# Patient Record
Sex: MALE | Race: BLACK OR AFRICAN AMERICAN | Employment: FULL TIME | URBAN - METROPOLITAN AREA
[De-identification: names, ages, dates, MRNs, and addresses within clinical notes are randomized per-mention and may not be internally consistent; named-entity substitution may affect disease eponyms.]

---

## 2020-06-01 ENCOUNTER — APPOINTMENT (OUTPATIENT)
Dept: GENERAL RADIOLOGY | Age: 30
End: 2020-06-01
Payer: COMMERCIAL

## 2020-06-01 ENCOUNTER — HOSPITAL ENCOUNTER (EMERGENCY)
Age: 30
Discharge: HOME | End: 2020-06-01
Payer: COMMERCIAL

## 2020-06-01 VITALS
TEMPERATURE: 100.2 F | HEART RATE: 76 BPM | BODY MASS INDEX: 26.18 KG/M2 | DIASTOLIC BLOOD PRESSURE: 90 MMHG | HEIGHT: 76 IN | SYSTOLIC BLOOD PRESSURE: 128 MMHG | RESPIRATION RATE: 16 BRPM | OXYGEN SATURATION: 97 % | WEIGHT: 215 LBS

## 2020-06-01 LAB — SARS-COV-2, NAAT: NOT DETECTED

## 2020-06-01 PROCEDURE — U0002 COVID-19 LAB TEST NON-CDC: HCPCS

## 2020-06-01 PROCEDURE — 71045 X-RAY EXAM CHEST 1 VIEW: CPT

## 2020-06-01 PROCEDURE — 99283 EMERGENCY DEPT VISIT LOW MDM: CPT

## 2020-06-01 PROCEDURE — 6370000000 HC RX 637 (ALT 250 FOR IP): Performed by: NURSE PRACTITIONER

## 2020-06-01 RX ORDER — IBUPROFEN 800 MG/1
800 TABLET ORAL ONCE
Status: COMPLETED | OUTPATIENT
Start: 2020-06-01 | End: 2020-06-01

## 2020-06-01 RX ORDER — ACETAMINOPHEN 500 MG
1000 TABLET ORAL ONCE
Status: COMPLETED | OUTPATIENT
Start: 2020-06-01 | End: 2020-06-01

## 2020-06-01 RX ADMIN — ACETAMINOPHEN 1000 MG: 500 TABLET ORAL at 14:55

## 2020-06-01 RX ADMIN — IBUPROFEN 800 MG: 800 TABLET, FILM COATED ORAL at 16:28

## 2020-06-01 ASSESSMENT — PAIN SCALES - GENERAL
PAINLEVEL_OUTOF10: 8
PAINLEVEL_OUTOF10: 6

## 2020-06-01 NOTE — ED PROVIDER NOTES
[facility]     Department of Emergency Medicine   ED  Provider Note  Admit Date/RoomTime: 6/1/2020  2:35 PM  ED Room: 33/   Chief Complaint:   Fever (states temp was 103 at home starting friday increased weakness and bodyaches ); Chills; and Fatigue    History of Present Illness   Source of history provided by:  patient. History/Exam Limitations: none. Maureen Cardona is a 34 y.o. old male who has a past medical Hx of:   Past Medical History:   Diagnosis Date    Asthma     presents to the emergency department by private vehicle, for fever, which began 4 day(s) prior to arrival.  The fever is described as: low grade fevers and measured orally. Since onset the symptoms have been stable. His symptoms are associated with slight cough. There has been NO history of none of significance. He has been treated with OTC antipyretics prior to arrival.  Patient stated that he has recently been  at a family get together starting on Friday so developing low-grade fevers. He states they have been going on since then. Yesterday he woke up and was unable to taste foods or beverages. Is denying any type of fatigue. He has been having associated symptoms of a slight cough as well as a headache. N/A  ROS    Pertinent positives and negatives are stated within HPI, all other systems reviewed and are negative. No past surgical history on file. Social History:  reports that he has been smoking. He does not have any smokeless tobacco history on file. He reports that he does not drink alcohol or use drugs. Family History: family history is not on file. Allergies: Patient has no known allergies.     Physical Exam           ED Triage Vitals   BP Temp Temp Source Pulse Resp SpO2 Height Weight   06/01/20 1423 06/01/20 1420 06/01/20 1420 06/01/20 1420 06/01/20 1423 06/01/20 1420 06/01/20 1423 06/01/20 1423   129/70 99.5 °F (37.5 °C) Temporal 113 18 97 % 6' 4\" (1.93 m) 215 lb (97.5 kg)     Oxygen Saturation Interpretation: Bilobed Flap Text: The defect edges were debeveled with a #15 scalpel blade.  Given the location of the defect and the proximity to free margins a bilobe flap was deemed most appropriate.  Using a sterile surgical marker, an appropriate bilobe flap drawn around the defect.    The area thus outlined was incised deep to adipose tissue with a #15 scalpel blade.  The skin margins were undermined to an appropriate distance in all directions utilizing iris scissors.

## 2020-06-02 ENCOUNTER — CARE COORDINATION (OUTPATIENT)
Dept: CARE COORDINATION | Age: 30
End: 2020-06-02

## 2020-06-02 NOTE — CARE COORDINATION
Date/Time:  6/2/2020 10:56 AM  Attempted to reach patient by telephone. Unable to leave a message. Mailbox full. Will attempt to reach patient again.

## 2020-06-03 ENCOUNTER — CARE COORDINATION (OUTPATIENT)
Dept: CARE COORDINATION | Age: 30
End: 2020-06-03

## 2020-06-03 NOTE — CARE COORDINATION
Patient's preferred phone number: [phone]  Based on Loop alert triggers, patient will be contacted by nurse care manager for worsening symptoms. Pt will be further monitored by COVID Loop Team based on severity of symptoms and risk factors.

## 2021-10-05 ENCOUNTER — APPOINTMENT (OUTPATIENT)
Dept: CT IMAGING | Age: 31
DRG: 890 | End: 2021-10-05
Payer: COMMERCIAL

## 2021-10-05 ENCOUNTER — OFFICE VISIT (OUTPATIENT)
Dept: FAMILY MEDICINE CLINIC | Age: 31
DRG: 890 | End: 2021-10-05
Payer: COMMERCIAL

## 2021-10-05 ENCOUNTER — HOSPITAL ENCOUNTER (INPATIENT)
Age: 31
LOS: 33 days | Discharge: HOME | DRG: 890 | End: 2021-11-07
Attending: EMERGENCY MEDICINE | Admitting: FAMILY MEDICINE
Payer: COMMERCIAL

## 2021-10-05 VITALS
RESPIRATION RATE: 24 BRPM | BODY MASS INDEX: 20.19 KG/M2 | HEIGHT: 77 IN | WEIGHT: 171 LBS | SYSTOLIC BLOOD PRESSURE: 130 MMHG | TEMPERATURE: 100.2 F | HEART RATE: 140 BPM | OXYGEN SATURATION: 96 % | DIASTOLIC BLOOD PRESSURE: 76 MMHG

## 2021-10-05 DIAGNOSIS — N17.9 ACUTE KIDNEY INJURY DUE TO COVID-19 (HCC): ICD-10-CM

## 2021-10-05 DIAGNOSIS — R00.0 TACHYCARDIA: ICD-10-CM

## 2021-10-05 DIAGNOSIS — U07.1 ACUTE KIDNEY INJURY DUE TO COVID-19 (HCC): ICD-10-CM

## 2021-10-05 DIAGNOSIS — N17.9 AKI (ACUTE KIDNEY INJURY) (HCC): ICD-10-CM

## 2021-10-05 DIAGNOSIS — U07.1 COVID-19: Primary | ICD-10-CM

## 2021-10-05 DIAGNOSIS — R06.02 SHORTNESS OF BREATH: Primary | ICD-10-CM

## 2021-10-05 PROBLEM — J96.01 ACUTE RESPIRATORY FAILURE WITH HYPOXIA (HCC): Status: ACTIVE | Noted: 2021-10-05

## 2021-10-05 LAB
ALBUMIN SERPL-MCNC: 1.7 G/DL (ref 3.5–5.2)
ALP BLD-CCNC: 74 U/L (ref 40–129)
ALT SERPL-CCNC: 28 U/L (ref 0–40)
ANION GAP SERPL CALCULATED.3IONS-SCNC: 8 MMOL/L (ref 7–16)
AST SERPL-CCNC: 106 U/L (ref 0–39)
BASOPHILS ABSOLUTE: 0 E9/L (ref 0–0.2)
BASOPHILS RELATIVE PERCENT: 0 % (ref 0–2)
BILIRUB SERPL-MCNC: 0.3 MG/DL (ref 0–1.2)
BUN BLDV-MCNC: 41 MG/DL (ref 6–20)
CALCIUM SERPL-MCNC: 7.7 MG/DL (ref 8.6–10.2)
CHLORIDE BLD-SCNC: 102 MMOL/L (ref 98–107)
CO2: 20 MMOL/L (ref 22–29)
CREAT SERPL-MCNC: 3.6 MG/DL (ref 0.7–1.2)
EOSINOPHILS ABSOLUTE: 0 E9/L (ref 0.05–0.5)
EOSINOPHILS RELATIVE PERCENT: 0 % (ref 0–6)
GFR AFRICAN AMERICAN: 24
GFR NON-AFRICAN AMERICAN: 24 ML/MIN/1.73
GLUCOSE BLD-MCNC: 89 MG/DL (ref 74–99)
HCT VFR BLD CALC: 30 % (ref 37–54)
HEMOGLOBIN: 9.9 G/DL (ref 12.5–16.5)
LACTIC ACID, SEPSIS: 0.9 MMOL/L (ref 0.5–1.9)
LYMPHOCYTES ABSOLUTE: 0.76 E9/L (ref 1.5–4)
LYMPHOCYTES RELATIVE PERCENT: 17.9 % (ref 20–42)
MAGNESIUM: 2.7 MG/DL (ref 1.6–2.6)
MCH RBC QN AUTO: 29.2 PG (ref 26–35)
MCHC RBC AUTO-ENTMCNC: 33 % (ref 32–34.5)
MCV RBC AUTO: 88.5 FL (ref 80–99.9)
MONOCYTES ABSOLUTE: 0.13 E9/L (ref 0.1–0.95)
MONOCYTES RELATIVE PERCENT: 2.7 % (ref 2–12)
NEUTROPHILS ABSOLUTE: 3.36 E9/L (ref 1.8–7.3)
NEUTROPHILS RELATIVE PERCENT: 79.5 % (ref 43–80)
OVALOCYTES: ABNORMAL
PDW BLD-RTO: 14.8 FL (ref 11.5–15)
PLATELET # BLD: 178 E9/L (ref 130–450)
PMV BLD AUTO: 11.9 FL (ref 7–12)
POLYCHROMASIA: ABNORMAL
POTASSIUM SERPL-SCNC: 5.2 MMOL/L (ref 3.5–5)
PRO-BNP: 1599 PG/ML (ref 0–125)
RBC # BLD: 3.39 E12/L (ref 3.8–5.8)
SARS-COV-2, NAAT: DETECTED
SCHISTOCYTES: ABNORMAL
SODIUM BLD-SCNC: 130 MMOL/L (ref 132–146)
TOTAL PROTEIN: 6 G/DL (ref 6.4–8.3)
TROPONIN, HIGH SENSITIVITY: 78 NG/L (ref 0–11)
TROPONIN, HIGH SENSITIVITY: 78 NG/L (ref 0–11)
WBC # BLD: 4.2 E9/L (ref 4.5–11.5)

## 2021-10-05 PROCEDURE — 71250 CT THORAX DX C-: CPT

## 2021-10-05 PROCEDURE — 83735 ASSAY OF MAGNESIUM: CPT

## 2021-10-05 PROCEDURE — 85025 COMPLETE CBC W/AUTO DIFF WBC: CPT

## 2021-10-05 PROCEDURE — 6370000000 HC RX 637 (ALT 250 FOR IP): Performed by: EMERGENCY MEDICINE

## 2021-10-05 PROCEDURE — 83605 ASSAY OF LACTIC ACID: CPT

## 2021-10-05 PROCEDURE — G8484 FLU IMMUNIZE NO ADMIN: HCPCS | Performed by: STUDENT IN AN ORGANIZED HEALTH CARE EDUCATION/TRAINING PROGRAM

## 2021-10-05 PROCEDURE — 87186 SC STD MICRODIL/AGAR DIL: CPT

## 2021-10-05 PROCEDURE — 74176 CT ABD & PELVIS W/O CONTRAST: CPT

## 2021-10-05 PROCEDURE — 2580000003 HC RX 258: Performed by: EMERGENCY MEDICINE

## 2021-10-05 PROCEDURE — 80053 COMPREHEN METABOLIC PANEL: CPT

## 2021-10-05 PROCEDURE — 99202 OFFICE O/P NEW SF 15 MIN: CPT | Performed by: STUDENT IN AN ORGANIZED HEALTH CARE EDUCATION/TRAINING PROGRAM

## 2021-10-05 PROCEDURE — G8427 DOCREV CUR MEDS BY ELIG CLIN: HCPCS | Performed by: STUDENT IN AN ORGANIZED HEALTH CARE EDUCATION/TRAINING PROGRAM

## 2021-10-05 PROCEDURE — 87635 SARS-COV-2 COVID-19 AMP PRB: CPT

## 2021-10-05 PROCEDURE — 93005 ELECTROCARDIOGRAM TRACING: CPT | Performed by: EMERGENCY MEDICINE

## 2021-10-05 PROCEDURE — 99285 EMERGENCY DEPT VISIT HI MDM: CPT

## 2021-10-05 PROCEDURE — 87077 CULTURE AEROBIC IDENTIFY: CPT

## 2021-10-05 PROCEDURE — 84484 ASSAY OF TROPONIN QUANT: CPT

## 2021-10-05 PROCEDURE — 87040 BLOOD CULTURE FOR BACTERIA: CPT

## 2021-10-05 PROCEDURE — 6360000002 HC RX W HCPCS: Performed by: FAMILY MEDICINE

## 2021-10-05 PROCEDURE — 99204 OFFICE O/P NEW MOD 45 MIN: CPT | Performed by: STUDENT IN AN ORGANIZED HEALTH CARE EDUCATION/TRAINING PROGRAM

## 2021-10-05 PROCEDURE — 1036F TOBACCO NON-USER: CPT | Performed by: STUDENT IN AN ORGANIZED HEALTH CARE EDUCATION/TRAINING PROGRAM

## 2021-10-05 PROCEDURE — 3E0333Z INTRODUCTION OF ANTI-INFLAMMATORY INTO PERIPHERAL VEIN, PERCUTANEOUS APPROACH: ICD-10-PCS | Performed by: FAMILY MEDICINE

## 2021-10-05 PROCEDURE — G8420 CALC BMI NORM PARAMETERS: HCPCS | Performed by: STUDENT IN AN ORGANIZED HEALTH CARE EDUCATION/TRAINING PROGRAM

## 2021-10-05 PROCEDURE — 83880 ASSAY OF NATRIURETIC PEPTIDE: CPT

## 2021-10-05 PROCEDURE — 2140000000 HC CCU INTERMEDIATE R&B

## 2021-10-05 RX ORDER — SODIUM CHLORIDE 9 MG/ML
25 INJECTION, SOLUTION INTRAVENOUS PRN
Status: DISCONTINUED | OUTPATIENT
Start: 2021-10-05 | End: 2021-11-07 | Stop reason: HOSPADM

## 2021-10-05 RX ORDER — CHLORHEXIDINE GLUCONATE 0.12 MG/ML
RINSE ORAL
Status: ON HOLD | COMMUNITY
Start: 2021-09-09 | End: 2021-11-07 | Stop reason: HOSPADM

## 2021-10-05 RX ORDER — 0.9 % SODIUM CHLORIDE 0.9 %
1000 INTRAVENOUS SOLUTION INTRAVENOUS ONCE
Status: COMPLETED | OUTPATIENT
Start: 2021-10-05 | End: 2021-10-05

## 2021-10-05 RX ORDER — 0.9 % SODIUM CHLORIDE 0.9 %
1000 INTRAVENOUS SOLUTION INTRAVENOUS ONCE
Status: COMPLETED | OUTPATIENT
Start: 2021-10-06 | End: 2021-10-06

## 2021-10-05 RX ORDER — ACETAMINOPHEN 500 MG
1000 TABLET ORAL ONCE
Status: COMPLETED | OUTPATIENT
Start: 2021-10-05 | End: 2021-10-05

## 2021-10-05 RX ORDER — IBUPROFEN 800 MG/1
800 TABLET ORAL ONCE
Status: COMPLETED | OUTPATIENT
Start: 2021-10-05 | End: 2021-10-05

## 2021-10-05 RX ORDER — DEXAMETHASONE SODIUM PHOSPHATE 10 MG/ML
10 INJECTION INTRAMUSCULAR; INTRAVENOUS EVERY 12 HOURS
Status: DISCONTINUED | OUTPATIENT
Start: 2021-10-05 | End: 2021-10-08 | Stop reason: SDUPTHER

## 2021-10-05 RX ADMIN — DEXAMETHASONE SODIUM PHOSPHATE 10 MG: 10 INJECTION INTRAMUSCULAR; INTRAVENOUS at 23:43

## 2021-10-05 RX ADMIN — ACETAMINOPHEN 1000 MG: 500 TABLET ORAL at 16:08

## 2021-10-05 RX ADMIN — SODIUM CHLORIDE 1000 ML: 9 INJECTION, SOLUTION INTRAVENOUS at 16:09

## 2021-10-05 RX ADMIN — IBUPROFEN 800 MG: 800 TABLET, FILM COATED ORAL at 22:06

## 2021-10-05 RX ADMIN — SODIUM CHLORIDE 1000 ML: 9 INJECTION, SOLUTION INTRAVENOUS at 23:55

## 2021-10-05 SDOH — ECONOMIC STABILITY: FOOD INSECURITY: WITHIN THE PAST 12 MONTHS, THE FOOD YOU BOUGHT JUST DIDN'T LAST AND YOU DIDN'T HAVE MONEY TO GET MORE.: SOMETIMES TRUE

## 2021-10-05 SDOH — ECONOMIC STABILITY: FOOD INSECURITY: WITHIN THE PAST 12 MONTHS, YOU WORRIED THAT YOUR FOOD WOULD RUN OUT BEFORE YOU GOT MONEY TO BUY MORE.: SOMETIMES TRUE

## 2021-10-05 ASSESSMENT — ENCOUNTER SYMPTOMS
CONSTIPATION: 0
WHEEZING: 1
ABDOMINAL PAIN: 0
SINUS PAIN: 0
VOMITING: 1
CHEST TIGHTNESS: 1
COUGH: 1
SORE THROAT: 0
COUGH: 1
BLOOD IN STOOL: 0
VOMITING: 0
DIARRHEA: 1
ABDOMINAL PAIN: 0
SHORTNESS OF BREATH: 1
NAUSEA: 0
RHINORRHEA: 0
SHORTNESS OF BREATH: 1
NAUSEA: 1

## 2021-10-05 ASSESSMENT — LIFESTYLE VARIABLES: HOW OFTEN DO YOU HAVE A DRINK CONTAINING ALCOHOL: NEVER

## 2021-10-05 ASSESSMENT — SOCIAL DETERMINANTS OF HEALTH (SDOH): HOW HARD IS IT FOR YOU TO PAY FOR THE VERY BASICS LIKE FOOD, HOUSING, MEDICAL CARE, AND HEATING?: NOT VERY HARD

## 2021-10-05 ASSESSMENT — PAIN SCALES - GENERAL
PAINLEVEL_OUTOF10: 6
PAINLEVEL_OUTOF10: 9
PAINLEVEL_OUTOF10: 0

## 2021-10-05 ASSESSMENT — PAIN DESCRIPTION - LOCATION: LOCATION: CHEST

## 2021-10-05 ASSESSMENT — PAIN DESCRIPTION - PAIN TYPE: TYPE: ACUTE PAIN

## 2021-10-05 NOTE — PROGRESS NOTES
[facility]  FAMILY MEDICINE RESIDENCY PROGRAM  DATE OF VISIT : 10/5/2021    Patient : Alee Salas   Age : 36 y.o.  : 1990   MRN : <[id number]>   ______________________________________________________________________    Chief Complaint :   Chief Complaint   Patient presents with    [facility] Care     post COVID       HPI : Alee Salas is 36 y.o. male who presented to the clinic today for Shortness of breath    SOB  - COVID 19- positive in Aug  - experienced cough and SOB back then. Improved 10 days later  - 1 month ago began having return SOB, increase sputum production and cough  - reports unintentional weight loss of 50 lbs since Aug due to decrease appetite. - weakness in LE and numbness in feet to the point he can not shower on his own  - denies any recent sick contacts  - Temperature yesterday at home 102F    Past Medical History :  Past Medical History:   Diagnosis Date    Asthma      History reviewed. No pertinent surgical history. Allergies :   No Known Allergies    Medication List :    No current outpatient medications on file. No current facility-administered medications for this visit. Family History :    Family History   Problem Relation Age of Onset    Diabetes type 2  Mother     Diabetes type 2  Father        Surgical History :   History reviewed. No pertinent surgical history. Social History :   Social History     Tobacco Use    Smoking status: Former Smoker     Quit date: 2021     Years since quittin.1    Smokeless tobacco: Never Used   Substance Use Topics    Alcohol use: No    Drug use: No       Review of Systems :  Review of Systems   Constitutional: Positive for appetite change, fatigue, fever and unexpected weight change. HENT: Negative for congestion, sinus pain and sore throat. Respiratory: Positive for cough and shortness of breath. Cardiovascular: Positive for palpitations. Negative for chest pain.    Gastrointestinal: Positive for nausea and vomiting. Negative for abdominal pain. Endocrine: Positive for cold intolerance.     ______________________________________________________________________    Physical Exam :    Vitals: /76 (Site: Right Upper Arm, Position: Sitting, Cuff Size: Medium Adult)   Pulse 140   Temp 100.2 °F (37.9 °C) (Temporal)   Resp 24   Ht 6' 4.5\" (1.943 m)   Wt 171 lb (77.6 kg)   SpO2 96%   BMI 20.54 kg/m²   General Appearance: awake, alert, oriented, and ill appearing  HEENT: NCAT, MMM, no pallor or icterus. Neck: Supple, symmetrical, trachea midline Cervical  Lymphadenopathy   Chest wall/Lung: Course breath sounds. Wheezing bilaterally. Heart[de-identified] tachycardic, normal S1 and S2, no murmurs, rubs or gallops. Abdomen: SNTND, +BSx4. Extremities: Extremities normal, atraumatic, no cyanosis, clubbing or edema. Warm extremeties  Skin: Skin color, texture, turgor normal, no rashes or lesions  Musculokeletal: ROM grossly normal in all joints of extremities, no obvious joint swelling. Lymph nodes: cervical  lymph node enlargement appreciated  ___________________________________________________________________    Assessment & Plan :    1. Shortness of breath and tachycardia - differential diagnosis (COVID vs PNA vs asthma exacerbation)  - Pulse ox 94% while sitting, desaturated to 80% while walking 10 feet. - upon sitting down patient pulse ox increase to 90%, .   - given history of being covid positive in Aug, Pulse ox desaturation with minimal exertion and tachycardia patient will be sent to ED for further evaluation.            Ching Austin MD

## 2021-10-05 NOTE — ED NOTES
Bed: 19  Expected date:   Expected time:   Means of arrival:   Comments:  KANDI Leyva RN  10/05/21 4133

## 2021-10-05 NOTE — PROGRESS NOTES
S: 36 y.o. male here as a new patient to establish care. Hx of asthma. Dyspnea at rest and while sleeping, fatigue and numbness in pedal extremities. Symptoms started with COVID. Resolved and then returned about a month ago. Fever yesterday to 102. Lost 50 pounds unintentionally since he was diagnosed with COVID. Not using albuterol. O: VS: /76 (Site: Right Upper Arm, Position: Sitting, Cuff Size: Medium Adult)   Pulse 140   Temp 100.2 °F (37.9 °C) (Temporal)   Resp 24   Ht 6' 4.5\" (1.943 m)   Wt 171 lb (77.6 kg)   SpO2 96%   BMI 20.54 kg/m²    General: NAD   CV:  RRR, no gallops, rubs, or murmurs   Resp: diffuse wheezing anterior and bibasilar with coarse bs throughout   Abd:  Soft, nontender, no masses    Ext:  no C/C/E   Rt anterior cervical lad  Impression/Plan:   1. Dyspnea-ambulatory pulse for oxygen and pulse recheck with desaturation to 80s. Glendia Reeve -to ER. Possible asthma,pneumonia, or covid/viral etiologies. 2.Weight loss-HIV testing    To the ER      Attending Physician Statement  I have discussed the case, including pertinent history and exam findings with the resident. I saw and examined the patient. I agree with the documented assessment and plan.         Marilia Wilson MD

## 2021-10-06 PROBLEM — U07.1 PNEUMONIA DUE TO COVID-19 VIRUS: Status: ACTIVE | Noted: 2021-10-06

## 2021-10-06 PROBLEM — J45.909 ASTHMA: Status: ACTIVE | Noted: 2021-10-06

## 2021-10-06 PROBLEM — J12.82 PNEUMONIA DUE TO COVID-19 VIRUS: Status: ACTIVE | Noted: 2021-10-06

## 2021-10-06 LAB
ALBUMIN SERPL-MCNC: 1.3 G/DL (ref 3.5–5.2)
ALP BLD-CCNC: 67 U/L (ref 40–129)
ALT SERPL-CCNC: 27 U/L (ref 0–40)
ANION GAP SERPL CALCULATED.3IONS-SCNC: 7 MMOL/L (ref 7–16)
ANION GAP SERPL CALCULATED.3IONS-SCNC: 9 MMOL/L (ref 7–16)
AST SERPL-CCNC: 96 U/L (ref 0–39)
BACTERIA: ABNORMAL /HPF
BASOPHILS ABSOLUTE: 0.01 E9/L (ref 0–0.2)
BASOPHILS RELATIVE PERCENT: 0.4 % (ref 0–2)
BILIRUB SERPL-MCNC: 0.3 MG/DL (ref 0–1.2)
BILIRUBIN URINE: NEGATIVE
BLOOD, URINE: ABNORMAL
BUN BLDV-MCNC: 46 MG/DL (ref 6–20)
BUN BLDV-MCNC: 51 MG/DL (ref 6–20)
C-REACTIVE PROTEIN: 10.1 MG/DL (ref 0–0.4)
CALCIUM IONIZED: 1.15 MMOL/L (ref 1.15–1.33)
CALCIUM SERPL-MCNC: 7.2 MG/DL (ref 8.6–10.2)
CALCIUM SERPL-MCNC: 7.9 MG/DL (ref 8.6–10.2)
CHLORIDE BLD-SCNC: 106 MMOL/L (ref 98–107)
CHLORIDE BLD-SCNC: 108 MMOL/L (ref 98–107)
CHLORIDE URINE RANDOM: <20 MMOL/L
CLARITY: CLEAR
CO2: 14 MMOL/L (ref 22–29)
CO2: 17 MMOL/L (ref 22–29)
COARSE CASTS, UA: ABNORMAL /LPF (ref 0–2)
COLOR: YELLOW
CREAT SERPL-MCNC: 3.4 MG/DL (ref 0.7–1.2)
CREAT SERPL-MCNC: 3.5 MG/DL (ref 0.7–1.2)
CREATININE URINE: 129 MG/DL (ref 40–278)
D DIMER: 712 NG/ML DDU
EKG ATRIAL RATE: 114 BPM
EKG P AXIS: 66 DEGREES
EKG P-R INTERVAL: 118 MS
EKG Q-T INTERVAL: 312 MS
EKG QRS DURATION: 72 MS
EKG QTC CALCULATION (BAZETT): 430 MS
EKG R AXIS: 66 DEGREES
EKG T AXIS: 43 DEGREES
EKG VENTRICULAR RATE: 114 BPM
EOSINOPHILS ABSOLUTE: 0 E9/L (ref 0.05–0.5)
EOSINOPHILS RELATIVE PERCENT: 0 % (ref 0–6)
EPITHELIAL CELLS, UA: ABNORMAL /HPF
FERRITIN: 3921 NG/ML
FIBRINOGEN: >700 MG/DL (ref 225–540)
FOLATE: 13.8 NG/ML (ref 4.8–24.2)
GFR AFRICAN AMERICAN: 25
GFR AFRICAN AMERICAN: 26
GFR NON-AFRICAN AMERICAN: 25 ML/MIN/1.73
GFR NON-AFRICAN AMERICAN: 26 ML/MIN/1.73
GLUCOSE BLD-MCNC: 100 MG/DL (ref 74–99)
GLUCOSE BLD-MCNC: 113 MG/DL (ref 74–99)
GLUCOSE URINE: NEGATIVE MG/DL
HCT VFR BLD CALC: 29 % (ref 37–54)
HEMOGLOBIN: 9.6 G/DL (ref 12.5–16.5)
IMMATURE GRANULOCYTES #: 0.02 E9/L
IMMATURE GRANULOCYTES %: 0.9 % (ref 0–5)
IRON SATURATION: 19 % (ref 20–55)
IRON: 23 MCG/DL (ref 59–158)
KETONES, URINE: NEGATIVE MG/DL
L. PNEUMOPHILA SEROGP 1 UR AG: NORMAL
LACTATE DEHYDROGENASE: 1389 U/L (ref 135–225)
LEUKOCYTE ESTERASE, URINE: NEGATIVE
LYMPHOCYTES ABSOLUTE: 0.27 E9/L (ref 1.5–4)
LYMPHOCYTES RELATIVE PERCENT: 11.6 % (ref 20–42)
MCH RBC QN AUTO: 29.9 PG (ref 26–35)
MCHC RBC AUTO-ENTMCNC: 33.1 % (ref 32–34.5)
MCV RBC AUTO: 90.3 FL (ref 80–99.9)
MONOCYTES ABSOLUTE: 0.04 E9/L (ref 0.1–0.95)
MONOCYTES RELATIVE PERCENT: 1.7 % (ref 2–12)
NEUTROPHILS ABSOLUTE: 1.98 E9/L (ref 1.8–7.3)
NEUTROPHILS RELATIVE PERCENT: 85.4 % (ref 43–80)
NITRITE, URINE: NEGATIVE
OSMOLALITY URINE: 225 MOSM/KG (ref 300–900)
OVALOCYTES: ABNORMAL
PDW BLD-RTO: 15.1 FL (ref 11.5–15)
PH UA: 5 (ref 5–9)
PLATELET # BLD: 144 E9/L (ref 130–450)
PMV BLD AUTO: 10.7 FL (ref 7–12)
POIKILOCYTES: ABNORMAL
POLYCHROMASIA: ABNORMAL
POTASSIUM REFLEX MAGNESIUM: 5.2 MMOL/L (ref 3.5–5)
POTASSIUM SERPL-SCNC: 5.5 MMOL/L (ref 3.5–5)
POTASSIUM, UR: 19.4 MMOL/L
PROTEIN UA: >=300 MG/DL
RBC # BLD: 3.21 E12/L (ref 3.8–5.8)
RBC UA: ABNORMAL /HPF (ref 0–2)
SEDIMENTATION RATE, ERYTHROCYTE: 130 MM/HR (ref 0–15)
SMUDGE CELLS: ABNORMAL
SODIUM BLD-SCNC: 130 MMOL/L (ref 132–146)
SODIUM BLD-SCNC: 131 MMOL/L (ref 132–146)
SODIUM URINE: <20 MMOL/L
SPECIFIC GRAVITY UA: 1.01 (ref 1–1.03)
TOTAL CK: 605 U/L (ref 20–200)
TOTAL IRON BINDING CAPACITY: 118 MCG/DL (ref 250–450)
TOTAL PROTEIN: 6.3 G/DL (ref 6.4–8.3)
TSH SERPL DL<=0.05 MIU/L-ACNC: 3.22 UIU/ML (ref 0.27–4.2)
UREA NITROGEN, UR: 362 MG/DL (ref 800–1666)
UROBILINOGEN, URINE: 0.2 E.U./DL
VITAMIN B-12: 1005 PG/ML (ref 211–946)
WAXY CASTS: ABNORMAL /LPF
WBC # BLD: 2.3 E9/L (ref 4.5–11.5)
WBC UA: ABNORMAL /HPF (ref 0–5)

## 2021-10-06 PROCEDURE — 86140 C-REACTIVE PROTEIN: CPT

## 2021-10-06 PROCEDURE — 86701 HIV-1ANTIBODY: CPT

## 2021-10-06 PROCEDURE — 6370000000 HC RX 637 (ALT 250 FOR IP): Performed by: NURSE PRACTITIONER

## 2021-10-06 PROCEDURE — 84540 ASSAY OF URINE/UREA-N: CPT

## 2021-10-06 PROCEDURE — 80053 COMPREHEN METABOLIC PANEL: CPT

## 2021-10-06 PROCEDURE — 87186 SC STD MICRODIL/AGAR DIL: CPT

## 2021-10-06 PROCEDURE — 82746 ASSAY OF FOLIC ACID SERUM: CPT

## 2021-10-06 PROCEDURE — 6360000002 HC RX W HCPCS: Performed by: NURSE PRACTITIONER

## 2021-10-06 PROCEDURE — 84165 PROTEIN E-PHORESIS SERUM: CPT

## 2021-10-06 PROCEDURE — 83615 LACTATE (LD) (LDH) ENZYME: CPT

## 2021-10-06 PROCEDURE — 86702 HIV-2 ANTIBODY: CPT

## 2021-10-06 PROCEDURE — 87077 CULTURE AEROBIC IDENTIFY: CPT

## 2021-10-06 PROCEDURE — 83935 ASSAY OF URINE OSMOLALITY: CPT

## 2021-10-06 PROCEDURE — 93010 ELECTROCARDIOGRAM REPORT: CPT | Performed by: INTERNAL MEDICINE

## 2021-10-06 PROCEDURE — 84443 ASSAY THYROID STIM HORMONE: CPT

## 2021-10-06 PROCEDURE — 6360000002 HC RX W HCPCS: Performed by: FAMILY MEDICINE

## 2021-10-06 PROCEDURE — 2580000003 HC RX 258: Performed by: INTERNAL MEDICINE

## 2021-10-06 PROCEDURE — 82436 ASSAY OF URINE CHLORIDE: CPT

## 2021-10-06 PROCEDURE — 99223 1ST HOSP IP/OBS HIGH 75: CPT | Performed by: FAMILY MEDICINE

## 2021-10-06 PROCEDURE — 82570 ASSAY OF URINE CREATININE: CPT

## 2021-10-06 PROCEDURE — 83550 IRON BINDING TEST: CPT

## 2021-10-06 PROCEDURE — 86703 HIV-1/HIV-2 1 RESULT ANTBDY: CPT

## 2021-10-06 PROCEDURE — 82728 ASSAY OF FERRITIN: CPT

## 2021-10-06 PROCEDURE — 36415 COLL VENOUS BLD VENIPUNCTURE: CPT

## 2021-10-06 PROCEDURE — 84166 PROTEIN E-PHORESIS/URINE/CSF: CPT

## 2021-10-06 PROCEDURE — 85384 FIBRINOGEN ACTIVITY: CPT

## 2021-10-06 PROCEDURE — 82607 VITAMIN B-12: CPT

## 2021-10-06 PROCEDURE — 87449 NOS EACH ORGANISM AG IA: CPT

## 2021-10-06 PROCEDURE — 85651 RBC SED RATE NONAUTOMATED: CPT

## 2021-10-06 PROCEDURE — 85378 FIBRIN DEGRADE SEMIQUANT: CPT

## 2021-10-06 PROCEDURE — 82330 ASSAY OF CALCIUM: CPT

## 2021-10-06 PROCEDURE — 81001 URINALYSIS AUTO W/SCOPE: CPT

## 2021-10-06 PROCEDURE — 82550 ASSAY OF CK (CPK): CPT

## 2021-10-06 PROCEDURE — P9047 ALBUMIN (HUMAN), 25%, 50ML: HCPCS | Performed by: NURSE PRACTITIONER

## 2021-10-06 PROCEDURE — 80074 ACUTE HEPATITIS PANEL: CPT

## 2021-10-06 PROCEDURE — 80048 BASIC METABOLIC PNL TOTAL CA: CPT

## 2021-10-06 PROCEDURE — 85025 COMPLETE CBC W/AUTO DIFF WBC: CPT

## 2021-10-06 PROCEDURE — 2140000000 HC CCU INTERMEDIATE R&B

## 2021-10-06 PROCEDURE — 2580000003 HC RX 258: Performed by: FAMILY MEDICINE

## 2021-10-06 PROCEDURE — 2500000003 HC RX 250 WO HCPCS: Performed by: INTERNAL MEDICINE

## 2021-10-06 PROCEDURE — 84300 ASSAY OF URINE SODIUM: CPT

## 2021-10-06 PROCEDURE — 84133 ASSAY OF URINE POTASSIUM: CPT

## 2021-10-06 PROCEDURE — 86038 ANTINUCLEAR ANTIBODIES: CPT

## 2021-10-06 PROCEDURE — 87088 URINE BACTERIA CULTURE: CPT

## 2021-10-06 PROCEDURE — 6360000002 HC RX W HCPCS: Performed by: INTERNAL MEDICINE

## 2021-10-06 PROCEDURE — 83540 ASSAY OF IRON: CPT

## 2021-10-06 PROCEDURE — 87040 BLOOD CULTURE FOR BACTERIA: CPT

## 2021-10-06 PROCEDURE — 82784 ASSAY IGA/IGD/IGG/IGM EACH: CPT

## 2021-10-06 RX ORDER — SODIUM CHLORIDE 0.9 % (FLUSH) 0.9 %
5-40 SYRINGE (ML) INJECTION EVERY 12 HOURS SCHEDULED
Status: DISCONTINUED | OUTPATIENT
Start: 2021-10-06 | End: 2021-11-07 | Stop reason: HOSPADM

## 2021-10-06 RX ORDER — SODIUM CHLORIDE 0.9 % (FLUSH) 0.9 %
5-40 SYRINGE (ML) INJECTION PRN
Status: DISCONTINUED | OUTPATIENT
Start: 2021-10-06 | End: 2021-11-07 | Stop reason: HOSPADM

## 2021-10-06 RX ORDER — ALBUMIN (HUMAN) 12.5 G/50ML
25 SOLUTION INTRAVENOUS EVERY 6 HOURS
Status: DISPENSED | OUTPATIENT
Start: 2021-10-06 | End: 2021-10-07

## 2021-10-06 RX ORDER — HEPARIN SODIUM 10000 [USP'U]/ML
5000 INJECTION, SOLUTION INTRAVENOUS; SUBCUTANEOUS EVERY 8 HOURS
Status: DISCONTINUED | OUTPATIENT
Start: 2021-10-07 | End: 2021-10-15

## 2021-10-06 RX ORDER — ONDANSETRON 4 MG/1
4 TABLET, ORALLY DISINTEGRATING ORAL EVERY 8 HOURS PRN
Status: DISCONTINUED | OUTPATIENT
Start: 2021-10-06 | End: 2021-11-07 | Stop reason: HOSPADM

## 2021-10-06 RX ORDER — HEPARIN SODIUM 10000 [USP'U]/ML
5000 INJECTION, SOLUTION INTRAVENOUS; SUBCUTANEOUS EVERY 8 HOURS
Status: DISCONTINUED | OUTPATIENT
Start: 2021-10-06 | End: 2021-10-06

## 2021-10-06 RX ORDER — ACETAMINOPHEN 650 MG/1
650 SUPPOSITORY RECTAL EVERY 6 HOURS PRN
Status: DISCONTINUED | OUTPATIENT
Start: 2021-10-06 | End: 2021-11-07 | Stop reason: HOSPADM

## 2021-10-06 RX ORDER — ACETAMINOPHEN 325 MG/1
650 TABLET ORAL EVERY 6 HOURS PRN
Status: DISCONTINUED | OUTPATIENT
Start: 2021-10-06 | End: 2021-11-07 | Stop reason: HOSPADM

## 2021-10-06 RX ORDER — ONDANSETRON 2 MG/ML
4 INJECTION INTRAMUSCULAR; INTRAVENOUS EVERY 6 HOURS PRN
Status: DISCONTINUED | OUTPATIENT
Start: 2021-10-06 | End: 2021-11-07 | Stop reason: HOSPADM

## 2021-10-06 RX ORDER — POLYETHYLENE GLYCOL 3350 17 G/17G
17 POWDER, FOR SOLUTION ORAL DAILY PRN
Status: DISCONTINUED | OUTPATIENT
Start: 2021-10-06 | End: 2021-11-07 | Stop reason: HOSPADM

## 2021-10-06 RX ADMIN — ALBUMIN (HUMAN) 25 G: 0.25 INJECTION, SOLUTION INTRAVENOUS at 16:54

## 2021-10-06 RX ADMIN — SODIUM BICARBONATE: 84 INJECTION, SOLUTION INTRAVENOUS at 16:54

## 2021-10-06 RX ADMIN — VANCOMYCIN HYDROCHLORIDE 1500 MG: 10 INJECTION, POWDER, LYOPHILIZED, FOR SOLUTION INTRAVENOUS at 16:54

## 2021-10-06 RX ADMIN — ENOXAPARIN SODIUM 40 MG: 40 INJECTION SUBCUTANEOUS at 08:48

## 2021-10-06 RX ADMIN — Medication 10 ML: at 21:30

## 2021-10-06 RX ADMIN — SODIUM ZIRCONIUM CYCLOSILICATE 10 G: 10 POWDER, FOR SUSPENSION ORAL at 21:30

## 2021-10-06 RX ADMIN — DEXAMETHASONE SODIUM PHOSPHATE 10 MG: 10 INJECTION INTRAMUSCULAR; INTRAVENOUS at 23:56

## 2021-10-06 RX ADMIN — DEXAMETHASONE SODIUM PHOSPHATE 10 MG: 10 INJECTION INTRAMUSCULAR; INTRAVENOUS at 10:44

## 2021-10-06 RX ADMIN — Medication 10 ML: at 08:48

## 2021-10-06 RX ADMIN — ALBUMIN (HUMAN) 25 G: 0.25 INJECTION, SOLUTION INTRAVENOUS at 23:56

## 2021-10-06 RX ADMIN — SODIUM BICARBONATE: 84 INJECTION, SOLUTION INTRAVENOUS at 23:46

## 2021-10-06 ASSESSMENT — PAIN SCALES - GENERAL
PAINLEVEL_OUTOF10: 0

## 2021-10-06 NOTE — CONSULTS
(DEFINITY) injection 1.65 mg, 1.5 mL, IntraVENous, ONCE PRN  0.9 % sodium chloride infusion, 25 mL, IntraVENous, PRN  Allergies:  Patient has no known allergies. Social History:    TOBACCO:   reports that he quit smoking about 7 weeks ago. He has never used smokeless tobacco.  ETOH:   reports no history of alcohol use. Family History:       Problem Relation Age of Onset    Diabetes type 2  Mother     Diabetes type 2  Father      REVIEW OF SYSTEMS:    CONSTITUTIONAL:  negative for  fevers, chills and sweats.  Positive for fatigue, malaise and anorexia  EYES:  negative for  visual disturbance and color blindness  HEENT:  negative for  epistaxis and sore throat  RESPIRATORY:  positive for  dyspnea  CARDIOVASCULAR:  negative for  chest pain, palpitations, orthopnea  GASTROINTESTINAL:  positive for nausea  GENITOURINARY:  negative  INTEGUMENT/BREAST:  negative  HEMATOLOGIC/LYMPHATIC:  negative for easy bruising and bleeding  ALLERGIC/IMMUNOLOGIC:  negative for angioedema and anaphylaxis  ENDOCRINE:  negative for heat intolerance and cold intolerance  MUSCULOSKELETAL:  negative for  joint swelling and stiff joints positive for muscle weakness  NEUROLOGICAL:  negative for headaches, dizziness and seizures  BEHAVIOR/PSYCH:  negative  PHYSICAL EXAM:      Vitals:    VITALS:  /80   Pulse 76   Temp 97.5 °F (36.4 °C) (Oral)   Resp 18   Ht 6' 4.5\" (1.943 m)   Wt 173 lb (78.5 kg)   SpO2 97%   BMI 20.78 kg/m²   24HR INTAKE/OUTPUT:    Intake/Output Summary (Last 24 hours) at 10/6/2021 1204  Last data filed at 10/6/2021 0659  Gross per 24 hour   Intake 1000 ml   Output    Net 1000 ml       Constitutional:  Awake, alert, NAD  HEENT:  PERRL, normocephalic, atraumatic  Respiratory: diminished lung sounds  Cardiovascular/Edema:  RRR, S1/S2, -edema  Gastrointestinal:  abd flat, soft, non-tender  Neurologic:  Awake, alert, no focal deficits  Skin:  Warm, dry, no rash or lesions  Other:      DATA:    CBC:   Lab Results Component Value Date    WBC 2.3 10/06/2021    RBC 3.21 10/06/2021    HGB 9.6 10/06/2021    HCT 29.0 10/06/2021    MCV 90.3 10/06/2021    MCH 29.9 10/06/2021    MCHC 33.1 10/06/2021    RDW 15.1 10/06/2021     10/06/2021    MPV 10.7 10/06/2021     CMP:    Lab Results   Component Value Date     10/06/2021    K 5.2 10/06/2021     10/06/2021    CO2 14 10/06/2021    BUN 46 10/06/2021    CREATININE 3.5 10/06/2021    GFRAA 25 10/06/2021    LABGLOM 25 10/06/2021    GLUCOSE 100 10/06/2021    PROT 6.3 10/06/2021    LABALBU 1.3 10/06/2021    CALCIUM 7.2 10/06/2021    BILITOT 0.3 10/06/2021    ALKPHOS 67 10/06/2021    AST 96 10/06/2021    ALT 27 10/06/2021     Magnesium:    Lab Results   Component Value Date    MG 2.7 10/05/2021     Phosphorus:  No results found for: PHOS  Radiology Review:      CT Chest October 5, 2021   Limited study by the patient's respiratory motion and lack of intravenous   contrast.       Extensive multifocal COVID-19 pneumonia bilaterally.       Bilateral trace layering pleural effusions.       Bilateral gynecomastia.       Abdominal organs inadequately evaluated due to the limitations of the study.       Normal appendix.       No evidence of bowel obstruction.             IMPRESSION/RECOMMENDATIONS:      Briefly, Mr. Fatimah Meyer is a 36year old male with no significant PMH who was admitted on October 5, 2021 after he presented from an outside clinic after he was found to be hypoxic during 6 minute walking test. Patient tested positive for Covid 19 on August 18th, he is unvaccinated and had Covid 19 last year as well. Patient states he has quarantined by himself and when his mother recently visited him she was shocked at his appearance as he had lost about 50 pounds in 2 and a half months. On admission labs were significant for sodium of 130, potassium 5.2, bicarbonate 20, BUN 41, creatinine 3.6, magnesium 2.7, calcium 7.7 and proBNP 1,599. We are consulted for DEISI.     DEISI stage II versus DEISI on CKD?, most likely volume responsive pre-renal DEISI d/t volume depletion from poor oral intake, urine sodium <20, Patient states he has lost about 50 pounds in the last 2 and a half months. Large proteinuria (U/A with > 300 mg), r/o CKD, FSGS?, we will obtain UACR and UPCR   Hyperkalemia, 2/2 DEISI. Low potassium diet  Hypotonic hyponatremia 2/2 intravascular volume from poor oral intake. Bicarb drip started. Low bicarbonate levels with hyperchloremia, most likely NAGMA versus respiratory alkalosis; we need a PH to clarify diagnosis    Severe Hypoalbuminemia, multifactorial  Covid 19 infection in non vaccinated pt    Plan:    Start D5 with 150 mEq sodium bicarbonate at 150 cc/hr  Give albumin 25 g Q6 hrs X 4 doses  BMP Q 6 hrs X3  Strict I&O's  Monitor potassium levels  Low potassium diet  Lokelma 10 g po every 8 hours x 3    Monitor albumin levels  Obtain ionized calcium in am  Obtain chest x-ray  Obtain urine ACR and PCR   Encourage oral intake      Thank you very much for allowing us to participate in the care of Mr. Neida Agosto.     Electronically signed by RICKY Murhpy CNP on 10/6/2021 at 12:08 PM

## 2021-10-06 NOTE — ED NOTES
This RN to bedside, pt resting comfortably in bed, vitals are stable, no distress noted at this time. Pt complains of pain in both lower extremities 9/10.    Nelda Whitman RN  10/05/21 2004       Nelda Whitman RN  10/05/21 2005

## 2021-10-06 NOTE — CARE COORDINATION
10/6: Transition of care: Pt came into the ER with SOB and weakness/Covid+ 10/5. Pt resides with his cousin Cindy with apprx 9 steps to enter. There are apprx another 12 steps to get to his bed/bathroom. Pt's PCP is  and [facility]. Pt has no DME/HHC or preferences. Pt's d/c plan is to return home. Pt's family can transport him. Pt is currently on room air, IV decadron 10mg Q 12hrs and pending a 2Decho. SW/CM will continue to follow. Electronically signed by Job Borges RN on 10/6/2021 at 9:03 AM  The Plan for Transition of Care is related to the following treatment goals: DME/HHC    The Patient and/or patient representative  was provided with a choice of provider and agrees   with the discharge plan. [x] Yes [] No    Freedom of choice list was provided with basic dialogue that supports the patient's individualized plan of care/goals, treatment preferences and shares the quality data associated with the providers.  [x] Yes [] No

## 2021-10-06 NOTE — PROGRESS NOTES
[facility] Inpatient   Resident Progress Note    S:  Hospital day: 1   Brief Synopsis: Mera Collado is a 36 y.o. male with no PMH who presented with hypoxia and fever. He was initially seen in PCP office and was hypoxic on exertion with desaturate into the mid low 80s as well as noted to be febrile and tachycardic. Per patient, his symptoms began 8/15/2021 and he tested positive for Covid 8/28. endorse worsening shortness of breath over the past month with sputum production, intermittent chest discomfort, persistent diarrhea, chills, fever, decrease in appetite and 50 pound weight loss since August.  Patient also endorses significantly worsening \"neuropathy\" of bilateral feet, states it feels like he is \"being stabbed by needles\" which is causing him inability to walk due to pain. In the ED, he was found to be anemic, hyperkalemic, hyponatremic, febrile and COVID positive. He also had elevated Creatinine, AST, proBNP and troponin x2. CTA showed extensive multifocal COVID-19 pneumonia bilaterally, bilateral trace layering pleural effusion and bilateral gynecomastia. CT abdomen was limited due to movement and non-contrast, but showed normal appendix and no evidence of bowel obstruction. Overnight/interim:  Patient was seen and examined at bedside. He states he has been profusely diaphoretic, as needs to change his gown twice. He denies chest pain, shortness of breath, but continues to have numbness in his toes. Also endorses cough with sputum production, decreased appetite and diarrhea. Denies hematochezia, hematuria, and hemoptysis. He is not vaccinated against Covid. He states he had Covid 8/2020 and again this year. He has had weakness since August 15.           Cont meds:    sodium chloride       Scheduled meds:    sodium chloride flush  5-40 mL IntraVENous 2 times per day    enoxaparin  40 mg SubCUTAneous Daily    dexamethasone  10 mg IntraVENous Q12H     PRN meds: sodium chloride flush, ondansetron **OR** ondansetron, polyethylene glycol, acetaminophen **OR** acetaminophen, perflutren lipid microspheres, sodium chloride     I reviewed the patient's past medical and surgical history, Medications and Allergies. O:  /71   Pulse 109   Temp 98.9 °F (37.2 °C) (Oral)   Resp 18   Ht 6' 4.5\" (1.943 m)   Wt 173 lb (78.5 kg)   SpO2 94%   BMI 20.78 kg/m²   24 hour I&O: No intake/output data recorded. No intake/output data recorded. Physical Exam  Constitutional:       General: He is not in acute distress. Appearance: Normal appearance. He is ill-appearing. He is not diaphoretic. HENT:      Nose: No rhinorrhea. Mouth/Throat:      Mouth: Mucous membranes are moist.      Pharynx: Oropharynx is clear. Eyes:      General: No scleral icterus. Right eye: No discharge. Left eye: No discharge. Extraocular Movements: Extraocular movements intact. Cardiovascular:      Rate and Rhythm: Normal rate and regular rhythm. Heart sounds: Normal heart sounds. No murmur heard. No friction rub. No gallop. Pulmonary:      Effort: Pulmonary effort is normal. No respiratory distress. Breath sounds: No wheezing, rhonchi or rales. Comments: Decreased breath sounds   Chest:      Chest wall: No tenderness. Abdominal:      General: Abdomen is flat. Bowel sounds are normal. There is no distension. Palpations: Abdomen is soft. There is no mass. Tenderness: There is abdominal tenderness (diffuse, most in LUQ ). There is no guarding or rebound. Hernia: No hernia is present. Musculoskeletal:         General: No swelling or tenderness. Cervical back: Normal range of motion and neck supple. No rigidity or tenderness. Right lower leg: No edema. Left lower leg: No edema. Lymphadenopathy:      Cervical: No cervical adenopathy. Skin:     General: Skin is warm and dry. Coloration: Skin is not jaundiced. Findings: No bruising or rash. Neurological:      General: No focal deficit present. Mental Status: He is alert and oriented to person, place, and time. Cranial Nerves: No cranial nerve deficit. Motor: Weakness (decreased strenght in LE and UE, 4/5 ) present. Comments: Hypersensitivity to light touch   Psychiatric:         Mood and Affect: Mood normal.         Behavior: Behavior normal.         Thought Content: Thought content normal.                 Labs:  Na/K/Cl/CO2:  130/5.2/102/20 (10/05 1550)  BUN/Cr/glu/ALT/AST/amyl/lip:  41/3.6/--/28/106/--/-- (10/05 1550)  WBC/Hgb/Hct/Plts:  4.2/9.9/30.0/178 (10/05 1550)  estimated creatinine clearance is 34 mL/min (A) (based on SCr of 3.6 mg/dL (H)). Other pertinent labs as noted below    Radiology:  CT ABDOMEN PELVIS WO CONTRAST Additional Contrast? None   Final Result   Limited study by the patient's respiratory motion and lack of intravenous   contrast.      Extensive multifocal COVID-19 pneumonia bilaterally. Bilateral trace layering pleural effusions. Bilateral gynecomastia. Abdominal organs inadequately evaluated due to the limitations of the study. Normal appendix. No evidence of bowel obstruction. CT CHEST WO CONTRAST   Final Result   Limited study by the patient's respiratory motion and lack of intravenous   contrast.      Extensive multifocal COVID-19 pneumonia bilaterally. Bilateral trace layering pleural effusions. Bilateral gynecomastia. Abdominal organs inadequately evaluated due to the limitations of the study. Normal appendix. No evidence of bowel obstruction. Resident Assessment and Plan       1.  Acute hypoxia 2/2 COVID pneumonia   - Worsening SOB since 8/28/2021 when he initially tested positive   - Patient desaturates to low-mid 80s with any activity  - CTA: COVID pneumonia, Bilateral trace layering pleural effusion, negative for PE   - D dimer 712, Ferritin 3900, CRP 10.1  - WBC 4.2 on admission, now 2.3  - Does not require O2 when resting, saturating 94-97%  - Continue monitoring SpO2  - Pulmonology and ID consulted  - Legionella negative  - T spot TB test ordered   - Tmax overnight 103, currently afebrile, received tylenol / ibuprofen   - Trend CRP, D Dimer, Ferritin daily  - Continue Decadron 10mg BID, day 2      2. Weight loss   - Per patient due to decreased appetite from COVID; 50lb weight loss since August   - R/o other potential origins   - MARILEE, HIV, hepatitis panel pending   - TSH normal   - Sed rate and CRP elevated     3. DEISI   - Likely secondary to dehydration from persistent diarrhea and deacresed PO intake   - Cr on admission 3.6, now 3.5  - Urine osm and urine nitrogen low   - UA: protein, 1-2 waxy casts, moderate bacteria, moderate blood   - Nephro consulted, started on bicarb drip        4. Elevated troponin and proBNP  - 2/2 myocardial injury vs stress cardiomyopathy due to COVID vs new onset CHF   - initial troponin 78, 78  - initial BNP 1599  - Echo pending   - Consider Cardio consult   - Gentle hydration      4. Hyperkalemia/Hyponatremia   - K 5.2, Na 130  - IVF  - Monitor BMP      5.  Anemia   - Likely 2/2 from neuropathy   - Rule out granulomatosis with polyangitis in the setting of neuropathy, DEISI and weight loss  - Consider OP EMG        PT/OT evaluation: not indicated   DVT prophylaxis:lovenox   GI prophylaxis: not indicated   Disposition:home +/- home health/ rehab  Diet: adult         Electronically signed by Darrell Browne MD on 10/6/2021 at 6:32 AM  Attending physician: Dr. Bertin Li

## 2021-10-06 NOTE — ACP (ADVANCE CARE PLANNING)
Advance Care Planning   Healthcare Decision Maker:    Primary Decision Maker: Fay Ocampo - Brother/Sister - [phone]    Secondary Decision Maker: Cindi Doyle - Parent - [phone]    Click here to complete Healthcare Decision Makers including selection of the Healthcare Decision Maker Relationship (ie \"Primary\").

## 2021-10-06 NOTE — CONSULTS
[facility] CONSULT NOTE    Reason for Consult: COVID-19   Requested by: Tim Philippe MD    Chief complaint: Shortness of breath    History Obtained From: Patient and EMR    HISTORY OFPRESENT ILLNESS              The patient is a 36 y.o. male, not vaccinated against COVID-19, with history of asthma, presented on 10/05 with shortness of breath, found to be febrile at 103 °F, positive SARS-CoV-2 PCR, bilateral groundglass opacities on chest CT, tolerating room air with oxygen saturation of 97%. He reports having tested positive for SARS-CoV-2 for the 3rd time now with previous on in late August at which time he reports having quarantined. Urine Streptococcus pneumonia and Legionella antigens were negative. Blood cultures showed Gram-positive cocci in clusters. ID service was subsequently consulted for further recommendations.     Past Medical History  Past Medical History:   Diagnosis Date    Asthma        Current Facility-Administered Medications   Medication Dose Route Frequency Provider Last Rate Last Admin    sodium chloride flush 0.9 % injection 5-40 mL  5-40 mL IntraVENous 2 times per day Tim Philippe MD   10 mL at 10/06/21 0848    sodium chloride flush 0.9 % injection 5-40 mL  5-40 mL IntraVENous PRN Tim Philippe MD        ondansetron (ZOFRAN-ODT) disintegrating tablet 4 mg  4 mg Oral Q8H PRN Tim Philippe MD        Or    ondansetron [facility]) injection 4 mg  4 mg IntraVENous Q6H PRN Tim Philippe MD        polyethylene glycol [facility]) packet 17 g  17 g Oral Daily PRN Tim Philippe MD        acetaminophen (TYLENOL) tablet 650 mg  650 mg Oral Q6H PRN Tim Philippe MD        Or    acetaminophen (TYLENOL) suppository 650 mg  650 mg Rectal Q6H PRN Tim Philippe MD        [START ON 10/7/2021] heparin (porcine) injection 5,000 Units  5,000 Units SubCUTAneous Hira Fontana MD        sodium bicarbonate 150 mEq in dextrose 5 % 1,000 mL infusion   IntraVENous Continuous [facility]        dexamethasone (DECADRON) injection 10 mg  10 mg IntraVENous Q12H Corinne Beat, MD   10 mg at 10/06/21 1044    perflutren lipid microspheres (DEFINITY) injection 1.65 mg  1.5 mL IntraVENous ONCE PRN Corinne Beat, MD        0.9 % sodium chloride infusion  25 mL IntraVENous PRN Corinne Beat, MD           No Known Allergies    Surgical History  Past Surgical History:   Procedure Laterality Date    FRACTURE SURGERY          Social History  Social History     Socioeconomic History    Marital status: Single   Tobacco Use    Smoking status: Former Smoker     Quit date: 2021     Years since quittin.1    Smokeless tobacco: Never Used   Vaping Use    Vaping Use: Never used   Substance and Sexual Activity    Alcohol use: No    Drug use: No         Family Medical History  Family History   Problem Relation Age of Onset    Diabetes type 2  Mother     Diabetes type 2  Father        Review of Systems:  Constitutional: Has fever, no chills  Eyes: No vision changes, no retroorbital pain  ENT: No hearing changes, no ear pain  Respiratory: No cough, has dyspnea  Cardiovascular: No chest pain, no palpitations  Gastrointestinal: No abdominal pain, no diarrhea  Genitourinary: No dysuria, no hematuria  Integumentary: No rash, no itching  Musculoskeletal: No muscle pain, no joint pain  Neurologic: No headache, no numbness in extremities    Physical Examination:  Vitals:    10/05/21 2157 10/05/21 2342 10/06/21 0015 10/06/21 0844   BP: (!) 130/90 122/78 111/71 129/80   Pulse: 110 106 109 76   Resp: 18 16 18 18   Temp: 103 °F (39.4 °C) 101 °F (38.3 °C) 98.9 °F (37.2 °C) 97.5 °F (36.4 °C)   TempSrc: Oral Oral Oral Oral   SpO2: 97% 95% 94% 97%   Weight:   173 lb (78.5 kg)    Height:   6' 4.5\" (1.943 m)      Constitutional: Alert, not in distress  Eyes: Sclerae anicteric, no conjunctival erythema  ENT: No buccal lesion, no pharyngeal exudates  Neck: No nuchal rigidity, no cervical adenopathy  Lungs: Clear breath sounds, no crackles, no wheezes  Heart: Regular rate and rhythm, no murmurs  Abdomen: Bowel sounds present, soft, nontender  Skin: Warm and dry, no active dermatoses  Musculoskeletal: No joint erythema, no joint swelling    Labs, imaging, and medical records/notes were personally reviewed. Assessment:  Sepsis  COVID-19, mild  Pneumonia  Gram-positive cocci in clusters on blood culture  DEISI    Plan:  Repeat blood cultures. Start vancomycin after repeat blood cultures drawn. Follow up blood cultures. Follow up HIV screen. Monitor respiratory status. Wean off oxygen as tolerated. Continue supportive care. Advised to consider getting vaccinated against COVID-19. Thank you for involving me in the care of Kristin James. I will continue to follow. Please do not hesitate to call for any questions or concerns.         Electronically signed by Mi Chavez MD on 10/6/2021 at 12:24 PM

## 2021-10-06 NOTE — PROGRESS NOTES
[facility]  Family Medicine Attending    S: 36 y.o. male with history of asthma and smoking history presents with increasing dyspnea and cough over past month. Has tested positive for COVID twice, most recently end of August.  CT shows extensive bilateral pneumonia and COVID testing again positive. Today, some dyspnea, fevers, weakness. Painful paresthesias in bilateral toes      O: VS- Blood pressure 111/71, pulse 109, temperature 98.9 °F (37.2 °C), temperature source Oral, resp. rate 18, height 6' 4.5\" (1.943 m), weight 173 lb (78.5 kg), SpO2 94 %. Exam is as noted by resident with the following changes, additions or corrections:  Awake, alert  Heart - tachycardia  Lungs- bilateral wheezing and coarse BS  Ext - no edema, but very sensitive bilateral toes and distal feet. No color change     Impressions: Active Problems:    Acute respiratory failure with hypoxia (HCC)    Pneumonia due to COVID-19 virus    Asthma  Resolved Problems:    * No resolved hospital problems. *      Plan:   Kidney failure with proteinuria noted - labs obtained   Apparent iron deficiency, but normocytic anemia (? Renal anemia)   Consult pulmonary, ID, and nephrology   No old records - no significant PMH   Weight loss, hypoalbuminemia noted as well     Attending Physician Statement  I have reviewed the chart and seen the patient with the resident(s). I personally reviewed images, EKG's and similar tests, if present. I personally reviewed and performed key elements of the history and exam.  I have reviewed and confirmed student and/or resident history and exam with changes as indicated above. I agree with the assessment, plan and orders as documented by the resident. Please refer to the resident and/or student note for additional information.       Leslie Sandhu MD

## 2021-10-06 NOTE — PROGRESS NOTES
Pharmacy Consultation Note  (Antibiotic Dosing and Monitoring)    Initial consult date: 10/6/21  Consulting physician/provider: Karen Ashford  Drug: Vancomycin  Indication: bloodstream infection    Age/  Gender Height Weight IBW  Allergy Information   31 y.o./male 6' 4\" (193 cm) 171 lb (77.6 kg)     Ideal body weight: 87.9 kg (193 lb 14.3 oz)   Patient has no known allergies. Renal Function:  Recent Labs     10/05/21  1550 10/06/21  0657   BUN 41* 46*   CREATININE 3.6* 3.5*       Intake/Output Summary (Last 24 hours) at 10/6/2021 1509  Last data filed at 10/6/2021 0659  Gross per 24 hour   Intake 1000 ml   Output    Net 1000 ml       Vancomycin Monitoring:  Trough:  No results for input(s): VANCOTROUGH in the last 72 hours. Random:  No results for input(s): VANCORANDOM in the last 72 hours. Vancomycin Administration Times:  Recent vancomycin administrations      No vancomycin IV orders with administrations found. Orders not given:          vancomycin 1500 mg in dextrose 5% 300 mL IVPB    vancomycin (VANCOCIN) intermittent dosing (placeholder)                Assessment:  · Patient is a 36 y.o. male who has been initiated on vancomycin  · Estimated Creatinine Clearance: 35 mL/min (A) (based on SCr of 3.5 mg/dL (H)).   · To dose vancomycin, pharmacy will be utilizing dosing based off of levels because of patient's renal impairment/insufficiency    Plan:  · Vancomycin 1500 mg IVPB x 1  · Will continue to monitor renal function   · Clinical pharmacy to follow    Angel Marquez PharmD, BCGP 10/6/2021 3:11 PM

## 2021-10-06 NOTE — CONSULTS
Consult sent via [facility] service to Dr. William Polanco.   Hanh Boyer RN

## 2021-10-06 NOTE — PLAN OF CARE
Problem: Airway Clearance - Ineffective  Goal: Achieve or maintain patent airway  Outcome: Met This Shift     Problem: Gas Exchange - Impaired  Goal: Absence of hypoxia  Outcome: Met This Shift  Goal: Promote optimal lung function  Outcome: Met This Shift     Problem: Breathing Pattern - Ineffective  Goal: Ability to achieve and maintain a regular respiratory rate  Outcome: Met This Shift     Problem:  Body Temperature -  Risk of, Imbalanced  Goal: Ability to maintain a body temperature within defined limits  Outcome: Met This Shift  Goal: Will regain or maintain usual level of consciousness  Outcome: Met This Shift  Goal: Complications related to the disease process, condition or treatment will be avoided or minimized  Outcome: Met This Shift     Problem: Isolation Precautions - Risk of Spread of Infection  Goal: Prevent transmission of infection  Outcome: Met This Shift     Problem: Nutrition Deficits  Goal: Optimize nutritional status  Outcome: Met This Shift     Problem: Risk for Fluid Volume Deficit  Goal: Maintain normal heart rhythm  Outcome: Met This Shift  Goal: Maintain absence of muscle cramping  Outcome: Met This Shift     Problem: Loneliness or Risk for Loneliness  Goal: Demonstrate positive use of time alone when socialization is not possible  Outcome: Met This Shift     Problem: Patient Education: Go to Patient Education Activity  Goal: Patient/Family Education  Outcome: Met This Shift     Problem: Falls - Risk of:  Goal: Will remain free from falls  Description: Will remain free from falls  Outcome: Met This Shift  Goal: Absence of physical injury  Description: Absence of physical injury  Outcome: Met This Shift     Problem: Risk for Fluid Volume Deficit  Goal: Maintain normal serum potassium, sodium, calcium, phosphorus, and pH  Outcome: Not Met This Shift     Problem: Fatigue  Goal: Verbalize increase energy and improved vitality  Outcome: Not Met This Shift

## 2021-10-06 NOTE — H&P
[facility]  Family Medicine Residency Program  History and Physical    Patient:  Joanna Oviedo 36 y.o. male MRN: [id number]     Date of Service: 10/5/2021    Hospital Day: 1      Chief complaint: had concerns including Fever (sent in from the clinic for a low walking pulse ox and fever. Reports 53# weight loss in approx 2 months. Cough with left sided chest pain) and Extremity Weakness (states that the top of his bilateral feet feel numb and weak). History of Present Illness   The patient is a 36 y.o. male with no previous medical history who presented to the ED for worsening shortness of breath and weakness. Per patient he tested positive for Covid earlier this year and again in August 28. Patient was seen in PCPs office today and was noted to be hypoxic on exertion with desaturate into the mid low 80s as well as noted to be febrile and tachycardic. Patient was sent to the ED for further assessment and management. Patient does endorse worsening shortness of breath over the past month with sputum production, intermittent chest discomfort, persistent diarrhea, chills, fever, decrease in appetite and 50 pound weight loss since August.  Patient also endorses significantly worsening \"neuropathy\" of bilateral feet, states it feels like he is \"being stabbed by needles\" which is causing him inability to walk due to pain. ED course:  Patient remained hemodynamically stable. Labs: Hgb 9.9, , K5.2, BUN 41, CR 3.6, ALK Phos 74, ALT 28, , initial troponin 78 (repeat 78), BNP 1,599, COVID-19 positive. CTA chest: Extensive multifocal COVID-19 pneumonia bilaterally, bilateral trace layering pleural effusion. Bilateral gynecomastia. CT abdomen: Abdominal organs and adequately Evaluated due to the limitations of the study (noncontrast and movement), normal appendix and no evidence of bowel obstruction.       Medications   0.9 % sodium chloride bolus (0 mLs IntraVENous Stopped 10/5/21 2408)   acetaminophen (TYLENOL) tablet 1,000 mg (1,000 mg Oral Given 10/5/21 1603)       Past Medical History:      Diagnosis Date    Asthma        Past Surgical History:    History reviewed. No pertinent surgical history. Medications Prior to Admission:    Prior to Admission medications    Not on File       Allergies:  Patient has no known allergies. Social History:   TOBACCO:   reports that he quit smoking about 7 weeks ago. He has never used smokeless tobacco.  ETOH:   reports no history of alcohol use. Family History:       Problem Relation Age of Onset    Diabetes type 2  Mother     Diabetes type 2  Father        REVIEW OF SYSTEMS:    Review of Systems   Constitutional: Positive for activity change, appetite change, chills, fatigue, fever and unexpected weight change. HENT: Negative for congestion and rhinorrhea. Respiratory: Positive for cough, chest tightness, shortness of breath and wheezing. Cardiovascular: Positive for chest pain. Negative for palpitations and leg swelling. Gastrointestinal: Positive for diarrhea. Negative for abdominal pain, blood in stool, constipation, nausea and vomiting. Endocrine: Negative for polydipsia and polyuria. Genitourinary: Negative for dysuria, frequency, hematuria and urgency. Musculoskeletal: Positive for gait problem and myalgias. Negative for arthralgias. Neurological: Positive for dizziness, light-headedness and numbness. Negative for headaches. Hematological: Negative for adenopathy. Does not bruise/bleed easily. Physical Exam   Vitals: /82   Pulse 113   Temp 101.4 °F (38.6 °C) (Oral)   Resp 16   Ht 6' 4\" (1.93 m)   Wt 171 lb (77.6 kg)   SpO2 97%   BMI 20.81 kg/m²     Physical Exam  Vitals reviewed. Constitutional:       General: He is not in acute distress. Appearance: Normal appearance. He is ill-appearing. Cardiovascular:      Rate and Rhythm: Regular rhythm. Tachycardia present.       Pulses: Normal pulses. Heart sounds: Normal heart sounds. No murmur heard. Pulmonary:      Effort: No respiratory distress. Breath sounds: Wheezing present. Comments: Inspiratory and expiratory wheezing bilaterally, bilateral coarse breath sounds. Abdominal:      General: Bowel sounds are normal. There is no distension. Palpations: Abdomen is soft. Tenderness: There is no abdominal tenderness. Musculoskeletal:      Right lower leg: No edema. Left lower leg: No edema. Skin:     General: Skin is warm. Capillary Refill: Capillary refill takes less than 2 seconds. Neurological:      General: No focal deficit present. Mental Status: He is alert. Motor: No weakness. Comments: Significant hypersensitivity noted to the plantar aspect of bilateral feet otherwise strength and sensation equal and intact bilaterally above the ankle. Labs and Imaging Studies   Basic Labs  CBC:   Recent Labs     10/05/21  1550   WBC 4.2*   RBC 3.39*   HGB 9.9*   HCT 30.0*   MCV 88.5   MCH 29.2   MCHC 33.0   RDW 14.8      MPV 11.9       BMP:    Recent Labs     10/05/21  1550   *   K 5.2*      CO2 20*   BUN 41*   CREATININE 3.6*   GLUCOSE 89   CALCIUM 7.7*   PROT 6.0*   LABALBU 1.7*   BILITOT 0.3   ALKPHOS 74   *   ALT 28       LIVER PROFILE:   Recent Labs     10/05/21  1550   *   ALT 28   BILITOT 0.3   ALKPHOS 74       PT/INR:   No results for input(s): PROTIME, INR in the last 72 hours. APTT:   No results for input(s): APTT in the last 72 hours.     Fasting Lipid Panel:    No results found for: CHOL, TRIG, HDL    Cardiac Enzymes:    No results found for: CKTOTAL, CKMB, CKMBINDEX, TROPONINI    Imaging Studies:     CT ABDOMEN PELVIS WO CONTRAST Additional Contrast? None    Result Date: 10/5/2021  EXAMINATION: CT OF THE CHEST WITHOUT CONTRAST; CT OF THE ABDOMEN AND PELVIS WITHOUT CONTRAST 10/5/2021 7:15 pm TECHNIQUE: CT of the chest was performed without the administration of intravenous contrast. Multiplanar reformatted images are provided for review. Dose modulation, iterative reconstruction, and/or weight based adjustment of the mA/kV was utilized to reduce the radiation dose to as low as reasonably achievable.; CT of the abdomen and pelvis was performed without the administration of intravenous contrast. Multiplanar reformatted images are provided for review. Dose modulation, iterative reconstruction, and/or weight based adjustment of the mA/kV was utilized to reduce the radiation dose to as low as reasonably achievable. COMPARISON: None. HISTORY: ORDERING SYSTEM PROVIDED HISTORY: ro mass, weight loss? TECHNOLOGIST PROVIDED HISTORY: Reason for exam:->ro mass, weight loss? Decision Support Exception - unselect if not a suspected or confirmed emergency medical condition->Emergency Medical Condition (MA) What reading provider will be dictating this exam?->CRC FINDINGS: THE STUDY IS LIMITED DUE TO LACK OF INTRAVENOUS CONTRAST. Chest: Mediastinum: No thoracic aortic aneurysm. No definite adenopathy on this unenhanced study. Trace pericardial fluid anteriorly. Lungs/pleura: Trace layering pleural effusions bilaterally, slightly larger on the right. No pneumothorax. Numerous ground-glass densities in the bilateral upper and to a lesser degree lower lungs, in keeping with known COVID-19 pneumonia. Soft Tissues/Bones: Bilateral gynecomastia. No acute osseous abnormality in the thoracic cage. Abdomen and pelvis: The study is degraded by the patient's respiratory motion. Organs: Abdominal organs inadequately evaluated on this motion degraded and unenhanced study. No hydronephrosis on either side. GI/Bowel: Normal appendix. No evidence of bowel obstruction. Pelvis: Normal sized prostate. Urinary bladder grossly intact. Peritoneum/Retroperitoneum: No free air or free fluid. No bulky adenopathy. No abdominal aortic aneurysm.  Bones/Soft Tissues: No lytic or sclerotic lesion in the regional skeleton. Limited study by the patient's respiratory motion and lack of intravenous contrast. Extensive multifocal COVID-19 pneumonia bilaterally. Bilateral trace layering pleural effusions. Bilateral gynecomastia. Abdominal organs inadequately evaluated due to the limitations of the study. Normal appendix. No evidence of bowel obstruction. CT CHEST WO CONTRAST    Result Date: 10/5/2021  EXAMINATION: CT OF THE CHEST WITHOUT CONTRAST; CT OF THE ABDOMEN AND PELVIS WITHOUT CONTRAST 10/5/2021 7:15 pm TECHNIQUE: CT of the chest was performed without the administration of intravenous contrast. Multiplanar reformatted images are provided for review. Dose modulation, iterative reconstruction, and/or weight based adjustment of the mA/kV was utilized to reduce the radiation dose to as low as reasonably achievable.; CT of the abdomen and pelvis was performed without the administration of intravenous contrast. Multiplanar reformatted images are provided for review. Dose modulation, iterative reconstruction, and/or weight based adjustment of the mA/kV was utilized to reduce the radiation dose to as low as reasonably achievable. COMPARISON: None. HISTORY: ORDERING SYSTEM PROVIDED HISTORY: ro mass, weight loss? TECHNOLOGIST PROVIDED HISTORY: Reason for exam:->ro mass, weight loss? Decision Support Exception - unselect if not a suspected or confirmed emergency medical condition->Emergency Medical Condition (MA) What reading provider will be dictating this exam?->CRC FINDINGS: THE STUDY IS LIMITED DUE TO LACK OF INTRAVENOUS CONTRAST. Chest: Mediastinum: No thoracic aortic aneurysm. No definite adenopathy on this unenhanced study. Trace pericardial fluid anteriorly. Lungs/pleura: Trace layering pleural effusions bilaterally, slightly larger on the right. No pneumothorax.   Numerous ground-glass densities in the bilateral upper and to a lesser degree lower lungs, in keeping with known COVID-19 pneumonia. Soft Tissues/Bones: Bilateral gynecomastia. No acute osseous abnormality in the thoracic cage. Abdomen and pelvis: The study is degraded by the patient's respiratory motion. Organs: Abdominal organs inadequately evaluated on this motion degraded and unenhanced study. No hydronephrosis on either side. GI/Bowel: Normal appendix. No evidence of bowel obstruction. Pelvis: Normal sized prostate. Urinary bladder grossly intact. Peritoneum/Retroperitoneum: No free air or free fluid. No bulky adenopathy. No abdominal aortic aneurysm. Bones/Soft Tissues: No lytic or sclerotic lesion in the regional skeleton. Limited study by the patient's respiratory motion and lack of intravenous contrast. Extensive multifocal COVID-19 pneumonia bilaterally. Bilateral trace layering pleural effusions. Bilateral gynecomastia. Abdominal organs inadequately evaluated due to the limitations of the study. Normal appendix. No evidence of bowel obstruction. Resident's Assessment and Plan     Alee Salas is a 36 y.o. male    Active Problems:    * No active hospital problems. *  Resolved Problems:    * No resolved hospital problems.  *    <Neurologic>    <Cardiovascular>  Elevated troponin  - 2/2 myocardial injury VS stress cardiomyopathy from [facility]  - Initial troponin 78-->78  - ECHO ordered  -Consider cardiology consult    Elevated Pro-BNP  - 2/2 new onset CHF vs cardiomyopathy (Stress cardiomyopathy from COVID19)  - BNP on admission: 159  - CTA chest: Bilateral trace layering pleural effusions  - Gentle hydration   - ECHO ordered  -Consider cardiology consult    <Pulmonary>  Hypoxia  - 2/2 to COVID 19   - Worsening since September  - Patient desaturates to low-mid 80s with any activity  - Does not require O2 when resting  - Continue monitoring SpO2  -Pulmonology consulted    <Gastrointestinal>  Weight Loss  - Per patien due to decreased appetite from COVID  - Per patient about 50b weight loss since August  - Rule

## 2021-10-07 ENCOUNTER — APPOINTMENT (OUTPATIENT)
Dept: GENERAL RADIOLOGY | Age: 31
DRG: 890 | End: 2021-10-07
Payer: COMMERCIAL

## 2021-10-07 ENCOUNTER — APPOINTMENT (OUTPATIENT)
Dept: ULTRASOUND IMAGING | Age: 31
DRG: 890 | End: 2021-10-07
Payer: COMMERCIAL

## 2021-10-07 PROBLEM — N17.9 ACUTE KIDNEY INJURY DUE TO COVID-19 (HCC): Status: ACTIVE | Noted: 2021-10-06

## 2021-10-07 PROBLEM — R80.9 NEPHROTIC RANGE PROTEINURIA: Status: ACTIVE | Noted: 2021-10-07

## 2021-10-07 LAB
ALBUMIN SERPL-MCNC: 2.2 G/DL (ref 3.5–5.2)
ALP BLD-CCNC: 59 U/L (ref 40–129)
ALT SERPL-CCNC: 21 U/L (ref 0–40)
ANION GAP SERPL CALCULATED.3IONS-SCNC: 12 MMOL/L (ref 7–16)
ANION GAP SERPL CALCULATED.3IONS-SCNC: 9 MMOL/L (ref 7–16)
ANISOCYTOSIS: ABNORMAL
ANTI-NUCLEAR ANTIBODY (ANA): NEGATIVE
AST SERPL-CCNC: 63 U/L (ref 0–39)
BASOPHILS ABSOLUTE: 0 E9/L (ref 0–0.2)
BASOPHILS RELATIVE PERCENT: 0 % (ref 0–2)
BILIRUB SERPL-MCNC: 0.4 MG/DL (ref 0–1.2)
BUN BLDV-MCNC: 51 MG/DL (ref 6–20)
BUN BLDV-MCNC: 53 MG/DL (ref 6–20)
C3 COMPLEMENT: 88 MG/DL (ref 90–180)
C4 COMPLEMENT: 18 MG/DL (ref 10–40)
CALCIUM SERPL-MCNC: 7.1 MG/DL (ref 8.6–10.2)
CALCIUM SERPL-MCNC: 7.2 MG/DL (ref 8.6–10.2)
CHLORIDE BLD-SCNC: 103 MMOL/L (ref 98–107)
CHLORIDE BLD-SCNC: 104 MMOL/L (ref 98–107)
CO2: 18 MMOL/L (ref 22–29)
CO2: 19 MMOL/L (ref 22–29)
CREAT SERPL-MCNC: 2.8 MG/DL (ref 0.7–1.2)
CREAT SERPL-MCNC: 3 MG/DL (ref 0.7–1.2)
CREATININE URINE: 94 MG/DL (ref 40–278)
CREATININE URINE: <1 MG/DL (ref 40–278)
EOSINOPHILS ABSOLUTE: 0 E9/L (ref 0.05–0.5)
EOSINOPHILS RELATIVE PERCENT: 0 % (ref 0–6)
GFR AFRICAN AMERICAN: 30
GFR AFRICAN AMERICAN: 33
GFR NON-AFRICAN AMERICAN: 30 ML/MIN/1.73
GFR NON-AFRICAN AMERICAN: 33 ML/MIN/1.73
GLUCOSE BLD-MCNC: 124 MG/DL (ref 74–99)
GLUCOSE BLD-MCNC: 149 MG/DL (ref 74–99)
HCT VFR BLD CALC: 26.2 % (ref 37–54)
HEMOGLOBIN: 8.8 G/DL (ref 12.5–16.5)
LYMPHOCYTES ABSOLUTE: 0.11 E9/L (ref 1.5–4)
LYMPHOCYTES RELATIVE PERCENT: 2.6 % (ref 20–42)
MCH RBC QN AUTO: 29.2 PG (ref 26–35)
MCHC RBC AUTO-ENTMCNC: 33.6 % (ref 32–34.5)
MCV RBC AUTO: 87 FL (ref 80–99.9)
MICROALBUMIN UR-MCNC: <12 MG/L
MICROALBUMIN/CREAT UR-RTO: ABNORMAL (ref 0–30)
MONOCYTES ABSOLUTE: 0.15 E9/L (ref 0.1–0.95)
MONOCYTES RELATIVE PERCENT: 4.3 % (ref 2–12)
NEUTROPHILS ABSOLUTE: 3.44 E9/L (ref 1.8–7.3)
NEUTROPHILS RELATIVE PERCENT: 93 % (ref 43–80)
NUCLEATED RED BLOOD CELLS: 0.9 /100 WBC
PDW BLD-RTO: 14.9 FL (ref 11.5–15)
PLATELET # BLD: 168 E9/L (ref 130–450)
PMV BLD AUTO: 11.2 FL (ref 7–12)
POIKILOCYTES: ABNORMAL
POLYCHROMASIA: ABNORMAL
POTASSIUM REFLEX MAGNESIUM: 4.3 MMOL/L (ref 3.5–5)
POTASSIUM SERPL-SCNC: 4.8 MMOL/L (ref 3.5–5)
PROTEIN PROTEIN: 491 MG/DL (ref 0–12)
PROTEIN/CREAT RATIO: 5.2
PROTEIN/CREAT RATIO: 5.2 (ref 0–0.2)
RBC # BLD: 3.01 E12/L (ref 3.8–5.8)
SODIUM BLD-SCNC: 131 MMOL/L (ref 132–146)
SODIUM BLD-SCNC: 134 MMOL/L (ref 132–146)
TOTAL PROTEIN: 6.2 G/DL (ref 6.4–8.3)
TROPONIN, HIGH SENSITIVITY: 45 NG/L (ref 0–11)
VANCOMYCIN RANDOM: 21.1 MCG/ML (ref 5–40)
WBC # BLD: 3.7 E9/L (ref 4.5–11.5)

## 2021-10-07 PROCEDURE — 82570 ASSAY OF URINE CREATININE: CPT

## 2021-10-07 PROCEDURE — 80053 COMPREHEN METABOLIC PANEL: CPT

## 2021-10-07 PROCEDURE — 76770 US EXAM ABDO BACK WALL COMP: CPT

## 2021-10-07 PROCEDURE — 85025 COMPLETE CBC W/AUTO DIFF WBC: CPT

## 2021-10-07 PROCEDURE — 99232 SBSQ HOSP IP/OBS MODERATE 35: CPT | Performed by: FAMILY MEDICINE

## 2021-10-07 PROCEDURE — 71045 X-RAY EXAM CHEST 1 VIEW: CPT

## 2021-10-07 PROCEDURE — 6370000000 HC RX 637 (ALT 250 FOR IP): Performed by: NURSE PRACTITIONER

## 2021-10-07 PROCEDURE — 6360000002 HC RX W HCPCS: Performed by: FAMILY MEDICINE

## 2021-10-07 PROCEDURE — 84156 ASSAY OF PROTEIN URINE: CPT

## 2021-10-07 PROCEDURE — 82044 UR ALBUMIN SEMIQUANTITATIVE: CPT

## 2021-10-07 PROCEDURE — 2580000003 HC RX 258: Performed by: INTERNAL MEDICINE

## 2021-10-07 PROCEDURE — 36415 COLL VENOUS BLD VENIPUNCTURE: CPT

## 2021-10-07 PROCEDURE — 86481 TB AG RESPONSE T-CELL SUSP: CPT

## 2021-10-07 PROCEDURE — 2580000003 HC RX 258: Performed by: FAMILY MEDICINE

## 2021-10-07 PROCEDURE — P9047 ALBUMIN (HUMAN), 25%, 50ML: HCPCS | Performed by: NURSE PRACTITIONER

## 2021-10-07 PROCEDURE — 6360000002 HC RX W HCPCS: Performed by: INTERNAL MEDICINE

## 2021-10-07 PROCEDURE — 99255 IP/OBS CONSLTJ NEW/EST HI 80: CPT | Performed by: INTERNAL MEDICINE

## 2021-10-07 PROCEDURE — 2500000003 HC RX 250 WO HCPCS: Performed by: INTERNAL MEDICINE

## 2021-10-07 PROCEDURE — 80202 ASSAY OF VANCOMYCIN: CPT

## 2021-10-07 PROCEDURE — 84484 ASSAY OF TROPONIN QUANT: CPT

## 2021-10-07 PROCEDURE — 80048 BASIC METABOLIC PNL TOTAL CA: CPT

## 2021-10-07 PROCEDURE — 2140000000 HC CCU INTERMEDIATE R&B

## 2021-10-07 PROCEDURE — 6360000002 HC RX W HCPCS: Performed by: NURSE PRACTITIONER

## 2021-10-07 PROCEDURE — 82595 ASSAY OF CRYOGLOBULIN: CPT

## 2021-10-07 PROCEDURE — 86160 COMPLEMENT ANTIGEN: CPT

## 2021-10-07 RX ADMIN — DEXAMETHASONE SODIUM PHOSPHATE 10 MG: 10 INJECTION INTRAMUSCULAR; INTRAVENOUS at 12:16

## 2021-10-07 RX ADMIN — SODIUM ZIRCONIUM CYCLOSILICATE 10 G: 10 POWDER, FOR SUSPENSION ORAL at 21:35

## 2021-10-07 RX ADMIN — HEPARIN SODIUM 5000 UNITS: 10000 INJECTION INTRAVENOUS; SUBCUTANEOUS at 05:15

## 2021-10-07 RX ADMIN — SODIUM BICARBONATE: 84 INJECTION, SOLUTION INTRAVENOUS at 06:59

## 2021-10-07 RX ADMIN — HEPARIN SODIUM 5000 UNITS: 10000 INJECTION INTRAVENOUS; SUBCUTANEOUS at 21:42

## 2021-10-07 RX ADMIN — Medication 10 ML: at 21:34

## 2021-10-07 RX ADMIN — SODIUM BICARBONATE: 84 INJECTION, SOLUTION INTRAVENOUS at 16:09

## 2021-10-07 RX ADMIN — ALBUMIN (HUMAN) 25 G: 0.25 INJECTION, SOLUTION INTRAVENOUS at 05:15

## 2021-10-07 RX ADMIN — VANCOMYCIN HYDROCHLORIDE 750 MG: 10 INJECTION, POWDER, LYOPHILIZED, FOR SOLUTION INTRAVENOUS at 17:30

## 2021-10-07 RX ADMIN — SODIUM ZIRCONIUM CYCLOSILICATE 10 G: 10 POWDER, FOR SUSPENSION ORAL at 12:17

## 2021-10-07 ASSESSMENT — PAIN SCALES - GENERAL
PAINLEVEL_OUTOF10: 0
PAINLEVEL_OUTOF10: 0

## 2021-10-07 ASSESSMENT — ENCOUNTER SYMPTOMS
VOMITING: 0
ABDOMINAL PAIN: 0
CHEST TIGHTNESS: 0
DIARRHEA: 0
NAUSEA: 0
SHORTNESS OF BREATH: 1

## 2021-10-07 NOTE — PROGRESS NOTES
[facility]  Family Medicine Attending    S: 36 y.o. male with history of asthma and smoking history presents with increasing dyspnea and cough over past month. Has tested positive for COVID twice, most recently end of August.  CT shows extensive bilateral pneumonia and COVID testing again positive. Today, feeling much better. No fevers for past 24 hours      O: VS- Blood pressure 113/71, pulse 83, temperature 98.2 °F (36.8 °C), temperature source Oral, resp. rate 16, height 6' 4.5\" (1.943 m), weight 173 lb (78.5 kg), SpO2 97 %. Exam is as noted by resident with the following changes, additions or corrections:  Awake, alert  Heart - RRR  Lungs- bilateral wheezing and coarse BS  Ext - no edema, but very sensitive bilateral toes and distal feet. No color change     Impressions:   Principal Problem:    Acute respiratory failure with hypoxia (HCC)  Active Problems:    Acute kidney injury due to COVID-19 ([address]    Asthma    Nephrotic range proteinuria  Resolved Problems:    * No resolved hospital problems. *      Plan:   Kidney failure with proteinuria noted - labs obtained   Apparent iron deficiency, but normocytic anemia (? Renal anemia)   Consult pulmonary, ID, and nephrology   No old records - no significant PMH   Weight loss, hypoalbuminemia noted as well - consult nutrition     Attending Physician Statement  I have reviewed the chart and seen the patient with the resident(s). I personally reviewed images, EKG's and similar tests, if present. I personally reviewed and performed key elements of the history and exam.  I have reviewed and confirmed student and/or resident history and exam with changes as indicated above. I agree with the assessment, plan and orders as documented by the resident. Please refer to the resident and/or student note for additional information.       Nel Charles MD

## 2021-10-07 NOTE — PROGRESS NOTES
Pharmacy Consultation Note  (Antibiotic Dosing and Monitoring)    Initial consult date: 10/6/21  Consulting physician/provider: Dr. Karen Ashford  Drug: Vancomycin  Indication: Bloodstream Infection    Age/  Gender Height Weight IBW  Allergy Information   31 y.o./male 6' 4\" (193 cm) 171 lb (77.6 kg)     Ideal body weight: 87.9 kg (193 lb 14.3 oz)   Patient has no known allergies. Renal Function:  Recent Labs     10/06/21  1548 10/07/21  0021 10/07/21  0535   BUN 51* 53* 51*   CREATININE 3.4* 3.0* 2.8*       Intake/Output Summary (Last 24 hours) at 10/7/2021 0844  Last data filed at 10/7/2021 0519  Gross per 24 hour   Intake 2362.5 ml   Output 1350 ml   Net 1012.5 ml       Vancomycin Monitoring:  Trough:  No results for input(s): VANCOTROUGH in the last 72 hours. Random:    Recent Labs     10/07/21  0535   VANCORANDOM 21.1       Vancomycin Administration Times:  Recent vancomycin administrations                   vancomycin 1500 mg in dextrose 5% 300 mL IVPB (mg) 1,500 mg New Bag 10/06/21 1654              Assessment:  · Patient is a 36 y.o. male who has been initiated on vancomycin  Estimated Creatinine Clearance: 40 mL/min (A) (based on SCr of 3 mg/dL (H)).   · To dose vancomycin, pharmacy will be utilizing dosing based off of levels because of patient's renal impairment/insufficiency    Plan:  · Give vancomycin 750 mg IV x 1 tonight  · Will draw random level tomorrow with AM labs  · Will continue to monitor renal function   · Clinical pharmacy to       [address], [facility] 10/7/2021 8:44 AM

## 2021-10-07 NOTE — CONSULTS
[facility]  Department of Internal Medicine  Division of Pulmonary, Critical Care and Sleep Medicine  Consult Note    Sonja Hemphill DO, MPH, RESHMA Melendrez MD, [facility]  [facility]      Patient: Cam Rangel  MRN: [id number]  : 1990    Encounter Time: 3:54 PM     Date of Admission: 10/5/2021  3:07 PM    Primary Care Physician: Yessenia Andrews MD    Reason for Consultation: COVID     HISTORY OF PRESENT ILLNESS : Eliseo Harris 36 y.o. male was seen in consultation regarding the above chief compliant. The patient is a 36 y.o. male, not vaccinated against COVID-19, with history of asthma, presented on 10/05 with shortness of breath, found to be febrile at 103 °F, positive SARS-CoV-2 PCR, bilateral groundglass opacities on chest CT, tolerating room air with oxygen saturation of 97%. He reports having tested positive for SARS-CoV-2 for the 3rd time now with previous on in late August at which time he reports having quarantined. Urine Streptococcus pneumonia and Legionella antigens were negative. Blood cultures from 10/05 and 10/06 showed Gram-positive cocci in clusters in 1 out of 2 sets. PAST MEDICAL HISTORY:  has a past medical history of Asthma. SURGICAL HISTORY:  has a past surgical history that includes fracture surgery. SOCIAL HISTORY:  reports that he quit smoking about 7 weeks ago. He has never used smokeless tobacco. He reports that he does not drink alcohol and does not use drugs. FAMILY  HISTORY: family history includes Diabetes type 2  in his father and mother. MEDICATIONS:    Prior to Admission medications    Medication Sig Start Date End Date Taking? Authorizing Provider   chlorhexidine (PERIDEX) 0.12 % solution SWISH 1/2 OUNCE BY MOUTH TWICE DAILY 21   Historical Provider, MD       ALLERGIES: Patient has no known allergies.        REVIEW OF SYSTEMS:  Otherwise negative if not reported or listed below  Constitutional:  No unanticipated weight loss. No change in sleep pattern or activity. No fevers, chills or rigors. Eyes:    No visual changes or diplopia. No scleral icterus. ENT:    No Headaches, hearing loss or vertigo. No mouth sores or sore throat. Cardiovascular:  + chest discomfort, palpitations. Respiratory:  + cough, No wheezing      No sputum production. No hemoptysis, pleuritic pain. Gastrointestinal:  No abdominal pain, appetite loss, blood in stools. No hematemesis  Genitourinary:  No dysuria, trouble voiding or hematuria. No nocturia. Musculoskeletal:   No weakness or joint complaints. Integumentary: No rashes or pruritis. Neurological:  No headache, numbness or tingling. Psychiatric:   No effect on mood, memory, mentation, or behavior. No anxiety or depression. Endocrine:   No excessive thirst, fluid intake, or urination. No tremor. Hematologic: No abnormal bruising or bleeding. Lymphatic:  No swollen lymph nodes. Immunologic:  No hives or hx of anaphaxsis.       OBJECTIVE:     PHYSICAL EXAM:   VITALS:   Vitals:    10/06/21 0015 10/06/21 0844 10/06/21 2130 10/07/21 0945   BP: 111/71 129/80 (!) 128/94 113/71   Pulse: 109 76 84 83   Resp: 18 18 16 16   Temp: 98.9 °F (37.2 °C) 97.5 °F (36.4 °C) 98 °F (36.7 °C) 98.2 °F (36.8 °C)   TempSrc: Oral Oral Oral Oral   SpO2: 94% 97% 99% 97%   Weight: 173 lb (78.5 kg)      Height: 6' 4.5\" (1.943 m)           Intake/Output Summary (Last 24 hours) at 10/7/2021 1554  Last data filed at 10/7/2021 0959  Gross per 24 hour   Intake 2362.5 ml   Output 1350 ml   Net 1012.5 ml        CONSTITUTIONAL:   A&O x 3, NAD  SKIN:     No rash, No suspicious lesions or skin discoloration  HEENT:     EOMI, MMM, No thrush  NECK:    No bruits, No JVP apprechiated  CV:      Sinus,  No murmur, No rubs, No gallops  PULMONARY:   Couse BS,  No Wheezing, No Rales, No Rhonchi      No noted egophony  ABDOMEN:     Soft, non-tender. BS normal. No R/R/G  EXT:    No deformities . No clubbing.       + lower extremity edema, No venous stasis  PULSE:   Appears equal and palpable.   PSYCHIATRIC:  Seems appropriate, No acute psycosis  MS:    No fractures, No gross weakness  NEUROLOGIC:   The clinical assessment is non-focal     DATA: IMAGING & TESTING:     LABORATORY TESTS:    CBC:   Lab Results   Component Value Date    WBC 3.7 10/07/2021    RBC 3.01 10/07/2021    HGB 8.8 10/07/2021    HCT 26.2 10/07/2021    MCV 87.0 10/07/2021    MCH 29.2 10/07/2021    MCHC 33.6 10/07/2021    RDW 14.9 10/07/2021     10/07/2021    MPV 11.2 10/07/2021     CMP:    Lab Results   Component Value Date     10/07/2021    K 4.3 10/07/2021     10/07/2021    CO2 19 10/07/2021    BUN 51 10/07/2021    CREATININE 2.8 10/07/2021    GFRAA 33 10/07/2021    LABGLOM 33 10/07/2021    GLUCOSE 124 10/07/2021    PROT 6.2 10/07/2021    LABALBU 2.2 10/07/2021    CALCIUM 7.2 10/07/2021    BILITOT 0.4 10/07/2021    ALKPHOS 59 10/07/2021    AST 63 10/07/2021    ALT 21 10/07/2021     Magnesium:    Lab Results   Component Value Date    MG 2.7 10/05/2021     Phosphorus:  No results found for: PHOS  PT/INR:  No results found for: PROTIME, INR  FERRITIN:    Lab Results   Component Value Date    FERRITIN 3,921 10/06/2021     Fibrinogen Level:  No components found for: FIB     PRO-BNP:   Lab Results   Component Value Date    PROBNP 1,599 (H) 10/05/2021      ABGs: No results found for: PH, PO2, PCO2  Hemoglobin A1C: No components found for: HGBA1C    IMAGING:  Imaging tests were completed and reviewed and discussed radiology and care team involved and reveals   CT ABDOMEN PELVIS WO CONTRAST Additional Contrast? None    Result Date: 10/5/2021  EXAMINATION: CT OF THE CHEST WITHOUT CONTRAST; CT OF THE ABDOMEN AND PELVIS WITHOUT CONTRAST 10/5/2021 7:15 pm TECHNIQUE: CT of the chest was performed without the administration of intravenous contrast. Multiplanar reformatted images are provided for review. Dose modulation, iterative reconstruction, and/or weight based adjustment of the mA/kV was utilized to reduce the radiation dose to as low as reasonably achievable.; CT of the abdomen and pelvis was performed without the administration of intravenous contrast. Multiplanar reformatted images are provided for review. Dose modulation, iterative reconstruction, and/or weight based adjustment of the mA/kV was utilized to reduce the radiation dose to as low as reasonably achievable. COMPARISON: None. HISTORY: ORDERING SYSTEM PROVIDED HISTORY: ro mass, weight loss? TECHNOLOGIST PROVIDED HISTORY: Reason for exam:->ro mass, weight loss? Decision Support Exception - unselect if not a suspected or confirmed emergency medical condition->Emergency Medical Condition (MA) What reading provider will be dictating this exam?->CRC FINDINGS: THE STUDY IS LIMITED DUE TO LACK OF INTRAVENOUS CONTRAST. Chest: Mediastinum: No thoracic aortic aneurysm. No definite adenopathy on this unenhanced study. Trace pericardial fluid anteriorly. Lungs/pleura: Trace layering pleural effusions bilaterally, slightly larger on the right. No pneumothorax. Numerous ground-glass densities in the bilateral upper and to a lesser degree lower lungs, in keeping with known COVID-19 pneumonia. Soft Tissues/Bones: Bilateral gynecomastia. No acute osseous abnormality in the thoracic cage. Abdomen and pelvis: The study is degraded by the patient's respiratory motion. Organs: Abdominal organs inadequately evaluated on this motion degraded and unenhanced study. No hydronephrosis on either side. GI/Bowel: Normal appendix. No evidence of bowel obstruction. Pelvis: Normal sized prostate. Urinary bladder grossly intact. Peritoneum/Retroperitoneum: No free air or free fluid. No bulky adenopathy. No abdominal aortic aneurysm. Bones/Soft Tissues: No lytic or sclerotic lesion in the regional skeleton.      Limited study by the patient's respiratory motion and lack of intravenous contrast. Extensive multifocal COVID-19 pneumonia bilaterally. Bilateral trace layering pleural effusions. Bilateral gynecomastia. Abdominal organs inadequately evaluated due to the limitations of the study. Normal appendix. No evidence of bowel obstruction. CT CHEST WO CONTRAST    Result Date: 10/5/2021  EXAMINATION: CT OF THE CHEST WITHOUT CONTRAST; CT OF THE ABDOMEN AND PELVIS WITHOUT CONTRAST 10/5/2021 7:15 pm TECHNIQUE: CT of the chest was performed without the administration of intravenous contrast. Multiplanar reformatted images are provided for review. Dose modulation, iterative reconstruction, and/or weight based adjustment of the mA/kV was utilized to reduce the radiation dose to as low as reasonably achievable.; CT of the abdomen and pelvis was performed without the administration of intravenous contrast. Multiplanar reformatted images are provided for review. Dose modulation, iterative reconstruction, and/or weight based adjustment of the mA/kV was utilized to reduce the radiation dose to as low as reasonably achievable. COMPARISON: None. HISTORY: ORDERING SYSTEM PROVIDED HISTORY: ro mass, weight loss? TECHNOLOGIST PROVIDED HISTORY: Reason for exam:->ro mass, weight loss? Decision Support Exception - unselect if not a suspected or confirmed emergency medical condition->Emergency Medical Condition (MA) What reading provider will be dictating this exam?->CRC FINDINGS: THE STUDY IS LIMITED DUE TO LACK OF INTRAVENOUS CONTRAST. Chest: Mediastinum: No thoracic aortic aneurysm. No definite adenopathy on this unenhanced study. Trace pericardial fluid anteriorly. Lungs/pleura: Trace layering pleural effusions bilaterally, slightly larger on the right. No pneumothorax. Numerous ground-glass densities in the bilateral upper and to a lesser degree lower lungs, in keeping with known COVID-19 pneumonia. Soft Tissues/Bones: Bilateral gynecomastia.   No acute osseous abnormality in the thoracic cage. Abdomen and pelvis: The study is degraded by the patient's respiratory motion. Organs: Abdominal organs inadequately evaluated on this motion degraded and unenhanced study. No hydronephrosis on either side. GI/Bowel: Normal appendix. No evidence of bowel obstruction. Pelvis: Normal sized prostate. Urinary bladder grossly intact. Peritoneum/Retroperitoneum: No free air or free fluid. No bulky adenopathy. No abdominal aortic aneurysm. Bones/Soft Tissues: No lytic or sclerotic lesion in the regional skeleton. Limited study by the patient's respiratory motion and lack of intravenous contrast. Extensive multifocal COVID-19 pneumonia bilaterally. Bilateral trace layering pleural effusions. Bilateral gynecomastia. Abdominal organs inadequately evaluated due to the limitations of the study. Normal appendix. No evidence of bowel obstruction. Assessment:  Mr. Juan Orellana is a 36year old male with no significant PMH who was admitted on October 5, 2021 after he presented from an outside clinic after he was found to be hypoxic during 6 minute walking test. Patient tested positive for Covid 19 on August 18th, he is unvaccinated and had Covid 19 last year as well.  Patient states he has quarantined by himself and when his mother recently visited him she was shocked at his appearance as he had lost about 50 pounds in 2 and a half months  Sepsis  COVID-19, mild  Pneumonia  Gram-positive cocci in clusters bacteremia, etiology unclear  DEISI        Plan:   · Strict I&O's  · Continue to monitor potassium levels  · Continue low potassium diet  · Continue to monitor albumin levels  · Repeat urine ACR   · Encourage oral intake  · Obtain renal ultrasound  · Obtain complement C 3&4 levels, and cryoglobulin titer        Mary Starkey DO DO, MPH, [facility], [facility]  Professor of Internal Medicine  Pulmonary, Critical Care and Sleep Medicine

## 2021-10-07 NOTE — PROGRESS NOTES
CREATININE 2.8 10/07/2021    GFRAA 33 10/07/2021    LABGLOM 33 10/07/2021    GLUCOSE 124 10/07/2021    PROT 6.2 10/07/2021    LABALBU 2.2 10/07/2021    CALCIUM 7.2 10/07/2021    BILITOT 0.4 10/07/2021    ALKPHOS 59 10/07/2021    AST 63 10/07/2021    ALT 21 10/07/2021     Magnesium:    Lab Results   Component Value Date    MG 2.7 10/05/2021     Phosphorus:  No results found for: PHOS  Radiology Review:      CT Chest October 5, 2021   Limited study by the patient's respiratory motion and lack of intravenous   contrast.       Extensive multifocal COVID-19 pneumonia bilaterally.       Bilateral trace layering pleural effusions.       Bilateral gynecomastia.       Abdominal organs inadequately evaluated due to the limitations of the study.       Normal appendix.       No evidence of bowel obstruction.           BRIEF SUMMARY OF INITIAL CONSULT:     Briefly, Mr. Gilda Sandhoff is a 36year old male with no significant PMH who was admitted on October 5, 2021 after he presented from an outside clinic after he was found to be hypoxic during 6 minute walking test. Patient tested positive for Covid 19 on August 18th, he is unvaccinated and had Covid 19 last year as well. Patient states he has quarantined by himself and when his mother recently visited him she was shocked at his appearance as he had lost about 50 pounds in 2 and a half months. On admission labs were significant for sodium of 130, potassium 5.2, bicarbonate 20, BUN 41, creatinine 3.6, magnesium 2.7, calcium 7.7 and proBNP 1,599. We are consulted for DEISI. Problems resolved  Hyperkalemia, 2/2 DEISI. Low potassium diet    IMPRESSION/RECOMMENDATIONS:     DEISI stage II versus DEISI on CKD?, most likely volume responsive pre-renal DEISI d/t volume depletion from poor oral intake, urine sodium <20, Patient states he has lost about 50 pounds in the last 2 and a half months. Creatinine improving over the last 24 hrs with IVF administration.     Large proteinuria (U/A with > 300 mg), r/o CKD, FSGS?, Nephrotic range protein 5.2 g. UACR showing less than 12, I believe this to be in error and will repeat. Will also obtain Complement C3&4 levels, and cryoglobulin titer. Hepatitis B and C serology pending, immunoglobulins pending. MARILEE negative. Hypotonic hyponatremia 2/2 intravascular volume from poor oral intake. Bicarb drip started. Sodium levels improving. Low bicarbonate levels with hyperchloremia, most likely NAGMA versus respiratory alkalosis; we need a PH to clarify diagnosis. Awaiting ABG results  Severe Hypoalbuminemia, multifactorial  Bacteremia, gram +cocci in clusters in 1 of 2 bottles  HIV screen pending  Covid 19 infection in non vaccinated pt  Leukopenia, WBC   T spot TB test pending  Complaints of tingling pain in bilateral feet, neuropathy?     Plan:    Continue D5 with 150 mEq sodium bicarbonate at 150 cc/hr  Give albumin 25 g Q6 hrs to complete 4 doses  Strict I&O's  Continue to monitor potassium levels  Continue low potassium diet  Continue to monitor albumin levels  Repeat urine ACR   Encourage oral intake  Obtain renal ultrasound  Obtain complement C 3&4 levels, and cryoglobulin titer  Continue dry tray      Case and plan discussed with Dr. Melyssa Hernandez    Electronically signed by RICKY Washington CNP on 10/7/2021 at 11:47 AM

## 2021-10-07 NOTE — PROGRESS NOTES
[facility] PROGRESS NOTE      Chief complaint: Follow-up of Gram-positive cocci in clusters on blood culture    The patient is a 36 y.o. male, not vaccinated against COVID-19, with history of asthma, presented on 10/05 with shortness of breath, found to be febrile at 103 °F, positive SARS-CoV-2 PCR, bilateral groundglass opacities on chest CT, tolerating room air with oxygen saturation of 97%. He reports having tested positive for SARS-CoV-2 for the 3rd time now with previous on in late August at which time he reports having quarantined. Urine Streptococcus pneumonia and Legionella antigens were negative. Blood cultures from 10/05 and 10/06 showed Gram-positive cocci in clusters in 1 out of 2 sets. Subjective: Patient was seen and examined. No chills, no abdominal pain, no diarrhea, no rash, no itching. He reports feeling better. Objective:    Vitals:    10/07/21 0945   BP: 113/71   Pulse: 83   Resp: 16   Temp: 98.2 °F (36.8 °C)   SpO2: 97%     Constitutional: Alert, not in distress  Respiratory: Clear breath sounds, no crackles, no wheezes  Cardiovascular: Regular rate and rhythm, no murmurs  Gastrointestinal: Bowel sounds present, soft, nontender  Skin: Warm and dry, no active dermatoses  Musculoskeletal: No joint swelling, no joint erythema    Labs, imaging, and medical records/notes were personally reviewed. Assessment:  Sepsis  COVID-19, mild  Pneumonia  Gram-positive cocci in clusters bacteremia, etiology unclear  DEISI    Recommendations:  Continue vancomycin dosed according to level per Pharmacy. Follow up blood cultures. Follow up HIV screen. Check JENNY. Monitor respiratory status. Wean off oxygen as tolerated. Continue supportive care.     Thank you for involving me in the care of Kristin James. I will continue to follow. Please do not hesitate to call for any questions or concerns.     Electronically signed by Mi Chavez MD on 10/7/2021 at 10:44 AM

## 2021-10-07 NOTE — ED PROVIDER NOTES
and time. Cranial Nerves: No cranial nerve deficit. Procedures     [facility]              --------------------------------------------- PAST HISTORY ---------------------------------------------  Past Medical History:  has a past medical history of Asthma. Past Surgical History:  has a past surgical history that includes fracture surgery. Social History:  reports that he quit smoking about 7 weeks ago. He has never used smokeless tobacco. He reports that he does not drink alcohol and does not use drugs. Family History: family history includes Diabetes type 2  in his father and mother. The patients home medications have been reviewed. Allergies: Patient has no known allergies. -------------------------------------------------- RESULTS -------------------------------------------------    LABS:  Results for orders placed or performed during the hospital encounter of 10/05/21   Culture, Blood 1    Specimen: Blood   Result Value Ref Range    Blood Culture, Routine (A)      Gram stain performed from blood culture bottle media  Gram positive cocci in clusters     Culture, Blood 2    Specimen: Blood   Result Value Ref Range    Culture, Blood 2 24 Hours no growth    COVID-19, Rapid    Specimen: Nasopharyngeal Swab   Result Value Ref Range    SARS-CoV-2, NAAT DETECTED (A) Not Detected   LEGIONELLA ANTIGEN, URINE    Specimen: Urine, clean catch   Result Value Ref Range    L. pneumophila Serogp 1 Ur Ag       Presumptive Negative -suggesting no recent or current infections  with Legionella pneumophila serogroup 1. Infection to Legionella cannot be ruled out since other serogroups  and species may cause infection, antigen may not be present in  early infection, or level of antigen may be below the  detection limit.   Normal Range: Presumptive Negative     CBC auto differential   Result Value Ref Range    WBC 4.2 (L) 4.5 - 11.5 E9/L    RBC 3.39 (L) 3.80 - 5.80 E12/L    Hemoglobin 9.9 (L) 12.5 - 16.5 g/dL    Hematocrit 30.0 (L) 37.0 - 54.0 %    MCV 88.5 80.0 - 99.9 fL    MCH 29.2 26.0 - 35.0 pg    MCHC 33.0 32.0 - 34.5 %    RDW 14.8 11.5 - 15.0 fL    Platelets 358 405 - 206 E9/L    MPV 11.9 7.0 - 12.0 fL    Neutrophils % 79.5 43.0 - 80.0 %    Lymphocytes % 17.9 (L) 20.0 - 42.0 %    Monocytes % 2.7 2.0 - 12.0 %    Eosinophils % 0.0 0.0 - 6.0 %    Basophils % 0.0 0.0 - 2.0 %    Neutrophils Absolute 3.36 1.80 - 7.30 E9/L    Lymphocytes Absolute 0.76 (L) 1.50 - 4.00 E9/L    Monocytes Absolute 0.13 0.10 - 0.95 E9/L    Eosinophils Absolute 0.00 (L) 0.05 - 0.50 E9/L    Basophils Absolute 0.00 0.00 - 0.20 E9/L    Polychromasia 1+     Schistocytes 1+     Ovalocytes 1+    Comprehensive metabolic panel   Result Value Ref Range    Sodium 130 (L) 132 - 146 mmol/L    Potassium 5.2 (H) 3.5 - 5.0 mmol/L    Chloride 102 98 - 107 mmol/L    CO2 20 (L) 22 - 29 mmol/L    Anion Gap 8 7 - 16 mmol/L    Glucose 89 74 - 99 mg/dL    BUN 41 (H) 6 - 20 mg/dL    CREATININE 3.6 (H) 0.7 - 1.2 mg/dL    GFR Non-African American 24 >=60 mL/min/1.73    GFR African American 24     Calcium 7.7 (L) 8.6 - 10.2 mg/dL    Total Protein 6.0 (L) 6.4 - 8.3 g/dL    Albumin 1.7 (L) 3.5 - 5.2 g/dL    Total Bilirubin 0.3 0.0 - 1.2 mg/dL    Alkaline Phosphatase 74 40 - 129 U/L    ALT 28 0 - 40 U/L     (H) 0 - 39 U/L   Troponin   Result Value Ref Range    Troponin, High Sensitivity 78 (H) 0 - 11 ng/L   Brain Natriuretic Peptide   Result Value Ref Range    Pro-BNP 1,599 (H) 0 - 125 pg/mL   Magnesium   Result Value Ref Range    Magnesium 2.7 (H) 1.6 - 2.6 mg/dL   Lactate, Sepsis   Result Value Ref Range    Lactic Acid, Sepsis 0.9 0.5 - 1.9 mmol/L   Troponin   Result Value Ref Range    Troponin, High Sensitivity 78 (H) 0 - 11 ng/L   Creatinine, Random Urine   Result Value Ref Range    Creatinine, Ur 129 40 - 278 mg/dL   Urine electrolytes   Result Value Ref Range    Sodium, Ur <20 Not Established mmol/L    Potassium, Ur 19.4 Not Established mmol/L    Chloride <20 Not Established mmol/L   Urea nitrogen, urine   Result Value Ref Range    Urea Nitrogen, Ur 362 (L) 800 - 1666 mg/dL   Osmolality, Urine   Result Value Ref Range    Osmolality, Ur 225 (L) 300 - 900 mOsm/kg   Urinalysis with Microscopic   Result Value Ref Range    Color, UA Yellow Straw/Yellow    Clarity, UA Clear Clear    Glucose, Ur Negative Negative mg/dL    Bilirubin Urine Negative Negative    Ketones, Urine Negative Negative mg/dL    Specific Gravity, UA 1.015 1.005 - 1.030    Blood, Urine MODERATE (A) Negative    pH, UA 5.0 5.0 - 9.0    Protein, UA >=300 (A) Negative mg/dL    Urobilinogen, Urine 0.2 <2.0 E.U./dL    Nitrite, Urine Negative Negative    Leukocyte Esterase, Urine Negative Negative    Coarse Casts, UA 0-2 0 - 2 /LPF    Waxy Casts, UA 1-2 (A) None Seen /LPF    WBC, UA 1-3 0 - 5 /HPF    RBC, UA 2-5 0 - 2 /HPF    Epithelial Cells, UA RARE /HPF    Bacteria, UA MODERATE (A) None Seen /HPF   Comprehensive Metabolic Panel w/ Reflex to MG   Result Value Ref Range    Sodium 131 (L) 132 - 146 mmol/L    Potassium reflex Magnesium 5.2 (H) 3.5 - 5.0 mmol/L    Chloride 108 (H) 98 - 107 mmol/L    CO2 14 (L) 22 - 29 mmol/L    Anion Gap 9 7 - 16 mmol/L    Glucose 100 (H) 74 - 99 mg/dL    BUN 46 (H) 6 - 20 mg/dL    CREATININE 3.5 (H) 0.7 - 1.2 mg/dL    GFR Non-African American 25 >=60 mL/min/1.73    GFR African American 25     Calcium 7.2 (L) 8.6 - 10.2 mg/dL    Total Protein 6.3 (L) 6.4 - 8.3 g/dL    Albumin 1.3 (L) 3.5 - 5.2 g/dL    Total Bilirubin 0.3 0.0 - 1.2 mg/dL    Alkaline Phosphatase 67 40 - 129 U/L    ALT 27 0 - 40 U/L    AST 96 (H) 0 - 39 U/L   CBC auto differential   Result Value Ref Range    WBC 2.3 (L) 4.5 - 11.5 E9/L    RBC 3.21 (L) 3.80 - 5.80 E12/L    Hemoglobin 9.6 (L) 12.5 - 16.5 g/dL    Hematocrit 29.0 (L) 37.0 - 54.0 %    MCV 90.3 80.0 - 99.9 fL    MCH 29.9 26.0 - 35.0 pg    MCHC 33.1 32.0 - 34.5 %    RDW 15.1 (H) 11.5 - 15.0 fL    Platelets 436 850 - 960 E9/L    MPV 10.7 7.0 - 12.0 fL Neutrophils % 85.4 (H) 43.0 - 80.0 %    Immature Granulocytes % 0.9 0.0 - 5.0 %    Lymphocytes % 11.6 (L) 20.0 - 42.0 %    Monocytes % 1.7 (L) 2.0 - 12.0 %    Eosinophils % 0.0 0.0 - 6.0 %    Basophils % 0.4 0.0 - 2.0 %    Neutrophils Absolute 1.98 1.80 - 7.30 E9/L    Immature Granulocytes # 0.02 E9/L    Lymphocytes Absolute 0.27 (L) 1.50 - 4.00 E9/L    Monocytes Absolute 0.04 (L) 0.10 - 0.95 E9/L    Eosinophils Absolute 0.00 (L) 0.05 - 0.50 E9/L    Basophils Absolute 0.01 0.00 - 0.20 E9/L    Smudge Cells 1+     Polychromasia 1+     Poikilocytes 1+     Ovalocytes 1+    CK   Result Value Ref Range    Total  (H) 20 - 200 U/L   Fibrinogen   Result Value Ref Range    Fibrinogen >700 (H) 225 - 540 mg/dL   C-REACTIVE PROTEIN   Result Value Ref Range    CRP 10.1 (H) 0.0 - 0.4 mg/dL   Sedimentation Rate   Result Value Ref Range    Sed Rate 130 (H) 0 - 15 mm/Hr   TSH WITHOUT REFLEX   Result Value Ref Range    TSH 3.220 0.270 - 4.200 uIU/mL   LACTATE DEHYDROGENASE   Result Value Ref Range    LD 1,389 (H) 135 - 225 U/L   D-dimer, quantitative   Result Value Ref Range    D-Dimer, Quant 712 ng/mL DDU   Vitamin B12 & folate   Result Value Ref Range    Vitamin B-12 1005 (H) 211 - 946 pg/mL    Folate 13.8 4.8 - 24.2 ng/mL   FERRITIN   Result Value Ref Range    Ferritin 3,921 ng/mL   Iron and TIBC   Result Value Ref Range    Iron 23 (L) 59 - 158 mcg/dL    TIBC 118 (L) 250 - 450 mcg/dL    Iron Saturation 19 (L) 20 - 55 %   Calcium, ionized   Result Value Ref Range    Calcium, Ion 1.15 1.15 - 1.33 mmol/L   Basic Metabolic Panel   Result Value Ref Range    Sodium 130 (L) 132 - 146 mmol/L    Potassium 5.5 (H) 3.5 - 5.0 mmol/L    Chloride 106 98 - 107 mmol/L    CO2 17 (L) 22 - 29 mmol/L    Anion Gap 7 7 - 16 mmol/L    Glucose 113 (H) 74 - 99 mg/dL    BUN 51 (H) 6 - 20 mg/dL    CREATININE 3.4 (H) 0.7 - 1.2 mg/dL    GFR Non-African American 26 >=60 mL/min/1.73    GFR African American 26     Calcium 7.9 (L) 8.6 - 10.2 mg/dL   Basic 129/80 97.5 °F (36.4 °C) Oral 76 18 97 %       Oxygen Saturation Interpretation: Abnormal and Improved after treatment    ------------------------------------------ PROGRESS NOTES ------------------------------------------  Re-evaluation(s):    Patients symptoms are improving  Repeat physical examination is improved    Counseling:  I have spoken with the patient and discussed todays results, in addition to providing specific details for the plan of care and counseling regarding the diagnosis and prognosis. Their questions are answered at this time and they are agreeable with the plan of admission.    --------------------------------- ADDITIONAL PROVIDER NOTES ---------------------------------  Consultations:  Spoke with Dr. Sol Perez. Discussed case. They will admit the patient. This patient's ED course included: a personal history and physicial examination and re-evaluation prior to disposition    This patient has remained hemodynamically stable during their ED course. Diagnosis:  1. COVID-19    2. DEISI (acute kidney injury) ([facility] [address]        Disposition:  Patient's disposition: Admit to telemetry  Patient's condition is stable.            Janeth Hernandez DO  10/07/21 0128

## 2021-10-07 NOTE — PROGRESS NOTES
Spoke with Lizzie Blackburn pt's mother. Pt's mother was upset because there was a miss understanding about pt's fluid intake. The pt not allowed any fluids other than IV fluids. This RN explained to Lizzie Blackburn that a sign will be hung in pt's room and staff will be educated on this matter.

## 2021-10-07 NOTE — PROGRESS NOTES
[facility] Inpatient   Resident Progress Note    S:  Hospital day: 2   Brief Synopsis: Morena Tam is a 36 y.o. male with no PMH who presented with hypoxia and fever. He was initially seen in PCP office and was hypoxic on exertion with desaturate into the mid low 80s as well as noted to be febrile and tachycardic. Per patient, his symptoms began 8/15/2021 and he tested positive for Covid 8/28. endorse worsening shortness of breath over the past month with sputum production, intermittent chest discomfort, persistent diarrhea, chills, fever, decrease in appetite and 50 pound weight loss since August.  Patient also endorses significantly worsening \"neuropathy\" of bilateral feet, states it feels like he is \"being stabbed by needles\" which is causing him inability to walk due to pain. In the ED, he was found to be anemic, hyperkalemic, hyponatremic, febrile and COVID positive. He also had elevated Creatinine, AST, proBNP and troponin x2. CTA showed extensive multifocal COVID-19 pneumonia bilaterally, bilateral trace layering pleural effusion and bilateral gynecomastia. CT abdomen was limited due to movement and non-contrast, but showed normal appendix and no evidence of bowel obstruction. Admitted for acute hypoxia due to Covid pneumonia. Overnight/interim:  Patient was seen and examined at bedside. He states he is feeling overall better. He endorses some left-sided chest pain. Continues to feel diaphoretic and short of breath. Also also has decreased appetite.         Cont meds:    IV infusion builder 150 mL/hr at 10/07/21 0659    sodium chloride       Scheduled meds:    sodium chloride flush  5-40 mL IntraVENous 2 times per day    heparin (porcine)  5,000 Units SubCUTAneous Q8H    albumin human  25 g IntraVENous Q6H    vancomycin (VANCOCIN) intermittent dosing (placeholder)   Other RX Placeholder    sodium zirconium cyclosilicate  10 g Oral TID    dexamethasone  10 mg IntraVENous Cervical: No cervical adenopathy. Skin:     General: Skin is warm and dry. Coloration: Skin is not jaundiced. Findings: No bruising or rash. Neurological:      General: No focal deficit present. Mental Status: He is alert and oriented to person, place, and time. Cranial Nerves: No cranial nerve deficit. Motor: Weakness (decreased strenght in LE and UE, 4/5 ) present. Comments: Hypersensitivity to light touch   Psychiatric:         Mood and Affect: Mood normal.         Behavior: Behavior normal.         Thought Content: Thought content normal.                 Labs:  Na/K/Cl/CO2:  131/4.8/104/18 (10/07 0021)  BUN/Cr/glu/ALT/AST/amyl/lip:  53/3.0/--/--/--/--/-- (10/07 0021)  WBC/Hgb/Hct/Plts:  2.3/9.6/29.0/144 (10/06 9273)  estimated creatinine clearance is 40 mL/min (A) (based on SCr of 3 mg/dL (H)). Other pertinent labs as noted below    Radiology:  CT ABDOMEN PELVIS WO CONTRAST Additional Contrast? None   Final Result   Limited study by the patient's respiratory motion and lack of intravenous   contrast.      Extensive multifocal COVID-19 pneumonia bilaterally. Bilateral trace layering pleural effusions. Bilateral gynecomastia. Abdominal organs inadequately evaluated due to the limitations of the study. Normal appendix. No evidence of bowel obstruction. CT CHEST WO CONTRAST   Final Result   Limited study by the patient's respiratory motion and lack of intravenous   contrast.      Extensive multifocal COVID-19 pneumonia bilaterally. Bilateral trace layering pleural effusions. Bilateral gynecomastia. Abdominal organs inadequately evaluated due to the limitations of the study. Normal appendix. No evidence of bowel obstruction. Resident Assessment and Plan       1.  Acute hypoxia 2/2 COVID pneumonia   - Worsening SOB since 8/28/2021 when he initially tested positive   - Patient desaturates to low-mid 80s with any activity  - CTA: COVID pneumonia, Bilateral trace layering pleural effusion, negative for PE   - D dimer 712, Ferritin 3900, CRP 10.1  - WBC 4.2 on admission, now 2.3  - Does not require O2 when resting, saturating 94-99% on room air  - Continue monitoring SpO2  - Pulmonology and ID consulted  - Legionella negative  - T spot TB test pending  - Currently afebrile  - Trend CRP, D Dimer, Ferritin daily  - Continue Decadron 10mg BID, day 4  - CXR shows multifocal groundglass opacities are stable  - ID on board: received 2 doses of vancomycin 10/6  - Blood cx: gram positive cocci in clusters, repeat blood cx shows no growth  Urine culture: NGTD  - Counseled on getting COVID vaccine after patient recovers      2. Weight loss   - Per patient due to decreased appetite from COVID; 50lb weight loss since August   - R/o other potential origins   - MARILEE negative  HIV, hepatitis panel pending   - Immunoglobulins pending   - TSH normal   - Sed rate and CRP elevated     3. DEISI   - Likely secondary to dehydration from persistent diarrhea and decreased PO intake   - Cr on admission 3.6, trending down now 2.8  - Urine osm and urine nitrogen low   - UA: protein, 1-2 waxy casts, moderate bacteria, moderate blood   - Nephro consulted, started on bicarb drip    - Microalbumin-creatinine ratio elevated      4. Elevated troponin and proBNP  - 2/2 myocardial injury vs stress cardiomyopathy due to COVID vs new onset CHF   - initial troponin 78, 78  - initial BNP 1599  - Echo pending   - Consider Cardio consult   -Fluid restriction  - Intermittent chest pain, EKG pending  Repeat troponin 45, decreased from admission     4. Hyperkalemia/Hyponatremia - resolving    - on admission K 5.2, Na 130  - K 4.3, Na 134  - Fluid restricted, strict I&O's  - Nephro on board: Lokelma 10g PO q8h x 3 doses  Low potassium diet  - Monitor BMP     5.  Hypoalbuminemia   - Alb 1.7 on admission, now 2.8  -On albumin 25 g q6h x 4 doses  Nephro recommends

## 2021-10-08 LAB
ADDENDUM ELECTROPHORESIS URINE RANDOM: NORMAL
ALBUMIN SERPL-MCNC: 2.3 G/DL (ref 3.5–5.2)
ALP BLD-CCNC: 63 U/L (ref 40–129)
ALT SERPL-CCNC: 21 U/L (ref 0–40)
ANION GAP SERPL CALCULATED.3IONS-SCNC: 9 MMOL/L (ref 7–16)
ANISOCYTOSIS: ABNORMAL
AST SERPL-CCNC: 53 U/L (ref 0–39)
BASOPHILS ABSOLUTE: 0 E9/L (ref 0–0.2)
BASOPHILS RELATIVE PERCENT: 0 % (ref 0–2)
BILIRUB SERPL-MCNC: 0.4 MG/DL (ref 0–1.2)
BUN BLDV-MCNC: 53 MG/DL (ref 6–20)
CALCIUM SERPL-MCNC: 7.1 MG/DL (ref 8.6–10.2)
CHLORIDE BLD-SCNC: 102 MMOL/L (ref 98–107)
CO2: 31 MMOL/L (ref 22–29)
CREAT SERPL-MCNC: 2.7 MG/DL (ref 0.7–1.2)
CREATININE URINE: 109 MG/DL (ref 40–278)
CREATININE URINE: 111 MG/DL (ref 40–278)
EKG ATRIAL RATE: 74 BPM
EKG P AXIS: 62 DEGREES
EKG P-R INTERVAL: 132 MS
EKG Q-T INTERVAL: 482 MS
EKG QRS DURATION: 82 MS
EKG QTC CALCULATION (BAZETT): 535 MS
EKG R AXIS: 69 DEGREES
EKG T AXIS: 40 DEGREES
EKG VENTRICULAR RATE: 74 BPM
EOSINOPHILS ABSOLUTE: 0 E9/L (ref 0.05–0.5)
EOSINOPHILS RELATIVE PERCENT: 0 % (ref 0–6)
GFR AFRICAN AMERICAN: 34
GFR NON-AFRICAN AMERICAN: 34 ML/MIN/1.73
GLUCOSE BLD-MCNC: 101 MG/DL (ref 74–99)
HCT VFR BLD CALC: 28.4 % (ref 37–54)
HEMOGLOBIN: 9.3 G/DL (ref 12.5–16.5)
HYPOCHROMIA: ABNORMAL
IGA: 577 MG/DL (ref 70–400)
IGG: 1966 MG/DL (ref 700–1600)
IGM: 191 MG/DL (ref 40–230)
LYMPHOCYTES ABSOLUTE: 0.41 E9/L (ref 1.5–4)
LYMPHOCYTES RELATIVE PERCENT: 7 % (ref 20–42)
MCH RBC QN AUTO: 28.8 PG (ref 26–35)
MCHC RBC AUTO-ENTMCNC: 32.7 % (ref 32–34.5)
MCV RBC AUTO: 87.9 FL (ref 80–99.9)
MICROALBUMIN UR-MCNC: 2768.3 MG/L
MICROALBUMIN/CREAT UR-RTO: 2539.7 (ref 0–30)
MONOCYTES ABSOLUTE: 0.23 E9/L (ref 0.1–0.95)
MONOCYTES RELATIVE PERCENT: 4.3 % (ref 2–12)
NEUTROPHILS ABSOLUTE: 5.16 E9/L (ref 1.8–7.3)
NEUTROPHILS RELATIVE PERCENT: 88.7 % (ref 43–80)
OVALOCYTES: ABNORMAL
PDW BLD-RTO: 14.6 FL (ref 11.5–15)
PLATELET # BLD: 192 E9/L (ref 130–450)
PMV BLD AUTO: 10.9 FL (ref 7–12)
POIKILOCYTES: ABNORMAL
POLYCHROMASIA: ABNORMAL
POTASSIUM REFLEX MAGNESIUM: 3.7 MMOL/L (ref 3.5–5)
PROTEIN PROTEIN: 421 MG/DL (ref 0–12)
PROTEIN/CREAT RATIO: 3.8
PROTEIN/CREAT RATIO: 3.8 (ref 0–0.2)
RBC # BLD: 3.23 E12/L (ref 3.8–5.8)
SODIUM BLD-SCNC: 142 MMOL/L (ref 132–146)
TEAR DROP CELLS: ABNORMAL
TOTAL PROTEIN: 6.4 G/DL (ref 6.4–8.3)
URINE CULTURE, ROUTINE: NORMAL
VANCOMYCIN RANDOM: 19.1 MCG/ML (ref 5–40)
WBC # BLD: 5.8 E9/L (ref 4.5–11.5)

## 2021-10-08 PROCEDURE — 6360000002 HC RX W HCPCS: Performed by: FAMILY MEDICINE

## 2021-10-08 PROCEDURE — 87040 BLOOD CULTURE FOR BACTERIA: CPT

## 2021-10-08 PROCEDURE — 85025 COMPLETE CBC W/AUTO DIFF WBC: CPT

## 2021-10-08 PROCEDURE — 6360000002 HC RX W HCPCS: Performed by: INTERNAL MEDICINE

## 2021-10-08 PROCEDURE — 2140000000 HC CCU INTERMEDIATE R&B

## 2021-10-08 PROCEDURE — 2500000003 HC RX 250 WO HCPCS: Performed by: INTERNAL MEDICINE

## 2021-10-08 PROCEDURE — 84156 ASSAY OF PROTEIN URINE: CPT

## 2021-10-08 PROCEDURE — 2580000003 HC RX 258: Performed by: INTERNAL MEDICINE

## 2021-10-08 PROCEDURE — 99232 SBSQ HOSP IP/OBS MODERATE 35: CPT | Performed by: FAMILY MEDICINE

## 2021-10-08 PROCEDURE — 80053 COMPREHEN METABOLIC PANEL: CPT

## 2021-10-08 PROCEDURE — 82570 ASSAY OF URINE CREATININE: CPT

## 2021-10-08 PROCEDURE — 82044 UR ALBUMIN SEMIQUANTITATIVE: CPT

## 2021-10-08 PROCEDURE — 2580000003 HC RX 258: Performed by: FAMILY MEDICINE

## 2021-10-08 PROCEDURE — 36415 COLL VENOUS BLD VENIPUNCTURE: CPT

## 2021-10-08 PROCEDURE — 80202 ASSAY OF VANCOMYCIN: CPT

## 2021-10-08 RX ORDER — DEXAMETHASONE SODIUM PHOSPHATE 4 MG/ML
10 INJECTION, SOLUTION INTRA-ARTICULAR; INTRALESIONAL; INTRAMUSCULAR; INTRAVENOUS; SOFT TISSUE EVERY 12 HOURS
Status: DISCONTINUED | OUTPATIENT
Start: 2021-10-08 | End: 2021-10-09

## 2021-10-08 RX ORDER — DEXTROSE AND SODIUM CHLORIDE 5; .9 G/100ML; G/100ML
INJECTION, SOLUTION INTRAVENOUS CONTINUOUS
Status: DISCONTINUED | OUTPATIENT
Start: 2021-10-08 | End: 2021-10-10

## 2021-10-08 RX ADMIN — HEPARIN SODIUM 5000 UNITS: 10000 INJECTION INTRAVENOUS; SUBCUTANEOUS at 14:13

## 2021-10-08 RX ADMIN — HEPARIN SODIUM 5000 UNITS: 10000 INJECTION INTRAVENOUS; SUBCUTANEOUS at 05:29

## 2021-10-08 RX ADMIN — Medication 10 ML: at 10:38

## 2021-10-08 RX ADMIN — SODIUM BICARBONATE: 84 INJECTION, SOLUTION INTRAVENOUS at 00:38

## 2021-10-08 RX ADMIN — Medication 2000 MG: at 21:57

## 2021-10-08 RX ADMIN — DEXTROSE AND SODIUM CHLORIDE: 5; 900 INJECTION, SOLUTION INTRAVENOUS at 12:55

## 2021-10-08 RX ADMIN — Medication 10 ML: at 21:57

## 2021-10-08 RX ADMIN — DEXAMETHASONE SODIUM PHOSPHATE 10 MG: 10 INJECTION INTRAMUSCULAR; INTRAVENOUS at 00:39

## 2021-10-08 RX ADMIN — DEXAMETHASONE SODIUM PHOSPHATE 10 MG: 10 INJECTION INTRAMUSCULAR; INTRAVENOUS at 11:52

## 2021-10-08 RX ADMIN — Medication 2000 MG: at 12:55

## 2021-10-08 RX ADMIN — HEPARIN SODIUM 5000 UNITS: 10000 INJECTION INTRAVENOUS; SUBCUTANEOUS at 23:37

## 2021-10-08 RX ADMIN — DEXTROSE AND SODIUM CHLORIDE: 5; 900 INJECTION, SOLUTION INTRAVENOUS at 21:57

## 2021-10-08 ASSESSMENT — PAIN SCALES - GENERAL
PAINLEVEL_OUTOF10: 0

## 2021-10-08 NOTE — PROGRESS NOTES
[facility] PROGRESS NOTE      Chief complaint: Follow-up of Gram-positive cocci in clusters on blood culture    The patient is a 36 y.o. male, not vaccinated against COVID-19, with history of asthma, presented on 10/05 with shortness of breath, found to be febrile at 103 °F, positive SARS-CoV-2 PCR, bilateral groundglass opacities on chest CT, tolerating room air with oxygen saturation of 97%. He reports having tested positive for SARS-CoV-2 for the 3rd time now with previous on in late August at which time he reports having quarantined. Urine Streptococcus pneumonia and Legionella antigens were negative. Blood cultures from 10/05 and 10/06 showed methicillin-susceptible Staphylococcus epidermidis in 1 out of 2 sets. Subjective: Patient was seen and examined. No chills, no abdominal pain, no diarrhea, no rash, no itching. He reports dyspnea on ambulation. Objective:    Vitals:    10/08/21 1030   BP: 132/78   Pulse: 88   Resp: 18   Temp: 97.8 °F (36.6 °C)   SpO2: 99%     Constitutional: Alert, not in distress  Respiratory: Clear breath sounds, no crackles, no wheezes  Cardiovascular: Regular rate and rhythm, no murmurs  Gastrointestinal: Bowel sounds present, soft, nontender  Skin: Warm and dry, no active dermatoses  Musculoskeletal: No joint swelling, no joint erythema    Labs, imaging, and medical records/notes were personally reviewed. Assessment:  Sepsis  COVID-19, mild  Pneumonia  Gram-positive cocci in clusters bacteremia, etiology unclear  DEISI    Recommendations:  Change vancomycin to cefazolin 2g q8h. Follow up TTE. Follow up blood cultures. Follow up HIV screen. Repeat blood cultures today. Monitor respiratory status. Continue supportive care.     Thank you for involving me in the care of Kristin James. I will continue to follow. Please do not hesitate to call for any questions or concerns.     Electronically signed by Claudine Pittman MD on 10/8/2021 at 11:22 AM

## 2021-10-08 NOTE — PLAN OF CARE
Problem: Airway Clearance - Ineffective  Goal: Achieve or maintain patent airway  Outcome: Met This Shift     Problem: Gas Exchange - Impaired  Goal: Absence of hypoxia  Outcome: Met This Shift  Goal: Promote optimal lung function  Outcome: Met This Shift     Problem: Breathing Pattern - Ineffective  Goal: Ability to achieve and maintain a regular respiratory rate  Outcome: Met This Shift     Problem: Body Temperature -  Risk of, Imbalanced  Goal: Ability to maintain a body temperature within defined limits  Outcome: Met This Shift  Goal: Will regain or maintain usual level of consciousness  Outcome: Met This Shift  Goal: Complications related to the disease process, condition or treatment will be avoided or minimized  Outcome: Met This Shift     Problem: Isolation Precautions - Risk of Spread of Infection  Goal: Prevent transmission of infection  Outcome: Met This Shift     Problem: Nutrition Deficits  Goal: Optimize nutritional status  Outcome: Met This Shift     Problem: Inadequate oral food/beverage intake (NI-2.1)  Goal: Food and/or Nutrient Delivery  Description: Individualized approach for food/nutrient provision.   10/8/2021 1133 by Mahesh Calvillo RD, LD  Outcome: Met This Shift

## 2021-10-08 NOTE — PROGRESS NOTES
Department of Internal Medicine  Nephrology Progress Note      Events reviewed. SUBJECTIVE:  We are following Mr. Aron Chaparro for DEISI. Reports no complaints.     PHYSICAL EXAM:      Vitals:    VITALS:  /72   Pulse 76   Temp 97.6 °F (36.4 °C) (Tympanic)   Resp 18   Ht 6' 4\" (1.93 m)   Wt 173 lb (78.5 kg)   SpO2 98%   BMI 21.06 kg/m²   24HR INTAKE/OUTPUT:      Intake/Output Summary (Last 24 hours) at 10/8/2021 1723  Last data filed at 10/8/2021 0951  Gross per 24 hour   Intake 0 ml   Output 1100 ml   Net -1100 ml       Constitutional:  Awake, alert, NAD  HEENT:  PERRL, normocephalic, atraumatic  Respiratory: diminished lung sounds  Cardiovascular/Edema:  RRR, S1/S2, -edema  Gastrointestinal:  abd flat, soft, non-tender  Neurologic:  Awake, alert, no focal deficits  Skin:  Warm, dry, no rash or lesions  Other: No edema      Scheduled Meds:   ceFAZolin  2,000 mg IntraVENous Q8H    sodium chloride flush  5-40 mL IntraVENous 2 times per day    heparin (porcine)  5,000 Units SubCUTAneous Q8H    dexamethasone  10 mg IntraVENous Q12H     Continuous Infusions:   dextrose 5 % and 0.9 % NaCl 125 mL/hr at 10/08/21 1255    sodium chloride       PRN Meds:.sodium chloride flush, ondansetron **OR** ondansetron, polyethylene glycol, acetaminophen **OR** acetaminophen, perflutren lipid microspheres, sodium chloride    DATA:    CBC:   Lab Results   Component Value Date    WBC 5.8 10/08/2021    RBC 3.23 10/08/2021    HGB 9.3 10/08/2021    HCT 28.4 10/08/2021    MCV 87.9 10/08/2021    MCH 28.8 10/08/2021    MCHC 32.7 10/08/2021    RDW 14.6 10/08/2021     10/08/2021    MPV 10.9 10/08/2021     CMP:    Lab Results   Component Value Date     10/08/2021    K 3.7 10/08/2021     10/08/2021    CO2 31 10/08/2021    BUN 53 10/08/2021    CREATININE 2.7 10/08/2021    GFRAA 34 10/08/2021    LABGLOM 34 10/08/2021    GLUCOSE 101 10/08/2021    PROT 6.4 10/08/2021    LABALBU 2.3 10/08/2021    CALCIUM 7.1 10/08/2021    BILITOT 0.4 10/08/2021    ALKPHOS 63 10/08/2021    AST 53 10/08/2021    ALT 21 10/08/2021     Magnesium:    Lab Results   Component Value Date    MG 2.7 10/05/2021     Phosphorus:  No results found for: PHOS     Radiology Review:      CT Chest October 5, 2021   Limited study by the patient's respiratory motion and lack of intravenous   contrast.       Extensive multifocal COVID-19 pneumonia bilaterally.       Bilateral trace layering pleural effusions.       Bilateral gynecomastia.       Abdominal organs inadequately evaluated due to the limitations of the study.       Normal appendix.       No evidence of bowel obstruction.         Kidney ultrasound October 7, 2021   1. Hyperechoic kidneys due to underlying parenchymal disease. 2. Trace bilateral perinephric fluid of indeterminate etiology, potentially   due to underlying inflammation. 3. An approximately 1.1 cm x 1.5 cm x 2.3 cm lesion in the right kidney could   be a cyst but could also be seen with infarction, abscess, or neoplasia. Consider further evaluation with renal protocol abdomen MRI (preferred) or   CT.  Following resolution of acute kidney injury.           BRIEF SUMMARY OF INITIAL CONSULT:     Briefly, Mr. Bobby Santiago is a 36year old male with no significant PMH who was admitted on October 5, 2021 after he presented from an outside clinic after he was found to be hypoxic during 6 minute walking test. Patient tested positive for Covid 19 on August 18th, he is unvaccinated and had Covid 19 last year as well. Patient states he has quarantined by himself and when his mother recently visited him she was shocked at his appearance as he had lost about 50 pounds in 2 and a half months. On admission labs were significant for sodium of 130, potassium 5.2, bicarbonate 20, BUN 41, creatinine 3.6, magnesium 2.7, calcium 7.7 and proBNP 1,599. We are consulted for DEISI. Problems resolved:    Hyperkalemia, 2/2 DEISI.  Low potassium diet  Hypotonic hyponatremia 2/2 intravascular volume from poor oral intake. Bicarb drip started. Sodium levels improving. Low bicarbonate levels with hyperchloremia, most likely NAGMA versus respiratory alkalosis; we need a PH to clarify diagnosis. Awaiting ABG results    IMPRESSION/RECOMMENDATIONS:     DEISI on CKD?, volume responsive pre-renal DEISI d/t volume (poor oral intake), urine sodium <20. Renal function continues to slowly improve with IV fluids administration. CKD, stage unknown, with large proteinuria (U/A with > 300 mg), r/o CKD, FSGS?, Nephrotic range protein 5.2 g. UACR showing less than 12, to repeat, possibly lab error. Work-up so far MARILEE negative, C4 normal, C3 88 (low), UPEP without monoclonal gammopathy, SPEP pending. Kidney ultrasound with hyperechoic kidneys. Right kidney lesion, likely a cyst but cannot be ruled out other pathology including infarction, abscess, neoplasm. We will proceed with MRI when renal function improved    High bicarbonate levels, likely metabolic alkalosis 2/2 administration  Severe Hypoalbuminemia, multifactorial  ---------------------------------------------------------------------  Bacteremia?, gram +cocci in clusters in 1 of 2 bottles, likely contaminant  HIV screen pending  Covid 19 infection in non vaccinated pt  Leukopenia, WBC   T spot TB test pending  Complaints of tingling pain in bilateral feet, neuropathy?     Plan:    Change IV fluids to D5 NS at 125 cc/hour  Complete albumin 25 g Q6 hrs x4  Repeat urine ACR   Discontinue fluid restriction  We will discuss kidney biopsy if severe proteinuria is confirmed     Electronically signed by Jane Read MD on 10/8/2021 at 5:23 PM

## 2021-10-08 NOTE — PROGRESS NOTES
 Vancomycin has been discontinued   [address] to [facility] Physician to re-consult pharmacy if future dosing is needed    Thank you for the consult,  Babs Klinefelter. Isaac Banks, PharmD 10/8/2021 12:42 PM        Pharmacy Consultation Note  (Antibiotic Dosing and Monitoring)    Initial consult date: 10/6/21  Consulting physician/provider: Dr. Tiny Farmer  Drug: Vancomycin  Indication: Bloodstream Infection    Age/  Gender Height Weight IBW  Allergy Information   31 y.o./male 6' 4\" (193 cm) 171 lb (77.6 kg)     Ideal body weight: 87.9 kg (193 lb 14.3 oz)   Patient has no known allergies. Renal Function:  Recent Labs     10/07/21  0021 10/07/21  0535 10/08/21  0757   BUN 53* 51* 53*   CREATININE 3.0* 2.8* 2.7*       Intake/Output Summary (Last 24 hours) at 10/8/2021 1045  Last data filed at 10/8/2021 0951  Gross per 24 hour   Intake 0 ml   Output 1400 ml   Net -1400 ml       Vancomycin Monitoring:  Trough:  No results for input(s): VANCOTROUGH in the last 72 hours. Random:    Recent Labs     10/07/21  0535 10/08/21  0757   VANCORANDOM 21.1 19.1       Vancomycin Administration Times:  Recent vancomycin administrations                   vancomycin (VANCOCIN) 750 mg in dextrose 5 % 250 mL IVPB (mg) 750 mg New Bag 10/07/21 1730    vancomycin 1500 mg in dextrose 5% 300 mL IVPB (mg) 1,500 mg New Bag 10/06/21 1654                Assessment:  · Patient is a 36 y.o. male who has been initiated on vancomycin  · Estimated Creatinine Clearance: 45 mL/min (A) (based on SCr of 2.7 mg/dL (H)).   · To dose vancomycin, pharmacy will be utilizing [employer] calculation software for goal AUC/MARSHALL 400-600 mg/L-hr    Plan:  · Initiate vancomycin 750 mg IV q24h   · Will check vancomycin levels when appropriate  · Will continue to monitor renal function   · Clinical pharmacy to follow      [facility] 10/8/2021 10:45 AM

## 2021-10-08 NOTE — PROGRESS NOTES
[address] [facility]  Family Medicine Attending    S: 36 y.o. male with history of asthma and smoking history presents with increasing dyspnea and cough over past month. On presentation had fever of 103. Has tested positive for COVID twice, most recently end of August.  CT shows extensive bilateral pneumonia and COVID testing again positive. Now afebrile for second day, and feeling better. O: VS- Blood pressure 132/78, pulse 88, temperature 97.8 °F (36.6 °C), temperature source Tympanic, resp. rate 18, height 6' 4\" (1.93 m), weight 173 lb (78.5 kg), SpO2 99 %. Exam is as noted by resident   Awake, alert  Heart - RRR  Lungs- bilateral wheezing and coarse BS  Ext - no edema, distal tenderness    Creatinine 3.6 to 2.8    Impressions:   Principal Problem:    Acute respiratory failure with hypoxia (HCC)  Active Problems:  Repeatedly positive Covid  Stable groundglass opacities  Saturations okay on room air    Acute kidney injury due to COVID-19 ([address]    Asthma    Nephrotic range proteinuria  50 pound weight loss  2 blood cultures positive  Improved on vancomycin    Plan:   Kidney failure with proteinuria noted - labs obtained  rx albumin   Apparent iron deficiency, but normocytic anemia (? Renal anemia)   Consult pulmonary, ID, and nephrology   No old records - no significant PMH   Weight loss, hypoalbuminemia noted as well - consult nutrition              Nephrology, ID and Pulm following    Awaiting further labs, HIV  Awaiting echo       Attending Physician Statement  I have reviewed the chart and seen the patient with the resident(s). I personally reviewed images, EKG's and similar tests, if present. I personally reviewed and performed key elements of the history and exam.  I have reviewed and confirmed student and/or resident history and exam with changes as indicated above. I agree with the assessment, plan and orders as documented by the resident.   Please refer to the resident and/or student note for additional information.       Jessy Phillips MD

## 2021-10-08 NOTE — PROGRESS NOTES
[facility] Inpatient   Resident Progress Note    S:  Hospital day: 3   Brief Synopsis: Kinza Draper is a 36 y.o. male with no PMH who presented with hypoxia and fever. He was initially seen in PCP office and was hypoxic on exertion with desaturate into the mid low 80s as well as noted to be febrile and tachycardic. Per patient, his symptoms began 8/15/2021 and he tested positive for Covid 8/28. endorse worsening shortness of breath over the past month with sputum production, intermittent chest discomfort, persistent diarrhea, chills, fever, decrease in appetite and 50 pound weight loss since August.  Patient also endorses significantly worsening \"neuropathy\" of bilateral feet, states it feels like he is \"being stabbed by needles\" which is causing him inability to walk due to pain. In the ED, he was found to be anemic, hyperkalemic, hyponatremic, febrile and COVID positive. He also had elevated Creatinine, AST, proBNP and troponin x2. CTA showed extensive multifocal COVID-19 pneumonia bilaterally, bilateral trace layering pleural effusion and bilateral gynecomastia. CT abdomen was limited due to movement and non-contrast, but showed normal appendix and no evidence of bowel obstruction. Admitted for acute hypoxia due to Covid pneumonia. Nephro, pulm and ID consulted. Started on bicarb drip. US showed hyperechoic kidneys, trace b/l perinephric fluid and right kidney lesion (Cyst vs infarction vs abscess vs neoplasia). Overnight/interim:  Patient was seen and examined at bedside. He states he is feeling overall better. Continues to have night sweats and shortness of breath. Appetite improved. Denies CP, abdominal pain or diarrhea.          Cont meds:    IV infusion builder 150 mL/hr at 10/08/21 0038    sodium chloride       Scheduled meds:    sodium chloride flush  5-40 mL IntraVENous 2 times per day    heparin (porcine)  5,000 Units SubCUTAneous Q8H    vancomycin (VANCOCIN) intermittent dosing (placeholder)   Other RX Placeholder    dexamethasone  10 mg IntraVENous Q12H     PRN meds: sodium chloride flush, ondansetron **OR** ondansetron, polyethylene glycol, acetaminophen **OR** acetaminophen, perflutren lipid microspheres, sodium chloride     I reviewed the patient's past medical and surgical history, Medications and Allergies. O:  /76   Pulse 86   Temp 97.3 °F (36.3 °C) (Tympanic)   Resp 18   Ht 6' 4.5\" (1.943 m)   Wt 173 lb (78.5 kg)   SpO2 100%   BMI 20.78 kg/m²   24 hour I&O: I/O last 3 completed shifts: In: 0   Out: 300 [Urine:300]  No intake/output data recorded. Physical Exam  Constitutional:       General: He is not in acute distress. Appearance: Normal appearance. He is not ill-appearing, toxic-appearing or diaphoretic. HENT:      Nose: No rhinorrhea. Mouth/Throat:      Mouth: Mucous membranes are moist.      Pharynx: Oropharynx is clear. Eyes:      General: No scleral icterus. Right eye: No discharge. Left eye: No discharge. Extraocular Movements: Extraocular movements intact. Cardiovascular:      Rate and Rhythm: Normal rate and regular rhythm. Pulses: Normal pulses. Heart sounds: Normal heart sounds. No murmur heard. No friction rub. No gallop. Pulmonary:      Effort: Pulmonary effort is normal. No respiratory distress. Breath sounds: No wheezing, rhonchi or rales. Comments: Decreased breath sounds   Chest:      Chest wall: No tenderness. Abdominal:      General: Abdomen is flat. Bowel sounds are normal. There is no distension. Palpations: Abdomen is soft. There is no mass. Tenderness: There is no abdominal tenderness (LUQ ). There is no guarding or rebound. Hernia: No hernia is present. Musculoskeletal:         General: No swelling or tenderness. Cervical back: Normal range of motion and neck supple. No rigidity or tenderness. Right lower leg: No edema.       Left lower leg: No edema. Lymphadenopathy:      Cervical: No cervical adenopathy. Skin:     General: Skin is warm and dry. Coloration: Skin is not jaundiced. Findings: No bruising or rash. Neurological:      General: No focal deficit present. Mental Status: He is alert and oriented to person, place, and time. Cranial Nerves: No cranial nerve deficit. Motor: No weakness. Comments: Hypersensitivity to light touch   Psychiatric:         Mood and Affect: Mood normal.         Behavior: Behavior normal.         Thought Content: Thought content normal.                 Labs:  Na/K/Cl/CO2:  134/4.3/103/19 (10/07 0535)  BUN/Cr/glu/ALT/AST/amyl/lip:  51/2.8/--/21/63/--/-- (10/07 0535)  WBC/Hgb/Hct/Plts:  3.7/8.8/26.2/168 (10/07 0535)  estimated creatinine clearance is 43 mL/min (A) (based on SCr of 2.8 mg/dL (H)). Other pertinent labs as noted below    Radiology:  [address] [facility]   Final Result   1. Hyperechoic kidneys due to underlying parenchymal disease. 2. Trace bilateral perinephric fluid of indeterminate etiology, potentially   due to underlying inflammation. 3. An approximately 1.1 cm x 1.5 cm x 2.3 cm lesion in the right kidney could   be a cyst but could also be seen with infarction, abscess, or neoplasia. Consider further evaluation with renal protocol abdomen MRI (preferred) or   CT. Following resolution of acute kidney injury. XR CHEST PORTABLE   Final Result   Multifocal bilateral pulmonary ground-glass airspace opacities are stable         CT ABDOMEN PELVIS WO CONTRAST Additional Contrast? None   Final Result   Limited study by the patient's respiratory motion and lack of intravenous   contrast.      Extensive multifocal COVID-19 pneumonia bilaterally. Bilateral trace layering pleural effusions. Bilateral gynecomastia. Abdominal organs inadequately evaluated due to the limitations of the study. Normal appendix.       No evidence of bowel obstruction. CT CHEST WO CONTRAST   Final Result   Limited study by the patient's respiratory motion and lack of intravenous   contrast.      Extensive multifocal COVID-19 pneumonia bilaterally. Bilateral trace layering pleural effusions. Bilateral gynecomastia. Abdominal organs inadequately evaluated due to the limitations of the study. Normal appendix. No evidence of bowel obstruction. Resident Assessment and Plan       1. Acute hypoxia 2/2 COVID pneumonia   - Worsening SOB since 8/28/2021 when he initially tested positive   - Patient desaturates to low-mid 80s with any activity  - CTA: COVID pneumonia, Bilateral trace layering pleural effusion, negative for PE   - D dimer 712, Ferritin 3900, CRP 10.1  - WBC 4.2 on admission, now 3.7  - Does not require O2 when resting, saturating 94-99% on room air  - Continue monitoring SpO2  - Pulmonology and ID consulted  - Legionella negative  - T spot TB test pending  - Currently afebrile  - Continue Decadron 10mg BID, day 5  - CXR 10/8 shows multifocal groundglass opacities are stable  - ID on board: received 2 days of vancomycin 10/6  - Blood cx: gram positive cocci in clusters, repeat blood cx shows no growth  Urine culture: NGTD  - Counseled on getting COVID vaccine after patient recovers      2. Weight loss   - Per patient due to decreased appetite from COVID; 50lb weight loss since August   - R/o other potential origins   - MARILEE negative  HIV, hepatitis panel pending   - Elevated IgG and IgA, normal IgM: IgA nephropathy vs MPGN?  - TSH normal   - Sed rate and CRP elevated     3.  DEISI   - Likely secondary to dehydration from persistent diarrhea and decreased PO intake   - Cr on admission 3.6, trending down now 2.8  - Urine osm and urine nitrogen low   - UA: protein, 1-2 waxy casts, moderate bacteria, moderate blood   - On bicarb drip    - Microalbumin-creatinine ratio elevated   - Nephro recommends repeat albumin to creatinine ratio  - Fluid restriction, encourage oral intake       4. Elevated troponin and proBNP  - 2/2 myocardial injury vs stress cardiomyopathy due to COVID vs new onset CHF   - initial troponin 78, 78  - initial BNP 1599  - Echo pending   - Consider Cardio consult   - Fluid restriction  - Intermittent chest pain yesterday, EKG showed prolonged QT, T wave abnormality   Repeat troponin 45, decreased from admission     4. Hyperkalemia/Hyponatremia - resolving    - on admission K 5.2, Na 130  - K 4.3, Na 134  - Fluid restricted, strict I&O's  - Nephro on board: Lokelma 10g PO q8h x 3 doses  Low potassium diet  - Monitor BMP     5. Hypoalbuminemia   - Alb 1.7 on admission, now 2.8  -On albumin 25 g q6h x 4 doses  Nephro recommends complement C3 and 4 levels and cryoglobulin titer  - Continue to monitor      6.  Anemia   - Likely 2/2 from neuropathy   - Rule out granulomatosis with polyangitis in the setting of neuropathy, DEISI and weight loss  - Consider OP EMG        PT/OT evaluation: not indicated   DVT prophylaxis: heparin due to DEISI    GI prophylaxis: not indicated   Disposition:home +/- home health/ rehab  Diet: adult         Electronically signed by Diego Gamble MD on 10/8/2021 at 8:32 AM  Attending physician: Dr. Trevon Dougherty

## 2021-10-08 NOTE — PROGRESS NOTES
Patient has a potassium level of 3.7. Call to Nephrology and fluid restriction discontinued. Patient may now have PO fluids with meals.

## 2021-10-09 ENCOUNTER — APPOINTMENT (OUTPATIENT)
Dept: GENERAL RADIOLOGY | Age: 31
DRG: 890 | End: 2021-10-09
Payer: COMMERCIAL

## 2021-10-09 LAB
ALBUMIN SERPL-MCNC: 2.1 G/DL (ref 3.5–5.2)
ALP BLD-CCNC: 61 U/L (ref 40–129)
ALT SERPL-CCNC: 20 U/L (ref 0–40)
ANION GAP SERPL CALCULATED.3IONS-SCNC: 5 MMOL/L (ref 7–16)
AST SERPL-CCNC: 48 U/L (ref 0–39)
BASOPHILS ABSOLUTE: 0 E9/L (ref 0–0.2)
BASOPHILS RELATIVE PERCENT: 0 % (ref 0–2)
BILIRUB SERPL-MCNC: 0.4 MG/DL (ref 0–1.2)
BUN BLDV-MCNC: 48 MG/DL (ref 6–20)
CALCIUM SERPL-MCNC: 7.2 MG/DL (ref 8.6–10.2)
CHLORIDE BLD-SCNC: 101 MMOL/L (ref 98–107)
CO2: 32 MMOL/L (ref 22–29)
CREAT SERPL-MCNC: 2.4 MG/DL (ref 0.7–1.2)
EOSINOPHILS ABSOLUTE: 0 E9/L (ref 0.05–0.5)
EOSINOPHILS RELATIVE PERCENT: 0 % (ref 0–6)
GFR AFRICAN AMERICAN: 39
GFR NON-AFRICAN AMERICAN: 39 ML/MIN/1.73
GLUCOSE BLD-MCNC: 106 MG/DL (ref 74–99)
HCT VFR BLD CALC: 26.8 % (ref 37–54)
HEMOGLOBIN: 8.7 G/DL (ref 12.5–16.5)
HYPOCHROMIA: ABNORMAL
IMMATURE GRANULOCYTES #: 0.02 E9/L
IMMATURE GRANULOCYTES %: 0.4 % (ref 0–5)
LYMPHOCYTES ABSOLUTE: 0.23 E9/L (ref 1.5–4)
LYMPHOCYTES RELATIVE PERCENT: 4.7 % (ref 20–42)
MCH RBC QN AUTO: 29.9 PG (ref 26–35)
MCHC RBC AUTO-ENTMCNC: 32.5 % (ref 32–34.5)
MCV RBC AUTO: 92.1 FL (ref 80–99.9)
MONOCYTES ABSOLUTE: 0.23 E9/L (ref 0.1–0.95)
MONOCYTES RELATIVE PERCENT: 4.7 % (ref 2–12)
NEUTROPHILS ABSOLUTE: 4.38 E9/L (ref 1.8–7.3)
NEUTROPHILS RELATIVE PERCENT: 90.2 % (ref 43–80)
OVALOCYTES: ABNORMAL
PDW BLD-RTO: 14.6 FL (ref 11.5–15)
PLATELET # BLD: 198 E9/L (ref 130–450)
PMV BLD AUTO: 11.3 FL (ref 7–12)
POIKILOCYTES: ABNORMAL
POTASSIUM REFLEX MAGNESIUM: 3.9 MMOL/L (ref 3.5–5)
PRO-BNP: 3431 PG/ML (ref 0–125)
RBC # BLD: 2.91 E12/L (ref 3.8–5.8)
SODIUM BLD-SCNC: 138 MMOL/L (ref 132–146)
TOTAL PROTEIN: 5.7 G/DL (ref 6.4–8.3)
WBC # BLD: 4.9 E9/L (ref 4.5–11.5)

## 2021-10-09 PROCEDURE — 85025 COMPLETE CBC W/AUTO DIFF WBC: CPT

## 2021-10-09 PROCEDURE — 2580000003 HC RX 258: Performed by: FAMILY MEDICINE

## 2021-10-09 PROCEDURE — 83880 ASSAY OF NATRIURETIC PEPTIDE: CPT

## 2021-10-09 PROCEDURE — 99233 SBSQ HOSP IP/OBS HIGH 50: CPT | Performed by: INTERNAL MEDICINE

## 2021-10-09 PROCEDURE — 6360000002 HC RX W HCPCS: Performed by: FAMILY MEDICINE

## 2021-10-09 PROCEDURE — 36415 COLL VENOUS BLD VENIPUNCTURE: CPT

## 2021-10-09 PROCEDURE — 71045 X-RAY EXAM CHEST 1 VIEW: CPT

## 2021-10-09 PROCEDURE — 2580000003 HC RX 258: Performed by: INTERNAL MEDICINE

## 2021-10-09 PROCEDURE — 80074 ACUTE HEPATITIS PANEL: CPT

## 2021-10-09 PROCEDURE — 80053 COMPREHEN METABOLIC PANEL: CPT

## 2021-10-09 PROCEDURE — 2500000003 HC RX 250 WO HCPCS: Performed by: INTERNAL MEDICINE

## 2021-10-09 PROCEDURE — 99232 SBSQ HOSP IP/OBS MODERATE 35: CPT | Performed by: FAMILY MEDICINE

## 2021-10-09 PROCEDURE — 2140000000 HC CCU INTERMEDIATE R&B

## 2021-10-09 PROCEDURE — 6360000002 HC RX W HCPCS: Performed by: INTERNAL MEDICINE

## 2021-10-09 PROCEDURE — 6360000002 HC RX W HCPCS: Performed by: CLINICAL NURSE SPECIALIST

## 2021-10-09 RX ORDER — FUROSEMIDE 10 MG/ML
20 INJECTION INTRAMUSCULAR; INTRAVENOUS ONCE
Status: COMPLETED | OUTPATIENT
Start: 2021-10-09 | End: 2021-10-09

## 2021-10-09 RX ORDER — DEXAMETHASONE SODIUM PHOSPHATE 10 MG/ML
6 INJECTION, SOLUTION INTRAMUSCULAR; INTRAVENOUS EVERY 24 HOURS
Status: DISCONTINUED | OUTPATIENT
Start: 2021-10-10 | End: 2021-10-09

## 2021-10-09 RX ADMIN — FUROSEMIDE 20 MG: 10 INJECTION, SOLUTION INTRAMUSCULAR; INTRAVENOUS at 14:26

## 2021-10-09 RX ADMIN — Medication 10 ML: at 10:02

## 2021-10-09 RX ADMIN — DEXAMETHASONE SODIUM PHOSPHATE 10 MG: 4 INJECTION, SOLUTION INTRAMUSCULAR; INTRAVENOUS at 10:03

## 2021-10-09 RX ADMIN — Medication 2000 MG: at 06:14

## 2021-10-09 RX ADMIN — DEXTROSE AND SODIUM CHLORIDE: 5; 900 INJECTION, SOLUTION INTRAVENOUS at 14:10

## 2021-10-09 RX ADMIN — Medication 2000 MG: at 21:38

## 2021-10-09 RX ADMIN — HEPARIN SODIUM 5000 UNITS: 10000 INJECTION INTRAVENOUS; SUBCUTANEOUS at 14:27

## 2021-10-09 RX ADMIN — Medication 10 ML: at 21:39

## 2021-10-09 RX ADMIN — Medication 2000 MG: at 12:00

## 2021-10-09 RX ADMIN — HEPARIN SODIUM 5000 UNITS: 10000 INJECTION INTRAVENOUS; SUBCUTANEOUS at 06:14

## 2021-10-09 RX ADMIN — DEXAMETHASONE SODIUM PHOSPHATE 10 MG: 4 INJECTION, SOLUTION INTRAMUSCULAR; INTRAVENOUS at 00:01

## 2021-10-09 RX ADMIN — HEPARIN SODIUM 5000 UNITS: 10000 INJECTION INTRAVENOUS; SUBCUTANEOUS at 21:39

## 2021-10-09 ASSESSMENT — PAIN SCALES - GENERAL
PAINLEVEL_OUTOF10: 0

## 2021-10-09 NOTE — PROGRESS NOTES
[facility] PROGRESS NOTE      Chief complaint: Follow-up of Gram-positive cocci in clusters on blood culture    The patient is a 36 y.o. male, not vaccinated against COVID-19, with history of asthma, presented on 10/05 with shortness of breath, found to be febrile at 103 °F, positive SARS-CoV-2 PCR, bilateral groundglass opacities on chest CT, tolerating room air with oxygen saturation of 97%. He reports having tested positive for SARS-CoV-2 for the 3rd time now with previous on in late August at which time he reports having quarantined. Urine Streptococcus pneumonia and Legionella antigens were negative. Blood cultures from 10/05 and 10/06 showed methicillin-susceptible Staphylococcus epidermidis in 1 out of 2 sets. Subjective: Patient was seen and examined. No chills, no abdominal pain, no diarrhea, no rash, no itching. He reports swelling on his feet. Objective:    Vitals:    10/09/21 0945   BP: 128/88   Pulse: 74   Resp: 18   Temp: 97 °F (36.1 °C)   SpO2: 98%     Constitutional: Alert, not in distress  Respiratory: Clear breath sounds, no crackles, no wheezes  Cardiovascular: Regular rate and rhythm, no murmurs  Gastrointestinal: Bowel sounds present, soft, nontender  Skin: Warm and dry, no active dermatoses  Musculoskeletal: No joint swelling, no joint erythema    Labs, imaging, and medical records/notes were personally reviewed. Assessment:  Sepsis, resolved  COVID-19, mild  Pneumonia  Methicillin susceptible Staphylococcus epidermidis bacteremia, etiology unclear  DEISI    Recommendations:  Continue cefazolin 2g q8h. Follow up TTE. Follow up blood cultures. Follow up HIV screen. Monitor respiratory status. Continue supportive care.     Thank you for involving me in the care of Kristin James. I will continue to follow. Please do not hesitate to call for any questions or concerns.     Electronically signed by Racheal Terry MD on 10/9/2021 at 11:17 AM

## 2021-10-09 NOTE — PROGRESS NOTES
Department of Internal Medicine  Nephrology Progress Note      Events reviewed. SUBJECTIVE:  We are following Mr. Carlyn Snellen for DEISI. Reports no complaints.     PHYSICAL EXAM:      Vitals:    VITALS:  /88   Pulse 74   Temp 97 °F (36.1 °C) (Oral)   Resp 18   Ht 6' 4\" (1.93 m)   Wt 173 lb (78.5 kg)   SpO2 98%   BMI 21.06 kg/m²   24HR INTAKE/OUTPUT:      Intake/Output Summary (Last 24 hours) at 10/9/2021 1344  Last data filed at 10/9/2021 0018  Gross per 24 hour   Intake    Output 250 ml   Net -250 ml       Constitutional:  Awake, alert, NAD  HEENT:  PERRL, normocephalic, atraumatic  Respiratory: diminished lung sounds  Cardiovascular/Edema:  RRR, S1/S2, -edema  Gastrointestinal:  abd flat, soft, non-tender  Neurologic:  Awake, alert, no focal deficits  Skin:  Warm, dry, no rash or lesions  Other: No edema      Scheduled Meds:   ceFAZolin  2,000 mg IntraVENous Q8H    sodium chloride flush  5-40 mL IntraVENous 2 times per day    heparin (porcine)  5,000 Units SubCUTAneous Q8H     Continuous Infusions:   dextrose 5 % and 0.9 % NaCl 75 mL/hr at 10/08/21 2157    sodium chloride       PRN Meds:.sodium chloride flush, ondansetron **OR** ondansetron, polyethylene glycol, acetaminophen **OR** acetaminophen, perflutren lipid microspheres, sodium chloride    DATA:    CBC:   Lab Results   Component Value Date    WBC 4.9 10/09/2021    RBC 2.91 10/09/2021    HGB 8.7 10/09/2021    HCT 26.8 10/09/2021    MCV 92.1 10/09/2021    MCH 29.9 10/09/2021    MCHC 32.5 10/09/2021    RDW 14.6 10/09/2021     10/09/2021    MPV 11.3 10/09/2021     CMP:    Lab Results   Component Value Date     10/08/2021    K 3.7 10/08/2021     10/08/2021    CO2 31 10/08/2021    BUN 53 10/08/2021    CREATININE 2.7 10/08/2021    GFRAA 34 10/08/2021    LABGLOM 34 10/08/2021    GLUCOSE 101 10/08/2021    PROT 6.4 10/08/2021    LABALBU 2.3 10/08/2021    CALCIUM 7.1 10/08/2021    BILITOT 0.4 10/08/2021    ALKPHOS 63 10/08/2021 AST 53 10/08/2021    ALT 21 10/08/2021     Magnesium:    Lab Results   Component Value Date    MG 2.7 10/05/2021     Phosphorus:  No results found for: PHOS     Radiology Review:      CT Chest October 5, 2021   Limited study by the patient's respiratory motion and lack of intravenous   contrast.       Extensive multifocal COVID-19 pneumonia bilaterally.       Bilateral trace layering pleural effusions.       Bilateral gynecomastia.       Abdominal organs inadequately evaluated due to the limitations of the study.       Normal appendix.       No evidence of bowel obstruction.         Kidney ultrasound October 7, 2021   1. Hyperechoic kidneys due to underlying parenchymal disease. 2. Trace bilateral perinephric fluid of indeterminate etiology, potentially   due to underlying inflammation. 3. An approximately 1.1 cm x 1.5 cm x 2.3 cm lesion in the right kidney could   be a cyst but could also be seen with infarction, abscess, or neoplasia. Consider further evaluation with renal protocol abdomen MRI (preferred) or   CT.  Following resolution of acute kidney injury.           BRIEF SUMMARY OF INITIAL CONSULT:     Briefly, Mr. Tessa Espinoza is a 36year old male with no significant PMH who was admitted on October 5, 2021 after he presented from an outside clinic after he was found to be hypoxic during 6 minute walking test. Patient tested positive for Covid 19 on August 18th, he is unvaccinated and had Covid 19 last year as well. Patient states he has quarantined by himself and when his mother recently visited him she was shocked at his appearance as he had lost about 50 pounds in 2 and a half months. On admission labs were significant for sodium of 130, potassium 5.2, bicarbonate 20, BUN 41, creatinine 3.6, magnesium 2.7, calcium 7.7 and proBNP 1,599. We are consulted for DEISI. Problems resolved:    Hyperkalemia, 2/2 DEISI.  Low potassium diet  Hypotonic hyponatremia 2/2 intravascular volume from poor oral intake. Bicarb drip started. Sodium levels improving. Low bicarbonate levels with hyperchloremia, most likely NAGMA versus respiratory alkalosis; we need a PH to clarify diagnosis. Awaiting ABG results    IMPRESSION/RECOMMENDATIONS:     DEISI on CKD?, volume responsive pre-renal DEISI d/t volume (poor oral intake), urine sodium <20. Renal function continues to slowly improve with IV fluids administration. CKD, stage unknown, with large proteinuria (U/A with > 300 mg), r/o CKD, FSGS?, Nephrotic range protein 5.2 g. UACR showing less than 12, to repeat, possibly lab error. Work-up so far MARILEE negative, C4 normal, C3 88 (low), UPEP without monoclonal gammopathy, SPEP pending. Kidney ultrasound with hyperechoic kidneys. Right kidney lesion, likely a cyst but cannot be ruled out other pathology including infarction, abscess, neoplasm. We will proceed with MRI when renal function improved    High bicarbonate levels, likely metabolic alkalosis 2/2 administration  Severe Hypoalbuminemia, multifactorial  ---------------------------------------------------------------------  Bacteremia?, gram +cocci in clusters in 1 of 2 bottles, likely contaminant  HIV screen pending  Covid 19 infection in non vaccinated pt  Leukopenia, WBC   T spot TB test pending  Complaints of tingling pain in bilateral feet, neuropathy?     Plan:    Continue IV fluids to D5 NS at 125 mL/hour  Lasix IV 20 mg x1 today  Portable chest xray  We will discuss kidney biopsy if severe proteinuria is confirmed     Electronically signed by RICKY Spencer on 10/9/2021 at 1:44 PM  Agree as annotated  Kaykay Urena MD

## 2021-10-09 NOTE — PLAN OF CARE
Problem: Airway Clearance - Ineffective  Goal: Achieve or maintain patent airway  10/9/2021 0019 by Zoya Goldberg RN  Outcome: Ongoing  10/8/2021 1524 by Teagan Holguin RN  Outcome: Met This Shift     Problem: Gas Exchange - Impaired  Goal: Absence of hypoxia  10/9/2021 0019 by Zoya Goldberg RN  Outcome: Ongoing  10/8/2021 1524 by Teagan Holguin RN  Outcome: Met This Shift  Goal: Promote optimal lung function  10/9/2021 0019 by Zoya Goldberg RN  Outcome: Ongoing  10/8/2021 1524 by Teagan Holguin RN  Outcome: Met This Shift     Problem: Body Temperature -  Risk of, Imbalanced  Goal: Ability to maintain a body temperature within defined limits  10/9/2021 0019 by Zoya Goldberg RN  Outcome: Ongoing  10/8/2021 1524 by Teagan Holguin RN  Outcome: Met This Shift  Goal: Will regain or maintain usual level of consciousness  10/9/2021 0019 by Zoya Goldberg RN  Outcome: Ongoing  10/8/2021 1524 by Teagan Holguin RN  Outcome: Met This Shift  Goal: Complications related to the disease process, condition or treatment will be avoided or minimized  10/9/2021 0019 by Zoya Goldberg RN  Outcome: Ongoing  10/8/2021 1524 by Teagan Holguin RN  Outcome: Met This Shift     Problem: Nutrition Deficits  Goal: Optimize nutritional status  10/9/2021 0019 by Zoya Goldberg RN  Outcome: Ongoing  10/8/2021 1524 by Teagan Holguin RN  Outcome: Met This Shift     Problem: Isolation Precautions - Risk of Spread of Infection  Goal: Prevent transmission of infection  10/9/2021 0019 by Zoya Goldberg RN  Outcome: Ongoing  10/8/2021 1524 by Teagan Holguin RN  Outcome: Met This Shift     Problem: Loneliness or Risk for Loneliness  Goal: Demonstrate positive use of time alone when socialization is not possible  Outcome: Ongoing     Problem: Inadequate oral food/beverage intake (NI-2.1)  Goal: Food and/or Nutrient Delivery  Description: Individualized approach for food/nutrient provision.   10/8/2021 1133 by Omaira Davis RD, LD  Outcome: Met This Shift

## 2021-10-09 NOTE — PROGRESS NOTES
[address] [facility]  Family Medicine Attending    S: 36 y.o. male with history of asthma and smoking history presents with increasing dyspnea and cough over past month. On presentation had fever of 103. Has tested positive for COVID twice, most recently end of August.  CT shows extensive bilateral pneumonia and COVID testing again positive. Now afebrile on Ancef, and feeling better. O: VS- Blood pressure 128/88, pulse 74, temperature 97 °F (36.1 °C), temperature source Oral, resp. rate 18, height 6' 4\" (1.93 m), weight 173 lb (78.5 kg), SpO2 98 %. Exam is as noted by resident   Awake, alert  Heart - RRR  Lungs- bilateral wheezing and coarse BS  Ext - no edema, distal tenderness    Creatinine 3.6 to 2.8    Impressions:   Principal Problem:    Acute respiratory failure with hypoxia (HCC)  Active Problems:  Repeatedly positive Covid  Stable groundglass opacities  Saturations okay on room air    Acute kidney injury due to COVID-19 ([address]    Asthma    Nephrotic range proteinuria  50 pound weight loss  2 blood cultures positive  Improved on abx    Plan:   Kidney failure with proteinuria noted - labs obtained  rx albumin   Apparent iron deficiency, but normocytic anemia (? Renal                                                                                                 anemia)   Pulmonary, ID, and nephrology following   No old records - no significant PMH   Weight loss, hypoalbuminemia noted as well - nutrition                  Awaiting further labs, HIV  Awaiting echo  Nephrology considering kidney bx and multiple labs     Attending Physician Statement  I have reviewed the chart and seen the patient with the resident(s). I personally reviewed images, EKG's and similar tests, if present. I personally reviewed and performed key elements of the history and exam.  I have reviewed and confirmed student and/or resident history and exam with changes as indicated above.   I agree with the assessment, plan and orders as documented by the resident. Please refer to the resident and/or student note for additional information.       Jahaira Thurston MD

## 2021-10-09 NOTE — PROGRESS NOTES
IntraVENous Q8H    dexamethasone  10 mg IntraVENous Q12H    sodium chloride flush  5-40 mL IntraVENous 2 times per day    heparin (porcine)  5,000 Units SubCUTAneous Q8H     PRN meds: sodium chloride flush, ondansetron **OR** ondansetron, polyethylene glycol, acetaminophen **OR** acetaminophen, perflutren lipid microspheres, sodium chloride     I reviewed the patient's past medical and surgical history, Medications and Allergies. O:  /70   Pulse 61   Temp 97.3 °F (36.3 °C) (Temporal)   Resp 18   Ht 6' 4\" (1.93 m)   Wt 173 lb (78.5 kg)   SpO2 92%   BMI 21.06 kg/m²   24 hour I&O: I/O last 3 completed shifts: In: 0   Out: 1350 [Urine:1350]  No intake/output data recorded. Physical Exam  Constitutional:       General: He is not in acute distress. Appearance: Normal appearance. He is not ill-appearing, toxic-appearing or diaphoretic. HENT:      Head: Normocephalic and atraumatic. Nose: No rhinorrhea. Mouth/Throat:      Mouth: Mucous membranes are moist.      Pharynx: Oropharynx is clear. Eyes:      General: No scleral icterus. Right eye: No discharge. Left eye: No discharge. Extraocular Movements: Extraocular movements intact. Cardiovascular:      Rate and Rhythm: Normal rate and regular rhythm. Pulses: Normal pulses. Heart sounds: Normal heart sounds. No murmur heard. No friction rub. No gallop. Pulmonary:      Effort: Pulmonary effort is normal. No respiratory distress. Breath sounds: Wheezing (diffuse) present. No rhonchi or rales. Comments: Decreased breath sounds   Abdominal:      General: Abdomen is flat. Musculoskeletal:         General: No swelling or tenderness. Cervical back: Normal range of motion. Right lower leg: No edema. Left lower leg: No edema. Skin:     General: Skin is warm and dry. Coloration: Skin is not jaundiced. Findings: No bruising or rash.    Neurological:      General: No focal deficit present. Mental Status: He is alert and oriented to person, place, and time. Cranial Nerves: No cranial nerve deficit. Motor: No weakness. Comments: Hypersensitivity to light touch   Psychiatric:         Behavior: Behavior normal.         Thought Content: Thought content normal.      Comments: Lower mood today, judgement and thought process normal           Labs:  Na/K/Cl/CO2:  142/3.7/102/31 (10/08 0757)  BUN/Cr/glu/ALT/AST/amyl/lip:  53/2.7/--/21/53/--/-- (10/08 0757)  WBC/Hgb/Hct/Plts:  5.8/9.3/28.4/192 (10/08 0757)  estimated creatinine clearance is 45 mL/min (A) (based on SCr of 2.7 mg/dL (H)). Other pertinent labs as noted below    Radiology:  [facility] 157   Final Result   1. Hyperechoic kidneys due to underlying parenchymal disease. 2. Trace bilateral perinephric fluid of indeterminate etiology, potentially   due to underlying inflammation. 3. An approximately 1.1 cm x 1.5 cm x 2.3 cm lesion in the right kidney could   be a cyst but could also be seen with infarction, abscess, or neoplasia. Consider further evaluation with renal protocol abdomen MRI (preferred) or   CT. Following resolution of acute kidney injury. XR CHEST PORTABLE   Final Result   Multifocal bilateral pulmonary ground-glass airspace opacities are stable         CT ABDOMEN PELVIS WO CONTRAST Additional Contrast? None   Final Result   Limited study by the patient's respiratory motion and lack of intravenous   contrast.      Extensive multifocal COVID-19 pneumonia bilaterally. Bilateral trace layering pleural effusions. Bilateral gynecomastia. Abdominal organs inadequately evaluated due to the limitations of the study. Normal appendix. No evidence of bowel obstruction. CT CHEST WO CONTRAST   Final Result   Limited study by the patient's respiratory motion and lack of intravenous   contrast.      Extensive multifocal COVID-19 pneumonia bilaterally. Bilateral trace layering pleural effusions. Bilateral gynecomastia. Abdominal organs inadequately evaluated due to the limitations of the study. Normal appendix. No evidence of bowel obstruction. IR INTERVENTIONAL RADIOLOGY PROCEDURE REQUEST    (Results Pending)         Resident Assessment and Plan       1. Acute hypoxia 2/2 COVID pneumonia   - Worsening SOB since 8/28/2021 when he initially tested positive   - Patient desaturates to low-mid 80s with any activity  - CTA: COVID pneumonia, Bilateral trace layering pleural effusion, negative for PE   - D dimer 712, Ferritin 3900, CRP 10.1  - WBC 4.2 on admission, now 3.7  - Does not require O2 when resting, saturating 94-99% on room air  - Continue monitoring SpO2  - Pulmonology and ID consulted  - Legionella negative  - T spot TB test pending  - Currently afebrile  - Continue Decadron 10mg BID, day 5  - CXR 10/8 shows multifocal groundglass opacities are stable  - ID on board: received 2 days of vancomycin 10/6  - Blood cx: gram positive cocci in clusters, repeat blood cx shows no growth  -Urine culture: NGTD  -Counseled on getting COVID vaccine after patient recovers   -98% on RA       2. Weight loss   - Per patient due to decreased appetite from COVID; 50lb weight loss since August   - R/o other potential origins   - MARILEE negative  HIV, hepatitis panel pending   - Elevated IgG and IgA, normal IgM: IgA nephropathy vs MPGN?  - TSH normal   - Sed rate and CRP elevated    - On cefazolin 2000 mg q8h per ID    3.  DEISI   - Likely secondary to dehydration from persistent diarrhea and decreased PO intake   - Cr on admission 3.6, trending down now 2.7  - Urine osm and urine nitrogen low   - UA: protein, 1-2 waxy casts, moderate bacteria, moderate blood   - On bicarb drip    - Microalbumin-creatinine ratio elevated   - Nephro recommends repeat albumin to creatinine ratio  - Fluid restriction discontinued  - On D5 NS at 75 ml/hr as per nephro, consider discontinuing now that fluid restriction is D/C'd     4. Elevated troponin and proBNP  - 2/2 myocardial injury vs stress cardiomyopathy due to COVID vs new onset CHF   - initial troponin 78, 78  - initial BNP 1599, repeat today  - Echo pending, consider outpatient     4. Hyperkalemia/Hyponatremia - resolving    - on admission K 5.2, Na 130  - K 3.7, Na 142 today  - Fluid restricted, strict I&O's  - Nephro on board: received Lokelma 10g PO q8h x 3 doses  Low potassium diet  - Monitor BMP     5. Proteinuria  -kidney biopsy being considered per nephro  -protein to creatinine ratio 3.8  -if no biopsy, consider possible discharge with further work up outpatient    6. Hypoalbuminemia   - Alb 1.7 on admission, now 2.3  -Received albumin 25 g q6h x 4 doses  Nephro recommends complement C3 and 4 levels and cryoglobulin titer  - Continue to monitor      7.  Anemia   - Likely 2/2 from neuropathy   - Rule out granulomatosis with polyangitis in the setting of neuropathy, DEISI and weight loss  - Consider OP EMG        PT/OT evaluation: not indicated   DVT prophylaxis: heparin due to DEISI    GI prophylaxis: not indicated   Disposition:home +/- home health/ rehab   Diet: adult         Electronically signed by Na Moeller DO on 10/9/2021 at 8:40 AM  Attending physician: Dr. Raquel Quiles

## 2021-10-09 NOTE — PROGRESS NOTES
[place]  Department of Internal Medicine  Division of Pulmonary, Critical Care and Sleep Medicine  Progress Note    Shane Olivier DO, MPH, [facility], Richard Jasmine MD, [facility]  [facility]      Patient: Joanna Oviedo  MRN: [id number]  : 1990    Encounter Time: 11:53 AM     Date of Admission: 10/5/2021  3:07 PM    Primary Care Physician: Lynda Duenas MD    Reason for Consultation: COVID     SUBJECTIVE:  36 y.o. male with history of asthma and smoking history presents with increasing dyspnea and cough over past month. On presentation had fever of 103. Has tested positive for COVID twice, most recently end of August.  CT shows extensive bilateral pneumonia and COVID testing again positive. Now afebrile on Ancef, and feeling better. OBJECTIVE:     PHYSICAL EXAM:   VITALS:   Vitals:    10/08/21 1515 10/08/21 2130 10/09/21 0630 10/09/21 0945   BP: 128/72 115/72 122/70 128/88   Pulse: 76 73 61 74   Resp: 18 16 18 18   Temp: 97.6 °F (36.4 °C) 97.5 °F (36.4 °C) 97.3 °F (36.3 °C) 97 °F (36.1 °C)   TempSrc: Tympanic Temporal Temporal Oral   SpO2: 98% 94% 92% 98%   Weight:       Height:            Intake/Output Summary (Last 24 hours) at 10/9/2021 1153  Last data filed at 10/9/2021 0018  Gross per 24 hour   Intake    Output 250 ml   Net -250 ml        CONSTITUTIONAL:   A&O x 3, NAD  SKIN:     No rash, No suspicious lesions or skin discoloration  HEENT:     EOMI, MMM, No thrush  NECK:    No bruits, No JVP apprechiated  CV:      Sinus,  No murmur, No rubs, No gallops  PULMONARY:   Couse BS,  No Wheezing, No Rales, No Rhonchi      No noted egophony  ABDOMEN:     Soft, non-tender. BS normal. No R/R/G  EXT:    No deformities . No clubbing.       + lower extremity edema, No venous stasis  PULSE:   Appears equal and palpable.   PSYCHIATRIC:  Seems appropriate, No acute psycosis  MS:    No fractures, No gross weakness  NEUROLOGIC:   The clinical assessment is non-focal     DATA: IMAGING & TESTING:     LABORATORY TESTS:    CBC:   Lab Results   Component Value Date    WBC 5.8 10/08/2021    RBC 3.23 10/08/2021    HGB 9.3 10/08/2021    HCT 28.4 10/08/2021    MCV 87.9 10/08/2021    MCH 28.8 10/08/2021    MCHC 32.7 10/08/2021    RDW 14.6 10/08/2021     10/08/2021    MPV 10.9 10/08/2021     CMP:    Lab Results   Component Value Date     10/08/2021    K 3.7 10/08/2021     10/08/2021    CO2 31 10/08/2021    BUN 53 10/08/2021    CREATININE 2.7 10/08/2021    GFRAA 34 10/08/2021    LABGLOM 34 10/08/2021    GLUCOSE 101 10/08/2021    PROT 6.4 10/08/2021    LABALBU 2.3 10/08/2021    CALCIUM 7.1 10/08/2021    BILITOT 0.4 10/08/2021    ALKPHOS 63 10/08/2021    AST 53 10/08/2021    ALT 21 10/08/2021     Magnesium:    Lab Results   Component Value Date    MG 2.7 10/05/2021     Phosphorus:  No results found for: PHOS  PT/INR:  No results found for: PROTIME, INR  FERRITIN:    Lab Results   Component Value Date    FERRITIN 3,921 10/06/2021     Fibrinogen Level:  No components found for: FIB     PRO-BNP:   Lab Results   Component Value Date    PROBNP 1,599 (H) 10/05/2021      ABGs: No results found for: PH, PO2, PCO2  Hemoglobin A1C: No components found for: HGBA1C    IMAGING:  Imaging tests were completed and reviewed and discussed radiology and care team involved and reveals   CT ABDOMEN PELVIS WO CONTRAST Additional Contrast? None    Result Date: 10/5/2021  EXAMINATION: CT OF THE CHEST WITHOUT CONTRAST; CT OF THE ABDOMEN AND PELVIS WITHOUT CONTRAST 10/5/2021 7:15 pm TECHNIQUE: CT of the chest was performed without the administration of intravenous contrast. Multiplanar reformatted images are provided for review.  Dose modulation, iterative reconstruction, and/or weight based adjustment of the mA/kV was utilized to reduce the radiation dose to as low as reasonably achievable.; CT of the abdomen and pelvis was performed without the administration of intravenous contrast. Multiplanar reformatted images are provided for review. Dose modulation, iterative reconstruction, and/or weight based adjustment of the mA/kV was utilized to reduce the radiation dose to as low as reasonably achievable. COMPARISON: None. HISTORY: ORDERING SYSTEM PROVIDED HISTORY: ro mass, weight loss? TECHNOLOGIST PROVIDED HISTORY: Reason for exam:->ro mass, weight loss? Decision Support Exception - unselect if not a suspected or confirmed emergency medical condition->Emergency Medical Condition ([place]) What reading provider will be dictating this exam?->CRC FINDINGS: THE STUDY IS LIMITED DUE TO LACK OF INTRAVENOUS CONTRAST. Chest: Mediastinum: No thoracic aortic aneurysm. No definite adenopathy on this unenhanced study. Trace pericardial fluid anteriorly. Lungs/pleura: Trace layering pleural effusions bilaterally, slightly larger on the right. No pneumothorax. Numerous ground-glass densities in the bilateral upper and to a lesser degree lower lungs, in keeping with known COVID-19 pneumonia. Soft Tissues/Bones: Bilateral gynecomastia. No acute osseous abnormality in the thoracic cage. Abdomen and pelvis: The study is degraded by the patient's respiratory motion. Organs: Abdominal organs inadequately evaluated on this motion degraded and unenhanced study. No hydronephrosis on either side. GI/Bowel: Normal appendix. No evidence of bowel obstruction. Pelvis: Normal sized prostate. Urinary bladder grossly intact. Peritoneum/Retroperitoneum: No free air or free fluid. No bulky adenopathy. No abdominal aortic aneurysm. Bones/Soft Tissues: No lytic or sclerotic lesion in the regional skeleton. Limited study by the patient's respiratory motion and lack of intravenous contrast. Extensive multifocal COVID-19 pneumonia bilaterally. Bilateral trace layering pleural effusions. Bilateral gynecomastia. Abdominal organs inadequately evaluated due to the limitations of the study. Normal appendix.  No evidence of bowel obstruction. CT CHEST WO CONTRAST    Result Date: 10/5/2021  EXAMINATION: CT OF THE CHEST WITHOUT CONTRAST; CT OF THE ABDOMEN AND PELVIS WITHOUT CONTRAST 10/5/2021 7:15 pm TECHNIQUE: CT of the chest was performed without the administration of intravenous contrast. Multiplanar reformatted images are provided for review. Dose modulation, iterative reconstruction, and/or weight based adjustment of the mA/kV was utilized to reduce the radiation dose to as low as reasonably achievable.; CT of the abdomen and pelvis was performed without the administration of intravenous contrast. Multiplanar reformatted images are provided for review. Dose modulation, iterative reconstruction, and/or weight based adjustment of the mA/kV was utilized to reduce the radiation dose to as low as reasonably achievable. COMPARISON: None. HISTORY: ORDERING SYSTEM PROVIDED HISTORY: ro mass, weight loss? TECHNOLOGIST PROVIDED HISTORY: Reason for exam:->ro mass, weight loss? Decision Support Exception - unselect if not a suspected or confirmed emergency medical condition->Emergency Medical Condition (MA) What reading provider will be dictating this exam?->CRC FINDINGS: THE STUDY IS LIMITED DUE TO LACK OF INTRAVENOUS CONTRAST. Chest: Mediastinum: No thoracic aortic aneurysm. No definite adenopathy on this unenhanced study. Trace pericardial fluid anteriorly. Lungs/pleura: Trace layering pleural effusions bilaterally, slightly larger on the right. No pneumothorax. Numerous ground-glass densities in the bilateral upper and to a lesser degree lower lungs, in keeping with known COVID-19 pneumonia. Soft Tissues/Bones: Bilateral gynecomastia. No acute osseous abnormality in the thoracic cage. Abdomen and pelvis: The study is degraded by the patient's respiratory motion. Organs: Abdominal organs inadequately evaluated on this motion degraded and unenhanced study. No hydronephrosis on either side. GI/Bowel: Normal appendix.   No evidence of bowel obstruction. Pelvis: Normal sized prostate. Urinary bladder grossly intact. Peritoneum/Retroperitoneum: No free air or free fluid. No bulky adenopathy. No abdominal aortic aneurysm. Bones/Soft Tissues: No lytic or sclerotic lesion in the regional skeleton. Limited study by the patient's respiratory motion and lack of intravenous contrast. Extensive multifocal COVID-19 pneumonia bilaterally. Bilateral trace layering pleural effusions. Bilateral gynecomastia. Abdominal organs inadequately evaluated due to the limitations of the study. Normal appendix. No evidence of bowel obstruction. Assessment:  Mr. Dominik Amaya is a 36year old male with no significant PMH who was admitted on October 5, 2021 after he presented from an outside clinic after he was found to be hypoxic during 6 minute walking test. Patient tested positive for Covid 19 on August 18th, he is unvaccinated and had Covid 19 last year as well. Patient states he has quarantined by himself and when his mother recently visited him she was shocked at his appearance as he had lost about 50 pounds in 2 and a half months  Sepsis  COVID-19, mild  Pneumonia  Gram-positive cocci in clusters bacteremia, etiology unclear  DEISI        Plan:   1. Lower and stop steroids soon as off oxygen  2. Strict I&O's  3. ontinue to monitor potassium levels  4. Continue low potassium diet  5. Continue to monitor albumin levels  6. Repeat urine ACR   7. Encourage oral intake  8. Obtain renal ultrasound  9. Obtain complement C 3&4 levels, and cryoglobulin titer  10. Off oxygen   11.  Keep off Jorge Luis Vasquez DO DO, MPH, Nelia Little  Professor of Internal Medicine  Pulmonary, Critical Care and Sleep Medicine

## 2021-10-10 LAB
ALBUMIN SERPL-MCNC: 2 G/DL (ref 3.5–5.2)
ALP BLD-CCNC: 62 U/L (ref 40–129)
ALT SERPL-CCNC: 27 U/L (ref 0–40)
ANION GAP SERPL CALCULATED.3IONS-SCNC: 6 MMOL/L (ref 7–16)
AST SERPL-CCNC: 79 U/L (ref 0–39)
BASOPHILS ABSOLUTE: 0.01 E9/L (ref 0–0.2)
BASOPHILS RELATIVE PERCENT: 0.2 % (ref 0–2)
BILIRUB SERPL-MCNC: 0.3 MG/DL (ref 0–1.2)
BUN BLDV-MCNC: 39 MG/DL (ref 6–20)
CALCIUM SERPL-MCNC: 7.4 MG/DL (ref 8.6–10.2)
CHLORIDE BLD-SCNC: 108 MMOL/L (ref 98–107)
CO2: 29 MMOL/L (ref 22–29)
CREAT SERPL-MCNC: 2.1 MG/DL (ref 0.7–1.2)
CULTURE, BLOOD 2: NORMAL
EOSINOPHILS ABSOLUTE: 0 E9/L (ref 0.05–0.5)
EOSINOPHILS RELATIVE PERCENT: 0 % (ref 0–6)
GFR AFRICAN AMERICAN: 45
GFR NON-AFRICAN AMERICAN: 45 ML/MIN/1.73
GLUCOSE BLD-MCNC: 77 MG/DL (ref 74–99)
HCT VFR BLD CALC: 27 % (ref 37–54)
HEMOGLOBIN: 8.4 G/DL (ref 12.5–16.5)
HYPOCHROMIA: ABNORMAL
IMMATURE GRANULOCYTES #: 0.03 E9/L
IMMATURE GRANULOCYTES %: 0.7 % (ref 0–5)
LYMPHOCYTES ABSOLUTE: 0.3 E9/L (ref 1.5–4)
LYMPHOCYTES RELATIVE PERCENT: 6.5 % (ref 20–42)
MAGNESIUM: 2.9 MG/DL (ref 1.6–2.6)
MCH RBC QN AUTO: 29.4 PG (ref 26–35)
MCHC RBC AUTO-ENTMCNC: 31.1 % (ref 32–34.5)
MCV RBC AUTO: 94.4 FL (ref 80–99.9)
MONOCYTES ABSOLUTE: 0.3 E9/L (ref 0.1–0.95)
MONOCYTES RELATIVE PERCENT: 6.5 % (ref 2–12)
NEUTROPHILS ABSOLUTE: 3.95 E9/L (ref 1.8–7.3)
NEUTROPHILS RELATIVE PERCENT: 86.1 % (ref 43–80)
OVALOCYTES: ABNORMAL
PDW BLD-RTO: 14.6 FL (ref 11.5–15)
PLATELET # BLD: 193 E9/L (ref 130–450)
PMV BLD AUTO: 10.9 FL (ref 7–12)
POIKILOCYTES: ABNORMAL
POTASSIUM REFLEX MAGNESIUM: 3.5 MMOL/L (ref 3.5–5)
RBC # BLD: 2.86 E12/L (ref 3.8–5.8)
SCHISTOCYTES: ABNORMAL
SODIUM BLD-SCNC: 143 MMOL/L (ref 132–146)
TOTAL PROTEIN: 5.6 G/DL (ref 6.4–8.3)
WBC # BLD: 4.6 E9/L (ref 4.5–11.5)

## 2021-10-10 PROCEDURE — 6360000002 HC RX W HCPCS: Performed by: FAMILY MEDICINE

## 2021-10-10 PROCEDURE — 99232 SBSQ HOSP IP/OBS MODERATE 35: CPT | Performed by: FAMILY MEDICINE

## 2021-10-10 PROCEDURE — P9047 ALBUMIN (HUMAN), 25%, 50ML: HCPCS | Performed by: CLINICAL NURSE SPECIALIST

## 2021-10-10 PROCEDURE — 2500000003 HC RX 250 WO HCPCS: Performed by: INTERNAL MEDICINE

## 2021-10-10 PROCEDURE — 83735 ASSAY OF MAGNESIUM: CPT

## 2021-10-10 PROCEDURE — 2580000003 HC RX 258: Performed by: INTERNAL MEDICINE

## 2021-10-10 PROCEDURE — 6360000002 HC RX W HCPCS: Performed by: INTERNAL MEDICINE

## 2021-10-10 PROCEDURE — 36415 COLL VENOUS BLD VENIPUNCTURE: CPT

## 2021-10-10 PROCEDURE — 2580000003 HC RX 258: Performed by: FAMILY MEDICINE

## 2021-10-10 PROCEDURE — 6360000002 HC RX W HCPCS: Performed by: CLINICAL NURSE SPECIALIST

## 2021-10-10 PROCEDURE — 2140000000 HC CCU INTERMEDIATE R&B

## 2021-10-10 PROCEDURE — 80053 COMPREHEN METABOLIC PANEL: CPT

## 2021-10-10 PROCEDURE — 85025 COMPLETE CBC W/AUTO DIFF WBC: CPT

## 2021-10-10 RX ORDER — ALBUMIN (HUMAN) 12.5 G/50ML
25 SOLUTION INTRAVENOUS EVERY 6 HOURS
Status: DISPENSED | OUTPATIENT
Start: 2021-10-10 | End: 2021-10-12

## 2021-10-10 RX ORDER — FUROSEMIDE 10 MG/ML
20 INJECTION INTRAMUSCULAR; INTRAVENOUS ONCE
Status: COMPLETED | OUTPATIENT
Start: 2021-10-10 | End: 2021-10-10

## 2021-10-10 RX ADMIN — HEPARIN SODIUM 5000 UNITS: 10000 INJECTION INTRAVENOUS; SUBCUTANEOUS at 14:26

## 2021-10-10 RX ADMIN — DEXTROSE AND SODIUM CHLORIDE: 5; 900 INJECTION, SOLUTION INTRAVENOUS at 06:08

## 2021-10-10 RX ADMIN — HEPARIN SODIUM 5000 UNITS: 10000 INJECTION INTRAVENOUS; SUBCUTANEOUS at 06:17

## 2021-10-10 RX ADMIN — SODIUM CHLORIDE, PRESERVATIVE FREE 10 ML: 5 INJECTION INTRAVENOUS at 11:31

## 2021-10-10 RX ADMIN — ALBUMIN (HUMAN) 25 G: 0.25 INJECTION, SOLUTION INTRAVENOUS at 21:21

## 2021-10-10 RX ADMIN — Medication 2000 MG: at 11:32

## 2021-10-10 RX ADMIN — Medication 10 ML: at 19:31

## 2021-10-10 RX ADMIN — HEPARIN SODIUM 5000 UNITS: 10000 INJECTION INTRAVENOUS; SUBCUTANEOUS at 21:21

## 2021-10-10 RX ADMIN — ALBUMIN (HUMAN) 25 G: 0.25 INJECTION, SOLUTION INTRAVENOUS at 15:00

## 2021-10-10 RX ADMIN — Medication 2000 MG: at 06:08

## 2021-10-10 RX ADMIN — Medication 2000 MG: at 19:31

## 2021-10-10 RX ADMIN — FUROSEMIDE 20 MG: 20 INJECTION, SOLUTION INTRAMUSCULAR; INTRAVENOUS at 15:00

## 2021-10-10 RX ADMIN — SODIUM CHLORIDE, PRESERVATIVE FREE 10 ML: 5 INJECTION INTRAVENOUS at 15:00

## 2021-10-10 ASSESSMENT — PAIN SCALES - GENERAL
PAINLEVEL_OUTOF10: 0
PAINLEVEL_OUTOF10: 0

## 2021-10-10 NOTE — PROGRESS NOTES
[facility] - [facility] Inpatient   Resident Progress Note    S:  Hospital day: 5   Brief Synopsis: Krishna Castano is a 36 y.o. male with no PMH who presented with hypoxia and fever. He was initially seen in PCP office and was hypoxic on exertion with desaturate into the mid low 80s as well as noted to be febrile and tachycardic. Per patient, his symptoms began 8/15/2021 and he tested positive for Covid 8/28. endorse worsening shortness of breath over the past month with sputum production, intermittent chest discomfort, persistent diarrhea, chills, fever, decrease in appetite and 50 pound weight loss since August.  Patient also endorses significantly worsening \"neuropathy\" of bilateral feet, states it feels like he is \"being stabbed by needles\" which is causing him inability to walk due to pain. In the ED, he was found to be anemic, hyperkalemic, hyponatremic, febrile and COVID positive. He also had elevated Creatinine, AST, proBNP and troponin x2. CTA showed extensive multifocal COVID-19 pneumonia bilaterally, bilateral trace layering pleural effusion and bilateral gynecomastia. CT abdomen was limited due to movement and non-contrast, but showed normal appendix and no evidence of bowel obstruction. Admitted for acute hypoxia due to Covid pneumonia. Nephro, pulm and ID consulted. Started on bicarb drip. US showed hyperechoic kidneys, trace b/l perinephric fluid and right kidney lesion (Cyst vs infarction vs abscess vs neoplasia). Overnight/interim:  Patient was seen and examined at bedside. Patient is contemplating leaving AMA. He reports he is not getting enough sleep, and he feels he does not understand why everything is being done. I was able to answer some questions today. He feels he can recover better at home. Discussed risks of leaving AMA, including greatest risk of death, and other risk of worsening illness. Patient understands risks.  Still some SOB when getting up to walk, but not at rest.    Cont meds:    dextrose 5 % and 0.9 % NaCl 75 mL/hr at 10/10/21 0608    sodium chloride       Scheduled meds:    ceFAZolin  2,000 mg IntraVENous Q8H    sodium chloride flush  5-40 mL IntraVENous 2 times per day    heparin (porcine)  5,000 Units SubCUTAneous Q8H     PRN meds: sodium chloride flush, ondansetron **OR** ondansetron, polyethylene glycol, acetaminophen **OR** acetaminophen, perflutren lipid microspheres, sodium chloride     I reviewed the patient's past medical and surgical history, Medications and Allergies. O:  /82   Pulse 64   Temp 97.4 °F (36.3 °C) (Temporal)   Resp 14   Ht 6' 4\" (1.93 m)   Wt 173 lb (78.5 kg)   SpO2 100%   BMI 21.06 kg/m²   24 hour I&O: I/O last 3 completed shifts:  In: -   Out: 600 [Urine:600]  No intake/output data recorded. Physical Exam  Constitutional:       Appearance: Normal appearance. He is not ill-appearing, toxic-appearing or diaphoretic. HENT:      Head: Normocephalic and atraumatic. Nose: No rhinorrhea. Mouth/Throat:      Mouth: Mucous membranes are moist.      Pharynx: Oropharynx is clear. Eyes:      General: No scleral icterus. Right eye: No discharge. Left eye: No discharge. Extraocular Movements: Extraocular movements intact. Cardiovascular:      Rate and Rhythm: Normal rate and regular rhythm. Pulses: Normal pulses. Heart sounds: Normal heart sounds. No murmur heard. No friction rub. No gallop. Pulmonary:      Effort: Pulmonary effort is normal. No respiratory distress. Breath sounds: No wheezing, rhonchi or rales. Comments: Decreased breath sounds   Abdominal:      General: Abdomen is flat. Musculoskeletal:         General: No swelling or tenderness. Cervical back: Normal range of motion. Right lower leg: No edema. Left lower leg: No edema. Skin:     General: Skin is warm and dry. Coloration: Skin is not jaundiced.       Findings: No bruising or rash.   Neurological:      General: No focal deficit present. Mental Status: He is alert and oriented to person, place, and time. Cranial Nerves: No cranial nerve deficit. Motor: No weakness. Comments: Hypersensitivity to light touch   Psychiatric:         Behavior: Behavior normal.         Thought Content: Thought content normal.      Comments: Lower mood today, judgement and thought process normal. Somewhat flat affect. Labs:  Na/K/Cl/CO2:  138/3.9/101/32 (10/09 1156)  BUN/Cr/glu/ALT/AST/amyl/lip:  48/2.4/--/20/48/--/-- (10/09 1156)  WBC/Hgb/Hct/Plts:  4.9/8.7/26.8/198 (10/09 1156)  estimated creatinine clearance is 50 mL/min (A) (based on SCr of 2.4 mg/dL (H)). Other pertinent labs as noted below    Radiology:  XR CHEST PORTABLE   Final Result   No acute process. US RETROPERITONEAL COMPLETE   Final Result   1. Hyperechoic kidneys due to underlying parenchymal disease. 2. Trace bilateral perinephric fluid of indeterminate etiology, potentially   due to underlying inflammation. 3. An approximately 1.1 cm x 1.5 cm x 2.3 cm lesion in the right kidney could   be a cyst but could also be seen with infarction, abscess, or neoplasia. Consider further evaluation with renal protocol abdomen MRI (preferred) or   CT. Following resolution of acute kidney injury. XR CHEST PORTABLE   Final Result   Multifocal bilateral pulmonary ground-glass airspace opacities are stable         CT ABDOMEN PELVIS WO CONTRAST Additional Contrast? None   Final Result   Limited study by the patient's respiratory motion and lack of intravenous   contrast.      Extensive multifocal COVID-19 pneumonia bilaterally. Bilateral trace layering pleural effusions. Bilateral gynecomastia. Abdominal organs inadequately evaluated due to the limitations of the study. Normal appendix. No evidence of bowel obstruction.          CT CHEST WO CONTRAST   Final Result   Limited study by the patient's respiratory motion and lack of intravenous   contrast.      Extensive multifocal COVID-19 pneumonia bilaterally. Bilateral trace layering pleural effusions. Bilateral gynecomastia. Abdominal organs inadequately evaluated due to the limitations of the study. Normal appendix. No evidence of bowel obstruction.          IR INTERVENTIONAL RADIOLOGY PROCEDURE REQUEST    (Results Pending)         Resident Assessment and Plan       Acute hypoxia 2/2 COVID pneumonia   - Worsening SOB since 8/28/2021 when he initially tested positive   - Patient desaturates to low-mid 80s with any activity  - CTA: COVID pneumonia, Bilateral trace layering pleural effusion, negative for PE   - D dimer 712, Ferritin 3900, CRP 10.1  - WBC 4.2 on admission, now 3.7  - Does not require O2 when resting, saturating 94-99% on room air  - Continue monitoring SpO2  - Pulmonology and ID consulted  - Legionella negative  - T spot TB test pending  - Currently afebrile  - Continue Decadron 10mg BID, day 5  - CXR 10/8 shows multifocal groundglass opacities are stable  - ID on board: received 2 days of vancomycin 10/6  - Blood cx: gram positive cocci in clusters, repeat blood cx shows no growth  -Urine culture: NGTD  -Counseled on getting COVID vaccine after patient recovers   -100% on RA       Weight loss   - Per patient due to decreased appetite from COVID; 50lb weight loss since August   - R/o other potential origins   - MARILEE negative  HIV, hepatitis panel pending   - Elevated IgG and IgA, normal IgM: IgA nephropathy vs MPGN?  - TSH normal   - Sed rate and CRP elevated    - On cefazolin 2000 mg q8h per ID  - blood cultures #1 and #2 no growth after 24 hours    DEISI   - Likely secondary to dehydration from persistent diarrhea and decreased PO intake   - Cr on admission 3.6, trending down now 2.4  - Urine osm and urine nitrogen low   - UA: protein, 1-2 waxy casts, moderate bacteria, moderate blood   - On bicarb drip    - Microalbumin-creatinine ratio elevated   - Nephro recommends repeat albumin to creatinine ratio  - Fluid restriction discontinued  - D5 NS discontinued with agreement from nephrology.  - If Creatinine continues to trend down, will plan ot hold on biopsy     Elevated troponin and proBNP  - 2/2 myocardial injury vs stress cardiomyopathy due to COVID vs new onset CHF   - initial troponin 78, 78  - initial BNP 1599, repeat 3431 on 10/9  - Echo pending, consider outpatient     Hyperkalemia/Hyponatremia - resolving    - on admission K 5.2, Na 130  - K 3.9, Na 142 today  - Fluid restricted, strict I&O's  - Nephro on board: received Lokelma 10g PO q8h x 3 doses  Low potassium diet  - Monitor BMP     Proteinuria  -kidney biopsy being considered per nephro  -protein to creatinine ratio 3.8  -if no biopsy, consider possible discharge with further work up outpatient    Hypoalbuminemia   - Alb 1.7 on admission, now 2.3  -Received albumin 25 g q6h x 4 doses  Nephro recommends complement C3 and 4 levels and cryoglobulin titer  - Continue to monitor      Anemia   - Likely 2/2 from neuropathy   - Rule out granulomatosis with polyangitis in the setting of neuropathy, DEISI and weight loss  - Consider OP EMG        PT/OT evaluation: not indicated   DVT prophylaxis: heparin due to DEISI    GI prophylaxis: not indicated   Disposition:home +/- home health/ rehab   Diet: adult         Electronically signed by Siddhartha Peng DO on 10/10/2021 at 9:00 AM  Attending physician: Dr. [facility]

## 2021-10-10 NOTE — PROGRESS NOTES
Department of Internal Medicine  Nephrology Progress Note      Events reviewed. SUBJECTIVE:  We are following Mr. Bernell Meckel for DEISI. Very frustrated today. Stated he almost signed himself out.      PHYSICAL EXAM:      Vitals:    VITALS:  /76   Pulse 75   Temp 97.7 °F (36.5 °C) (Temporal)   Resp 14   Ht 6' 4\" (1.93 m)   Wt 173 lb (78.5 kg)   SpO2 100%   BMI 21.06 kg/m²   24HR INTAKE/OUTPUT:      Intake/Output Summary (Last 24 hours) at 10/10/2021 1137  Last data filed at 10/10/2021 0700  Gross per 24 hour   Intake    Output 600 ml   Net -600 ml       Constitutional:  Awake, alert, NAD  HEENT:  PERRL, normocephalic, atraumatic  Respiratory: diminished lung sounds  Cardiovascular/Edema:  RRR, S1/S2, -edema  Gastrointestinal:  abd flat, soft, non-tender  Neurologic:  Awake, alert, no focal deficits  Skin:  Warm, dry, no rash or lesions  Other: BLE 1+ edema       Scheduled Meds:   ceFAZolin  2,000 mg IntraVENous Q8H    sodium chloride flush  5-40 mL IntraVENous 2 times per day    heparin (porcine)  5,000 Units SubCUTAneous Q8H     Continuous Infusions:   sodium chloride       PRN Meds:.sodium chloride flush, ondansetron **OR** ondansetron, polyethylene glycol, acetaminophen **OR** acetaminophen, perflutren lipid microspheres, sodium chloride    DATA:    CBC:   Lab Results   Component Value Date    WBC 4.9 10/09/2021    RBC 2.91 10/09/2021    HGB 8.7 10/09/2021    HCT 26.8 10/09/2021    MCV 92.1 10/09/2021    MCH 29.9 10/09/2021    MCHC 32.5 10/09/2021    RDW 14.6 10/09/2021     10/09/2021    MPV 11.3 10/09/2021     CMP:    Lab Results   Component Value Date     10/09/2021    K 3.9 10/09/2021     10/09/2021    CO2 32 10/09/2021    BUN 48 10/09/2021    CREATININE 2.4 10/09/2021    GFRAA 39 10/09/2021    LABGLOM 39 10/09/2021    GLUCOSE 106 10/09/2021    PROT 5.7 10/09/2021    LABALBU 2.1 10/09/2021    CALCIUM 7.2 10/09/2021    BILITOT 0.4 10/09/2021    ALKPHOS 61 10/09/2021 AST 48 10/09/2021    ALT 20 10/09/2021     Magnesium:    Lab Results   Component Value Date    MG 2.7 10/05/2021     Phosphorus:  No results found for: PHOS     Radiology Review:      Chest Xray 10/09/2021    No acute process.                     CT Chest October 5, 2021   Limited study by the patient's respiratory motion and lack of intravenous   contrast.       Extensive multifocal COVID-19 pneumonia bilaterally.       Bilateral trace layering pleural effusions.       Bilateral gynecomastia.       Abdominal organs inadequately evaluated due to the limitations of the study.       Normal appendix.       No evidence of bowel obstruction.         Kidney ultrasound October 7, 2021   1. Hyperechoic kidneys due to underlying parenchymal disease. 2. Trace bilateral perinephric fluid of indeterminate etiology, potentially   due to underlying inflammation. 3. An approximately 1.1 cm x 1.5 cm x 2.3 cm lesion in the right kidney could   be a cyst but could also be seen with infarction, abscess, or neoplasia. Consider further evaluation with renal protocol abdomen MRI (preferred) or   CT.  Following resolution of acute kidney injury.           BRIEF SUMMARY OF INITIAL CONSULT:     Briefly, Mr. Kindra Serrano is a 36year old male with no significant PMH who was admitted on October 5, 2021 after he presented from an outside clinic after he was found to be hypoxic during 6 minute walking test. Patient tested positive for Covid 19 on August 18th, he is unvaccinated and had Covid 19 last year as well. Patient states he has quarantined by himself and when his mother recently visited him she was shocked at his appearance as he had lost about 50 pounds in 2 and a half months. On admission labs were significant for sodium of 130, potassium 5.2, bicarbonate 20, BUN 41, creatinine 3.6, magnesium 2.7, calcium 7.7 and proBNP 1,599. We are consulted for DEISI. Problems resolved:    Hyperkalemia, 2/2 DEISI.  Low potassium diet  Hypotonic hyponatremia 2/2 intravascular volume from poor oral intake. Bicarb drip started. Sodium levels improving. Low bicarbonate levels with hyperchloremia, most likely NAGMA versus respiratory alkalosis; we need a PH to clarify diagnosis. Awaiting ABG results    IMPRESSION/RECOMMENDATIONS:     DEISI on CKD?, volume responsive pre-renal DEISI d/t volume (poor oral intake), urine sodium <20. Renal function continues to slowly improve with IV fluids administration. CKD, stage unknown, with large proteinuria (U/A with > 300 mg), r/o CKD, FSGS?, Nephrotic range protein 5.2 g. UACR showing less than 12, to repeat, possibly lab error. Work-up so far MARILEE negative, C4 normal, C3 88 (low), UPEP without monoclonal gammopathy, SPEP pending. Kidney ultrasound with hyperechoic kidneys. Right kidney lesion, likely a cyst but cannot be ruled out other pathology including infarction, abscess, neoplasm. We will proceed with MRI when renal function improved    High bicarbonate levels, likely metabolic alkalosis 2/2 administration  Severe Hypoalbuminemia, multifactorial  ---------------------------------------------------------------------  Bacteremia?, gram +cocci in clusters in 1 of 2 bottles, likely contaminant  HIV screen pending  Covid 19 infection in non vaccinated pt  Leukopenia, WBC   T spot TB test pending  Complaints of tingling pain in bilateral feet, neuropathy?     Plan:    Albumin 25 gm IV q 6 hour x 8 doses  Lasix IV 20 mg x 1  Strict I & O's  We will discuss kidney biopsy if severe proteinuria is confirmed       Case discussed with Dr. Melyssa Hernandez  Electronically signed by RICKY Tiwari on 10/10/2021 at 11:37 AM

## 2021-10-10 NOTE — PROGRESS NOTES
[facility] PROGRESS NOTE      Chief complaint: Follow-up of Gram-positive cocci in clusters on blood culture    The patient is a 36 y.o. male, not vaccinated against COVID-19, with history of asthma, presented on 10/05 with shortness of breath, found to be febrile at 103 °F, positive SARS-CoV-2 PCR, bilateral groundglass opacities on chest CT, tolerating room air with oxygen saturation of 97%. He reports having tested positive for SARS-CoV-2 for the 3rd time now with previous on in late August at which time he reports having quarantined. Urine Streptococcus pneumonia and Legionella antigens were negative. Blood cultures from 10/05 and 10/06 showed methicillin-susceptible Staphylococcus epidermidis in 1 out of 2 sets. Subjective: Patient was seen and examined. No chills, no abdominal pain, no diarrhea, no rash, no itching. He reports swelling on his feet, otherwise feeling well. Objective:    Vitals:    10/10/21 1000   BP: 132/76   Pulse: 75   Resp: 14   Temp: 97.7 °F (36.5 °C)   SpO2: 100%     Constitutional: Alert, not in distress  Respiratory: Clear breath sounds, no crackles, no wheezes  Cardiovascular: Regular rate and rhythm, no murmurs  Gastrointestinal: Bowel sounds present, soft, nontender  Skin: Warm and dry, no active dermatoses  Musculoskeletal: No joint swelling, no joint erythema    Labs, imaging, and medical records/notes were personally reviewed. Assessment:  Sepsis, resolved  COVID-19, mild  Pneumonia  Methicillin susceptible Staphylococcus epidermidis bacteremia, etiology unclear  DEISI    Recommendations:  Continue cefazolin 2g q8h. Follow up TTE. Follow up blood cultures. Follow up HIV screen. Monitor respiratory status. Continue supportive care.     Thank you for involving me in the care of Kristin James. I will continue to follow. Please do not hesitate to call for any questions or concerns.     Electronically signed by Sami Gutierres MD on 10/10/2021 at 11:45 AM

## 2021-10-11 LAB
ABO/RH: NORMAL
ALBUMIN SERPL-MCNC: 1.3 G/DL (ref 3.5–4.7)
ALBUMIN SERPL-MCNC: 2.5 G/DL (ref 3.5–5.2)
ALP BLD-CCNC: 53 U/L (ref 40–129)
ALPHA-1-GLOBULIN: 0.4 G/DL (ref 0.2–0.4)
ALPHA-2-GLOBULIN: 0.9 G/FL (ref 0.5–1)
ALT SERPL-CCNC: 17 U/L (ref 0–40)
ANION GAP SERPL CALCULATED.3IONS-SCNC: 4 MMOL/L (ref 7–16)
ANTIBODY SCREEN: NORMAL
AST SERPL-CCNC: 57 U/L (ref 0–39)
BASOPHILS ABSOLUTE: 0 E9/L (ref 0–0.2)
BASOPHILS RELATIVE PERCENT: 0 % (ref 0–2)
BETA GLOBULIN: 0.8 G/DL (ref 0.8–1.3)
BILIRUB SERPL-MCNC: 0.5 MG/DL (ref 0–1.2)
BLOOD BANK DISPENSE STATUS: NORMAL
BLOOD BANK PRODUCT CODE: NORMAL
BPU ID: NORMAL
BUN BLDV-MCNC: 27 MG/DL (ref 6–20)
CALCIUM SERPL-MCNC: 7.9 MG/DL (ref 8.6–10.2)
CHLORIDE BLD-SCNC: 108 MMOL/L (ref 98–107)
CO2: 29 MMOL/L (ref 22–29)
CREAT SERPL-MCNC: 1.9 MG/DL (ref 0.7–1.2)
CRYOGLOBULIN: NEGATIVE
CULTURE, BLOOD 2: NORMAL
DESCRIPTION BLOOD BANK: NORMAL
ELECTROPHORESIS: ABNORMAL
EOSINOPHILS ABSOLUTE: 0.01 E9/L (ref 0.05–0.5)
EOSINOPHILS RELATIVE PERCENT: 0.5 % (ref 0–6)
GAMMA GLOBULIN: 2 G/DL (ref 0.7–1.6)
GFR AFRICAN AMERICAN: 51
GFR NON-AFRICAN AMERICAN: 51 ML/MIN/1.73
GLUCOSE BLD-MCNC: 146 MG/DL (ref 74–99)
HAV IGM SER IA-ACNC: ABNORMAL
HCT VFR BLD CALC: 20.9 % (ref 37–54)
HCT VFR BLD CALC: 22 % (ref 37–54)
HCT VFR BLD CALC: 24.3 % (ref 37–54)
HEMOGLOBIN: 6.7 G/DL (ref 12.5–16.5)
HEMOGLOBIN: 6.9 G/DL (ref 12.5–16.5)
HEMOGLOBIN: 8 G/DL (ref 12.5–16.5)
HEPATITIS B CORE IGM ANTIBODY: ABNORMAL
HEPATITIS B SURFACE ANTIGEN INTERPRETATION: ABNORMAL
HEPATITIS C ANTIBODY INTERPRETATION: REACTIVE
HIV-1 AND HIV-2 ANTIBODIES: REACTIVE
IMMATURE GRANULOCYTES #: 0.02 E9/L
IMMATURE GRANULOCYTES %: 1 % (ref 0–5)
LYMPHOCYTES ABSOLUTE: 0.22 E9/L (ref 1.5–4)
LYMPHOCYTES RELATIVE PERCENT: 10.8 % (ref 20–42)
MAGNESIUM: 2.4 MG/DL (ref 1.6–2.6)
MCH RBC QN AUTO: 29.1 PG (ref 26–35)
MCH RBC QN AUTO: 29.7 PG (ref 26–35)
MCHC RBC AUTO-ENTMCNC: 31.4 % (ref 32–34.5)
MCHC RBC AUTO-ENTMCNC: 32.9 % (ref 32–34.5)
MCV RBC AUTO: 90.3 FL (ref 80–99.9)
MCV RBC AUTO: 92.8 FL (ref 80–99.9)
MONOCYTES ABSOLUTE: 0.12 E9/L (ref 0.1–0.95)
MONOCYTES RELATIVE PERCENT: 5.9 % (ref 2–12)
NEUTROPHILS ABSOLUTE: 1.67 E9/L (ref 1.8–7.3)
NEUTROPHILS RELATIVE PERCENT: 81.8 % (ref 43–80)
ORGANISM: ABNORMAL
OVALOCYTES: ABNORMAL
PDW BLD-RTO: 14.3 FL (ref 11.5–15)
PDW BLD-RTO: 14.4 FL (ref 11.5–15)
PLATELET # BLD: 141 E9/L (ref 130–450)
PLATELET # BLD: 155 E9/L (ref 130–450)
PMV BLD AUTO: 10.6 FL (ref 7–12)
PMV BLD AUTO: 11.2 FL (ref 7–12)
POIKILOCYTES: ABNORMAL
POTASSIUM REFLEX MAGNESIUM: 3.4 MMOL/L (ref 3.5–5)
RBC # BLD: 2.37 E12/L (ref 3.8–5.8)
RBC # BLD: 2.69 E12/L (ref 3.8–5.8)
SODIUM BLD-SCNC: 141 MMOL/L (ref 132–146)
TOTAL PROTEIN: 5.4 G/DL (ref 6.4–8.3)
TOTAL PROTEIN: 5.6 G/DL (ref 6.4–8.3)
WBC # BLD: 2 E9/L (ref 4.5–11.5)
WBC # BLD: 2.9 E9/L (ref 4.5–11.5)

## 2021-10-11 PROCEDURE — 86850 RBC ANTIBODY SCREEN: CPT

## 2021-10-11 PROCEDURE — P9047 ALBUMIN (HUMAN), 25%, 50ML: HCPCS | Performed by: CLINICAL NURSE SPECIALIST

## 2021-10-11 PROCEDURE — 2500000003 HC RX 250 WO HCPCS: Performed by: INTERNAL MEDICINE

## 2021-10-11 PROCEDURE — 87522 HEPATITIS C REVRS TRNSCRPJ: CPT

## 2021-10-11 PROCEDURE — 99232 SBSQ HOSP IP/OBS MODERATE 35: CPT | Performed by: FAMILY MEDICINE

## 2021-10-11 PROCEDURE — 86900 BLOOD TYPING SEROLOGIC ABO: CPT

## 2021-10-11 PROCEDURE — 6370000000 HC RX 637 (ALT 250 FOR IP): Performed by: STUDENT IN AN ORGANIZED HEALTH CARE EDUCATION/TRAINING PROGRAM

## 2021-10-11 PROCEDURE — 36415 COLL VENOUS BLD VENIPUNCTURE: CPT

## 2021-10-11 PROCEDURE — 6360000002 HC RX W HCPCS: Performed by: CLINICAL NURSE SPECIALIST

## 2021-10-11 PROCEDURE — 87536 HIV-1 QUANT&REVRSE TRNSCRPJ: CPT

## 2021-10-11 PROCEDURE — 83735 ASSAY OF MAGNESIUM: CPT

## 2021-10-11 PROCEDURE — 6360000002 HC RX W HCPCS: Performed by: INTERNAL MEDICINE

## 2021-10-11 PROCEDURE — 6360000002 HC RX W HCPCS: Performed by: FAMILY MEDICINE

## 2021-10-11 PROCEDURE — 86923 COMPATIBILITY TEST ELECTRIC: CPT

## 2021-10-11 PROCEDURE — 2140000000 HC CCU INTERMEDIATE R&B

## 2021-10-11 PROCEDURE — 85025 COMPLETE CBC W/AUTO DIFF WBC: CPT

## 2021-10-11 PROCEDURE — P9016 RBC LEUKOCYTES REDUCED: HCPCS

## 2021-10-11 PROCEDURE — 86360 T CELL ABSOLUTE COUNT/RATIO: CPT

## 2021-10-11 PROCEDURE — 2580000003 HC RX 258: Performed by: FAMILY MEDICINE

## 2021-10-11 PROCEDURE — 85018 HEMOGLOBIN: CPT

## 2021-10-11 PROCEDURE — 86901 BLOOD TYPING SEROLOGIC RH(D): CPT

## 2021-10-11 PROCEDURE — 86359 T CELLS TOTAL COUNT: CPT

## 2021-10-11 PROCEDURE — 85014 HEMATOCRIT: CPT

## 2021-10-11 PROCEDURE — 99232 SBSQ HOSP IP/OBS MODERATE 35: CPT | Performed by: INTERNAL MEDICINE

## 2021-10-11 PROCEDURE — 85027 COMPLETE CBC AUTOMATED: CPT

## 2021-10-11 PROCEDURE — 80053 COMPREHEN METABOLIC PANEL: CPT

## 2021-10-11 RX ORDER — SODIUM CHLORIDE 9 MG/ML
INJECTION, SOLUTION INTRAVENOUS PRN
Status: DISCONTINUED | OUTPATIENT
Start: 2021-10-11 | End: 2021-10-25

## 2021-10-11 RX ORDER — POTASSIUM CHLORIDE 20 MEQ/1
40 TABLET, EXTENDED RELEASE ORAL DAILY
Status: DISCONTINUED | OUTPATIENT
Start: 2021-10-11 | End: 2021-10-12

## 2021-10-11 RX ADMIN — HEPARIN SODIUM 5000 UNITS: 10000 INJECTION INTRAVENOUS; SUBCUTANEOUS at 21:55

## 2021-10-11 RX ADMIN — Medication 10 ML: at 20:27

## 2021-10-11 RX ADMIN — HEPARIN SODIUM 5000 UNITS: 10000 INJECTION INTRAVENOUS; SUBCUTANEOUS at 06:18

## 2021-10-11 RX ADMIN — Medication 2000 MG: at 03:22

## 2021-10-11 RX ADMIN — Medication 2000 MG: at 11:57

## 2021-10-11 RX ADMIN — Medication 2000 MG: at 20:26

## 2021-10-11 RX ADMIN — ALBUMIN (HUMAN) 25 G: 0.25 INJECTION, SOLUTION INTRAVENOUS at 09:22

## 2021-10-11 RX ADMIN — ALBUMIN (HUMAN) 25 G: 0.25 INJECTION, SOLUTION INTRAVENOUS at 20:34

## 2021-10-11 RX ADMIN — ALBUMIN (HUMAN) 25 G: 0.25 INJECTION, SOLUTION INTRAVENOUS at 03:22

## 2021-10-11 RX ADMIN — HEPARIN SODIUM 5000 UNITS: 10000 INJECTION INTRAVENOUS; SUBCUTANEOUS at 14:11

## 2021-10-11 RX ADMIN — POTASSIUM CHLORIDE 40 MEQ: 1500 TABLET, EXTENDED RELEASE ORAL at 12:03

## 2021-10-11 RX ADMIN — Medication 10 ML: at 09:22

## 2021-10-11 ASSESSMENT — PAIN SCALES - GENERAL
PAINLEVEL_OUTOF10: 0

## 2021-10-11 NOTE — PROGRESS NOTES
[facility] Inpatient   Resident Progress Note    S:  Hospital day: 6   Brief Synopsis: Ricardo Jones is a 36 y.o. male with no PMH who presented with hypoxia and fever. He was initially seen in PCP office and was hypoxic on exertion with desaturate into the mid low 80s as well as noted to be febrile and tachycardic. Per patient, his symptoms began 8/15/2021 and he tested positive for Covid 8/28. endorse worsening shortness of breath over the past month with sputum production, intermittent chest discomfort, persistent diarrhea, chills, fever, decrease in appetite and 50 pound weight loss since August.  Patient also endorses significantly worsening \"neuropathy\" of bilateral feet, states it feels like he is \"being stabbed by needles\" which is causing him inability to walk due to pain. In the ED, he was found to be anemic, hyperkalemic, hyponatremic, febrile and COVID positive. He also had elevated Creatinine, AST, proBNP and troponin x2. CTA showed extensive multifocal COVID-19 pneumonia bilaterally, bilateral trace layering pleural effusion and bilateral gynecomastia. CT abdomen was limited due to movement and non-contrast, but showed normal appendix and no evidence of bowel obstruction. Admitted for acute hypoxia due to Covid pneumonia. Nephro, pulm and ID consulted. Started on bicarb drip. US showed hyperechoic kidneys, trace b/l perinephric fluid and right kidney lesion (Cyst vs infarction vs abscess vs neoplasia). Overnight/interim:  · Patient was seen and examined at bedside. Patient continues to feel frustrated and would like to leave. Discussed risks of leaving AMA, including greatest risk of death, and other risk of worsening illness. attempted to answer all of his questions. Patient understands risks. Denies CP, SOB, abdominal pain or diarrhea. · Patient was informed about positive screen for HIV and Hepatitis C.   Patient initially in disbelief and did not understand how this could be. He denied any new sexual encounters or IV drug use. Has gotten tattoos in the past. He was extensively counseled and discussed that this is not yet a confirmed diagnosis as there still needs to be additional testing. He eventually verbalized understanding. Expressed much anxiety and confusion about all of this information. Cont meds:    sodium chloride       Scheduled meds:    albumin human  25 g IntraVENous Q6H    ceFAZolin  2,000 mg IntraVENous Q8H    sodium chloride flush  5-40 mL IntraVENous 2 times per day    heparin (porcine)  5,000 Units SubCUTAneous Q8H     PRN meds: sodium chloride flush, ondansetron **OR** ondansetron, polyethylene glycol, acetaminophen **OR** acetaminophen, perflutren lipid microspheres, sodium chloride     I reviewed the patient's past medical and surgical history, Medications and Allergies. O:  BP (!) 133/97   Pulse 94   Temp 98.5 °F (36.9 °C) (Oral)   Resp 14   Ht 6' 4\" (1.93 m)   Wt 173 lb (78.5 kg)   SpO2 100%   BMI 21.06 kg/m²   24 hour I&O: I/O last 3 completed shifts:  In: -   Out: 600 [Urine:600]  No intake/output data recorded. Physical Exam  Constitutional:       Appearance: Normal appearance. He is not ill-appearing, toxic-appearing or diaphoretic. HENT:      Head: Normocephalic and atraumatic. Nose: No rhinorrhea. Mouth/Throat:      Mouth: Mucous membranes are moist.      Pharynx: Oropharynx is clear. Eyes:      General: No scleral icterus. Right eye: No discharge. Left eye: No discharge. Extraocular Movements: Extraocular movements intact. Cardiovascular:      Rate and Rhythm: Normal rate and regular rhythm. Pulses: Normal pulses. Heart sounds: Normal heart sounds. No murmur heard. No friction rub. No gallop. Pulmonary:      Effort: Pulmonary effort is normal. No respiratory distress. Breath sounds: No wheezing, rhonchi or rales.       Comments: Decreased breath sounds   Abdominal: General: Abdomen is flat. Musculoskeletal:         General: No swelling or tenderness. Cervical back: Normal range of motion. Right lower leg: No edema. Left lower leg: No edema. Skin:     General: Skin is warm and dry. Coloration: Skin is not jaundiced. Findings: No bruising or rash. Neurological:      General: No focal deficit present. Mental Status: He is alert and oriented to person, place, and time. Cranial Nerves: No cranial nerve deficit. Motor: No weakness. Comments: Hypersensitivity to light touch   Psychiatric:         Behavior: Behavior normal.         Thought Content: Thought content normal.      Comments: Agitated, judgement and thought process normal.           Labs:  Na/K/Cl/CO2:  143/3.5/108/29 (10/10 1147)  BUN/Cr/glu/ALT/AST/amyl/lip:  39/2.1/--/27/79/--/-- (10/10 1147)  WBC/Hgb/Hct/Plts:  4.6/8.4/27.0/193 (10/10 1147)  estimated creatinine clearance is 58 mL/min (A) (based on SCr of 2.1 mg/dL (H)). Other pertinent labs as noted below    Radiology:  XR CHEST PORTABLE   Final Result   No acute process. US RETROPERITONEAL COMPLETE   Final Result   1. Hyperechoic kidneys due to underlying parenchymal disease. 2. Trace bilateral perinephric fluid of indeterminate etiology, potentially   due to underlying inflammation. 3. An approximately 1.1 cm x 1.5 cm x 2.3 cm lesion in the right kidney could   be a cyst but could also be seen with infarction, abscess, or neoplasia. Consider further evaluation with renal protocol abdomen MRI (preferred) or   CT. Following resolution of acute kidney injury. XR CHEST PORTABLE   Final Result   Multifocal bilateral pulmonary ground-glass airspace opacities are stable         CT ABDOMEN PELVIS WO CONTRAST Additional Contrast? None   Final Result   Limited study by the patient's respiratory motion and lack of intravenous   contrast.      Extensive multifocal COVID-19 pneumonia bilaterally. Bilateral trace layering pleural effusions. Bilateral gynecomastia. Abdominal organs inadequately evaluated due to the limitations of the study. Normal appendix. No evidence of bowel obstruction. CT CHEST WO CONTRAST   Final Result   Limited study by the patient's respiratory motion and lack of intravenous   contrast.      Extensive multifocal COVID-19 pneumonia bilaterally. Bilateral trace layering pleural effusions. Bilateral gynecomastia. Abdominal organs inadequately evaluated due to the limitations of the study. Normal appendix. No evidence of bowel obstruction.          IR INTERVENTIONAL RADIOLOGY PROCEDURE REQUEST    (Results Pending)         Resident Assessment and Plan       Acute hypoxia 2/2 COVID pneumonia   - Worsening SOB since 8/28/2021 when he initially tested positive   - Patient desaturates to low-mid 80s with any activity  - CTA: COVID pneumonia, Bilateral trace layering pleural effusion, negative for PE   - D dimer 712, Ferritin 3900, CRP 10.1  - WBC 4.2 on admission, now 3.7  - Does not require O2 when resting, saturating 94-99% on room air  - Continue monitoring SpO2  - Pulmonology and ID consulted  - Legionella negative  - T spot TB test pending  - Currently afebrile  - Decadron discontinued on 10/9  - CXR 10/8 shows multifocal groundglass opacities are stable  - ID on board: received 2 days of vancomycin 10/6, on ancef 10/8, may discontinue due to negative blood culture growth   - Blood cx: gram positive cocci in clusters, repeat blood cx shows no growth x5 days   -Urine culture: NGTD   -Counseled on getting COVID vaccine after patient recovers      Weight loss   - Per patient due to decreased appetite from COVID; 50lb weight loss since August   - R/o other potential origins   - MARILEE negative  HIV, hepatitis C antibodies positive   - Elevated IgG and IgA, normal IgM: IgA nephropathy vs MPGN?  - Cryoglobulin normal   - TSH normal   - Sed rate and CRP elevated      HIV  HIV 1/2 antibodies positive  T-cell lymphocyte labs pending  HIV RNA quantitative PCR pending    Hepatitis C  Hep C antibody reactive  Viral load pending    Anemia   - Likely 2/2 from neuropathy vs GI bleed  - Hb 6.7 today, 9.9 on admission   - FOBT positive   Transfuse 1 unit   Type and screen  - General surgery consulted  - Rule out granulomatosis with polyangitis in the setting of neuropathy, DEISI and weight loss  - Consider OP EMG     DEISI   - Likely secondary to dehydration from persistent diarrhea and decreased PO intake   - Cr on admission 3.6, trending down now 2.4  - Urine osm and urine nitrogen low   - UA: protein, 1-2 waxy casts, moderate bacteria, moderate blood   - bicarb drip discontinued 10/8  - Microalbumin-creatinine ratio elevated   - Nephro recommends repeat albumin to creatinine ratio  - D5 NS discontinued with agreement from nephrology.   - Nephro recommends kidney bx     Elevated troponin and proBNP  - 2/2 myocardial injury vs stress cardiomyopathy due to COVID vs new onset CHF   - initial troponin 78, 78  - initial BNP 1599, repeat 3431 on 10/9  - Echo pending, consider outpatient      Hyperkalemia/Hyponatremia - resolving    - on admission K 5.2, Na 130  - K 3.4, Na 141 today, will replace with PO KCl   - Fluid restricted, strict I&O's  - Nephro on board: received Lokelma 10g PO q8h x 3 doses  Low potassium diet  - Monitor BMP      Proteinuria  -protein to creatinine ratio 3.8  - microalbumin to creatine ratio elevated   -kidney biopsy per nephro, patient agreeable    Hypoalbuminemia   - Alb 1.7 on admission, now 1.3  -Previously received albumin 25 g q6h x 4 doses 10/6  -Currently receiving albumin 25 g q6h x 8 doses  Low C3, normal C4 and cryoglobulin  - Continue to monitor             PT/OT evaluation: not indicated   DVT prophylaxis: heparin due to DEISI    GI prophylaxis: not indicated   Disposition:home +/- home health/ rehab   Diet: adult Electronically signed by Sidra Mullen MD on 10/11/2021 at 6:48 AM  Attending physician: Dr. Yuli Lyle

## 2021-10-11 NOTE — PLAN OF CARE
Dr Bruce Bautista came to department and is ok with doing renal biopsy as outpt.  Please schedule in 2 weeks per his request. Floor RN notified

## 2021-10-11 NOTE — CONSULTS
GENERAL SURGERY  CONSULT NOTE  10/11/2021    Physician Consulted: Dr. Declan Chang  Reason for Consult: anemia  Referring Physician: Dr. Catina Gomesry is a 36 y.o. male who presents to the general surgery service for evaluation of anemia. The patient is currently admitted for COVID pneumonia. He reports recent chills, fever, numbness, tremor, and 50lb weight loss. He states that he is feeling better since admission, and is currently tolerating a regular diet without nausea, vomiting, or abdominal pain. He is having normal, solid bowel movements. The patient denies past medical, surgical, or endoscopic history. He reports occasional tobacco use and socially drinking alcohol approximately 1x per week. He denies recent sexual activity or recreational drug use. He states that he never uses needles at home and has received tattoos only from licensed facilities. Over the past 2 days, his hemoglobin has downtrended from 9 to 6.7. A stool sample was reported to be FOBT+. Bloodwork shows positive serology for HIV and HepC. Blood cultures also positive for Staph epidermitis. Past Medical History:   Diagnosis Date    Asthma        Past Surgical History:   Procedure Laterality Date    FRACTURE SURGERY         Medications Prior to Admission:    Prior to Admission medications    Medication Sig Start Date End Date Taking?  Authorizing Provider   chlorhexidine (PERIDEX) 0.12 % solution SWISH 1/2 OUNCE BY MOUTH TWICE DAILY 21   Historical Provider, MD       No Known Allergies    Family History   Problem Relation Age of Onset    Diabetes type 2  Mother     Diabetes type 2  Father        Social History     Tobacco Use    Smoking status: Former Smoker     Quit date: 2021     Years since quittin.1    Smokeless tobacco: Never Used   Vaping Use    Vaping Use: Never used   Substance Use Topics    Alcohol use: No    Drug use: No         Review of Systems   Negative except as listed in the HPI PHYSICAL EXAM:    Vitals:    10/11/21 1647   BP: (!) 156/88   Pulse: 96   Resp: 14   Temp: 97.8 °F (36.6 °C)   SpO2: 94%       General Appearance:  awake, alert, oriented, in no acute distress  Skin:  Skin color, texture, turgor normal. No rashes or lesions. Head/face:  NCAT  Eyes:  No gross abnormalities. , PERRL, EOMI and Sclera nonicteric  Lungs/Chest:  On room air. Normal expansion. No chest wall tenderness  Heart:  Warm throughout. No chest pain  Abdomen:  Soft, minimally-tender, non-distended. No guarding. No palpable masses. Extremities: Extremities warm to touch, with no edema. Rectal:  No palpable masses. No hemorrhoids noted. No stool present in rectal vault. FOBT negative      LABS:    CBC  Recent Labs     10/11/21  0907 10/11/21  0907 10/11/21  1151   WBC 2.0*  --   --    HGB 6.9*   < > 6.7*   HCT 22.0*   < > 20.9*     --   --     < > = values in this interval not displayed. BMP  Recent Labs     10/11/21  0907      K 3.4*   *   CO2 29   BUN 27*   CREATININE 1.9*   CALCIUM 7.9*     Liver Function  Recent Labs     10/11/21  0907   BILITOT 0.5   AST 57*   ALT 17   ALKPHOS 53   PROT 5.6*   LABALBU 2.5*     No results for input(s): LACTATE in the last 72 hours. No results for input(s): INR, PTT in the last 72 hours. Invalid input(s): PT    RADIOLOGY    CT ABDOMEN PELVIS WO CONTRAST Additional Contrast? None    Result Date: 10/5/2021  EXAMINATION: CT OF THE CHEST WITHOUT CONTRAST; CT OF THE ABDOMEN AND PELVIS WITHOUT CONTRAST 10/5/2021 7:15 pm TECHNIQUE: CT of the chest was performed without the administration of intravenous contrast. Multiplanar reformatted images are provided for review.  Dose modulation, iterative reconstruction, and/or weight based adjustment of the mA/kV was utilized to reduce the radiation dose to as low as reasonably achievable.; CT of the abdomen and pelvis was performed without the administration of intravenous contrast. Multiplanar reformatted images are provided for review. Dose modulation, iterative reconstruction, and/or weight based adjustment of the mA/kV was utilized to reduce the radiation dose to as low as reasonably achievable. COMPARISON: None. HISTORY: ORDERING SYSTEM PROVIDED HISTORY: ro mass, weight loss? TECHNOLOGIST PROVIDED HISTORY: Reason for exam:->ro mass, weight loss? Decision Support Exception - unselect if not a suspected or confirmed emergency medical condition->Emergency Medical Condition (MA) What reading provider will be dictating this exam?->CRC FINDINGS: THE STUDY IS LIMITED DUE TO LACK OF INTRAVENOUS CONTRAST. Chest: Mediastinum: No thoracic aortic aneurysm. No definite adenopathy on this unenhanced study. Trace pericardial fluid anteriorly. Lungs/pleura: Trace layering pleural effusions bilaterally, slightly larger on the right. No pneumothorax. Numerous ground-glass densities in the bilateral upper and to a lesser degree lower lungs, in keeping with known COVID-19 pneumonia. Soft Tissues/Bones: Bilateral gynecomastia. No acute osseous abnormality in the thoracic cage. Abdomen and pelvis: The study is degraded by the patient's respiratory motion. Organs: Abdominal organs inadequately evaluated on this motion degraded and unenhanced study. No hydronephrosis on either side. GI/Bowel: Normal appendix. No evidence of bowel obstruction. Pelvis: Normal sized prostate. Urinary bladder grossly intact. Peritoneum/Retroperitoneum: No free air or free fluid. No bulky adenopathy. No abdominal aortic aneurysm. Bones/Soft Tissues: No lytic or sclerotic lesion in the regional skeleton. Limited study by the patient's respiratory motion and lack of intravenous contrast. Extensive multifocal COVID-19 pneumonia bilaterally. Bilateral trace layering pleural effusions. Bilateral gynecomastia. Abdominal organs inadequately evaluated due to the limitations of the study. Normal appendix. No evidence of bowel obstruction.      CT CHEST WO sized prostate. Urinary bladder grossly intact. Peritoneum/Retroperitoneum: No free air or free fluid. No bulky adenopathy. No abdominal aortic aneurysm. Bones/Soft Tissues: No lytic or sclerotic lesion in the regional skeleton. Limited study by the patient's respiratory motion and lack of intravenous contrast. Extensive multifocal COVID-19 pneumonia bilaterally. Bilateral trace layering pleural effusions. Bilateral gynecomastia. Abdominal organs inadequately evaluated due to the limitations of the study. Normal appendix. No evidence of bowel obstruction. ASSESSMENT:  36 y.o. male with anemia, and reported FOBT+ stool. No stool present in rectal vault and FOBT- on re-examination. No signs of bleeding. Admitted for COVID-19 pneumonia. Newly diagnosed HIV and HepC with positive blood cultures    PLAN:  - recheck H/H  - no acute surgical or endoscopic intervention planned at this time  - monitor for signs of bleeding    Plan to be discussed with Dr. Lola Perry    Electronically signed by Duran Costa DO on 10/11/21 at 6:30 PM EDT     ATTENDING PHYSICIAN PROGRESS NOTE      I have examined the patient, reviewed the record, and discussed the case with the Resident. I have reviewed all relevant labs and imaging data. Please refer to the resident's note. I agree with the assessment and plan with the following corrections/ additions. The following summarizes my clinical findings and independent assessment.      Moe Tejeda MD

## 2021-10-11 NOTE — PROGRESS NOTES
Department of Internal Medicine  Nephrology Progress Note      Events reviewed. SUBJECTIVE:  We are following Mr. Reg Hagan for DEISI. Reports no complaints.     PHYSICAL EXAM:      Vitals:    VITALS:  BP (!) 142/88   Pulse 74   Temp 98.4 °F (36.9 °C) (Oral)   Resp 14   Ht 6' 4\" (1.93 m)   Wt 173 lb (78.5 kg)   SpO2 96%   BMI 21.06 kg/m²   24HR INTAKE/OUTPUT:    No intake or output data in the 24 hours ending 10/11/21 1012    Constitutional:  Awake, alert, NAD  HEENT:  PERRL, normocephalic, atraumatic  Respiratory: diminished lung sounds  Cardiovascular/Edema:  RRR, S1/S2, -edema  Gastrointestinal:  abd flat, soft, non-tender  Neurologic:  Awake, alert, no focal deficits  Skin:  Warm, dry, no rash or lesions  Other: BLE 1+ edema       Scheduled Meds:   albumin human  25 g IntraVENous Q6H    ceFAZolin  2,000 mg IntraVENous Q8H    sodium chloride flush  5-40 mL IntraVENous 2 times per day    heparin (porcine)  5,000 Units SubCUTAneous Q8H     Continuous Infusions:   sodium chloride       PRN Meds:.sodium chloride flush, ondansetron **OR** ondansetron, polyethylene glycol, acetaminophen **OR** acetaminophen, perflutren lipid microspheres, sodium chloride    DATA:    CBC:   Lab Results   Component Value Date    WBC 4.6 10/10/2021    RBC 2.86 10/10/2021    HGB 8.4 10/10/2021    HCT 27.0 10/10/2021    MCV 94.4 10/10/2021    MCH 29.4 10/10/2021    MCHC 31.1 10/10/2021    RDW 14.6 10/10/2021     10/10/2021    MPV 10.9 10/10/2021     CMP:    Lab Results   Component Value Date     10/10/2021    K 3.5 10/10/2021     10/10/2021    CO2 29 10/10/2021    BUN 39 10/10/2021    CREATININE 2.1 10/10/2021    GFRAA 45 10/10/2021    LABGLOM 45 10/10/2021    GLUCOSE 77 10/10/2021    PROT 5.6 10/10/2021    LABALBU 2.0 10/10/2021    CALCIUM 7.4 10/10/2021    BILITOT 0.3 10/10/2021    ALKPHOS 62 10/10/2021    AST 79 10/10/2021    ALT 27 10/10/2021     Magnesium:    Lab Results   Component Value Date    MG 2.9 10/10/2021     Phosphorus:  No results found for: PHOS     Radiology Review:      Chest Xray 10/09/2021    No acute process.                     CT Chest October 5, 2021   Limited study by the patient's respiratory motion and lack of intravenous   contrast.       Extensive multifocal COVID-19 pneumonia bilaterally.       Bilateral trace layering pleural effusions.       Bilateral gynecomastia.       Abdominal organs inadequately evaluated due to the limitations of the study.       Normal appendix.       No evidence of bowel obstruction.         Kidney ultrasound October 7, 2021   1. Hyperechoic kidneys due to underlying parenchymal disease. 2. Trace bilateral perinephric fluid of indeterminate etiology, potentially   due to underlying inflammation. 3. An approximately 1.1 cm x 1.5 cm x 2.3 cm lesion in the right kidney could   be a cyst but could also be seen with infarction, abscess, or neoplasia. Consider further evaluation with renal protocol abdomen MRI (preferred) or   CT.  Following resolution of acute kidney injury.           BRIEF SUMMARY OF INITIAL CONSULT:     Briefly, Mr. Milli Urena is a 36year old male with no significant PMH who was admitted on October 5, 2021 after he presented from an outside clinic after he was found to be hypoxic during 6 minute walking test. Patient tested positive for Covid 19 on August 18th, he is unvaccinated and had Covid 19 last year as well. Patient states he has quarantined by himself and when his mother recently visited him she was shocked at his appearance as he had lost about 50 pounds in 2 and a half months. On admission labs were significant for sodium of 130, potassium 5.2, bicarbonate 20, BUN 41, creatinine 3.6, magnesium 2.7, calcium 7.7 and proBNP 1,599. We are consulted for DEISI. Problems resolved:    Hyperkalemia, 2/2 DEISI. Low potassium diet  Hypotonic hyponatremia 2/2 intravascular volume from poor oral intake. Bicarb drip started.  Sodium levels improving. Low bicarbonate levels with hyperchloremia, most likely NAGMA versus respiratory alkalosis; we need a PH to clarify diagnosis. Awaiting ABG results  High bicarbonate levels, likely metabolic alkalosis 2/2 administration    IMPRESSION/RECOMMENDATIONS:     DEISI on CKD, volume responsive pre-renal DEISI d/t volume (poor oral intake), urine sodium <20. Renal function continues to improve. 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 [facility]y Angles Work-up so far MARILEE negative, C4 normal, C3 88 (low), UPEP without monoclonal gammopathy, negative cryoglobulins. Kidney ultrasound with hyperechoic kidneys. Needs kidney biopsy, discussed with interventional radiology they would rather wait for a couple weeks to perform biopsy in view of patient having Covid-19 infection. Right kidney lesion, likely a cyst but cannot be ruled out other pathology including infarction, abscess, neoplasm.   We will proceed with MRI when renal function improved    Hypokalemia, multifactorial, poor intake, probably renal potassium wasting  Severe Hypoalbuminemia, multifactorial, status post albumin administration  ---------------------------------------------------------------------  MSSE bacteremia, on cefazolin 2 g every 8 hours needs TTE  Covid 19 infection in non vaccinated pt  Normocytic anemia, multifactorial    Plan:    Complete Albumin 25 gm IV q 6 hour x 8 doses  Replace potassium, KCl 40 mg p.o. daily  Kidney biopsy as an outpatient in 2 weeks after discharge  Transfuse 1 unit of packed red cells       Electronically signed by Naye Mckenzie MD on 10/11/2021 at 10:12 AM

## 2021-10-11 NOTE — PROGRESS NOTES
NORMA PROGRESS NOTE      Chief complaint: Follow-up of Gram-positive cocci in clusters on blood culture    The patient is a 36 y.o. male, not vaccinated against COVID-19, with history of asthma, presented on 10/05 with shortness of breath, found to be febrile at 103 °F, positive SARS-CoV-2 PCR, bilateral groundglass opacities on chest CT, tolerating room air with oxygen saturation of 97%. He reports having tested positive for SARS-CoV-2 for the 3rd time now with previous on in late August at which time he reports having quarantined. Urine Streptococcus pneumonia and Legionella antigens were negative. Blood cultures from 10/05 and 10/06 showed methicillin-susceptible Staphylococcus epidermidis in 1 out of 2 sets. HIV screen was positive. Subjective: Patient was seen and examined. No chills, no abdominal pain, no diarrhea, no rash, no itching. He reports swelling on his feet. He was noted to have low hemoglobin with positive stool occult blood. Objective:    Vitals:    10/11/21 0745   BP: (!) 142/88   Pulse: 74   Resp: 14   Temp: 98.4 °F (36.9 °C)   SpO2: 96%     Constitutional: Alert, not in distress  Respiratory: Clear breath sounds, no crackles, no wheezes  Cardiovascular: Regular rate and rhythm, no murmurs  Gastrointestinal: Bowel sounds present, soft, nontender  Skin: Warm and dry, no active dermatoses  Musculoskeletal: No joint swelling, no joint erythema    Labs, imaging, and medical records/notes were personally reviewed. Assessment:  Sepsis, resolved  COVID-19, mild  Pneumonia  Methicillin susceptible Staphylococcus epidermidis bacteremia, etiology unclear  DEISI  HIV screen positive  Hepatitis C Ab positive. Recommendations:  Continue cefazolin 2g q8h. Check HIV viral load, CD4 cell count, hepatitis C viral load. Follow up TTE. Follow up blood cultures. Monitor respiratory status. Continue supportive care.     Thank you for involving me in the care of Kristin James.  I will continue to follow. Please do not hesitate to call for any questions or concerns.     Electronically signed by Michaelle Wang MD on 10/11/2021 at 11:16 AM

## 2021-10-11 NOTE — PROGRESS NOTES
[facility] Attending    S: 36 y.o. male with history of asthma and smoking history presented with increasing dyspnea and cough over past month. On presentation had fever of 103. Has tested positive for COVID twice, most recently end of August.  CT shows extensive bilateral pneumonia and COVID testing again positive. Currently  afebrile, and feeling better. Pt reports that his breathing is better today. Asking many questions about his medical care. Denies rectal bleeding or hematemesis. Pt attempted to leave AMA this weekend. O: VS- Blood pressure (!) 142/88, pulse 74, temperature 98.4 °F (36.9 °C), temperature source Oral, resp. rate 14, height 6' 4\" (1.93 m), weight 173 lb (78.5 kg), SpO2 96 %. Exam is as noted by resident   Awake, alert  Heart - RRR  Lungs- decreased bs  Ext - no edema, distal tenderness      Impressions:   Principal Problem:    Acute respiratory failure with hypoxia (HCC)  Active Problems:  Repeatedly positive Covid  Stable groundglass opacities  Saturations okay on room air    Acute kidney injury due to COVID-19 ([address]    Asthma    Nephrotic range proteinuria  50 pound weight loss  2 blood cultures positive  Improved on antibiotic    Old records not available    Plan  Covid pneumonia   Appreciate Nephrology, ID, and pulmonology following   Blood culture with staph epi, on Ancef; sepsis appears resolved  Check ambulatory pulse ox, normal resting oxygen level  Awaiting further labs, HIV, HCV, TB, confirmatory testing pending  Decadron stopped  Awaiting echo  Nephrology planning biopsy outpatient, W/U for possible Wegener'sReceiving albumin  DEISI is improved  FOBT heme positive-with decrease in h/h; repeat lab and transfuse, consult gen surgery if low  Disposition planning  Follow up outpatient for right kidney lesion-ct/mri when cr improved     Attending Physician Statement  I have reviewed the chart and seen the patient with the resident(s).   I personally reviewed images, EKG's and similar tests, if present. I personally reviewed and performed key elements of the history and exam.  I have reviewed and confirmed student and/or resident history and exam with changes as indicated above. I agree with the assessment, plan and orders as documented by the resident. Please refer to the resident and/or student note for additional information.       Jason Schumacher MD

## 2021-10-11 NOTE — CARE COORDINATION
Patient remains on room air, afebrile on IV Ancef Q8, hemoglobin 6.7 today. Nephrology following for DEISI.     Rey Patel, MSW, LSW [phone]

## 2021-10-11 NOTE — PROGRESS NOTES
[place]  Department of Internal Medicine  Division of Pulmonary, Critical Care and Sleep Medicine  Progress Note    Bird Yates DO, MPH, FarnazBeverly Andino MD, [facility]  [place] [facility]      Patient: Krishna Castano  MRN: [id number]  : 1990    Encounter Time: 3:16 PM     Date of Admission: 10/5/2021  3:07 PM    Primary Care Physician: Veronica Genao MD    Reason for Consultation: COVID     SUBJECTIVE:  36 y.o. male with history of asthma and smoking history presents with increasing dyspnea and cough over past month. On presentation had fever of 103. Has tested positive for COVID twice, most recently end of August.  CT shows extensive bilateral pneumonia and COVID testing again positive. Now afebrile on Ancef, and feeling better. Hep C and HIV + screen    OBJECTIVE:     PHYSICAL EXAM:   VITALS:   Vitals:    10/10/21 1000 10/10/21 1500 10/11/21 0330 10/11/21 0745   BP: 132/76 130/82 (!) 133/97 (!) 142/88   Pulse: 75 78 94 74   Resp: 14 14 14 14   Temp: 97.7 °F (36.5 °C) 98 °F (36.7 °C) 98.5 °F (36.9 °C) 98.4 °F (36.9 °C)   TempSrc: Temporal Oral Oral Oral   SpO2: 100% 100%  96%   Weight:       Height:            Intake/Output Summary (Last 24 hours) at 10/11/2021 1516  Last data filed at 10/11/2021 1438  Gross per 24 hour   Intake    Output 900 ml   Net -900 ml        CONSTITUTIONAL:   A&O x 3, NAD  SKIN:     No rash, No suspicious lesions or skin discoloration  HEENT:     EOMI, MMM, No thrush  NECK:    No bruits, No JVP apprechiated  CV:      Sinus,  No murmur, No rubs, No gallops  PULMONARY:   Couse BS,  No Wheezing, No Rales, No Rhonchi      No noted egophony  ABDOMEN:     Soft, non-tender. BS normal. No R/R/G  EXT:    No deformities . No clubbing.       + lower extremity edema, No venous stasis  PULSE:   Appears equal and palpable.   PSYCHIATRIC:  Seems appropriate, No acute psycosis  MS:    No fractures, No gross weakness  NEUROLOGIC:   The clinical assessment is non-focal     DATA: IMAGING & TESTING:     LABORATORY TESTS:    CBC:   Lab Results   Component Value Date    WBC 2.0 10/11/2021    RBC 2.37 10/11/2021    HGB 6.7 10/11/2021    HCT 20.9 10/11/2021    MCV 92.8 10/11/2021    MCH 29.1 10/11/2021    MCHC 31.4 10/11/2021    RDW 14.4 10/11/2021     10/11/2021    MPV 11.2 10/11/2021     CMP:    Lab Results   Component Value Date     10/11/2021    K 3.4 10/11/2021     10/11/2021    CO2 29 10/11/2021    BUN 27 10/11/2021    CREATININE 1.9 10/11/2021    GFRAA 51 10/11/2021    LABGLOM 51 10/11/2021    GLUCOSE 146 10/11/2021    PROT 5.6 10/11/2021    LABALBU 2.5 10/11/2021    CALCIUM 7.9 10/11/2021    BILITOT 0.5 10/11/2021    ALKPHOS 53 10/11/2021    AST 57 10/11/2021    ALT 17 10/11/2021     Magnesium:    Lab Results   Component Value Date    MG 2.4 10/11/2021     Phosphorus:  No results found for: PHOS  PT/INR:  No results found for: PROTIME, INR  FERRITIN:    Lab Results   Component Value Date    FERRITIN 3,921 10/06/2021     Fibrinogen Level:  No components found for: FIB     PRO-BNP:   Lab Results   Component Value Date    PROBNP 3,431 (H) 10/09/2021    PROBNP 1,599 (H) 10/05/2021      ABGs: No results found for: PH, PO2, PCO2  Hemoglobin A1C: No components found for: HGBA1C    IMAGING:  Imaging tests were completed and reviewed and discussed radiology and care team involved and reveals   CT ABDOMEN PELVIS WO CONTRAST Additional Contrast? None    Result Date: 10/5/2021  EXAMINATION: CT OF THE CHEST WITHOUT CONTRAST; CT OF THE ABDOMEN AND PELVIS WITHOUT CONTRAST 10/5/2021 7:15 pm TECHNIQUE: CT of the chest was performed without the administration of intravenous contrast. Multiplanar reformatted images are provided for review.  Dose modulation, iterative reconstruction, and/or weight based adjustment of the mA/kV was utilized to reduce the radiation dose to as low as reasonably achievable.; CT of the abdomen and pelvis was performed without the administration of intravenous contrast. Multiplanar reformatted images are provided for review. Dose modulation, iterative reconstruction, and/or weight based adjustment of the mA/kV was utilized to reduce the radiation dose to as low as reasonably achievable. COMPARISON: None. HISTORY: ORDERING SYSTEM PROVIDED HISTORY: ro mass, weight loss? TECHNOLOGIST PROVIDED HISTORY: Reason for exam:->ro mass, weight loss? Decision Support Exception - unselect if not a suspected or confirmed emergency medical condition->Emergency Medical Condition (MA) What reading provider will be dictating this exam?->CRC FINDINGS: THE STUDY IS LIMITED DUE TO LACK OF INTRAVENOUS CONTRAST. Chest: Mediastinum: No thoracic aortic aneurysm. No definite adenopathy on this unenhanced study. Trace pericardial fluid anteriorly. Lungs/pleura: Trace layering pleural effusions bilaterally, slightly larger on the right. No pneumothorax. Numerous ground-glass densities in the bilateral upper and to a lesser degree lower lungs, in keeping with known COVID-19 pneumonia. Soft Tissues/Bones: Bilateral gynecomastia. No acute osseous abnormality in the thoracic cage. Abdomen and pelvis: The study is degraded by the patient's respiratory motion. Organs: Abdominal organs inadequately evaluated on this motion degraded and unenhanced study. No hydronephrosis on either side. GI/Bowel: Normal appendix. No evidence of bowel obstruction. Pelvis: Normal sized prostate. Urinary bladder grossly intact. Peritoneum/Retroperitoneum: No free air or free fluid. No bulky adenopathy. No abdominal aortic aneurysm. Bones/Soft Tissues: No lytic or sclerotic lesion in the regional skeleton. Limited study by the patient's respiratory motion and lack of intravenous contrast. Extensive multifocal COVID-19 pneumonia bilaterally. Bilateral trace layering pleural effusions. Bilateral gynecomastia.  Abdominal organs inadequately evaluated due to the limitations of the study. Normal appendix. No evidence of bowel obstruction. CT CHEST WO CONTRAST    Result Date: 10/5/2021  EXAMINATION: CT OF THE CHEST WITHOUT CONTRAST; CT OF THE ABDOMEN AND PELVIS WITHOUT CONTRAST 10/5/2021 7:15 pm TECHNIQUE: CT of the chest was performed without the administration of intravenous contrast. Multiplanar reformatted images are provided for review. Dose modulation, iterative reconstruction, and/or weight based adjustment of the mA/kV was utilized to reduce the radiation dose to as low as reasonably achievable.; CT of the abdomen and pelvis was performed without the administration of intravenous contrast. Multiplanar reformatted images are provided for review. Dose modulation, iterative reconstruction, and/or weight based adjustment of the mA/kV was utilized to reduce the radiation dose to as low as reasonably achievable. COMPARISON: None. HISTORY: ORDERING SYSTEM PROVIDED HISTORY: ro mass, weight loss? TECHNOLOGIST PROVIDED HISTORY: Reason for exam:->ro mass, weight loss? Decision Support Exception - unselect if not a suspected or confirmed emergency medical condition->Emergency Medical Condition (MA) What reading provider will be dictating this exam?->CRC FINDINGS: THE STUDY IS LIMITED DUE TO LACK OF INTRAVENOUS CONTRAST. Chest: Mediastinum: No thoracic aortic aneurysm. No definite adenopathy on this unenhanced study. Trace pericardial fluid anteriorly. Lungs/pleura: Trace layering pleural effusions bilaterally, slightly larger on the right. No pneumothorax. Numerous ground-glass densities in the bilateral upper and to a lesser degree lower lungs, in keeping with known COVID-19 pneumonia. Soft Tissues/Bones: Bilateral gynecomastia. No acute osseous abnormality in the thoracic cage. Abdomen and pelvis: The study is degraded by the patient's respiratory motion. Organs: Abdominal organs inadequately evaluated on this motion degraded and unenhanced study. No hydronephrosis on either side. GI/Bowel: Normal appendix. No evidence of bowel obstruction. Pelvis: Normal sized prostate. Urinary bladder grossly intact. Peritoneum/Retroperitoneum: No free air or free fluid. No bulky adenopathy. No abdominal aortic aneurysm. Bones/Soft Tissues: No lytic or sclerotic lesion in the regional skeleton. Limited study by the patient's respiratory motion and lack of intravenous contrast. Extensive multifocal COVID-19 pneumonia bilaterally. Bilateral trace layering pleural effusions. Bilateral gynecomastia. Abdominal organs inadequately evaluated due to the limitations of the study. Normal appendix. No evidence of bowel obstruction. Assessment:  Mr. Juan Orellana is a 36year old male with no significant PMH who was admitted on October 5, 2021 after he presented from an outside clinic after he was found to be hypoxic during 6 minute walking test. Patient tested positive for Covid 19 on August 18th, he is unvaccinated and had Covid 19 last year as well. Patient states he has quarantined by himself and when his mother recently visited him she was shocked at his appearance as he had lost about 50 pounds in 2 and a half months  Sepsis  COVID-19, mild  HIV     Pneumonia  Gram-positive cocci in clusters bacteremia, etiology unclear  DEISI        Plan:   1. Lower and stop steroids soon as off oxygen  2. Strict I&O's  3. ontinue to monitor potassium levels  4. Continue low potassium diet  5. Continue to monitor albumin levels  6. Repeat urine ACR   7. Encourage oral intake  8. Obtain renal ultrasound  9. Obtain complement C 3&4 levels, and cryoglobulin titer  10. Off oxygen   11. Keep off Bronchodialtors  12.  ID to see for +HIV , CD4 and VL        Mary Starkey DO DO, MPH, [facility]  Professor of Internal Medicine  Pulmonary, Critical Care and Sleep Medicine

## 2021-10-12 ENCOUNTER — APPOINTMENT (OUTPATIENT)
Dept: GENERAL RADIOLOGY | Age: 31
DRG: 890 | End: 2021-10-12
Payer: COMMERCIAL

## 2021-10-12 DIAGNOSIS — R80.9 PROTEINURIA, UNSPECIFIED TYPE: Primary | ICD-10-CM

## 2021-10-12 LAB
ALBUMIN SERPL-MCNC: 2.9 G/DL (ref 3.5–5.2)
ALP BLD-CCNC: 53 U/L (ref 40–129)
ALT SERPL-CCNC: 13 U/L (ref 0–40)
ANION GAP SERPL CALCULATED.3IONS-SCNC: 10 MMOL/L (ref 7–16)
ANISOCYTOSIS: ABNORMAL
APTT: 31.6 SEC (ref 24.5–35.1)
AST SERPL-CCNC: 53 U/L (ref 0–39)
BASOPHILS ABSOLUTE: 0.03 E9/L (ref 0–0.2)
BASOPHILS RELATIVE PERCENT: 0.9 % (ref 0–2)
BILIRUB SERPL-MCNC: 0.5 MG/DL (ref 0–1.2)
BLOOD CULTURE, ROUTINE: ABNORMAL
BUN BLDV-MCNC: 17 MG/DL (ref 6–20)
CALCIUM SERPL-MCNC: 8.2 MG/DL (ref 8.6–10.2)
CHLORIDE BLD-SCNC: 107 MMOL/L (ref 98–107)
CO2: 22 MMOL/L (ref 22–29)
CREAT SERPL-MCNC: 1.5 MG/DL (ref 0.7–1.2)
D DIMER: 752 NG/ML DDU
EOSINOPHILS ABSOLUTE: 0.03 E9/L (ref 0.05–0.5)
EOSINOPHILS RELATIVE PERCENT: 0.9 % (ref 0–6)
GFR AFRICAN AMERICAN: >60
GFR NON-AFRICAN AMERICAN: >60 ML/MIN/1.73
GLUCOSE BLD-MCNC: 79 MG/DL (ref 74–99)
HCT VFR BLD CALC: 24.5 % (ref 37–54)
HEMOGLOBIN: 7.9 G/DL (ref 12.5–16.5)
HYPOCHROMIA: ABNORMAL
INR BLD: 1.2
LYMPHOCYTES ABSOLUTE: 0.23 E9/L (ref 1.5–4)
LYMPHOCYTES RELATIVE PERCENT: 7.5 % (ref 20–42)
MCH RBC QN AUTO: 29.3 PG (ref 26–35)
MCHC RBC AUTO-ENTMCNC: 32.2 % (ref 32–34.5)
MCV RBC AUTO: 90.7 FL (ref 80–99.9)
MONOCYTES ABSOLUTE: 0.23 E9/L (ref 0.1–0.95)
MONOCYTES RELATIVE PERCENT: 8.4 % (ref 2–12)
NEUTROPHILS ABSOLUTE: 2.38 E9/L (ref 1.8–7.3)
NEUTROPHILS RELATIVE PERCENT: 82.2 % (ref 43–80)
NUCLEATED RED BLOOD CELLS: 0.9 /100 WBC
ORGANISM: ABNORMAL
OVALOCYTES: ABNORMAL
PDW BLD-RTO: 14.3 FL (ref 11.5–15)
PLATELET # BLD: 141 E9/L (ref 130–450)
PMV BLD AUTO: 11.2 FL (ref 7–12)
POIKILOCYTES: ABNORMAL
POLYCHROMASIA: ABNORMAL
POTASSIUM REFLEX MAGNESIUM: 3.8 MMOL/L (ref 3.5–5)
PROTHROMBIN TIME: 13.8 SEC (ref 9.3–12.4)
RBC # BLD: 2.7 E12/L (ref 3.8–5.8)
SMUDGE CELLS: ABNORMAL
SODIUM BLD-SCNC: 139 MMOL/L (ref 132–146)
TOTAL PROTEIN: 5.8 G/DL (ref 6.4–8.3)
WBC # BLD: 2.9 E9/L (ref 4.5–11.5)

## 2021-10-12 PROCEDURE — 2500000003 HC RX 250 WO HCPCS: Performed by: INTERNAL MEDICINE

## 2021-10-12 PROCEDURE — 6360000002 HC RX W HCPCS: Performed by: FAMILY MEDICINE

## 2021-10-12 PROCEDURE — 85378 FIBRIN DEGRADE SEMIQUANT: CPT

## 2021-10-12 PROCEDURE — 2580000003 HC RX 258: Performed by: FAMILY MEDICINE

## 2021-10-12 PROCEDURE — 6360000002 HC RX W HCPCS: Performed by: CLINICAL NURSE SPECIALIST

## 2021-10-12 PROCEDURE — 6360000002 HC RX W HCPCS: Performed by: INTERNAL MEDICINE

## 2021-10-12 PROCEDURE — 99232 SBSQ HOSP IP/OBS MODERATE 35: CPT | Performed by: INTERNAL MEDICINE

## 2021-10-12 PROCEDURE — 6370000000 HC RX 637 (ALT 250 FOR IP): Performed by: FAMILY MEDICINE

## 2021-10-12 PROCEDURE — 80053 COMPREHEN METABOLIC PANEL: CPT

## 2021-10-12 PROCEDURE — 85610 PROTHROMBIN TIME: CPT

## 2021-10-12 PROCEDURE — 36415 COLL VENOUS BLD VENIPUNCTURE: CPT

## 2021-10-12 PROCEDURE — 71045 X-RAY EXAM CHEST 1 VIEW: CPT

## 2021-10-12 PROCEDURE — 2140000000 HC CCU INTERMEDIATE R&B

## 2021-10-12 PROCEDURE — P9047 ALBUMIN (HUMAN), 25%, 50ML: HCPCS | Performed by: CLINICAL NURSE SPECIALIST

## 2021-10-12 PROCEDURE — 85730 THROMBOPLASTIN TIME PARTIAL: CPT

## 2021-10-12 PROCEDURE — 85025 COMPLETE CBC W/AUTO DIFF WBC: CPT

## 2021-10-12 PROCEDURE — 6370000000 HC RX 637 (ALT 250 FOR IP): Performed by: STUDENT IN AN ORGANIZED HEALTH CARE EDUCATION/TRAINING PROGRAM

## 2021-10-12 PROCEDURE — 99232 SBSQ HOSP IP/OBS MODERATE 35: CPT | Performed by: FAMILY MEDICINE

## 2021-10-12 RX ORDER — PANTOPRAZOLE SODIUM 40 MG/1
40 TABLET, DELAYED RELEASE ORAL
Status: DISCONTINUED | OUTPATIENT
Start: 2021-10-12 | End: 2021-11-07 | Stop reason: HOSPADM

## 2021-10-12 RX ADMIN — Medication 2000 MG: at 13:48

## 2021-10-12 RX ADMIN — Medication 10 ML: at 09:07

## 2021-10-12 RX ADMIN — POTASSIUM CHLORIDE 40 MEQ: 1500 TABLET, EXTENDED RELEASE ORAL at 09:11

## 2021-10-12 RX ADMIN — PANTOPRAZOLE SODIUM 40 MG: 40 TABLET, DELAYED RELEASE ORAL at 09:06

## 2021-10-12 RX ADMIN — Medication 2000 MG: at 21:07

## 2021-10-12 RX ADMIN — Medication 10 ML: at 21:17

## 2021-10-12 RX ADMIN — ALBUMIN (HUMAN) 25 G: 0.25 INJECTION, SOLUTION INTRAVENOUS at 11:32

## 2021-10-12 RX ADMIN — Medication 2000 MG: at 05:51

## 2021-10-12 RX ADMIN — HEPARIN SODIUM 5000 UNITS: 10000 INJECTION INTRAVENOUS; SUBCUTANEOUS at 05:51

## 2021-10-12 RX ADMIN — HEPARIN SODIUM 5000 UNITS: 10000 INJECTION INTRAVENOUS; SUBCUTANEOUS at 13:52

## 2021-10-12 RX ADMIN — ACETAMINOPHEN 650 MG: 325 TABLET ORAL at 00:15

## 2021-10-12 RX ADMIN — ACETAMINOPHEN 650 MG: 325 TABLET ORAL at 21:12

## 2021-10-12 RX ADMIN — HEPARIN SODIUM 5000 UNITS: 10000 INJECTION INTRAVENOUS; SUBCUTANEOUS at 21:07

## 2021-10-12 RX ADMIN — ALBUMIN (HUMAN) 25 G: 0.25 INJECTION, SOLUTION INTRAVENOUS at 05:51

## 2021-10-12 ASSESSMENT — PAIN SCALES - GENERAL
PAINLEVEL_OUTOF10: 0

## 2021-10-12 NOTE — PROGRESS NOTES
[facility] Inpatient   Resident Progress Note    S:  Hospital day: 7   Brief Synopsis: Dominik Blackwood is a 36 y.o. male with no PMH who presented with hypoxia and fever. He was initially seen in PCP office and was hypoxic on exertion with desaturate into the mid low 80s as well as noted to be febrile and tachycardic. Per patient, his symptoms began 8/15/2021 and he tested positive for Covid 8/28. endorse worsening shortness of breath over the past month with sputum production, intermittent chest discomfort, persistent diarrhea, chills, fever, decrease in appetite and 50 pound weight loss since August.  Patient also endorses significantly worsening \"neuropathy\" of bilateral feet, states it feels like he is \"being stabbed by needles\" which is causing him inability to walk due to pain. In the ED, he was found to be anemic, hyperkalemic, hyponatremic, febrile and COVID positive. He also had elevated Creatinine, AST, proBNP and troponin x2. CTA showed extensive multifocal COVID-19 pneumonia bilaterally, bilateral trace layering pleural effusion and bilateral gynecomastia. CT abdomen was limited due to movement and non-contrast, but showed normal appendix and no evidence of bowel obstruction. Admitted for acute hypoxia due to Covid pneumonia. Nephro, pulm and ID consulted. Started on bicarb drip. US showed hyperechoic kidneys, trace b/l perinephric fluid and right kidney lesion (Cyst vs infarction vs abscess vs neoplasia). Overnight/interim:  · Patient was seen and examined at bedside. He was a little less frustrated and more understanding about the HIV/Hep C results taking time. He also had a fever overnight, 101 and he was tachycardic and hypertensive. Came down to 99 with tylenol. Also had an episode of diarrhea, no blood in the stool. He stated yesterday he was able to walk around the room with minimal SOB. · He had another concern about there being lapses in his care since he's been here. When asked to explain, he stated that there was miscommunication about when he was supposed to be fluid restricted last week. Another instance was his confusion about being on a low potassium diet and receiving potassium supplementation. Dr. Javier Ahn and I were able to answer his questions and clarify any confusion and ensured him that we ensuring there is no compromise in his care. We also informed him that he can have a patient advocate if he has any more issues with his care. Cont meds:    sodium chloride      sodium chloride       Scheduled meds:    potassium chloride  40 mEq Oral Daily    albumin human  25 g IntraVENous Q6H    ceFAZolin  2,000 mg IntraVENous Q8H    sodium chloride flush  5-40 mL IntraVENous 2 times per day    heparin (porcine)  5,000 Units SubCUTAneous Q8H     PRN meds: sodium chloride, sodium chloride flush, ondansetron **OR** ondansetron, polyethylene glycol, acetaminophen **OR** acetaminophen, perflutren lipid microspheres, sodium chloride     I reviewed the patient's past medical and surgical history, Medications and Allergies. O:  BP (!) 146/86   Pulse 104   Temp 99.4 °F (37.4 °C) (Oral)   Resp 18   Ht 6' 4\" (1.93 m)   Wt 173 lb (78.5 kg)   SpO2 90%   BMI 21.06 kg/m²   24 hour I&O: I/O last 3 completed shifts:  In: -   Out: 1425 [Urine:1425]  No intake/output data recorded. Physical Exam  Constitutional:       Appearance: Normal appearance. He is not ill-appearing, toxic-appearing or diaphoretic. HENT:      Head: Normocephalic and atraumatic. Nose: No rhinorrhea. Mouth/Throat:      Mouth: Mucous membranes are moist.      Pharynx: Oropharynx is clear. Eyes:      General: No scleral icterus. Right eye: No discharge. Left eye: No discharge. Extraocular Movements: Extraocular movements intact. Cardiovascular:      Rate and Rhythm: Normal rate and regular rhythm. Pulses: Normal pulses. Heart sounds: Normal heart sounds. No murmur heard. No friction rub. No gallop. Pulmonary:      Effort: Pulmonary effort is normal. No respiratory distress. Breath sounds: No wheezing, rhonchi or rales. Comments: Decreased breath sounds   Abdominal:      General: Abdomen is flat. Musculoskeletal:         General: No swelling or tenderness. Cervical back: Normal range of motion. Right lower leg: No edema. Left lower leg: No edema. Skin:     General: Skin is warm and dry. Coloration: Skin is not jaundiced. Findings: No bruising or rash. Neurological:      General: No focal deficit present. Mental Status: He is alert and oriented to person, place, and time. Cranial Nerves: No cranial nerve deficit. Motor: No weakness. Comments: Hypersensitivity to light touch   Psychiatric:         Behavior: Behavior normal.         Thought Content: Thought content normal.      Comments: Agitated, judgement and thought process normal.           Labs:  Na/K/Cl/CO2:  141/3.4/108/29 (10/11 0907)  BUN/Cr/glu/ALT/AST/amyl/lip:  27/1.9/--/17/57/--/-- (10/11 0907)  WBC/Hgb/Hct/Plts:  2.9/8.0/24.3/141 (10/11 2055)  estimated creatinine clearance is 64 mL/min (A) (based on SCr of 1.9 mg/dL (H)). Other pertinent labs as noted below    Radiology:  XR CHEST PORTABLE   Final Result   No acute process. US RETROPERITONEAL COMPLETE   Final Result   1. Hyperechoic kidneys due to underlying parenchymal disease. 2. Trace bilateral perinephric fluid of indeterminate etiology, potentially   due to underlying inflammation. 3. An approximately 1.1 cm x 1.5 cm x 2.3 cm lesion in the right kidney could   be a cyst but could also be seen with infarction, abscess, or neoplasia. Consider further evaluation with renal protocol abdomen MRI (preferred) or   CT. Following resolution of acute kidney injury.          XR CHEST PORTABLE   Final Result   Multifocal bilateral pulmonary ground-glass airspace opacities are stable CT ABDOMEN PELVIS WO CONTRAST Additional Contrast? None   Final Result   Limited study by the patient's respiratory motion and lack of intravenous   contrast.      Extensive multifocal COVID-19 pneumonia bilaterally. Bilateral trace layering pleural effusions. Bilateral gynecomastia. Abdominal organs inadequately evaluated due to the limitations of the study. Normal appendix. No evidence of bowel obstruction. CT CHEST WO CONTRAST   Final Result   Limited study by the patient's respiratory motion and lack of intravenous   contrast.      Extensive multifocal COVID-19 pneumonia bilaterally. Bilateral trace layering pleural effusions. Bilateral gynecomastia. Abdominal organs inadequately evaluated due to the limitations of the study. Normal appendix. No evidence of bowel obstruction.                Resident Assessment and Plan       Acute hypoxia 2/2 COVID pneumonia   - Worsening SOB since 8/28/2021 when he initially tested positive   - Patient desaturates to low-mid 80s with any activity  - CTA: COVID pneumonia, Bilateral trace layering pleural effusion, negative for PE   - D dimer 712, repeat today stable 752  - Ferritin 3900, CRP 10.1 on admission   - WBC 4.2 on admission, now 3.7  - Does not require O2 when resting, saturating 90-99% on room air  - Continue monitoring SpO2  - Pulmonology and ID consulted  - Legionella negative  - T spot TB test pending  - Tmax 101, down to 99.4 with tylenol   - Decadron discontinued on 10/9  - CXR 10/8 shows multifocal groundglass opacities are stable  - Repeat CXR today: b/l pulm infiltrates   - ID on board: received 2 days of vancomycin 10/6, on ancef 10/8 day 5  - Blood cx: gram positive cocci in clusters, repeat blood cx shows no growth x5 days   -Urine culture: NGTD   -Counseled on getting COVID vaccine after patient recovers     Weight loss   - Per patient due to decreased appetite from COVID; 50lb weight loss since August   - R/o other potential origins   - MARILEE negative  HIV, hepatitis C antibodies positive   - Elevated IgG and IgA, normal IgM: IgA nephropathy vs MPGN?  - Cryoglobulin normal   - TSH normal   - Sed rate and CRP elevated      HIV  HIV 1/2 antibodies positive  T-cell lymphocyte labs pending  HIV RNA quantitative PCR pending    Hepatitis C  Hep C antibody reactive  Viral load pending    Anemia   - Likely 2/2 from neuropathy vs GI bleed  - Hb 7.9 today, 9.9 on admission   - FOBT positive  Transfused 1 unit 10/11, post transfusion Hb 8   - General surgery consulted, FOBT negative the second time  - Rule out granulomatosis with polyangitis in the setting of neuropathy, DEISI and weight loss  - Consider OP EMG     DEISI   - Likely secondary to dehydration from persistent diarrhea and decreased PO intake   - Cr on admission 3.6, trending down now 2.4  - Urine osm and urine nitrogen low   - UA: protein, 1-2 waxy casts, moderate bacteria, moderate blood   - bicarb drip discontinued 10/8  - Microalbumin-creatinine ratio elevated   - Kidney bx OP 2 weeks after discharge      Elevated troponin and proBNP  - 2/2 myocardial injury vs stress cardiomyopathy due to COVID vs new onset CHF   - initial troponin 78, 78  - initial BNP 1599, repeat 3431 on 10/9  - Echo pending, consider outpatient      Hyperkalemia/Hyponatremia - resolving    - on admission K 5.2, Na 130  - K 3.4, Na 141 today, will replace with PO KCl   - Fluid restricted, strict I&O's  - Nephro on board: received Lokelma 10g PO q8h x 3 doses  Low potassium diet  - Monitor BMP      Proteinuria  -protein to creatinine ratio 3.8  - microalbumin to creatine ratio elevated   -kidney biopsy per nephro, patient agreeable    Hypoalbuminemia   - Alb 1.7 on admission, now 1.3  -Previously received albumin 25 g q6h x 4 doses 10/6  -Currently receiving albumin 25 g q6h x 8 doses  Low C3, normal C4 and cryoglobulin  - Continue to monitor             PT/OT evaluation:

## 2021-10-12 NOTE — PROGRESS NOTES
Department of Internal Medicine  Nephrology Progress Note      Events reviewed. SUBJECTIVE:  We are following Mr. Jerald Cintron for DEISI. Reports no complaints.     PHYSICAL EXAM:      Vitals:    VITALS:  BP (!) 150/98   Pulse 86   Temp 98.5 °F (36.9 °C) (Oral)   Resp 18   Ht 6' 4\" (1.93 m)   Wt 173 lb (78.5 kg)   SpO2 90%   BMI 21.06 kg/m²   24HR INTAKE/OUTPUT:      Intake/Output Summary (Last 24 hours) at 10/12/2021 1126  Last data filed at 10/11/2021 2035  Gross per 24 hour   Intake    Output 1425 ml   Net -1425 ml       Constitutional:  Awake, alert, NAD  HEENT:  PERRL, normocephalic, atraumatic  Respiratory: diminished lung sounds  Cardiovascular/Edema:  RRR, S1/S2, -edema  Gastrointestinal:  abd flat, soft, non-tender  Neurologic:  Awake, alert, no focal deficits  Skin:  Warm, dry, no rash or lesions  Other: BLE 1+ edema       Scheduled Meds:   pantoprazole  40 mg Oral QAM AC    potassium chloride  40 mEq Oral Daily    albumin human  25 g IntraVENous Q6H    ceFAZolin  2,000 mg IntraVENous Q8H    sodium chloride flush  5-40 mL IntraVENous 2 times per day    heparin (porcine)  5,000 Units SubCUTAneous Q8H     Continuous Infusions:   sodium chloride      sodium chloride       PRN Meds:.sodium chloride, sodium chloride flush, ondansetron **OR** ondansetron, polyethylene glycol, acetaminophen **OR** acetaminophen, perflutren lipid microspheres, sodium chloride    DATA:    CBC:   Lab Results   Component Value Date    WBC 2.9 10/12/2021    RBC 2.70 10/12/2021    HGB 7.9 10/12/2021    HCT 24.5 10/12/2021    MCV 90.7 10/12/2021    MCH 29.3 10/12/2021    MCHC 32.2 10/12/2021    RDW 14.3 10/12/2021     10/12/2021    MPV 11.2 10/12/2021     CMP:    Lab Results   Component Value Date     10/12/2021    K 3.8 10/12/2021     10/12/2021    CO2 22 10/12/2021    BUN 17 10/12/2021    CREATININE 1.5 10/12/2021    GFRAA >60 10/12/2021    LABGLOM >60 10/12/2021    GLUCOSE 79 10/12/2021    PROT 5.8 10/12/2021    LABALBU 2.9 10/12/2021    CALCIUM 8.2 10/12/2021    BILITOT 0.5 10/12/2021    ALKPHOS 53 10/12/2021    AST 53 10/12/2021    ALT 13 10/12/2021     Magnesium:    Lab Results   Component Value Date    MG 2.4 10/11/2021     Phosphorus:  No results found for: PHOS     Radiology Review:      Chest Xray 10/09/2021    No acute process.                     CT Chest October 5, 2021   Limited study by the patient's respiratory motion and lack of intravenous   contrast.       Extensive multifocal COVID-19 pneumonia bilaterally.       Bilateral trace layering pleural effusions.       Bilateral gynecomastia.       Abdominal organs inadequately evaluated due to the limitations of the study.       Normal appendix.       No evidence of bowel obstruction.         Kidney ultrasound October 7, 2021   1. Hyperechoic kidneys due to underlying parenchymal disease. 2. Trace bilateral perinephric fluid of indeterminate etiology, potentially   due to underlying inflammation. 3. An approximately 1.1 cm x 1.5 cm x 2.3 cm lesion in the right kidney could   be a cyst but could also be seen with infarction, abscess, or neoplasia. Consider further evaluation with renal protocol abdomen MRI (preferred) or   CT.  Following resolution of acute kidney injury.           BRIEF SUMMARY OF INITIAL CONSULT:     Briefly, Mr. Yissel Leon is a 36year old male with no significant PMH who was admitted on October 5, 2021 after he presented from an outside clinic after he was found to be hypoxic during 6 minute walking test. Patient tested positive for Covid 19 on August 18th, he is unvaccinated and had Covid 19 last year as well. Patient states he has quarantined by himself and when his mother recently visited him she was shocked at his appearance as he had lost about 50 pounds in 2 and a half months.   On admission labs were significant for sodium of 130, potassium 5.2, bicarbonate 20, BUN 41, creatinine 3.6, magnesium 2.7, calcium 7.7 and proBNP 1,599. We are consulted for DEISI. Problems resolved:    Hyperkalemia, 2/2 DEISI. Low potassium diet  Hypotonic hyponatremia 2/2 intravascular volume from poor oral intake. Bicarb drip started. Sodium levels improving. Low bicarbonate levels with hyperchloremia, most likely NAGMA versus respiratory alkalosis; we need a PH to clarify diagnosis. Awaiting ABG results  High bicarbonate levels, likely metabolic alkalosis 2/2 administration    IMPRESSION/RECOMMENDATIONS:     DEISI on CKD, volume responsive pre-renal DEISI d/t volume (poor oral intake), urine sodium <20. 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 Charmainejen Silva Work-up so far MARILEE negative, C4 normal, C3 88 (low), UPEP without monoclonal gammopathy, negative cryoglobulins. Kidney ultrasound with hyperechoic kidneys. Needs kidney biopsy, discussed with interventional radiology they would rather wait for a couple weeks to perform biopsy in view of patient having Covid-19 infection. Right kidney lesion, likely a cyst but cannot be ruled out other pathology including infarction, abscess, neoplasm.   We will proceed with MRI when renal function improved    Hypokalemia, multifactorial, poor intake, probably renal potassium wasting  Severe Hypoalbuminemia, multifactorial, status post albumin administration  ---------------------------------------------------------------------  MSSE bacteremia, on cefazolin 2 g every 8 hours needs TTE  Covid 19 infection in non vaccinated pt  Normocytic anemia, multifactorial    Plan:    Discontinue potassium  Kidney biopsy as an outpatient in 2 weeks after discharge  Discharge planning      Electronically signed by Ana Martines MD on 10/12/2021 at 11:26 AM

## 2021-10-12 NOTE — PROGRESS NOTES
[facility] Attending    S: 36 y.o. male with history of asthma and smoking history presented with increasing dyspnea and cough over past month. On presentation had fever of 103. Has tested positive for COVID twice, most recently end of August.  CT shows extensive bilateral pneumonia and COVID testing again positive. Currently  afebrile, and feeling better. Pt reports that his breathing is ok and he has ambulated to the bathroom without feeling short of breath. O: VS- Blood pressure (!) 150/98, pulse 86, temperature 98.5 °F (36.9 °C), temperature source Oral, resp. rate 18, height 6' 4\" (1.93 m), weight 173 lb (78.5 kg), SpO2 90 %. Exam is as noted by resident   Awake, alert  Heart - RRR  Lungs- decreased bs  Ext - no edema      Impressions:   Principal Problem:    Acute respiratory failure with hypoxia (HCC)  Active Problems:  Repeatedly positive Covid  Stable groundglass opacities  Saturations okay on room air    Acute kidney injury due to COVID-19 ([facility] [address]    Asthma    Nephrotic range proteinuria  50 pound weight loss  2 blood cultures positive  Improved on antibiotic      Plan  Covid pneumonia with sepsis  Had fever overnight, tm 101, chest xray ordered  Appreciate Nephrology, ID, and pulmonology following   FOBT heme positive-Gen surgery has no plans for scopes s/p 1U PRBCs, will trend h/h  Blood culture with staph epi, on Ancef  Check ambulatory pulse ox, normal resting oxygen level  Awaiting further labs, HIV, HCV, TB, confirmatory testing pending with hcv ab positive and reactive HIV, neutropenia  Decadron stopped  Awaiting echo  Nephrology planning biopsy outpatient, W/U for possible Wegener's  DEISI is improved  Disposition planning  Follow up outpatient for right kidney lesion-ct/mri when cr improved     Attending Physician Statement  I have reviewed the chart and seen the patient with the resident(s).   I personally reviewed images, EKG's and similar tests, if present. I personally reviewed and performed key elements of the history and exam.  I have reviewed and confirmed student and/or resident history and exam with changes as indicated above. I agree with the assessment, plan and orders as documented by the resident. Please refer to the resident and/or student note for additional information.       Nawaf Hamlin MD

## 2021-10-12 NOTE — PROGRESS NOTES
General surgery was consulted for evaluation for possible GI bleed. He was found to be anemic yesterday with hemoglobin initially in the 9s and yesterday morning at 6.7. He received a unit of blood yesterday and his repeat CBC showed a hemoglobin of 8. He has had no bowel movements. FOBT testing performed yesterday and was negative. He is no signs of gastric intestinal bleeding at this point. Endoscopic evaluation is not indicated at this time. Recommend to continue PPI daily. Please page for any questions or concerns. Discussed with Dr. Adriana Jaimes.     Leslie Luque MD  PGY-4, General Surgery

## 2021-10-12 NOTE — PROGRESS NOTES
[place]  Department of Internal Medicine  Division of Pulmonary, Critical Care and Sleep Medicine  Progress Note    Eloise Castle DO, MPH, [facility], Terry Veronica MD, [facility]  [facility]      Patient: Mable Pierre  MRN: [id number]  : 1990    Encounter Time: 4:45 PM     Date of Admission: 10/5/2021  3:07 PM    Primary Care Physician: Mason Hooper MD    Reason for Consultation: COVID     SUBJECTIVE:  36 y.o. male with history of asthma and smoking history presents with increasing dyspnea and cough over past month. On presentation had fever of 103. Has tested positive for COVID twice, most recently end of August.  CT shows extensive bilateral pneumonia and COVID testing again positive. Now afebrile on Ancef, and feeling better. Hep C and HIV + screen  Staph Epi  Echo pending    OBJECTIVE:     PHYSICAL EXAM:   VITALS:   Vitals:    10/11/21 2330 10/12/21 0145 10/12/21 0739 10/12/21 1500   BP: (!) 146/86  (!) 150/98 (!) 166/90   Pulse: 104  86 80   Resp: 18  18 18   Temp: 101 °F (38.3 °C) 99.4 °F (37.4 °C) 98.5 °F (36.9 °C) 98.9 °F (37.2 °C)   TempSrc: Oral Oral Oral Oral   SpO2: 90%      Weight:       Height:            Intake/Output Summary (Last 24 hours) at 10/12/2021 1645  Last data filed at 10/11/2021 203  Gross per 24 hour   Intake    Output 525 ml   Net -525 ml        CONSTITUTIONAL:   A&O x 3, NAD  SKIN:     No rash, No suspicious lesions or skin discoloration  HEENT:     EOMI, MMM, No thrush  NECK:    No bruits, No JVP apprechiated  CV:      Sinus,  No murmur, No rubs, No gallops  PULMONARY:   Couse BS,  No Wheezing, No Rales, No Rhonchi      No noted egophony  ABDOMEN:     Soft, non-tender. BS normal. No R/R/G  EXT:    No deformities . No clubbing.       + lower extremity edema, No venous stasis  PULSE:   Appears equal and palpable.   PSYCHIATRIC:  Seems appropriate, No acute psycosis  MS:    No fractures, No gross weakness  NEUROLOGIC:   The clinical assessment is non-focal     DATA: IMAGING & TESTING:     LABORATORY TESTS:    CBC:   Lab Results   Component Value Date    WBC 2.9 10/12/2021    RBC 2.70 10/12/2021    HGB 7.9 10/12/2021    HCT 24.5 10/12/2021    MCV 90.7 10/12/2021    MCH 29.3 10/12/2021    MCHC 32.2 10/12/2021    RDW 14.3 10/12/2021     10/12/2021    MPV 11.2 10/12/2021     CMP:    Lab Results   Component Value Date     10/12/2021    K 3.8 10/12/2021     10/12/2021    CO2 22 10/12/2021    BUN 17 10/12/2021    CREATININE 1.5 10/12/2021    GFRAA >60 10/12/2021    LABGLOM >60 10/12/2021    GLUCOSE 79 10/12/2021    PROT 5.8 10/12/2021    LABALBU 2.9 10/12/2021    CALCIUM 8.2 10/12/2021    BILITOT 0.5 10/12/2021    ALKPHOS 53 10/12/2021    AST 53 10/12/2021    ALT 13 10/12/2021     Magnesium:    Lab Results   Component Value Date    MG 2.4 10/11/2021     Phosphorus:  No results found for: PHOS  PT/INR:    Lab Results   Component Value Date    PROTIME 13.8 10/12/2021    INR 1.2 10/12/2021     FERRITIN:    Lab Results   Component Value Date    FERRITIN 3,921 10/06/2021     Fibrinogen Level:  No components found for: FIB     PRO-BNP:   Lab Results   Component Value Date    PROBNP 3,431 (H) 10/09/2021    PROBNP 1,599 (H) 10/05/2021      ABGs: No results found for: PH, PO2, PCO2  Hemoglobin A1C: No components found for: HGBA1C    IMAGING:  Imaging tests were completed and reviewed and discussed radiology and care team involved and reveals   CT ABDOMEN PELVIS WO CONTRAST Additional Contrast? None    Result Date: 10/5/2021  EXAMINATION: CT OF THE CHEST WITHOUT CONTRAST; CT OF THE ABDOMEN AND PELVIS WITHOUT CONTRAST 10/5/2021 7:15 pm TECHNIQUE: CT of the chest was performed without the administration of intravenous contrast. Multiplanar reformatted images are provided for review.  Dose modulation, iterative reconstruction, and/or weight based adjustment of the mA/kV was utilized to reduce the radiation dose to as low as reasonably achievable.; CT of the abdomen and pelvis was performed without the administration of intravenous contrast. Multiplanar reformatted images are provided for review. Dose modulation, iterative reconstruction, and/or weight based adjustment of the mA/kV was utilized to reduce the radiation dose to as low as reasonably achievable. COMPARISON: None. HISTORY: ORDERING SYSTEM PROVIDED HISTORY: ro mass, weight loss? TECHNOLOGIST PROVIDED HISTORY: Reason for exam:->ro mass, weight loss? Decision Support Exception - unselect if not a suspected or confirmed emergency medical condition->Emergency Medical Condition ([place]) What reading provider will be dictating this exam?->CRC FINDINGS: THE STUDY IS LIMITED DUE TO LACK OF INTRAVENOUS CONTRAST. Chest: Mediastinum: No thoracic aortic aneurysm. No definite adenopathy on this unenhanced study. Trace pericardial fluid anteriorly. Lungs/pleura: Trace layering pleural effusions bilaterally, slightly larger on the right. No pneumothorax. Numerous ground-glass densities in the bilateral upper and to a lesser degree lower lungs, in keeping with known COVID-19 pneumonia. Soft Tissues/Bones: Bilateral gynecomastia. No acute osseous abnormality in the thoracic cage. Abdomen and pelvis: The study is degraded by the patient's respiratory motion. Organs: Abdominal organs inadequately evaluated on this motion degraded and unenhanced study. No hydronephrosis on either side. GI/Bowel: Normal appendix. No evidence of bowel obstruction. Pelvis: Normal sized prostate. Urinary bladder grossly intact. Peritoneum/Retroperitoneum: No free air or free fluid. No bulky adenopathy. No abdominal aortic aneurysm. Bones/Soft Tissues: No lytic or sclerotic lesion in the regional skeleton. Limited study by the patient's respiratory motion and lack of intravenous contrast. Extensive multifocal COVID-19 pneumonia bilaterally. Bilateral trace layering pleural effusions. Bilateral gynecomastia. Abdominal organs inadequately evaluated due to the limitations of the study. Normal appendix. No evidence of bowel obstruction. CT CHEST WO CONTRAST    Result Date: 10/5/2021  EXAMINATION: CT OF THE CHEST WITHOUT CONTRAST; CT OF THE ABDOMEN AND PELVIS WITHOUT CONTRAST 10/5/2021 7:15 pm TECHNIQUE: CT of the chest was performed without the administration of intravenous contrast. Multiplanar reformatted images are provided for review. Dose modulation, iterative reconstruction, and/or weight based adjustment of the mA/kV was utilized to reduce the radiation dose to as low as reasonably achievable.; CT of the abdomen and pelvis was performed without the administration of intravenous contrast. Multiplanar reformatted images are provided for review. Dose modulation, iterative reconstruction, and/or weight based adjustment of the mA/kV was utilized to reduce the radiation dose to as low as reasonably achievable. COMPARISON: None. HISTORY: ORDERING SYSTEM PROVIDED HISTORY: ro mass, weight loss? TECHNOLOGIST PROVIDED HISTORY: Reason for exam:->ro mass, weight loss? Decision Support Exception - unselect if not a suspected or confirmed emergency medical condition->Emergency Medical Condition ([place]) What reading provider will be dictating this exam?->CRC FINDINGS: THE STUDY IS LIMITED DUE TO LACK OF INTRAVENOUS CONTRAST. Chest: Mediastinum: No thoracic aortic aneurysm. No definite adenopathy on this unenhanced study. Trace pericardial fluid anteriorly. Lungs/pleura: Trace layering pleural effusions bilaterally, slightly larger on the right. No pneumothorax. Numerous ground-glass densities in the bilateral upper and to a lesser degree lower lungs, in keeping with known COVID-19 pneumonia. Soft Tissues/Bones: Bilateral gynecomastia. No acute osseous abnormality in the thoracic cage. Abdomen and pelvis: The study is degraded by the patient's respiratory motion.  Organs: Abdominal organs inadequately evaluated on this motion degraded and unenhanced study. No hydronephrosis on either side. GI/Bowel: Normal appendix. No evidence of bowel obstruction. Pelvis: Normal sized prostate. Urinary bladder grossly intact. Peritoneum/Retroperitoneum: No free air or free fluid. No bulky adenopathy. No abdominal aortic aneurysm. Bones/Soft Tissues: No lytic or sclerotic lesion in the regional skeleton. Limited study by the patient's respiratory motion and lack of intravenous contrast. Extensive multifocal COVID-19 pneumonia bilaterally. Bilateral trace layering pleural effusions. Bilateral gynecomastia. Abdominal organs inadequately evaluated due to the limitations of the study. Normal appendix. No evidence of bowel obstruction. Assessment:  Mr. Fatimah Meyer is a 36year old male with no significant PMH who was admitted on October 5, 2021 after he presented from an outside clinic after he was found to be hypoxic during 6 minute walking test. Patient tested positive for Covid 19 on August 18th, he is unvaccinated and had Covid 19 last year as well. Patient states he has quarantined by himself and when his mother recently visited him she was shocked at his appearance as he had lost about 50 pounds in 2 and a half months  Sepsis  COVID-19, mild  HIV     Pneumonia  Gram-positive cocci in clusters bacteremia, etiology unclear  DEISI        Plan:   1. stop steroids  2. Strict I&O's  3. Off oxygen   4. Keep off Bronchodialtors  5.  ID to see for +HIV , CD4 and VL        Eren Healy DO DO, MPH, [facility]  Professor of Internal Medicine  Pulmonary, Critical Care and Sleep Medicine

## 2021-10-12 NOTE — PROGRESS NOTES
[facility] PROGRESS NOTE      Chief complaint: Follow-up of Gram-positive cocci in clusters on blood culture    The patient is a 36 y.o. male, not vaccinated against COVID-19, with history of asthma, presented on 10/05 with shortness of breath, found to be febrile at 103 °F, positive SARS-CoV-2 PCR, bilateral groundglass opacities on chest CT, tolerating room air with oxygen saturation of 97%. He reports having tested positive for SARS-CoV-2 for the 3rd time now with previous on in late August at which time he reports having quarantined. Urine Streptococcus pneumonia and Legionella antigens were negative. Blood cultures from 10/05 and 10/06 showed methicillin-susceptible Staphylococcus epidermidis in 1 out of 2 sets. HIV screen was positive. Subjective: Patient was seen and examined. No chills, no abdominal pain, no diarrhea, no rash, no itching. He reports feeling well. Objective:    Vitals:    10/12/21 0739   BP: (!) 150/98   Pulse: 86   Resp: 18   Temp: 98.5 °F (36.9 °C)   SpO2:      Constitutional: Alert, not in distress  Respiratory: Clear breath sounds, no crackles, no wheezes  Cardiovascular: Regular rate and rhythm, no murmurs  Gastrointestinal: Bowel sounds present, soft, nontender  Skin: Warm and dry, no active dermatoses  Musculoskeletal: No joint swelling, no joint erythema    Labs, imaging, and medical records/notes were personally reviewed. Assessment:  Sepsis, resolved  COVID-19, mild  Pneumonia  Methicillin susceptible Staphylococcus epidermidis bacteremia, etiology unclear  DEISI  HIV screen positive  Hepatitis C Ab positive. Recommendations:  Continue cefazolin 2g q8h. Follow up HIV viral load, CD4 cell count, hepatitis C viral load. I have discussed with him the need for follow-up to determine need for treatment based on results of confirmatory tests. Follow up TTE. Follow up blood cultures. Monitor respiratory status.     Continue supportive care.     Thank you for involving me in the care of Kristin Ramesh 136. I will continue to follow. Please do not hesitate to call for any questions or concerns.     Electronically signed by Janae Reilly MD on 10/12/2021 at 12:20 PM

## 2021-10-13 ENCOUNTER — APPOINTMENT (OUTPATIENT)
Dept: CT IMAGING | Age: 31
DRG: 890 | End: 2021-10-13
Payer: COMMERCIAL

## 2021-10-13 LAB
ALBUMIN SERPL-MCNC: 3.1 G/DL (ref 3.5–5.2)
ALP BLD-CCNC: 59 U/L (ref 40–129)
ALT SERPL-CCNC: 10 U/L (ref 0–40)
ANION GAP SERPL CALCULATED.3IONS-SCNC: 8 MMOL/L (ref 7–16)
AST SERPL-CCNC: 57 U/L (ref 0–39)
BASOPHILS ABSOLUTE: 0 E9/L (ref 0–0.2)
BASOPHILS RELATIVE PERCENT: 0 % (ref 0–2)
BILIRUB SERPL-MCNC: 0.5 MG/DL (ref 0–1.2)
BLOOD CULTURE, ROUTINE: NORMAL
BUN BLDV-MCNC: 13 MG/DL (ref 6–20)
CALCIUM SERPL-MCNC: 8 MG/DL (ref 8.6–10.2)
CHLORIDE BLD-SCNC: 108 MMOL/L (ref 98–107)
CO2: 23 MMOL/L (ref 22–29)
CREAT SERPL-MCNC: 1.5 MG/DL (ref 0.7–1.2)
CULTURE, BLOOD 2: NORMAL
D DIMER: 730 NG/ML DDU
EOSINOPHILS ABSOLUTE: 0 E9/L (ref 0.05–0.5)
EOSINOPHILS RELATIVE PERCENT: 2.2 % (ref 0–6)
GFR AFRICAN AMERICAN: >60
GFR NON-AFRICAN AMERICAN: >60 ML/MIN/1.73
GLUCOSE BLD-MCNC: 81 MG/DL (ref 74–99)
HCT VFR BLD CALC: 26 % (ref 37–54)
HEMOGLOBIN: 8.4 G/DL (ref 12.5–16.5)
LV EF: 55 %
LVEF MODALITY: NORMAL
LYMPHOCYTES ABSOLUTE: 0.13 E9/L (ref 1.5–4)
LYMPHOCYTES RELATIVE PERCENT: 4.4 % (ref 20–42)
MCH RBC QN AUTO: 30 PG (ref 26–35)
MCHC RBC AUTO-ENTMCNC: 32.3 % (ref 32–34.5)
MCV RBC AUTO: 92.9 FL (ref 80–99.9)
MONOCYTES ABSOLUTE: 0.19 E9/L (ref 0.1–0.95)
MONOCYTES RELATIVE PERCENT: 6.1 % (ref 2–12)
NEUTROPHILS ABSOLUTE: 2.88 E9/L (ref 1.8–7.3)
NEUTROPHILS RELATIVE PERCENT: 89.5 % (ref 43–80)
NUCLEATED RED BLOOD CELLS: 0.9 /100 WBC
OVALOCYTES: ABNORMAL
PDW BLD-RTO: 14.4 FL (ref 11.5–15)
PLATELET # BLD: 141 E9/L (ref 130–450)
PMV BLD AUTO: 11.3 FL (ref 7–12)
POIKILOCYTES: ABNORMAL
POTASSIUM REFLEX MAGNESIUM: 4.5 MMOL/L (ref 3.5–5)
RBC # BLD: 2.8 E12/L (ref 3.8–5.8)
SODIUM BLD-SCNC: 139 MMOL/L (ref 132–146)
TOTAL PROTEIN: 6 G/DL (ref 6.4–8.3)
WBC # BLD: 3.2 E9/L (ref 4.5–11.5)

## 2021-10-13 PROCEDURE — 80053 COMPREHEN METABOLIC PANEL: CPT

## 2021-10-13 PROCEDURE — 2580000003 HC RX 258: Performed by: FAMILY MEDICINE

## 2021-10-13 PROCEDURE — 85025 COMPLETE CBC W/AUTO DIFF WBC: CPT

## 2021-10-13 PROCEDURE — 99232 SBSQ HOSP IP/OBS MODERATE 35: CPT | Performed by: INTERNAL MEDICINE

## 2021-10-13 PROCEDURE — 85378 FIBRIN DEGRADE SEMIQUANT: CPT

## 2021-10-13 PROCEDURE — 6360000002 HC RX W HCPCS: Performed by: INTERNAL MEDICINE

## 2021-10-13 PROCEDURE — 2580000003 HC RX 258: Performed by: INTERNAL MEDICINE

## 2021-10-13 PROCEDURE — 94640 AIRWAY INHALATION TREATMENT: CPT

## 2021-10-13 PROCEDURE — 36415 COLL VENOUS BLD VENIPUNCTURE: CPT

## 2021-10-13 PROCEDURE — 2500000003 HC RX 250 WO HCPCS: Performed by: INTERNAL MEDICINE

## 2021-10-13 PROCEDURE — 2140000000 HC CCU INTERMEDIATE R&B

## 2021-10-13 PROCEDURE — 6370000000 HC RX 637 (ALT 250 FOR IP): Performed by: STUDENT IN AN ORGANIZED HEALTH CARE EDUCATION/TRAINING PROGRAM

## 2021-10-13 PROCEDURE — 6360000002 HC RX W HCPCS: Performed by: FAMILY MEDICINE

## 2021-10-13 PROCEDURE — 6360000004 HC RX CONTRAST MEDICATION: Performed by: STUDENT IN AN ORGANIZED HEALTH CARE EDUCATION/TRAINING PROGRAM

## 2021-10-13 PROCEDURE — 83880 ASSAY OF NATRIURETIC PEPTIDE: CPT

## 2021-10-13 PROCEDURE — 99232 SBSQ HOSP IP/OBS MODERATE 35: CPT | Performed by: FAMILY MEDICINE

## 2021-10-13 PROCEDURE — 71275 CT ANGIOGRAPHY CHEST: CPT

## 2021-10-13 PROCEDURE — 87449 NOS EACH ORGANISM AG IA: CPT

## 2021-10-13 PROCEDURE — 93308 TTE F-UP OR LMTD: CPT

## 2021-10-13 RX ORDER — HYDROXYZINE HYDROCHLORIDE 10 MG/1
10 TABLET, FILM COATED ORAL 3 TIMES DAILY PRN
Status: DISCONTINUED | OUTPATIENT
Start: 2021-10-13 | End: 2021-10-15

## 2021-10-13 RX ORDER — LEVOFLOXACIN 750 MG/1
750 TABLET ORAL DAILY
Status: DISCONTINUED | OUTPATIENT
Start: 2021-10-13 | End: 2021-10-13

## 2021-10-13 RX ORDER — LEVALBUTEROL INHALATION SOLUTION 0.63 MG/3ML
0.63 SOLUTION RESPIRATORY (INHALATION) EVERY 8 HOURS
Status: DISCONTINUED | OUTPATIENT
Start: 2021-10-13 | End: 2021-10-17

## 2021-10-13 RX ORDER — AMLODIPINE BESYLATE 5 MG/1
5 TABLET ORAL DAILY
Status: DISCONTINUED | OUTPATIENT
Start: 2021-10-13 | End: 2021-10-16

## 2021-10-13 RX ORDER — LABETALOL HYDROCHLORIDE 5 MG/ML
10 INJECTION, SOLUTION INTRAVENOUS EVERY 6 HOURS PRN
Status: DISCONTINUED | OUTPATIENT
Start: 2021-10-13 | End: 2021-11-07 | Stop reason: HOSPADM

## 2021-10-13 RX ORDER — SODIUM CHLORIDE 9 MG/ML
INJECTION, SOLUTION INTRAVENOUS CONTINUOUS
Status: DISCONTINUED | OUTPATIENT
Start: 2021-10-13 | End: 2021-10-14

## 2021-10-13 RX ADMIN — SODIUM CHLORIDE, PRESERVATIVE FREE 10 ML: 5 INJECTION INTRAVENOUS at 10:33

## 2021-10-13 RX ADMIN — Medication 10 ML: at 09:35

## 2021-10-13 RX ADMIN — Medication 10 ML: at 21:14

## 2021-10-13 RX ADMIN — SULFAMETHOXAZOLE AND TRIMETHOPRIM 400 MG: 80; 16 INJECTION, SOLUTION, CONCENTRATE INTRAVENOUS at 14:04

## 2021-10-13 RX ADMIN — HEPARIN SODIUM 5000 UNITS: 10000 INJECTION INTRAVENOUS; SUBCUTANEOUS at 15:08

## 2021-10-13 RX ADMIN — CEFEPIME HYDROCHLORIDE 2000 MG: 2 INJECTION, POWDER, FOR SOLUTION INTRAVENOUS at 15:08

## 2021-10-13 RX ADMIN — HEPARIN SODIUM 5000 UNITS: 10000 INJECTION INTRAVENOUS; SUBCUTANEOUS at 21:14

## 2021-10-13 RX ADMIN — IOPAMIDOL 60 ML: 755 INJECTION, SOLUTION INTRAVENOUS at 12:31

## 2021-10-13 RX ADMIN — LEVALBUTEROL HYDROCHLORIDE 0.63 MG: 0.63 SOLUTION RESPIRATORY (INHALATION) at 17:27

## 2021-10-13 RX ADMIN — AMLODIPINE BESYLATE 5 MG: 5 TABLET ORAL at 11:02

## 2021-10-13 RX ADMIN — CEFEPIME HYDROCHLORIDE 2000 MG: 2 INJECTION, POWDER, FOR SOLUTION INTRAVENOUS at 21:14

## 2021-10-13 RX ADMIN — Medication 2000 MG: at 03:53

## 2021-10-13 RX ADMIN — SULFAMETHOXAZOLE AND TRIMETHOPRIM 400 MG: 80; 16 INJECTION, SOLUTION, CONCENTRATE INTRAVENOUS at 19:36

## 2021-10-13 RX ADMIN — HEPARIN SODIUM 5000 UNITS: 10000 INJECTION INTRAVENOUS; SUBCUTANEOUS at 05:14

## 2021-10-13 RX ADMIN — SODIUM CHLORIDE: 9 INJECTION, SOLUTION INTRAVENOUS at 10:34

## 2021-10-13 RX ADMIN — PANTOPRAZOLE SODIUM 40 MG: 40 TABLET, DELAYED RELEASE ORAL at 05:13

## 2021-10-13 ASSESSMENT — PAIN SCALES - GENERAL
PAINLEVEL_OUTOF10: 0
PAINLEVEL_OUTOF10: 0

## 2021-10-13 NOTE — PROGRESS NOTES
Patient seen from [facility] due to COVID-19 pandemic. No further BM per nursing. Hemoglobin has remained stable post transfusion. No signs of bleeding. No surgical or endoscopic intervention indicated at this time. Please call with any signs of bleeding, decrease in hemoglobin, or further concerns. ATTENDING PHYSICIAN PROGRESS NOTE      I have examined the patient, reviewed the record, and discussed the case with the Resident. I have reviewed all relevant labs and imaging data. Please refer to the resident's note. I agree with the assessment and plan with the following corrections/ additions. The following summarizes my clinical findings and independent assessment.      Miguel A Draper MD

## 2021-10-13 NOTE — PROGRESS NOTES
[facility]  Department of Internal Medicine  Division of Pulmonary, Critical Care and Sleep Medicine  Progress Note    Monserrat Velazquez DO, MPH, Amanda Pulliam, Kerry Sim MD, [facility]  [place] , [place] [facility]      Patient: Fredi Willard  MRN: [id number]  : 1990    Encounter Time: 4:28 PM     Date of Admission: 10/5/2021  3:07 PM    Primary Care Physician: Kamilah Story MD    Reason for Consultation: COVID     SUBJECTIVE:  36 y.o. male with history of asthma and smoking history presents with increasing dyspnea and cough over past month. On presentation had fever of 103. Has tested positive for COVID twice, most recently end of August.  CT shows extensive bilateral pneumonia and COVID testing again positive. Now afebrile on Ancef, and feeling better. Hep C and HIV + screen  Staph Epi  Echo Pending  Fever last PM  CTA looks like pulmonary edema +/- infection    OBJECTIVE:     PHYSICAL EXAM:   VITALS:   Vitals:    10/13/21 1030 10/13/21 1100 10/13/21 1300 10/13/21 1515   BP:    (!) 143/104   Pulse:    119   Resp:    18   Temp:    98.6 °F (37 °C)   TempSrc:    Temporal   SpO2: 97% 94% 94% 96%   Weight:       Height:            Intake/Output Summary (Last 24 hours) at 10/13/2021 1628  Last data filed at 10/13/2021 0842  Gross per 24 hour   Intake 360 ml   Output 2550 ml   Net -2190 ml        CONSTITUTIONAL:   A&O x 3, NAD  SKIN:     No rash, No suspicious lesions or skin discoloration  HEENT:     EOMI, MMM, No thrush  NECK:    No bruits, No JVP apprechiated  CV:      Sinus,  No murmur, No rubs, No gallops  PULMONARY:   Couse BS,  No Wheezing, No Rales, No Rhonchi      No noted egophony  ABDOMEN:     Soft, non-tender. BS normal. No R/R/G  EXT:    No deformities . No clubbing.       + lower extremity edema, No venous stasis  PULSE:   Appears equal and palpable.   PSYCHIATRIC:  Seems appropriate, No acute psycosis  MS:    No fractures, No gross weakness  NEUROLOGIC:   The clinical assessment is non-focal     DATA: IMAGING & TESTING:     LABORATORY TESTS:    CBC:   Lab Results   Component Value Date    WBC 3.2 10/13/2021    RBC 2.80 10/13/2021    HGB 8.4 10/13/2021    HCT 26.0 10/13/2021    MCV 92.9 10/13/2021    MCH 30.0 10/13/2021    MCHC 32.3 10/13/2021    RDW 14.4 10/13/2021     10/13/2021    MPV 11.3 10/13/2021     CMP:    Lab Results   Component Value Date     10/13/2021    K 4.5 10/13/2021     10/13/2021    CO2 23 10/13/2021    BUN 13 10/13/2021    CREATININE 1.5 10/13/2021    GFRAA >60 10/13/2021    LABGLOM >60 10/13/2021    GLUCOSE 81 10/13/2021    PROT 6.0 10/13/2021    LABALBU 3.1 10/13/2021    CALCIUM 8.0 10/13/2021    BILITOT 0.5 10/13/2021    ALKPHOS 59 10/13/2021    AST 57 10/13/2021    ALT 10 10/13/2021     Magnesium:    Lab Results   Component Value Date    MG 2.4 10/11/2021     Phosphorus:  No results found for: PHOS  PT/INR:    Lab Results   Component Value Date    PROTIME 13.8 10/12/2021    INR 1.2 10/12/2021     FERRITIN:    Lab Results   Component Value Date    FERRITIN 3,921 10/06/2021     Fibrinogen Level:  No components found for: FIB     PRO-BNP:   Lab Results   Component Value Date    PROBNP 3,431 (H) 10/09/2021    PROBNP 1,599 (H) 10/05/2021      ABGs: No results found for: PH, PO2, PCO2  Hemoglobin A1C: No components found for: HGBA1C    IMAGING:  Imaging tests were completed and reviewed and discussed radiology and care team involved and reveals     CT CHEST : There is adequate opacification of the pulmonary arteries.  There is no   evidence of filling defect to suggest pulmonary embolism. Beckey Short is no   evidence of thoracic aortic aneurysm or dissection.  There are small   bilateral pleural effusions.  These are increased compared to prior   examination.  There is small pericardial effusion which appears stable.  Left   hilar lymph node measures approximately 12 mm in short axis.  Right hilar   lymph node measures approximately 12 mm in short axis.  There is bilateral   gynecomastia. Ramy Araceli is diffuse subcutaneous edema. The central airways are patent.  There is no pneumothorax.  There are   bilateral ground-glass opacities and alveolar consolidation.  There has been   interval increase in the airspace disease compared to prior examination.  The   interval worsening is most pronounced within the lower lobes.  There is   associated interstitial thickening. CT ABDOMEN PELVIS WO CONTRAST Additional Contrast? None    Result Date: 10/5/2021  FINDINGS: THE STUDY IS LIMITED DUE TO LACK OF INTRAVENOUS CONTRAST. Chest: Mediastinum: No thoracic aortic aneurysm. No definite adenopathy on this unenhanced study. Trace pericardial fluid anteriorly. Lungs/pleura: Trace layering pleural effusions bilaterally, slightly larger on the right. No pneumothorax. Numerous ground-glass densities in the bilateral upper and to a lesser degree lower lungs, in keeping with known COVID-19 pneumonia. Soft Tissues/Bones: Bilateral gynecomastia. No acute osseous abnormality in the thoracic cage. Abdomen and pelvis: The study is degraded by the patient's respiratory motion. Organs: Abdominal organs inadequately evaluated on this motion degraded and unenhanced study. No hydronephrosis on either side. GI/Bowel: Normal appendix. No evidence of bowel obstruction. Pelvis: Normal sized prostate. Urinary bladder grossly intact. Peritoneum/Retroperitoneum: No free air or free fluid. No bulky adenopathy. No abdominal aortic aneurysm. Bones/Soft Tissues: No lytic or sclerotic lesion in the regional skeleton. Limited study by the patient's respiratory motion and lack of intravenous contrast. Extensive multifocal COVID-19 pneumonia bilaterally. Bilateral trace layering pleural effusions. Bilateral gynecomastia. Abdominal organs inadequately evaluated due to the limitations of the study. Normal appendix. No evidence of bowel obstruction. CT CHEST WO CONTRAST    Result Date: 10/5/2021  FINDINGS: THE STUDY IS LIMITED DUE TO LACK OF INTRAVENOUS CONTRAST. Chest: Mediastinum: No thoracic aortic aneurysm. No definite adenopathy on this unenhanced study. Trace pericardial fluid anteriorly. Lungs/pleura: Trace layering pleural effusions bilaterally, slightly larger on the right. No pneumothorax. Numerous ground-glass densities in the bilateral upper and to a lesser degree lower lungs, in keeping with known COVID-19 pneumonia. Soft Tissues/Bones: Bilateral gynecomastia. No acute osseous abnormality in the thoracic cage. Abdomen and pelvis: The study is degraded by the patient's respiratory motion. Organs: Abdominal organs inadequately evaluated on this motion degraded and unenhanced study. No hydronephrosis on either side. GI/Bowel: Normal appendix. No evidence of bowel obstruction. Pelvis: Normal sized prostate. Urinary bladder grossly intact. Peritoneum/Retroperitoneum: No free air or free fluid. No bulky adenopathy. No abdominal aortic aneurysm. Bones/Soft Tissues: No lytic or sclerotic lesion in the regional skeleton. Limited study by the patient's respiratory motion and lack of intravenous contrast. Extensive multifocal COVID-19 pneumonia bilaterally. Bilateral trace layering pleural effusions. Bilateral gynecomastia. Abdominal organs inadequately evaluated due to the limitations of the study. Normal appendix. No evidence of bowel obstruction. Assessment:  Mr. Martin Machado is a 36year old male with no significant PMH who was admitted on October 5, 2021 after he presented from an outside clinic after he was found to be hypoxic during 6 minute walking test. Patient tested positive for Covid 19 on August 18th, he is unvaccinated and had Covid 19 last year as well.  Patient states he has quarantined by himself and when his mother recently visited him she was shocked at his appearance as he had lost about 50 pounds in 2 and a half months  Sepsis  COVID-19, mild  HIV   Pneumonia  Gram-positive cocci in clusters bacteremia, etiology unclear  DEISI        Plan:   1. Fever and increase oxygen, Suspect infection pneumonia  2. HIV status CD4 unknown  3. Start Bronchodialtors  4. ID to see for +HIV , CD4 and VL  5. Check BNP and diuresis if elevated  6.  Need the CD 4 to determine if bronchoscopy needed        Benitez Hassan DO DO, MPH, [facility]  Professor of Internal Medicine  Pulmonary, Critical Care and Sleep Medicine

## 2021-10-13 NOTE — PROGRESS NOTES
Department of Internal Medicine  Nephrology Progress Note      Events reviewed. SUBJECTIVE:  We are following Mr. Martin Machado for DEISI. Reports no complaints.     PHYSICAL EXAM:      Vitals:    VITALS:  BP (!) 160/104   Pulse 108   Temp 97.5 °F (36.4 °C) (Temporal)   Resp 17   Ht 6' 4\" (1.93 m)   Wt 173 lb (78.5 kg)   SpO2 94%   BMI 21.06 kg/m²   24HR INTAKE/OUTPUT:      Intake/Output Summary (Last 24 hours) at 10/13/2021 1246  Last data filed at 10/13/2021 0842  Gross per 24 hour   Intake 360 ml   Output 2550 ml   Net -2190 ml       Constitutional:  Awake, alert, NAD  HEENT:  PERRL, normocephalic, atraumatic  Respiratory: diminished lung sounds  Cardiovascular/Edema:  RRR, S1/S2, -edema  Gastrointestinal:  abd flat, soft, non-tender  Neurologic:  Awake, alert, no focal deficits  Skin:  Warm, dry, no rash or lesions  Other: BLE 1+ edema       Scheduled Meds:   amLODIPine  5 mg Oral Daily    sulfamethoxazole-trimethoprim (BACTRIM) IVPB  400 mg IntraVENous Q8H    cefepime  2,000 mg IntraVENous Q8H    pantoprazole  40 mg Oral QAM AC    sodium chloride flush  5-40 mL IntraVENous 2 times per day    heparin (porcine)  5,000 Units SubCUTAneous Q8H     Continuous Infusions:   sodium chloride 100 mL/hr at 10/13/21 1034    sodium chloride      sodium chloride       PRN Meds:.labetalol, sodium chloride, sodium chloride flush, ondansetron **OR** ondansetron, polyethylene glycol, acetaminophen **OR** acetaminophen, perflutren lipid microspheres, sodium chloride    DATA:    CBC:   Lab Results   Component Value Date    WBC 3.2 10/13/2021    RBC 2.80 10/13/2021    HGB 8.4 10/13/2021    HCT 26.0 10/13/2021    MCV 92.9 10/13/2021    MCH 30.0 10/13/2021    MCHC 32.3 10/13/2021    RDW 14.4 10/13/2021     10/13/2021    MPV 11.3 10/13/2021     CMP:    Lab Results   Component Value Date     10/13/2021    K 4.5 10/13/2021     10/13/2021    CO2 23 10/13/2021    BUN 13 10/13/2021    CREATININE 1.5 10/13/2021    GFRAA >60 10/13/2021    LABGLOM >60 10/13/2021    GLUCOSE 81 10/13/2021    PROT 6.0 10/13/2021    LABALBU 3.1 10/13/2021    CALCIUM 8.0 10/13/2021    BILITOT 0.5 10/13/2021    ALKPHOS 59 10/13/2021    AST 57 10/13/2021    ALT 10 10/13/2021     Magnesium:    Lab Results   Component Value Date    MG 2.4 10/11/2021     Phosphorus:  No results found for: PHOS     Radiology Review:      Chest Xray 10/09/2021    No acute process.                     CT Chest October 5, 2021   Limited study by the patient's respiratory motion and lack of intravenous   contrast.       Extensive multifocal COVID-19 pneumonia bilaterally.       Bilateral trace layering pleural effusions.       Bilateral gynecomastia.       Abdominal organs inadequately evaluated due to the limitations of the study.       Normal appendix.       No evidence of bowel obstruction.         Kidney ultrasound October 7, 2021   1. Hyperechoic kidneys due to underlying parenchymal disease. 2. Trace bilateral perinephric fluid of indeterminate etiology, potentially   due to underlying inflammation. 3. An approximately 1.1 cm x 1.5 cm x 2.3 cm lesion in the right kidney could   be a cyst but could also be seen with infarction, abscess, or neoplasia. Consider further evaluation with renal protocol abdomen MRI (preferred) or   CT.  Following resolution of acute kidney injury.           BRIEF SUMMARY OF INITIAL CONSULT:     Briefly, Mr. Tessa Espinoza is a 36year old male with no significant PMH who was admitted on October 5, 2021 after he presented from an outside clinic after he was found to be hypoxic during 6 minute walking test. Patient tested positive for Covid 19 on August 18th, he is unvaccinated and had Covid 19 last year as well. Patient states he has quarantined by himself and when his mother recently visited him she was shocked at his appearance as he had lost about 50 pounds in 2 and a half months.   On admission labs were significant for sodium of 130, potassium 5.2, bicarbonate 20, BUN 41, creatinine 3.6, magnesium 2.7, calcium 7.7 and proBNP 1,599. We are consulted for DEISI. Problems resolved:    Hyperkalemia, 2/2 DEISI. Low potassium diet  Hypotonic hyponatremia 2/2 intravascular volume from poor oral intake. Bicarb drip started. Sodium levels improving. Low bicarbonate levels with hyperchloremia, most likely NAGMA versus respiratory alkalosis; we need a PH to clarify diagnosis. Awaiting ABG results  High bicarbonate levels, likely metabolic alkalosis 2/2 administration  Hypokalemia, multifactorial, poor intake, probably renal potassium wasting  Severe Hypoalbuminemia, multifactorial, status post albumin administration    IMPRESSION/RECOMMENDATIONS:     DEISI on CKD, volume responsive pre-renal DEISI d/t volume (poor oral intake), urine sodium <20. Seem to be resolved. 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 Maritza Ware Work-up so far MARILEE negative, C4 normal, C3 88 (low), UPEP without monoclonal gammopathy, negative cryoglobulins. Kidney ultrasound with hyperechoic kidneys. Needs kidney biopsy, discussed with interventional radiology they would rather wait for a couple weeks to perform biopsy in view of patient having Covid-19 infection. Right kidney lesion, likely a cyst but cannot be ruled out other pathology including infarction, abscess, neoplasm.   We will proceed with MRI when renal function improved    ---------------------------------------------------------------------  MSSE bacteremia, on cefazolin 2 g every 8 hours needs TTE  Covid 19 infection in non vaccinated pt, to have CTA to rule out PE  Normocytic anemia, multifactorial    Plan:    Kidney biopsy as an outpatient in 2 weeks after discharge  Okay for CTA to rule out PE, start NS at 100 cc/hour  Continue to monitor renal function after contrasted study        Electronically signed by Cheryl Galan MD on 10/13/2021 at 12:46 PM

## 2021-10-13 NOTE — PROGRESS NOTES
[facility] PROGRESS NOTE      Chief complaint: Follow-up of Gram-positive cocci in clusters on blood culture    The patient is a 36 y.o. male, not vaccinated against COVID-19, with history of asthma, presented on 10/05 with shortness of breath, found to be febrile at 103 °F, positive SARS-CoV-2 PCR, bilateral groundglass opacities on chest CT, tolerating room air with oxygen saturation of 97%. He reports having tested positive for SARS-CoV-2 for the 3rd time now with previous on in late August at which time he reports having quarantined. Urine Streptococcus pneumonia and Legionella antigens were negative. Blood cultures from 10/05 and 10/06 showed methicillin-susceptible Staphylococcus epidermidis in 1 out of 2 sets. HIV screen was positive. Subjective: Patient was seen and examined. No chills, no abdominal pain, no diarrhea, no rash, no itching. Had fever of 103 F last night after having been afebrile since 10/06. Objective:  BP (!) 160/104   Pulse 108   Temp 97.5 °F (36.4 °C) (Temporal)   Resp 17   Ht 6' 4\" (1.93 m)   Wt 173 lb (78.5 kg)   SpO2 94%   BMI 21.06 kg/m²   Constitutional: Alert, not in distress  Respiratory: Clear breath sounds, no crackles, no wheezes  Cardiovascular: Regular rate and rhythm, no murmurs  Gastrointestinal: Bowel sounds present, soft, nontender  Skin: Warm and dry, no active dermatoses  Musculoskeletal: No joint swelling, no joint erythema    Labs, imaging, and medical records/notes were personally reviewed. Assessment:  Sepsis, resolved  COVID-19, mild  Pneumonia  Methicillin susceptible Staphylococcus epidermidis bacteremia, etiology unclear  DEISI  HIV screen positive  Hepatitis C Ab positive. Recommendations:  Change cefazolin to cefepime 2g q8h. Start high-dose Bactrim at 5 mg trimethoprim dose per kilogram actual body weight every 8 hours for suspected Pneumocystis pneumonia. Check serum 1, 3-beta D glucan. Send induced sputum for Pneumocystis PCR.  Indication for testing explained extensively to patient. He agreed to doing the procedure but expressed frustration about seemingly endless tests and wait times for test results to come back. I have explained to him that most of these tests are not available in-house and are sent out, thus take time to have results in. Follow up HIV viral load, CD4 cell count, hepatitis C viral load. I have discussed with him the need for follow-up to determine need for treatment based on results of confirmatory tests. Follow up TTE. Follow up blood cultures. Monitor respiratory status. Continue supportive care.     Thank you for involving me in the care of Kristin James. I will continue to follow. Please do not hesitate to call for any questions or concerns.     Electronically signed by Sami Gutierres MD on 10/13/2021 at 11:55 AM

## 2021-10-13 NOTE — PLAN OF CARE
Patient was seen at bedside. He stated he wanted to talk to me about his concerns. He continues to be extremely frustrated that he does not have definite results back yet and that \"things keep changing everyday. \" He also stated that he is not going to be able to afford his prolonged hospital stay. He stated, \"If I am positive, my life is over. \" He expressed a lot anxiety surrounding all of the uncertainty and multiple testing he is undergoing. I asked if he wanted anything to try to help with the anxiety and he was agreeable. Prescribed hydroxyzine PRN     Extensive counseling was given to him surrounding a possible HIV diagnosis and that it is treatable. I also explained that although we are suggesting the best course of treatment, it is ultimately his decision to deny any treatment. He expressed understanding.         Remington Ortiz MD  Family Medicine, PGY-1

## 2021-10-13 NOTE — PROGRESS NOTES
[facility] Attending    S: 36 y.o. male with history of asthma and smoking history presented with increasing dyspnea and cough over past month. On presentation had fever of 103. Has tested positive for COVID twice, most recently end of August.  CT shows extensive bilateral pneumonia and COVID testing again positive. Currently  afebrile, and feeling better. Pt reports feeling warm about attributes fevers overnight to blankets. Also had desat to 85% overnight requiring oxygen of 2L NC.     O: VS- Blood pressure (!) 160/104, pulse 108, temperature 97.5 °F (36.4 °C), temperature source Temporal, resp. rate 17, height 6' 4\" (1.93 m), weight 173 lb (78.5 kg), SpO2 94 %.   Exam is as noted by resident   Awake, alert on 2L NC tm 103  Heart - RRR  Lungs- decreased bs  Ext - no edema      Impressions:   Principal Problem:    Acute respiratory failure with hypoxia (HCC)  Active Problems:  Repeatedly positive Covid  Stable groundglass opacities  Saturations okay on room air    Acute kidney injury due to COVID-19 ([address]    Asthma    Nephrotic range proteinuria  50 pound weight loss  2 blood cultures positive  Improved on antibiotic      Plan  Covid pneumonia with sepsis  Had fever overnight, tm 101, chest xray ordered  Appreciate Nephrology, ID, and pulmonology following   Oxygen requirement with tachycardia-CTA ordered  FOBT heme positive-Gen surgery has no plans for scopes s/p 1U PRBCs, will trend h/h  Blood culture with staph epi, on Ancef; culture again if febrile; change antibiotics from ancef to bactrim and cefepime for possible sepsis  Check ambulatory pulse ox, normal resting oxygen level  Awaiting further labs, HIV, HCV, TB, confirmatory testing pending with hcv ab positive and reactive HIV  Decadron stopped  Awaiting echo, evaluate for endocarditis  Nephrology planning biopsy outpatient, W/U for possible Wegener's  DEISI is improved, IVF with ct pending  Disposition planning  Follow up outpatient for right kidney lesion-ct/mri when cr improved     Attending Physician Statement  I have reviewed the chart and seen the patient with the resident(s). I personally reviewed images, EKG's and similar tests, if present. I personally reviewed and performed key elements of the history and exam.  I have reviewed and confirmed student and/or resident history and exam with changes as indicated above. I agree with the assessment, plan and orders as documented by the resident. Please refer to the resident and/or student note for additional information.       Mony Plasencia MD

## 2021-10-13 NOTE — PLAN OF CARE
Problem: Airway Clearance - Ineffective  Goal: Achieve or maintain patent airway  Outcome: Met This Shift     Problem: Gas Exchange - Impaired  Goal: Absence of hypoxia  Outcome: Met This Shift     Problem: Gas Exchange - Impaired  Goal: Promote optimal lung function  Outcome: Met This Shift     Problem: Breathing Pattern - Ineffective  Goal: Ability to achieve and maintain a regular respiratory rate  Outcome: Met This Shift     Problem: Body Temperature -  Risk of, Imbalanced  Goal: Will regain or maintain usual level of consciousness  Outcome: Met This Shift     Problem: Isolation Precautions - Risk of Spread of Infection  Goal: Prevent transmission of infection  Outcome: Met This Shift     Problem: Gas Exchange - Impaired  Goal: Promote optimal lung function  Outcome: Met This Shift     Problem: Breathing Pattern - Ineffective  Goal: Ability to achieve and maintain a regular respiratory rate  Outcome: Met This Shift     Problem:  Body Temperature -  Risk of, Imbalanced  Goal: Will regain or maintain usual level of consciousness  Outcome: Met This Shift     Problem: Isolation Precautions - Risk of Spread of Infection  Goal: Prevent transmission of infection  Outcome: Met This Shift     Problem: Nutrition Deficits  Goal: Optimize nutritional status  Outcome: Met This Shift     Problem: Risk for Fluid Volume Deficit  Goal: Maintain normal heart rhythm  Outcome: Met This Shift     Problem: Risk for Fluid Volume Deficit  Goal: Maintain absence of muscle cramping  Outcome: Met This Shift     Problem: Risk for Fluid Volume Deficit  Goal: Maintain normal serum potassium, sodium, calcium, phosphorus, and pH  Outcome: Met This Shift     Problem: Fatigue  Goal: Verbalize increase energy and improved vitality  Outcome: Met This Shift     Problem: Falls - Risk of:  Goal: Will remain free from falls  Description: Will remain free from falls  Outcome: Met This Shift     Problem: Falls - Risk of:  Goal: Absence of physical injury  Description: Absence of physical injury  Outcome: Met This Shift     Problem:  Body Temperature -  Risk of, Imbalanced  Goal: Ability to maintain a body temperature within defined limits  Outcome: Ongoing

## 2021-10-13 NOTE — PLAN OF CARE
Problem: Airway Clearance - Ineffective  Goal: Achieve or maintain patent airway  10/13/2021 1654 by William Augustin RN  Outcome: Met This Shift     Problem: Gas Exchange - Impaired  Goal: Absence of hypoxia  10/13/2021 1654 by William Augustin RN  Outcome: Met This Shift     Problem: Gas Exchange - Impaired  Goal: Promote optimal lung function  10/13/2021 1654 by William Augustin RN  Outcome: Met This Shift     Problem:  Body Temperature -  Risk of, Imbalanced  Goal: Will regain or maintain usual level of consciousness  10/13/2021 1654 by William Augustin RN  Outcome: Met This Shift     Problem: Isolation Precautions - Risk of Spread of Infection  Goal: Prevent transmission of infection  10/13/2021 1654 by William Augustin RN  Outcome: Met This Shift     Problem: Falls - Risk of:  Goal: Will remain free from falls  Description: Will remain free from falls  10/13/2021 1654 by William Augustin RN  Outcome: Met This Shift     Problem: Falls - Risk of:  Goal: Absence of physical injury  Description: Absence of physical injury  10/13/2021 1654 by William Augustin RN  Outcome: Met This Shift

## 2021-10-13 NOTE — PROGRESS NOTES
[facility] - [facility] Inpatient   Resident Progress Note    S:  Hospital day: 8   Brief Synopsis: Yue Henao is a 36 y.o. male with no PMH who presented with hypoxia and fever. He was initially seen in PCP office and was hypoxic on exertion with desaturate into the mid low 80s as well as noted to be febrile and tachycardic. Per patient, his symptoms began 8/15/2021 and he tested positive for Covid 8/28. endorse worsening shortness of breath over the past month with sputum production, intermittent chest discomfort, persistent diarrhea, chills, fever, decrease in appetite and 50 pound weight loss since August.  Patient also endorses significantly worsening \"neuropathy\" of bilateral feet, states it feels like he is \"being stabbed by needles\" which is causing him inability to walk due to pain. In the ED, he was found to be anemic, hyperkalemic, hyponatremic, febrile and COVID positive. He also had elevated Creatinine, AST, proBNP and troponin x2. CTA showed extensive multifocal COVID-19 pneumonia bilaterally, bilateral trace layering pleural effusion and bilateral gynecomastia. CT abdomen was limited due to movement and non-contrast, but showed normal appendix and no evidence of bowel obstruction. Admitted for acute hypoxia due to Covid pneumonia. Nephro, pulm and ID consulted. Started on bicarb drip. US showed hyperechoic kidneys, trace b/l perinephric fluid and right kidney lesion (Cyst vs infarction vs abscess vs neoplasia). Overnight/interim:  · Patient was seen and examined at bedside. Had a fever overnight, 103, was tachycardic and hypertensive. Came down to 100.3 with tylenol. Denies CP, SOB, abdominal pain. Endorses chills.         Cont meds:    sodium chloride      sodium chloride       Scheduled meds:    pantoprazole  40 mg Oral QAM AC    ceFAZolin  2,000 mg IntraVENous Q8H    sodium chloride flush  5-40 mL IntraVENous 2 times per day    heparin (porcine)  5,000 Units SubCUTAneous Q8H PRN meds: sodium chloride, sodium chloride flush, ondansetron **OR** ondansetron, polyethylene glycol, acetaminophen **OR** acetaminophen, perflutren lipid microspheres, sodium chloride     I reviewed the patient's past medical and surgical history, Medications and Allergies. O:  BP (!) 160/104   Pulse 108   Temp 97.5 °F (36.4 °C) (Temporal)   Resp 17   Ht 6' 4\" (1.93 m)   Wt 173 lb (78.5 kg)   SpO2 90%   BMI 21.06 kg/m²   24 hour I&O: I/O last 3 completed shifts: In: 360 [P.O.:360]  Out: 1800 [Urine:1800]  No intake/output data recorded. Physical Exam  Constitutional:       Appearance: Normal appearance. He is not ill-appearing, toxic-appearing or diaphoretic. HENT:      Head: Normocephalic and atraumatic. Nose: No rhinorrhea. Mouth/Throat:      Mouth: Mucous membranes are moist.      Pharynx: Oropharynx is clear. Eyes:      General: No scleral icterus. Right eye: No discharge. Left eye: No discharge. Extraocular Movements: Extraocular movements intact. Cardiovascular:      Rate and Rhythm: Normal rate and regular rhythm. Pulses: Normal pulses. Heart sounds: Normal heart sounds. No murmur heard. No friction rub. No gallop. Pulmonary:      Effort: Pulmonary effort is normal. No respiratory distress. Breath sounds: No wheezing, rhonchi or rales. Abdominal:      General: Abdomen is flat. Musculoskeletal:         General: No swelling or tenderness. Cervical back: Normal range of motion. Right lower leg: No edema. Left lower leg: No edema. Skin:     General: Skin is warm and dry. Coloration: Skin is not jaundiced. Findings: No bruising or rash. Neurological:      General: No focal deficit present. Mental Status: He is alert and oriented to person, place, and time. Cranial Nerves: No cranial nerve deficit. Motor: No weakness.       Comments: Hypersensitivity to light touch   Psychiatric: Behavior: Behavior normal.         Thought Content: Thought content normal.      Comments: Agitated, judgement and thought process normal.           Labs:  Na/K/Cl/CO2:  139/3.8/107/22 (10/12 0720)  BUN/Cr/glu/ALT/AST/amyl/lip:  17/1.5/--/13/53/--/-- (10/12 0720)  WBC/Hgb/Hct/Plts:  2.9/7.9/24.5/141 (10/12 0720)  estimated creatinine clearance is 81 mL/min (A) (based on SCr of 1.5 mg/dL (H)). Other pertinent labs as noted below    Radiology:  XR CHEST PORTABLE   Final Result   Development of extensive bilateral pulmonary infiltrates         XR CHEST PORTABLE   Final Result   No acute process. US RETROPERITONEAL COMPLETE   Final Result   1. Hyperechoic kidneys due to underlying parenchymal disease. 2. Trace bilateral perinephric fluid of indeterminate etiology, potentially   due to underlying inflammation. 3. An approximately 1.1 cm x 1.5 cm x 2.3 cm lesion in the right kidney could   be a cyst but could also be seen with infarction, abscess, or neoplasia. Consider further evaluation with renal protocol abdomen MRI (preferred) or   CT. Following resolution of acute kidney injury. XR CHEST PORTABLE   Final Result   Multifocal bilateral pulmonary ground-glass airspace opacities are stable         CT ABDOMEN PELVIS WO CONTRAST Additional Contrast? None   Final Result   Limited study by the patient's respiratory motion and lack of intravenous   contrast.      Extensive multifocal COVID-19 pneumonia bilaterally. Bilateral trace layering pleural effusions. Bilateral gynecomastia. Abdominal organs inadequately evaluated due to the limitations of the study. Normal appendix. No evidence of bowel obstruction. CT CHEST WO CONTRAST   Final Result   Limited study by the patient's respiratory motion and lack of intravenous   contrast.      Extensive multifocal COVID-19 pneumonia bilaterally. Bilateral trace layering pleural effusions. Bilateral gynecomastia. Abdominal organs inadequately evaluated due to the limitations of the study. Normal appendix. No evidence of bowel obstruction.                Resident Assessment and Plan       Acute hypoxia 2/2 COVID pneumonia   - Worsening SOB since 8/28/2021 when he initially tested positive   - CTA: COVID pneumonia, Bilateral trace layering pleural effusion, negative for PE   - D dimer elevated on admission, currently stable, 730  - Ferritin 3900, CRP 10.1 on admission   - WBC 4.2 on admission, now 3.2  - on 2L saturating 90-99% on room air  - Continue monitoring SpO2  - Pulmonology and ID consulted  - Legionella negative  - T spot TB test pending   - Tmax 103, down to 100.3 with tylenol   - Decadron discontinued on 10/9  - CXR 10/8 shows multifocal groundglass opacities are stable  - Repeat CXR today: b/l pulm infiltrates   - ID on board: received 2 days of vancomycin 10/6, discontinued ancef, started on cefepime 2g q8h and bactrim 400mg q8h for respiratory coverage   - Blood cx: staph epidermitis, repeat blood cx shows no growth x5 days   - may need to repeat blood cultures if continues to spike fevers   -Urine culture: NGTD   -Counseled on getting COVID vaccine after patient recovers   - ECHO pending, concern for PE, discussed with Dr. Yarely Dunbar   - CTA to r/o PE       Weight loss   - Per patient due to decreased appetite from COVID; 50lb weight loss since August   - R/o other potential origins   - MARILEE negative  HIV, hepatitis C antibodies positive   - Leukopenia   - Elevated IgG and IgA, normal IgM: IgA nephropathy vs MPGN?  - Cryoglobulin normal   - TSH normal   - Sed rate and CRP elevated      HIV  HIV 1/2 antibodies positive  T-cell lymphocyte labs pending  HIV RNA quantitative PCR pending    Hepatitis C  Hep C antibody reactive  Viral load pending    Anemia   - Likely 2/2 from neuropathy vs GI bleed  - Hb 8.4 today, 9.9 on admission   Transfused 1 unit 10/11, post transfusion Hb 8   - General surgery consulted, FOBT negative the second time  - Rule out granulomatosis with polyangitis in the setting of neuropathy, DEISI and weight loss  - Consider OP EMG     DEISI   - Likely secondary to dehydration from persistent diarrhea and decreased PO intake   - Cr on admission 3.6, trending down now 1.5  - Urine osm and urine nitrogen low   - UA: protein, 1-2 waxy casts, moderate bacteria, moderate blood   - bicarb drip discontinued 10/8  - Microalbumin-creatinine ratio elevated   - Kidney bx OP 2 weeks after discharge   - Started on IVF 100cc/h due to CTA with contrast      Elevated troponin and proBNP  - 2/2 myocardial injury vs stress cardiomyopathy due to COVID vs new onset CHF   - initial troponin 78, 78  - initial BNP 1599, repeat 3431 on 10/9  - Echo pending    HTN  - BP has been consistently elevated SBP >140   - Tachycardic    - Started on amlodipine 5mg daily, labetalol PRN      Hyperkalemia/Hyponatremia - resolved    - on admission K 5.2, Na 130  - K 4.5, Na 139 today, will replace with PO KCl   - strict I&O's  - Nephro on board  - Stopped low potassium diet   - Monitor BMP      Proteinuria  -protein to creatinine ratio 3.8  - microalbumin to creatine ratio elevated   -kidney biopsy per nephro, patient agreeable    Hypoalbuminemia   - Alb 1.7 on admission, now 1.3  -Previously received albumin 25 g q6h x 4 doses 10/6  -Currently receiving albumin 25 g q6h x 8 doses  Low C3, normal C4 and cryoglobulin  - Continue to monitor             PT/OT evaluation: not indicated   DVT prophylaxis: heparin due to DEISI    GI prophylaxis: not indicated   Disposition:home +/- home health/ rehab   Diet: adult         Electronically signed by Orion Mejia MD on 10/13/2021 at 7:51 AM  Attending physician: Dr. Zandra Larry

## 2021-10-14 ENCOUNTER — ANESTHESIA EVENT (OUTPATIENT)
Dept: ENDOSCOPY | Age: 31
DRG: 890 | End: 2021-10-14
Payer: COMMERCIAL

## 2021-10-14 LAB
ABSOLUTE CD 3: 122 CELLS/UL (ref 570–2400)
ABSOLUTE CD 3: 153 CELLS/UL (ref 570–2400)
ABSOLUTE CD 4 HELPER: 43 CELLS/UL (ref 430–1800)
ABSOLUTE CD 4 HELPER: 44 CELLS/UL (ref 430–1800)
ABSOLUTE CD 8 (SUPP): 100 CELLS/UL (ref 210–1200)
ABSOLUTE CD 8 (SUPP): 73 CELLS/UL (ref 210–1200)
ALBUMIN SERPL-MCNC: 2.8 G/DL (ref 3.5–5.2)
ALP BLD-CCNC: 64 U/L (ref 40–129)
ALT SERPL-CCNC: 11 U/L (ref 0–40)
ANION GAP SERPL CALCULATED.3IONS-SCNC: 9 MMOL/L (ref 7–16)
AST SERPL-CCNC: 56 U/L (ref 0–39)
BASOPHILS ABSOLUTE: 0 E9/L (ref 0–0.2)
BASOPHILS RELATIVE PERCENT: 0.3 % (ref 0–2)
BILIRUB SERPL-MCNC: 0.4 MG/DL (ref 0–1.2)
BUN BLDV-MCNC: 13 MG/DL (ref 6–20)
CALCIUM SERPL-MCNC: 8.1 MG/DL (ref 8.6–10.2)
CD4/CD8 RATIO: 0.44 RATIO (ref 0.8–3.9)
CD4/CD8 RATIO: 0.59 RATIO (ref 0.8–3.9)
CHLORIDE BLD-SCNC: 105 MMOL/L (ref 98–107)
CO2: 20 MMOL/L (ref 22–29)
CREAT SERPL-MCNC: 1.7 MG/DL (ref 0.7–1.2)
EOSINOPHILS ABSOLUTE: 0.03 E9/L (ref 0.05–0.5)
EOSINOPHILS RELATIVE PERCENT: 0.9 % (ref 0–6)
GFR AFRICAN AMERICAN: 58
GFR NON-AFRICAN AMERICAN: 58 ML/MIN/1.73
GLUCOSE BLD-MCNC: 101 MG/DL (ref 74–99)
HCT VFR BLD CALC: 25.3 % (ref 37–54)
HCV QNT BY NAAT IU/ML: NOT DETECTED IU/ML
HCV QNT BY NAAT LOG IU/ML: NOT DETECTED LOG IU/ML
HEMOGLOBIN: 8.3 G/DL (ref 12.5–16.5)
HIV INTERPRETATION: ABNORMAL
HIV-1 ANTIBODY: POSITIVE
HIV-2 AB: NEGATIVE
INTERPRETATION: NOT DETECTED
LYMPH SUBSET INFORMATION: ABNORMAL
LYMPH SUBSET INFORMATION: ABNORMAL
LYMPHOCYTES ABSOLUTE: 0.25 E9/L (ref 1.5–4)
LYMPHOCYTES RELATIVE PERCENT: 7 % (ref 20–42)
MCH RBC QN AUTO: 29.4 PG (ref 26–35)
MCHC RBC AUTO-ENTMCNC: 32.8 % (ref 32–34.5)
MCV RBC AUTO: 89.7 FL (ref 80–99.9)
METAMYELOCYTES RELATIVE PERCENT: 2.6 % (ref 0–1)
MONOCYTES ABSOLUTE: 0.11 E9/L (ref 0.1–0.95)
MONOCYTES RELATIVE PERCENT: 2.6 % (ref 2–12)
NEUTROPHILS ABSOLUTE: 3.24 E9/L (ref 1.8–7.3)
NEUTROPHILS RELATIVE PERCENT: 87 % (ref 43–80)
OVALOCYTES: ABNORMAL
PDW BLD-RTO: 14.4 FL (ref 11.5–15)
PLATELET # BLD: 121 E9/L (ref 130–450)
PMV BLD AUTO: 11.5 FL (ref 7–12)
POIKILOCYTES: ABNORMAL
POLYCHROMASIA: ABNORMAL
POTASSIUM REFLEX MAGNESIUM: 4.4 MMOL/L (ref 3.5–5)
PRO-BNP: ABNORMAL PG/ML (ref 0–125)
RBC # BLD: 2.82 E12/L (ref 3.8–5.8)
SODIUM BLD-SCNC: 134 MMOL/L (ref 132–146)
TOTAL PROTEIN: 6 G/DL (ref 6.4–8.3)
WBC # BLD: 3.6 E9/L (ref 4.5–11.5)

## 2021-10-14 PROCEDURE — 6360000002 HC RX W HCPCS: Performed by: NURSE PRACTITIONER

## 2021-10-14 PROCEDURE — 99233 SBSQ HOSP IP/OBS HIGH 50: CPT | Performed by: INTERNAL MEDICINE

## 2021-10-14 PROCEDURE — 6370000000 HC RX 637 (ALT 250 FOR IP): Performed by: INTERNAL MEDICINE

## 2021-10-14 PROCEDURE — 94640 AIRWAY INHALATION TREATMENT: CPT

## 2021-10-14 PROCEDURE — 99232 SBSQ HOSP IP/OBS MODERATE 35: CPT | Performed by: FAMILY MEDICINE

## 2021-10-14 PROCEDURE — 2500000003 HC RX 250 WO HCPCS: Performed by: FAMILY MEDICINE

## 2021-10-14 PROCEDURE — 2140000000 HC CCU INTERMEDIATE R&B

## 2021-10-14 PROCEDURE — 6360000002 HC RX W HCPCS: Performed by: INTERNAL MEDICINE

## 2021-10-14 PROCEDURE — 6370000000 HC RX 637 (ALT 250 FOR IP): Performed by: STUDENT IN AN ORGANIZED HEALTH CARE EDUCATION/TRAINING PROGRAM

## 2021-10-14 PROCEDURE — 93005 ELECTROCARDIOGRAM TRACING: CPT | Performed by: FAMILY MEDICINE

## 2021-10-14 PROCEDURE — 81381 HLA I TYPING 1 ALLELE HR: CPT

## 2021-10-14 PROCEDURE — 2580000003 HC RX 258: Performed by: FAMILY MEDICINE

## 2021-10-14 PROCEDURE — 6360000002 HC RX W HCPCS: Performed by: FAMILY MEDICINE

## 2021-10-14 PROCEDURE — 2500000003 HC RX 250 WO HCPCS: Performed by: INTERNAL MEDICINE

## 2021-10-14 PROCEDURE — 36415 COLL VENOUS BLD VENIPUNCTURE: CPT

## 2021-10-14 PROCEDURE — 80053 COMPREHEN METABOLIC PANEL: CPT

## 2021-10-14 PROCEDURE — 85025 COMPLETE CBC W/AUTO DIFF WBC: CPT

## 2021-10-14 PROCEDURE — 2500000003 HC RX 250 WO HCPCS: Performed by: STUDENT IN AN ORGANIZED HEALTH CARE EDUCATION/TRAINING PROGRAM

## 2021-10-14 PROCEDURE — 2580000003 HC RX 258: Performed by: INTERNAL MEDICINE

## 2021-10-14 RX ORDER — METOPROLOL TARTRATE 5 MG/5ML
5 INJECTION INTRAVENOUS ONCE
Status: COMPLETED | OUTPATIENT
Start: 2021-10-14 | End: 2021-10-14

## 2021-10-14 RX ORDER — FUROSEMIDE 10 MG/ML
40 INJECTION INTRAMUSCULAR; INTRAVENOUS ONCE
Status: COMPLETED | OUTPATIENT
Start: 2021-10-14 | End: 2021-10-14

## 2021-10-14 RX ORDER — BUMETANIDE 1 MG/1
1 TABLET ORAL 2 TIMES DAILY
Status: DISCONTINUED | OUTPATIENT
Start: 2021-10-14 | End: 2021-10-17

## 2021-10-14 RX ADMIN — Medication 10 ML: at 20:29

## 2021-10-14 RX ADMIN — FUROSEMIDE 40 MG: 10 INJECTION, SOLUTION INTRAMUSCULAR; INTRAVENOUS at 12:26

## 2021-10-14 RX ADMIN — BUMETANIDE 1 MG: 1 TABLET ORAL at 16:34

## 2021-10-14 RX ADMIN — SULFAMETHOXAZOLE AND TRIMETHOPRIM 400 MG: 80; 16 INJECTION, SOLUTION, CONCENTRATE INTRAVENOUS at 14:26

## 2021-10-14 RX ADMIN — Medication 10 ML: at 10:35

## 2021-10-14 RX ADMIN — LEVALBUTEROL HYDROCHLORIDE 0.63 MG: 0.63 SOLUTION RESPIRATORY (INHALATION) at 17:29

## 2021-10-14 RX ADMIN — SODIUM CHLORIDE, PRESERVATIVE FREE 10 ML: 5 INJECTION INTRAVENOUS at 10:33

## 2021-10-14 RX ADMIN — HYDROXYZINE HYDROCHLORIDE 10 MG: 10 TABLET ORAL at 20:29

## 2021-10-14 RX ADMIN — METOPROLOL TARTRATE 5 MG: 1 INJECTION, SOLUTION INTRAVENOUS at 16:25

## 2021-10-14 RX ADMIN — HYDROXYZINE HYDROCHLORIDE 10 MG: 10 TABLET ORAL at 09:16

## 2021-10-14 RX ADMIN — LEVALBUTEROL HYDROCHLORIDE 0.63 MG: 0.63 SOLUTION RESPIRATORY (INHALATION) at 09:27

## 2021-10-14 RX ADMIN — HEPARIN SODIUM 5000 UNITS: 10000 INJECTION INTRAVENOUS; SUBCUTANEOUS at 14:26

## 2021-10-14 RX ADMIN — LEVALBUTEROL HYDROCHLORIDE 0.63 MG: 0.63 SOLUTION RESPIRATORY (INHALATION) at 01:14

## 2021-10-14 RX ADMIN — CEFEPIME HYDROCHLORIDE 2000 MG: 2 INJECTION, POWDER, FOR SOLUTION INTRAVENOUS at 20:33

## 2021-10-14 RX ADMIN — PANTOPRAZOLE SODIUM 40 MG: 40 TABLET, DELAYED RELEASE ORAL at 05:52

## 2021-10-14 RX ADMIN — METOPROLOL TARTRATE 5 MG: 1 INJECTION, SOLUTION INTRAVENOUS at 10:33

## 2021-10-14 RX ADMIN — SULFAMETHOXAZOLE AND TRIMETHOPRIM 400 MG: 80; 16 INJECTION, SOLUTION, CONCENTRATE INTRAVENOUS at 04:28

## 2021-10-14 RX ADMIN — CEFEPIME HYDROCHLORIDE 2000 MG: 2 INJECTION, POWDER, FOR SOLUTION INTRAVENOUS at 05:52

## 2021-10-14 RX ADMIN — AMLODIPINE BESYLATE 5 MG: 5 TABLET ORAL at 09:13

## 2021-10-14 RX ADMIN — HEPARIN SODIUM 5000 UNITS: 10000 INJECTION INTRAVENOUS; SUBCUTANEOUS at 21:01

## 2021-10-14 RX ADMIN — SULFAMETHOXAZOLE AND TRIMETHOPRIM 400 MG: 80; 16 INJECTION, SOLUTION, CONCENTRATE INTRAVENOUS at 20:27

## 2021-10-14 RX ADMIN — HYDROXYZINE HYDROCHLORIDE 10 MG: 10 TABLET ORAL at 00:24

## 2021-10-14 RX ADMIN — SODIUM CHLORIDE: 9 INJECTION, SOLUTION INTRAVENOUS at 09:13

## 2021-10-14 RX ADMIN — HEPARIN SODIUM 5000 UNITS: 10000 INJECTION INTRAVENOUS; SUBCUTANEOUS at 05:59

## 2021-10-14 RX ADMIN — SODIUM CHLORIDE, PRESERVATIVE FREE 10 ML: 5 INJECTION INTRAVENOUS at 16:25

## 2021-10-14 RX ADMIN — CEFEPIME HYDROCHLORIDE 2000 MG: 2 INJECTION, POWDER, FOR SOLUTION INTRAVENOUS at 13:22

## 2021-10-14 ASSESSMENT — PAIN SCALES - GENERAL
PAINLEVEL_OUTOF10: 0

## 2021-10-14 NOTE — PROGRESS NOTES
[facility] PROGRESS NOTE      Chief complaint: Follow-up of Gram-positive cocci in clusters on blood culture    The patient is a 36 y.o. male, not vaccinated against COVID-19, with history of asthma, presented on 10/05 with shortness of breath, found to be febrile at 103 °F, positive SARS-CoV-2 PCR, bilateral groundglass opacities on chest CT, tolerating room air with oxygen saturation of 97%. He reports having tested positive for SARS-CoV-2 for the 3rd time now with previous on in late August at which time he reports having quarantined. Urine Streptococcus pneumonia and Legionella antigens were negative. Blood cultures from 10/05 and 10/06 showed methicillin-susceptible Staphylococcus epidermidis in 1 out of 2 sets. HIV screen was positive. CD4 count was low at 43. Transthoracic echocardiogram showed late positive bubble suggesting possible shunt from pulmonary origin, moderately dilated left atrium, small pericardial effusion without tamponade physiology, ejection fraction visually estimated at 55%. Subjective: Patient was seen and examined. No chills, no abdominal pain, no diarrhea, no rash, no itching. Objective:  BP (!) 132/100   Pulse 118   Temp 98.2 °F (36.8 °C) (Oral)   Resp 20   Ht 6' 4\" (1.93 m)   Wt 173 lb (78.5 kg)   SpO2 90%   BMI 21.06 kg/m²   Constitutional: Alert, not in distress  Respiratory: Clear breath sounds, no crackles, no wheezes  Cardiovascular: Regular rate and rhythm, no murmurs  Gastrointestinal: Bowel sounds present, soft, nontender  Skin: Warm and dry, no active dermatoses  Musculoskeletal: No joint swelling, no joint erythema    Labs, imaging, and medical records/notes were personally reviewed. Assessment:  Sepsis, resolved  COVID-19, mild  Pneumonia  Methicillin susceptible Staphylococcus epidermidis bacteremia, etiology unclear  DEISI  Advanced HIV disease with CD4 of 43. Hepatitis C Ab positive. Prolonged QTc    Recommendations:  Continue cefepime 2g q8h.   Continue

## 2021-10-14 NOTE — PROGRESS NOTES
RN discussed with medical team on rounds patient's increased heart rate throughout morning-please see med administration-

## 2021-10-14 NOTE — PROGRESS NOTES
[facility] Attending    S: 36 y.o. male with history of asthma and smoking history presented with increasing dyspnea and cough over past month. On presentation had fever of 103. Has tested positive for COVID twice, most recently end of August.  CT shows extensive bilateral pneumonia and COVID testing again positive. Currently  afebrile, and feeling better. Pt reports some foot swelling. No oxygen today. Hydroxyzine helped with sleep and anxiety. O: VS- Blood pressure (!) 132/100, pulse 118, temperature 98.2 °F (36.8 °C), temperature source Oral, resp. rate 20, height 6' 4\" (1.93 m), weight 173 lb (78.5 kg), SpO2 90 %.   Exam is as noted by resident   Awake, alert afebrile  Heart - RRR  Lungs- decreased bs  Ext - no edema      Impressions:   Principal Problem:    Acute respiratory failure with hypoxia (HCC)  Active Problems:  Repeatedly positive Covid  Stable groundglass opacities  Saturations okay on room air    Acute kidney injury due to COVID-19 ([address]    Asthma    Nephrotic range proteinuria  50 pound weight loss  2 blood cultures positive      Plan  Covid pneumonia with sepsis  cta neg for pe  Appreciate Nephrology, ID, and pulmonology following   Off oxygen but tachycardia persists  FOBT heme positive-Gen surgery has no plans for scopes s/p 1U PRBCs, will trend h/h  Blood culture with staph epi, on Ancef; antibiotics were changed from ancef to bactrim and cefepime for possible sepsis/PCP pneumonia  Awaiting further labs, HIV, HCV, TB, confirmatory testing pending with hcv ab positive and reactive HIV  Decadron stopped  Echo with EF 55%, small pericardial effusion and possible atrial shunt  Nephrology planning biopsy outpatient, W/U for possible Wegener's  DEISI is improved, IVF add bumex with pleural effusions and elevated probnp (on oxygen 10/13)  Disposition planning  Follow up outpatient for right kidney lesion-ct/mri when cr improved    Spoke with patient and his

## 2021-10-14 NOTE — PROGRESS NOTES
Respiratory therapist called at request of Infectious Disease, Dr Mariana Canas, regarding acquiring a forced sputum. Therapist aware.

## 2021-10-14 NOTE — PROGRESS NOTES
[facility] Inpatient   Resident Progress Note    S:  Hospital day: 9   Brief Synopsis: Kristin James is a 36 y.o. male with no PMH who presented with hypoxia and fever. He was initially seen in PCP office and was hypoxic on exertion with desaturate into the mid low 80s as well as noted to be febrile and tachycardic. Per patient, his symptoms began 8/15/2021 and he tested positive for Covid 8/28. endorse worsening shortness of breath over the past month with sputum production, intermittent chest discomfort, persistent diarrhea, chills, fever, decrease in appetite and 50 pound weight loss since August.  Patient also endorses significantly worsening \"neuropathy\" of bilateral feet, states it feels like he is \"being stabbed by needles\" which is causing him inability to walk due to pain. In the ED, he was found to be anemic, hyperkalemic, hyponatremic, febrile and COVID positive. He also had elevated Creatinine, AST, proBNP and troponin x2. CTA showed extensive multifocal COVID-19 pneumonia bilaterally, bilateral trace layering pleural effusion and bilateral gynecomastia. CT abdomen was limited due to movement and non-contrast, but showed normal appendix and no evidence of bowel obstruction. Admitted for acute hypoxia due to Covid pneumonia. Nephro, pulm and ID consulted. Started on bicarb drip. US showed hyperechoic kidneys, trace b/l perinephric fluid and right kidney lesion (Cyst vs infarction vs abscess vs neoplasia). HIV and Hep C screen were negative. HIV RNA and T and B lymphocytes were ordered. Patient became more hypoxic, tachycardic and hypertensive so  there was concern for PE. CTA was ordered, which came back negative. Echo was completed due to elevated BNP and concern for PE and that revealed a normal EF with late bubble sign possible shunting of pulmonary origin. BNP continued to increase. Overnight/interim:  · Patient was seen and examined at bedside. No fever overnight.  He was having a coughing fit, no sputum. His saturation was 85% on RA. I put him on 2L NC and he came up to 90%. Denies CP, abdominal pain. Anxiety better controlled. Was able to sleep last night with the vistaril. Cont meds:    sodium chloride 100 mL/hr at 10/13/21 1034    sodium chloride      sodium chloride       Scheduled meds:    amLODIPine  5 mg Oral Daily    sulfamethoxazole-trimethoprim (BACTRIM) IVPB  400 mg IntraVENous Q8H    cefepime  2,000 mg IntraVENous Q8H    levalbuterol  0.63 mg Nebulization Q8H    pantoprazole  40 mg Oral QAM AC    sodium chloride flush  5-40 mL IntraVENous 2 times per day    heparin (porcine)  5,000 Units SubCUTAneous Q8H     PRN meds: labetalol, hydrOXYzine, sodium chloride, sodium chloride flush, ondansetron **OR** ondansetron, polyethylene glycol, acetaminophen **OR** acetaminophen, perflutren lipid microspheres, sodium chloride     I reviewed the patient's past medical and surgical history, Medications and Allergies. O:  BP (!) 132/100   Pulse 124   Temp 98.2 °F (36.8 °C) (Oral)   Resp 14   Ht 6' 4\" (1.93 m)   Wt 173 lb (78.5 kg)   SpO2 92%   BMI 21.06 kg/m²   24 hour I&O: I/O last 3 completed shifts:  In: -   Out: 2050 [Urine:2050]  No intake/output data recorded. Physical Exam  Constitutional:       Appearance: Normal appearance. He is not ill-appearing, toxic-appearing or diaphoretic. HENT:      Head: Normocephalic and atraumatic. Nose: No rhinorrhea. Mouth/Throat:      Mouth: Mucous membranes are moist.      Pharynx: Oropharynx is clear. Eyes:      General: No scleral icterus. Right eye: No discharge. Left eye: No discharge. Extraocular Movements: Extraocular movements intact. Cardiovascular:      Rate and Rhythm: Normal rate and regular rhythm. Pulses: Normal pulses. Heart sounds: Normal heart sounds. No murmur heard. No friction rub. No gallop.     Pulmonary:      Effort: Pulmonary effort is normal. lesion in the right kidney could   be a cyst but could also be seen with infarction, abscess, or neoplasia. Consider further evaluation with renal protocol abdomen MRI (preferred) or   CT. Following resolution of acute kidney injury. XR CHEST PORTABLE   Final Result   Multifocal bilateral pulmonary ground-glass airspace opacities are stable         CT ABDOMEN PELVIS WO CONTRAST Additional Contrast? None   Final Result   Limited study by the patient's respiratory motion and lack of intravenous   contrast.      Extensive multifocal COVID-19 pneumonia bilaterally. Bilateral trace layering pleural effusions. Bilateral gynecomastia. Abdominal organs inadequately evaluated due to the limitations of the study. Normal appendix. No evidence of bowel obstruction. CT CHEST WO CONTRAST   Final Result   Limited study by the patient's respiratory motion and lack of intravenous   contrast.      Extensive multifocal COVID-19 pneumonia bilaterally. Bilateral trace layering pleural effusions. Bilateral gynecomastia. Abdominal organs inadequately evaluated due to the limitations of the study. Normal appendix. No evidence of bowel obstruction.                Resident Assessment and Plan       Acute hypoxia 2/2 COVID pneumonia   - Worsening SOB since 8/28/2021 when he initially tested positive   - CTA: COVID pneumonia, Bilateral trace layering pleural effusion, negative for PE   - D dimer elevated on admission, currently stable, 730  - Ferritin 3900, CRP 10.1 on admission   - WBC 4.2 on admission, now 3.2  - saturating 90% on room air  - Continue monitoring SpO2  - Pulmonology and ID consulted  - Legionella negative  - T spot TB test pending   - Afebrile   - Decadron discontinued on 10/9  - CXR 10/8 shows multifocal groundglass opacities are stable  - Repeat CXR today: b/l pulm infiltrates   - ID on board: received 2 days of vancomycin 10/6, discontinued ancef, started on cefepime 2g q8h and bactrim 400mg q8h for respiratory coverage   - Blood cx: staph epidermitis, repeat blood cx shows no growth x5 days   - may need to repeat blood cultures if continues to spike fevers   -Urine culture: NGTD   -Counseled on getting COVID vaccine after patient recovers   - ECHO: EF 55%, late bubble sign possible shunting of pulmonary origin   - CTA 10/13 negative for PE       Weight loss   - Per patient due to decreased appetite from COVID; 50lb weight loss since August   - R/o other potential origins   - MARILEE negative  HIV, hepatitis C antibodies positive   - Leukopenia   - Elevated IgG and IgA, normal IgM: IgA nephropathy vs MPGN?  - Cryoglobulin normal   - TSH normal   - Sed rate and CRP elevated      HIV  HIV 1/2 antibodies positive  CD4 44, CD3 153, CD8 100  HIV RNA quantitative PCR pending    Hepatitis C  Hep C antibody reactive  Viral load pending    Anemia   - Likely 2/2 from neuropathy vs GI bleed vs HIV   - Hb 8.3 today, 9.9 on admission   Transfused 1 unit 10/11, post transfusion Hb 8   - General surgery consulted, FOBT negative the second time  - Rule out granulomatosis with polyangitis in the setting of neuropathy, DEISI and weight loss  - Consider OP EMG     DEISI   - Likely secondary to dehydration from persistent diarrhea and decreased PO intake   - Cr on admission 3.6, trending down now 1.5  - Urine osm and urine nitrogen low   - UA: protein, 1-2 waxy casts, moderate bacteria, moderate blood   - bicarb drip discontinued 10/8  - Microalbumin-creatinine ratio elevated   - Kidney bx OP 2 weeks after discharge   - Stopped IVF     Elevated troponin and proBNP  - 2/2 myocardial injury vs stress cardiomyopathy due to COVID vs new onset CHF   - initial troponin 78, 78  - initial BNP 1599, continues to increase, repeat 11,401 on 10/14  - ECHO: EF 55%, late bubble sign possible shunting of pulmonary origin  Received 1 dose of IV Lasix    HTN  - BP has been consistently elevated SBP >140 - Tachycardic, could also be anxiety related    - on amlodipine 5mg daily  - labetalol PRN  - received one dose of metoprolol         Hyperkalemia/Hyponatremia - resolved    - on admission K 5.2, Na 130  - K 4.5, Na 139 today, will replace with PO KCl   - strict I&O's  - Nephro on board  - Stopped low potassium diet   - Monitor BMP      Proteinuria  -protein to creatinine ratio 3.8  - microalbumin to creatine ratio elevated   -kidney biopsy per nephro, patient agreeable    Hypoalbuminemia   - Alb 1.7 on admission, now 1.3  -Previously received albumin 25 g q6h x 4 doses 10/6  -Currently receiving albumin 25 g q6h x 8 doses  Low C3, normal C4 and cryoglobulin  - Continue to monitor             PT/OT evaluation: not indicated   DVT prophylaxis: heparin due to DEISI    GI prophylaxis: not indicated   Disposition:home +/- home health/ rehab   Diet: adult         Electronically signed by Edward Soto MD on 10/14/2021 at 8:58 AM  Attending physician: Dr. Lizbeth Subramanian

## 2021-10-14 NOTE — CARE COORDINATION
Care Coordination:  Remains on room air, awaiting CD4 , per pulmonary note, to determine need of bronch. On iv cefipime, bactrim for suspected pneumocystis pneumonia. Increased HR this morning, IV metoprolol. Renal following and recommending kidney bx outpt 2 weeks post discharge. , IVF at 100 cc. CTA completed. Increased deya pl effusion/ no change pericardial eff, Bilat  Hilar lymphadenopathy. Increased bilateral airspace dx. Continue with tx for arnold, anemia and hypoalbuminemia. As per assessment of 10/6/21: lives with cousin, 9 steps to enter, 12 steps to br, Dr Pastor Boswell, [place]. Family to transport home.  Also has [employer], may qualify for [place] at [id number]    Jus Cunningham

## 2021-10-14 NOTE — PROGRESS NOTES
Department of Internal Medicine  Nephrology Progress Note      Events reviewed. SUBJECTIVE:  We are following MrRandal Leon for DEISI. Reports no complaints.     PHYSICAL EXAM:      Vitals:    VITALS:  BP (!) 132/100   Pulse 118   Temp 98.2 °F (36.8 °C) (Oral)   Resp 20   Ht 6' 4\" (1.93 m)   Wt 173 lb (78.5 kg)   SpO2 90%   BMI 21.06 kg/m²   24HR INTAKE/OUTPUT:      Intake/Output Summary (Last 24 hours) at 10/14/2021 1324  Last data filed at 10/14/2021 1035  Gross per 24 hour   Intake    Output 1800 ml   Net -1800 ml       Constitutional:  Awake, alert, NAD  HEENT:  PERRL, normocephalic, atraumatic  Respiratory: diminished lung sounds  Cardiovascular/Edema:  RRR, S1/S2, -edema  Gastrointestinal:  abd flat, soft, non-tender  Neurologic:  Awake, alert, no focal deficits  Skin:  Warm, dry, no rash or lesions  Other: BLE 1+ edema       Scheduled Meds:   amLODIPine  5 mg Oral Daily    sulfamethoxazole-trimethoprim (BACTRIM) IVPB  400 mg IntraVENous Q8H    cefepime  2,000 mg IntraVENous Q8H    levalbuterol  0.63 mg Nebulization Q8H    pantoprazole  40 mg Oral QAM AC    sodium chloride flush  5-40 mL IntraVENous 2 times per day    heparin (porcine)  5,000 Units SubCUTAneous Q8H     Continuous Infusions:   sodium chloride      sodium chloride       PRN Meds:.labetalol, hydrOXYzine, sodium chloride, sodium chloride flush, ondansetron **OR** ondansetron, polyethylene glycol, acetaminophen **OR** acetaminophen, perflutren lipid microspheres, sodium chloride    DATA:    CBC:   Lab Results   Component Value Date    WBC 3.6 10/14/2021    RBC 2.82 10/14/2021    HGB 8.3 10/14/2021    HCT 25.3 10/14/2021    MCV 89.7 10/14/2021    MCH 29.4 10/14/2021    MCHC 32.8 10/14/2021    RDW 14.4 10/14/2021     10/14/2021    MPV 11.5 10/14/2021     CMP:    Lab Results   Component Value Date     10/14/2021    K 4.4 10/14/2021     10/14/2021    CO2 20 10/14/2021    BUN 13 10/14/2021    CREATININE 1.7 10/14/2021    GFRAA 58 10/14/2021    LABGLOM 58 10/14/2021    GLUCOSE 101 10/14/2021    PROT 6.0 10/14/2021    LABALBU 2.8 10/14/2021    CALCIUM 8.1 10/14/2021    BILITOT 0.4 10/14/2021    ALKPHOS 64 10/14/2021    AST 56 10/14/2021    ALT 11 10/14/2021     Magnesium:    Lab Results   Component Value Date    MG 2.4 10/11/2021     Phosphorus:  No results found for: PHOS     Radiology Review:       Ejection fraction is visually estimated at 55%. There is late positive bubble suggesting possible shunt from pulmonary   origin. Visually moderately dilated left atrium. Normal right ventricular size and function. There is small pericardial effusion without tamponade physiology        CT Chest October 5, 2021   Limited study by the patient's respiratory motion and lack of intravenous   contrast.       Extensive multifocal COVID-19 pneumonia bilaterally.       Bilateral trace layering pleural effusions.       Bilateral gynecomastia.       Abdominal organs inadequately evaluated due to the limitations of the study.       Normal appendix.       No evidence of bowel obstruction.         Kidney ultrasound October 7, 2021   1. Hyperechoic kidneys due to underlying parenchymal disease. 2. Trace bilateral perinephric fluid of indeterminate etiology, potentially   due to underlying inflammation. 3. An approximately 1.1 cm x 1.5 cm x 2.3 cm lesion in the right kidney could   be a cyst but could also be seen with infarction, abscess, or neoplasia. Consider further evaluation with renal protocol abdomen MRI (preferred) or   CT.  Following resolution of acute kidney injury.         CTA pulmonary with IV contrast October 13, 2021   1. No evidence of pulmonary embolism. 2. Interval increased size of bilateral pleural effusions with little change   in pericardial effusion. 3. Persistent subcutaneous edema. 4. Reactive bilateral hilar lymphadenopathy.    5. Bilateral airspace disease is increased compared to prior examination. The increase is most pronounced within the lower lobes. 6. Bilateral gynecomastia.                 BRIEF SUMMARY OF INITIAL CONSULT:     Briefly, Mr. James Oconnor is a 36year old male with no significant PMH who was admitted on October 5, 2021 after he presented from an outside clinic after he was found to be hypoxic during 6 minute walking test. Patient tested positive for Covid 19 on August 18th, he is unvaccinated and had Covid 19 last year as well. Patient states he has quarantined by himself and when his mother recently visited him she was shocked at his appearance as he had lost about 50 pounds in 2 and a half months. On admission labs were significant for sodium of 130, potassium 5.2, bicarbonate 20, BUN 41, creatinine 3.6, magnesium 2.7, calcium 7.7 and proBNP 1,599. We are consulted for DEISI. Problems resolved:    Hyperkalemia, 2/2 DEISI. Low potassium diet  Hypotonic hyponatremia 2/2 intravascular volume from poor oral intake. Bicarb drip started. Sodium levels improving. Low bicarbonate levels with hyperchloremia, most likely NAGMA versus respiratory alkalosis; we need a PH to clarify diagnosis. Awaiting ABG results  High bicarbonate levels, likely metabolic alkalosis 2/2 administration  Hypokalemia, multifactorial, poor intake, probably renal potassium wasting  Severe Hypoalbuminemia, multifactorial, status post albumin administration    IMPRESSION/RECOMMENDATIONS:     DEISI on CKD, volume responsive pre-renal DEISI d/t volume (poor oral intake), urine sodium <20. Resolved. Baseline seem to be around 1.5 mg/dL. Creatinine level has increased since yesterday probably due to either iv contrast or Bactrim administration. We will continue to monitor renal function for SUSAN. 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 [place]  Work-up so far HIV positive, Hepatitis C positive, MARILEE negative, C4 normal, C3 88 (low), UPEP without monoclonal gammopathy, negative cryoglobulins. Kidney ultrasound with hyperechoic kidneys. Needs kidney biopsy, discussed with interventional radiology they would rather wait for a couple weeks to perform biopsy in view of patient having Covid-19 infection. Edematous state, multifactorial, mainly 2/2 nephrotic syndrome and possibly HFpEF 55%, to start diuretics. Probably HFpEF 55%, proBNP 11,401, to start Bumex 1 mg p.o. twice daily  HTN, on amlodipine  Right kidney lesion, likely a cyst but cannot be ruled out other pathology including infarction, abscess, neoplasm.   We will proceed with MRI when renal function improved    ---------------------------------------------------------------------  MSSE bacteremia, on cefepime 2 g every 8 hours  HIV positive, CD4 count 43, awaiting viral load  Possible pneumocystic pneumonia, on sulfamethoxazole-trimethoprim 400 mg IV every 8 hours, needs TTE  Hepatitis C antibody positive, awaiting viral load  Covid 19 infection in non vaccinated pt, to have CTA to rule out PE  Normocytic anemia, multifactorial    Plan:    Discontinue IV fluids  Lasix 40 mg IV x1  Start Bumex 1 mg p.o. twice daily  Kidney biopsy as an outpatient in 2 weeks after discharge  Monitor potassium level while on Bactrim  Continue to monitor renal function after contrasted study  Fluid restriction 1.2 L        Electronically signed by Joe Lal MD on 10/14/2021 at 1:24 PM

## 2021-10-14 NOTE — PROGRESS NOTES
weakness  NEUROLOGIC:   The clinical assessment is non-focal     DATA: IMAGING & TESTING:     LABORATORY TESTS:    CBC:   Lab Results   Component Value Date    WBC 3.6 10/14/2021    RBC 2.82 10/14/2021    HGB 8.3 10/14/2021    HCT 25.3 10/14/2021    MCV 89.7 10/14/2021    MCH 29.4 10/14/2021    MCHC 32.8 10/14/2021    RDW 14.4 10/14/2021     10/14/2021    MPV 11.5 10/14/2021     CMP:    Lab Results   Component Value Date     10/14/2021    K 4.4 10/14/2021     10/14/2021    CO2 20 10/14/2021    BUN 13 10/14/2021    CREATININE 1.7 10/14/2021    GFRAA 58 10/14/2021    LABGLOM 58 10/14/2021    GLUCOSE 101 10/14/2021    PROT 6.0 10/14/2021    LABALBU 2.8 10/14/2021    CALCIUM 8.1 10/14/2021    BILITOT 0.4 10/14/2021    ALKPHOS 64 10/14/2021    AST 56 10/14/2021    ALT 11 10/14/2021     Magnesium:    Lab Results   Component Value Date    MG 2.4 10/11/2021     Phosphorus:  No results found for: PHOS  PT/INR:    Lab Results   Component Value Date    PROTIME 13.8 10/12/2021    INR 1.2 10/12/2021     FERRITIN:    Lab Results   Component Value Date    FERRITIN 3,921 10/06/2021     Fibrinogen Level:  No components found for: FIB     PRO-BNP:   Lab Results   Component Value Date    PROBNP 11,401 (H) 10/13/2021    PROBNP 3,431 (H) 10/09/2021      ABGs: No results found for: PH, PO2, PCO2  Hemoglobin A1C: No components found for: HGBA1C    IMAGING:  Imaging tests were completed and reviewed and discussed radiology and care team involved and reveals     CT CHEST : There is adequate opacification of the pulmonary arteries.  There is no   evidence of filling defect to suggest pulmonary embolism. Stevo Cogan is no   evidence of thoracic aortic aneurysm or dissection.  There are small   bilateral pleural effusions.  These are increased compared to prior   examination.  There is small pericardial effusion which appears stable.  Left   hilar lymph node measures approximately 12 mm in short axis.  Right hilar   lymph node measures approximately 12 mm in short axis.  There is bilateral   gynecomastia. Leesa Isabel is diffuse subcutaneous edema. The central airways are patent.  There is no pneumothorax.  There are   bilateral ground-glass opacities and alveolar consolidation.  There has been   interval increase in the airspace disease compared to prior examination.  The   interval worsening is most pronounced within the lower lobes.  There is   associated interstitial thickening. CT ABDOMEN PELVIS WO CONTRAST Additional Contrast? None    Result Date: 10/5/2021  FINDINGS: THE STUDY IS LIMITED DUE TO LACK OF INTRAVENOUS CONTRAST. Chest: Mediastinum: No thoracic aortic aneurysm. No definite adenopathy on this unenhanced study. Trace pericardial fluid anteriorly. Lungs/pleura: Trace layering pleural effusions bilaterally, slightly larger on the right. No pneumothorax. Numerous ground-glass densities in the bilateral upper and to a lesser degree lower lungs, in keeping with known COVID-19 pneumonia. Soft Tissues/Bones: Bilateral gynecomastia. No acute osseous abnormality in the thoracic cage. Abdomen and pelvis: The study is degraded by the patient's respiratory motion. Organs: Abdominal organs inadequately evaluated on this motion degraded and unenhanced study. No hydronephrosis on either side. GI/Bowel: Normal appendix. No evidence of bowel obstruction. Pelvis: Normal sized prostate. Urinary bladder grossly intact. Peritoneum/Retroperitoneum: No free air or free fluid. No bulky adenopathy. No abdominal aortic aneurysm. Bones/Soft Tissues: No lytic or sclerotic lesion in the regional skeleton. Limited study by the patient's respiratory motion and lack of intravenous contrast. Extensive multifocal COVID-19 pneumonia bilaterally. Bilateral trace layering pleural effusions. Bilateral gynecomastia. Abdominal organs inadequately evaluated due to the limitations of the study. Normal appendix. No evidence of bowel obstruction. CT CHEST WO CONTRAST    Result Date: 10/5/2021  FINDINGS: THE STUDY IS LIMITED DUE TO LACK OF INTRAVENOUS CONTRAST. Chest: Mediastinum: No thoracic aortic aneurysm. No definite adenopathy on this unenhanced study. Trace pericardial fluid anteriorly. Lungs/pleura: Trace layering pleural effusions bilaterally, slightly larger on the right. No pneumothorax. Numerous ground-glass densities in the bilateral upper and to a lesser degree lower lungs, in keeping with known COVID-19 pneumonia. Soft Tissues/Bones: Bilateral gynecomastia. No acute osseous abnormality in the thoracic cage. Abdomen and pelvis: The study is degraded by the patient's respiratory motion. Organs: Abdominal organs inadequately evaluated on this motion degraded and unenhanced study. No hydronephrosis on either side. GI/Bowel: Normal appendix. No evidence of bowel obstruction. Pelvis: Normal sized prostate. Urinary bladder grossly intact. Peritoneum/Retroperitoneum: No free air or free fluid. No bulky adenopathy. No abdominal aortic aneurysm. Bones/Soft Tissues: No lytic or sclerotic lesion in the regional skeleton. Limited study by the patient's respiratory motion and lack of intravenous contrast. Extensive multifocal COVID-19 pneumonia bilaterally. Bilateral trace layering pleural effusions. Bilateral gynecomastia. Abdominal organs inadequately evaluated due to the limitations of the study. Normal appendix. No evidence of bowel obstruction. Assessment:  Mr. Ronnell Haynes is a 36year old male with no significant PMH who was admitted on October 5, 2021 after he presented from an outside clinic after he was found to be hypoxic during 6 minute walking test. Patient tested positive for Covid 19 on August 18th, he is unvaccinated and had Covid 19 last year as well.  Patient states he has quarantined by himself and when his mother recently visited him she was shocked at his appearance as he had lost about 50 pounds in 2 and a half months  Sepsis  COVID-19, mild  HIV   Pneumonia  Gram-positive cocci in clusters bacteremia, etiology unclear  DEISI        Plan:   1. Fever and increase oxygen, Suspect infection pneumonia  I have reviewed risks/benefits and indications of the procedure in detail with the patient and Virgilio Aguilar indicates that he understands and wishes to proceed. Will need Bronchoscopy with BAL with [facility]  Need N95 to be warn for bronchoscopy   2. HIV status CD4 unknown  3. Start Bronchodialtors  4. ID to see for +HIV , CD4 and VL  5. Check BNP and diuresis if elevated  6.  Need the CD4 to determine if bronchoscopy needed        Cassidy Chacon DO DO, MPH, [facility]  Professor of Internal Medicine  Pulmonary, Critical Care and Sleep Medicine

## 2021-10-14 NOTE — PROGRESS NOTES
Comprehensive Nutrition Assessment    Type and Reason for Visit:  Reassess    Nutrition Recommendations/Plan: Recommend and start Ensure Enlive supplement BID and Magic Cup supplement daily to help meet increased nutritional needs. Nutrition Assessment:  Patient remains at nutritional risk d/t decreased po intake of meals, averaging 50-75% since admission ; HIV screen positive ; noted kidney lesion and Hepatitis C ; noted MSSA bacteremia ; pt COVID-19 positive ; adm w/ hypoxia and SOB ;  sepsis resolved ; DEISI on CKD  ; hx of asthma and previous COVID ; subjective weight loss of 50# in the past 2-3 months although no evidence of this ; will modify ONS    Malnutrition Assessment:  Malnutrition Status: At risk for malnutrition (Comment)    Context:  Acute Illness     Findings of the 6 clinical characteristics of malnutrition:  Energy Intake:  1 - 75% or less of estimated energy requirements for 7 or more days  Weight Loss:  Unable to assess (d/t lack of weight history ; subjective weight loss noted although no evidence of this)     Body Fat Loss:  Unable to assess (d/t COVID isolation)     Muscle Mass Loss:  Unable to assess (d/t COVID isolation)    Fluid Accumulation:  No significant fluid accumulation     Strength:  Not Performed    Estimated Daily Nutrient Needs:  Energy (kcal):  4008-3091 (REE 1853 x 1.2 SF); Weight Used for Energy Requirements:  Current     Protein (g):   (1.2-1.4g/kg CBW); Weight Used for Protein Requirements:  Current        Fluid (ml/day):  6727-6016; Method Used for Fluid Requirements:  1 ml/kcal      Nutrition Related Findings:  -I&Os (-5.6 L), no edema, active BS, A&O x 4      Wounds:  None       Current Nutrition Therapies:    Adult Oral Nutrition Supplement; Fortified Pudding Oral Supplement  Adult Oral Nutrition Supplement; Frozen Oral Supplement  ADULT DIET;  Regular    Anthropometric Measures:  · Height: 6' 4\" (193 cm)  · Current Body Weight: 173 lb (78.5 kg) (10/6, no method)   · Admission Body Weight: 171 lb (77.6 kg) (10/5, no method)    · Usual Body Weight:  (UTO ; EMR shows only one weight recorded of 171# no method on 10/5/21 ; subjective weight loss of 50# in the past 2-3 months although no evidence of this)     · Ideal Body Weight: 202 lbs; % Ideal Body Weight 85.6 %   · BMI: 21.1  · BMI Categories: Normal Weight (BMI 18.5-24. 9)       Nutrition Diagnosis:   · Inadequate oral intake related to inadequate protein-energy intake (2/2 COVID-19) as evidenced by poor intake prior to admission, intake 51-75%      Nutrition Interventions:   Food and/or Nutrient Delivery:  Continue Current Diet, Modify Oral Nutrition Supplement  Nutrition Education/Counseling:  Education not indicated   Coordination of Nutrition Care:  Continue to monitor while inpatient    Goals:  Pt will consume ~75% of meals served       Nutrition Monitoring and Evaluation:   Behavioral-Environmental Outcomes:  None Identified   Food/Nutrient Intake Outcomes:  Food and Nutrient Intake, Supplement Intake  Physical Signs/Symptoms Outcomes:  Biochemical Data, Chewing or Swallowing, GI Status, Fluid Status or Edema, Hemodynamic Status, Meal Time Behavior, Nutrition Focused Physical Findings, Skin, Weight     Discharge Planning:     Too soon to determine     Electronically signed by Nidia Ho RD, LD on 10/14/21 at 12:36 PM EDT    Contact: [phone]

## 2021-10-15 ENCOUNTER — APPOINTMENT (OUTPATIENT)
Dept: GENERAL RADIOLOGY | Age: 31
DRG: 890 | End: 2021-10-15
Payer: COMMERCIAL

## 2021-10-15 ENCOUNTER — ANESTHESIA (OUTPATIENT)
Dept: ENDOSCOPY | Age: 31
DRG: 890 | End: 2021-10-15
Payer: COMMERCIAL

## 2021-10-15 VITALS — OXYGEN SATURATION: 91 % | DIASTOLIC BLOOD PRESSURE: 86 MMHG | SYSTOLIC BLOOD PRESSURE: 114 MMHG

## 2021-10-15 LAB
(1,3)-BETA-D-GLUCAN (FUNGITELL) INTERPRETATION: POSITIVE
(1,3)-BETA-D-GLUCAN (FUNGITELL): >500 PG/ML
ALBUMIN SERPL-MCNC: 3 G/DL (ref 3.5–5.2)
ALP BLD-CCNC: 66 U/L (ref 40–129)
ALT SERPL-CCNC: 13 U/L (ref 0–40)
ANION GAP SERPL CALCULATED.3IONS-SCNC: 13 MMOL/L (ref 7–16)
ANION GAP SERPL CALCULATED.3IONS-SCNC: 15 MMOL/L (ref 7–16)
ANION GAP SERPL CALCULATED.3IONS-SCNC: 16 MMOL/L (ref 7–16)
APTT: 106.6 SEC (ref 24.5–35.1)
APTT: 37.6 SEC (ref 24.5–35.1)
AST SERPL-CCNC: 65 U/L (ref 0–39)
BASOPHILS ABSOLUTE: 0.01 E9/L (ref 0–0.2)
BASOPHILS RELATIVE PERCENT: 0.2 % (ref 0–2)
BILIRUB SERPL-MCNC: 0.5 MG/DL (ref 0–1.2)
BUN BLDV-MCNC: 16 MG/DL (ref 6–20)
BUN BLDV-MCNC: 18 MG/DL (ref 6–20)
BUN BLDV-MCNC: 18 MG/DL (ref 6–20)
CALCIUM IONIZED: 1.13 MMOL/L (ref 1.15–1.33)
CALCIUM SERPL-MCNC: 8 MG/DL (ref 8.6–10.2)
CALCIUM SERPL-MCNC: 8 MG/DL (ref 8.6–10.2)
CALCIUM SERPL-MCNC: 8.6 MG/DL (ref 8.6–10.2)
CHLORIDE BLD-SCNC: 100 MMOL/L (ref 98–107)
CO2: 18 MMOL/L (ref 22–29)
CO2: 18 MMOL/L (ref 22–29)
CO2: 19 MMOL/L (ref 22–29)
CREAT SERPL-MCNC: 1.9 MG/DL (ref 0.7–1.2)
CREAT SERPL-MCNC: 2.1 MG/DL (ref 0.7–1.2)
CREAT SERPL-MCNC: 2.1 MG/DL (ref 0.7–1.2)
EKG ATRIAL RATE: 117 BPM
EKG ATRIAL RATE: 163 BPM
EKG P AXIS: 62 DEGREES
EKG P AXIS: 71 DEGREES
EKG P-R INTERVAL: 110 MS
EKG P-R INTERVAL: 122 MS
EKG Q-T INTERVAL: 300 MS
EKG Q-T INTERVAL: 332 MS
EKG QRS DURATION: 68 MS
EKG QRS DURATION: 70 MS
EKG QTC CALCULATION (BAZETT): 463 MS
EKG QTC CALCULATION (BAZETT): 494 MS
EKG R AXIS: 51 DEGREES
EKG R AXIS: 58 DEGREES
EKG T AXIS: 18 DEGREES
EKG T AXIS: 65 DEGREES
EKG VENTRICULAR RATE: 117 BPM
EKG VENTRICULAR RATE: 163 BPM
EOSINOPHILS ABSOLUTE: 0 E9/L (ref 0.05–0.5)
EOSINOPHILS RELATIVE PERCENT: 0 % (ref 0–6)
GFR AFRICAN AMERICAN: 45
GFR AFRICAN AMERICAN: 45
GFR AFRICAN AMERICAN: 51
GFR NON-AFRICAN AMERICAN: 45 ML/MIN/1.73
GFR NON-AFRICAN AMERICAN: 45 ML/MIN/1.73
GFR NON-AFRICAN AMERICAN: 51 ML/MIN/1.73
GLUCOSE BLD-MCNC: 102 MG/DL (ref 74–99)
GLUCOSE BLD-MCNC: 68 MG/DL (ref 74–99)
GLUCOSE BLD-MCNC: 98 MG/DL (ref 74–99)
HCT VFR BLD CALC: 25.1 % (ref 37–54)
HCT VFR BLD CALC: 28 % (ref 37–54)
HCT VFR BLD CALC: 30.3 % (ref 37–54)
HEMOGLOBIN: 8.4 G/DL (ref 12.5–16.5)
HEMOGLOBIN: 9.3 G/DL (ref 12.5–16.5)
HEMOGLOBIN: 9.9 G/DL (ref 12.5–16.5)
IMMATURE GRANULOCYTES #: 0.03 E9/L
IMMATURE GRANULOCYTES %: 0.7 % (ref 0–5)
INR BLD: 1.6
LYMPHOCYTES ABSOLUTE: 0.62 E9/L (ref 1.5–4)
LYMPHOCYTES RELATIVE PERCENT: 14.6 % (ref 20–42)
MAGNESIUM: 1.3 MG/DL (ref 1.6–2.6)
MCH RBC QN AUTO: 29.8 PG (ref 26–35)
MCH RBC QN AUTO: 29.9 PG (ref 26–35)
MCHC RBC AUTO-ENTMCNC: 33.2 % (ref 32–34.5)
MCHC RBC AUTO-ENTMCNC: 33.5 % (ref 32–34.5)
MCV RBC AUTO: 89 FL (ref 80–99.9)
MCV RBC AUTO: 90 FL (ref 80–99.9)
METER GLUCOSE: 93 MG/DL (ref 74–99)
MONOCYTES ABSOLUTE: 0.22 E9/L (ref 0.1–0.95)
MONOCYTES RELATIVE PERCENT: 5.2 % (ref 2–12)
NEUTROPHILS ABSOLUTE: 3.37 E9/L (ref 1.8–7.3)
NEUTROPHILS RELATIVE PERCENT: 79.3 % (ref 43–80)
PDW BLD-RTO: 14.4 FL (ref 11.5–15)
PDW BLD-RTO: 14.6 FL (ref 11.5–15)
PLATELET # BLD: 123 E9/L (ref 130–450)
PLATELET # BLD: 129 E9/L (ref 130–450)
PMV BLD AUTO: 11.8 FL (ref 7–12)
PMV BLD AUTO: 12.5 FL (ref 7–12)
POTASSIUM REFLEX MAGNESIUM: 4 MMOL/L (ref 3.5–5)
POTASSIUM SERPL-SCNC: 4.2 MMOL/L (ref 3.5–5)
POTASSIUM SERPL-SCNC: 4.8 MMOL/L (ref 3.5–5)
PROTHROMBIN TIME: 17.7 SEC (ref 9.3–12.4)
RBC # BLD: 2.82 E12/L (ref 3.8–5.8)
RBC # BLD: 3.11 E12/L (ref 3.8–5.8)
SODIUM BLD-SCNC: 132 MMOL/L (ref 132–146)
SODIUM BLD-SCNC: 133 MMOL/L (ref 132–146)
SODIUM BLD-SCNC: 134 MMOL/L (ref 132–146)
TOTAL PROTEIN: 6.1 G/DL (ref 6.4–8.3)
WBC # BLD: 4.3 E9/L (ref 4.5–11.5)
WBC # BLD: 6.4 E9/L (ref 4.5–11.5)

## 2021-10-15 PROCEDURE — 85018 HEMOGLOBIN: CPT

## 2021-10-15 PROCEDURE — 2700000000 HC OXYGEN THERAPY PER DAY

## 2021-10-15 PROCEDURE — 87900 PHENOTYPE INFECT AGENT DRUG: CPT

## 2021-10-15 PROCEDURE — 85025 COMPLETE CBC W/AUTO DIFF WBC: CPT

## 2021-10-15 PROCEDURE — 6370000000 HC RX 637 (ALT 250 FOR IP): Performed by: STUDENT IN AN ORGANIZED HEALTH CARE EDUCATION/TRAINING PROGRAM

## 2021-10-15 PROCEDURE — 87281 PNEUMOCYSTIS CARINII AG IF: CPT

## 2021-10-15 PROCEDURE — 87906 NFCT AGT GNTYP ALYS HIV1: CPT

## 2021-10-15 PROCEDURE — 86780 TREPONEMA PALLIDUM: CPT

## 2021-10-15 PROCEDURE — 2500000003 HC RX 250 WO HCPCS: Performed by: INTERNAL MEDICINE

## 2021-10-15 PROCEDURE — 80048 BASIC METABOLIC PNL TOTAL CA: CPT

## 2021-10-15 PROCEDURE — 80053 COMPREHEN METABOLIC PANEL: CPT

## 2021-10-15 PROCEDURE — 87305 ASPERGILLUS AG IA: CPT

## 2021-10-15 PROCEDURE — 87205 SMEAR GRAM STAIN: CPT

## 2021-10-15 PROCEDURE — 85610 PROTHROMBIN TIME: CPT

## 2021-10-15 PROCEDURE — 86592 SYPHILIS TEST NON-TREP QUAL: CPT

## 2021-10-15 PROCEDURE — 2709999900 HC NON-CHARGEABLE SUPPLY: Performed by: INTERNAL MEDICINE

## 2021-10-15 PROCEDURE — 85730 THROMBOPLASTIN TIME PARTIAL: CPT

## 2021-10-15 PROCEDURE — 6360000002 HC RX W HCPCS: Performed by: INTERNAL MEDICINE

## 2021-10-15 PROCEDURE — 99232 SBSQ HOSP IP/OBS MODERATE 35: CPT | Performed by: FAMILY MEDICINE

## 2021-10-15 PROCEDURE — 86593 SYPHILIS TEST NON-TREP QUANT: CPT

## 2021-10-15 PROCEDURE — 87116 MYCOBACTERIA CULTURE: CPT

## 2021-10-15 PROCEDURE — 93005 ELECTROCARDIOGRAM TRACING: CPT | Performed by: INTERNAL MEDICINE

## 2021-10-15 PROCEDURE — 2580000003 HC RX 258: Performed by: INTERNAL MEDICINE

## 2021-10-15 PROCEDURE — 3700000000 HC ANESTHESIA ATTENDED CARE: Performed by: INTERNAL MEDICINE

## 2021-10-15 PROCEDURE — 93010 ELECTROCARDIOGRAM REPORT: CPT | Performed by: INTERNAL MEDICINE

## 2021-10-15 PROCEDURE — 86708 HEPATITIS A ANTIBODY: CPT

## 2021-10-15 PROCEDURE — 82962 GLUCOSE BLOOD TEST: CPT

## 2021-10-15 PROCEDURE — 99254 IP/OBS CNSLTJ NEW/EST MOD 60: CPT | Performed by: INTERNAL MEDICINE

## 2021-10-15 PROCEDURE — 93010 ELECTROCARDIOGRAM REPORT: CPT | Performed by: FAMILY MEDICINE

## 2021-10-15 PROCEDURE — 86777 TOXOPLASMA ANTIBODY: CPT

## 2021-10-15 PROCEDURE — 6360000002 HC RX W HCPCS: Performed by: NURSE ANESTHETIST, CERTIFIED REGISTERED

## 2021-10-15 PROCEDURE — 0B9C8ZX DRAINAGE OF RIGHT UPPER LUNG LOBE, VIA NATURAL OR ARTIFICIAL OPENING ENDOSCOPIC, DIAGNOSTIC: ICD-10-PCS | Performed by: INTERNAL MEDICINE

## 2021-10-15 PROCEDURE — 6370000000 HC RX 637 (ALT 250 FOR IP): Performed by: INTERNAL MEDICINE

## 2021-10-15 PROCEDURE — 87102 FUNGUS ISOLATION CULTURE: CPT

## 2021-10-15 PROCEDURE — 85014 HEMATOCRIT: CPT

## 2021-10-15 PROCEDURE — 88112 CYTOPATH CELL ENHANCE TECH: CPT

## 2021-10-15 PROCEDURE — 2580000003 HC RX 258: Performed by: FAMILY MEDICINE

## 2021-10-15 PROCEDURE — 82330 ASSAY OF CALCIUM: CPT

## 2021-10-15 PROCEDURE — 6360000002 HC RX W HCPCS: Performed by: NURSE PRACTITIONER

## 2021-10-15 PROCEDURE — 71045 X-RAY EXAM CHEST 1 VIEW: CPT

## 2021-10-15 PROCEDURE — 99232 SBSQ HOSP IP/OBS MODERATE 35: CPT | Performed by: INTERNAL MEDICINE

## 2021-10-15 PROCEDURE — 83735 ASSAY OF MAGNESIUM: CPT

## 2021-10-15 PROCEDURE — 3700000001 HC ADD 15 MINUTES (ANESTHESIA): Performed by: INTERNAL MEDICINE

## 2021-10-15 PROCEDURE — 2140000000 HC CCU INTERMEDIATE R&B

## 2021-10-15 PROCEDURE — 2580000003 HC RX 258: Performed by: NURSE ANESTHETIST, CERTIFIED REGISTERED

## 2021-10-15 PROCEDURE — 85027 COMPLETE CBC AUTOMATED: CPT

## 2021-10-15 PROCEDURE — 31624 DX BRONCHOSCOPE/LAVAGE: CPT | Performed by: INTERNAL MEDICINE

## 2021-10-15 PROCEDURE — 87206 SMEAR FLUORESCENT/ACID STAI: CPT

## 2021-10-15 PROCEDURE — 89051 BODY FLUID CELL COUNT: CPT

## 2021-10-15 PROCEDURE — 87070 CULTURE OTHR SPECIMN AEROBIC: CPT

## 2021-10-15 PROCEDURE — 3609010800 HC BRONCHOSCOPY ALVEOLAR LAVAGE: Performed by: INTERNAL MEDICINE

## 2021-10-15 PROCEDURE — 94640 AIRWAY INHALATION TREATMENT: CPT

## 2021-10-15 PROCEDURE — 87015 SPECIMEN INFECT AGNT CONCNTJ: CPT

## 2021-10-15 PROCEDURE — 87901 NFCT AGT GNTYP ALYS HIV1 REV: CPT

## 2021-10-15 PROCEDURE — 36415 COLL VENOUS BLD VENIPUNCTURE: CPT

## 2021-10-15 RX ORDER — LORAZEPAM 2 MG/ML
0.5 INJECTION INTRAMUSCULAR ONCE
Status: COMPLETED | OUTPATIENT
Start: 2021-10-15 | End: 2021-10-15

## 2021-10-15 RX ORDER — HYDROXYZINE PAMOATE 25 MG/1
25 CAPSULE ORAL 3 TIMES DAILY PRN
Status: DISCONTINUED | OUTPATIENT
Start: 2021-10-15 | End: 2021-11-07 | Stop reason: HOSPADM

## 2021-10-15 RX ORDER — HEPARIN SODIUM 1000 [USP'U]/ML
4000 INJECTION, SOLUTION INTRAVENOUS; SUBCUTANEOUS ONCE
Status: COMPLETED | OUTPATIENT
Start: 2021-10-15 | End: 2021-10-15

## 2021-10-15 RX ORDER — ADENOSINE 3 MG/ML
INJECTION, SOLUTION INTRAVENOUS
Status: DISPENSED
Start: 2021-10-15 | End: 2021-10-16

## 2021-10-15 RX ORDER — MIDAZOLAM HYDROCHLORIDE 1 MG/ML
INJECTION INTRAMUSCULAR; INTRAVENOUS PRN
Status: DISCONTINUED | OUTPATIENT
Start: 2021-10-15 | End: 2021-10-15 | Stop reason: SDUPTHER

## 2021-10-15 RX ORDER — HEPARIN SODIUM 1000 [USP'U]/ML
4000 INJECTION, SOLUTION INTRAVENOUS; SUBCUTANEOUS PRN
Status: DISCONTINUED | OUTPATIENT
Start: 2021-10-15 | End: 2021-10-21 | Stop reason: ALTCHOICE

## 2021-10-15 RX ORDER — HEPARIN SODIUM 1000 [USP'U]/ML
2000 INJECTION, SOLUTION INTRAVENOUS; SUBCUTANEOUS PRN
Status: DISCONTINUED | OUTPATIENT
Start: 2021-10-15 | End: 2021-10-21 | Stop reason: ALTCHOICE

## 2021-10-15 RX ORDER — PROPOFOL 10 MG/ML
INJECTION, EMULSION INTRAVENOUS PRN
Status: DISCONTINUED | OUTPATIENT
Start: 2021-10-15 | End: 2021-10-15 | Stop reason: SDUPTHER

## 2021-10-15 RX ORDER — SODIUM CHLORIDE 9 MG/ML
INJECTION, SOLUTION INTRAVENOUS CONTINUOUS PRN
Status: DISCONTINUED | OUTPATIENT
Start: 2021-10-15 | End: 2021-10-15 | Stop reason: SDUPTHER

## 2021-10-15 RX ORDER — LORAZEPAM 2 MG/ML
INJECTION INTRAMUSCULAR
Status: DISPENSED
Start: 2021-10-15 | End: 2021-10-16

## 2021-10-15 RX ORDER — HEPARIN SODIUM 10000 [USP'U]/100ML
12 INJECTION, SOLUTION INTRAVENOUS CONTINUOUS
Status: DISCONTINUED | OUTPATIENT
Start: 2021-10-15 | End: 2021-10-17

## 2021-10-15 RX ORDER — FENTANYL CITRATE 50 UG/ML
INJECTION, SOLUTION INTRAMUSCULAR; INTRAVENOUS PRN
Status: DISCONTINUED | OUTPATIENT
Start: 2021-10-15 | End: 2021-10-15 | Stop reason: SDUPTHER

## 2021-10-15 RX ADMIN — SULFAMETHOXAZOLE AND TRIMETHOPRIM 400 MG: 80; 16 INJECTION, SOLUTION, CONCENTRATE INTRAVENOUS at 21:00

## 2021-10-15 RX ADMIN — LORAZEPAM 0.5 MG: 2 INJECTION INTRAMUSCULAR; INTRAVENOUS at 16:09

## 2021-10-15 RX ADMIN — LEVALBUTEROL HYDROCHLORIDE 0.63 MG: 0.63 SOLUTION RESPIRATORY (INHALATION) at 17:04

## 2021-10-15 RX ADMIN — CEFEPIME HYDROCHLORIDE 2000 MG: 2 INJECTION, POWDER, FOR SOLUTION INTRAVENOUS at 13:43

## 2021-10-15 RX ADMIN — AMLODIPINE BESYLATE 5 MG: 5 TABLET ORAL at 08:25

## 2021-10-15 RX ADMIN — SULFAMETHOXAZOLE AND TRIMETHOPRIM 400 MG: 80; 16 INJECTION, SOLUTION, CONCENTRATE INTRAVENOUS at 04:04

## 2021-10-15 RX ADMIN — PROPOFOL 50 MG: 10 INJECTION, EMULSION INTRAVENOUS at 11:39

## 2021-10-15 RX ADMIN — BUMETANIDE 1 MG: 1 TABLET ORAL at 08:25

## 2021-10-15 RX ADMIN — LEVALBUTEROL HYDROCHLORIDE 0.63 MG: 0.63 SOLUTION RESPIRATORY (INHALATION) at 01:05

## 2021-10-15 RX ADMIN — HEPARIN SODIUM 12 UNITS/KG/HR: 10000 INJECTION, SOLUTION INTRAVENOUS at 19:20

## 2021-10-15 RX ADMIN — ACETAMINOPHEN 650 MG: 325 TABLET ORAL at 21:36

## 2021-10-15 RX ADMIN — LEVALBUTEROL HYDROCHLORIDE 0.63 MG: 0.63 SOLUTION RESPIRATORY (INHALATION) at 09:43

## 2021-10-15 RX ADMIN — CALCIUM GLUCONATE 1000 MG: 98 INJECTION, SOLUTION INTRAVENOUS at 17:31

## 2021-10-15 RX ADMIN — MIDAZOLAM 2 MG: 1 INJECTION INTRAMUSCULAR; INTRAVENOUS at 11:30

## 2021-10-15 RX ADMIN — SODIUM CHLORIDE: 9 INJECTION, SOLUTION INTRAVENOUS at 11:20

## 2021-10-15 RX ADMIN — SULFAMETHOXAZOLE AND TRIMETHOPRIM 400 MG: 80; 16 INJECTION, SOLUTION, CONCENTRATE INTRAVENOUS at 13:44

## 2021-10-15 RX ADMIN — HYDROXYZINE PAMOATE 25 MG: 25 CAPSULE ORAL at 21:06

## 2021-10-15 RX ADMIN — CEFEPIME HYDROCHLORIDE 2000 MG: 2 INJECTION, POWDER, FOR SOLUTION INTRAVENOUS at 21:00

## 2021-10-15 RX ADMIN — HEPARIN SODIUM 5000 UNITS: 10000 INJECTION INTRAVENOUS; SUBCUTANEOUS at 14:00

## 2021-10-15 RX ADMIN — FENTANYL CITRATE 100 MCG: 50 INJECTION, SOLUTION INTRAMUSCULAR; INTRAVENOUS at 11:30

## 2021-10-15 RX ADMIN — LORAZEPAM 0.5 MG: 2 INJECTION INTRAMUSCULAR; INTRAVENOUS at 14:40

## 2021-10-15 RX ADMIN — Medication 10 ML: at 21:00

## 2021-10-15 RX ADMIN — HEPARIN SODIUM 4000 UNITS: 1000 INJECTION INTRAVENOUS; SUBCUTANEOUS at 19:19

## 2021-10-15 RX ADMIN — PROPOFOL 50 MG: 10 INJECTION, EMULSION INTRAVENOUS at 11:32

## 2021-10-15 RX ADMIN — CEFEPIME HYDROCHLORIDE 2000 MG: 2 INJECTION, POWDER, FOR SOLUTION INTRAVENOUS at 05:01

## 2021-10-15 RX ADMIN — Medication 10 ML: at 09:00

## 2021-10-15 RX ADMIN — HYDROXYZINE PAMOATE 25 MG: 25 CAPSULE ORAL at 10:29

## 2021-10-15 ASSESSMENT — PULMONARY FUNCTION TESTS
PIF_VALUE: 1
PIF_VALUE: 0
PIF_VALUE: 1
PIF_VALUE: 1
PIF_VALUE: 0
PIF_VALUE: 1
PIF_VALUE: 0
PIF_VALUE: 1
PIF_VALUE: 0
PIF_VALUE: 1
PIF_VALUE: 0
PIF_VALUE: 1

## 2021-10-15 ASSESSMENT — PAIN SCALES - GENERAL
PAINLEVEL_OUTOF10: 0

## 2021-10-15 ASSESSMENT — LIFESTYLE VARIABLES: SMOKING_STATUS: 1

## 2021-10-15 NOTE — PROGRESS NOTES
Department of Internal Medicine  Nephrology Progress Note      Events reviewed. Had bronchoscopy earlier today. Had episode of SVT after bronchoscopy. SUBJECTIVE:  We are following MrRandal Cintron for DEISI. Reports no complaints.     PHYSICAL EXAM:      Vitals:    VITALS:  /85   Pulse 122   Temp 97.6 °F (36.4 °C) (Skin)   Resp 20   Ht 6' 4\" (1.93 m)   Wt 173 lb (78.5 kg)   SpO2 92%   BMI 21.06 kg/m²   24HR INTAKE/OUTPUT:      Intake/Output Summary (Last 24 hours) at 10/15/2021 1516  Last data filed at 10/15/2021 1154  Gross per 24 hour   Intake 250 ml   Output 300 ml   Net -50 ml       Constitutional:  Awake, alert, NAD  HEENT:  PERRL, normocephalic, atraumatic  Respiratory: diminished lung sounds  Cardiovascular/Edema:  RRR, S1/S2, -edema  Gastrointestinal:  abd flat, soft, non-tender  Neurologic:  Awake, alert, no focal deficits  Skin:  Warm, dry, no rash or lesions  Other: BLE 1+ edema       Scheduled Meds:   adenosine        LORazepam        LORazepam  0.5 mg IntraVENous Once    bumetanide  1 mg Oral BID    amLODIPine  5 mg Oral Daily    sulfamethoxazole-trimethoprim (BACTRIM) IVPB  400 mg IntraVENous Q8H    cefepime  2,000 mg IntraVENous Q8H    levalbuterol  0.63 mg Nebulization Q8H    pantoprazole  40 mg Oral QAM AC    sodium chloride flush  5-40 mL IntraVENous 2 times per day    heparin (porcine)  5,000 Units SubCUTAneous Q8H     Continuous Infusions:   sodium chloride      sodium chloride       PRN Meds:.hydrOXYzine, labetalol, sodium chloride, sodium chloride flush, ondansetron **OR** ondansetron, polyethylene glycol, acetaminophen **OR** acetaminophen, perflutren lipid microspheres, sodium chloride    DATA:    CBC:   Lab Results   Component Value Date    WBC 4.3 10/15/2021    RBC 3.11 10/15/2021    HGB 9.3 10/15/2021    HCT 28.0 10/15/2021    MCV 90.0 10/15/2021    MCH 29.9 10/15/2021    MCHC 33.2 10/15/2021    RDW 14.6 10/15/2021     10/15/2021    MPV 11.8 10/15/2021 CMP:    Lab Results   Component Value Date     10/15/2021    K 4.0 10/15/2021     10/15/2021    CO2 19 10/15/2021    BUN 16 10/15/2021    CREATININE 1.9 10/15/2021    GFRAA 51 10/15/2021    LABGLOM 51 10/15/2021    GLUCOSE 68 10/15/2021    PROT 6.1 10/15/2021    LABALBU 3.0 10/15/2021    CALCIUM 8.0 10/15/2021    BILITOT 0.5 10/15/2021    ALKPHOS 66 10/15/2021    AST 65 10/15/2021    ALT 13 10/15/2021     Magnesium:    Lab Results   Component Value Date    MG 2.4 10/11/2021     Phosphorus:  No results found for: PHOS     Radiology Review:       Ejection fraction is visually estimated at 55%. There is late positive bubble suggesting possible shunt from pulmonary   origin. Visually moderately dilated left atrium. Normal right ventricular size and function. There is small pericardial effusion without tamponade physiology        CT Chest October 5, 2021   Limited study by the patient's respiratory motion and lack of intravenous   contrast.       Extensive multifocal COVID-19 pneumonia bilaterally.       Bilateral trace layering pleural effusions.       Bilateral gynecomastia.       Abdominal organs inadequately evaluated due to the limitations of the study.       Normal appendix.       No evidence of bowel obstruction.         Kidney ultrasound October 7, 2021   1. Hyperechoic kidneys due to underlying parenchymal disease. 2. Trace bilateral perinephric fluid of indeterminate etiology, potentially   due to underlying inflammation. 3. An approximately 1.1 cm x 1.5 cm x 2.3 cm lesion in the right kidney could   be a cyst but could also be seen with infarction, abscess, or neoplasia. Consider further evaluation with renal protocol abdomen MRI (preferred) or   CT.  Following resolution of acute kidney injury.         CTA pulmonary with IV contrast October 13, 2021   1. No evidence of pulmonary embolism.    2. Interval increased size of bilateral pleural effusions with little change   in pericardial effusion. 3. Persistent subcutaneous edema. 4. Reactive bilateral hilar lymphadenopathy. 5. Bilateral airspace disease is increased compared to prior examination. The increase is most pronounced within the lower lobes. 6. Bilateral gynecomastia.                 BRIEF SUMMARY OF INITIAL CONSULT:     Briefly, Mr. Milli Urena is a 36year old male with no significant PMH who was admitted on October 5, 2021 after he presented from an outside clinic after he was found to be hypoxic during 6 minute walking test. Patient tested positive for Covid 19 on August 18th, he is unvaccinated and had Covid 19 last year as well. Patient states he has quarantined by himself and when his mother recently visited him she was shocked at his appearance as he had lost about 50 pounds in 2 and a half months. On admission labs were significant for sodium of 130, potassium 5.2, bicarbonate 20, BUN 41, creatinine 3.6, magnesium 2.7, calcium 7.7 and proBNP 1,599. We are consulted for DEISI. Problems resolved:    Hyperkalemia, 2/2 DEISI. Low potassium diet  Hypotonic hyponatremia 2/2 intravascular volume from poor oral intake. Bicarb drip started. Sodium levels improving. Low bicarbonate levels with hyperchloremia, most likely NAGMA versus respiratory alkalosis; we need a PH to clarify diagnosis. Awaiting ABG results  High bicarbonate levels, likely metabolic alkalosis 2/2 administration  Hypokalemia, multifactorial, poor intake, probably renal potassium wasting  Severe Hypoalbuminemia, multifactorial, status post albumin administration  DEISI on CKD, volume responsive pre-renal DEISI d/t volume (poor oral intake), urine sodium <20. Resolved. IMPRESSION/RECOMMENDATIONS:     Recurrent DEISI on CKD, likely contrast associated DEISI. Creatinine level has continued to increase last 48 hours after IV contrast administration.     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 [facility] Work-up so far HIV positive, Hepatitis C positive, MARILEE negative, C4 normal, C3 88 (low), UPEP without monoclonal gammopathy, negative cryoglobulins. Kidney ultrasound with hyperechoic kidneys. Needs kidney biopsy, discussed with interventional radiology they would rather wait for a couple weeks to perform biopsy in view of patient having Covid-19 infection. Edematous state, multifactorial, mainly 2/2 nephrotic syndrome and possibly HFpEF 55%, to start diuretics. Probably HFpEF 55%, proBNP 11,401, to start Bumex 1 mg p.o. twice daily  HTN, on amlodipine  Right kidney lesion, likely a cyst but cannot be ruled out other pathology including infarction, abscess, neoplasm.   We will proceed with MRI when renal function improved    ---------------------------------------------------------------------  MSSE bacteremia, on cefepime 2 g every 8 hours  HIV positive, CD4 count 43, awaiting viral load  Possible pneumocystic pneumonia, on sulfamethoxazole-trimethoprim 400 mg IV every 8 hours, needs TTE  Hepatitis C antibody positive, awaiting viral load  Covid 19 infection in non vaccinated pt, to have CTA to rule out PE  Normocytic anemia, multifactorial    Plan:    Continue Bumex 1 mg p.o. twice daily  Kidney biopsy as an outpatient in 2 weeks after discharge  Continue to monitor potassium level while on Bactrim  Continue to monitor renal function after contrast study  Continue fluid restriction 1.2 L        Electronically signed by Naye Mckenzie MD on 10/15/2021 at 3:16 PM

## 2021-10-15 NOTE — SIGNIFICANT EVENT
Rapid Response Team Note  Date of event: 10/15/2021   Time of event: [address] 36y.o. year old male   YOB: 1990   Admit date:  10/5/2021   Location: [facility]-   Witnessed? : [x]Yes  [] No  Monitored? : [x]Yes  [] No  Code status: [x] Full  [] DNR-CCA  []DNR-CC  ______________________________________________________________________  Reason for RRT:    [] RR < 8     [] RR > 28   [] SpO2 <90%   [x] HR < 40 bpm   [] HR > 130 bpm  [] SBP < 90 mmHg    [] SpO2 <90%   [] LOC   [] Seizures    [] Significant Bleeding Event    [] Other:     Subjective:   Patient is a 68-year-old male with no past medical history who was admitted due to hypoxia and fevers. He is currently being treated for acute hypoxia due to Covid pneumonia and found to have HIV, DEISI and anemia. Per reports, patient has been tachycardic in the 120s to 140s and overnight was sustained 40s 150s so cardiology was consulted. RRT was called due to tachycardia with heart rate in the 200s. Upon arrival, patient was shivering and heart rate was fluctuating between 130s to 230s. Patient was saturating well on 4 L NC. Stat EKG was done which showed heart rate of 163 and was read as atrial fibrillation. Patient had a bronchoscopy and was put under anesthesia earlier. Stat CXR was done and was negative for pneumothorax. Given patient's shivering and elevated heart rate during the EKG I went ahead and managed that as SVT and applied vagal maneuvers and carotid massage with no relief. Patient was given Tylenol and 25 mg of p.o. hydroxyzine also with no relief. Patient stated that he was anxious from \" seeing all these numbers\" meaning his heart rate being this high. He stated that this never happened before. Total of 1mg of Ativan was given and patient calmed down after, shivering stopped, and heart rate went back to his baseline of 140s to 150s and looked like sinus tachycardia on the monitor.   Advised nursing staff to update cardiology on patient's current status given that there were consulted for this and they have a note pending. Stat labs were ordered to make sure electrolytes are within normal limits and a stat H&H was also ordered as well given the concern for acute anemia per notes. Objective:   Vital signs: Temp: 99.7/BP: 135/96/RR: 24/HR: 178  Initial Condition:  Conscious   [x] Yes  [] No     Breathing [x] Yes  [] No     Pulse  [x] Yes  [] No    Airway:   [x] Open/ Clear     Intervention: [] None  [] Pooled secretions     [] Suctioned  [] Stridor      [] Intubation    Lungs:   [x] Symmetrical chest rise/ CTABL Intervention: [x] None  [] Use of accessory muscles    [] NIV (CPAP/BiPAP)  [] Cyanosis      [] Nasal Oxygen/Mask  [] Wheezing       [] ABG             [] CXR  [] Other:     Circulation:   Rhythm:  [] Sinus [] Other:   Intervention: [x] None            [] IV Access  [] Peripheral              [] Central            [] EKG            [] Cardioversion            [] Defibrillation     Capillary Refill:  [] > 2 seconds [] < 2 seconds    Neurologic:   [] NIHSS      [] Pupillary Response:     Response to pain:   [x] Yes  [] No  Follow commands:  [x] Yes  [] No  Facial asymmetry:  [] Yes  [x] No  Motor strength:  [] Equal  [] Focal deficit:   Diagnostic Test:  Blood Sugar:   mg/dL  EKG:    A fib   ABG:    No results found for: PH, PCO2, PO2, HCO3, O2SAT  Medication(s) Given:  []  Narcan (Naloxone)   []  Romazicon (Flumazenil)    []  Breathing Treatment    []  Steroids (Prednisone, Solu-Medrol)  []  Adenosine  [] Cardiac Medicines:     Infusion(s):   [] Normal Saline    Amount:  cc/hr      [] Lactate Ringers      [] Other:     Imaging:   [x] CXR:   [] Normal         [] Pneumothorax         [] Pulmonary Edema  [x] Infiltrate          [] CT Head  [] Normal          [] ICB          [] SAH          [] Other:      Laboratory Tests:   BMP, magnesium, ionized calcium, H&H      Teams Assessment and Plan:  1.   SVT and shivering    - HR 50's to 230's    - Pt just had a bronch done, anesthesia related?    -Stat CXR negative for pneumothorax   - Tylenol and hydroxyzine 25 mg PO did not help    - Total 1mg ativan given due to anxiety and pt HR back to baseline of Sinus tachycardia with a rate of 140-150   -Cardiology was consulted last night because of sinus tachycardia. Nursing staff to update cardiology on patient's current status   -Stat H&H (for concern for acute anemia per history), BMP, magnesium, ionized calcium were ordered. H&H stable ionized calcium low --> replaced. -BMP and magnesium still pending  ? ?  ?   Disposition:  [x] No transfer   [] Transfer to monitor floor  [] Transfer to: [] MICU [] NICU [] CCU [] SICU    Patients family updated: [] Yes  [] No   Discussed with:  [] Critical Care Intensivist:         [x] Primary Care Provider: Dr Carmelita Felix      [] Other:    Carmelita Huston MD PGY-3  10/15/2021 4:38 PM  Attending Physician: Dr Carmelita Felix

## 2021-10-15 NOTE — PROGRESS NOTES
[facility] PROGRESS NOTE      Chief complaint: Follow-up of Gram-positive cocci in clusters on blood culture    The patient is a 36 y.o. male, not vaccinated against COVID-19, with history of asthma, presented on 10/05 with shortness of breath, found to be febrile at 103 °F, positive SARS-CoV-2 PCR, bilateral groundglass opacities on chest CT, tolerating room air with oxygen saturation of 97%. He reports having tested positive for SARS-CoV-2 for the 3rd time now with previous on in late August at which time he reports having quarantined. Urine Streptococcus pneumonia and Legionella antigens were negative. Blood cultures from 10/05 and 10/06 showed methicillin-susceptible Staphylococcus epidermidis in 1 out of 2 sets. HIV screen was positive. CD4 count was low at 43. Transthoracic echocardiogram showed late positive bubble suggesting possible shunt from pulmonary origin, moderately dilated left atrium, small pericardial effusion without tamponade physiology, ejection fraction visually estimated at 55%. Subjective: Patient was seen and examined. No chills, no abdominal pain, no diarrhea, no rash, no itching. Objective:  /87   Pulse 137   Temp 98.5 °F (36.9 °C) (Tympanic)   Resp 20   Ht 6' 4\" (1.93 m)   Wt 173 lb (78.5 kg)   SpO2 99%   BMI 21.06 kg/m²   Constitutional: Alert, not in distress  Respiratory: Clear breath sounds, no crackles, no wheezes  Cardiovascular: Regular rate and rhythm, no murmurs  Gastrointestinal: Bowel sounds present, soft, nontender  Skin: Warm and dry, no active dermatoses  Musculoskeletal: No joint swelling, no joint erythema    Labs, imaging, and medical records/notes were personally reviewed. Assessment:  Sepsis, resolved  COVID-19, mild  Pneumonia  Methicillin susceptible Staphylococcus epidermidis bacteremia, etiology unclear  DEISI  Suspected advanced HIV disease (CD4 of 37). Hepatitis C Ab positive.   Prolonged QTc    Recommendations:  Continue cefepime 2g q8h.  Continue high-dose Bactrim at 5 mg trimethoprim dose per kilogram actual body weight every 8 hours for suspected Pneumocystis pneumonia. Follow up serum 1, 3-beta D glucan. Plan for bronchoscopy is noted. No need for sputum induction. Send BAL for Gram stain, aerobic and anaerobic cultures and Pneumocystis PCR. Follow up HIV viral load, hepatitis C viral load. Follow up HIV GART, [id number], Toxoplasma Ab, RPR, urine GC/Chlamydia PCR, hepatitis A total Ab. Monitor respiratory status. Continue supportive care. Case was discussed with Dr. Joselin Demarco.     Thank you for involving me in the care of Kristin Zuletaoly James. Dr. Norma Miranda will continue to follow. Please do not hesitate to call for any questions or concerns.     Electronically signed by Racheal Terry MD on 10/15/2021 at 11:23 AM

## 2021-10-15 NOTE — PROGRESS NOTES
Messaged attending Dr Pastora Olmos about patients continued tachycardia of 135 to 160 throughout the night. No new orders received at this time.

## 2021-10-15 NOTE — PROGRESS NOTES
cr improved    At this point, the patient does not desire his mother to know results of hiv/hcv testing, but ok with discussion with his step-father, Jesus Herrera. Attending Physician Statement  I have reviewed the chart and seen the patient with the resident(s). I personally reviewed images, EKG's and similar tests, if present. I personally reviewed and performed key elements of the history and exam.  I have reviewed and confirmed student and/or resident history and exam with changes as indicated above. I agree with the assessment, plan and orders as documented by the resident. Please refer to the resident and/or student note for additional information.       Martine Layton MD

## 2021-10-15 NOTE — PROGRESS NOTES
[address] [place] [address] Attending    S: 36 y.o. male with history of asthma and smoking history presented with increasing dyspnea and cough over past month. On presentation had fever of 103. Has tested positive for COVID twice, most recently end of August.  CT shows extensive bilateral pneumonia and COVID testing again positive. HIV pos, CD4 43. Pt states breathing is ok. O: VS- Blood pressure 118/71, pulse 104, temperature 98.9 °F (37.2 °C), resp. rate 18, height 6' 4\" (1.93 m), weight 173 lb (78.5 kg), SpO2 94 %. Exam is as noted by resident   Awake, alert and oriented. on room air  Heart - RRR  Lungs- CTAB  Ext - no edema      Impressions:   Principal Problem:    Acute respiratory failure with hypoxia (HCC)  Active Problems:  Repeatedly positive Covid  Stable groundglass opacities  Saturations okay on room air    Acute kidney injury due to COVID-19 ([address]    Asthma    Nephrotic range proteinuria  50 pound weight loss  2 blood cultures positive      Plan   Covid pneumonia with sepsis  cta neg for pe, for bronchoscopy today  Appreciate Nephrology, ID, and pulmonology following   Off oxygen but sinus tachycardia persists, Echo with EF 55%, small pericardial effusion and possible atrial shunt-cardiology consulted  FOBT heme positive first time, neg on repeat. Gen surgery has no plans for scopes s/p 1U PRBCs, will trend h/h  Blood culture with staph epi, on Ancef; antibiotics were changed from ancef to bactrim and cefepime for possible sepsis/PCP pneumonia  Awaiting further lab, TB, confirmatory testing pending with hcv ab positive-neg viral load  Reactive HIV 1 antibodies; viral load and resistance testing pending, CD4 count of 43  Decadron stopped  Nephrology planning biopsy outpatient, W/U for possible Wegener's  DEISI, follow creatinine, on bumex with pleural effusions and elevated probnp (on oxygen 10/13).  Low threshold for stopping bumex given pt euvolemic  Tachy - replace amlodipine with metoprolol  Disposition planning  Follow up outpatient for right kidney lesion-ct/mri when cr improved    At this point, the patient does not desire his mother to know results of hiv/hcv testing, but ok with discussion with his step-father, Laurie Ferrari. Attending Physician Statement  I have reviewed the chart and seen the patient with the resident(s). I personally reviewed images, EKG's and similar tests, if present. I personally reviewed and performed key elements of the history and exam.  I have reviewed and confirmed student and/or resident history and exam with changes as indicated above. I agree with the assessment, plan and orders as documented by the resident. Please refer to the resident and/or student note for additional information.       Kristen Nicholas MD

## 2021-10-15 NOTE — PROGRESS NOTES
[facility] Inpatient   Resident Progress Note    S:  Hospital day: 10   Brief Synopsis: Abel Sosa is a 36 y.o. male with no PMH who presented with hypoxia and fever. He was initially seen in PCP office and was hypoxic on exertion with desaturate into the mid low 80s as well as noted to be febrile and tachycardic. Per patient, his symptoms began 8/15/2021 and he tested positive for Covid 8/28. endorse worsening shortness of breath over the past month with sputum production, intermittent chest discomfort, persistent diarrhea, chills, fever, decrease in appetite and 50 pound weight loss since August.  Patient also endorses significantly worsening \"neuropathy\" of bilateral feet, states it feels like he is \"being stabbed by needles\" which is causing him inability to walk due to pain. In the ED, he was found to be anemic, hyperkalemic, hyponatremic, febrile and COVID positive. He also had elevated Creatinine, AST, proBNP and troponin x2. CTA showed extensive multifocal COVID-19 pneumonia bilaterally, bilateral trace layering pleural effusion and bilateral gynecomastia. CT abdomen was limited due to movement and non-contrast, but showed normal appendix and no evidence of bowel obstruction. Admitted for acute hypoxia due to Covid pneumonia. Nephro, pulm and ID consulted. Started on bicarb drip. US showed hyperechoic kidneys, trace b/l perinephric fluid and right kidney lesion (Cyst vs infarction vs abscess vs neoplasia). HIV and Hep C screen were negative. HIV RNA and T and B lymphocytes were ordered. Patient became more hypoxic, tachycardic and hypertensive so  there was concern for PE. CTA was ordered, which came back negative. Echo was completed due to elevated BNP and concern for PE and that revealed a normal EF with late bubble sign possible shunting of pulmonary origin. BNP continued to increase. Cardiology was consulted. Overnight/interim:  · Patient was seen and examined at bedside.  No fever overnight. He was not informed yesterday about his results. Dr. Yeimy Blackburn was in the room explaining to him the updates. He was frustrated and stated, \"You can examine me, but then I want yall out of here. \"        Cont meds:    sodium chloride      sodium chloride       Scheduled meds:    bumetanide  1 mg Oral BID    amLODIPine  5 mg Oral Daily    sulfamethoxazole-trimethoprim (BACTRIM) IVPB  400 mg IntraVENous Q8H    cefepime  2,000 mg IntraVENous Q8H    levalbuterol  0.63 mg Nebulization Q8H    pantoprazole  40 mg Oral QAM AC    sodium chloride flush  5-40 mL IntraVENous 2 times per day    heparin (porcine)  5,000 Units SubCUTAneous Q8H     PRN meds: labetalol, hydrOXYzine, sodium chloride, sodium chloride flush, ondansetron **OR** ondansetron, polyethylene glycol, acetaminophen **OR** acetaminophen, perflutren lipid microspheres, sodium chloride     I reviewed the patient's past medical and surgical history, Medications and Allergies. O:  /87   Pulse 137   Temp 98.5 °F (36.9 °C) (Tympanic)   Resp 20   Ht 6' 4\" (1.93 m)   Wt 173 lb (78.5 kg)   SpO2 99%   BMI 21.06 kg/m²   24 hour I&O: I/O last 3 completed shifts:  In: -   Out: 1500 [Urine:1500]  No intake/output data recorded. Physical Exam  Constitutional:       Appearance: Normal appearance. He is not ill-appearing, toxic-appearing or diaphoretic. HENT:      Head: Normocephalic and atraumatic. Nose: No rhinorrhea. Mouth/Throat:      Mouth: Mucous membranes are moist.      Pharynx: Oropharynx is clear. Eyes:      General: No scleral icterus. Right eye: No discharge. Left eye: No discharge. Extraocular Movements: Extraocular movements intact. Cardiovascular:      Rate and Rhythm: Normal rate and regular rhythm. Pulses: Normal pulses. Heart sounds: Normal heart sounds. No murmur heard. No friction rub. No gallop.     Pulmonary:      Effort: Pulmonary effort is normal. No respiratory also be seen with infarction, abscess, or neoplasia. Consider further evaluation with renal protocol abdomen MRI (preferred) or   CT. Following resolution of acute kidney injury. XR CHEST PORTABLE   Final Result   Multifocal bilateral pulmonary ground-glass airspace opacities are stable         CT ABDOMEN PELVIS WO CONTRAST Additional Contrast? None   Final Result   Limited study by the patient's respiratory motion and lack of intravenous   contrast.      Extensive multifocal COVID-19 pneumonia bilaterally. Bilateral trace layering pleural effusions. Bilateral gynecomastia. Abdominal organs inadequately evaluated due to the limitations of the study. Normal appendix. No evidence of bowel obstruction. CT CHEST WO CONTRAST   Final Result   Limited study by the patient's respiratory motion and lack of intravenous   contrast.      Extensive multifocal COVID-19 pneumonia bilaterally. Bilateral trace layering pleural effusions. Bilateral gynecomastia. Abdominal organs inadequately evaluated due to the limitations of the study. Normal appendix. No evidence of bowel obstruction.                Resident Assessment and Plan       Acute hypoxia 2/2 COVID pneumonia   - Worsening SOB since 8/28/2021 when he initially tested positive   - CTA: COVID pneumonia, Bilateral trace layering pleural effusion, negative for PE   - D dimer elevated on admission, currently stable, 730  - Ferritin 3900, CRP 10.1 on admission   - WBC 4.2 on admission, now 3.2  - saturating 92 % on room air  - Continue monitoring SpO2  - Pulmonology and ID on board  - Legionella negative  - T spot TB test pending   - Afebrile   - Decadron discontinued on 10/9  - CXR 10/8 shows multifocal groundglass opacities are stable  - Repeat CXR today: b/l pulm infiltrates   - ID on board: received 2 days of vancomycin 10/6, discontinued ancef, started on cefepime 2g q8h and bactrim 400mg q8h for respiratory coverage   - Blood cx: staph epidermitis, repeat blood cx shows no growth x5 days    -Urine culture: NGTD   -Counseled on getting COVID vaccine after patient recovers   - ECHO: EF 55%, late bubble sign possible shunting of pulmonary origin   - CTA 10/13 negative for PE  Bronchoscopy with BAL in the a.m.     Weight loss   - Per patient due to decreased appetite from COVID; 50lb weight loss since August   - R/o other potential origins   - MARILEE negative  HIV, hepatitis C antibodies positive   - Leukopenia   - Elevated IgG and IgA, normal IgM: IgA nephropathy vs MPGN?  - Cryoglobulin normal   - TSH normal   - Sed rate and CRP elevated      HIV  HIV 1/2 antibodies positive  CD4 44, CD3 153, CD8 100  HIV RNA quantitative PCR pending  - ID on board  - tests for opportunistic infections pending: HIV GART, [id number], Toxoplasma Ab, RPR, urine GC/Chlamydia PCR, hepatitis A total Ab     Hepatitis C resolved  Hep C antibody reactive  Viral load undetectable    Anemia   - Likely 2/2 from neuropathy vs GI bleed vs HIV   - Hb 9.3  Transfused 1 unit 10/11, post transfusion Hb 8   - General surgery consulted, FOBT negative the second time    DEISI   - Likely secondary to dehydration from persistent diarrhea and decreased PO intake   - Cr on admission 3.6, trending up now 1.9, due to IV contrast  - Urine osm and urine nitrogen low   - UA: protein, 1-2 waxy casts, moderate bacteria, moderate blood   - bicarb drip discontinued 10/8  - Microalbumin-creatinine ratio elevated   - Kidney bx OP 2 weeks after discharge   Fluid restriction     Elevated troponin and proBNP  - 2/2 myocardial injury vs stress cardiomyopathy due to COVID vs new onset CHF   - initial troponin 78, 78  - initial BNP 1599, continues to increase, repeat 11,401 on 10/14  - ECHO: EF 55%, late bubble sign possible shunting of pulmonary origin  Received 1 dose of IV Lasix  On Bumex 1 mg twice daily    Tachycardia  Consistently HR 120s to 140s  Cardio consulted  - received two dose of metoprolol, with minimal improvement  Asymptomatic  Continue to monitor    HTN  - BP has been consistently elevated SBP >140   - Tachycardic   - on amlodipine 5mg daily  - labetalol PRN     Proteinuria  -protein to creatinine ratio 3.8  - microalbumin to creatine ratio elevated   -kidney biopsy per nephro, patient agreeable    Hypoalbuminemia   - Alb 1.7 on admission, now 1.3  -Previously received albumin 25 g q6h x 4 doses 10/6  -Currently receiving albumin 25 g q6h x 8 doses  Low C3, normal C4 and cryoglobulin  Fluid restriction  - Continue to monitor             PT/OT evaluation: not indicated   DVT prophylaxis: heparin due to DEISI    GI prophylaxis:protonix  Disposition:home +/- home health/ rehab   Diet: adult         Electronically signed by Edith Flores MD on 10/15/2021 at 9:07 AM  Attending physician: Dr. Javier Ahn

## 2021-10-15 NOTE — PROGRESS NOTES
[place]  Department of Internal Medicine  Division of Pulmonary, Critical Care and Sleep Medicine  Progress Note    Italo Horner DO, MPH, [facility], FACOI, FACP  Jelani Dolan MD, [facility]  David REAVES, [facility]      Patient: Eve Negrete  MRN: [id number]  : 1990    Encounter Time: 11:39 AM     Date of Admission: 10/5/2021  3:07 PM    Primary Care Physician: Joe Hawthorne MD    Reason for Consultation: COVID     SUBJECTIVE:    Hep C and HIV + screen  CD4 is 54  Echo Pending  Fever last PM  CTA looks like pulmonary edema +/- infection    OBJECTIVE:     PHYSICAL EXAM:   VITALS:   Vitals:    10/14/21 1630 10/14/21 1730 10/15/21 0045 10/15/21 0827   BP: (!) 137/96  135/88 130/87   Pulse: 120  144 137   Resp: 15  16 20   Temp: 100 °F (37.8 °C)  100.1 °F (37.8 °C) 98.5 °F (36.9 °C)   TempSrc: Temporal  Tympanic Tympanic   SpO2: 98% 100%  99%   Weight:       Height:            Intake/Output Summary (Last 24 hours) at 10/15/2021 1139  Last data filed at 10/14/2021 1600  Gross per 24 hour   Intake    Output 300 ml   Net -300 ml        CONSTITUTIONAL:   A&O x 3, NAD  SKIN:     No rash, No suspicious lesions or skin discoloration  HEENT:     EOMI, MMM, No thrush  NECK:    No bruits, No JVP apprechiated  CV:      Sinus,  No murmur, No rubs, No gallops  PULMONARY:   Couse BS,  No Wheezing, No Rales, No Rhonchi      No noted egophony  ABDOMEN:     Soft, non-tender. BS normal. No R/R/G  EXT:    No deformities . No clubbing.       + lower extremity edema, No venous stasis  PULSE:   Appears equal and palpable.   PSYCHIATRIC:  Seems appropriate, No acute psycosis  MS:    No fractures, No gross weakness  NEUROLOGIC:   The clinical assessment is non-focal     DATA: IMAGING & TESTING:     LABORATORY TESTS:    CBC:   Lab Results   Component Value Date    WBC 4.3 10/15/2021    RBC 3.11 10/15/2021    HGB 9.3 10/15/2021    HCT 28.0 10/15/2021    MCV 90.0 10/15/2021    MCH 29.9 10/15/2021 MCHC 33.2 10/15/2021    RDW 14.6 10/15/2021     10/15/2021    MPV 11.8 10/15/2021     CMP:    Lab Results   Component Value Date     10/15/2021    K 4.0 10/15/2021     10/15/2021    CO2 19 10/15/2021    BUN 16 10/15/2021    CREATININE 1.9 10/15/2021    GFRAA 51 10/15/2021    LABGLOM 51 10/15/2021    GLUCOSE 68 10/15/2021    PROT 6.1 10/15/2021    LABALBU 3.0 10/15/2021    CALCIUM 8.0 10/15/2021    BILITOT 0.5 10/15/2021    ALKPHOS 66 10/15/2021    AST 65 10/15/2021    ALT 13 10/15/2021     Magnesium:    Lab Results   Component Value Date    MG 2.4 10/11/2021     Phosphorus:  No results found for: PHOS  PT/INR:    Lab Results   Component Value Date    PROTIME 17.7 10/15/2021    INR 1.6 10/15/2021     FERRITIN:    Lab Results   Component Value Date    FERRITIN 3,921 10/06/2021     Fibrinogen Level:  No components found for: FIB     PRO-BNP:   Lab Results   Component Value Date    PROBNP 11,401 (H) 10/13/2021    PROBNP 3,431 (H) 10/09/2021      ABGs: No results found for: PH, PO2, PCO2  Hemoglobin A1C: No components found for: HGBA1C    IMAGING:  Imaging tests were completed and reviewed and discussed radiology and care team involved and reveals     CT CHEST : There is adequate opacification of the pulmonary arteries.  There is no   evidence of filling defect to suggest pulmonary embolism. Omelia Sovereign is no   evidence of thoracic aortic aneurysm or dissection.  There are small   bilateral pleural effusions.  These are increased compared to prior   examination.  There is small pericardial effusion which appears stable.  Left   hilar lymph node measures approximately 12 mm in short axis.  Right hilar   lymph node measures approximately 12 mm in short axis.  There is bilateral   gynecomastia. Omelia Sovereign is diffuse subcutaneous edema.      The central airways are patent.  There is no pneumothorax.  There are   bilateral ground-glass opacities and alveolar consolidation.  There has been   interval increase in the airspace disease compared to prior examination.  The   interval worsening is most pronounced within the lower lobes. [place] is   associated interstitial thickening. CT ABDOMEN PELVIS WO CONTRAST Additional Contrast? None    Result Date: 10/5/2021  FINDINGS: THE STUDY IS LIMITED DUE TO LACK OF INTRAVENOUS CONTRAST. Chest: Mediastinum: No thoracic aortic aneurysm. No definite adenopathy on this unenhanced study. Trace pericardial fluid anteriorly. Lungs/pleura: Trace layering pleural effusions bilaterally, slightly larger on the right. No pneumothorax. Numerous ground-glass densities in the bilateral upper and to a lesser degree lower lungs, in keeping with known COVID-19 pneumonia. Soft Tissues/Bones: Bilateral gynecomastia. No acute osseous abnormality in the thoracic cage. Abdomen and pelvis: The study is degraded by the patient's respiratory motion. Organs: Abdominal organs inadequately evaluated on this motion degraded and unenhanced study. No hydronephrosis on either side. GI/Bowel: Normal appendix. No evidence of bowel obstruction. Pelvis: Normal sized prostate. Urinary bladder grossly intact. Peritoneum/Retroperitoneum: No free air or free fluid. No bulky adenopathy. No abdominal aortic aneurysm. Bones/Soft Tissues: No lytic or sclerotic lesion in the regional skeleton. Limited study by the patient's respiratory motion and lack of intravenous contrast. Extensive multifocal COVID-19 pneumonia bilaterally. Bilateral trace layering pleural effusions. Bilateral gynecomastia. Abdominal organs inadequately evaluated due to the limitations of the study. Normal appendix. No evidence of bowel obstruction. CT CHEST WO CONTRAST    Result Date: 10/5/2021  FINDINGS: THE STUDY IS LIMITED DUE TO LACK OF INTRAVENOUS CONTRAST. Chest: Mediastinum: No thoracic aortic aneurysm. No definite adenopathy on this unenhanced study. Trace pericardial fluid anteriorly.  Lungs/pleura: Trace layering pleural effusions bilaterally, slightly larger on the right. No pneumothorax. Numerous ground-glass densities in the bilateral upper and to a lesser degree lower lungs, in keeping with known COVID-19 pneumonia. Soft Tissues/Bones: Bilateral gynecomastia. No acute osseous abnormality in the thoracic cage. Abdomen and pelvis: The study is degraded by the patient's respiratory motion. Organs: Abdominal organs inadequately evaluated on this motion degraded and unenhanced study. No hydronephrosis on either side. GI/Bowel: Normal appendix. No evidence of bowel obstruction. Pelvis: Normal sized prostate. Urinary bladder grossly intact. Peritoneum/Retroperitoneum: No free air or free fluid. No bulky adenopathy. No abdominal aortic aneurysm. Bones/Soft Tissues: No lytic or sclerotic lesion in the regional skeleton. Limited study by the patient's respiratory motion and lack of intravenous contrast. Extensive multifocal COVID-19 pneumonia bilaterally. Bilateral trace layering pleural effusions. Bilateral gynecomastia. Abdominal organs inadequately evaluated due to the limitations of the study. Normal appendix. No evidence of bowel obstruction. Assessment:  Mr. Kindra Serrano is a 36year old male with no significant PMH who was admitted on October 5, 2021 after he presented from an outside clinic after he was found to be hypoxic during 6 minute walking test. Patient tested positive for Covid 19 on August 18th, he is unvaccinated and had Covid 19 last year as well. Patient states he has quarantined by himself and when his mother recently visited him she was shocked at his appearance as he had lost about 50 pounds in 2 and a half months  Sepsis  COVID-19, mild  HIV   Pneumonia  Gram-positive cocci in clusters bacteremia, etiology unclear  DEISI        Plan:   1.  Fever and increase oxygen, Suspect infection pneumonia  I have reviewed risks/benefits and indications of the procedure in detail with the patient and Urban Quiles Steven indicates that he understands and wishes to proceed. Today Bronchoscopy with BAL with [facility]  Need N95 to be warn for bronchoscopy   2. HIV status CD4 unknown  3. Start Bronchodialtors  4. ID to see for +HIV , CD4 and VL  5.  Check BNP and diuresis if elevated        Shane Olivier DO DO, MPH, FCCP, [facility]  Professor of Internal Medicine  Pulmonary, Critical Care and Sleep Medicine

## 2021-10-15 NOTE — BH NOTE
[id number] [id number] Consultation   [facility] Family Medicine    Reason for consult:  Anxiety; Difficulty coping with hospital stressors    Background:  Pt admitted for dyspnea, cough, Covid positive, fever 103, and bilateral pneumonia. Parents involved in his care by phone. 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 Endorsed social alcohol use, denied current illicit drug use. No children. Denied any prior history of mental health diagnosis or treatment. No SI/HI. Psychotherapy Intervention: Patient seen with family medicine resident Remington Ortiz MD PGY 1.  He appears to have difficulty understanding information about his status and care. Discussed how to manage hospital stressors by improving engagement in his care. Advised that he write down information that is discussed with him by his providers. Provided him with pen and paper. Discussed how to improve communication with his healthcare providers by asking questions and/or clarifying information when he does not understand. Recommendations/Plan:   Will continue to follow with Family Medicine team during hospitalization. Available for outpatient treatment if needed.       Electronically signed by Petra Eldridge PhD

## 2021-10-15 NOTE — CONSULTS
Inpatient Cardiology Consultation      Reason for Consult: Persistent tachycardia and late positive bubble on echo, in the setting of COVID-19 infection. Consulting Physician: Dr. Daphnie Liang    Requesting Physician:  Dr. Govind Sandhu    Date of Consultation: 10/15/2021    HISTORY OF PRESENT ILLNESS:     The following information is taken from electronic medical records and from a phone call with the patient due to an active COVID-19 infection. He is new to [address] [place] [address] and denies any cardiac history. He has a history of a COVID-19 infection from last year. He again tested positive for COVID-19 sometime at the end of August.  His symptoms were fatigue and fever, dyspnea and cough. He has been losing weight but denies chest pain. He has not been very active at home since the end of August due to COVID-19 symptoms. He was admitted on October 5 th. He was sent from primary care for hypoxia, fatigue dyspnea,  fever and tachycardia, cough and diarrhea and a 50 pound weight loss since August.  He was having \"neuropathy \" pain in his feet with inability to walk due to pain. Troponins were elevated at seventy-eight and a 2D echocardiogram was ordered. proBNP was one fifty-nine but a CTA of the chest showed bilateral trace layering pleural effusions. He was Positive for COVID-19 pneumonia and started on Decadron. He was in DEISI, creatinine 3.6, and hyponatremic at 130 with a potassium of 5.2 and a hemoglobin of 9.9, baseline labs was unknown. He was evaluated by nephrology and started on bicarbonate drip. He had proteinuria and is being evaluated. 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 This will most likely occur in a few weeks due to his active COVID-19 infection. He is being diuresed for nephrotic syndrome. He was evaluated by infectious disease for sepsis. Blood cultures were gram-positive cocci.   He was started on IV antibiotics. Blood cultures now positive for staph epidermitis. Per pulmonology, bronchoscopy is planned for today. He has been evaluated by general surgery for anemia. His hemoglobin is trended down to 6.7 and he is positive for occult blood. No intervention is planned at this time. Blood work is positive for HIV and hepatitis C. He has been persistently tachycardic with rates up to 160 bpm.  TSH on October 7 was 3.22. Blood pressure is stable around 135/88 but he is febrile at 100.1 °F    EKG done yesterday shows sinus tachycardia with nonspecific T wave changes. As compared to the EKG on admission, his heart rate was one fourteen. An EKG was done on October 7, his heart rate seventy-four and there was poor R wave progression to V3 and a prolonged QT c 535 ms. He has been treated with metoprolol 5 mg IV on October 14 x2 at 10:45 AM and again at 4 PM.  He received one dose of Lasix 40 mg IV yesterday and is now on Bumex orally 2 mg twice a day. Labs from today show an H&H of 9.3 and 28, WBCs 4.3.. Yesterday potassium was 4.4 and BUN and creatinine were thirteen and 1.7, BNP 19173 on October 13, 2021. D-dimer was on October 13.    2D echocardiogram shows normal left ventricular systolic function. Past medical history  1. Underweight with current weight loss  2. Former smoker and quit in August of this year  3. Asthma, he denies  4. COVID-19 infection, June 2020 and recurrent COVID-19 infection August 28, 2021, he currently declines vaccination  5. 2D echocardiogram October 5, 2021  Ejection fraction is visually estimated at 55%. There is late positive bubble suggesting possible shunt from pulmonary origin. Visually moderately dilated left atrium. Normal right ventricular size and function. There is small pericardial effusion without tamponade physiology    6.  Finger surgery for a fracture but he is unclear of the details      Medications Prior to admit:  Prior to Admission medications    Medication Sig Start Date End Date Taking? Authorizing Provider   chlorhexidine (PERIDEX) 0.12 % solution SWISH 1/2 OUNCE BY MOUTH TWICE DAILY 9/9/21   Historical Provider, MD       Current Medications:    Current Facility-Administered Medications: bumetanide (BUMEX) tablet 1 mg, 1 mg, Oral, BID  amLODIPine (NORVASC) tablet 5 mg, 5 mg, Oral, Daily  labetalol (NORMODYNE;TRANDATE) injection 10 mg, 10 mg, IntraVENous, Q6H PRN  sulfamethoxazole-trimethoprim (BACTRIM) 400 mg in dextrose 5 % 500 mL IVPB, 400 mg, IntraVENous, Q8H  cefepime (MAXIPIME) 2000 mg IVPB minibag, 2,000 mg, IntraVENous, Q8H  hydrOXYzine (ATARAX) tablet 10 mg, 10 mg, Oral, TID PRN  levalbuterol (XOPENEX) nebulization 0.63 mg, 0.63 mg, Nebulization, Q8H  pantoprazole (PROTONIX) tablet 40 mg, 40 mg, Oral, QAM AC  0.9 % sodium chloride infusion, , IntraVENous, PRN  sodium chloride flush 0.9 % injection 5-40 mL, 5-40 mL, IntraVENous, 2 times per day  sodium chloride flush 0.9 % injection 5-40 mL, 5-40 mL, IntraVENous, PRN  ondansetron (ZOFRAN-ODT) disintegrating tablet 4 mg, 4 mg, Oral, Q8H PRN **OR** ondansetron (ZOFRAN) injection 4 mg, 4 mg, IntraVENous, Q6H PRN  polyethylene glycol (GLYCOLAX) packet 17 g, 17 g, Oral, Daily PRN  acetaminophen (TYLENOL) tablet 650 mg, 650 mg, Oral, Q6H PRN **OR** acetaminophen (TYLENOL) suppository 650 mg, 650 mg, Rectal, Q6H PRN  heparin (porcine) injection 5,000 Units, 5,000 Units, SubCUTAneous, Q8H  perflutren lipid microspheres (DEFINITY) injection 1.65 mg, 1.5 mL, IntraVENous, ONCE PRN  0.9 % sodium chloride infusion, 25 mL, IntraVENous, PRN    Allergies:  Patient has no known allergies.     Social History: Quit smoking in August 2021 and admits to binge drinking occasionally on the weekends, no illicit drugs      Family History: Both parents living with no premature coronary artery disease  Family History   Problem Relation Age of Onset    Diabetes type 2  Mother     Diabetes type 2  Father last 72 hours. Phos: No results for input(s): PHOS in the last 72 hours. TFT:   Lab Results   Component Value Date    TSH 3.220 10/06/2021      HgA1c: No results found for: LABA1C  No results found for: EAG  proBNP:   Recent Labs     10/13/21  0727   PROBNP 11,401*     PT/INR:   Recent Labs     10/15/21  0708   PROTIME 17.7*   INR 1.6     APTT:No results for input(s): APTT in the last 72 hours. CARDIAC ENZYMES:No results for input(s): CKTOTAL, CKMB, CKMBINDEX, TROPHS in the last 72 hours. FASTING LIPID PANEL:No results found for: CHOL, HDL, LDLDIRECT, LDLCALC, TRIG  LIVER PROFILE:  Recent Labs     10/13/21  0727 10/14/21  0929   AST 57* 56*   ALT 10 11   LABALBU 3.1* 2.8*     Impressions  1. Sinus tachycardia which is multifactorial, fever, Covid PNA, Anemia, GI bleed  2. COVID-19 pneumonia, lymphadenopathy  3. Elevated proBNP/pleural effusions  4. Abnormal 2D echocardiogram, small pericardial effusion and possible shunt from pulmonary origin, likely from pulmonary AMV from Covid 19  5. Prolonged QTC  6. Hepatitis C, elevated AST of 65  7. HIV   8. DEISI with volume overload and nephrotic syndrome  9. Elevated D-dimer with CTA of the chest on October 13 showing no PE  10. New onset hypertension  11. Anemia with positive stool cards mild thrombocytopenia,  12. recent tobacco abuse  13. Weight loss  14. Nephrotic syndrome and DEISI  15. Gynecomastia on CTA of the chest October 13      Plans  1. Correct underlying causes of tachycardia  2. Repeat   3. 2d echo when Covid 19 resolved         Electronically signed by RICKY Ch CNP on 10/15/2021 at 8:26 AM     Case discussed in detail with cardiology nurse practitioner and agree with assessment and plan and history and physical examination as documented and discussed in detail above.   Underlying sinus tachycardia is due to the patient's ongoing infectious etiology and the mildly positive bubble study which appears to be pulmonary in nature is probably due to the COVID-19 infection with subsequent possible pulmonary AVMs. Consider repeat limited echo with bubble once patient COVID-19 has improved to see if the mild positive bubble study has resolved with resolution of COVID-19 infection.

## 2021-10-15 NOTE — PROGRESS NOTES
Valorie TELLEZ with [facility] cardiology notified of Dr. Jennifer Chaves EKG interpretation in [facility] from [facility]RT.

## 2021-10-15 NOTE — PROGRESS NOTES
Joselin Dinh NP with cardiology and Dr. Luiz Jasmine notified of RRT for SVT and tremoring post bronch.

## 2021-10-15 NOTE — OP NOTE
[place]  Department of Pulmonary, Critical Care and Sleep Medicine  [address] [facility]  Department of Internal Medicine    BRONCHOSCOPY PROCEDURE NOTE    DATE OF PROCEDURE: 10/15/2021      INDICATIONS & HISTORY:  Javy Nielsen is a 36 y.o. male with HIV AIDS CD54 with abdmonral CT imaging. The risks, benefits, complications, treatment options and expected outcomes were discussed with the patient and/or appropriate POA/care givers. The possibilities of reaction to medication, pulmonary aspiration, perforation of an organ, bleeding, failure to diagnose a condition and creating a complication requiring transfusion or operation were discussed with the patient/POA who freely signed the informed consent. PREOPERATIVE DIAGNOSIS:    [] Lung Nodule,  [] Lung Cancer Evaluation,  [] Adenopathy,  [x] Diffuse Lung Disease,   [] Mucous Plug,  [x] Abnormal Imaging CXR/CT scan,     POSTOPERATIVE DIAGNOSES:  [x] Normal Endobronchial Evaluation,  [x] Normal Anatomy,  [x] No endobronchial mass/tumor      PROCEDURE PERFORMED:   [x] Diagnotic, Fiberoptic Bronchoscopy  [x] Bronchoalveolar Lavage [BAL]  [x] Bronchial Wash        SURGEON:    Brooks Muniz DO, DO, MPH, FCCP, FACP, FACOI    ASSISTANT:   Procedural Nursing & Technical Team    SEDATION:  []  General Anesthesia  []  Regional Anesthesia  [x]  [facility] via Anesthesiology Team,   Other [] Versed, [] Fentanyl  [] Other: None     ANESTHESIA:    [] Lidocaine 2% via intranasal instillation,   [] Epinephrine 1:10,000 diluted Endobronchial administration    ANESTHESIOLOGIST:  Per EPIC Note    SPECIMENS: [x] Bronchial sample(s) for :    [] Biospies for cytology. [] EBUS biopsy for Pathology review  [] Transbronchial biopsy for Pathologic review.   [x] PCR or cytology for Pneumocystis      [x] Bronchoalveolar lavage sample   [x] Cell Count & differential    [x] CD4 to CD8 ratio   [x] Flow Cytometry (cell markers) for lymphoma    [x] Infection & Culture Analysis   [x] PCR for Tuberculosis   [x] Fungal smear & culture,   [x] Acid-fast bacillus smear and culture,    [x] Gram stain with C&S,   [x] DFA & Culture for Legionella. [] PCR for Vercella   [x] PCR for Chlamydia   [x] PCR for Myoplasma   [x] PCR for Legionella   [x] PCR for EBNA (Ebstein Barr Nuclear Antigen)  [x] Galactomannan for Aspergillus  [x] DRIPP panel    Special Testing/Analysis  [x] Ig G4+/IgG+ ratio      ESTIMATED BLOOD LOSS: Minimal    FINDINGS:     1. Normal nasal passages,   2. Normal posterior pharynx, & vocal cords. 3. The vocal cords appeared to move normally during phonation,  4. Trachea was within normal limits. 5. No edematous airways  6. Normal right and left bronchial trees. 7. No endobronchial lesions or mucosal irregularities. DESCRIPTION OF PROCEDURE:  After confirming informed consent a time out was held and the above information confirmed. Morena Tam was brought to the procedure suite and administered local anesthesia to the naris and posterior pharynx. This was then followed by administration of appropriate sedation. After adequate sedation was achieved the fiberoptic bronchoscope was inserted into the naris, advanced to the posterior pharynx through the vocal cords and into the trachea. The vocal cords were visualized and Lidocaine was topically placed onto the cords. The cords were Normal.  The scope was then passed into the trachea. Additional 2% Lidocaine was used topically within the airway. Careful inspection of the tracheal lumen was accomplished. We then did a systematic inspection of the entire tracheobronchial tree. The bronchoscope was then advanced to the RUL and wedged this was considered the most abnormal area on imaging. Bronchoalveolar lavage [BAL] was then performed at this segment utilizing (2) 60 mL of isotonic, non-bacteriostatic sterile saline warmed at room temperature.   The Bronchoalveolar lavage returned 40 ml of  cloudy color fluid. The bronchoscope was then pulled back and the team prepared for histological sampling. When this was completed, the additional bronchial washings were obtained. The area was then inspected for any acute hemorrhage, but none was seen. The bronchoscope was withdrawn and the procedure ended. Patient was then assisted to the proper post procedural suit per [facility]. COMPLICATIONS:   None    IMPRESSIONS:   [x]  Airway examination was normal  [x]  No evidence of endobronchial mass/lesion   [x]  No signs of diffuse alveolar damage or hemorrhage. RECOMMENDATIONS:   [x]  The patient was taken to the recovery area in satisfactory stable condition.  See tests results      Cassidy Chacon DO, DO, MPH, Pepper Sandy  Professor of Internal Medicine  [address]

## 2021-10-15 NOTE — ANESTHESIA PRE PROCEDURE
Department of Anesthesiology  Preprocedure Note       Name:  Esther Cintron   Age:  36 y.o.  :  1990                                          MRN:  [id number]         Date:  10/15/2021      Surgeon: Brady Mcmillan):  Fidel Park DO    Procedure: Procedure(s):  BRONCHOSCOPY    Medications prior to admission:   Prior to Admission medications    Medication Sig Start Date End Date Taking?  Authorizing Provider   chlorhexidine (PERIDEX) 0.12 % solution SWISH 1/2 OUNCE BY MOUTH TWICE DAILY 21   Historical Provider, MD       Current medications:    Current Facility-Administered Medications   Medication Dose Route Frequency Provider Last Rate Last Admin    hydrOXYzine (VISTARIL) capsule 25 mg  25 mg Oral TID PRN Edward Soto MD   25 mg at 10/15/21 1029    bumetanide (BUMEX) tablet 1 mg  1 mg Oral BID Jimi Yin MD   1 mg at 10/15/21 0825    amLODIPine (NORVASC) tablet 5 mg  5 mg Oral Daily Edward Soto MD   5 mg at 10/15/21 0825    labetalol (NORMODYNE;TRANDATE) injection 10 mg  10 mg IntraVENous Q6H PRN Edward Soto MD        sulfamethoxazole-trimethoprim (BACTRIM) 400 mg in dextrose 5 % 500 mL IVPB  400 mg IntraVENous Q8H Rosita Yarbrough MD   Stopped at 10/15/21 0545    cefepime (MAXIPIME) 2000 mg IVPB minibag  2,000 mg IntraVENous Q8H Rosita Yarbrough MD   Stopped at 10/15/21 0908    levalbuterol (XOPENEX) nebulization 0.63 mg  0.63 mg Nebulization Q8H Andre Carlson DO   0.63 mg at 10/15/21 0943    pantoprazole (PROTONIX) tablet 40 mg  40 mg Oral QAM AUSTEN Mccullough MD   40 mg at 10/14/21 0552    0.9 % sodium chloride infusion   IntraVENous PRN Edward Soto MD        sodium chloride flush 0.9 % injection 5-40 mL  5-40 mL IntraVENous 2 times per day Yao Brady MD   10 mL at 10/15/21 0900    sodium chloride flush 0.9 % injection 5-40 mL  5-40 mL IntraVENous PRN Yao Brady MD   10 mL at 10/14/21 1625    ondansetron (ZOFRAN-ODT) 130/87   10/05/21 130/76   06/01/20 (!) 128/90       NPO Status:                                                                            Date of last solid food consumption: 10/14/21    BMI:   Wt Readings from Last 3 Encounters:   10/06/21 173 lb (78.5 kg)   10/05/21 171 lb (77.6 kg)   06/01/20 215 lb (97.5 kg)     Body mass index is 21.06 kg/m². CBC:   Lab Results   Component Value Date    WBC 4.3 10/15/2021    RBC 3.11 10/15/2021    HGB 9.3 10/15/2021    HCT 28.0 10/15/2021    MCV 90.0 10/15/2021    RDW 14.6 10/15/2021     10/15/2021       CMP:   Lab Results   Component Value Date     10/15/2021    K 4.0 10/15/2021     10/15/2021    CO2 19 10/15/2021    BUN 16 10/15/2021    CREATININE 1.9 10/15/2021    GFRAA 51 10/15/2021    LABGLOM 51 10/15/2021    GLUCOSE 68 10/15/2021    PROT 6.1 10/15/2021    CALCIUM 8.0 10/15/2021    BILITOT 0.5 10/15/2021    ALKPHOS 66 10/15/2021    AST 65 10/15/2021    ALT 13 10/15/2021       POC Tests: No results for input(s): POCGLU, POCNA, POCK, POCCL, POCBUN, POCHEMO, POCHCT in the last 72 hours.     Coags:   Lab Results   Component Value Date    PROTIME 17.7 10/15/2021    INR 1.6 10/15/2021    APTT 31.6 10/12/2021       HCG (If Applicable): No results found for: PREGTESTUR, PREGSERUM, HCG, HCGQUANT     ABGs: No results found for: PHART, PO2ART, ZXV2CUB, VVM9KMA, BEART, U4VMNOQX     Type & Screen (If Applicable):  No results found for: LABABO, LABRH    Drug/Infectious Status (If Applicable):  No results found for: HIV, HEPCAB    COVID-19 Screening (If Applicable):   Lab Results   Component Value Date    COVID19 DETECTED 10/05/2021           Anesthesia Evaluation  Patient summary reviewed and Nursing notes reviewed no history of anesthetic complications:   Airway: Mallampati: I  TM distance: >3 FB   Neck ROM: full  Mouth opening: > = 3 FB Dental: normal exam         Pulmonary:   (+) recent URI (Covid 19.):  rhonchi,  decreased breath sounds,  asthma: current smoker                           Cardiovascular:    (+) hypertension:,       ECG reviewed  Rhythm: regular  Rate: abnormal  Echocardiogram reviewed         Beta Blocker:  Not on Beta Blocker        PE comment: Tachycardic at rate of 124   Neuro/Psych:   (+) neuromuscular disease (Neuropathy in bilateral feet in toes.):,             GI/Hepatic/Renal: Neg GI/Hepatic/Renal ROS            Endo/Other: Negative Endo/Other ROS       (-) arthritis        Pt had no PAT visit       Abdominal:             Vascular: negative vascular ROS. Other Findings:           Anesthesia Plan      MAC     ASA 3       Induction: intravenous. Anesthetic plan and risks discussed with patient. Use of blood products discussed with patient whom consented to blood products. Plan discussed with CRNA and attending.                   Amarilis John RN   10/15/2021

## 2021-10-16 LAB
ALBUMIN SERPL-MCNC: 2.9 G/DL (ref 3.5–5.2)
ALP BLD-CCNC: 61 U/L (ref 40–129)
ALT SERPL-CCNC: 13 U/L (ref 0–40)
ANION GAP SERPL CALCULATED.3IONS-SCNC: 13 MMOL/L (ref 7–16)
APTT: 105 SEC (ref 24.5–35.1)
APTT: 54.5 SEC (ref 24.5–35.1)
AST SERPL-CCNC: 62 U/L (ref 0–39)
BASOPHILS ABSOLUTE: 0.1 E9/L (ref 0–0.2)
BASOPHILS RELATIVE PERCENT: 1.8 % (ref 0–2)
BILIRUB SERPL-MCNC: 0.5 MG/DL (ref 0–1.2)
BUN BLDV-MCNC: 22 MG/DL (ref 6–20)
CALCIUM SERPL-MCNC: 7.8 MG/DL (ref 8.6–10.2)
CHLORIDE BLD-SCNC: 98 MMOL/L (ref 98–107)
CO2: 20 MMOL/L (ref 22–29)
CREAT SERPL-MCNC: 2.1 MG/DL (ref 0.7–1.2)
EOSINOPHILS ABSOLUTE: 0 E9/L (ref 0.05–0.5)
EOSINOPHILS RELATIVE PERCENT: 0 % (ref 0–6)
GFR AFRICAN AMERICAN: 45
GFR NON-AFRICAN AMERICAN: 45 ML/MIN/1.73
GLUCOSE BLD-MCNC: 75 MG/DL (ref 74–99)
HAV AB SERPL IA-ACNC: POSITIVE
HCT VFR BLD CALC: 24.1 % (ref 37–54)
HCT VFR BLD CALC: 27.1 % (ref 37–54)
HEMOGLOBIN: 7.9 G/DL (ref 12.5–16.5)
HEMOGLOBIN: 9.1 G/DL (ref 12.5–16.5)
LYMPHOCYTES ABSOLUTE: 0.06 E9/L (ref 1.5–4)
LYMPHOCYTES RELATIVE PERCENT: 0.9 % (ref 20–42)
MCH RBC QN AUTO: 29 PG (ref 26–35)
MCHC RBC AUTO-ENTMCNC: 32.8 % (ref 32–34.5)
MCV RBC AUTO: 88.6 FL (ref 80–99.9)
MONOCYTES ABSOLUTE: 0.12 E9/L (ref 0.1–0.95)
MONOCYTES RELATIVE PERCENT: 1.8 % (ref 2–12)
NEUTROPHILS ABSOLUTE: 5.57 E9/L (ref 1.8–7.3)
NEUTROPHILS RELATIVE PERCENT: 95.5 % (ref 43–80)
OVALOCYTES: ABNORMAL
PDW BLD-RTO: 14.6 FL (ref 11.5–15)
PLATELET # BLD: 122 E9/L (ref 130–450)
PMV BLD AUTO: 12.2 FL (ref 7–12)
POIKILOCYTES: ABNORMAL
POLYCHROMASIA: ABNORMAL
POTASSIUM REFLEX MAGNESIUM: 4.1 MMOL/L (ref 3.5–5)
RBC # BLD: 2.72 E12/L (ref 3.8–5.8)
SODIUM BLD-SCNC: 131 MMOL/L (ref 132–146)
TOTAL PROTEIN: 5.6 G/DL (ref 6.4–8.3)
WBC # BLD: 5.8 E9/L (ref 4.5–11.5)

## 2021-10-16 PROCEDURE — 85018 HEMOGLOBIN: CPT

## 2021-10-16 PROCEDURE — 2500000003 HC RX 250 WO HCPCS: Performed by: INTERNAL MEDICINE

## 2021-10-16 PROCEDURE — 6360000002 HC RX W HCPCS: Performed by: NURSE PRACTITIONER

## 2021-10-16 PROCEDURE — 93005 ELECTROCARDIOGRAM TRACING: CPT | Performed by: INTERNAL MEDICINE

## 2021-10-16 PROCEDURE — 94640 AIRWAY INHALATION TREATMENT: CPT

## 2021-10-16 PROCEDURE — 80053 COMPREHEN METABOLIC PANEL: CPT

## 2021-10-16 PROCEDURE — 2700000000 HC OXYGEN THERAPY PER DAY

## 2021-10-16 PROCEDURE — 99233 SBSQ HOSP IP/OBS HIGH 50: CPT | Performed by: INTERNAL MEDICINE

## 2021-10-16 PROCEDURE — 2580000003 HC RX 258: Performed by: INTERNAL MEDICINE

## 2021-10-16 PROCEDURE — 6360000002 HC RX W HCPCS: Performed by: INTERNAL MEDICINE

## 2021-10-16 PROCEDURE — 85014 HEMATOCRIT: CPT

## 2021-10-16 PROCEDURE — 6370000000 HC RX 637 (ALT 250 FOR IP): Performed by: INTERNAL MEDICINE

## 2021-10-16 PROCEDURE — 2580000003 HC RX 258: Performed by: NURSE PRACTITIONER

## 2021-10-16 PROCEDURE — 85025 COMPLETE CBC W/AUTO DIFF WBC: CPT

## 2021-10-16 PROCEDURE — 6370000000 HC RX 637 (ALT 250 FOR IP): Performed by: STUDENT IN AN ORGANIZED HEALTH CARE EDUCATION/TRAINING PROGRAM

## 2021-10-16 PROCEDURE — 99232 SBSQ HOSP IP/OBS MODERATE 35: CPT | Performed by: INTERNAL MEDICINE

## 2021-10-16 PROCEDURE — 85730 THROMBOPLASTIN TIME PARTIAL: CPT

## 2021-10-16 PROCEDURE — 2140000000 HC CCU INTERMEDIATE R&B

## 2021-10-16 PROCEDURE — 36415 COLL VENOUS BLD VENIPUNCTURE: CPT

## 2021-10-16 RX ADMIN — CEFEPIME HYDROCHLORIDE 2000 MG: 2 INJECTION, POWDER, FOR SOLUTION INTRAVENOUS at 21:07

## 2021-10-16 RX ADMIN — SULFAMETHOXAZOLE AND TRIMETHOPRIM 400 MG: 80; 16 INJECTION, SOLUTION, CONCENTRATE INTRAVENOUS at 12:16

## 2021-10-16 RX ADMIN — DEXTROSE MONOHYDRATE 200 MG: 50 INJECTION, SOLUTION INTRAVENOUS at 14:24

## 2021-10-16 RX ADMIN — LEVALBUTEROL HYDROCHLORIDE 0.63 MG: 0.63 SOLUTION RESPIRATORY (INHALATION) at 00:38

## 2021-10-16 RX ADMIN — Medication 10 ML: at 12:11

## 2021-10-16 RX ADMIN — METOPROLOL TARTRATE 12.5 MG: 25 TABLET, FILM COATED ORAL at 20:58

## 2021-10-16 RX ADMIN — BUMETANIDE 1 MG: 1 TABLET ORAL at 17:58

## 2021-10-16 RX ADMIN — SULFAMETHOXAZOLE AND TRIMETHOPRIM 400 MG: 80; 16 INJECTION, SOLUTION, CONCENTRATE INTRAVENOUS at 21:01

## 2021-10-16 RX ADMIN — Medication 10 ML: at 21:13

## 2021-10-16 RX ADMIN — HEPARIN SODIUM 8.03 UNITS/KG/HR: 10000 INJECTION, SOLUTION INTRAVENOUS at 23:48

## 2021-10-16 RX ADMIN — CEFEPIME HYDROCHLORIDE 2000 MG: 2 INJECTION, POWDER, FOR SOLUTION INTRAVENOUS at 13:00

## 2021-10-16 RX ADMIN — METOPROLOL TARTRATE 12.5 MG: 25 TABLET, FILM COATED ORAL at 11:03

## 2021-10-16 RX ADMIN — HYDROXYZINE PAMOATE 25 MG: 25 CAPSULE ORAL at 20:59

## 2021-10-16 RX ADMIN — PANTOPRAZOLE SODIUM 40 MG: 40 TABLET, DELAYED RELEASE ORAL at 06:12

## 2021-10-16 RX ADMIN — SULFAMETHOXAZOLE AND TRIMETHOPRIM 400 MG: 80; 16 INJECTION, SOLUTION, CONCENTRATE INTRAVENOUS at 04:15

## 2021-10-16 RX ADMIN — LEVALBUTEROL HYDROCHLORIDE 0.63 MG: 0.63 SOLUTION RESPIRATORY (INHALATION) at 21:43

## 2021-10-16 RX ADMIN — CEFEPIME HYDROCHLORIDE 2000 MG: 2 INJECTION, POWDER, FOR SOLUTION INTRAVENOUS at 06:12

## 2021-10-16 RX ADMIN — BUMETANIDE 1 MG: 1 TABLET ORAL at 09:00

## 2021-10-16 ASSESSMENT — PAIN SCALES - GENERAL
PAINLEVEL_OUTOF10: 0
PAINLEVEL_OUTOF10: 0

## 2021-10-16 NOTE — PROGRESS NOTES
[facility] PROGRESS NOTE      Chief complaint: Follow-up of Gram-positive cocci in clusters on blood culture    The patient is a 36 y.o. male, not vaccinated against COVID-19, with history of asthma, presented on 10/05 with shortness of breath, found to be febrile at 103 °F, positive SARS-CoV-2 PCR, bilateral groundglass opacities on chest CT, tolerating room air with oxygen saturation of 97%. He reports having tested positive for SARS-CoV-2 for the 3rd time now with previous on in late August at which time he reports having quarantined. Urine Streptococcus pneumonia and Legionella antigens were negative. Blood cultures from 10/05 and 10/06 showed methicillin-susceptible Staphylococcus epidermidis in 1 out of 2 sets. HIV screen was positive. CD4 count was low at 43. Transthoracic echocardiogram showed late positive bubble suggesting possible shunt from pulmonary origin, moderately dilated left atrium, small pericardial effusion without tamponade physiology, ejection fraction visually estimated at 55%. Subjective: Patient was seen and examined. No chills, no abdominal pain, no diarrhea, no rash, no itching. Dry coug. Objective:  /64   Pulse 136   Temp 96.5 °F (35.8 °C) (Tympanic)   Resp 18   Ht 6' 4\" (1.93 m)   Wt 173 lb (78.5 kg)   SpO2 100%   BMI 21.06 kg/m²   Constitutional: Alert, not in distress  Respiratory: Clear breath sounds, no crackles, no wheezes  Cardiovascular: Regular rate and rhythm, no murmurs  Gastrointestinal: Bowel sounds present, soft, nontender  Skin: Warm and dry, no active dermatoses  Musculoskeletal: No joint swelling, no joint erythema    Labs, imaging, and medical records/notes were personally reviewed. Bronch resp cx Gram variable rods  10/8/21 blood cx negative  Assessment:  Sepsis, resolved  COVID-19, mild  Pneumonia  Methicillin susceptible Staphylococcus epidermidis bacteremia, etiology unclear  DEISI  Suspected advanced HIV disease (CD4 of 37).   Hepatitis C Ab positive. Prolonged QTc    Recommendations:  Continue cefepime 2g q8h. Continue high-dose Bactrim at 5 mg trimethoprim dose per kilogram actual body weight every 8 hours for suspected Pneumocystis pneumonia. Follow up serum 1, 3-beta D glucan. Positive-will start eraxis  Follow up bronchoscopy. BAL for Gram stain, aerobic and anaerobic cultures and Pneumocystis PCR. Follow up HIV viral load, hepatitis C viral load   Follow up HIV GART, [id number], Toxoplasma Ab, RPR, urine GC/Chlamydia PCR, hepatitis A total Ab. Monitor respiratory status. Continue supportive care.       Thank you for involving me in the care of Kristin James. Electronically signed by Milburn Ormond, APRN - CNP on 10/16/2021 at 9:51 AM   Pt seen and examined. Above discussed agree with advanced practice nurse. Labs, cultures, and radiographs reviewed. Face to Face encounter occurred. Changes made as necessary.      Hector Salinas MD

## 2021-10-16 NOTE — PROGRESS NOTES
Department of Internal Medicine  Nephrology Progress Note      Events reviewed. SUBJECTIVE:  We are following Mr. Juan Orellana for DEISI. Reports no complaints.     PHYSICAL EXAM:      Vitals:    VITALS:  /64   Pulse 136   Temp 96.5 °F (35.8 °C) (Tympanic)   Resp 18   Ht 6' 4\" (1.93 m)   Wt 173 lb (78.5 kg)   SpO2 100%   BMI 21.06 kg/m²   24HR INTAKE/OUTPUT:      Intake/Output Summary (Last 24 hours) at 10/16/2021 1022  Last data filed at 10/16/2021 0612  Gross per 24 hour   Intake 1422.82 ml   Output 700 ml   Net 722.82 ml       Constitutional:  Awake, alert, NAD  HEENT:  PERRL, normocephalic, atraumatic  Respiratory: diminished lung sounds  Cardiovascular/Edema:  RRR, S1/S2, -edema  Gastrointestinal:  abd flat, soft, non-tender  Neurologic:  Awake, alert, no focal deficits  Skin:  Warm, dry, no rash or lesions  Other: BLE 1+ edema       Scheduled Meds:   metoprolol tartrate  12.5 mg Oral BID    anidulafungin  200 mg IntraVENous Once    And    [START ON 10/17/2021] anidulafungin  100 mg IntraVENous Q24H    bumetanide  1 mg Oral BID    sulfamethoxazole-trimethoprim (BACTRIM) IVPB  400 mg IntraVENous Q8H    cefepime  2,000 mg IntraVENous Q8H    levalbuterol  0.63 mg Nebulization Q8H    pantoprazole  40 mg Oral QAM AC    sodium chloride flush  5-40 mL IntraVENous 2 times per day     Continuous Infusions:   heparin (PORCINE) Infusion 10 Units/kg/hr (10/15/21 4092)    sodium chloride      sodium chloride       PRN Meds:.hydrOXYzine, heparin (porcine), heparin (porcine), labetalol, sodium chloride, sodium chloride flush, ondansetron **OR** ondansetron, polyethylene glycol, acetaminophen **OR** acetaminophen, perflutren lipid microspheres, sodium chloride    DATA:    CBC:   Lab Results   Component Value Date    WBC 5.8 10/16/2021    RBC 2.72 10/16/2021    HGB 7.9 10/16/2021    HCT 24.1 10/16/2021    MCV 88.6 10/16/2021    MCH 29.0 10/16/2021    MCHC 32.8 10/16/2021    RDW 14.6 10/16/2021  10/16/2021    MPV 12.2 10/16/2021     CMP:    Lab Results   Component Value Date     10/16/2021    K 4.1 10/16/2021    CL 98 10/16/2021    CO2 20 10/16/2021    BUN 22 10/16/2021    CREATININE 2.1 10/16/2021    GFRAA 45 10/16/2021    LABGLOM 45 10/16/2021    GLUCOSE 75 10/16/2021    PROT 5.6 10/16/2021    LABALBU 2.9 10/16/2021    CALCIUM 7.8 10/16/2021    BILITOT 0.5 10/16/2021    ALKPHOS 61 10/16/2021    AST 62 10/16/2021    ALT 13 10/16/2021     Magnesium:    Lab Results   Component Value Date    MG 1.3 10/15/2021     Phosphorus:  No results found for: PHOS     Radiology Review:       Ejection fraction is visually estimated at 55%. There is late positive bubble suggesting possible shunt from pulmonary   origin. Visually moderately dilated left atrium. Normal right ventricular size and function. There is small pericardial effusion without tamponade physiology        CT Chest October 5, 2021   Limited study by the patient's respiratory motion and lack of intravenous   contrast.       Extensive multifocal COVID-19 pneumonia bilaterally.       Bilateral trace layering pleural effusions.       Bilateral gynecomastia.       Abdominal organs inadequately evaluated due to the limitations of the study.       Normal appendix.       No evidence of bowel obstruction.         Kidney ultrasound October 7, 2021   1. Hyperechoic kidneys due to underlying parenchymal disease. 2. Trace bilateral perinephric fluid of indeterminate etiology, potentially   due to underlying inflammation. 3. An approximately 1.1 cm x 1.5 cm x 2.3 cm lesion in the right kidney could   be a cyst but could also be seen with infarction, abscess, or neoplasia. Consider further evaluation with renal protocol abdomen MRI (preferred) or   CT.  Following resolution of acute kidney injury.         CTA pulmonary with IV contrast October 13, 2021   1. No evidence of pulmonary embolism.    2. Interval increased size of bilateral pleural effusions with little change   in pericardial effusion. 3. Persistent subcutaneous edema. 4. Reactive bilateral hilar lymphadenopathy. 5. Bilateral airspace disease is increased compared to prior examination. The increase is most pronounced within the lower lobes. 6. Bilateral gynecomastia.                 BRIEF SUMMARY OF INITIAL CONSULT:     Briefly, Mr. Milli Urena is a 36year old male with no significant PMH who was admitted on October 5, 2021 after he presented from an outside clinic after he was found to be hypoxic during 6 minute walking test. Patient tested positive for Covid 19 on August 18th, he is unvaccinated and had Covid 19 last year as well. Patient states he has quarantined by himself and when his mother recently visited him she was shocked at his appearance as he had lost about 50 pounds in 2 and a half months. On admission labs were significant for sodium of 130, potassium 5.2, bicarbonate 20, BUN 41, creatinine 3.6, magnesium 2.7, calcium 7.7 and proBNP 1,599. We are consulted for DEISI. Problems resolved:    Hyperkalemia, 2/2 DEISI. Low potassium diet  Hypotonic hyponatremia 2/2 intravascular volume from poor oral intake. Bicarb drip started. Sodium levels improving. Low bicarbonate levels with hyperchloremia, most likely NAGMA versus respiratory alkalosis; we need a PH to clarify diagnosis. Awaiting ABG results  High bicarbonate levels, likely metabolic alkalosis 2/2 administration  Hypokalemia, multifactorial, poor intake, probably renal potassium wasting  Severe Hypoalbuminemia, multifactorial, status post albumin administration  DEISI on CKD, volume responsive pre-renal DEISI d/t volume (poor oral intake), urine sodium <20. Resolved. IMPRESSION/RECOMMENDATIONS:     Recurrent DEISI on CKD, likely contrast associated DEISI. Renal function stable last 24 hours.     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 Nikolas Jaquez Work-up so far HIV positive, Hepatitis C positive, MARILEE negative, C4 normal, C3 88 (low), UPEP without monoclonal gammopathy, negative cryoglobulins. Kidney ultrasound with hyperechoic kidneys. Needs kidney biopsy, discussed with interventional radiology they would rather wait for a couple weeks to perform biopsy in view of patient having Covid-19 infection. Edematous state, multifactorial, mainly 2/2 nephrotic syndrome and possibly HFpEF 55%, on bumex  Probably HFpEF 55%, proBNP 11,401, on Bumex 1 mg p.o. twice daily  HTN, on metoprolol   Right kidney lesion, likely a cyst but cannot be ruled out other pathology including infarction, abscess, neoplasm.   We will proceed with MRI when renal function improved    ---------------------------------------------------------------------  MSSE bacteremia, on cefepime 2 g every 8 hours  HIV positive, CD4 count 43, awaiting viral load  Possible pneumocystic pneumonia, on sulfamethoxazole-trimethoprim 400 mg IV every 8 hours, needs TTE  Hepatitis C antibody positive, awaiting viral load  Covid 19 infection in non vaccinated pt, to have CTA to rule out PE  Normocytic anemia, multifactorial    Plan:    Continue Bumex 1 mg p.o. twice daily  Kidney biopsy as an outpatient in 2 weeks after discharge  Continue to monitor potassium level while on Bactrim  Continue to monitor renal function after contrast study  Monitor Na levels   Continue fluid restriction 1.2 L        Electronically signed by Eyal Langford MD on 10/16/2021 at 10:22 AM

## 2021-10-16 NOTE — PROGRESS NOTES
INPATIENT CARDIOLOGY FOLLOW-UP    Name: Silvia Calvillo    Age: 36 y.o. Date of Admission: 10/5/2021  3:07 PM    Date of Service: 10/16/2021    Chief Complaint: Follow-up for COVID-19 infection with associated pneumonitis and acute hypoxic respiratory failure, HIV disease, hepatitis C    Interim History: Interim events including that of his bronchoscopy and postprocedural difficulty with electrocardiographic evidence suggestive of potential paroxysmal atrial fibrillation and limited quality electrocardiogram and present persistence of sinus tachycardia. He remains febrile with a progressive anemia in the face of present anticoagulation.       Review of Systems:   As in the patient's condition, a review of systems could not be performed    Problem List:  Patient Active Problem List   Diagnosis    [facility] failure with hypoxia ([facility]    Acute kidney injury due to COVID-19 ([facility] [address] [facility]    Asthma    Nephrotic range proteinuria       Allergies:  No Known Allergies    Current Medications:  Current Facility-Administered Medications   Medication Dose Route Frequency Provider Last Rate Last Admin    hydrOXYzine (VISTARIL) capsule 25 mg  25 mg Oral TID PRN Jackquline Calk, DO   25 mg at 10/15/21 2106    heparin (porcine) injection 4,000 Units  4,000 Units IntraVENous PRN Valorie Pantelis, APRN - CNP        heparin (porcine) injection 2,000 Units  2,000 Units IntraVENous PRN Valorie Pantelis, APRN - CNP        heparin 25,000 units in dextrose 5% 250 mL (premix) infusion  12 Units/kg/hr IntraVENous Continuous Valorie Pantelis, APRN - CNP 7.9 mL/hr at 10/15/21 2359 10 Units/kg/hr at 10/15/21 2359    bumetanide (BUMEX) tablet 1 mg  1 mg Oral BID Adolph Monroe Aldo, DO   1 mg at 10/15/21 0825    amLODIPine (NORVASC) tablet 5 mg  5 mg Oral Daily Adolph Monroe Aldo, DO   5 mg at 10/15/21 0825    labetalol (NORMODYNE;TRANDATE) injection 10 mg  10 mg IntraVENous Q6H PRN Jackquline Calk, DO        sulfamethoxazole-trimethoprim (BACTRIM) 400 mg in dextrose 5 % 500 mL IVPB  400 mg IntraVENous Q8H Carito Blush, DO   Stopped at 10/16/21 0612    cefepime (MAXIPIME) 2000 mg IVPB minibag  2,000 mg IntraVENous Q8H [facility], DO 12.5 mL/hr at 10/16/21 0612 2,000 mg at 10/16/21 0612    levalbuterol (XOPENEX) nebulization 0.63 mg  0.63 mg Nebulization Q8H [facility], DO   0.63 mg at 10/16/21 0038    pantoprazole (PROTONIX) tablet 40 mg  40 mg Oral QAM AC [facility], DO   40 mg at 10/16/21 0612    0.9 % sodium chloride infusion   IntraVENous PRN [facility], DO        sodium chloride flush 0.9 % injection 5-40 mL  5-40 mL IntraVENous 2 times per day Carito Blush, DO   10 mL at 10/15/21 2100    sodium chloride flush 0.9 % injection 5-40 mL  5-40 mL IntraVENous PRN Carito Blush, DO   10 mL at 10/14/21 1625    ondansetron (ZOFRAN-ODT) disintegrating tablet 4 mg  4 mg Oral Q8H PRN Carito Blush, DO        Or    ondansetron [facility]) injection 4 mg  4 mg IntraVENous Q6H PRN Carito Blush, DO        polyethylene glycol (GLYCOLAX) packet 17 g  17 g Oral Daily PRN Carito Blush, DO        acetaminophen (TYLENOL) tablet 650 mg  650 mg Oral Q6H PRN Carito Blush, DO   650 mg at 10/15/21 2136    Or    acetaminophen (TYLENOL) suppository 650 mg  650 mg Rectal Q6H PRN Carito Blush, DO        perflutren lipid microspheres (DEFINITY) injection 1.65 mg  1.5 mL IntraVENous ONCE PRN [facility], DO        0.9 % sodium chloride infusion  25 mL IntraVENous PRN Carito Blush, DO          heparin (PORCINE) Infusion 10 Units/kg/hr (10/15/21 2455)    sodium chloride      sodium chloride         Physical Exam:  /71   Pulse 104   Temp 98.9 °F (37.2 °C)   Resp 18   Ht 6' 4\" (1.93 m)   Wt 173 lb (78.5 kg)   SpO2 94%   BMI 21.06 kg/m²   Weight change:    Wt Readings from Last 3 Encounters:   10/06/21 173 lb (78.5 kg)   10/05/21 171 lb (77.6 kg)   06/01/20 215 lb (97.5 kg)     Based on the patient's COVID-19 infection and isolation to reduce risk of exposure and personal protective apparatus usage, examination was not performed    Intake/Output:    Intake/Output Summary (Last 24 hours) at 10/16/2021 0725  Last data filed at 10/16/2021 0612  Gross per 24 hour   Intake 1422.82 ml   Output 700 ml   Net 722.82 ml     No intake/output data recorded. Laboratory Tests:  Lab Results   Component Value Date    CREATININE 2.1 (H) 10/15/2021    BUN 18 10/15/2021     10/15/2021    K 4.2 10/15/2021     10/15/2021    CO2 18 (L) 10/15/2021     No results for input(s): CKTOTAL, CKMB in the last 72 hours.     Invalid input(s): TROPONONI  No results found for: BNP  Lab Results   Component Value Date    WBC 6.4 10/15/2021    RBC 2.82 10/15/2021    HGB 8.4 10/15/2021    HCT 25.1 10/15/2021    MCV 89.0 10/15/2021    MCH 29.8 10/15/2021    MCHC 33.5 10/15/2021    RDW 14.4 10/15/2021     10/15/2021    MPV 12.5 10/15/2021     Recent Labs     10/13/21  0727 10/14/21  0929 10/15/21  0708   ALKPHOS 59 64 66   ALT 10 11 13   AST 57* 56* 65*   PROT 6.0* 6.0* 6.1*   BILITOT 0.5 0.4 0.5   LABALBU 3.1* 2.8* 3.0*     Lab Results   Component Value Date    MG 1.3 10/15/2021     Lab Results   Component Value Date    PROTIME 17.7 10/15/2021    INR 1.6 10/15/2021     Lab Results   Component Value Date    TSH 3.220 10/06/2021     No components found for: CHLPL  No results found for: TRIG  No results found for: HDL  No results found for: [address]    Cardiac Tests:  ECG: A resting electrocardiogram reviewed at time of assessment and limited by artifact suggested evidence of paroxysmal atrial fibrillation with nonspecific ST changes  Telemetry findings reviewed: sinus tachycardia, no new tachy/bradyarrhythmias overnight  Chest X-ray: X-ray post bronchoscopy and reviewed at the time of evaluation demonstrates no evidence of cardiomegaly with persistent bilateral infiltrates      ASSESSMENT / PLAN: On a clinical basis, the patient appears compensated from a cardiovascular standpoint with his persistent sinus tachycardia the reflection of his acute illness inclusive of his interstitial pneumonia and anemia. In spite of a single episode of transient paroxysmal atrial fibrillation in the face of his acute illness, anticoagulation is not indicated for embolic protection unless recurrent arrhythmias are noted and unless anticoagulation is presently indicated for additional issues related to his COVID-19 illness, especially in view of his anemia and decreasing hemoglobin levels would recommend discontinuation. He is otherwise hemodynamically compensated in view of present therapy for hypertension, the considered substitution of a beta-blocker for that of his existing amlodipine may be beneficial will be deferred to primary care. Additional management will be deferred to that of primary care as well as the pulmonary service throughout the remainder of the weekend unless additional cardiovascular difficulties or concerns arise. Note: This report was completed utilizing computer voice recognition software. Every effort has been made to ensure accuracy, however; inadvertent computerized transcription errors may be present. Lashonda Carter.  Earl Payne, formerly [facility]

## 2021-10-16 NOTE — PROGRESS NOTES
[place]  Department of Internal Medicine  Division of Pulmonary, Critical Care and Sleep Medicine  Progress Note    Sarthak Khan DO, MPH, Honor Carlotta Kimbrough MD, [facility]  [facility]      Patient: Dominik Blackwood  MRN: [id number]  : 1990    Encounter Time: 2:33 PM     Date of Admission: 10/5/2021  3:07 PM    Primary Care Physician: Gretchen Knight MD    Reason for Consultation: COVID     SUBJECTIVE:    Hep C and HIV + screen  CD4 is 54  Echo = reviewed  BAL results ? OBJECTIVE:     PHYSICAL EXAM:   VITALS:   Vitals:    10/15/21 2345 10/16/21 0245 10/16/21 0415 10/16/21 0920   BP:    112/64   Pulse: 126 127 104 136   Resp:    18   Temp: 100.9 °F (38.3 °C) 98.6 °F (37 °C) 98.9 °F (37.2 °C) 96.5 °F (35.8 °C)   TempSrc:    Tympanic   SpO2: 95% 94%  100%   Weight:       Height:            Intake/Output Summary (Last 24 hours) at 10/16/2021 1433  Last data filed at 10/16/2021 0612  Gross per 24 hour   Intake 1172.82 ml   Output 700 ml   Net 472.82 ml        CONSTITUTIONAL:   A&O x 3, NAD  SKIN:     No rash, No suspicious lesions or skin discoloration  HEENT:     EOMI, MMM, No thrush  NECK:    No bruits, No JVP apprechiated  CV:      Sinus,  No murmur, No rubs, No gallops  PULMONARY:   Couse BS,  No Wheezing, No Rales, No Rhonchi      No noted egophony  ABDOMEN:     Soft, non-tender. BS normal. No R/R/G  EXT:    No deformities . No clubbing.       + lower extremity edema, No venous stasis  PULSE:   Appears equal and palpable.   PSYCHIATRIC:  Seems appropriate, No acute psycosis  MS:    No fractures, No gross weakness  NEUROLOGIC:   The clinical assessment is non-focal     DATA: IMAGING & TESTING:     LABORATORY TESTS:    CBC:   Lab Results   Component Value Date    WBC 5.8 10/16/2021    RBC 2.72 10/16/2021    HGB 7.9 10/16/2021    HCT 24.1 10/16/2021    MCV 88.6 10/16/2021    MCH 29.0 10/16/2021    MCHC 32.8 10/16/2021    RDW 14.6 10/16/2021  10/16/2021    MPV 12.2 10/16/2021     CMP:    Lab Results   Component Value Date     10/16/2021    K 4.1 10/16/2021    CL 98 10/16/2021    CO2 20 10/16/2021    BUN 22 10/16/2021    CREATININE 2.1 10/16/2021    GFRAA 45 10/16/2021    LABGLOM 45 10/16/2021    GLUCOSE 75 10/16/2021    PROT 5.6 10/16/2021    LABALBU 2.9 10/16/2021    CALCIUM 7.8 10/16/2021    BILITOT 0.5 10/16/2021    ALKPHOS 61 10/16/2021    AST 62 10/16/2021    ALT 13 10/16/2021     Magnesium:    Lab Results   Component Value Date    MG 1.3 10/15/2021     Phosphorus:  No results found for: PHOS  PT/INR:    Lab Results   Component Value Date    PROTIME 17.7 10/15/2021    INR 1.6 10/15/2021     FERRITIN:    Lab Results   Component Value Date    FERRITIN 3,921 10/06/2021     Fibrinogen Level:  No components found for: FIB     PRO-BNP:   Lab Results   Component Value Date    PROBNP 11,401 (H) 10/13/2021    PROBNP 3,431 (H) 10/09/2021      ABGs: No results found for: PH, PO2, PCO2  Hemoglobin A1C: No components found for: HGBA1C    IMAGING:  Imaging tests were completed and reviewed and discussed radiology and care team involved and reveals     CT CHEST : There is adequate opacification of the pulmonary arteries.  There is no   evidence of filling defect to suggest pulmonary embolism. Louanna Mercado is no   evidence of thoracic aortic aneurysm or dissection.  There are small   bilateral pleural effusions.  These are increased compared to prior   examination.  There is small pericardial effusion which appears stable.  Left   hilar lymph node measures approximately 12 mm in short axis.  Right hilar   lymph node measures approximately 12 mm in short axis.  There is bilateral   gynecomastia. Louanna Mercado is diffuse subcutaneous edema. The central airways are patent.  There is no pneumothorax.  There are   bilateral ground-glass opacities and alveolar consolidation.  There has been   interval increase in the airspace disease compared to prior examination.  The   interval worsening is most pronounced within the lower lobes. Shelda Clock is   associated interstitial thickening. CT ABDOMEN PELVIS WO CONTRAST Additional Contrast? None    Result Date: 10/5/2021  FINDINGS: THE STUDY IS LIMITED DUE TO LACK OF INTRAVENOUS CONTRAST. Chest: Mediastinum: No thoracic aortic aneurysm. No definite adenopathy on this unenhanced study. Trace pericardial fluid anteriorly. Lungs/pleura: Trace layering pleural effusions bilaterally, slightly larger on the right. No pneumothorax. Numerous ground-glass densities in the bilateral upper and to a lesser degree lower lungs, in keeping with known COVID-19 pneumonia. Soft Tissues/Bones: Bilateral gynecomastia. No acute osseous abnormality in the thoracic cage. Abdomen and pelvis: The study is degraded by the patient's respiratory motion. Organs: Abdominal organs inadequately evaluated on this motion degraded and unenhanced study. No hydronephrosis on either side. GI/Bowel: Normal appendix. No evidence of bowel obstruction. Pelvis: Normal sized prostate. Urinary bladder grossly intact. Peritoneum/Retroperitoneum: No free air or free fluid. No bulky adenopathy. No abdominal aortic aneurysm. Bones/Soft Tissues: No lytic or sclerotic lesion in the regional skeleton. Limited study by the patient's respiratory motion and lack of intravenous contrast. Extensive multifocal COVID-19 pneumonia bilaterally. Bilateral trace layering pleural effusions. Bilateral gynecomastia. Abdominal organs inadequately evaluated due to the limitations of the study. Normal appendix. No evidence of bowel obstruction. CT CHEST WO CONTRAST    Result Date: 10/5/2021  FINDINGS: THE STUDY IS LIMITED DUE TO LACK OF INTRAVENOUS CONTRAST. Chest: Mediastinum: No thoracic aortic aneurysm. No definite adenopathy on this unenhanced study. Trace pericardial fluid anteriorly. Lungs/pleura: Trace layering pleural effusions bilaterally, slightly larger on the right. No pneumothorax. Numerous ground-glass densities in the bilateral upper and to a lesser degree lower lungs, in keeping with known COVID-19 pneumonia. Soft Tissues/Bones: Bilateral gynecomastia. No acute osseous abnormality in the thoracic cage. Abdomen and pelvis: The study is degraded by the patient's respiratory motion. Organs: Abdominal organs inadequately evaluated on this motion degraded and unenhanced study. No hydronephrosis on either side. GI/Bowel: Normal appendix. No evidence of bowel obstruction. Pelvis: Normal sized prostate. Urinary bladder grossly intact. Peritoneum/Retroperitoneum: No free air or free fluid. No bulky adenopathy. No abdominal aortic aneurysm. Bones/Soft Tissues: No lytic or sclerotic lesion in the regional skeleton. Limited study by the patient's respiratory motion and lack of intravenous contrast. Extensive multifocal COVID-19 pneumonia bilaterally. Bilateral trace layering pleural effusions. Bilateral gynecomastia. Abdominal organs inadequately evaluated due to the limitations of the study. Normal appendix. No evidence of bowel obstruction. ECHOCARDIOGRAM:  Ejection fraction is visually estimated at 55%. There is late positive bubble suggesting possible shunt from pulmonary   origin. Visually moderately dilated left atrium. Normal right ventricular size and function. There is small pericardial effusion without tamponade physiology    Assessment:  Mr. Gilda Sandhoff is a 36year old male with no significant PMH who was admitted on October 5, 2021 after he presented from an outside clinic after he was found to be hypoxic during 6 minute walking test. Patient tested positive for Covid 19 on August 18th, he is unvaccinated and had Covid 19 last year as well.  Patient states he has quarantined by himself and when his mother recently visited him she was shocked at his appearance as he had lost about 50 pounds in 2 and a half months  Sepsis  COVID-19, mild  HIV Pneumonia  Gram-positive cocci in clusters bacteremia, etiology unclear  DEISI        Plan:   1. Fever and increase oxygen, Suspect infection pneumonia  I have reviewed risks/benefits and indications of the procedure in detail with the patient and Philip Figueroa indicates that he understands and wishes to proceed. Today Bronchoscopy with BAL with [facility] NELIDA  Need N95 to be warn for bronchoscopy   2. HIV status CD4 unknown  3. Start Bronchodialtors  4. ID to see for +HIV , CD4 and VL  5.  Check BNP and diuresis if elevated        Lashonda Slater DO DO, MPH, FCCP, Gladis Lesch  Professor of Internal Medicine  Pulmonary, Critical Care and Sleep Medicine

## 2021-10-16 NOTE — ANESTHESIA POSTPROCEDURE EVALUATION
Department of Anesthesiology  Postprocedure Note    Patient: Mera Collado  MRN: [id number]  YOB: 1990  Date of evaluation: 10/16/2021  Time:  7:13 AM     Procedure Summary     Date: 10/15/21 Room / Location: [address] [place] [address]    Anesthesia Start: 1120 Anesthesia Stop: 4449    Procedure: BRONCHOSCOPY ALVEOLAR LAVAGE (N/A ) Diagnosis: (/)    Surgeons: Deborah Medina DO Responsible Provider: Ankit Andrews MD    Anesthesia Type: MAC ASA Status: 3          Anesthesia Type: MAC    Mervat Phase I:      Mervat Phase II:      Last vitals: Reviewed and per EMR flowsheets.        Anesthesia Post Evaluation    Patient participation: complete - patient participated  Level of consciousness: awake  Airway patency: patent  Nausea & Vomiting: no nausea and no vomiting  Complications: no  Cardiovascular status: hemodynamically stable  Respiratory status: acceptable  Hydration status: stable

## 2021-10-16 NOTE — PROGRESS NOTES
[facility] - [facility]   Resident Progress Note    S:  Hospital day: 6   Brief Synopsis: Silvia Calvillo is a 36 y.o. male with no PMH who presented with hypoxia and fever. He was initially seen in PCP office and was hypoxic on exertion with desaturate into the mid low 80s as well as noted to be febrile and tachycardic. Per patient, his symptoms began 8/15/2021 and he tested positive for Covid 8/28. endorse worsening shortness of breath over the past month with sputum production, intermittent chest discomfort, persistent diarrhea, chills, fever, decrease in appetite and 50 pound weight loss since August.  Patient also endorses significantly worsening \"neuropathy\" of bilateral feet, states it feels like he is \"being stabbed by needles\" which is causing him inability to walk due to pain. In the ED, he was found to be anemic, hyperkalemic, hyponatremic, febrile and COVID positive. He also had elevated Creatinine, AST, proBNP and troponin x2. CTA showed extensive multifocal COVID-19 pneumonia bilaterally, bilateral trace layering pleural effusion and bilateral gynecomastia. CT abdomen was limited due to movement and non-contrast, but showed normal appendix and no evidence of bowel obstruction. Admitted for acute hypoxia due to Covid pneumonia. Nephro, pulm and ID consulted. Started on bicarb drip. US showed hyperechoic kidneys, trace b/l perinephric fluid and right kidney lesion (Cyst vs infarction vs abscess vs neoplasia). HIV and Hep C screen were negative. HIV RNA and T and B lymphocytes were ordered. Patient became more hypoxic, tachycardic and hypertensive so  there was concern for PE. CTA was ordered, which came back negative. Echo was completed due to elevated BNP and concern for PE and that revealed a normal EF with late bubble sign possible shunting of pulmonary origin. BNP continued to increase. Cardiology was consulted. Patient was seen and examined at bedside.  He is anxious, Endorses Chills and fever. T max up to 100.9 F overnight. Denies any sob, chest pain, diarrhea, abdominal pain, dysuria. Cont meds:    heparin (PORCINE) Infusion 10 Units/kg/hr (10/15/21 8404)    sodium chloride      sodium chloride       Scheduled meds:    bumetanide  1 mg Oral BID    amLODIPine  5 mg Oral Daily    sulfamethoxazole-trimethoprim (BACTRIM) IVPB  400 mg IntraVENous Q8H    cefepime  2,000 mg IntraVENous Q8H    levalbuterol  0.63 mg Nebulization Q8H    pantoprazole  40 mg Oral QAM AC    sodium chloride flush  5-40 mL IntraVENous 2 times per day     PRN meds: hydrOXYzine, heparin (porcine), heparin (porcine), labetalol, sodium chloride, sodium chloride flush, ondansetron **OR** ondansetron, polyethylene glycol, acetaminophen **OR** acetaminophen, perflutren lipid microspheres, sodium chloride     I reviewed the patient's past medical and surgical history, Medications and Allergies. O:  /71   Pulse 104   Temp 98.9 °F (37.2 °C)   Resp 18   Ht 6' 4\" (1.93 m)   Wt 173 lb (78.5 kg)   SpO2 94%   BMI 21.06 kg/m²   24 hour I&O: I/O last 3 completed shifts: In: 1422.8 [P.O.:480; I.V.:342.8; IV Piggyback:600]  Out: 700 [Urine:700]  No intake/output data recorded. Physical Exam  Constitutional:       Appearance: Normal appearance. He is not ill-appearing, toxic-appearing or diaphoretic. HENT:      Head: Normocephalic and atraumatic. Nose: No rhinorrhea. Mouth/Throat:      Mouth: Mucous membranes are moist.      Pharynx: Oropharynx is clear. Eyes:      General: No scleral icterus. Right eye: No discharge. Left eye: No discharge. Extraocular Movements: Extraocular movements intact. Cardiovascular:      Rate and Rhythm: Normal rate and regular rhythm. Pulses: Normal pulses. Heart sounds: Normal heart sounds. No murmur heard. No friction rub. No gallop. Pulmonary:      Effort: Pulmonary effort is normal. No respiratory distress.       Breath sounds: No wheezing, rhonchi or rales. Abdominal:      General: Abdomen is flat. Musculoskeletal:         General: No swelling or tenderness. Cervical back: Normal range of motion. Right lower leg: No edema. Left lower leg: No edema. Skin:     General: Skin is warm and dry. Coloration: Skin is not jaundiced. Findings: No bruising or rash. Neurological:      General: No focal deficit present. Mental Status: He is alert and oriented to person, place, and time. Cranial Nerves: No cranial nerve deficit. Motor: No weakness. Psychiatric:         Behavior: Behavior normal.         Thought Content: Thought content normal.      Comments: Agitated           Labs:  Na/K/Cl/CO2:  131/4.1/98/20 (10/16 9883)  BUN/Cr/glu/ALT/AST/amyl/lip:  22/2.1/--/13/62/--/-- (10/16 9130)  WBC/Hgb/Hct/Plts:  5.8/7.9/24.1/122 (10/16 0748)  estimated creatinine clearance is 58 mL/min (A) (based on SCr of 2.1 mg/dL (H)). Other pertinent labs as noted below    Radiology:  XR CHEST PORTABLE   Final Result   Persistent bilateral infiltrates. No significant changes since October 13. See above comments. See report CT chest October 13. CTA PULMONARY W CONTRAST   Final Result   1. No evidence of pulmonary embolism. 2. Interval increased size of bilateral pleural effusions with little change   in pericardial effusion. 3. Persistent subcutaneous edema. 4. Reactive bilateral hilar lymphadenopathy. 5. Bilateral airspace disease is increased compared to prior examination. The increase is most pronounced within the lower lobes. 6. Bilateral gynecomastia. XR CHEST PORTABLE   Final Result   Development of extensive bilateral pulmonary infiltrates         XR CHEST PORTABLE   Final Result   No acute process. US RETROPERITONEAL COMPLETE   Final Result   1. Hyperechoic kidneys due to underlying parenchymal disease.    2. Trace bilateral perinephric fluid of indeterminate etiology, potentially   due to underlying inflammation. 3. An approximately 1.1 cm x 1.5 cm x 2.3 cm lesion in the right kidney could   be a cyst but could also be seen with infarction, abscess, or neoplasia. Consider further evaluation with renal protocol abdomen MRI (preferred) or   CT. Following resolution of acute kidney injury. XR CHEST PORTABLE   Final Result   Multifocal bilateral pulmonary ground-glass airspace opacities are stable         CT ABDOMEN PELVIS WO CONTRAST Additional Contrast? None   Final Result   Limited study by the patient's respiratory motion and lack of intravenous   contrast.      Extensive multifocal COVID-19 pneumonia bilaterally. Bilateral trace layering pleural effusions. Bilateral gynecomastia. Abdominal organs inadequately evaluated due to the limitations of the study. Normal appendix. No evidence of bowel obstruction. CT CHEST WO CONTRAST   Final Result   Limited study by the patient's respiratory motion and lack of intravenous   contrast.      Extensive multifocal COVID-19 pneumonia bilaterally. Bilateral trace layering pleural effusions. Bilateral gynecomastia. Abdominal organs inadequately evaluated due to the limitations of the study. Normal appendix. No evidence of bowel obstruction.                Resident Assessment and Plan       Acute hypoxia resolved   2/2 COVID pneumonia vs Pneumocystis pneumonia    - Maintaining Oxygen saturation in room air  - CTA: COVID pneumonia, Bilateral trace layering pleural effusion, negative for PE   - D dimer elevated on admission, currently stable  - Ferritin 3900, CRP 10.1 on admission   - WBC is trending down(Today is 2.9)  - Continue monitor Oxygen saturation   - Pulmonology and ID on board  - Legionella negative  - T spot TB test pending   - Decadron discontinued on 10/9  - CXR 10/8 shows multifocal groundglass opacities are stable  - Repeat CXR today: b/l pulm infiltrates   - Blood cx: staph epidermitis, repeat blood cx shows no growth x 6 days   -Urine culture: NGTD   -Counseled on getting COVID vaccine after patient recovers   - ECHO: EF 55%, late bubble sign possible shunting of pulmonary origin   - CTA 10/13 negative for PE  Bronchoscopy with BAL performed on 10/15/2021    Weight loss likely secondary to HIv  - Per patient due to decreased appetite from COVID; 50lb weight loss since August   - R/o other potential origins   - MARILEE negative  HIV, hepatitis C antibodies positive   - Leukopenia   - Elevated IgG and IgA, normal IgM: IgA nephropathy vs MPGN?  - Cryoglobulin normal   - TSH normal   - Sed rate and CRP elevated      HIV  HIV 1/2 antibodies positive  - Leucopenia, normal absolute neutrophil   - Febrile and tachycardic   CD4 44, CD3 153, CD8 100  HIV RNA quantitative PCR pending  - ID on board: received 2 days of vancomycin 10/6, discontinued ancef, started on cefepime 2g q8h  - Bactrim 400mg q8h forsuspected Pneumocystis pneumonia.    - tests for opportunistic infections pending: HIV GART, [id number], Toxoplasma Ab, RPR, urine GC/Chlamydia PCR, hepatitis A total Ab     Hepatitis C resolved  Hep C antibody reactive  Viral load undetectable    Anemia   - Likely 2/2 from neuropathy vs GI bleed vs HIV   - Hb is trending down( today is 7.9)  - Will repeat H/H every 12 hours   Transfused 1 unit 10/11, post transfusion Hb 8   - General surgery consulted, FOBT negative the second time    DEISI   Hyponatremia  - Na is 131  - nephr on board  - Likely secondary to dehydration from persistent diarrhea and decreased PO intake   - Cr on admission 3.6, trending up now 2.1, due to IV contrast  - Urine osm and urine nitrogen low   - UA: protein, 1-2 waxy casts, moderate bacteria, moderate blood   - bicarb drip discontinued 10/8  - Microalbumin-creatinine ratio elevated   - Kidney bx OP 2 weeks after discharge   Fluid restriction     Elevated troponin and proBNP  - 2/2 myocardial injury vs stress cardiomyopathy due to COVID vs new onset CHF   - initial troponin 78, 78  - initial BNP 1599, continues to increase, repeat 11,401 on 10/14  - ECHO: EF 55%, late bubble sign possible shunting of pulmonary origin  Received 1 dose of IV Lasix  On Bumex 1 mg twice daily    Tachycardia  Consistently HR 120s to 140s  Cardio consulted, recommend to start Beta blocker   - will start Metoprolol 12.5 mg BID  - received two dose of metoprolol, with minimal improvement  Asymptomatic  Continue to monitor    HTN  - BP is controlled   - Since patient is Tachycardic, will start Lopressor and hold amlodipine    - labetalol PRN     Proteinuria  -protein to creatinine ratio 3.8  - microalbumin to creatine ratio elevated   -kidney biopsy per nephro, patient agreeable    Hypoalbuminemia   - Alb 1.7 on admission, now 1.3  -Previously received albumin 25 g q6h x 4 doses 10/6  -Currently receiving albumin 25 g q6h x 8 doses  Low C3, normal C4 and cryoglobulin  Fluid restriction  - Continue to monitor             PT/OT evaluation: not indicated   DVT prophylaxis: heparin due to DEISI    GI prophylaxis:protonix  Disposition:home +/- home health/ rehab   Diet: adult         Electronically signed by Niki Riddle MD on 10/16/2021 at 8:30 AM  Attending physician: Dr. Edward Stein

## 2021-10-17 ENCOUNTER — APPOINTMENT (OUTPATIENT)
Dept: GENERAL RADIOLOGY | Age: 31
DRG: 890 | End: 2021-10-17
Payer: COMMERCIAL

## 2021-10-17 LAB
ALBUMIN SERPL-MCNC: 3 G/DL (ref 3.5–5.2)
ALP BLD-CCNC: 69 U/L (ref 40–129)
ALT SERPL-CCNC: 16 U/L (ref 0–40)
ANION GAP SERPL CALCULATED.3IONS-SCNC: 11 MMOL/L (ref 7–16)
ANION GAP SERPL CALCULATED.3IONS-SCNC: 12 MMOL/L (ref 7–16)
ANISOCYTOSIS: ABNORMAL
APTT: 58 SEC (ref 24.5–35.1)
AST SERPL-CCNC: 70 U/L (ref 0–39)
BASOPHILS ABSOLUTE: 0 E9/L (ref 0–0.2)
BASOPHILS RELATIVE PERCENT: 0.3 % (ref 0–2)
BILIRUB SERPL-MCNC: 0.3 MG/DL (ref 0–1.2)
BUN BLDV-MCNC: 20 MG/DL (ref 6–20)
BUN BLDV-MCNC: 21 MG/DL (ref 6–20)
CALCIUM SERPL-MCNC: 8 MG/DL (ref 8.6–10.2)
CALCIUM SERPL-MCNC: 8.1 MG/DL (ref 8.6–10.2)
CHLORIDE BLD-SCNC: 100 MMOL/L (ref 98–107)
CHLORIDE BLD-SCNC: 99 MMOL/L (ref 98–107)
CO2: 18 MMOL/L (ref 22–29)
CO2: 19 MMOL/L (ref 22–29)
CREAT SERPL-MCNC: 1.8 MG/DL (ref 0.7–1.2)
CREAT SERPL-MCNC: 2 MG/DL (ref 0.7–1.2)
CULTURE, RESPIRATORY: NORMAL
EOSINOPHILS ABSOLUTE: 0 E9/L (ref 0.05–0.5)
EOSINOPHILS RELATIVE PERCENT: 0.3 % (ref 0–6)
GFR AFRICAN AMERICAN: 48
GFR AFRICAN AMERICAN: 54
GFR NON-AFRICAN AMERICAN: 48 ML/MIN/1.73
GFR NON-AFRICAN AMERICAN: 54 ML/MIN/1.73
GLUCOSE BLD-MCNC: 86 MG/DL (ref 74–99)
GLUCOSE BLD-MCNC: 98 MG/DL (ref 74–99)
HCT VFR BLD CALC: 25.6 % (ref 37–54)
HEMOGLOBIN: 8.6 G/DL (ref 12.5–16.5)
LYMPHOCYTES ABSOLUTE: 0.14 E9/L (ref 1.5–4)
LYMPHOCYTES RELATIVE PERCENT: 4.4 % (ref 20–42)
MCH RBC QN AUTO: 29.6 PG (ref 26–35)
MCHC RBC AUTO-ENTMCNC: 33.6 % (ref 32–34.5)
MCV RBC AUTO: 88 FL (ref 80–99.9)
MONOCYTES ABSOLUTE: 0.11 E9/L (ref 0.1–0.95)
MONOCYTES RELATIVE PERCENT: 2.7 % (ref 2–12)
NEUTROPHILS ABSOLUTE: 3.35 E9/L (ref 1.8–7.3)
NEUTROPHILS RELATIVE PERCENT: 92.9 % (ref 43–80)
OVALOCYTES: ABNORMAL
PDW BLD-RTO: 14.6 FL (ref 11.5–15)
PLATELET # BLD: 126 E9/L (ref 130–450)
PMV BLD AUTO: 12.5 FL (ref 7–12)
POIKILOCYTES: ABNORMAL
POLYCHROMASIA: ABNORMAL
POTASSIUM REFLEX MAGNESIUM: 4.9 MMOL/L (ref 3.5–5)
POTASSIUM SERPL-SCNC: 4.5 MMOL/L (ref 3.5–5)
RBC # BLD: 2.91 E12/L (ref 3.8–5.8)
SMEAR, RESPIRATORY: NORMAL
SODIUM BLD-SCNC: 129 MMOL/L (ref 132–146)
SODIUM BLD-SCNC: 130 MMOL/L (ref 132–146)
TOTAL PROTEIN: 6 G/DL (ref 6.4–8.3)
WBC # BLD: 3.6 E9/L (ref 4.5–11.5)

## 2021-10-17 PROCEDURE — 71045 X-RAY EXAM CHEST 1 VIEW: CPT

## 2021-10-17 PROCEDURE — 99232 SBSQ HOSP IP/OBS MODERATE 35: CPT | Performed by: FAMILY MEDICINE

## 2021-10-17 PROCEDURE — 85730 THROMBOPLASTIN TIME PARTIAL: CPT

## 2021-10-17 PROCEDURE — 2580000003 HC RX 258: Performed by: INTERNAL MEDICINE

## 2021-10-17 PROCEDURE — 2580000003 HC RX 258: Performed by: STUDENT IN AN ORGANIZED HEALTH CARE EDUCATION/TRAINING PROGRAM

## 2021-10-17 PROCEDURE — 80048 BASIC METABOLIC PNL TOTAL CA: CPT

## 2021-10-17 PROCEDURE — 6360000002 HC RX W HCPCS: Performed by: NURSE PRACTITIONER

## 2021-10-17 PROCEDURE — 6370000000 HC RX 637 (ALT 250 FOR IP): Performed by: INTERNAL MEDICINE

## 2021-10-17 PROCEDURE — 94640 AIRWAY INHALATION TREATMENT: CPT

## 2021-10-17 PROCEDURE — 6360000002 HC RX W HCPCS: Performed by: STUDENT IN AN ORGANIZED HEALTH CARE EDUCATION/TRAINING PROGRAM

## 2021-10-17 PROCEDURE — 2140000000 HC CCU INTERMEDIATE R&B

## 2021-10-17 PROCEDURE — 6360000002 HC RX W HCPCS: Performed by: INTERNAL MEDICINE

## 2021-10-17 PROCEDURE — 6370000000 HC RX 637 (ALT 250 FOR IP): Performed by: STUDENT IN AN ORGANIZED HEALTH CARE EDUCATION/TRAINING PROGRAM

## 2021-10-17 PROCEDURE — 99233 SBSQ HOSP IP/OBS HIGH 50: CPT | Performed by: INTERNAL MEDICINE

## 2021-10-17 PROCEDURE — 2580000003 HC RX 258: Performed by: NURSE PRACTITIONER

## 2021-10-17 PROCEDURE — 85025 COMPLETE CBC W/AUTO DIFF WBC: CPT

## 2021-10-17 PROCEDURE — 2500000003 HC RX 250 WO HCPCS: Performed by: INTERNAL MEDICINE

## 2021-10-17 PROCEDURE — 80053 COMPREHEN METABOLIC PANEL: CPT

## 2021-10-17 PROCEDURE — 36415 COLL VENOUS BLD VENIPUNCTURE: CPT

## 2021-10-17 RX ORDER — SODIUM CHLORIDE 9 MG/ML
INJECTION, SOLUTION INTRAVENOUS CONTINUOUS
Status: DISCONTINUED | OUTPATIENT
Start: 2021-10-17 | End: 2021-10-18

## 2021-10-17 RX ADMIN — CEFEPIME HYDROCHLORIDE 2000 MG: 2 INJECTION, POWDER, FOR SOLUTION INTRAVENOUS at 22:51

## 2021-10-17 RX ADMIN — DEXTROSE MONOHYDRATE 100 MG: 50 INJECTION, SOLUTION INTRAVENOUS at 11:18

## 2021-10-17 RX ADMIN — LEVALBUTEROL HYDROCHLORIDE 0.63 MG: 0.63 SOLUTION RESPIRATORY (INHALATION) at 13:18

## 2021-10-17 RX ADMIN — ENOXAPARIN SODIUM 40 MG: 40 INJECTION SUBCUTANEOUS at 11:17

## 2021-10-17 RX ADMIN — PANTOPRAZOLE SODIUM 40 MG: 40 TABLET, DELAYED RELEASE ORAL at 06:12

## 2021-10-17 RX ADMIN — Medication 10 ML: at 22:51

## 2021-10-17 RX ADMIN — METOPROLOL TARTRATE 25 MG: 25 TABLET, FILM COATED ORAL at 11:17

## 2021-10-17 RX ADMIN — SULFAMETHOXAZOLE AND TRIMETHOPRIM 400 MG: 80; 16 INJECTION, SOLUTION, CONCENTRATE INTRAVENOUS at 22:51

## 2021-10-17 RX ADMIN — SODIUM CHLORIDE: 9 INJECTION, SOLUTION INTRAVENOUS at 11:32

## 2021-10-17 RX ADMIN — SULFAMETHOXAZOLE AND TRIMETHOPRIM 400 MG: 80; 16 INJECTION, SOLUTION, CONCENTRATE INTRAVENOUS at 15:46

## 2021-10-17 RX ADMIN — SULFAMETHOXAZOLE AND TRIMETHOPRIM 400 MG: 80; 16 INJECTION, SOLUTION, CONCENTRATE INTRAVENOUS at 04:32

## 2021-10-17 RX ADMIN — Medication 10 ML: at 11:33

## 2021-10-17 RX ADMIN — CEFEPIME HYDROCHLORIDE 2000 MG: 2 INJECTION, POWDER, FOR SOLUTION INTRAVENOUS at 04:30

## 2021-10-17 RX ADMIN — CEFEPIME HYDROCHLORIDE 2000 MG: 2 INJECTION, POWDER, FOR SOLUTION INTRAVENOUS at 15:46

## 2021-10-17 ASSESSMENT — PAIN SCALES - GENERAL
PAINLEVEL_OUTOF10: 0
PAINLEVEL_OUTOF10: 0

## 2021-10-17 NOTE — PROGRESS NOTES
[address] [place] [address] Attending    S: 36 y.o. male with history of asthma and smoking history presented with increasing dyspnea and cough over past month. On presentation had fever of 103. Has tested positive for COVID twice, most recently end of August.  CT shows extensive bilateral pneumonia and COVID testing again positive. HIV pos, CD4 43. Pt states breathing is ok. No fever. No n/v/d.     O: VS- Blood pressure 113/72, pulse 125, temperature 98.5 °F (36.9 °C), temperature source Tympanic, resp. rate 18, height 6' 4\" (1.93 m), weight 173 lb (78.5 kg), SpO2 100 %. Exam is as noted by resident   Awake, alert and oriented. on room air  Heart - RRR  Lungs- CTAB  Ext - no edema      Impressions:   Principal Problem:    Acute respiratory failure with hypoxia (HCC)  Active Problems:  Repeatedly positive Covid  Stable groundglass opacities  Saturations okay on room air    Acute kidney injury due to COVID-19 ([address]    Asthma    Nephrotic range proteinuria  50 pound weight loss  2 blood cultures positive      Plan   Covid pneumonia with sepsis  cta neg for pe  Nephrology, ID, and pulmonology following  sinus tachycardia - Echo with EF 55%, small pericardial effusion and possible atrial shunt-cardiology consulted. Tachy 2/2 anemia vs pericardial effusion vs infxn vs dehydration. Replaced amlodipine with metoprolol. Stopped bumex and started IVFs. Pericardial effusion - will repeat Echo. If still present recommend consult CTS for pericardiocentesis with tachycardia despite metoprolol. Anemia - FOBT heme positive first time, neg on repeat.  Gen surgery has no plans for scopes s/p 1U PRBCs, will trend h/h  Blood culture with staph epi, on Ancef; antibiotics were changed from ancef to bactrim and cefepime for possible sepsis/PCP pneumonia  Awaiting further lab, TB, confirmatory testing pending with hcv ab positive-neg viral load  Reactive HIV 1 antibodies; viral load and resistance testing pending, CD4 count of 43  Decadron stopped  Nephrology planning biopsy outpatient, W/U for possible Wegener's  DEISI - stop bumex. Start IVFs   Hyponatremia - start NS, CTM  Disposition planning  Follow up outpatient for right kidney lesion-ct/mri when cr improved    At this point, the patient does not desire his mother to know results of hiv/hcv testing, but ok with discussion with his step-father, Lima Robison. Attending Physician Statement  I have reviewed the chart and seen the patient with the resident(s). I personally reviewed images, EKG's and similar tests, if present. I personally reviewed and performed key elements of the history and exam.  I have reviewed and confirmed student and/or resident history and exam with changes as indicated above. I agree with the assessment, plan and orders as documented by the resident. Please refer to the resident and/or student note for additional information.       Pastora Olmos MD

## 2021-10-17 NOTE — PROGRESS NOTES
[facility]patient   Resident Progress Note    S:  Hospital day: 12   Brief Synopsis: Bola Elena is a 36 y.o. male with no PMH who presented with hypoxia and fever. He was initially seen in PCP office and was hypoxic on exertion with desaturate into the mid low 80s as well as noted to be febrile and tachycardic. Per patient, his symptoms began 8/15/2021 and he tested positive for Covid 8/28. endorse worsening shortness of breath over the past month with sputum production, intermittent chest discomfort, persistent diarrhea, chills, fever, decrease in appetite and 50 pound weight loss since August.  Patient also endorses significantly worsening \"neuropathy\" of bilateral feet, states it feels like he is \"being stabbed by needles\" which is causing him inability to walk due to pain. In the ED, he was found to be anemic, hyperkalemic, hyponatremic, febrile and COVID positive. He also had elevated Creatinine, AST, proBNP and troponin x2. CTA showed extensive multifocal COVID-19 pneumonia bilaterally, bilateral trace layering pleural effusion and bilateral gynecomastia. CT abdomen was limited due to movement and non-contrast, but showed normal appendix and no evidence of bowel obstruction. Admitted for acute hypoxia due to Covid pneumonia. Nephro, pulm and ID consulted. Started on bicarb drip. US showed hyperechoic kidneys, trace b/l perinephric fluid and right kidney lesion (Cyst vs infarction vs abscess vs neoplasia). HIV and Hep C screen were negative. HIV RNA and T and B lymphocytes were ordered. Patient became more hypoxic, tachycardic and hypertensive so  there was concern for PE. CTA was ordered, which came back negative. Echo was completed due to elevated BNP and concern for PE and that revealed a normal EF with late bubble sign possible shunting of pulmonary origin. BNP continued to increase. Cardiology was consulted. No acute event overnight. Patient reports doing better. Afebrile. Maintaining oxygen saturation in room air. Cont meds:    heparin (PORCINE) Infusion 8.025 Units/kg/hr (10/16/21 0387)    sodium chloride      sodium chloride       Scheduled meds:    metoprolol tartrate  12.5 mg Oral BID    anidulafungin  100 mg IntraVENous Q24H    bumetanide  1 mg Oral BID    sulfamethoxazole-trimethoprim (BACTRIM) IVPB  400 mg IntraVENous Q8H    cefepime  2,000 mg IntraVENous Q8H    levalbuterol  0.63 mg Nebulization Q8H    pantoprazole  40 mg Oral QAM AC    sodium chloride flush  5-40 mL IntraVENous 2 times per day     PRN meds: hydrOXYzine, heparin (porcine), heparin (porcine), labetalol, sodium chloride, sodium chloride flush, ondansetron **OR** ondansetron, polyethylene glycol, acetaminophen **OR** acetaminophen, perflutren lipid microspheres, sodium chloride     I reviewed the patient's past medical and surgical history, Medications and Allergies. O:  /72   Pulse 125   Temp 98.5 °F (36.9 °C) (Tympanic)   Resp 18   Ht 6' 4\" (1.93 m)   Wt 173 lb (78.5 kg)   SpO2 100%   BMI 21.06 kg/m²   24 hour I&O: I/O last 3 completed shifts: In: 460 [P.O.:460]  Out: 1600 [Urine:1600]  No intake/output data recorded. Physical Exam  Constitutional:       Appearance: Normal appearance. He is not ill-appearing, toxic-appearing or diaphoretic. HENT:      Head: Normocephalic and atraumatic. Nose: No rhinorrhea. Mouth/Throat:      Mouth: Mucous membranes are moist.      Pharynx: Oropharynx is clear. Eyes:      General: No scleral icterus. Right eye: No discharge. Left eye: No discharge. Extraocular Movements: Extraocular movements intact. Cardiovascular:      Rate and Rhythm: Normal rate and regular rhythm. Pulses: Normal pulses. Heart sounds: Normal heart sounds. No murmur heard. No friction rub. No gallop. Pulmonary:      Effort: Pulmonary effort is normal. No respiratory distress. Breath sounds:  No wheezing, rhonchi or rales. Abdominal:      General: Abdomen is flat. Musculoskeletal:         General: No swelling or tenderness. Cervical back: Normal range of motion. Right lower leg: No edema. Left lower leg: No edema. Skin:     General: Skin is warm and dry. Coloration: Skin is not jaundiced. Findings: No bruising or rash. Neurological:      General: No focal deficit present. Mental Status: He is alert and oriented to person, place, and time. Cranial Nerves: No cranial nerve deficit. Motor: No weakness. Psychiatric:         Behavior: Behavior normal.         Thought Content: Thought content normal.      Comments: Agitated           Labs:  Na/K/Cl/CO2:  131/4.1/98/20 (10/16 5397)  BUN/Cr/glu/ALT/AST/amyl/lip:  22/2.1/--/13/62/--/-- (10/16 6032)  WBC/Hgb/Hct/Plts:  3.6/8.6/25.6/126 (10/17 0608)  estimated creatinine clearance is 58 mL/min (A) (based on SCr of 2.1 mg/dL (H)). Other pertinent labs as noted below    Radiology:  XR CHEST PORTABLE   Final Result   Persistent bilateral infiltrates. No significant changes since October 13. See above comments. See report CT chest October 13. CTA PULMONARY W CONTRAST   Final Result   1. No evidence of pulmonary embolism. 2. Interval increased size of bilateral pleural effusions with little change   in pericardial effusion. 3. Persistent subcutaneous edema. 4. Reactive bilateral hilar lymphadenopathy. 5. Bilateral airspace disease is increased compared to prior examination. The increase is most pronounced within the lower lobes. 6. Bilateral gynecomastia. XR CHEST PORTABLE   Final Result   Development of extensive bilateral pulmonary infiltrates         XR CHEST PORTABLE   Final Result   No acute process. US RETROPERITONEAL COMPLETE   Final Result   1. Hyperechoic kidneys due to underlying parenchymal disease.    2. Trace bilateral perinephric fluid of indeterminate etiology, potentially   due to underlying inflammation. 3. An approximately 1.1 cm x 1.5 cm x 2.3 cm lesion in the right kidney could   be a cyst but could also be seen with infarction, abscess, or neoplasia. Consider further evaluation with renal protocol abdomen MRI (preferred) or   CT. Following resolution of acute kidney injury. XR CHEST PORTABLE   Final Result   Multifocal bilateral pulmonary ground-glass airspace opacities are stable         CT ABDOMEN PELVIS WO CONTRAST Additional Contrast? None   Final Result   Limited study by the patient's respiratory motion and lack of intravenous   contrast.      Extensive multifocal COVID-19 pneumonia bilaterally. Bilateral trace layering pleural effusions. Bilateral gynecomastia. Abdominal organs inadequately evaluated due to the limitations of the study. Normal appendix. No evidence of bowel obstruction. CT CHEST WO CONTRAST   Final Result   Limited study by the patient's respiratory motion and lack of intravenous   contrast.      Extensive multifocal COVID-19 pneumonia bilaterally. Bilateral trace layering pleural effusions. Bilateral gynecomastia. Abdominal organs inadequately evaluated due to the limitations of the study. Normal appendix. No evidence of bowel obstruction.                Resident Assessment and Plan       Acute hypoxia resolved   2/2 COVID pneumonia vs Pneumocystis pneumonia    - Maintaining Oxygen saturation in room air  - CTA: COVID pneumonia, Bilateral trace layering pleural effusion, negative for PE   - D dimer elevated on admission, currently stable  - Ferritin 3900, CRP 10.1 on admission   - WBC is trending down(Today is 2.9)  - Continue monitor Oxygen saturation   - Pulmonology and ID on board  - Legionella negative  - T spot TB test pending   - Decadron discontinued on 10/9  - CXR 10/8 shows multifocal groundglass opacities are stable  - Repeat CXR today: b/l pulm infiltrates   - Blood cx: kira epidermitis, repeat blood cx shows no growth x 6 days   -Urine culture: NGTD   -Counseled on getting COVID vaccine after patient recovers   - ECHO: EF 55%, late bubble sign possible shunting of pulmonary origin   - CTA 10/13 negative for PE  Bronchoscopy with BAL performed on 10/15/2021    Weight loss likely secondary to HIv  - Per patient due to decreased appetite from COVID; 50lb weight loss since August   - R/o other potential origins   - MARILEE negative  HIV, hepatitis C antibodies positive   - Leukopenia   - Elevated IgG and IgA, normal IgM: IgA nephropathy vs MPGN?  - Cryoglobulin normal   - TSH normal   - Sed rate and CRP elevated      HIV  HIV 1/2 antibodies positive  - Leucopenia, normal absolute neutrophil   - Febrile and tachycardic   CD4 44, CD3 153, CD8 100  HIV RNA quantitative PCR pending  - ID on board: received 2 days of vancomycin 10/6, discontinued ancef, started on cefepime 2g q8h  - Bactrim 400mg q8h forsuspected Pneumocystis pneumonia.    - tests for opportunistic infections pending: HIV GART, HLAB-5701, Toxoplasma Ab, RPR, urine GC/Chlamydia PCR, hepatitis A total Ab     Mild to moderate Pericardial effusion  - likely secondary to HIV  - Patient is asymptomatic and hemodynamically stable  - Tachycardic  - Cardio and pulmonary on board  - Will repeat Echo to follow up on pericardial effusion    - If repeat echo show worsening considering consult CTS    Hepatitis C resolved  Hep C antibody reactive  Viral load undetectable    Anemia   - Likely 2/2 from neuropathy vs GI bleed vs HIV   - Hb/H stable  Transfused 1 unit 10/11, post transfusion Hb 8   - General surgery consulted, FOBT negative the second time    DEISI   Hyponatremia  - Na is trending down(129<131) liekly secondary to dehydration  - nephr on board  - will hold Bumex and start maintenance  IVF   - Cr on admission 3.6, trending down 2  - Urine osm and urine nitrogen low   - UA: protein, 1-2 waxy casts, moderate bacteria, moderate

## 2021-10-17 NOTE — PROGRESS NOTES
[facility] PROGRESS NOTE      Chief complaint: Follow-up of Gram-positive cocci in clusters on blood culture    The patient is a 36 y.o. male, not vaccinated against COVID-19, with history of asthma, presented on 10/05 with shortness of breath, found to be febrile at 103 °F, positive SARS-CoV-2 PCR, bilateral groundglass opacities on chest CT, tolerating room air with oxygen saturation of 97%. He reports having tested positive for SARS-CoV-2 for the 3rd time now with previous on in late August at which time he reports having quarantined. Urine Streptococcus pneumonia and Legionella antigens were negative. Blood cultures from 10/05 and 10/06 showed methicillin-susceptible Staphylococcus epidermidis in 1 out of 2 sets. HIV screen was positive. CD4 count was low at 43. Transthoracic echocardiogram showed late positive bubble suggesting possible shunt from pulmonary origin, moderately dilated left atrium, small pericardial effusion without tamponade physiology, ejection fraction visually estimated at 55%. Subjective: Patient was seen and examined. No chills, no abdominal pain, no diarrhea, no rash, no itching. Objective:  /74   Pulse 121   Temp 97.3 °F (36.3 °C) (Temporal)   Resp 16   Ht 6' 4\" (1.93 m)   Wt 173 lb (78.5 kg)   SpO2 98%   BMI 21.06 kg/m²   Constitutional: Alert, not in distress  Respiratory: Clear breath sounds, no crackles, no wheezes  Cardiovascular: Regular rate and rhythm, no murmurs  Gastrointestinal: Bowel sounds present, soft, nontender  Skin: Warm and dry, no active dermatoses  Musculoskeletal: No joint swelling, no joint erythema    Labs, imaging, and medical records/notes were personally reviewed. Bronch resp cx Gram variable rods  10/8/21 blood cx negative  Assessment:  Sepsis, resolved  COVID-19, mild  Pneumonia  Methicillin susceptible Staphylococcus epidermidis bacteremia, etiology unclear  DEISI  Suspected advanced HIV disease (CD4 of 37).   Hepatitis C Ab positive. Prolonged QTc    Recommendations:  Continue cefepime 2g q8h. Continue high-dose Bactrim at 5 mg trimethoprim dose per kilogram actual body weight every 8 hours for suspected Pneumocystis pneumonia. Follow up serum 1, 3-beta D glucan. Positive-will start eraxis  Follow up bronchoscopy. BAL for Gram stain, aerobic and anaerobic cultures and Pneumocystis PCR. Follow up HIV viral load, hepatitis C viral load-not detected  Follow up HIV GART, HLAB-5701, Toxoplasma Ab, RPR, urine GC/Chlamydia PCR, hepatitis A total Ab +. Monitor respiratory status. Continue supportive care. Monitor creatine      Thank you for involving me in the care of Kristin James. Electronically signed by Lela Boxer, APRN - CNP on 10/17/2021 at 9:34 AM   Pt seen and examined. Above discussed agree with advanced practice nurse. Labs, cultures, and radiographs reviewed. Face to Face encounter occurred. Changes made as necessary.      Matilda Herring MD

## 2021-10-17 NOTE — PROGRESS NOTES
Department of Internal Medicine  Nephrology Progress Note      Events reviewed. SUBJECTIVE:  We are following Mr. Milli Urena for DEISI. Reports no complaints.     PHYSICAL EXAM:      Vitals:    VITALS:  /74   Pulse 121   Temp 97.3 °F (36.3 °C) (Temporal)   Resp 16   Ht 6' 4\" (1.93 m)   Wt 173 lb (78.5 kg)   SpO2 98%   BMI 21.06 kg/m²   24HR INTAKE/OUTPUT:      Intake/Output Summary (Last 24 hours) at 10/17/2021 0957  Last data filed at 10/17/2021 0040  Gross per 24 hour   Intake 340 ml   Output 1600 ml   Net -1260 ml       Constitutional:  Awake, alert, NAD  HEENT:  PERRL, normocephalic, atraumatic  Respiratory: diminished lung sounds  Cardiovascular/Edema:  RRR, S1/S2, -edema  Gastrointestinal:  abd flat, soft, non-tender  Neurologic:  Awake, alert, no focal deficits  Skin:  Warm, dry, no rash or lesions  Other: BLE 1+ edema       Scheduled Meds:   enoxaparin  40 mg SubCUTAneous Daily    metoprolol tartrate  25 mg Oral Daily    anidulafungin  100 mg IntraVENous Q24H    sulfamethoxazole-trimethoprim (BACTRIM) IVPB  400 mg IntraVENous Q8H    cefepime  2,000 mg IntraVENous Q8H    levalbuterol  0.63 mg Nebulization Q8H    pantoprazole  40 mg Oral QAM AC    sodium chloride flush  5-40 mL IntraVENous 2 times per day     Continuous Infusions:   sodium chloride      sodium chloride      sodium chloride       PRN Meds:.perflutren lipid microspheres, hydrOXYzine, heparin (porcine), heparin (porcine), labetalol, sodium chloride, sodium chloride flush, ondansetron **OR** ondansetron, polyethylene glycol, acetaminophen **OR** acetaminophen, perflutren lipid microspheres, sodium chloride    DATA:    CBC:   Lab Results   Component Value Date    WBC 3.6 10/17/2021    RBC 2.91 10/17/2021    HGB 8.6 10/17/2021    HCT 25.6 10/17/2021    MCV 88.0 10/17/2021    MCH 29.6 10/17/2021    MCHC 33.6 10/17/2021    RDW 14.6 10/17/2021     10/17/2021    MPV 12.5 10/17/2021     CMP:    Lab Results Component Value Date     10/17/2021    K 4.9 10/17/2021     10/17/2021    CO2 18 10/17/2021    BUN 21 10/17/2021    CREATININE 2.0 10/17/2021    GFRAA 48 10/17/2021    LABGLOM 48 10/17/2021    GLUCOSE 98 10/17/2021    PROT 6.0 10/17/2021    LABALBU 3.0 10/17/2021    CALCIUM 8.1 10/17/2021    BILITOT 0.3 10/17/2021    ALKPHOS 69 10/17/2021    AST 70 10/17/2021    ALT 16 10/17/2021     Magnesium:    Lab Results   Component Value Date    MG 1.3 10/15/2021     Phosphorus:  No results found for: PHOS     Radiology Review:       Ejection fraction is visually estimated at 55%. There is late positive bubble suggesting possible shunt from pulmonary   origin. Visually moderately dilated left atrium. Normal right ventricular size and function. There is small pericardial effusion without tamponade physiology        CT Chest October 5, 2021   Limited study by the patient's respiratory motion and lack of intravenous   contrast.       Extensive multifocal COVID-19 pneumonia bilaterally.       Bilateral trace layering pleural effusions.       Bilateral gynecomastia.       Abdominal organs inadequately evaluated due to the limitations of the study.       Normal appendix.       No evidence of bowel obstruction.         Kidney ultrasound October 7, 2021   1. Hyperechoic kidneys due to underlying parenchymal disease. 2. Trace bilateral perinephric fluid of indeterminate etiology, potentially   due to underlying inflammation. 3. An approximately 1.1 cm x 1.5 cm x 2.3 cm lesion in the right kidney could   be a cyst but could also be seen with infarction, abscess, or neoplasia. Consider further evaluation with renal protocol abdomen MRI (preferred) or   CT.  Following resolution of acute kidney injury.         CTA pulmonary with IV contrast October 13, 2021   1. No evidence of pulmonary embolism. 2. Interval increased size of bilateral pleural effusions with little change   in pericardial effusion.    3. Persistent subcutaneous edema. 4. Reactive bilateral hilar lymphadenopathy. 5. Bilateral airspace disease is increased compared to prior examination. The increase is most pronounced within the lower lobes. 6. Bilateral gynecomastia.                 BRIEF SUMMARY OF INITIAL CONSULT:     Briefly, Mr. Jessy Soto is a 36year old male with no significant PMH who was admitted on October 5, 2021 after he presented from an outside clinic after he was found to be hypoxic during 6 minute walking test. Patient tested positive for Covid 19 on August 18th, he is unvaccinated and had Covid 19 last year as well. Patient states he has quarantined by himself and when his mother recently visited him she was shocked at his appearance as he had lost about 50 pounds in 2 and a half months. On admission labs were significant for sodium of 130, potassium 5.2, bicarbonate 20, BUN 41, creatinine 3.6, magnesium 2.7, calcium 7.7 and proBNP 1,599. We are consulted for DEISI. Problems resolved:    Hyperkalemia, 2/2 DEISI. Low potassium diet  Hypotonic hyponatremia 2/2 intravascular volume from poor oral intake. Bicarb drip started. Sodium levels improving. Low bicarbonate levels with hyperchloremia, most likely NAGMA versus respiratory alkalosis; we need a PH to clarify diagnosis. Awaiting ABG results  High bicarbonate levels, likely metabolic alkalosis 2/2 administration  Hypokalemia, multifactorial, poor intake, probably renal potassium wasting  Severe Hypoalbuminemia, multifactorial, status post albumin administration  DEISI on CKD, volume responsive pre-renal DEISI d/t volume (poor oral intake), urine sodium <20. Resolved. IMPRESSION/RECOMMENDATIONS:     Recurrent DEISI on CKD, likely contrast associated DEISI. Renal function slowly improving, creatinine down to 1.6 mg deciliter.     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 Work-up so far HIV positive, Hepatitis C positive, MARILEE negative, C4 normal, C3 88 (low), UPEP without monoclonal gammopathy, negative cryoglobulins. Kidney ultrasound with hyperechoic kidneys. Needs kidney biopsy, discussed with interventional radiology they would rather wait for a couple weeks to perform biopsy in view of patient having Covid-19 infection. Hyponatremia, multifactorial, including decreased GFR and hemodynamically mediated in the setting of large hypotonic fluid administration (D5 with Bactrim). Sodium levels relatively stable. Low bicarbonate levels with hyperchloremia, consistent with either hyperchloremic metabolic acidosis versus respiratory alkalosis. Edematous state, multifactorial, mainly 2/2 nephrotic syndrome and possibly HFpEF 55%, on bumex  Probably HFpEF 55%, proBNP 11,401 > 3421, on Bumex 1 mg p.o. twice daily  HTN, on metoprolol   Right kidney lesion, likely a cyst but cannot be ruled out other pathology including infarction, abscess, neoplasm.   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, (1, 3) beta-D-glucan positive, started on anidulafungin  Sinus tachycardia, multifactorial  Hepatitis C antibody positive, awaiting viral load  Covid 19 infection in non vaccinated pt  Normocytic anemia, multifactorial    Plan:    Change IV fluids to D5W with 150 mEq of sodium bicarbonate at 50 cc/hour  Repeat proBNP  Kidney biopsy as an outpatient in 2 weeks after discharge  Continue to monitor potassium level while on Bactrim  Continue to monitor renal function after contrast study  Monitor Na levels   Monitor bicarbonate levels  Continue fluid restriction, dry tray        Electronically signed by Maria Isabel Mayes MD on 10/17/2021 at 9:57 AM

## 2021-10-17 NOTE — PROGRESS NOTES
[facility]  Department of Internal Medicine  Division of Pulmonary, Critical Care and Sleep Medicine  Progress Note    Carito Morales DO, MPH, [facility], FACOI, FACP  Jelani Dolan MD, [facility]  David REAVES, [facility]      Patient: Priya Wilkerson  MRN: [id number]  : 1990    Encounter Time: 3:06 PM     Date of Admission: 10/5/2021  3:07 PM    Primary Care Physician: Ching Austin MD    Reason for Consultation: COVID     SUBJECTIVE:    Hep C and HIV + screen  CD4 is 54  Echo = reviewed  BAL results still pending    OBJECTIVE:     PHYSICAL EXAM:   VITALS:   Vitals:    10/16/21 0920 10/16/21 1515 10/17/21 0806 10/17/21 1318   BP: 112/64 113/72 120/74    Pulse: 136 125 121    Resp: 18 18 16    Temp: 96.5 °F (35.8 °C) 98.5 °F (36.9 °C) 97.3 °F (36.3 °C)    TempSrc: Tympanic Tympanic Temporal    SpO2: 100% 100% 98% 98%   Weight:       Height:            Intake/Output Summary (Last 24 hours) at 10/17/2021 1506  Last data filed at 10/17/2021 1432  Gross per 24 hour   Intake 360 ml   Output 200 ml   Net 160 ml        CONSTITUTIONAL:   A&O x 3, NAD  SKIN:     No rash, No suspicious lesions or skin discoloration  HEENT:     EOMI, MMM, No thrush  NECK:    No bruits, No JVP apprechiated  CV:      Sinus,  No murmur, No rubs, No gallops  PULMONARY:   Couse BS,  No Wheezing, No Rales, No Rhonchi      No noted egophony  ABDOMEN:     Soft, non-tender. BS normal. No R/R/G  EXT:    No deformities . No clubbing.       + lower extremity edema, No venous stasis  PULSE:   Appears equal and palpable.   PSYCHIATRIC:  Seems appropriate, No acute psycosis  MS:    No fractures, No gross weakness  NEUROLOGIC:   The clinical assessment is non-focal     DATA: IMAGING & TESTING:     LABORATORY TESTS:    CBC:   Lab Results   Component Value Date    WBC 3.6 10/17/2021    RBC 2.91 10/17/2021    HGB 8.6 10/17/2021    HCT 25.6 10/17/2021    MCV 88.0 10/17/2021    MCH 29.6 10/17/2021    MCHC 33.6 10/17/2021 RDW 14.6 10/17/2021     10/17/2021    MPV 12.5 10/17/2021     CMP:    Lab Results   Component Value Date     10/17/2021    K 4.9 10/17/2021     10/17/2021    CO2 18 10/17/2021    BUN 21 10/17/2021    CREATININE 2.0 10/17/2021    GFRAA 48 10/17/2021    LABGLOM 48 10/17/2021    GLUCOSE 98 10/17/2021    PROT 6.0 10/17/2021    LABALBU 3.0 10/17/2021    CALCIUM 8.1 10/17/2021    BILITOT 0.3 10/17/2021    ALKPHOS 69 10/17/2021    AST 70 10/17/2021    ALT 16 10/17/2021     Magnesium:    Lab Results   Component Value Date    MG 1.3 10/15/2021     Phosphorus:  No results found for: PHOS  PT/INR:    Lab Results   Component Value Date    PROTIME 17.7 10/15/2021    INR 1.6 10/15/2021     FERRITIN:    Lab Results   Component Value Date    FERRITIN 3,921 10/06/2021     Fibrinogen Level:  No components found for: FIB     PRO-BNP:   Lab Results   Component Value Date    PROBNP 11,401 (H) 10/13/2021    PROBNP 3,431 (H) 10/09/2021      ABGs: No results found for: PH, PO2, PCO2  Hemoglobin A1C: No components found for: HGBA1C    IMAGING:  Imaging tests were completed and reviewed and discussed radiology and care team involved and reveals     CT CHEST : There is adequate opacification of the pulmonary arteries.  There is no   evidence of filling defect to suggest pulmonary embolism. Daphane Flies is no   evidence of thoracic aortic aneurysm or dissection.  There are small   bilateral pleural effusions.  These are increased compared to prior   examination.  There is small pericardial effusion which appears stable.  Left   hilar lymph node measures approximately 12 mm in short axis.  Right hilar   lymph node measures approximately 12 mm in short axis.  There is bilateral   gynecomastia. [place] Flies is diffuse subcutaneous edema.      The central airways are patent.  There is no pneumothorax.  There are   bilateral ground-glass opacities and alveolar consolidation.  There has been   interval increase in the airspace disease compared larger on the right. No pneumothorax. Numerous ground-glass densities in the bilateral upper and to a lesser degree lower lungs, in keeping with known COVID-19 pneumonia. Soft Tissues/Bones: Bilateral gynecomastia. No acute osseous abnormality in the thoracic cage. Abdomen and pelvis: The study is degraded by the patient's respiratory motion. Organs: Abdominal organs inadequately evaluated on this motion degraded and unenhanced study. No hydronephrosis on either side. GI/Bowel: Normal appendix. No evidence of bowel obstruction. Pelvis: Normal sized prostate. Urinary bladder grossly intact. Peritoneum/Retroperitoneum: No free air or free fluid. No bulky adenopathy. No abdominal aortic aneurysm. Bones/Soft Tissues: No lytic or sclerotic lesion in the regional skeleton. Limited study by the patient's respiratory motion and lack of intravenous contrast. Extensive multifocal COVID-19 pneumonia bilaterally. Bilateral trace layering pleural effusions. Bilateral gynecomastia. Abdominal organs inadequately evaluated due to the limitations of the study. Normal appendix. No evidence of bowel obstruction. ECHOCARDIOGRAM:  Ejection fraction is visually estimated at 55%. There is late positive bubble suggesting possible shunt from pulmonary   origin. Visually moderately dilated left atrium. Normal right ventricular size and function. There is small pericardial effusion without tamponade physiology    Assessment:  Mr. Dominik Amaya is a 36year old male with no significant PMH who was admitted on October 5, 2021 after he presented from an outside clinic after he was found to be hypoxic during 6 minute walking test. Patient tested positive for Covid 19 on August 18th, he is unvaccinated and had Covid 19 last year as well.  Patient states he has quarantined by himself and when his mother recently visited him she was shocked at his appearance as he had lost about 50 pounds in 2 and a half

## 2021-10-18 LAB
ALBUMIN SERPL-MCNC: 2.7 G/DL (ref 3.5–5.2)
ALP BLD-CCNC: 73 U/L (ref 40–129)
ALT SERPL-CCNC: 19 U/L (ref 0–40)
ANION GAP SERPL CALCULATED.3IONS-SCNC: 11 MMOL/L (ref 7–16)
APPEARANCE FLUID: NORMAL
APTT: 35.8 SEC (ref 24.5–35.1)
AST SERPL-CCNC: 74 U/L (ref 0–39)
BASO FLUID: 0 %
BASOPHILS ABSOLUTE: 0 E9/L (ref 0–0.2)
BASOPHILS RELATIVE PERCENT: 0.3 % (ref 0–2)
BILIRUB SERPL-MCNC: 0.3 MG/DL (ref 0–1.2)
BUN BLDV-MCNC: 15 MG/DL (ref 6–20)
BURR CELLS: ABNORMAL
CALCIUM SERPL-MCNC: 7.9 MG/DL (ref 8.6–10.2)
CELL COUNT FLUID TYPE: NORMAL
CHLORIDE BLD-SCNC: 103 MMOL/L (ref 98–107)
CO2: 15 MMOL/L (ref 22–29)
COLOR FLUID: NORMAL
CREAT SERPL-MCNC: 1.6 MG/DL (ref 0.7–1.2)
DIRECT EXAM: NORMAL
DIRECT EXAM: NORMAL
EKG ATRIAL RATE: 122 BPM
EKG P AXIS: 49 DEGREES
EKG P-R INTERVAL: 122 MS
EKG Q-T INTERVAL: 336 MS
EKG QRS DURATION: 70 MS
EKG QTC CALCULATION (BAZETT): 478 MS
EKG R AXIS: 64 DEGREES
EKG T AXIS: -63 DEGREES
EKG VENTRICULAR RATE: 122 BPM
EOSINOPHIL FLUID: 1 %
EOSINOPHILS ABSOLUTE: 0 E9/L (ref 0.05–0.5)
EOSINOPHILS RELATIVE PERCENT: 1 % (ref 0–6)
GFR AFRICAN AMERICAN: >60
GFR NON-AFRICAN AMERICAN: >60 ML/MIN/1.73
GLUCOSE BLD-MCNC: 65 MG/DL (ref 74–99)
HCT VFR BLD CALC: 27.1 % (ref 37–54)
HEMOGLOBIN: 8.8 G/DL (ref 12.5–16.5)
LYMPHOCYTES ABSOLUTE: 0.12 E9/L (ref 1.5–4)
LYMPHOCYTES RELATIVE PERCENT: 4.4 % (ref 20–42)
LYMPHOCYTES, BODY FLUID: 48 %
MCH RBC QN AUTO: 29.2 PG (ref 26–35)
MCHC RBC AUTO-ENTMCNC: 32.5 % (ref 32–34.5)
MCV RBC AUTO: 90 FL (ref 80–99.9)
MONOCYTE, FLUID: 38 %
MONOCYTES ABSOLUTE: 0.09 E9/L (ref 0.1–0.95)
MONOCYTES RELATIVE PERCENT: 2.7 % (ref 2–12)
NEUTROPHIL, FLUID: 13 %
NEUTROPHILS ABSOLUTE: 2.79 E9/L (ref 1.8–7.3)
NEUTROPHILS RELATIVE PERCENT: 92.9 % (ref 43–80)
NUCLEATED CELLS FLUID: 40 /UL
OVALOCYTES: ABNORMAL
PDW BLD-RTO: 14.4 FL (ref 11.5–15)
PLATELET # BLD: 152 E9/L (ref 130–450)
PMV BLD AUTO: 12.4 FL (ref 7–12)
POIKILOCYTES: ABNORMAL
POLYCHROMASIA: ABNORMAL
POTASSIUM REFLEX MAGNESIUM: 4.3 MMOL/L (ref 3.5–5)
PRO-BNP: 3421 PG/ML (ref 0–125)
RBC # BLD: 3.01 E12/L (ref 3.8–5.8)
RBC FLUID: 6335 /UL
RPR TITER: NORMAL
RPR: REACTIVE
SCHISTOCYTES: ABNORMAL
SMUDGE CELLS: ABNORMAL
SODIUM BLD-SCNC: 129 MMOL/L (ref 132–146)
SPECIMEN: NORMAL
SPECIMEN: NORMAL
TEAR DROP CELLS: ABNORMAL
TOTAL PROTEIN: 6.7 G/DL (ref 6.4–8.3)
WBC # BLD: 3 E9/L (ref 4.5–11.5)

## 2021-10-18 PROCEDURE — 2500000003 HC RX 250 WO HCPCS: Performed by: NURSE PRACTITIONER

## 2021-10-18 PROCEDURE — 85025 COMPLETE CBC W/AUTO DIFF WBC: CPT

## 2021-10-18 PROCEDURE — 6360000002 HC RX W HCPCS: Performed by: INTERNAL MEDICINE

## 2021-10-18 PROCEDURE — 2140000000 HC CCU INTERMEDIATE R&B

## 2021-10-18 PROCEDURE — 2580000003 HC RX 258: Performed by: NURSE PRACTITIONER

## 2021-10-18 PROCEDURE — 2580000003 HC RX 258: Performed by: INTERNAL MEDICINE

## 2021-10-18 PROCEDURE — 99232 SBSQ HOSP IP/OBS MODERATE 35: CPT | Performed by: FAMILY MEDICINE

## 2021-10-18 PROCEDURE — 6370000000 HC RX 637 (ALT 250 FOR IP): Performed by: STUDENT IN AN ORGANIZED HEALTH CARE EDUCATION/TRAINING PROGRAM

## 2021-10-18 PROCEDURE — 6370000000 HC RX 637 (ALT 250 FOR IP): Performed by: INTERNAL MEDICINE

## 2021-10-18 PROCEDURE — 99232 SBSQ HOSP IP/OBS MODERATE 35: CPT | Performed by: INTERNAL MEDICINE

## 2021-10-18 PROCEDURE — 6360000002 HC RX W HCPCS: Performed by: STUDENT IN AN ORGANIZED HEALTH CARE EDUCATION/TRAINING PROGRAM

## 2021-10-18 PROCEDURE — 36415 COLL VENOUS BLD VENIPUNCTURE: CPT

## 2021-10-18 PROCEDURE — 93010 ELECTROCARDIOGRAM REPORT: CPT | Performed by: INTERNAL MEDICINE

## 2021-10-18 PROCEDURE — 83880 ASSAY OF NATRIURETIC PEPTIDE: CPT

## 2021-10-18 PROCEDURE — 80053 COMPREHEN METABOLIC PANEL: CPT

## 2021-10-18 PROCEDURE — 2500000003 HC RX 250 WO HCPCS: Performed by: INTERNAL MEDICINE

## 2021-10-18 PROCEDURE — 6360000002 HC RX W HCPCS: Performed by: NURSE PRACTITIONER

## 2021-10-18 PROCEDURE — 85730 THROMBOPLASTIN TIME PARTIAL: CPT

## 2021-10-18 RX ADMIN — Medication 10 ML: at 11:53

## 2021-10-18 RX ADMIN — SULFAMETHOXAZOLE AND TRIMETHOPRIM 400 MG: 80; 16 INJECTION, SOLUTION, CONCENTRATE INTRAVENOUS at 14:38

## 2021-10-18 RX ADMIN — CEFEPIME HYDROCHLORIDE 2000 MG: 2 INJECTION, POWDER, FOR SOLUTION INTRAVENOUS at 22:31

## 2021-10-18 RX ADMIN — ENOXAPARIN SODIUM 40 MG: 40 INJECTION SUBCUTANEOUS at 11:52

## 2021-10-18 RX ADMIN — METOPROLOL TARTRATE 25 MG: 25 TABLET, FILM COATED ORAL at 11:53

## 2021-10-18 RX ADMIN — Medication 10 ML: at 20:23

## 2021-10-18 RX ADMIN — SULFAMETHOXAZOLE AND TRIMETHOPRIM 400 MG: 80; 16 INJECTION, SOLUTION, CONCENTRATE INTRAVENOUS at 20:23

## 2021-10-18 RX ADMIN — CEFEPIME HYDROCHLORIDE 2000 MG: 2 INJECTION, POWDER, FOR SOLUTION INTRAVENOUS at 14:37

## 2021-10-18 RX ADMIN — CEFEPIME HYDROCHLORIDE 2000 MG: 2 INJECTION, POWDER, FOR SOLUTION INTRAVENOUS at 05:13

## 2021-10-18 RX ADMIN — HYDROXYZINE PAMOATE 25 MG: 25 CAPSULE ORAL at 03:22

## 2021-10-18 RX ADMIN — HYDROXYZINE PAMOATE 25 MG: 25 CAPSULE ORAL at 11:56

## 2021-10-18 RX ADMIN — SODIUM BICARBONATE: 84 INJECTION, SOLUTION INTRAVENOUS at 18:16

## 2021-10-18 RX ADMIN — PANTOPRAZOLE SODIUM 40 MG: 40 TABLET, DELAYED RELEASE ORAL at 05:13

## 2021-10-18 RX ADMIN — SULFAMETHOXAZOLE AND TRIMETHOPRIM 400 MG: 80; 16 INJECTION, SOLUTION, CONCENTRATE INTRAVENOUS at 05:13

## 2021-10-18 RX ADMIN — DEXTROSE MONOHYDRATE 100 MG: 50 INJECTION, SOLUTION INTRAVENOUS at 14:38

## 2021-10-18 ASSESSMENT — PAIN SCALES - GENERAL: PAINLEVEL_OUTOF10: 0

## 2021-10-18 NOTE — BH NOTE
[address] [facility]      Reason for consult:  Anxiety; Difficulty coping with hospital stressors     Background:  Pt admitted for dyspnea, cough, Covid positive, fever 103, and bilateral pneumonia. HIV positive. Psychotherapy Intervention:  Problem-solved regarding various stressors includin) living situation. States that the house he is renting has black mold, and he has had problems getting his rent paid due to hospitalization; 2) 's license  and he has not been able to access funds. Problem solved different options, including making calls to his bank, and identifying potential family support for helping him manage these stressors. Discussed how to gain control of the problems that he is experiencing for purpose of stress management. Processed reactions to new diagnosis of HIV.         Recommendations/Plan:   Will continue to follow with Family Medicine team during hospitalization.  He is in agreement to follow with this psychologist outpatient following discharge.       Electronically signed by Claudean Pellet, PhD

## 2021-10-18 NOTE — PROGRESS NOTES
[address] [place] [address] Attending    S: 36 y.o. male with history of asthma and smoking history presented with increasing dyspnea and cough over past month. On presentation had fever of 103. Has tested positive for COVID twice, most recently end of August.  CT shows extensive bilateral pneumonia and COVID testing again positive. HIV pos, CD4 43. Today, no new symptoms or concerns. Ongoing neuropathy symptoms bilateral feet at toes. O: VS- Blood pressure 127/75, pulse 120, temperature 99.1 °F (37.3 °C), temperature source Temporal, resp. rate 16, height 6' 4\" (1.93 m), weight 173 lb (78.5 kg), SpO2 97 %. Exam is as noted by resident   Awake, alert and oriented. on room air  Heart - RRR, mildly tachy at time of exam   Lungs- CTAB  Ext - no edema. Good pulses to bilateral feet; decreased sensation. Feet are warm, no lesions, good CR. Impressions:   Principal Problem:    Acute respiratory failure with hypoxia ([address]  Active Problems:  Repeatedly positive Covid  Stable groundglass opacities  Saturations okay on room air    Acute kidney injury due to COVID-19 ([address]    Asthma    Nephrotic range proteinuria  50 pound weight loss  2 blood cultures positive      Plan   Covid pneumonia with sepsis  cta neg for pe  Nephrology, ID, and pulmonology following  sinus tachycardia - Echo with EF 55%, small pericardial effusion and possible atrial shunt-cardiology consulted. Tachy 2/2 anemia vs pericardial effusion vs infxn vs dehydration. Replaced amlodipine with metoprolol. Stopped bumex and started IVFs. Pericardial effusion - will repeat Echo. If still present recommend consult CTS for pericardiocentesis with tachycardia despite metoprolol. Anemia - FOBT heme positive first time, neg on repeat. Gen surgery has no plans for scopes s/p 1U PRBCs, will trend h/h. Follow.     Blood culture with staph epi, on Ancef; antibiotics were changed from ancef to bactrim and cefepime for possible sepsis/PCP pneumonia. ID management appreciated. Awaiting further lab, TB, confirmatory testing pending with hcv ab positive-neg viral load. Reactive HIV 1 antibodies; viral load and resistance testing pending, CD4 count of 43. Decadron stopped. Nephrology planning biopsy outpatient, W/U for possible Wegener's. DEISI - stopped bumex. Start IVFs. Hyponatremia - start NS, CTM   Disposition planning. Follow up outpatient for right kidney lesion-ct/mri when cr improved. At this point, the patient does not desire his mother to know results of hiv/hcv testing, but ok with discussion with his step-father, Jayy Jane. Discussed SW resources for obtaining new identification (lost his ID cards) and to discuss contacting his landlord to address mold in the home. Attending Physician Statement  I have reviewed the chart and seen the patient with the resident(s). I personally reviewed images, EKG's and similar tests, if present. I personally reviewed and performed key elements of the history and exam.  I have reviewed and confirmed student and/or resident history and exam with changes as indicated above. I agree with the assessment, plan and orders as documented by the resident. Please refer to the resident and/or student note for additional information.       Bryce Ybarra, DO

## 2021-10-18 NOTE — PROGRESS NOTES
[facility]  Department of Internal Medicine  Division of Pulmonary, Critical Care and Sleep Medicine  Progress Note    Brooks Muniz DO, MPH, FCCP, FACOI, FACP  Jelani Dolan MD, [facility]  [facility]      Patient: Javy Nielsen  MRN: [id number]  : 1990    Encounter Time: 12:58 PM     Date of Admission: 10/5/2021  3:07 PM    Primary Care Physician: Dian Sargent MD    Reason for Consultation: COVID     SUBJECTIVE:    Hep C and HIV + screen  CD4 is 54  Echo = reviewed  BAL results still WTF  BNP 3 k      OBJECTIVE:     PHYSICAL EXAM:   VITALS:   Vitals:    10/17/21 0806 10/17/21 1318 10/17/21 2315 10/18/21 0657   BP: 120/74  118/70 127/75   Pulse: 121  104 120   Resp: 16  16 16   Temp: 97.3 °F (36.3 °C)  97 °F (36.1 °C) 99.1 °F (37.3 °C)   TempSrc: Temporal  Temporal Temporal   SpO2: 98% 98% 97% 97%   Weight:       Height:            Intake/Output Summary (Last 24 hours) at 10/18/2021 1258  Last data filed at 10/18/2021 0418  Gross per 24 hour   Intake 480 ml   Output 700 ml   Net -220 ml        CONSTITUTIONAL:   A&O x 3, NAD  SKIN:     No rash,   HEENT:     EOMI, MMM, No thrush  NECK:    No bruits, No JVP apprechiated  CV:      Sinus,  No murmur, No rubs, No gallops  PULMONARY:   Couse BS,  No Wheezing, No Rales, No Rhonchi      No noted egophony  ABDOMEN:     Soft, non-tender. BS normal. No R/R/G  EXT:    No deformities . No clubbing.       + lower extremity edema, No venous stasis  PULSE:   Appears equal and palpable.   PSYCHIATRIC:  Seems appropriate, No acute psycosis  MS:    No fractures, No gross weakness  NEUROLOGIC:   Non-focal     DATA: IMAGING & TESTING:     LABORATORY TESTS:    CBC:   Lab Results   Component Value Date    WBC 3.0 10/18/2021    RBC 3.01 10/18/2021    HGB 8.8 10/18/2021    HCT 27.1 10/18/2021    MCV 90.0 10/18/2021    MCH 29.2 10/18/2021    MCHC 32.5 10/18/2021    RDW 14.4 10/18/2021     10/18/2021    MPV 12.4 10/18/2021 CMP:    Lab Results   Component Value Date     10/18/2021    K 4.3 10/18/2021     10/18/2021    CO2 15 10/18/2021    BUN 15 10/18/2021    CREATININE 1.6 10/18/2021    GFRAA >60 10/18/2021    LABGLOM >60 10/18/2021    GLUCOSE 65 10/18/2021    PROT 6.7 10/18/2021    LABALBU 2.7 10/18/2021    CALCIUM 7.9 10/18/2021    BILITOT 0.3 10/18/2021    ALKPHOS 73 10/18/2021    AST 74 10/18/2021    ALT 19 10/18/2021     Magnesium:    Lab Results   Component Value Date    MG 1.3 10/15/2021     Phosphorus:  No results found for: PHOS  PT/INR:    Lab Results   Component Value Date    PROTIME 17.7 10/15/2021    INR 1.6 10/15/2021     FERRITIN:    Lab Results   Component Value Date    FERRITIN 3,921 10/06/2021     Fibrinogen Level:  No components found for: FIB     PRO-BNP:   Lab Results   Component Value Date    PROBNP 3,421 (H) 10/18/2021    PROBNP 11,401 (H) 10/13/2021      ABGs: No results found for: PH, PO2, PCO2  Hemoglobin A1C: No components found for: HGBA1C    IMAGING:  Imaging tests were completed and reviewed and discussed radiology and care team involved and reveals     CT CHEST : There is adequate opacification of the pulmonary arteries.  There is no   evidence of filling defect to suggest pulmonary embolism. [place] is no   evidence of thoracic aortic aneurysm or dissection.  There are small   bilateral pleural effusions.  These are increased compared to prior   examination.  There is small pericardial effusion which appears stable.  Left   hilar lymph node measures approximately 12 mm in short axis.  Right hilar   lymph node measures approximately 12 mm in short axis.  There is bilateral   gynecomastia. [place] is diffuse subcutaneous edema.      The central airways are patent.  There is no pneumothorax.  There are   bilateral ground-glass opacities and alveolar consolidation.  There has been   interval increase in the airspace disease compared to prior examination.  The   interval worsening is most pronounced within the lower lobes. Beckey Short is   associated interstitial thickening. CT ABDOMEN PELVIS WO CONTRAST Additional Contrast? None    Result Date: 10/5/2021  FINDINGS: THE STUDY IS LIMITED DUE TO LACK OF INTRAVENOUS CONTRAST. Chest: Mediastinum: No thoracic aortic aneurysm. No definite adenopathy on this unenhanced study. Trace pericardial fluid anteriorly. Lungs/pleura: Trace layering pleural effusions bilaterally, slightly larger on the right. No pneumothorax. Numerous ground-glass densities in the bilateral upper and to a lesser degree lower lungs, in keeping with known COVID-19 pneumonia. Soft Tissues/Bones: Bilateral gynecomastia. No acute osseous abnormality in the thoracic cage. Abdomen and pelvis: The study is degraded by the patient's respiratory motion. Organs: Abdominal organs inadequately evaluated on this motion degraded and unenhanced study. No hydronephrosis on either side. GI/Bowel: Normal appendix. No evidence of bowel obstruction. Pelvis: Normal sized prostate. Urinary bladder grossly intact. Peritoneum/Retroperitoneum: No free air or free fluid. No bulky adenopathy. No abdominal aortic aneurysm. Bones/Soft Tissues: No lytic or sclerotic lesion in the regional skeleton. Limited study by the patient's respiratory motion and lack of intravenous contrast. Extensive multifocal COVID-19 pneumonia bilaterally. Bilateral trace layering pleural effusions. Bilateral gynecomastia. Abdominal organs inadequately evaluated due to the limitations of the study. Normal appendix. No evidence of bowel obstruction. CT CHEST WO CONTRAST    Result Date: 10/5/2021  FINDINGS: THE STUDY IS LIMITED DUE TO LACK OF INTRAVENOUS CONTRAST. Chest: Mediastinum: No thoracic aortic aneurysm. No definite adenopathy on this unenhanced study. Trace pericardial fluid anteriorly. Lungs/pleura: Trace layering pleural effusions bilaterally, slightly larger on the right. No pneumothorax.   Numerous ground-glass densities in the bilateral upper and to a lesser degree lower lungs, in keeping with known COVID-19 pneumonia. Soft Tissues/Bones: Bilateral gynecomastia. No acute osseous abnormality in the thoracic cage. Abdomen and pelvis: The study is degraded by the patient's respiratory motion. Organs: Abdominal organs inadequately evaluated on this motion degraded and unenhanced study. No hydronephrosis on either side. GI/Bowel: Normal appendix. No evidence of bowel obstruction. Pelvis: Normal sized prostate. Urinary bladder grossly intact. Peritoneum/Retroperitoneum: No free air or free fluid. No bulky adenopathy. No abdominal aortic aneurysm. Bones/Soft Tissues: No lytic or sclerotic lesion in the regional skeleton. Limited study by the patient's respiratory motion and lack of intravenous contrast. Extensive multifocal COVID-19 pneumonia bilaterally. Bilateral trace layering pleural effusions. Bilateral gynecomastia. Abdominal organs inadequately evaluated due to the limitations of the study. Normal appendix. No evidence of bowel obstruction. ECHOCARDIOGRAM:  Ejection fraction is visually estimated at 55%. There is late positive bubble suggesting possible shunt from pulmonary   origin. Visually moderately dilated left atrium. Normal right ventricular size and function. There is small pericardial effusion without tamponade physiology    Assessment:  Mr. Todd Dhaliwal is a 36year old male with no significant PMH who was admitted on October 5, 2021 after he presented from an outside clinic after he was found to be hypoxic during 6 minute walking test. Patient tested positive for Covid 19 on August 18th, he is unvaccinated and had Covid 19 last year as well.  Patient states he has quarantined by himself and when his mother recently visited him she was shocked at his appearance as he had lost about 50 pounds in 2 and a half months  Sepsis  COVID-19, mild  HIV   Pneumonia  Gram-positive cocci in clusters bacteremia, etiology unclear  DEISI  + RPR  + BNP        Plan:   Off oxygen  Improved symptoms  Completed Bronchoscopy with BAL with [facility]  - completed results pending  Need N95 to be warn for bronchoscopy   1.  HIV status CD4  known  2. Stop Bronchodialtors  3. + BNP and diuresis if elevated        Nazia Holloway DO DO, MPH, FCCP, Jak Snow  Professor of Internal Medicine  Pulmonary, Critical Care and Sleep Medicine

## 2021-10-18 NOTE — PROGRESS NOTES
[facility] Inpatient   Resident Progress Note    S:  Hospital day: 15   Brief Synopsis: Lucina Valera is a 36 y.o. male with no PMH who presented with hypoxia and fever. He was initially seen in PCP office and was hypoxic on exertion with desaturate into the mid low 80s as well as noted to be febrile and tachycardic. Per patient, his symptoms began 8/15/2021 and he tested positive for Covid 8/28. endorse worsening shortness of breath over the past month with sputum production, intermittent chest discomfort, persistent diarrhea, chills, fever, decrease in appetite and 50 pound weight loss since August.  Patient also endorses significantly worsening \"neuropathy\" of bilateral feet, states it feels like he is \"being stabbed by needles\" which is causing him inability to walk due to pain. In the ED, he was found to be anemic, hyperkalemic, hyponatremic, febrile and COVID positive. He also had elevated Creatinine, AST, proBNP and troponin x2. CTA showed extensive multifocal COVID-19 pneumonia bilaterally, bilateral trace layering pleural effusion and bilateral gynecomastia. CT abdomen was limited due to movement and non-contrast, but showed normal appendix and no evidence of bowel obstruction. Admitted for acute hypoxia due to Covid pneumonia. Nephro, pulm and ID consulted. Started on bicarb drip. US showed hyperechoic kidneys, trace b/l perinephric fluid and right kidney lesion (Cyst vs infarction vs abscess vs neoplasia). HIV and Hep C screen were negative. HIV RNA and T and B lymphocytes were ordered. Patient became more hypoxic, tachycardic and hypertensive so  there was concern for PE. CTA was ordered, which came back negative. Echo was completed due to elevated BNP and concern for PE and that revealed a normal EF with late bubble sign possible shunting of pulmonary origin. BNP continued to increase. Cardiology was consulted. Overnight/interim:   Patient was seen and examined at bedside.  He is less anxious, a little more comfortable with his diagnosis. States he is having some social issues, in terms of needing to pay his rent,  ID, and needing new contacts. Denies any sob, chest pain, diarrhea, abdominal pain, dysuria. Cont meds:    sodium chloride 75 mL/hr at 10/17/21 1132    sodium chloride      sodium chloride       Scheduled meds:    enoxaparin  40 mg SubCUTAneous Daily    metoprolol tartrate  25 mg Oral Daily    anidulafungin  100 mg IntraVENous Q24H    sulfamethoxazole-trimethoprim (BACTRIM) IVPB  400 mg IntraVENous Q8H    cefepime  2,000 mg IntraVENous Q8H    pantoprazole  40 mg Oral QAM AC    sodium chloride flush  5-40 mL IntraVENous 2 times per day     PRN meds: perflutren lipid microspheres, hydrOXYzine, heparin (porcine), heparin (porcine), labetalol, sodium chloride, sodium chloride flush, ondansetron **OR** ondansetron, polyethylene glycol, acetaminophen **OR** acetaminophen, perflutren lipid microspheres, sodium chloride     I reviewed the patient's past medical and surgical history, Medications and Allergies. O:  /70   Pulse 104   Temp 97 °F (36.1 °C) (Temporal)   Resp 16   Ht 6' 4\" (1.93 m)   Wt 173 lb (78.5 kg)   SpO2 97%   BMI 21.06 kg/m²   24 hour I&O: I/O last 3 completed shifts: In: 360 [P.O.:360]  Out: 200 [Urine:200]  I/O this shift:  In: 240 [P.O.:240]  Out: 700 [Urine:700]        Physical Exam  Constitutional:       Appearance: Normal appearance. He is not ill-appearing, toxic-appearing or diaphoretic. HENT:      Head: Normocephalic and atraumatic. Nose: No rhinorrhea. Mouth/Throat:      Mouth: Mucous membranes are moist.      Pharynx: Oropharynx is clear. Eyes:      General: No scleral icterus. Right eye: No discharge. Left eye: No discharge. Extraocular Movements: Extraocular movements intact. Cardiovascular:      Rate and Rhythm: Normal rate and regular rhythm. Pulses: Normal pulses.       Heart sounds: Normal heart sounds. No murmur heard. No friction rub. No gallop. Pulmonary:      Effort: Pulmonary effort is normal. No respiratory distress. Breath sounds: No wheezing, rhonchi or rales. Abdominal:      General: Abdomen is flat. Musculoskeletal:         General: No swelling or tenderness. Cervical back: Normal range of motion. Right lower leg: No edema. Left lower leg: No edema. Skin:     General: Skin is warm and dry. Coloration: Skin is not jaundiced. Findings: No bruising or rash. Neurological:      General: No focal deficit present. Mental Status: He is alert and oriented to person, place, and time. Cranial Nerves: No cranial nerve deficit. Motor: No weakness. Psychiatric:         Behavior: Behavior normal.         Thought Content: Thought content normal.      Comments: Calm           Labs:  Na/K/Cl/CO2:  130/4.5/99/19 (10/17 1639)  BUN/Cr/glu/ALT/AST/amyl/lip:  20/1.8/--/--/--/--/-- (10/17 1639)  WBC/Hgb/Hct/Plts:  3.6/8.6/25.6/126 (10/17 0608)  estimated creatinine clearance is 67 mL/min (A) (based on SCr of 1.8 mg/dL (H)). Other pertinent labs as noted below    Radiology:  XR CHEST PORTABLE   Final Result   Extensive bilateral patchy areas of ground-glass opacity concerning for   atypical pneumonia or viral airway disease, improved         XR CHEST PORTABLE   Final Result   Persistent bilateral infiltrates. No significant changes since October 13. See above comments. See report CT chest October 13. CTA PULMONARY W CONTRAST   Final Result   1. No evidence of pulmonary embolism. 2. Interval increased size of bilateral pleural effusions with little change   in pericardial effusion. 3. Persistent subcutaneous edema. 4. Reactive bilateral hilar lymphadenopathy. 5. Bilateral airspace disease is increased compared to prior examination. The increase is most pronounced within the lower lobes. 6. Bilateral gynecomastia. XR CHEST PORTABLE   Final Result   Development of extensive bilateral pulmonary infiltrates         XR CHEST PORTABLE   Final Result   No acute process. US RETROPERITONEAL COMPLETE   Final Result   1. Hyperechoic kidneys due to underlying parenchymal disease. 2. Trace bilateral perinephric fluid of indeterminate etiology, potentially   due to underlying inflammation. 3. An approximately 1.1 cm x 1.5 cm x 2.3 cm lesion in the right kidney could   be a cyst but could also be seen with infarction, abscess, or neoplasia. Consider further evaluation with renal protocol abdomen MRI (preferred) or   CT. Following resolution of acute kidney injury. XR CHEST PORTABLE   Final Result   Multifocal bilateral pulmonary ground-glass airspace opacities are stable         CT ABDOMEN PELVIS WO CONTRAST Additional Contrast? None   Final Result   Limited study by the patient's respiratory motion and lack of intravenous   contrast.      Extensive multifocal COVID-19 pneumonia bilaterally. Bilateral trace layering pleural effusions. Bilateral gynecomastia. Abdominal organs inadequately evaluated due to the limitations of the study. Normal appendix. No evidence of bowel obstruction. CT CHEST WO CONTRAST   Final Result   Limited study by the patient's respiratory motion and lack of intravenous   contrast.      Extensive multifocal COVID-19 pneumonia bilaterally. Bilateral trace layering pleural effusions. Bilateral gynecomastia. Abdominal organs inadequately evaluated due to the limitations of the study. Normal appendix. No evidence of bowel obstruction.                Resident Assessment and Plan       Acute hypoxia resolved   2/2 COVID pneumonia vs Pneumocystis pneumonia    - Maintaining Oxygen saturation in room air  - CTA: COVID pneumonia, Bilateral trace layering pleural effusion, negative for PE   - D dimer elevated on admission, currently stable  - negative the second time    DEISI   Hyponatremia  - Na coming down 129  - Nephro on board   - Likely secondary to dehydration from persistent diarrhea and decreased PO intake   - Cr on admission 3.6, trending down 1.6, due to IV contrast  - Urine osm and urine nitrogen low   - UA: protein, 1-2 waxy casts, moderate bacteria, moderate blood   - bicarb drip discontinued 10/8  - Microalbumin-creatinine ratio elevated   - Kidney bx OP 2 weeks after discharge   Fluid restriction  - Bumex held   - On IVF 75cc/h      Elevated troponin and proBNP  - 2/2 myocardial injury vs stress cardiomyopathy due to COVID vs new onset CHF   - initial troponin 78, 78  - initial BNP 1599,  repeat 11,401 on 10/14, trending down 3400   - ECHO: EF 55%, late bubble sign possible shunting of pulmonary origin  - Repeat ECHO pending   Received 1 dose of IV Lasix 40mg on 10/15  Stopped bumex due to hyponatremia, on IVF 75cc/h     Tachycardia  Consistently HR 120s to 140s  Cardio consulted, recommend to start Beta blocker   - On Metoprolol 25 mg daily   Asymptomatic  Continue to monitor    HTN  - BP is controlled   - Since patient is Tachycardic, will start Lopressor and hold amlodipine    - labetalol PRN     Proteinuria  -protein to creatinine ratio 3.8  - microalbumin to creatine ratio elevated   -kidney biopsy per nephro, patient agreeable    Hypoalbuminemia   - Alb 1.7 on admission, now 2.7  -Previously received albumin 25 g q6h x 4 doses 10/6  -Received albumin 25 g q6h x 8 doses 10/12  Low C3, normal C4 and cryoglobulin  Fluid restriction  - Continue to monitor             PT/OT evaluation: not indicated   DVT prophylaxis: heparin due to DEISI    GI prophylaxis:protonix  Disposition:home +/- home health/ rehab   Diet: adult         Electronically signed by Rebeca Wei MD on 10/18/2021 at 6:43 AM  Attending physician: Dr. Delfin Ellis

## 2021-10-18 NOTE — PROGRESS NOTES
[facility] PROGRESS NOTE      Chief complaint: Follow-up of Gram-positive cocci in clusters on blood culture    The patient is a 36 y.o. male, not vaccinated against COVID-19, with history of asthma, presented on 10/05 with shortness of breath, found to be febrile at 103 °F, positive SARS-CoV-2 PCR, bilateral groundglass opacities on chest CT, tolerating room air with oxygen saturation of 97%. He reports having tested positive for SARS-CoV-2 for the 3rd time now with previous on in late August at which time he reports having quarantined. Urine Streptococcus pneumonia and Legionella antigens were negative. Blood cultures from 10/05 and 10/06 showed methicillin-susceptible Staphylococcus epidermidis in 1 out of 2 sets. HIV screen was positive. CD4 count was low at 43. Transthoracic echocardiogram showed late positive bubble suggesting possible shunt from pulmonary origin, moderately dilated left atrium, small pericardial effusion without tamponade physiology, ejection fraction visually estimated at 55%. Subjective: Patient was seen and examined. No chills, no abdominal pain, no diarrhea, no rash, no itching. Objective:  /75   Pulse 120   Temp 99.1 °F (37.3 °C) (Temporal)   Resp 16   Ht 6' 4\" (1.93 m)   Wt 173 lb (78.5 kg)   SpO2 97%   BMI 21.06 kg/m²   Constitutional: Alert, not in distress  Respiratory: Clear breath sounds, no crackles, no wheezes  Cardiovascular: Regular rate and rhythm, no murmurs  Gastrointestinal: Bowel sounds present, soft, nontender  Skin: Warm and dry, no active dermatoses  Musculoskeletal: No joint swelling, no joint erythema    Labs, imaging, and medical records/notes were personally reviewed. Bronch resp cx Gram variable rods  10/8/21 blood cx negative  Assessment:  Sepsis, resolved  COVID-19, mild  Pneumonia  Methicillin susceptible Staphylococcus epidermidis bacteremia, etiology unclear  DEISI  Suspected advanced HIV disease (CD4 of 37).   Hepatitis C Ab

## 2021-10-19 LAB
ALBUMIN SERPL-MCNC: 3.4 G/DL (ref 3.5–5.2)
ALP BLD-CCNC: 81 U/L (ref 40–129)
ALT SERPL-CCNC: 23 U/L (ref 0–40)
ANION GAP SERPL CALCULATED.3IONS-SCNC: 13 MMOL/L (ref 7–16)
APTT: 36.2 SEC (ref 24.5–35.1)
AST SERPL-CCNC: 82 U/L (ref 0–39)
BASOPHILS ABSOLUTE: 0 E9/L (ref 0–0.2)
BASOPHILS RELATIVE PERCENT: 0 % (ref 0–2)
BILIRUB SERPL-MCNC: 0.3 MG/DL (ref 0–1.2)
BUN BLDV-MCNC: 14 MG/DL (ref 6–20)
BURR CELLS: ABNORMAL
CALCIUM SERPL-MCNC: 8.2 MG/DL (ref 8.6–10.2)
CHLORIDE BLD-SCNC: 99 MMOL/L (ref 98–107)
CO2: 16 MMOL/L (ref 22–29)
COMMENT: NORMAL
CREAT SERPL-MCNC: 1.7 MG/DL (ref 0.7–1.2)
EOSINOPHILS ABSOLUTE: 0 E9/L (ref 0.05–0.5)
EOSINOPHILS RELATIVE PERCENT: 0 % (ref 0–6)
GFR AFRICAN AMERICAN: 58
GFR NON-AFRICAN AMERICAN: 58 ML/MIN/1.73
GLUCOSE BLD-MCNC: 83 MG/DL (ref 74–99)
GRAM STAIN ORDERABLE: NORMAL
HCT VFR BLD CALC: 28.3 % (ref 37–54)
HEMOGLOBIN: 9.3 G/DL (ref 12.5–16.5)
LYMPHOCYTES ABSOLUTE: 0.46 E9/L (ref 1.5–4)
LYMPHOCYTES RELATIVE PERCENT: 14 % (ref 20–42)
MCH RBC QN AUTO: 29.7 PG (ref 26–35)
MCHC RBC AUTO-ENTMCNC: 32.9 % (ref 32–34.5)
MCV RBC AUTO: 90.4 FL (ref 80–99.9)
MONOCYTES ABSOLUTE: 0.1 E9/L (ref 0.1–0.95)
MONOCYTES RELATIVE PERCENT: 3 % (ref 2–12)
NEUTROPHILS ABSOLUTE: 2.74 E9/L (ref 1.8–7.3)
NEUTROPHILS RELATIVE PERCENT: 83 % (ref 43–80)
OVALOCYTES: ABNORMAL
PDW BLD-RTO: 14.6 FL (ref 11.5–15)
PLATELET # BLD: 175 E9/L (ref 130–450)
PMV BLD AUTO: 12.2 FL (ref 7–12)
PNEUMOCYSTIS DFA: NORMAL
POIKILOCYTES: ABNORMAL
POTASSIUM REFLEX MAGNESIUM: 4.8 MMOL/L (ref 3.5–5)
RBC # BLD: 3.13 E12/L (ref 3.8–5.8)
REPORT: NORMAL
SODIUM BLD-SCNC: 128 MMOL/L (ref 132–146)
TOTAL PROTEIN: 6.6 G/DL (ref 6.4–8.3)
WBC # BLD: 3.3 E9/L (ref 4.5–11.5)

## 2021-10-19 PROCEDURE — 6360000002 HC RX W HCPCS: Performed by: INTERNAL MEDICINE

## 2021-10-19 PROCEDURE — 6370000000 HC RX 637 (ALT 250 FOR IP): Performed by: INTERNAL MEDICINE

## 2021-10-19 PROCEDURE — 2580000003 HC RX 258: Performed by: NURSE PRACTITIONER

## 2021-10-19 PROCEDURE — 2500000003 HC RX 250 WO HCPCS: Performed by: INTERNAL MEDICINE

## 2021-10-19 PROCEDURE — 2580000003 HC RX 258: Performed by: INTERNAL MEDICINE

## 2021-10-19 PROCEDURE — 85025 COMPLETE CBC W/AUTO DIFF WBC: CPT

## 2021-10-19 PROCEDURE — 80053 COMPREHEN METABOLIC PANEL: CPT

## 2021-10-19 PROCEDURE — 2140000000 HC CCU INTERMEDIATE R&B

## 2021-10-19 PROCEDURE — 36415 COLL VENOUS BLD VENIPUNCTURE: CPT

## 2021-10-19 PROCEDURE — 2500000003 HC RX 250 WO HCPCS: Performed by: NURSE PRACTITIONER

## 2021-10-19 PROCEDURE — 6360000002 HC RX W HCPCS: Performed by: STUDENT IN AN ORGANIZED HEALTH CARE EDUCATION/TRAINING PROGRAM

## 2021-10-19 PROCEDURE — 6360000002 HC RX W HCPCS: Performed by: NURSE PRACTITIONER

## 2021-10-19 PROCEDURE — 99232 SBSQ HOSP IP/OBS MODERATE 35: CPT | Performed by: INTERNAL MEDICINE

## 2021-10-19 PROCEDURE — 85730 THROMBOPLASTIN TIME PARTIAL: CPT

## 2021-10-19 PROCEDURE — 99232 SBSQ HOSP IP/OBS MODERATE 35: CPT | Performed by: FAMILY MEDICINE

## 2021-10-19 RX ADMIN — SODIUM BICARBONATE: 84 INJECTION, SOLUTION INTRAVENOUS at 21:52

## 2021-10-19 RX ADMIN — DEXTROSE MONOHYDRATE 100 MG: 50 INJECTION, SOLUTION INTRAVENOUS at 10:26

## 2021-10-19 RX ADMIN — PANTOPRAZOLE SODIUM 40 MG: 40 TABLET, DELAYED RELEASE ORAL at 05:28

## 2021-10-19 RX ADMIN — SULFAMETHOXAZOLE AND TRIMETHOPRIM 400 MG: 80; 16 INJECTION, SOLUTION, CONCENTRATE INTRAVENOUS at 12:26

## 2021-10-19 RX ADMIN — ACETAMINOPHEN 650 MG: 325 TABLET ORAL at 15:56

## 2021-10-19 RX ADMIN — CEFEPIME HYDROCHLORIDE 2000 MG: 2 INJECTION, POWDER, FOR SOLUTION INTRAVENOUS at 21:52

## 2021-10-19 RX ADMIN — CEFEPIME HYDROCHLORIDE 2000 MG: 2 INJECTION, POWDER, FOR SOLUTION INTRAVENOUS at 12:28

## 2021-10-19 RX ADMIN — SODIUM CHLORIDE, PRESERVATIVE FREE 10 ML: 5 INJECTION INTRAVENOUS at 04:31

## 2021-10-19 RX ADMIN — SULFAMETHOXAZOLE AND TRIMETHOPRIM 400 MG: 80; 16 INJECTION, SOLUTION, CONCENTRATE INTRAVENOUS at 04:31

## 2021-10-19 RX ADMIN — Medication 10 ML: at 21:52

## 2021-10-19 RX ADMIN — CEFEPIME HYDROCHLORIDE 2000 MG: 2 INJECTION, POWDER, FOR SOLUTION INTRAVENOUS at 05:28

## 2021-10-19 RX ADMIN — ENOXAPARIN SODIUM 40 MG: 40 INJECTION SUBCUTANEOUS at 10:19

## 2021-10-19 RX ADMIN — Medication 10 ML: at 10:19

## 2021-10-19 RX ADMIN — SULFAMETHOXAZOLE AND TRIMETHOPRIM 400 MG: 80; 16 INJECTION, SOLUTION, CONCENTRATE INTRAVENOUS at 21:52

## 2021-10-19 ASSESSMENT — PAIN SCALES - GENERAL: PAINLEVEL_OUTOF10: 0

## 2021-10-19 NOTE — BH NOTE
Behavioral Health Inpatient Follow Up   [address] [place] [address]        Reason for consult:  Anxiety; Difficulty coping with hospital stressors     Background:  Pt admitted for dyspnea, cough, Covid positive, fever 103, and bilateral pneumonia. HIV positive.     Psychotherapy Intervention:   Continued intervention for stress management. He is reporting tremor in bilateral hands. He has been following recommendations from this psychologist to write down information that is discussed with him from his medical team.  He is frustrated with his difficulty writing. Set goal to right in larger print and take breaks. Taught and practiced relaxation breathing technique/imagery.      Recommendations/Plan:   Will continue to follow with Family Medicine team during hospitalization. He is in agreement to follow with this psychologist outpatient following discharge.

## 2021-10-19 NOTE — PROGRESS NOTES
Lacrimal canaliculi probing: [facility]  Department of Internal Medicine  Division of Pulmonary, Critical Care and Sleep Medicine  Progress Note    Arti Henao DO, MPH, FCCP, FACOI, FACP  Jelani Dloan MD, [facility]  [facility]      Patient: Lucina Valera  MRN: [id number]  : 1990    Encounter Time: 6:45 PM     Date of Admission: 10/5/2021  3:07 PM    Primary Care Physician: Sheryl Vyas MD    Reason for Consultation: COVID     SUBJECTIVE:    Hep C and HIV + screen  CD4 is 54  Echo = reviewed      OBJECTIVE:     PHYSICAL EXAM:   VITALS:   Vitals:    10/18/21 0657 10/19/21 0015 10/19/21 0922 10/19/21 1545   BP: 127/75 116/68 121/66 127/75   Pulse: 120 103 103 110   Resp: 16 16 24 20   Temp: 99.1 °F (37.3 °C) 98.4 °F (36.9 °C) 99.1 °F (37.3 °C) 100.3 °F (37.9 °C)   TempSrc: Temporal Temporal Temporal Temporal   SpO2: 97% 98% 97% 96%   Weight:       Height:            Intake/Output Summary (Last 24 hours) at 10/19/2021 1845  Last data filed at 10/19/2021 1232  Gross per 24 hour   Intake    Output 1050 ml   Net -1050 ml        CONSTITUTIONAL:   A&O x 3, NAD  SKIN:     No rash,   HEENT:     EOMI, MMM, No thrush  NECK:    No bruits, No JVP apprechiated  CV:      Sinus,  No murmur, No rubs, No gallops  PULMONARY:   Couse BS,  No Wheezing, No Rales, No Rhonchi      No noted egophony  ABDOMEN:     Soft, non-tender. BS normal. No R/R/G  EXT:    No deformities . No clubbing.       + lower extremity edema, No venous stasis  PULSE:   Appears equal and palpable.   PSYCHIATRIC:  Seems appropriate, No acute psycosis  MS:    No fractures, No gross weakness  NEUROLOGIC:   Non-focal     DATA: IMAGING & TESTING:     LABORATORY TESTS:    CBC:   Lab Results   Component Value Date    WBC 3.3 10/19/2021    RBC 3.13 10/19/2021    HGB 9.3 10/19/2021    HCT 28.3 10/19/2021    MCV 90.4 10/19/2021    MCH 29.7 10/19/2021    MCHC 32.9 10/19/2021    RDW 14.6 10/19/2021     10/19/2021    MPV 12.2 10/19/2021     CMP:    Lab Results   Component Value Date     10/19/2021    K 4.8 10/19/2021    CL 99 10/19/2021    CO2 16 10/19/2021    BUN 14 10/19/2021    CREATININE 1.7 10/19/2021    GFRAA 58 10/19/2021    LABGLOM 58 10/19/2021    GLUCOSE 83 10/19/2021    PROT 6.6 10/19/2021    LABALBU 3.4 10/19/2021    CALCIUM 8.2 10/19/2021    BILITOT 0.3 10/19/2021    ALKPHOS 81 10/19/2021    AST 82 10/19/2021    ALT 23 10/19/2021     Magnesium:    Lab Results   Component Value Date    MG 1.3 10/15/2021     Phosphorus:  No results found for: PHOS  PT/INR:    Lab Results   Component Value Date    PROTIME 17.7 10/15/2021    INR 1.6 10/15/2021     FERRITIN:    Lab Results   Component Value Date    FERRITIN 3,921 10/06/2021     Fibrinogen Level:  No components found for: FIB     PRO-BNP:   Lab Results   Component Value Date    PROBNP 3,421 (H) 10/18/2021    PROBNP 11,401 (H) 10/13/2021      ABGs: No results found for: PH, PO2, PCO2  Hemoglobin A1C: No components found for: HGBA1C    IMAGING:  Imaging tests were completed and reviewed and discussed radiology and care team involved and reveals     CT CHEST : There is adequate opacification of the pulmonary arteries.  There is no   evidence of filling defect to suggest pulmonary embolism. Rj Rower is no   evidence of thoracic aortic aneurysm or dissection.  There are small   bilateral pleural effusions.  These are increased compared to prior   examination.  There is small pericardial effusion which appears stable.  Left   hilar lymph node measures approximately 12 mm in short axis.  Right hilar   lymph node measures approximately 12 mm in short axis.  There is bilateral   gynecomastia. Rj Rower is diffuse subcutaneous edema.      The central airways are patent.  There is no pneumothorax.  There are   bilateral ground-glass opacities and alveolar consolidation.  There has been   interval increase in the airspace disease compared to prior examination.  The   interval worsening is most pronounced within the lower lobes. Gregery Mar is   associated interstitial thickening. CT ABDOMEN PELVIS WO CONTRAST Additional Contrast? None    Result Date: 10/5/2021  FINDINGS: THE STUDY IS LIMITED DUE TO LACK OF INTRAVENOUS CONTRAST. Chest: Mediastinum: No thoracic aortic aneurysm. No definite adenopathy on this unenhanced study. Trace pericardial fluid anteriorly. Lungs/pleura: Trace layering pleural effusions bilaterally, slightly larger on the right. No pneumothorax. Numerous ground-glass densities in the bilateral upper and to a lesser degree lower lungs, in keeping with known COVID-19 pneumonia. Soft Tissues/Bones: Bilateral gynecomastia. No acute osseous abnormality in the thoracic cage. Abdomen and pelvis: The study is degraded by the patient's respiratory motion. Organs: Abdominal organs inadequately evaluated on this motion degraded and unenhanced study. No hydronephrosis on either side. GI/Bowel: Normal appendix. No evidence of bowel obstruction. Pelvis: Normal sized prostate. Urinary bladder grossly intact. Peritoneum/Retroperitoneum: No free air or free fluid. No bulky adenopathy. No abdominal aortic aneurysm. Bones/Soft Tissues: No lytic or sclerotic lesion in the regional skeleton. Limited study by the patient's respiratory motion and lack of intravenous contrast. Extensive multifocal COVID-19 pneumonia bilaterally. Bilateral trace layering pleural effusions. Bilateral gynecomastia. Abdominal organs inadequately evaluated due to the limitations of the study. Normal appendix. No evidence of bowel obstruction. CT CHEST WO CONTRAST    Result Date: 10/5/2021  FINDINGS: THE STUDY IS LIMITED DUE TO LACK OF INTRAVENOUS CONTRAST. Chest: Mediastinum: No thoracic aortic aneurysm. No definite adenopathy on this unenhanced study. Trace pericardial fluid anteriorly. Lungs/pleura: Trace layering pleural effusions bilaterally, slightly larger on the right. No pneumothorax.   Numerous ground-glass densities in the bilateral upper and to a lesser degree lower lungs, in keeping with known COVID-19 pneumonia. Soft Tissues/Bones: Bilateral gynecomastia. No acute osseous abnormality in the thoracic cage. Abdomen and pelvis: The study is degraded by the patient's respiratory motion. Organs: Abdominal organs inadequately evaluated on this motion degraded and unenhanced study. No hydronephrosis on either side. GI/Bowel: Normal appendix. No evidence of bowel obstruction. Pelvis: Normal sized prostate. Urinary bladder grossly intact. Peritoneum/Retroperitoneum: No free air or free fluid. No bulky adenopathy. No abdominal aortic aneurysm. Bones/Soft Tissues: No lytic or sclerotic lesion in the regional skeleton. Limited study by the patient's respiratory motion and lack of intravenous contrast. Extensive multifocal COVID-19 pneumonia bilaterally. Bilateral trace layering pleural effusions. Bilateral gynecomastia. Abdominal organs inadequately evaluated due to the limitations of the study. Normal appendix. No evidence of bowel obstruction. ECHOCARDIOGRAM:  Ejection fraction is visually estimated at 55%. There is late positive bubble suggesting possible shunt from pulmonary   origin. Visually moderately dilated left atrium. Normal right ventricular size and function. There is small pericardial effusion without tamponade physiology    Assessment:  Mr. Alfredo Barkley is a 36year old male with no significant PMH who was admitted on October 5, 2021 after he presented from an outside clinic after he was found to be hypoxic during 6 minute walking test. Patient tested positive for Covid 19 on August 18th, he is unvaccinated and had Covid 19 last year as well.  Patient states he has quarantined by himself and when his mother recently visited him she was shocked at his appearance as he had lost about 50 pounds in 2 and a half months  Sepsis  COVID-19, mild  HIV   Pneumonia  Gram-positive cocci in clusters bacteremia, etiology unclear  DEISI  + RPR  + BNP        Plan:   Off oxygen  Improved symptoms  Completed Bronchoscopy with BAL with LMAC  - completed results no TB, NOPCP  HIV status CD4  known          Asa Altamirano DO DO, MPH, FCCP, Angel Jaeger  Professor of Internal Medicine  Pulmonary, Critical Care and Sleep Medicine

## 2021-10-19 NOTE — PROGRESS NOTES
[facility] - [facility] Inpatient   Resident Progress Note    S:  Hospital day: 14   Brief Synopsis: Yue Henao is a 36 y.o. male with no PMH who presented with hypoxia and fever. He was initially seen in PCP office and was hypoxic on exertion with desaturate into the mid low 80s as well as noted to be febrile and tachycardic. Per patient, his symptoms began 8/15/2021 and he tested positive for Covid 8/28. endorse worsening shortness of breath over the past month with sputum production, intermittent chest discomfort, persistent diarrhea, chills, fever, decrease in appetite and 50 pound weight loss since August.  Patient also endorses significantly worsening \"neuropathy\" of bilateral feet, states it feels like he is \"being stabbed by needles\" which is causing him inability to walk due to pain. In the ED, he was found to be anemic, hyperkalemic, hyponatremic, febrile and COVID positive. He also had elevated Creatinine, AST, proBNP and troponin x2. CTA showed extensive multifocal COVID-19 pneumonia bilaterally, bilateral trace layering pleural effusion and bilateral gynecomastia. CT abdomen was limited due to movement and non-contrast, but showed normal appendix and no evidence of bowel obstruction. Admitted for acute hypoxia due to Covid pneumonia. Nephro, pulm and ID consulted. Started on bicarb drip. US showed hyperechoic kidneys, trace b/l perinephric fluid and right kidney lesion (Cyst vs infarction vs abscess vs neoplasia). HIV and Hep C screen were negative. HIV RNA and T and B lymphocytes were ordered. Patient became more hypoxic, tachycardic and hypertensive so  there was concern for PE. CTA was ordered, which came back negative. Echo was completed due to elevated BNP and concern for PE and that revealed a normal EF with late bubble sign possible shunting of pulmonary origin. BNP continued to increase. Cardiology was consulted.  Repeat Echo pending       Overnight/interim:   Patient was seen and Cardiovascular:      Rate and Rhythm: Normal rate and regular rhythm. Pulses: Normal pulses. Heart sounds: Normal heart sounds. No murmur heard. No friction rub. No gallop. Pulmonary:      Effort: Pulmonary effort is normal. No respiratory distress. Breath sounds: No wheezing, rhonchi or rales. Abdominal:      General: Abdomen is flat. Musculoskeletal:         General: No swelling or tenderness. Cervical back: Normal range of motion. Right lower leg: No edema. Left lower leg: No edema. Skin:     General: Skin is warm and dry. Coloration: Skin is not jaundiced. Findings: No bruising or rash. Neurological:      General: No focal deficit present. Mental Status: He is alert and oriented to person, place, and time. Cranial Nerves: No cranial nerve deficit. Motor: No weakness. Psychiatric:         Behavior: Behavior normal.         Thought Content: Thought content normal.      Comments: Tearful           Labs:  Na/K/Cl/CO2:  128/4.8/99/16 (10/19 0359)  BUN/Cr/glu/ALT/AST/amyl/lip:  14/1.7/--/23/82/--/-- (10/19 0359)  WBC/Hgb/Hct/Plts:  3.3/9.3/28.3/175 (10/19 0400)  estimated creatinine clearance is 71 mL/min (A) (based on SCr of 1.7 mg/dL (H)). Other pertinent labs as noted below    Radiology:  XR CHEST PORTABLE   Final Result   Extensive bilateral patchy areas of ground-glass opacity concerning for   atypical pneumonia or viral airway disease, improved         XR CHEST PORTABLE   Final Result   Persistent bilateral infiltrates. No significant changes since October 13. See above comments. See report CT chest October 13. CTA PULMONARY W CONTRAST   Final Result   1. No evidence of pulmonary embolism. 2. Interval increased size of bilateral pleural effusions with little change   in pericardial effusion. 3. Persistent subcutaneous edema. 4. Reactive bilateral hilar lymphadenopathy.    5. Bilateral airspace disease is increased compared to prior examination. The increase is most pronounced within the lower lobes. 6. Bilateral gynecomastia. XR CHEST PORTABLE   Final Result   Development of extensive bilateral pulmonary infiltrates         XR CHEST PORTABLE   Final Result   No acute process. US RETROPERITONEAL COMPLETE   Final Result   1. Hyperechoic kidneys due to underlying parenchymal disease. 2. Trace bilateral perinephric fluid of indeterminate etiology, potentially   due to underlying inflammation. 3. An approximately 1.1 cm x 1.5 cm x 2.3 cm lesion in the right kidney could   be a cyst but could also be seen with infarction, abscess, or neoplasia. Consider further evaluation with renal protocol abdomen MRI (preferred) or   CT. Following resolution of acute kidney injury. XR CHEST PORTABLE   Final Result   Multifocal bilateral pulmonary ground-glass airspace opacities are stable         CT ABDOMEN PELVIS WO CONTRAST Additional Contrast? None   Final Result   Limited study by the patient's respiratory motion and lack of intravenous   contrast.      Extensive multifocal COVID-19 pneumonia bilaterally. Bilateral trace layering pleural effusions. Bilateral gynecomastia. Abdominal organs inadequately evaluated due to the limitations of the study. Normal appendix. No evidence of bowel obstruction. CT CHEST WO CONTRAST   Final Result   Limited study by the patient's respiratory motion and lack of intravenous   contrast.      Extensive multifocal COVID-19 pneumonia bilaterally. Bilateral trace layering pleural effusions. Bilateral gynecomastia. Abdominal organs inadequately evaluated due to the limitations of the study. Normal appendix. No evidence of bowel obstruction.                Resident Assessment and Plan       Acute hypoxia resolved   2/2 COVID pneumonia vs Pneumocystis pneumonia    - Maintaining Oxygen saturation in room air  - CTA: COVID pneumonia, Bilateral trace layering pleural effusion, negative for PE   - D dimer elevated on admission, currently stable  - Ferritin 3900, CRP 10.1 on admission   - Continue monitor Oxygen saturation   - Pulmonology and ID on board  - Legionella negative  - T spot TB test negative   - Decadron discontinued on 10/9  - CXR 10/8 shows multifocal groundglass opacities are stable  - Repeat CXR 10/17: b/l pulm infiltrates improving   - Blood cx: staph epidermitis, repeat blood cx shows no growth x 7 days   -Urine culture: NGTD   -Counseled on getting COVID vaccine after patient recovers   - ECHO: EF 55%, late bubble sign possible shunting of pulmonary origin   - Cardiology consulted   - CTA 10/13 negative for PE  Bronchoscopy with BAL performed on 10/15/2021, awaiting gram stain, culture, and PCR    - Respiratory culture: oral pharyngeal liana decreased, few gram negative rods     Weight loss likely secondary to HIV  - Per patient due to decreased appetite from COVID; 50lb weight loss since August   - R/o other potential origins - SLE, TB, hepatitis  - MARILEE negative  - T spot TB test negative   HIV, hepatitis C antibodies positive  - Elevated IgG and IgA, normal IgM: IgA nephropathy vs MPGN?   - Cryoglobulin normal   - TSH normal   - Sed rate and CRP elevated      HIV  HIV 1/2 antibodies positive  - Leukopenia, normal absolute neutrophil   - Febrile and tachycardic   CD4 44, CD3 153, CD8 100  HIV viral load 195,000  - ID on board: received 2 days of vancomycin 10/6, discontinued ancef, started on cefepime 2g q8h day 7  - Fungitell test positive, on eraxis day 4  - Bactrim 400mg q8h day 7 for suspected Pneumocystis pneumonia.    - Pneumocystis stain negative: will probably change to  Prophylactic Bactrim dose   - tests for opportunistic infections pending: HIV GART, HLAB-5701, Toxoplasma Ab, urine GC/Chlamydia PCR  - hepatitis A total Ab positive  - RPR reactive, quant 1:2, treponema pallidum ab pending  - Need HLAB-5701 result before starting abacavir     Hepatitis C resolved  Hep C antibody reactive  Viral load undetectable    Anemia   - Likely 2/2 from GI bleed vs HIV   - Hb 8.8  Transfused 1 unit 10/11, post transfusion Hb 8   - General surgery consulted, FOBT negative the second time    DEISI   Hyponatremia  - Na stable at 129  - Nephro on board   - Likely secondary to dehydration from persistent diarrhea vs decreased PO intake vs bactrim vs HIVANS   - Cr on admission 3.6, trending down 1.7, due to IV contrast  - Urine osm and urine nitrogen low    - UA: protein, 1-2 waxy casts, moderate bacteria, moderate blood    - On bicarb drip due to decreased CO2   - Microalbumin-creatinine ratio elevated   - Kidney bx OP 2 weeks after discharge   Fluid restriction  - Bumex stopped       Elevated troponin and proBNP  - 2/2 myocardial injury vs stress cardiomyopathy due to COVID vs new onset CHF   - initial troponin 78, 78  - initial BNP 1599,  repeat 11,401 on 10/14, trending down 3400   - ECHO: EF 55%, late bubble sign possible shunting of pulmonary origin  - Repeat ECHO pending   Received 1 dose of IV Lasix 40mg on 10/15  Stopped bumex due to hyponatremia, on bicarb drip     Tachycardia  - 2/2 to infectious vs anxiety vs anemia   HR improving  Cardio consulted, recommend to start Beta blocker   - Hold lopressor  Asymptomatic  Continue to monitor    HTN  - BP is controlled   - Lopressor held   - labetalol PRN  - Continue to monitor      Proteinuria  -protein to creatinine ratio 3.8  - microalbumin to creatine ratio elevated   -kidney biopsy per nephro, patient agreeable    Hypoalbuminemia   - Alb 1.7 on admission, now 2.7  -Previously received albumin 25 g q6h x 4 doses 10/6  -Received albumin 25 g q6h x 8 doses 10/12  Low C3, normal C4 and cryoglobulin  Fluid restriction  - Continue to monitor             PT/OT evaluation: not indicated   DVT prophylaxis: heparin due to DEISI    GI prophylaxis:protonix  Disposition:home +/- home health/ rehab   Diet: adult         Electronically signed by Ira Zendejas MD on 10/19/2021 at 6:59 AM  Attending physician: Dr. Deedee Arriaga

## 2021-10-19 NOTE — PROGRESS NOTES
[address] [place] [address] Attending    S: 36 y.o. male with history of asthma and smoking history presented with increasing dyspnea and cough over past month. On presentation had fever of 103. Has tested positive for COVID twice, most recently end of August.  CT shows extensive bilateral pneumonia and COVID testing again positive. HIV pos, CD4 43. Today, no new symptoms, but expresses a concern about bilateral upper extremity tremor that is more prominent currently, though he has had symptoms intermittently in the past. History of left CTS s/p release. No tremor noted elsewhere, only upper extremities bilaterally. O: VS- Blood pressure 121/66, pulse 103, temperature 99.1 °F (37.3 °C), temperature source Temporal, resp. rate 24, height 6' 4\" (1.93 m), weight 173 lb (78.5 kg), SpO2 97 %. Exam is as noted by resident   Awake, alert and oriented. on room air  Heart - RRR, mildly tachy at time of exam   Lungs- CTAB  Ext - no edema. Bilateral hands with mild postural tremor. No dysmetria noted, no ataxia. No focal deficits. Impressions:   Principal Problem:    Acute respiratory failure with hypoxia ([address]  Active Problems:  Repeatedly positive Covid  Stable groundglass opacities  Saturations okay on room air    Acute kidney injury due to COVID-19 ([address]    Asthma    Nephrotic range proteinuria  50 pound weight loss  2 blood cultures positive      Plan   Covid pneumonia with sepsis  cta neg for pe  Nephrology, ID, and pulmonology following - their management is appreciated. sinus tachycardia - Echo with EF 55%, small pericardial effusion and possible atrial shunt-cardiology consulted. Tachy 2/2 anemia vs pericardial effusion vs infxn vs dehydration. Replaced amlodipine with metoprolol, but held metoprolol today; HR improving, BP controlled overall, continue to follow. Stopped bumex and started IVFs. Pericardial effusion - will repeat Echo.    Anemia - FOBT heme positive first time, neg on repeat. Gen surgery has no plans for scopes s/p 1U PRBCs, will trend h/h. Follow. Stable overall. Blood culture with staph epi, on Ancef; antibiotics were changed from ancef to bactrim and cefepime for possible sepsis/PCP pneumonia. ID management appreciated. Awaiting further lab, TB, confirmatory testing pending with hcv ab positive-neg viral load. Reactive HIV 1 antibodies; resistance testing pending, CD4 count of 43. Nephrology planning biopsy outpatient, W/U for possible Wegener's. DEISI and mild hyponatremia - nephrology management appreciated. Disposition planning. Follow up outpatient for right kidney lesion-ct/mri when cr improved. At this point, the patient does not desire his mother to know results of hiv/hcv testing, but ok with discussion with his step-father, Esau Anderson. Continue to follow tremor symptoms as medications are changed and treatments are initiated in the near future with further workup as appropriate. RPR positive, await FTA. Attending Physician Statement  I have reviewed the chart and seen the patient with the resident(s). I personally reviewed images, EKG's and similar tests, if present. I personally reviewed and performed key elements of the history and exam.  I have reviewed and confirmed student and/or resident history and exam with changes as indicated above. I agree with the assessment, plan and orders as documented by the resident. Please refer to the resident and/or student note for additional information.       Chintan Cortes,

## 2021-10-19 NOTE — PLAN OF CARE
Problem: Airway Clearance - Ineffective  Goal: Achieve or maintain patent airway  Outcome: Met This Shift     Problem: Gas Exchange - Impaired  Goal: Absence of hypoxia  Outcome: Met This Shift  Goal: Promote optimal lung function  Outcome: Met This Shift     Problem: Breathing Pattern - Ineffective  Goal: Ability to achieve and maintain a regular respiratory rate  Outcome: Met This Shift     Problem:  Body Temperature -  Risk of, Imbalanced  Goal: Ability to maintain a body temperature within defined limits  Outcome: Met This Shift  Goal: Will regain or maintain usual level of consciousness  Outcome: Met This Shift     Problem: Isolation Precautions - Risk of Spread of Infection  Goal: Prevent transmission of infection  Outcome: Met This Shift     Problem: Nutrition Deficits  Goal: Optimize nutritional status  Outcome: Met This Shift     Problem: Risk for Fluid Volume Deficit  Goal: Maintain normal heart rhythm  Outcome: Met This Shift  Goal: Maintain absence of muscle cramping  Outcome: Met This Shift     Problem: Falls - Risk of:  Goal: Will remain free from falls  Description: Will remain free from falls  Outcome: Met This Shift  Goal: Absence of physical injury  Description: Absence of physical injury  Outcome: Met This Shift

## 2021-10-19 NOTE — PROGRESS NOTES
Department of Internal Medicine  Nephrology Progress Note      Events reviewed. SUBJECTIVE:  We are following Mr. Carlyn Snellen for DEISI. Reports no complaints.     PHYSICAL EXAM:      Vitals:    VITALS:  /75   Pulse 110   Temp 100.3 °F (37.9 °C) (Temporal)   Resp 20   Ht 6' 4\" (1.93 m)   Wt 173 lb (78.5 kg)   SpO2 96%   BMI 21.06 kg/m²   24HR INTAKE/OUTPUT:      Intake/Output Summary (Last 24 hours) at 10/19/2021 1832  Last data filed at 10/19/2021 1232  Gross per 24 hour   Intake    Output 1050 ml   Net -1050 ml       Constitutional:  Awake, alert, NAD  HEENT:  PERRL, normocephalic, atraumatic  Respiratory: diminished lung sounds  Cardiovascular/Edema:  RRR, S1/S2, -edema  Gastrointestinal:  abd flat, soft, non-tender  Neurologic:  Awake, alert, no focal deficits  Skin:  Warm, dry, no rash or lesions  Other: BLE 1+ edema       Scheduled Meds:   enoxaparin  40 mg SubCUTAneous Daily    [Held by provider] metoprolol tartrate  25 mg Oral Daily    anidulafungin  100 mg IntraVENous Q24H    sulfamethoxazole-trimethoprim (BACTRIM) IVPB  400 mg IntraVENous Q8H    cefepime  2,000 mg IntraVENous Q8H    pantoprazole  40 mg Oral QAM AC    sodium chloride flush  5-40 mL IntraVENous 2 times per day     Continuous Infusions:   IV infusion builder 50 mL/hr at 10/19/21 1018    sodium chloride      sodium chloride       PRN Meds:.perflutren lipid microspheres, hydrOXYzine, heparin (porcine), heparin (porcine), labetalol, sodium chloride, sodium chloride flush, ondansetron **OR** ondansetron, polyethylene glycol, acetaminophen **OR** acetaminophen, perflutren lipid microspheres, sodium chloride    DATA:    CBC:   Lab Results   Component Value Date    WBC 3.3 10/19/2021    RBC 3.13 10/19/2021    HGB 9.3 10/19/2021    HCT 28.3 10/19/2021    MCV 90.4 10/19/2021    MCH 29.7 10/19/2021    MCHC 32.9 10/19/2021    RDW 14.6 10/19/2021     10/19/2021    MPV 12.2 10/19/2021     CMP:    Lab Results Component Value Date     10/19/2021    K 4.8 10/19/2021    CL 99 10/19/2021    CO2 16 10/19/2021    BUN 14 10/19/2021    CREATININE 1.7 10/19/2021    GFRAA 58 10/19/2021    LABGLOM 58 10/19/2021    GLUCOSE 83 10/19/2021    PROT 6.6 10/19/2021    LABALBU 3.4 10/19/2021    CALCIUM 8.2 10/19/2021    BILITOT 0.3 10/19/2021    ALKPHOS 81 10/19/2021    AST 82 10/19/2021    ALT 23 10/19/2021     Magnesium:    Lab Results   Component Value Date    MG 1.3 10/15/2021     Phosphorus:  No results found for: PHOS     Radiology Review:       Ejection fraction is visually estimated at 55%. There is late positive bubble suggesting possible shunt from pulmonary   origin. Visually moderately dilated left atrium. Normal right ventricular size and function. There is small pericardial effusion without tamponade physiology        CT Chest October 5, 2021   Limited study by the patient's respiratory motion and lack of intravenous   contrast.       Extensive multifocal COVID-19 pneumonia bilaterally.       Bilateral trace layering pleural effusions.       Bilateral gynecomastia.       Abdominal organs inadequately evaluated due to the limitations of the study.       Normal appendix.       No evidence of bowel obstruction.         Kidney ultrasound October 7, 2021   1. Hyperechoic kidneys due to underlying parenchymal disease. 2. Trace bilateral perinephric fluid of indeterminate etiology, potentially   due to underlying inflammation. 3. An approximately 1.1 cm x 1.5 cm x 2.3 cm lesion in the right kidney could   be a cyst but could also be seen with infarction, abscess, or neoplasia. Consider further evaluation with renal protocol abdomen MRI (preferred) or   CT.  Following resolution of acute kidney injury.         CTA pulmonary with IV contrast October 13, 2021   1. No evidence of pulmonary embolism. 2. Interval increased size of bilateral pleural effusions with little change   in pericardial effusion.    3. Work-up so far HIV positive, Hepatitis C positive, MARILEE negative, C4 normal, C3 88 (low), UPEP without monoclonal gammopathy, negative cryoglobulins. Kidney ultrasound with hyperechoic kidneys. Needs kidney biopsy, discussed with interventional radiology they would rather wait for a couple weeks to perform biopsy in view of patient having Covid-19 infection. Hyponatremia, multifactorial, including decreased GFR and hemodynamically mediated in the setting of large hypotonic fluid administration (D5 with Bactrim). Sodium levels relatively stable. Low bicarbonate levels with hyperchloremia, consistent with either hyperchloremic metabolic acidosis versus respiratory alkalosis. Edematous state, multifactorial, mainly 2/2 nephrotic syndrome and possibly HFpEF 55%, on bumex  Probably HFpEF 55%, proBNP 11,401 > 3421, on Bumex 1 mg p.o. twice daily  HTN, on metoprolol   Right kidney lesion, likely a cyst but cannot be ruled out other pathology including infarction, abscess, neoplasm.   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, (1, 3) beta-D-glucan positive, started on anidulafungin  Sinus tachycardia, multifactorial  Hepatitis C antibody positive, awaiting viral load  Covid 19 infection in non vaccinated pt  Normocytic anemia, multifactorial    Plan:    Change IV fluids to D5W with 150 mEq of sodium bicarbonate at 50 cc/hour  Repeat proBNP  Kidney biopsy as an outpatient in 2 weeks after discharge  Continue to monitor potassium level while on Bactrim  Continue to monitor renal function after contrast study  Monitor Na levels   Monitor bicarbonate levels  Continue fluid restriction, dry tray        Electronically signed by Margareth Viveros MD on 10/19/2021 at 6:32 PM

## 2021-10-19 NOTE — PROGRESS NOTES
Department of Internal Medicine  Nephrology Progress Note      Events reviewed. SUBJECTIVE:  We are following Mr. Janak Pittman for DEISI. Reports no complaints.     PHYSICAL EXAM:      Vitals:    VITALS:  /75   Pulse 110   Temp 100.3 °F (37.9 °C) (Temporal)   Resp 20   Ht 6' 4\" (1.93 m)   Wt 173 lb (78.5 kg)   SpO2 96%   BMI 21.06 kg/m²   24HR INTAKE/OUTPUT:      Intake/Output Summary (Last 24 hours) at 10/19/2021 1834  Last data filed at 10/19/2021 1232  Gross per 24 hour   Intake    Output 1050 ml   Net -1050 ml       Constitutional:  Awake, alert, NAD  HEENT:  PERRL, normocephalic, atraumatic  Respiratory: diminished lung sounds  Cardiovascular/Edema:  RRR, S1/S2, -edema  Gastrointestinal:  abd flat, soft, non-tender  Neurologic:  Awake, alert, no focal deficits  Skin:  Warm, dry, no rash or lesions  Other: BLE 1+ edema       Scheduled Meds:   enoxaparin  40 mg SubCUTAneous Daily    [Held by provider] metoprolol tartrate  25 mg Oral Daily    anidulafungin  100 mg IntraVENous Q24H    sulfamethoxazole-trimethoprim (BACTRIM) IVPB  400 mg IntraVENous Q8H    cefepime  2,000 mg IntraVENous Q8H    pantoprazole  40 mg Oral QAM AC    sodium chloride flush  5-40 mL IntraVENous 2 times per day     Continuous Infusions:   IV infusion builder 50 mL/hr at 10/19/21 1018    sodium chloride      sodium chloride       PRN Meds:.perflutren lipid microspheres, hydrOXYzine, heparin (porcine), heparin (porcine), labetalol, sodium chloride, sodium chloride flush, ondansetron **OR** ondansetron, polyethylene glycol, acetaminophen **OR** acetaminophen, perflutren lipid microspheres, sodium chloride    DATA:    CBC:   Lab Results   Component Value Date    WBC 3.3 10/19/2021    RBC 3.13 10/19/2021    HGB 9.3 10/19/2021    HCT 28.3 10/19/2021    MCV 90.4 10/19/2021    MCH 29.7 10/19/2021    MCHC 32.9 10/19/2021    RDW 14.6 10/19/2021     10/19/2021    MPV 12.2 10/19/2021     CMP:    Lab Results Component Value Date     10/19/2021    K 4.8 10/19/2021    CL 99 10/19/2021    CO2 16 10/19/2021    BUN 14 10/19/2021    CREATININE 1.7 10/19/2021    GFRAA 58 10/19/2021    LABGLOM 58 10/19/2021    GLUCOSE 83 10/19/2021    PROT 6.6 10/19/2021    LABALBU 3.4 10/19/2021    CALCIUM 8.2 10/19/2021    BILITOT 0.3 10/19/2021    ALKPHOS 81 10/19/2021    AST 82 10/19/2021    ALT 23 10/19/2021     Magnesium:    Lab Results   Component Value Date    MG 1.3 10/15/2021     Phosphorus:  No results found for: PHOS     Radiology Review:       Ejection fraction is visually estimated at 55%. There is late positive bubble suggesting possible shunt from pulmonary   origin. Visually moderately dilated left atrium. Normal right ventricular size and function. There is small pericardial effusion without tamponade physiology        CT Chest October 5, 2021   Limited study by the patient's respiratory motion and lack of intravenous   contrast.       Extensive multifocal COVID-19 pneumonia bilaterally.       Bilateral trace layering pleural effusions.       Bilateral gynecomastia.       Abdominal organs inadequately evaluated due to the limitations of the study.       Normal appendix.       No evidence of bowel obstruction.         Kidney ultrasound October 7, 2021   1. Hyperechoic kidneys due to underlying parenchymal disease. 2. Trace bilateral perinephric fluid of indeterminate etiology, potentially   due to underlying inflammation. 3. An approximately 1.1 cm x 1.5 cm x 2.3 cm lesion in the right kidney could   be a cyst but could also be seen with infarction, abscess, or neoplasia. Consider further evaluation with renal protocol abdomen MRI (preferred) or   CT.  Following resolution of acute kidney injury.         CTA pulmonary with IV contrast October 13, 2021   1. No evidence of pulmonary embolism. 2. Interval increased size of bilateral pleural effusions with little change   in pericardial effusion.    3. Persistent subcutaneous edema. 4. Reactive bilateral hilar lymphadenopathy. 5. Bilateral airspace disease is increased compared to prior examination. The increase is most pronounced within the lower lobes. 6. Bilateral gynecomastia.                 BRIEF SUMMARY OF INITIAL CONSULT:     Briefly, Mr. Kindra Serrano is a 36year old male with no significant PMH who was admitted on October 5, 2021 after he presented from an outside clinic after he was found to be hypoxic during 6 minute walking test. Patient tested positive for Covid 19 on August 18th, he is unvaccinated and had Covid 19 last year as well. Patient states he has quarantined by himself and when his mother recently visited him she was shocked at his appearance as he had lost about 50 pounds in 2 and a half months. On admission labs were significant for sodium of 130, potassium 5.2, bicarbonate 20, BUN 41, creatinine 3.6, magnesium 2.7, calcium 7.7 and proBNP 1,599. We are consulted for DEISI. Problems resolved:    Hyperkalemia, 2/2 DEISI. Low potassium diet  Hypotonic hyponatremia 2/2 intravascular volume from poor oral intake. Bicarb drip started. Sodium levels improving. Low bicarbonate levels with hyperchloremia, most likely NAGMA versus respiratory alkalosis; we need a PH to clarify diagnosis. Awaiting ABG results  High bicarbonate levels, likely metabolic alkalosis 2/2 administration  Hypokalemia, multifactorial, poor intake, probably renal potassium wasting  Severe Hypoalbuminemia, multifactorial, status post albumin administration  DEISI on CKD, volume responsive pre-renal DEISI d/t volume (poor oral intake), urine sodium <20. Resolved. IMPRESSION/RECOMMENDATIONS:     Recurrent DEISI on CKD, likely contrast associated DEISI. Renal function relatively stable, probably close to baseline. 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 Zohra Miguel Work-up so far HIV positive, Hepatitis C positive, MARILEE negative, C4 normal, C3 88 (low), UPEP without monoclonal gammopathy, negative cryoglobulins. Kidney ultrasound with hyperechoic kidneys. Needs kidney biopsy, discussed with interventional radiology they would rather wait for a couple weeks to perform biopsy in view of patient having Covid-19 infection. Hyponatremia, multifactorial, including decreased GFR and hemodynamically mediated in the setting of large hypotonic fluid administration (D5 with Bactrim). Sodium levels relatively stable. Low bicarbonate levels with hyperchloremia, consistent with either hyperchloremic metabolic acidosis versus respiratory alkalosis. Edematous state, multifactorial, mainly 2/2 nephrotic syndrome and possibly HFpEF 55%, on bumex  Probably HFpEF 55%, proBNP 11,401 > 3421   HTN, on metoprolol   Right kidney lesion, likely a cyst but cannot be ruled out other pathology including infarction, abscess, neoplasm.   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, (1, 3) beta-D-glucan positive, started on anidulafungin  Sinus tachycardia, multifactorial  Hepatitis C antibody positive, awaiting viral load  Covid 19 infection in non vaccinated pt  Normocytic anemia, multifactorial    Plan:    Continue D5W with 150 mEq of sodium bicarbonate at 50 cc/hour  Kidney biopsy as an outpatient in 2 weeks after discharge  Continue to monitor potassium level while on Bactrim  Continue to monitor renal function after contrast study  Monitor Na levels   Monitor bicarbonate levels  Continue fluid restriction, dry tray        Electronically signed by Keny Gibson MD on 10/19/2021 at 6:34 PM

## 2021-10-19 NOTE — CARE COORDINATION
Patient remains on room air, IV Eraxis Q24, IV Cefepime Q8 and IV Bactrim Q8. Per nursing rounds, patient's blood cultures resulted positive for fungal infection; Ancef was changed to Bactrim and Cefepime. Nephrology planning biopsy as outpatient; script for outpatient biopsy in the patient's chart. SW/CM will continue to follow.     Ivet Tabares, MSW, LSW [phone]

## 2021-10-19 NOTE — PROGRESS NOTES
[facility] PROGRESS NOTE      Chief complaint: Follow-up of Gram-positive cocci in clusters on blood culture    The patient is a 36 y.o. male, not vaccinated against COVID-19, with history of asthma, presented on 10/05 with shortness of breath, found to be febrile at 103 °F, positive SARS-CoV-2 PCR, bilateral groundglass opacities on chest CT, tolerating room air with oxygen saturation of 97%. He reports having tested positive for SARS-CoV-2 for the 3rd time now with previous on in late August at which time he reports having quarantined. Urine Streptococcus pneumonia and Legionella antigens were negative. Blood cultures from 10/05 and 10/06 showed methicillin-susceptible Staphylococcus epidermidis in 1 out of 2 sets. HIV screen was positive. CD4 count was low at 43. Transthoracic echocardiogram showed late positive bubble suggesting possible shunt from pulmonary origin, moderately dilated left atrium, small pericardial effusion without tamponade physiology, ejection fraction visually estimated at 55%. Subjective: Patient was seen and examined. No chills, no abdominal pain, no diarrhea, no rash, no itching. Objective:  /66   Pulse 103   Temp 99.1 °F (37.3 °C) (Temporal)   Resp 24   Ht 6' 4\" (1.93 m)   Wt 173 lb (78.5 kg)   SpO2 97%   BMI 21.06 kg/m²   Constitutional: Alert, not in distress  Respiratory: Clear breath sounds, no crackles, no wheezes  Cardiovascular: Regular rate and rhythm, no murmurs  Gastrointestinal: Bowel sounds present, soft, nontender  Skin: Warm and dry, no active dermatoses  Musculoskeletal: No joint swelling, no joint erythema    Labs, imaging, and medical records/notes were personally reviewed. Bronch resp cx Gram variable rods  10/8/21 blood cx negative  Assessment:  Sepsis, resolved  COVID-19, mild  Pneumonia  Methicillin susceptible Staphylococcus epidermidis bacteremia, etiology unclear  DEISI  Suspected advanced HIV disease (CD4 of 37).   Hepatitis C Ab positive. Prolonged QTc    Recommendations:  Continue cefepime 2g q8h. Continue high-dose Bactrim at 5 mg trimethoprim dose per kilogram actual body weight every 8 hours for suspected Pneumocystis pneumonia. Follow up serum 1, 3-beta D glucan. Positive-will start eraxis  Follow up bronchoscopy. BAL for Gram stain, aerobic and anaerobic cultures and Pneumocystis PCR. Follow up HIV viral load, hepatitis C viral load-not detected  Follow up HIV GART, [id number], Toxoplasma Ab, RPR, urine GC/Chlamydia PCR, hepatitis A total Ab +. Monitor respiratory status. Continue supportive care. Monitor creatine   RPR reactive  1:2  Need to check with pt and health dept if treated  If not will need LP  Check for neurosyphilis   FTA pending   CXR some improvement  HIV RNA by PCR   195,000  I called the health dept   They have no record of having seen Mr. Lance Bentley  Dr Lutz Case returns tomorrow   Will notify her of his RPR     Thank you for involving me in the care of Kristin James.    Electronically signed by Oscar Walker MD on 10/19/2021 at 12:30 PM

## 2021-10-20 LAB
ALBUMIN SERPL-MCNC: 2.6 G/DL (ref 3.5–5.2)
ALP BLD-CCNC: 74 U/L (ref 40–129)
ALT SERPL-CCNC: 22 U/L (ref 0–40)
ANION GAP SERPL CALCULATED.3IONS-SCNC: 11 MMOL/L (ref 7–16)
ANION GAP SERPL CALCULATED.3IONS-SCNC: 8 MMOL/L (ref 7–16)
ANISOCYTOSIS: ABNORMAL
APTT: 34.2 SEC (ref 24.5–35.1)
AST SERPL-CCNC: 77 U/L (ref 0–39)
BASOPHILS ABSOLUTE: 0 E9/L (ref 0–0.2)
BASOPHILS RELATIVE PERCENT: 0 % (ref 0–2)
BILIRUB SERPL-MCNC: 0.3 MG/DL (ref 0–1.2)
BUN BLDV-MCNC: 15 MG/DL (ref 6–20)
BUN BLDV-MCNC: 17 MG/DL (ref 6–20)
CALCIUM SERPL-MCNC: 7.9 MG/DL (ref 8.6–10.2)
CALCIUM SERPL-MCNC: 8.2 MG/DL (ref 8.6–10.2)
CHLORIDE BLD-SCNC: 100 MMOL/L (ref 98–107)
CHLORIDE BLD-SCNC: 96 MMOL/L (ref 98–107)
CO2: 21 MMOL/L (ref 22–29)
CO2: 22 MMOL/L (ref 22–29)
CREAT SERPL-MCNC: 1.9 MG/DL (ref 0.7–1.2)
CREAT SERPL-MCNC: 2.1 MG/DL (ref 0.7–1.2)
CULTURE, RESPIRATORY: NORMAL
EOSINOPHILS ABSOLUTE: 0 E9/L (ref 0.05–0.5)
EOSINOPHILS RELATIVE PERCENT: 0 % (ref 0–6)
GFR AFRICAN AMERICAN: 45
GFR AFRICAN AMERICAN: 51
GFR NON-AFRICAN AMERICAN: 45 ML/MIN/1.73
GFR NON-AFRICAN AMERICAN: 51 ML/MIN/1.73
GLUCOSE BLD-MCNC: 77 MG/DL (ref 74–99)
GLUCOSE BLD-MCNC: 91 MG/DL (ref 74–99)
HCT VFR BLD CALC: 25.9 % (ref 37–54)
HEMOGLOBIN: 8.6 G/DL (ref 12.5–16.5)
LYMPHOCYTES ABSOLUTE: 0.55 E9/L (ref 1.5–4)
LYMPHOCYTES RELATIVE PERCENT: 29 % (ref 20–42)
MCH RBC QN AUTO: 29.6 PG (ref 26–35)
MCHC RBC AUTO-ENTMCNC: 33.2 % (ref 32–34.5)
MCV RBC AUTO: 89 FL (ref 80–99.9)
MONOCYTES ABSOLUTE: 0.06 E9/L (ref 0.1–0.95)
MONOCYTES RELATIVE PERCENT: 3 % (ref 2–12)
NEUTROPHILS ABSOLUTE: 1.29 E9/L (ref 1.8–7.3)
NEUTROPHILS RELATIVE PERCENT: 68 % (ref 43–80)
OVALOCYTES: ABNORMAL
PDW BLD-RTO: 14.4 FL (ref 11.5–15)
PLATELET # BLD: 142 E9/L (ref 130–450)
PMV BLD AUTO: 11.7 FL (ref 7–12)
POIKILOCYTES: ABNORMAL
POLYCHROMASIA: ABNORMAL
POTASSIUM REFLEX MAGNESIUM: 4.7 MMOL/L (ref 3.5–5)
POTASSIUM SERPL-SCNC: 4.7 MMOL/L (ref 3.5–5)
RBC # BLD: 2.91 E12/L (ref 3.8–5.8)
SMEAR, RESPIRATORY: NORMAL
SMUDGE CELLS: ABNORMAL
SODIUM BLD-SCNC: 126 MMOL/L (ref 132–146)
SODIUM BLD-SCNC: 132 MMOL/L (ref 132–146)
TOTAL PROTEIN: 5.8 G/DL (ref 6.4–8.3)
TOXOPLASMOSIS IGG AB: NORMAL
WBC # BLD: 1.9 E9/L (ref 4.5–11.5)

## 2021-10-20 PROCEDURE — 86403 PARTICLE AGGLUT ANTBDY SCRN: CPT

## 2021-10-20 PROCEDURE — 2580000003 HC RX 258: Performed by: INTERNAL MEDICINE

## 2021-10-20 PROCEDURE — 36415 COLL VENOUS BLD VENIPUNCTURE: CPT

## 2021-10-20 PROCEDURE — 85025 COMPLETE CBC W/AUTO DIFF WBC: CPT

## 2021-10-20 PROCEDURE — 87491 CHLMYD TRACH DNA AMP PROBE: CPT

## 2021-10-20 PROCEDURE — 99232 SBSQ HOSP IP/OBS MODERATE 35: CPT | Performed by: FAMILY MEDICINE

## 2021-10-20 PROCEDURE — 2580000003 HC RX 258: Performed by: NURSE PRACTITIONER

## 2021-10-20 PROCEDURE — 6360000002 HC RX W HCPCS: Performed by: STUDENT IN AN ORGANIZED HEALTH CARE EDUCATION/TRAINING PROGRAM

## 2021-10-20 PROCEDURE — 80053 COMPREHEN METABOLIC PANEL: CPT

## 2021-10-20 PROCEDURE — 2500000003 HC RX 250 WO HCPCS: Performed by: INTERNAL MEDICINE

## 2021-10-20 PROCEDURE — 6360000002 HC RX W HCPCS: Performed by: INTERNAL MEDICINE

## 2021-10-20 PROCEDURE — 85730 THROMBOPLASTIN TIME PARTIAL: CPT

## 2021-10-20 PROCEDURE — 6360000002 HC RX W HCPCS: Performed by: NURSE PRACTITIONER

## 2021-10-20 PROCEDURE — 80048 BASIC METABOLIC PNL TOTAL CA: CPT

## 2021-10-20 PROCEDURE — 2500000003 HC RX 250 WO HCPCS: Performed by: NURSE PRACTITIONER

## 2021-10-20 PROCEDURE — 2140000000 HC CCU INTERMEDIATE R&B

## 2021-10-20 PROCEDURE — 87591 N.GONORRHOEAE DNA AMP PROB: CPT

## 2021-10-20 PROCEDURE — 6370000000 HC RX 637 (ALT 250 FOR IP): Performed by: INTERNAL MEDICINE

## 2021-10-20 PROCEDURE — 99232 SBSQ HOSP IP/OBS MODERATE 35: CPT | Performed by: INTERNAL MEDICINE

## 2021-10-20 RX ADMIN — Medication 10 ML: at 07:40

## 2021-10-20 RX ADMIN — PANTOPRAZOLE SODIUM 40 MG: 40 TABLET, DELAYED RELEASE ORAL at 06:34

## 2021-10-20 RX ADMIN — SULFAMETHOXAZOLE AND TRIMETHOPRIM 400 MG: 80; 16 INJECTION, SOLUTION, CONCENTRATE INTRAVENOUS at 12:01

## 2021-10-20 RX ADMIN — SODIUM BICARBONATE: 84 INJECTION, SOLUTION INTRAVENOUS at 22:02

## 2021-10-20 RX ADMIN — CEFEPIME HYDROCHLORIDE 2000 MG: 2 INJECTION, POWDER, FOR SOLUTION INTRAVENOUS at 13:02

## 2021-10-20 RX ADMIN — SULFAMETHOXAZOLE AND TRIMETHOPRIM 400 MG: 80; 16 INJECTION, SOLUTION, CONCENTRATE INTRAVENOUS at 04:00

## 2021-10-20 RX ADMIN — SODIUM BICARBONATE: 84 INJECTION, SOLUTION INTRAVENOUS at 14:36

## 2021-10-20 RX ADMIN — SULFAMETHOXAZOLE AND TRIMETHOPRIM 400 MG: 80; 16 INJECTION, SOLUTION, CONCENTRATE INTRAVENOUS at 22:02

## 2021-10-20 RX ADMIN — DEXTROSE MONOHYDRATE 100 MG: 50 INJECTION, SOLUTION INTRAVENOUS at 10:30

## 2021-10-20 RX ADMIN — Medication 10 ML: at 22:03

## 2021-10-20 RX ADMIN — CEFEPIME HYDROCHLORIDE 2000 MG: 2 INJECTION, POWDER, FOR SOLUTION INTRAVENOUS at 05:00

## 2021-10-20 RX ADMIN — ENOXAPARIN SODIUM 40 MG: 40 INJECTION SUBCUTANEOUS at 07:41

## 2021-10-20 ASSESSMENT — PAIN SCALES - GENERAL
PAINLEVEL_OUTOF10: 0
PAINLEVEL_OUTOF10: 0

## 2021-10-20 NOTE — PROGRESS NOTES
[facility] Attending    S: 36 y.o. male with history of asthma and smoking history presented with increasing dyspnea and cough over past month. On presentation had fever of 103. Has tested positive for COVID twice, most recently end of August.  CT shows extensive bilateral pneumonia and COVID testing again positive. HIV pos, CD4 43. Today, no new symptoms, ongoing concerns with bilateral UE tremor and B/L LE neuropathy symptoms. Expresses significant frustration. O: VS- Blood pressure 114/64, pulse 116, temperature 100 °F (37.8 °C), temperature source Temporal, resp. rate 18, height 6' 4\" (1.93 m), weight 173 lb (78.5 kg), SpO2 98 %. Exam is as noted by resident   Tmax 100. 3   Awake, alert and oriented. on room air. Agitation noted. Heart - RRR, mildly tachy at time of exam  Lungs- CTAB  Ext - no edema. Bilateral hands with mild postural tremor, stable    Impressions:   Principal Problem:    Acute respiratory failure with hypoxia (HCC)  Active Problems:  Repeatedly positive Covid  Stable groundglass opacities  Saturations okay on room air    Acute kidney injury due to COVID-19 ([facility] [address]    Asthma    Nephrotic range proteinuria  50 pound weight loss  2 blood cultures positive      Plan   Covid pneumonia with sepsis  cta neg for pe  Nephrology, ID, and pulmonology following - their management is appreciated. sinus tachycardia - Echo with EF 55%, small pericardial effusion and possible atrial shunt-cardiology consulted. Pericardial effusion - will repeat Echo. Mild sinus tachy, follow. Anemia - FOBT heme positive first time, neg on repeat. Gen surgery has no plans for scopes s/p 1U PRBCs, will trend h/h. Follow. Stable overall. Blood culture with staph epi, on Ancef; antibiotics were changed from ancef to bactrim and cefepime for possible sepsis/PCP pneumonia. ID management appreciated.     Awaiting further lab, TB, confirmatory testing pending with hcv ab positive-neg viral load. Reactive HIV 1 antibodies; resistance testing pending, CD4 count of 43. Nephrology planning biopsy outpatient, nephrology management appreciated. DEISI and mild hyponatremia - nephrology management appreciated. Disposition planning. Follow up outpatient for right kidney lesion-ct/mri when cr improved. At this point, the patient does not desire his mother to know results of hiv/hcv testing, but ok with discussion with his step-father, Kelly Winter. Continue to follow tremor symptoms as medications are changed and treatments are initiated in the near future with further workup as appropriate. RPR positive, await FTA. Discussed patient's concerns. Will continue to address. Attending Physician Statement  I have reviewed the chart and seen the patient with the resident(s). I personally reviewed images, EKG's and similar tests, if present. I personally reviewed and performed key elements of the history and exam.  I have reviewed and confirmed student and/or resident history and exam with changes as indicated above. I agree with the assessment, plan and orders as documented by the resident. Please refer to the resident and/or student note for additional information.       Verna Washington, DO

## 2021-10-20 NOTE — PROGRESS NOTES
Department of Internal Medicine  Nephrology Progress Note      Events reviewed. SUBJECTIVE:  We are following Mr. Carlyn Snellen for DEISI.   Reports feeling sad about still being in the hospital.    PHYSICAL EXAM:      Vitals:    VITALS:  /64   Pulse 116   Temp 100 °F (37.8 °C) (Temporal)   Resp 18   Ht 6' 4\" (1.93 m)   Wt 173 lb (78.5 kg)   SpO2 98%   BMI 21.06 kg/m²   24HR INTAKE/OUTPUT:      Intake/Output Summary (Last 24 hours) at 10/20/2021 1231  Last data filed at 10/20/2021 1035  Gross per 24 hour   Intake 720 ml   Output 1550 ml   Net -830 ml       Constitutional:  Awake, alert, NAD  HEENT:  PERRL, normocephalic, atraumatic  Respiratory: diminished lung sounds  Cardiovascular/Edema:  RRR, S1/S2, -edema  Gastrointestinal:  abd flat, soft, non-tender  Neurologic:  Awake, alert, no focal deficits  Skin:  Warm, dry, no rash or lesions  Other: BLE 1+ edema       Scheduled Meds:   enoxaparin  40 mg SubCUTAneous Daily    [Held by provider] metoprolol tartrate  25 mg Oral Daily    anidulafungin  100 mg IntraVENous Q24H    sulfamethoxazole-trimethoprim (BACTRIM) IVPB  400 mg IntraVENous Q8H    cefepime  2,000 mg IntraVENous Q8H    pantoprazole  40 mg Oral QAM AC    sodium chloride flush  5-40 mL IntraVENous 2 times per day     Continuous Infusions:   IV infusion builder 50 mL/hr at 10/19/21 2152    sodium chloride      sodium chloride       PRN Meds:.perflutren lipid microspheres, hydrOXYzine, heparin (porcine), heparin (porcine), labetalol, sodium chloride, sodium chloride flush, ondansetron **OR** ondansetron, polyethylene glycol, acetaminophen **OR** acetaminophen, perflutren lipid microspheres, sodium chloride    DATA:    CBC:   Lab Results   Component Value Date    WBC 1.9 10/20/2021    RBC 2.91 10/20/2021    HGB 8.6 10/20/2021    HCT 25.9 10/20/2021    MCV 89.0 10/20/2021    MCH 29.6 10/20/2021    MCHC 33.2 10/20/2021    RDW 14.4 10/20/2021     10/20/2021    MPV 11.7 10/20/2021 CMP:    Lab Results   Component Value Date     10/20/2021    K 4.7 10/20/2021     10/20/2021    CO2 21 10/20/2021    BUN 15 10/20/2021    CREATININE 1.9 10/20/2021    GFRAA 51 10/20/2021    LABGLOM 51 10/20/2021    GLUCOSE 77 10/20/2021    PROT 5.8 10/20/2021    LABALBU 2.6 10/20/2021    CALCIUM 7.9 10/20/2021    BILITOT 0.3 10/20/2021    ALKPHOS 74 10/20/2021    AST 77 10/20/2021    ALT 22 10/20/2021     Magnesium:    Lab Results   Component Value Date    MG 1.3 10/15/2021     Phosphorus:  No results found for: PHOS     Radiology Review:       Ejection fraction is visually estimated at 55%. There is late positive bubble suggesting possible shunt from pulmonary   origin. Visually moderately dilated left atrium. Normal right ventricular size and function. There is small pericardial effusion without tamponade physiology        CT Chest October 5, 2021   Limited study by the patient's respiratory motion and lack of intravenous   contrast.       Extensive multifocal COVID-19 pneumonia bilaterally.       Bilateral trace layering pleural effusions.       Bilateral gynecomastia.       Abdominal organs inadequately evaluated due to the limitations of the study.       Normal appendix.       No evidence of bowel obstruction.         Kidney ultrasound October 7, 2021   1. Hyperechoic kidneys due to underlying parenchymal disease. 2. Trace bilateral perinephric fluid of indeterminate etiology, potentially   due to underlying inflammation. 3. An approximately 1.1 cm x 1.5 cm x 2.3 cm lesion in the right kidney could   be a cyst but could also be seen with infarction, abscess, or neoplasia. Consider further evaluation with renal protocol abdomen MRI (preferred) or   CT.  Following resolution of acute kidney injury.         CTA pulmonary with IV contrast October 13, 2021   1. No evidence of pulmonary embolism.    2. Interval increased size of bilateral pleural effusions with little change   in pericardial effusion. 3. Persistent subcutaneous edema. 4. Reactive bilateral hilar lymphadenopathy. 5. Bilateral airspace disease is increased compared to prior examination. The increase is most pronounced within the lower lobes. 6. Bilateral gynecomastia.                 BRIEF SUMMARY OF INITIAL CONSULT:     Briefly, Mr. Kristal Hand is a 36year old male with no significant PMH who was admitted on October 5, 2021 after he presented from an outside clinic after he was found to be hypoxic during 6 minute walking test. Patient tested positive for Covid 19 on August 18th, he is unvaccinated and had Covid 19 last year as well. Patient states he has quarantined by himself and when his mother recently visited him she was shocked at his appearance as he had lost about 50 pounds in 2 and a half months. On admission labs were significant for sodium of 130, potassium 5.2, bicarbonate 20, BUN 41, creatinine 3.6, magnesium 2.7, calcium 7.7 and proBNP 1,599. We are consulted for DEISI. Problems resolved:    Hyperkalemia, 2/2 DEISI. Low potassium diet  Hypotonic hyponatremia 2/2 intravascular volume from poor oral intake. Bicarb drip started. Sodium levels improving. Low bicarbonate levels with hyperchloremia, most likely NAGMA versus respiratory alkalosis; we need a PH to clarify diagnosis. Awaiting ABG results  High bicarbonate levels, likely metabolic alkalosis 2/2 administration  Hypokalemia, multifactorial, poor intake, probably renal potassium wasting  Severe Hypoalbuminemia, multifactorial, status post albumin administration  DEISI on CKD, volume responsive pre-renal DEISI d/t volume (poor oral intake), urine sodium <20. Resolved. IMPRESSION/RECOMMENDATIONS:     Recurrent DEISI on CKD, likely contrast associated DEISI.   Renal function slightly worse today, will repeat BMP 4pm.    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 Caryl Goff Work-up so far HIV positive, Hepatitis C positive, MARILEE negative, C4 normal, C3 88 (low), UPEP without monoclonal gammopathy, negative cryoglobulins. Kidney ultrasound with hyperechoic kidneys. Needs kidney biopsy, discussed with interventional radiology they would rather wait for a couple weeks to perform biopsy in view of patient having Covid-19 infection. Hyponatremia, multifactorial, including decreased GFR and hemodynamically mediated in the setting of large hypotonic fluid administration (D5 with Bactrim). Sodium levels improving. Low bicarbonate levels with hyperchloremia, consistent with either hyperchloremic metabolic acidosis versus respiratory alkalosis. Bicarbonate levels improving with bicarbonate drip. Edematous state, multifactorial, mainly 2/2 nephrotic syndrome and possibly HFpEF 55%, on bumex  Probably HFpEF 55%, proBNP 11,401 > 3421   HTN, on metoprolol   Right kidney lesion, likely a cyst but cannot be ruled out other pathology including infarction, abscess, neoplasm.   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, (1, 3) beta-D-glucan positive, started on anidulafungin  Sinus tachycardia, multifactorial  Hepatitis C antibody positive, awaiting viral load  Covid 19 infection in non vaccinated pt  Normocytic anemia, multifactorial  Leukopenia, WBC 1.9    Plan:    Change IVF to D5W with 150 mEq of sodium bicarbonate at 50 cc/hour  Kidney biopsy as an outpatient in 2 weeks after discharge  Continue to monitor potassium level while on Bactrim  Continue to monitor renal function after contrast study  Continue to monitor Na levels   Continue to monitor bicarbonate levels  Continue fluid restriction, dry tray  Repeat BMP at 4 pm  Continue strict I&O      Electronically signed by RICKY Szymanski CNP on 10/20/2021 at 12:31 PM

## 2021-10-20 NOTE — PROGRESS NOTES
[place]  Department of Internal Medicine  Division of Pulmonary, Critical Care and Sleep Medicine  Progress Note    Paul Polk DO, MPH, FCCP, FACOI, FACP  Jelani Dolan MD, [facility]  [facility]      Patient: Richrd Gosselin  MRN: [id number]  : 1990    Encounter Time: 4:54 PM     Date of Admission: 10/5/2021  3:07 PM    Primary Care Physician: Leonel He MD    Reason for Consultation: COVID     SUBJECTIVE:    Hep C and HIV + screen  CD4 is 54  Echo = reviewed      OBJECTIVE:     PHYSICAL EXAM:   VITALS:   Vitals:    10/19/21 1545 10/20/21 0130 10/20/21 0729 10/20/21 1430   BP: 127/75 133/78 114/64 (!) 116/59   Pulse: 110 114 116 100   Resp:  16   Temp: 100.3 °F (37.9 °C) 100 °F (37.8 °C) 100 °F (37.8 °C) 100 °F (37.8 °C)   TempSrc: Temporal Temporal Temporal Temporal   SpO2: 96% 97% 98%    Weight:       Height:            Intake/Output Summary (Last 24 hours) at 10/20/2021 1654  Last data filed at 10/20/2021 1301  Gross per 24 hour   Intake 840 ml   Output 1100 ml   Net -260 ml        CONSTITUTIONAL:   A&O x 3, NAD  SKIN:     No rash,   HEENT:     EOMI, MMM, No thrush  NECK:    No bruits, No JVP apprechiated  CV:      Sinus,  No murmur, No rubs, No gallops  PULMONARY:   Couse BS,  No Wheezing, No Rales, No Rhonchi      No noted egophony  ABDOMEN:     Soft, non-tender. BS normal. No R/R/G  EXT:    No deformities . No clubbing.       + lower extremity edema, No venous stasis  PULSE:   Appears equal and palpable.   PSYCHIATRIC:  Seems appropriate, No acute psycosis  MS:    No fractures, No gross weakness  NEUROLOGIC:   Non-focal     DATA: IMAGING & TESTING:     LABORATORY TESTS:    CBC:   Lab Results   Component Value Date    WBC 1.9 10/20/2021    RBC 2.91 10/20/2021    HGB 8.6 10/20/2021    HCT 25.9 10/20/2021    MCV 89.0 10/20/2021    MCH 29.6 10/20/2021    MCHC 33.2 10/20/2021    RDW 14.4 10/20/2021     10/20/2021    MPV 11.7 10/20/2021     CMP:    Lab Results   Component Value Date     10/20/2021    K 4.7 10/20/2021     10/20/2021    CO2 21 10/20/2021    BUN 15 10/20/2021    CREATININE 1.9 10/20/2021    GFRAA 51 10/20/2021    LABGLOM 51 10/20/2021    GLUCOSE 77 10/20/2021    PROT 5.8 10/20/2021    LABALBU 2.6 10/20/2021    CALCIUM 7.9 10/20/2021    BILITOT 0.3 10/20/2021    ALKPHOS 74 10/20/2021    AST 77 10/20/2021    ALT 22 10/20/2021     Magnesium:    Lab Results   Component Value Date    MG 1.3 10/15/2021     Phosphorus:  No results found for: PHOS  PT/INR:    Lab Results   Component Value Date    PROTIME 17.7 10/15/2021    INR 1.6 10/15/2021     FERRITIN:    Lab Results   Component Value Date    FERRITIN 3,921 10/06/2021     Fibrinogen Level:  No components found for: FIB     PRO-BNP:   Lab Results   Component Value Date    PROBNP 3,421 (H) 10/18/2021    PROBNP 11,401 (H) 10/13/2021      ABGs: No results found for: PH, PO2, PCO2  Hemoglobin A1C: No components found for: HGBA1C    IMAGING:  Imaging tests were completed and reviewed and discussed radiology and care team involved and reveals     CT CHEST : There is adequate opacification of the pulmonary arteries.  There is no   evidence of filling defect to suggest pulmonary embolism. Elgie Dowdy is no   evidence of thoracic aortic aneurysm or dissection.  There are small   bilateral pleural effusions.  These are increased compared to prior   examination.  There is small pericardial effusion which appears stable.  Left   hilar lymph node measures approximately 12 mm in short axis.  Right hilar   lymph node measures approximately 12 mm in short axis.  There is bilateral   gynecomastia. Elgie Dowdy is diffuse subcutaneous edema.      The central airways are patent.  There is no pneumothorax.  There are   bilateral ground-glass opacities and alveolar consolidation.  There has been   interval increase in the airspace disease compared to prior examination.  The   interval worsening is most pronounced within the lower lobes. Robertha King is   associated interstitial thickening. CT ABDOMEN PELVIS WO CONTRAST Additional Contrast? None    Result Date: 10/5/2021  FINDINGS: THE STUDY IS LIMITED DUE TO LACK OF INTRAVENOUS CONTRAST. Chest: Mediastinum: No thoracic aortic aneurysm. No definite adenopathy on this unenhanced study. Trace pericardial fluid anteriorly. Lungs/pleura: Trace layering pleural effusions bilaterally, slightly larger on the right. No pneumothorax. Numerous ground-glass densities in the bilateral upper and to a lesser degree lower lungs, in keeping with known COVID-19 pneumonia. Soft Tissues/Bones: Bilateral gynecomastia. No acute osseous abnormality in the thoracic cage. Abdomen and pelvis: The study is degraded by the patient's respiratory motion. Organs: Abdominal organs inadequately evaluated on this motion degraded and unenhanced study. No hydronephrosis on either side. GI/Bowel: Normal appendix. No evidence of bowel obstruction. Pelvis: Normal sized prostate. Urinary bladder grossly intact. Peritoneum/Retroperitoneum: No free air or free fluid. No bulky adenopathy. No abdominal aortic aneurysm. Bones/Soft Tissues: No lytic or sclerotic lesion in the regional skeleton. Limited study by the patient's respiratory motion and lack of intravenous contrast. Extensive multifocal COVID-19 pneumonia bilaterally. Bilateral trace layering pleural effusions. Bilateral gynecomastia. Abdominal organs inadequately evaluated due to the limitations of the study. Normal appendix. No evidence of bowel obstruction. CT CHEST WO CONTRAST    Result Date: 10/5/2021  FINDINGS: THE STUDY IS LIMITED DUE TO LACK OF INTRAVENOUS CONTRAST. Chest: Mediastinum: No thoracic aortic aneurysm. No definite adenopathy on this unenhanced study. Trace pericardial fluid anteriorly. Lungs/pleura: Trace layering pleural effusions bilaterally, slightly larger on the right. No pneumothorax.   Numerous ground-glass densities in the bilateral upper and to a lesser degree lower lungs, in keeping with known COVID-19 pneumonia. Soft Tissues/Bones: Bilateral gynecomastia. No acute osseous abnormality in the thoracic cage. Abdomen and pelvis: The study is degraded by the patient's respiratory motion. Organs: Abdominal organs inadequately evaluated on this motion degraded and unenhanced study. No hydronephrosis on either side. GI/Bowel: Normal appendix. No evidence of bowel obstruction. Pelvis: Normal sized prostate. Urinary bladder grossly intact. Peritoneum/Retroperitoneum: No free air or free fluid. No bulky adenopathy. No abdominal aortic aneurysm. Bones/Soft Tissues: No lytic or sclerotic lesion in the regional skeleton. Limited study by the patient's respiratory motion and lack of intravenous contrast. Extensive multifocal COVID-19 pneumonia bilaterally. Bilateral trace layering pleural effusions. Bilateral gynecomastia. Abdominal organs inadequately evaluated due to the limitations of the study. Normal appendix. No evidence of bowel obstruction. ECHOCARDIOGRAM:  Ejection fraction is visually estimated at 55%. There is late positive bubble suggesting possible shunt from pulmonary   origin. Visually moderately dilated left atrium. Normal right ventricular size and function. There is small pericardial effusion without tamponade physiology    Assessment:  Mr. Virgen Sommers is a 36year old male with no significant PMH who was admitted on October 5, 2021 after he presented from an outside clinic after he was found to be hypoxic during 6 minute walking test. Patient tested positive for Covid 19 on August 18th, he is unvaccinated and had Covid 19 last year as well.  Patient states he has quarantined by himself and when his mother recently visited him she was shocked at his appearance as he had lost about 50 pounds in 2 and a half months  Sepsis  COVID-19, mild  HIV   Pneumonia  Gram-positive cocci in clusters bacteremia, etiology unclear  DEISI  + RPR  + BNP        Plan:   Off oxygen  Improved symptoms  Completed Bronchoscopy with BAL with LMAC  - completed results no TB, NOPCP  HIV status CD4  known          Arti Henao DO DO, MPH, FCCP, [facility]  Professor of Internal Medicine  Pulmonary, Critical Care and Sleep Medicine

## 2021-10-20 NOTE — PLAN OF CARE
Problem: Airway Clearance - Ineffective  Goal: Achieve or maintain patent airway  10/20/2021 1524 by Chloe Potts RN  Outcome: Ongoing  10/20/2021 0615 by Ephraim Solo RN  Outcome: Met This Shift     Problem: Gas Exchange - Impaired  Goal: Absence of hypoxia  10/20/2021 1524 by Chloe Potts RN  Outcome: Ongoing  10/20/2021 0615 by Ephraim Solo RN  Outcome: Met This Shift  Goal: Promote optimal lung function  10/20/2021 1524 by Chloe Potts RN  Outcome: Ongoing  10/20/2021 0615 by Ephraim Solo RN  Outcome: Met This Shift     Problem: Breathing Pattern - Ineffective  Goal: Ability to achieve and maintain a regular respiratory rate  10/20/2021 1524 by Chloe Potts RN  Outcome: Ongoing  10/20/2021 0615 by Ephraim Solo RN  Outcome: Met This Shift     Problem:  Body Temperature -  Risk of, Imbalanced  Goal: Ability to maintain a body temperature within defined limits  10/20/2021 1524 by Chloe Potts RN  Outcome: Ongoing  10/20/2021 0615 by Ephraim Solo RN  Outcome: Met This Shift  Goal: Will regain or maintain usual level of consciousness  10/20/2021 1524 by Chloe Potts RN  Outcome: Ongoing  10/20/2021 0615 by Ephraim Solo RN  Outcome: Met This Shift  Goal: Complications related to the disease process, condition or treatment will be avoided or minimized  10/20/2021 1524 by Chloe Potts RN  Outcome: Ongoing  10/20/2021 0615 by Ephraim Solo RN  Outcome: Met This Shift     Problem: Isolation Precautions - Risk of Spread of Infection  Goal: Prevent transmission of infection  10/20/2021 1524 by Chloe Potts RN  Outcome: Ongoing  10/20/2021 0615 by Ephraim Solo RN  Outcome: Met This Shift     Problem: Nutrition Deficits  Goal: Optimize nutritional status  10/20/2021 1524 by Chloe Potts RN  Outcome: Ongoing  10/20/2021 0615 by Ephraim Solo RN  Outcome: Met This Shift     Problem: Risk for Fluid Volume Deficit  Goal: Maintain normal heart rhythm  10/20/2021 1524 by Chloe Potts RN  Outcome: Ongoing  10/20/2021 0615 by Johanna Resendez RN  Outcome: Met This Shift  Goal: Maintain absence of muscle cramping  10/20/2021 1524 by Zaina Alfredo RN  Outcome: Ongoing  10/20/2021 0615 by Johanna Resendez RN  Outcome: Met This Shift  Goal: Maintain normal serum potassium, sodium, calcium, phosphorus, and pH  10/20/2021 1524 by Zaina Alfredo RN  Outcome: Ongoing  10/20/2021 0615 by Johanna Resendez RN  Outcome: Met This Shift     Problem: Loneliness or Risk for Loneliness  Goal: Demonstrate positive use of time alone when socialization is not possible  10/20/2021 1524 by Zaina Alfredo RN  Outcome: Ongoing  10/20/2021 0615 by Johanna Resendez RN  Outcome: Met This Shift     Problem: Fatigue  Goal: Verbalize increase energy and improved vitality  10/20/2021 1524 by Zaina Alfredo RN  Outcome: Ongoing  10/20/2021 0615 by Johanna Resendez RN  Outcome: Met This Shift     Problem: Patient Education: Go to Patient Education Activity  Goal: Patient/Family Education  10/20/2021 1524 by Zaina Alfredo RN  Outcome: Ongoing  10/20/2021 0615 by Johanna Resendez RN  Outcome: Met This Shift     Problem: Falls - Risk of:  Goal: Will remain free from falls  Description: Will remain free from falls  10/20/2021 1524 by Zaina Alfredo RN  Outcome: Ongoing  10/20/2021 0615 by Johanna Resendez RN  Outcome: Met This Shift  Goal: Absence of physical injury  Description: Absence of physical injury  10/20/2021 1524 by Zaina Alfredo RN  Outcome: Ongoing  10/20/2021 0615 by Johanna Resendez RN  Outcome: Met This Shift     Problem: Pain:  Goal: Pain level will decrease  Description: Pain level will decrease  10/20/2021 1524 by Zaina Alfredo RN  Outcome: Ongoing  10/20/2021 0615 by Johanna Resendez RN  Outcome: Met This Shift  Goal: Control of acute pain  Description: Control of acute pain  10/20/2021 1524 by Zaina Alfredo RN  Outcome: Ongoing  10/20/2021 0615 by Donnamae Jaksch, RN  Outcome: Met This Shift  Goal: Control of chronic pain  Description: Control of chronic pain  10/20/2021 1524 by Chloe Potts RN  Outcome: Ongoing  10/20/2021 0615 by Ephraim Solo RN  Outcome: Met This Shift

## 2021-10-20 NOTE — BH NOTE
Behavioral Health Inpatient Follow Up   [address] [place] [address]        Reason for consult:  Anxiety; Difficulty coping with hospital stressors     Background:  Pt admitted for dyspnea, cough, Covid positive, fever 103, and bilateral pneumonia. HIV positive.     Psychotherapy Intervention:   Continued intervention for stress management. He has followed behavioral recommendations for stress management:  1) practicing relaxation breathing/imagery; 2) writing down information to organize his thoughts; 3) taking breaks while writing to cope with tremor. Plans to have family drop off personal items for him. Discussed frustrations. Wants to be able to shower, exercise in room. Emphasized focusing on tangible daily goals, and differentiating goals that he has long-term.      Recommendations/Plan:   Discussed with Family Medicine team. Will continue to follow with Family Medicine team during hospitalization. He is in agreement to follow with this psychologist outpatient following discharge.

## 2021-10-20 NOTE — PLAN OF CARE
Problem: Gas Exchange - Impaired  Goal: Absence of hypoxia  Outcome: Met This Shift  Goal: Promote optimal lung function  Outcome: Met This Shift     Problem: Isolation Precautions - Risk of Spread of Infection  Goal: Prevent transmission of infection  Outcome: Met This Shift     Problem: Nutrition Deficits  Goal: Optimize nutritional status  Outcome: Met This Shift     Problem: Risk for Fluid Volume Deficit  Goal: Maintain normal heart rhythm  Outcome: Met This Shift  Goal: Maintain absence of muscle cramping  Outcome: Met This Shift  Goal: Maintain normal serum potassium, sodium, calcium, phosphorus, and pH  Outcome: Met This Shift

## 2021-10-20 NOTE — PROGRESS NOTES
[facility] PROGRESS NOTE      Chief complaint: Follow-up of Gram-positive cocci in clusters on blood culture    The patient is a 36 y.o. male, not vaccinated against COVID-19, with history of asthma, presented on 10/05 with shortness of breath, found to be febrile at 103 °F, positive SARS-CoV-2 PCR, bilateral groundglass opacities on chest CT, tolerating room air with oxygen saturation of 97%. He reports having tested positive for SARS-CoV-2 for the 3rd time now with previous on in late August at which time he reports having quarantined. Urine Streptococcus pneumonia and Legionella antigens were negative. Blood cultures from 10/05 and 10/06 showed methicillin-susceptible Staphylococcus epidermidis in 1 out of 2 sets. HIV screen was positive with viral load of 195,000. CD4 count was low at 43. RPR was reactive at titer of 1:2. Toxoplasma Ab was negative. Serum beta-d-glucan was positive. Transthoracic echocardiogram showed late positive bubble suggesting possible shunt from pulmonary origin, moderately dilated left atrium, small pericardial effusion without tamponade physiology, ejection fraction visually estimated at 55%. He underwent bronchoscopy on 10/15 during which normal endobronchial evaluation and normal anatomy were noted. BAL Gram stain and culture showed rare polymorphonuclear leukocytes, rare epithelial cells, rare gram-negative rods, rare gram-positive rods, reduced oropharyngeal liana. BAL pneumocystis stain was negative. Subjective: Patient was seen and examined. No chills, no abdominal pain, no diarrhea, no rash, no itching.      Objective:  /64   Pulse 116   Temp 100 °F (37.8 °C) (Temporal)   Resp 18   Ht 6' 4\" (1.93 m)   Wt 173 lb (78.5 kg)   SpO2 98%   BMI 21.06 kg/m²   Constitutional: Alert, not in distress  Respiratory: Clear breath sounds, no crackles, no wheezes  Cardiovascular: Regular rate and rhythm, no murmurs  Gastrointestinal: Bowel sounds present, soft, nontender  Skin: Warm and dry, no active dermatoses  Musculoskeletal: No joint swelling, no joint erythema    Labs, imaging, and medical records/notes were personally reviewed. 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 Hepatitis C Ab positive (viral load not detected). Hepatitis A immune  Prolonged QTc    Recommendations:  Stoop cefepime 2g q8h. Continue high-dose Bactrim at 5 mg trimethoprim dose per kilogram actual body weight every 8 hours for suspected Pneumocystis pneumonia. Follow up BAL Pneumocystis PCR. Follow up HIV GART, [id number], urine GC/Chlamydia PCR. Check serum Cryptococcus antigen. Do lumbar puncture with CSF sent for glucose, protein, cell count with differential, VDRL, meningitis/encephalitis PCR panel. Monitor respiratory status. Continue supportive care. Patient was advised to inform his sexual partners to get tested for HIV and syphilis. Thank you for involving me in the care of Kristin James. I will continue to follow. Please do not hesitate to call for any questions or concerns.     Electronically signed by Jona Latham MD on 10/20/2021 at 11:19 AM

## 2021-10-20 NOTE — PROGRESS NOTES
[facility] - [facility] Inpatient   Resident Progress Note    S:  Hospital day: 15   Brief Synopsis: Gilberto Bedoya is a 36 y.o. male with no PMH who presented with hypoxia and fever. He was initially seen in PCP office and was hypoxic on exertion with desaturate into the mid low 80s as well as noted to be febrile and tachycardic. Per patient, his symptoms began 8/15/2021 and he tested positive for Covid 8/28. endorse worsening shortness of breath over the past month with sputum production, intermittent chest discomfort, persistent diarrhea, chills, fever, decrease in appetite and 50 pound weight loss since August.  Patient also endorses significantly worsening \"neuropathy\" of bilateral feet, states it feels like he is \"being stabbed by needles\" which is causing him inability to walk due to pain. In the ED, he was found to be anemic, hyperkalemic, hyponatremic, febrile and COVID positive. He also had elevated Creatinine, AST, proBNP and troponin x2. CTA showed extensive multifocal COVID-19 pneumonia bilaterally, bilateral trace layering pleural effusion and bilateral gynecomastia. CT abdomen was limited due to movement and non-contrast, but showed normal appendix and no evidence of bowel obstruction. Admitted for acute hypoxia due to Covid pneumonia. Nephro, pulm and ID consulted. Started on bicarb drip. US showed hyperechoic kidneys, trace b/l perinephric fluid and right kidney lesion (Cyst vs infarction vs abscess vs neoplasia). HIV and Hep C screen were negative. HIV RNA and T and B lymphocytes were ordered. Patient became more hypoxic, tachycardic and hypertensive so  there was concern for PE. CTA was ordered, which came back negative. Echo was completed due to elevated BNP and concern for PE and that revealed a normal EF with late bubble sign possible shunting of pulmonary origin. BNP continued to increase. Cardiology was consulted.  Repeat Echo pending       Overnight/interim:   Patient was seen and examined at bedside. Denies any sob, chest pain, diarrhea, abdominal pain. Cont meds:    IV infusion builder 50 mL/hr at 10/19/21 2152    sodium chloride      sodium chloride       Scheduled meds:    enoxaparin  40 mg SubCUTAneous Daily    [Held by provider] metoprolol tartrate  25 mg Oral Daily    anidulafungin  100 mg IntraVENous Q24H    sulfamethoxazole-trimethoprim (BACTRIM) IVPB  400 mg IntraVENous Q8H    cefepime  2,000 mg IntraVENous Q8H    pantoprazole  40 mg Oral QAM AC    sodium chloride flush  5-40 mL IntraVENous 2 times per day     PRN meds: perflutren lipid microspheres, hydrOXYzine, heparin (porcine), heparin (porcine), labetalol, sodium chloride, sodium chloride flush, ondansetron **OR** ondansetron, polyethylene glycol, acetaminophen **OR** acetaminophen, perflutren lipid microspheres, sodium chloride     I reviewed the patient's past medical and surgical history, Medications and Allergies. O:  /78   Pulse 114   Temp 100 °F (37.8 °C) (Temporal)   Resp 22   Ht 6' 4\" (1.93 m)   Wt 173 lb (78.5 kg)   SpO2 97%   BMI 21.06 kg/m²   24 hour I&O: I/O last 3 completed shifts:  In: -   Out: 850 [Urine:850]  No intake/output data recorded. Physical Exam  Constitutional:       Appearance: Normal appearance. He is not ill-appearing, toxic-appearing or diaphoretic. HENT:      Head: Normocephalic and atraumatic. Nose: No rhinorrhea. Mouth/Throat:      Mouth: Mucous membranes are moist.      Pharynx: Oropharynx is clear. Eyes:      General: No scleral icterus. Right eye: No discharge. Left eye: No discharge. Extraocular Movements: Extraocular movements intact. Cardiovascular:      Rate and Rhythm: Normal rate and regular rhythm. Pulses: Normal pulses. Heart sounds: Normal heart sounds. No murmur heard. No friction rub. No gallop. Pulmonary:      Effort: Pulmonary effort is normal. No respiratory distress. Breath sounds:  No wheezing, rhonchi or rales. Abdominal:      General: Abdomen is flat. Musculoskeletal:         General: No swelling or tenderness. Cervical back: Normal range of motion. Right lower leg: No edema. Left lower leg: No edema. Skin:     General: Skin is warm and dry. Coloration: Skin is not jaundiced. Findings: No bruising or rash. Neurological:      General: No focal deficit present. Mental Status: He is alert and oriented to person, place, and time. Cranial Nerves: No cranial nerve deficit. Motor: No weakness. Psychiatric:         Behavior: Behavior normal.         Thought Content: Thought content normal.           Labs:  Na/K/Cl/CO2:  128/4.8/99/16 (10/19 0359)  BUN/Cr/glu/ALT/AST/amyl/lip:  14/1.7/--/23/82/--/-- (10/19 0359)  WBC/Hgb/Hct/Plts:  3.3/9.3/28.3/175 (10/19 0400)  estimated creatinine clearance is 71 mL/min (A) (based on SCr of 1.7 mg/dL (H)). Other pertinent labs as noted below    Radiology:  XR CHEST PORTABLE   Final Result   Extensive bilateral patchy areas of ground-glass opacity concerning for   atypical pneumonia or viral airway disease, improved         XR CHEST PORTABLE   Final Result   Persistent bilateral infiltrates. No significant changes since October 13. See above comments. See report CT chest October 13. CTA PULMONARY W CONTRAST   Final Result   1. No evidence of pulmonary embolism. 2. Interval increased size of bilateral pleural effusions with little change   in pericardial effusion. 3. Persistent subcutaneous edema. 4. Reactive bilateral hilar lymphadenopathy. 5. Bilateral airspace disease is increased compared to prior examination. The increase is most pronounced within the lower lobes. 6. Bilateral gynecomastia. XR CHEST PORTABLE   Final Result   Development of extensive bilateral pulmonary infiltrates         XR CHEST PORTABLE   Final Result   No acute process.          US RETROPERITONEAL COMPLETE   Final Result   1. Hyperechoic kidneys due to underlying parenchymal disease. 2. Trace bilateral perinephric fluid of indeterminate etiology, potentially   due to underlying inflammation. 3. An approximately 1.1 cm x 1.5 cm x 2.3 cm lesion in the right kidney could   be a cyst but could also be seen with infarction, abscess, or neoplasia. Consider further evaluation with renal protocol abdomen MRI (preferred) or   CT. Following resolution of acute kidney injury. XR CHEST PORTABLE   Final Result   Multifocal bilateral pulmonary ground-glass airspace opacities are stable         CT ABDOMEN PELVIS WO CONTRAST Additional Contrast? None   Final Result   Limited study by the patient's respiratory motion and lack of intravenous   contrast.      Extensive multifocal COVID-19 pneumonia bilaterally. Bilateral trace layering pleural effusions. Bilateral gynecomastia. Abdominal organs inadequately evaluated due to the limitations of the study. Normal appendix. No evidence of bowel obstruction. CT CHEST WO CONTRAST   Final Result   Limited study by the patient's respiratory motion and lack of intravenous   contrast.      Extensive multifocal COVID-19 pneumonia bilaterally. Bilateral trace layering pleural effusions. Bilateral gynecomastia. Abdominal organs inadequately evaluated due to the limitations of the study. Normal appendix. No evidence of bowel obstruction.                Resident Assessment and Plan       Acute hypoxia 2/2 COVID pneumonia - resolved  - Maintaining Oxygen saturation in room air  - CTA: COVID pneumonia, Bilateral trace layering pleural effusion, negative for PE   - D dimer elevated on admission, currently stable  - Ferritin 3900, CRP 10.1 on admission   - Continue monitor Oxygen saturation   - Pulmonology and ID on board  - Legionella negative  - T spot TB test negative   - Decadron discontinued on 10/9  - CXR 10/8 shows multifocal groundglass opacities are stable  - Repeat CXR 10/17: b/l pulm infiltrates improving   - Blood cx: staph epidermitis, repeat blood cx shows no growth x 7 days   -Urine culture: NGTD   -Counseled on getting COVID vaccine after patient recovers   - ECHO: EF 55%, late bubble sign possible shunting of pulmonary origin   - Cardiology consulted   - CTA 10/13 negative for PE  Bronchoscopy with BAL performed on 10/15/2021   - Respiratory culture: oral pharyngeal liana decreased, few gram negative rods     Weight loss likely secondary to HIV  - Per patient due to decreased appetite from COVID; 50lb weight loss since August   - R/o other potential origins - SLE, TB, hepatitis  - MARILEE negative  - T spot TB test negative   HIV, hepatitis C antibodies positive  - Elevated IgG and IgA, normal IgM: IgA nephropathy vs MPGN?   - Cryoglobulin normal   - TSH normal   - Sed rate and CRP elevated      HIV  HIV 1/2 antibodies positive  - Leukopenia, normal absolute neutrophil   - Febrile and tachycardic   CD4 44, CD3 153, CD8 100  HIV viral load 195,000  - ID on board: received 2 days of vancomycin 10/6, discontinued ancef, started on cefepime 2g q8h day 7  - Fungitell test positive, on eraxis day 4  - Bactrim 400mg q8h day 7 for suspected Pneumocystis pneumonia.    - Pneumocystis stain negative: will probably change to  Prophylactic Bactrim dose   - tests for opportunistic infections pending: HIV GART, HLAB-5701,  urine GC/Chlamydia PCR  - Toxoplasma Ab negative  - hepatitis A total Ab positive  - RPR reactive, quant 1:2, treponema pallidum ab pending  - Need HLAB-5701 result before starting abacavir     Hepatitis C resolved  Hep C antibody reactive  Viral load undetectable    Anemia   - Likely 2/2 from GI bleed vs HIV   - Hb 8.6  Transfused 1 unit 10/11, post transfusion Hb 8   - General surgery consulted, FOBT negative the second time  - Continue to monitor     DEISI   Hyponatremia  - Na stable at 129  - Nephro on board   - Likely secondary to dehydration from persistent diarrhea vs decreased PO intake vs bactrim vs HIVANS   - Cr on admission 3.6, trending down 1.7, due to IV contrast  - Urine osm and urine nitrogen low    - UA: protein, 1-2 waxy casts, moderate bacteria, moderate blood    - Microalbumin-creatinine ratio elevated   - Kidney bx OP 2 weeks after discharge   Fluid restriction  - On bicarb drip due to decreased CO2       Elevated troponin and proBNP  - 2/2 myocardial injury vs stress cardiomyopathy due to COVID vs new onset CHF   - initial troponin 78, 78  - initial BNP 1599,  repeat 11,401 on 10/14, trending down 3400   - ECHO: EF 55%, late bubble sign possible shunting of pulmonary origin  - Repeat ECHO pending   Received 1 dose of IV Lasix 40mg on 10/15  Stopped bumex due to hyponatremia, on bicarb drip     Tachycardia  - 2/2 to infectious vs anxiety vs anemia   HR improving  Cardio consulted, recommend to start Beta blocker   - Hold lopressor   Asymptomatic  Continue to monitor    HTN  - BP is controlled   - Lopressor held   - labetalol PRN  - Continue to monitor      Proteinuria  -protein to creatinine ratio 3.8  - microalbumin to creatine ratio elevated   -kidney biopsy OP per nephro, patient agreeable    Hypoalbuminemia   - Alb 1.7 on admission, now 2.7  -Previously received albumin 25 g q6h x 4 doses 10/6  -Received albumin 25 g q6h x 8 doses 10/12  Low C3, normal C4 and cryoglobulin  Fluid restriction  - Continue to monitor             PT/OT evaluation: not indicated   DVT prophylaxis: heparin due to DEISI    GI prophylaxis:protonix  Disposition:home +/- home health/ rehab   Diet: adult         Electronically signed by Eleno Looney MD on 10/20/2021 at 6:01 AM  Attending physician: Dr. Rosibel Montano

## 2021-10-21 ENCOUNTER — APPOINTMENT (OUTPATIENT)
Dept: GENERAL RADIOLOGY | Age: 31
DRG: 890 | End: 2021-10-21
Payer: COMMERCIAL

## 2021-10-21 LAB
ABO/RH: NORMAL
ALBUMIN SERPL-MCNC: 2.1 G/DL (ref 3.5–5.2)
ALP BLD-CCNC: 66 U/L (ref 40–129)
ALT SERPL-CCNC: 21 U/L (ref 0–40)
ANION GAP SERPL CALCULATED.3IONS-SCNC: 7 MMOL/L (ref 7–16)
ANION GAP SERPL CALCULATED.3IONS-SCNC: 7 MMOL/L (ref 7–16)
ANISOCYTOSIS: ABNORMAL
ANTIBODY SCREEN: NORMAL
APTT: 35.9 SEC (ref 24.5–35.1)
ASPERGILLUS GALACTO AG: NEGATIVE
ASPERGILLUS GALACTO INDEX: 0.09
AST SERPL-CCNC: 78 U/L (ref 0–39)
BASOPHILS ABSOLUTE: 0.01 E9/L (ref 0–0.2)
BASOPHILS RELATIVE PERCENT: 0.9 % (ref 0–2)
BILIRUB SERPL-MCNC: <0.2 MG/DL (ref 0–1.2)
BUN BLDV-MCNC: 21 MG/DL (ref 6–20)
BUN BLDV-MCNC: 21 MG/DL (ref 6–20)
CALCIUM SERPL-MCNC: 7.5 MG/DL (ref 8.6–10.2)
CALCIUM SERPL-MCNC: 8.1 MG/DL (ref 8.6–10.2)
CHLORIDE BLD-SCNC: 90 MMOL/L (ref 98–107)
CHLORIDE BLD-SCNC: 95 MMOL/L (ref 98–107)
CO2: 24 MMOL/L (ref 22–29)
CO2: 34 MMOL/L (ref 22–29)
CREAT SERPL-MCNC: 2.5 MG/DL (ref 0.7–1.2)
CREAT SERPL-MCNC: 2.6 MG/DL (ref 0.7–1.2)
CRYPTOCOCCAL ANTIGEN: NEGATIVE
CRYPTOCOCCUS NEOFORMANS/GATTII CSF BY PCR: NOT DETECTED
CYTOMEGALOVIRUS CSF BY PCR: NOT DETECTED
ENTEROVIRUS CSF BY PCR: NOT DETECTED
EOSINOPHILS ABSOLUTE: 0.03 E9/L (ref 0.05–0.5)
EOSINOPHILS RELATIVE PERCENT: 2.8 % (ref 0–6)
ESCHERICHIA COLI K1 CSF BY PCR: NOT DETECTED
GFR AFRICAN AMERICAN: 35
GFR AFRICAN AMERICAN: 37
GFR NON-AFRICAN AMERICAN: 35 ML/MIN/1.73
GFR NON-AFRICAN AMERICAN: 37 ML/MIN/1.73
GLUCOSE BLD-MCNC: 402 MG/DL (ref 74–99)
GLUCOSE BLD-MCNC: 70 MG/DL (ref 74–99)
GLUCOSE, CSF: 51 MG/DL (ref 40–70)
HAEMOPHILUS INFLUENZAE CSF BY PCR: NOT DETECTED
HCT VFR BLD CALC: 21.2 % (ref 37–54)
HCT VFR BLD CALC: 21.8 % (ref 37–54)
HCT VFR BLD CALC: 22.5 % (ref 37–54)
HEMOGLOBIN: 7.2 G/DL (ref 12.5–16.5)
HEMOGLOBIN: 7.4 G/DL (ref 12.5–16.5)
HEMOGLOBIN: 7.5 G/DL (ref 12.5–16.5)
HERPES SIMPLEX VIRUS 1 CSF BY PCR: NOT DETECTED
HERPES SIMPLEX VIRUS 2 CSF BY PCR: NOT DETECTED
HUMAN HERPESVIRUS 6 CSF BY PCR: NOT DETECTED
HUMAN PARECHOVIRUS CSF BY PCR: NOT DETECTED
LISTERIA MONOCYTOGENES CSF BY PCR: NOT DETECTED
LYMPHOCYTES ABSOLUTE: 0.23 E9/L (ref 1.5–4)
LYMPHOCYTES RELATIVE PERCENT: 19.3 % (ref 20–42)
MCH RBC QN AUTO: 29.5 PG (ref 26–35)
MCHC RBC AUTO-ENTMCNC: 33.3 % (ref 32–34.5)
MCV RBC AUTO: 88.6 FL (ref 80–99.9)
MONOCYTES ABSOLUTE: 0.1 E9/L (ref 0.1–0.95)
MONOCYTES RELATIVE PERCENT: 8.3 % (ref 2–12)
NEISSERIA MENINGITIDIS CSF BY PCR: NOT DETECTED
NEUTROPHILS ABSOLUTE: 0.83 E9/L (ref 1.8–7.3)
NEUTROPHILS RELATIVE PERCENT: 68.8 % (ref 43–80)
NUCLEATED RED BLOOD CELLS: 1.8 /100 WBC
OVALOCYTES: ABNORMAL
PDW BLD-RTO: 14.4 FL (ref 11.5–15)
PLATELET # BLD: 105 E9/L (ref 130–450)
PMV BLD AUTO: 12.5 FL (ref 7–12)
POIKILOCYTES: ABNORMAL
POLYCHROMASIA: ABNORMAL
POTASSIUM REFLEX MAGNESIUM: 4.2 MMOL/L (ref 3.5–5)
POTASSIUM SERPL-SCNC: 4.5 MMOL/L (ref 3.5–5)
PROTEIN CSF: 15 MG/DL (ref 15–40)
RBC # BLD: 2.54 E12/L (ref 3.8–5.8)
SODIUM BLD-SCNC: 126 MMOL/L (ref 132–146)
SODIUM BLD-SCNC: 131 MMOL/L (ref 132–146)
STREPTOCOCCUS AGALACTIAE CSF BY PCR: NOT DETECTED
STREPTOCOCCUS PNEUMONIAE CSF BY PCR: NOT DETECTED
TARGET CELLS: ABNORMAL
TOTAL PROTEIN: 5.5 G/DL (ref 6.4–8.3)
TREPONEMA PALLIDUM ANTIBODIES: NON REACTIVE
VARICELLA ZOSTER VIRUS CSF BY PCR: NOT DETECTED
WBC # BLD: 1.2 E9/L (ref 4.5–11.5)

## 2021-10-21 PROCEDURE — 6370000000 HC RX 637 (ALT 250 FOR IP): Performed by: INTERNAL MEDICINE

## 2021-10-21 PROCEDURE — 97165 OT EVAL LOW COMPLEX 30 MIN: CPT

## 2021-10-21 PROCEDURE — 2580000003 HC RX 258: Performed by: INTERNAL MEDICINE

## 2021-10-21 PROCEDURE — 80053 COMPREHEN METABOLIC PANEL: CPT

## 2021-10-21 PROCEDURE — 86900 BLOOD TYPING SEROLOGIC ABO: CPT

## 2021-10-21 PROCEDURE — 2580000003 HC RX 258: Performed by: NURSE PRACTITIONER

## 2021-10-21 PROCEDURE — 80048 BASIC METABOLIC PNL TOTAL CA: CPT

## 2021-10-21 PROCEDURE — 87483 CNS DNA AMP PROBE TYPE 12-25: CPT

## 2021-10-21 PROCEDURE — 2140000000 HC CCU INTERMEDIATE R&B

## 2021-10-21 PROCEDURE — 87070 CULTURE OTHR SPECIMN AEROBIC: CPT

## 2021-10-21 PROCEDURE — 87040 BLOOD CULTURE FOR BACTERIA: CPT

## 2021-10-21 PROCEDURE — 86901 BLOOD TYPING SEROLOGIC RH(D): CPT

## 2021-10-21 PROCEDURE — 87205 SMEAR GRAM STAIN: CPT

## 2021-10-21 PROCEDURE — 2500000003 HC RX 250 WO HCPCS: Performed by: INTERNAL MEDICINE

## 2021-10-21 PROCEDURE — 82945 GLUCOSE OTHER FLUID: CPT

## 2021-10-21 PROCEDURE — P9047 ALBUMIN (HUMAN), 25%, 50ML: HCPCS | Performed by: INTERNAL MEDICINE

## 2021-10-21 PROCEDURE — 85730 THROMBOPLASTIN TIME PARTIAL: CPT

## 2021-10-21 PROCEDURE — 85025 COMPLETE CBC W/AUTO DIFF WBC: CPT

## 2021-10-21 PROCEDURE — 36415 COLL VENOUS BLD VENIPUNCTURE: CPT

## 2021-10-21 PROCEDURE — 86592 SYPHILIS TEST NON-TREP QUAL: CPT

## 2021-10-21 PROCEDURE — 86593 SYPHILIS TEST NON-TREP QUANT: CPT

## 2021-10-21 PROCEDURE — 99232 SBSQ HOSP IP/OBS MODERATE 35: CPT | Performed by: INTERNAL MEDICINE

## 2021-10-21 PROCEDURE — 99232 SBSQ HOSP IP/OBS MODERATE 35: CPT | Performed by: FAMILY MEDICINE

## 2021-10-21 PROCEDURE — 85018 HEMOGLOBIN: CPT

## 2021-10-21 PROCEDURE — 84157 ASSAY OF PROTEIN OTHER: CPT

## 2021-10-21 PROCEDURE — 89051 BODY FLUID CELL COUNT: CPT

## 2021-10-21 PROCEDURE — 2709999900 FL LUMBAR PUNCTURE DIAG

## 2021-10-21 PROCEDURE — 6360000002 HC RX W HCPCS: Performed by: NURSE PRACTITIONER

## 2021-10-21 PROCEDURE — 86850 RBC ANTIBODY SCREEN: CPT

## 2021-10-21 PROCEDURE — 97162 PT EVAL MOD COMPLEX 30 MIN: CPT

## 2021-10-21 PROCEDURE — 85014 HEMATOCRIT: CPT

## 2021-10-21 PROCEDURE — 009U3ZX DRAINAGE OF SPINAL CANAL, PERCUTANEOUS APPROACH, DIAGNOSTIC: ICD-10-PCS | Performed by: STUDENT IN AN ORGANIZED HEALTH CARE EDUCATION/TRAINING PROGRAM

## 2021-10-21 PROCEDURE — 6360000002 HC RX W HCPCS: Performed by: INTERNAL MEDICINE

## 2021-10-21 PROCEDURE — 6370000000 HC RX 637 (ALT 250 FOR IP): Performed by: STUDENT IN AN ORGANIZED HEALTH CARE EDUCATION/TRAINING PROGRAM

## 2021-10-21 RX ORDER — ASCORBIC ACID 500 MG
500 TABLET ORAL DAILY
Status: DISCONTINUED | OUTPATIENT
Start: 2021-10-21 | End: 2021-11-07 | Stop reason: HOSPADM

## 2021-10-21 RX ORDER — ALBUMIN (HUMAN) 12.5 G/50ML
25 SOLUTION INTRAVENOUS EVERY 6 HOURS
Status: COMPLETED | OUTPATIENT
Start: 2021-10-21 | End: 2021-10-23

## 2021-10-21 RX ADMIN — ACETAMINOPHEN 650 MG: 325 TABLET ORAL at 01:37

## 2021-10-21 RX ADMIN — PANTOPRAZOLE SODIUM 40 MG: 40 TABLET, DELAYED RELEASE ORAL at 05:57

## 2021-10-21 RX ADMIN — Medication 10 ML: at 21:26

## 2021-10-21 RX ADMIN — SULFAMETHOXAZOLE AND TRIMETHOPRIM 400 MG: 80; 16 INJECTION, SOLUTION, CONCENTRATE INTRAVENOUS at 04:01

## 2021-10-21 RX ADMIN — ALBUMIN (HUMAN) 25 G: 0.25 INJECTION, SOLUTION INTRAVENOUS at 13:12

## 2021-10-21 RX ADMIN — ALBUMIN (HUMAN) 25 G: 0.25 INJECTION, SOLUTION INTRAVENOUS at 17:29

## 2021-10-21 RX ADMIN — SULFAMETHOXAZOLE AND TRIMETHOPRIM 400 MG: 80; 16 INJECTION, SOLUTION, CONCENTRATE INTRAVENOUS at 14:43

## 2021-10-21 RX ADMIN — OXYCODONE HYDROCHLORIDE AND ACETAMINOPHEN 500 MG: 500 TABLET ORAL at 09:14

## 2021-10-21 RX ADMIN — DEXTROSE MONOHYDRATE 100 MG: 50 INJECTION, SOLUTION INTRAVENOUS at 11:34

## 2021-10-21 RX ADMIN — ACETAMINOPHEN 650 MG: 325 TABLET ORAL at 09:14

## 2021-10-21 RX ADMIN — ACETAMINOPHEN 650 MG: 325 TABLET ORAL at 21:26

## 2021-10-21 RX ADMIN — SULFAMETHOXAZOLE AND TRIMETHOPRIM 400 MG: 80; 16 INJECTION, SOLUTION, CONCENTRATE INTRAVENOUS at 21:27

## 2021-10-21 ASSESSMENT — PAIN SCALES - GENERAL
PAINLEVEL_OUTOF10: 0

## 2021-10-21 NOTE — PLAN OF CARE
Problem: Airway Clearance - Ineffective  Goal: Achieve or maintain patent airway  10/21/2021 1217 by Georgeanne Sever, RN  Outcome: Met This Shift  10/21/2021 0353 by Conrad Solo RN  Outcome: Met This Shift     Problem: Gas Exchange - Impaired  Goal: Absence of hypoxia  10/21/2021 1217 by Georgeanne Sever, RN  Outcome: Met This Shift  10/21/2021 0353 by Conrad Solo RN  Outcome: Met This Shift  Goal: Promote optimal lung function  10/21/2021 0353 by Conrad Solo RN  Outcome: Met This Shift     Problem: Breathing Pattern - Ineffective  Goal: Ability to achieve and maintain a regular respiratory rate  Outcome: Met This Shift     Problem:  Body Temperature -  Risk of, Imbalanced  Goal: Ability to maintain a body temperature within defined limits  Outcome: Met This Shift     Problem: Isolation Precautions - Risk of Spread of Infection  Goal: Prevent transmission of infection  10/21/2021 0353 by Conrad Solo RN  Outcome: Met This Shift     Problem: Loneliness or Risk for Loneliness  Goal: Demonstrate positive use of time alone when socialization is not possible  10/21/2021 0353 by Conrad Solo RN  Outcome: Met This Shift     Problem: Fatigue  Goal: Verbalize increase energy and improved vitality  10/21/2021 0353 by Conrad Solo RN  Outcome: Met This Shift     Problem: Patient Education: Go to Patient Education Activity  Goal: Patient/Family Education  10/21/2021 0353 by Conrad Solo RN  Outcome: Met This Shift     Problem: Falls - Risk of:  Goal: Will remain free from falls  Description: Will remain free from falls  10/21/2021 0353 by Conrad Solo RN  Outcome: Met This Shift  Goal: Absence of physical injury  Description: Absence of physical injury  10/21/2021 0353 by Conrad Solo RN  Outcome: Met This Shift

## 2021-10-21 NOTE — PLAN OF CARE
Problem: Airway Clearance - Ineffective  Goal: Achieve or maintain patent airway  10/21/2021 0353 by Josias Price RN  Outcome: Met This Shift  10/20/2021 1524 by Isabel Hawkins RN  Outcome: Ongoing     Problem: Gas Exchange - Impaired  Goal: Absence of hypoxia  10/21/2021 0353 by Josias Price RN  Outcome: Met This Shift  10/20/2021 1524 by Isabel Hawkins RN  Outcome: Ongoing  Goal: Promote optimal lung function  10/21/2021 0353 by Josias Price RN  Outcome: Met This Shift  10/20/2021 1524 by Isabel Hawkins RN  Outcome: Ongoing     Problem: Breathing Pattern - Ineffective  Goal: Ability to achieve and maintain a regular respiratory rate  10/20/2021 1524 by Isabel Hawkins RN  Outcome: Ongoing     Problem:  Body Temperature -  Risk of, Imbalanced  Goal: Ability to maintain a body temperature within defined limits  10/20/2021 1524 by Isabel Hawkins RN  Outcome: Ongoing  Goal: Will regain or maintain usual level of consciousness  10/20/2021 1524 by Isabel Hawkins RN  Outcome: Ongoing  Goal: Complications related to the disease process, condition or treatment will be avoided or minimized  10/20/2021 1524 by Isabel Hawkins RN  Outcome: Ongoing     Problem: Isolation Precautions - Risk of Spread of Infection  Goal: Prevent transmission of infection  10/21/2021 0353 by Josias Price RN  Outcome: Met This Shift  10/20/2021 1524 by Isabel Hawkins RN  Outcome: Ongoing     Problem: Nutrition Deficits  Goal: Optimize nutritional status  10/20/2021 1524 by Isabel Hawkins RN  Outcome: Ongoing     Problem: Risk for Fluid Volume Deficit  Goal: Maintain normal heart rhythm  10/20/2021 1524 by Isabel Hawkins RN  Outcome: Ongoing  Goal: Maintain absence of muscle cramping  10/20/2021 1524 by Isabel Hawkins RN  Outcome: Ongoing  Goal: Maintain normal serum potassium, sodium, calcium, phosphorus, and pH  10/20/2021 1524 by Isabel Hawkins RN  Outcome: Ongoing     Problem: Loneliness or Risk for Loneliness  Goal: Demonstrate positive use of time alone when socialization is not possible  10/21/2021 0353 by Brianda Gallo RN  Outcome: Met This Shift  10/20/2021 1524 by Torsten Alvarado RN  Outcome: Ongoing     Problem: Fatigue  Goal: Verbalize increase energy and improved vitality  10/21/2021 0353 by Brianda Gallo RN  Outcome: Met This Shift  10/20/2021 1524 by Torsten Alvarado RN  Outcome: Ongoing     Problem: Patient Education: Go to Patient Education Activity  Goal: Patient/Family Education  10/21/2021 0353 by Brianda Gallo RN  Outcome: Met This Shift  10/20/2021 1524 by Torsten Alvarado RN  Outcome: Ongoing     Problem: Falls - Risk of:  Goal: Will remain free from falls  Description: Will remain free from falls  10/21/2021 0353 by Brianda Gallo RN  Outcome: Met This Shift  10/20/2021 1524 by Torsten Alvarado RN  Outcome: Ongoing  Goal: Absence of physical injury  Description: Absence of physical injury  10/21/2021 0353 by Brianda Gallo RN  Outcome: Met This Shift  10/20/2021 1524 by Torsten Alvarado RN  Outcome: Ongoing     Problem: Pain:  Goal: Pain level will decrease  Description: Pain level will decrease  10/20/2021 1524 by Torsten Alvarado RN  Outcome: Ongoing  Goal: Control of acute pain  Description: Control of acute pain  10/20/2021 1524 by Torsten Alvarado RN  Outcome: Ongoing  Goal: Control of chronic pain  Description: Control of chronic pain  10/20/2021 1524 by Torsten Alvarado RN  Outcome: Ongoing
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
## 2021-10-21 NOTE — PROGRESS NOTES
NORMA PROGRESS NOTE      Chief complaint: Follow-up of Gram-positive cocci in clusters on blood culture    The patient is a 36 y.o. male, not vaccinated against COVID-19, with history of asthma, presented on 10/05 with shortness of breath, found to be febrile at 103 °F, positive SARS-CoV-2 PCR, bilateral groundglass opacities on chest CT, tolerating room air with oxygen saturation of 97%. He reports having tested positive for SARS-CoV-2 for the 3rd time now with previous on in late August at which time he reports having quarantined. Urine Streptococcus pneumonia and Legionella antigens were negative. Blood cultures from 10/05 and 10/06 showed methicillin-susceptible Staphylococcus epidermidis in 1 out of 2 sets. HIV screen was positive with viral load of 195,000. CD4 count was low at 43. RPR was reactive at titer of 1:2. Toxoplasma Ab was negative. Serum beta-d-glucan was positive. Transthoracic echocardiogram showed late positive bubble suggesting possible shunt from pulmonary origin, moderately dilated left atrium, small pericardial effusion without tamponade physiology, ejection fraction visually estimated at 55%. He underwent bronchoscopy on 10/15 during which normal endobronchial evaluation and normal anatomy were noted. BAL Gram stain and culture showed rare polymorphonuclear leukocytes, rare epithelial cells, rare gram-negative rods, rare gram-positive rods, reduced oropharyngeal liana. BAL pneumocystis stain was negative. BAL galactomannan was negative. Serum Cryptococcus antigen was negative. Subjective: Patient was seen and examined. No chills, no abdominal pain, no diarrhea, no rash, no itching.      Objective:  BP (!) 113/58   Pulse 112   Temp 99.4 °F (37.4 °C) (Temporal)   Resp 16   Ht 6' 4\" (1.93 m)   Wt 173 lb (78.5 kg)   SpO2 97%   BMI 21.06 kg/m²   Constitutional: Alert, not in distress  Respiratory: Clear breath sounds, no crackles, no wheezes  Cardiovascular: Regular rate and rhythm, no murmurs  Gastrointestinal: Bowel sounds present, soft, nontender  Skin: Warm and dry, no active dermatoses  Musculoskeletal: No joint swelling, no joint erythema    Labs, imaging, and medical records/notes were personally reviewed. 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 Hepatitis C Ab positive (viral load not detected). Hepatitis A immune  Prolonged QTc    Recommendations:  Stop anidulafungin. Start Biktarvy 1 tab once daily. Continue high-dose Bactrim at 5 mg trimethoprim dose per kilogram actual body weight every 8 hours for suspected Pneumocystis pneumonia with elevated serum beta-d-glucan in the setting of advanced HIV disease with CD4 of 48. Follow up BAL Pneumocystis PCR. Follow up HIV GART, [id number], urine GC/Chlamydia PCR. Do lumbar puncture with CSF sent for glucose, protein, cell count with differential, VDRL, meningitis/encephalitis PCR panel. Monitor respiratory status. Continue supportive care. Thank you for involving me in the care of Kristin James. I will continue to follow. Please do not hesitate to call for any questions or concerns.     Electronically signed by Tiffanie Garza MD on 10/21/2021 at 10:42 AM

## 2021-10-21 NOTE — PROGRESS NOTES
Intervention:    [x]Decreased strength     []Decreased ROM  [x]Decreased functional mobility  []Decreased balance   [x]Decreased endurance   []Decreased posture  [x]Decreased sensation  []Decreased coordination   []Decreased vision  []Decreased safety awareness   []Increased pain       Comments:  Pt received in bathroom and agreeable to PT evaluation with OT collaboration. Pt ambulated back to bedside. Reports numbness and tingling in feet that feels like neuropathy. He is having a hard time getting his feet flat on the floor and WB during gait. His balance is fair. Strength is good with mild dorsiflexion weakness. Pt requesting bracing for his ankle. Spoke with him about this and recommended seeing results of LP first and continuing to assess gait and make recommendations as appropriate. May benefit from outpatient PT at discharge. Will follow. Pt left with call button in reach, lines attached, and needs met. Treatment:  Patient practiced and was instructed in the following treatment:     eval only     Pt's/ family goals   1. Home     Prognosis is good for reaching above PT goals. Patient and or family understand(s) diagnosis, prognosis, and plan of care.   yes    PHYSICAL THERAPY PLAN OF CARE:    PT POC is established based on physician order and patient diagnosis     Referring provider/PT Order:    10/21/21 0930  PT eval and treat Start: 10/21/21 0930, End: 10/21/21 0930, ONE TIME, Standing Count: 1 Occurrences, R      Anselmo Shepherd MD       Diagnosis:  Acute respiratory failure with hypoxia ([facility] [address] [J96.01]  COVID-19 [U07.1]  Specific instructions for next treatment:  Continue to assess feet/ankles during gait, make appropriate recommendations for adaptive equipment, LE strengthening, gait training     Current Treatment Recommendations:     [x] Strengthening to improve independence with functional mobility   [x] ROM to improve independence with functional mobility   [x] Balance Training to improve static/dynamic balance and to reduce fall risk  [x] Endurance Training to improve activity tolerance during functional mobility   [x] Transfer Training to improve safety and independence with all functional transfers   [x] Gait Training to improve gait mechanics, endurance and asses need for appropriate assistive device  [x] Stair Training in preparation for safe discharge home and/or into the community   [] Positioning to prevent skin breakdown and contractures  [x] Safety and Education Training   [] Patient/Caregiver Education   [x] HEP  [] Other     PT long term treatment goals are located in above grid    Frequency of treatments: 2-5x/week x 1-2 weeks. Time in  1430  Time out  1445    Total Treatment Time  0 minutes     Evaluation Time includes thorough review of current medical information, gathering information on past medical history/social history and prior level of function, completion of standardized testing/informal observation of tasks, assessment of data and education on plan of care and goals.     CPT codes:  [] Low Complexity PT evaluation 80460  [x] Moderate Complexity PT evaluation 61110  [] High Complexity PT evaluation 26540  [] PT Re-evaluation 05165  [] Gait training 11151 -- minutes  [] Manual therapy [id number] -- minutes  [] Therapeutic activities 73033 -- minutes  [] Therapeutic exercises 99689 -- minutes  [] Neuromuscular reeducation 41950 -- minutes     Edilma Nicholas PT, DPT  [id number]

## 2021-10-21 NOTE — PROGRESS NOTES
Department of Internal Medicine  Nephrology Progress Note      Events reviewed. SUBJECTIVE:  We are following Mr. Shabbir Trinh for DEISI.   Reports feeling sad about still being in the hospital.    PHYSICAL EXAM:      Vitals:    VITALS:  BP (!) 113/57   Pulse 119   Temp 100.1 °F (37.8 °C) (Temporal)   Resp 16   Ht 6' 4\" (1.93 m)   Wt 173 lb (78.5 kg)   SpO2 97%   BMI 21.06 kg/m²   24HR INTAKE/OUTPUT:      Intake/Output Summary (Last 24 hours) at 10/21/2021 0901  Last data filed at 10/20/2021 2202  Gross per 24 hour   Intake 240 ml   Output 700 ml   Net -460 ml       Constitutional:  Awake, alert, NAD  HEENT:  PERRL, normocephalic, atraumatic  Respiratory: diminished lung sounds  Cardiovascular/Edema:  RRR, S1/S2, -edema  Gastrointestinal:  abd flat, soft, non-tender  Neurologic:  Awake, alert, no focal deficits  Skin:  Warm, dry, no rash or lesions  Other: BLE 1+ edema       Scheduled Meds:   ascorbic acid  500 mg Oral Daily    enoxaparin  40 mg SubCUTAneous Daily    [Held by provider] metoprolol tartrate  25 mg Oral Daily    anidulafungin  100 mg IntraVENous Q24H    sulfamethoxazole-trimethoprim (BACTRIM) IVPB  400 mg IntraVENous Q8H    pantoprazole  40 mg Oral QAM AC    sodium chloride flush  5-40 mL IntraVENous 2 times per day     Continuous Infusions:   IV infusion builder 50 mL/hr at 10/20/21 2202    sodium chloride      sodium chloride       PRN Meds:.perflutren lipid microspheres, hydrOXYzine, heparin (porcine), heparin (porcine), labetalol, sodium chloride, sodium chloride flush, ondansetron **OR** ondansetron, polyethylene glycol, acetaminophen **OR** acetaminophen, perflutren lipid microspheres, sodium chloride    DATA:    CBC:   Lab Results   Component Value Date    WBC 1.2 10/21/2021    RBC 2.54 10/21/2021    HGB 7.5 10/21/2021    HCT 22.5 10/21/2021    MCV 88.6 10/21/2021    MCH 29.5 10/21/2021    MCHC 33.3 10/21/2021    RDW 14.4 10/21/2021     10/21/2021    MPV 12.5 10/21/2021     CMP:    Lab Results   Component Value Date     10/21/2021    K 4.2 10/21/2021    CL 95 10/21/2021    CO2 24 10/21/2021    BUN 21 10/21/2021    CREATININE 2.5 10/21/2021    GFRAA 37 10/21/2021    LABGLOM 37 10/21/2021    GLUCOSE 70 10/21/2021    PROT 5.5 10/21/2021    LABALBU 2.1 10/21/2021    CALCIUM 8.1 10/21/2021    BILITOT <0.2 10/21/2021    ALKPHOS 66 10/21/2021    AST 78 10/21/2021    ALT 21 10/21/2021     Magnesium:    Lab Results   Component Value Date    MG 1.3 10/15/2021     Phosphorus:  No results found for: PHOS     Radiology Review:       Ejection fraction is visually estimated at 55%. There is late positive bubble suggesting possible shunt from pulmonary   origin. Visually moderately dilated left atrium. Normal right ventricular size and function. There is small pericardial effusion without tamponade physiology        CT Chest October 5, 2021   Limited study by the patient's respiratory motion and lack of intravenous   contrast.       Extensive multifocal COVID-19 pneumonia bilaterally.       Bilateral trace layering pleural effusions.       Bilateral gynecomastia.       Abdominal organs inadequately evaluated due to the limitations of the study.       Normal appendix.       No evidence of bowel obstruction.         Kidney ultrasound October 7, 2021   1. Hyperechoic kidneys due to underlying parenchymal disease. 2. Trace bilateral perinephric fluid of indeterminate etiology, potentially   due to underlying inflammation. 3. An approximately 1.1 cm x 1.5 cm x 2.3 cm lesion in the right kidney could   be a cyst but could also be seen with infarction, abscess, or neoplasia. Consider further evaluation with renal protocol abdomen MRI (preferred) or   CT.  Following resolution of acute kidney injury.         CTA pulmonary with IV contrast October 13, 2021   1. No evidence of pulmonary embolism.    2. Interval increased size of bilateral pleural effusions with little change in pericardial effusion. 3. Persistent subcutaneous edema. 4. Reactive bilateral hilar lymphadenopathy. 5. Bilateral airspace disease is increased compared to prior examination. The increase is most pronounced within the lower lobes. 6. Bilateral gynecomastia.                 BRIEF SUMMARY OF INITIAL CONSULT:     Briefly, Mr. Bernell Meckel is a 36year old male with no significant PMH who was admitted on October 5, 2021 after he presented from an outside clinic after he was found to be hypoxic during 6 minute walking test. Patient tested positive for Covid 19 on August 18th, he is unvaccinated and had Covid 19 last year as well. Patient states he has quarantined by himself and when his mother recently visited him she was shocked at his appearance as he had lost about 50 pounds in 2 and a half months. On admission labs were significant for sodium of 130, potassium 5.2, bicarbonate 20, BUN 41, creatinine 3.6, magnesium 2.7, calcium 7.7 and proBNP 1,599. We are consulted for DEISI. Problems resolved:    Hyperkalemia, 2/2 DEISI. Low potassium diet  Hypotonic hyponatremia 2/2 intravascular volume from poor oral intake. Bicarb drip started. Sodium levels improving. Low bicarbonate levels with hyperchloremia, most likely NAGMA versus respiratory alkalosis; we need a PH to clarify diagnosis. Awaiting ABG results  High bicarbonate levels, likely metabolic alkalosis 2/2 administration  Hypokalemia, multifactorial, poor intake, probably renal potassium wasting  Severe Hypoalbuminemia, multifactorial, status post albumin administration  DEISI on CKD, volume responsive pre-renal DEISI d/t volume (poor oral intake), urine sodium <20. Resolved. Edematous state, multifactorial, mainly 2/2 nephrotic syndrome and possibly HFpEF 55%, on bumex    IMPRESSION/RECOMMENDATIONS:     Recurrent DEISI on CKD, likely contrast associated DEISI, versus ischemic ATN due to hypotension related tachycardia. Nonoliguric.   Renal function continues to worsen. 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 Pinky Angles Work-up so far HIV positive, Hepatitis C positive, MARILEE negative, C4 normal, C3 88 (low), UPEP without monoclonal gammopathy, negative cryoglobulins. Kidney ultrasound with hyperechoic kidneys. Needs kidney biopsy, discussed with interventional radiology they would rather wait for a couple weeks to perform biopsy in view of patient having Covid-19 infection. Hyponatremia, multifactorial, including decreased GFR and hemodynamically mediated in the setting of large hypotonic fluid administration (D5 with Bactrim). Sodium levels decrease, stable overnight    Low bicarbonate levels with hyperchloremia, consistent with either hyperchloremic metabolic acidosis versus respiratory alkalosis. Bicarbonate levels improved with bicarbonate drip. Probably HFpEF 55%, proBNP 11,401 > 3421   HTN, on metoprolol   Right kidney lesion, likely a cyst but cannot be ruled out other pathology including infarction, abscess, neoplasm.   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, (1, 3) beta-D-glucan positive, on anidulafungin  Sinus tachycardia, multifactorial  Hepatitis C antibody positive, awaiting viral load  Covid 19 infection in non vaccinated pt  Normocytic anemia, multifactorial  Leukopenia, WBC 1.9    Plan:    Discontinue bicarbonate drip  Albumin 25 g IV every 6 hours x8  Kidney biopsy as an outpatient in 2 weeks after discharge  Continue to monitor potassium level while on Bactrim  Continue to monitor renal function, BMP at 4 PM  Continue to monitor

## 2021-10-21 NOTE — PROGRESS NOTES
[place]  Department of Internal Medicine  Division of Pulmonary, Critical Care and Sleep Medicine  Progress Note    Lashonda Slater DO, MPH, [facility] [place], [facility], [facility] [address] Chau Hargrove MD, [facility] DO [place]      Patient: Roger Rico  MRN: [id number]  : 1990    Encounter Time: 4:47 PM     Date of Admission: 10/5/2021  3:07 PM    Primary Care Physician: Christine Gibson MD    Reason for Consultation: COVID     SUBJECTIVE:    Hep C and HIV + screen  CD4 is 47  Echo = reviewed  Work up continues   PCP PCR pending      OBJECTIVE:     PHYSICAL EXAM:   VITALS:   Vitals:    10/20/21 1430 10/21/21 0121 10/21/21 0400 10/21/21 0915   BP: (!) 116/59 (!) 113/57  (!) 113/58   Pulse: 100 119  112   Resp: 16 16  16   Temp: 100 °F (37.8 °C) 101.4 °F (38.6 °C) 100.1 °F (37.8 °C) 99.4 °F (37.4 °C)   TempSrc: Temporal Temporal Temporal Temporal   SpO2:  97%     Weight:       Height:            Intake/Output Summary (Last 24 hours) at 10/21/2021 1647  Last data filed at 10/20/2021 2202  Gross per 24 hour   Intake 120 ml   Output 300 ml   Net -180 ml        CONSTITUTIONAL:   A&O x 3, NAD  SKIN:     No rash,   HEENT:     EOMI, MMM, No thrush  NECK:    No bruits, No JVP apprechiated  CV:      Sinus,  No murmur, No rubs, No gallops  PULMONARY:   Couse BS,  No Wheezing, No Rales, No Rhonchi      No noted egophony  ABDOMEN:     Soft, non-tender. BS normal. No R/R/G  EXT:    No deformities . No clubbing.       + lower extremity edema, No venous stasis  PULSE:   Appears equal and palpable.   PSYCHIATRIC:  Seems appropriate, No acute psycosis  MS:    No fractures, No gross weakness  NEUROLOGIC:   Non-focal     DATA: IMAGING & TESTING:     LABORATORY TESTS:    CBC:   Lab Results   Component Value Date    WBC 1.2 10/21/2021    RBC 2.54 10/21/2021    HGB 7.2 10/21/2021    HCT 21.2 10/21/2021    MCV 88.6 10/21/2021    MCH 29.5 10/21/2021    MCHC 33.3 10/21/2021    RDW 14.4 10/21/2021     10/21/2021    MPV 12.5 10/21/2021     CMP:    Lab Results   Component Value Date     10/21/2021    K 4.5 10/21/2021    K 4.2 10/21/2021    CL 90 10/21/2021    CO2 34 10/21/2021    BUN 21 10/21/2021    CREATININE 2.6 10/21/2021    GFRAA 35 10/21/2021    LABGLOM 35 10/21/2021    GLUCOSE 402 10/21/2021    PROT 5.5 10/21/2021    LABALBU 2.1 10/21/2021    CALCIUM 7.5 10/21/2021    BILITOT <0.2 10/21/2021    ALKPHOS 66 10/21/2021    AST 78 10/21/2021    ALT 21 10/21/2021     Magnesium:    Lab Results   Component Value Date    MG 1.3 10/15/2021     Phosphorus:  No results found for: PHOS  PT/INR:    Lab Results   Component Value Date    PROTIME 17.7 10/15/2021    INR 1.6 10/15/2021     FERRITIN:    Lab Results   Component Value Date    FERRITIN 3,921 10/06/2021     Fibrinogen Level:  No components found for: FIB     PRO-BNP:   Lab Results   Component Value Date    PROBNP 3,421 (H) 10/18/2021    PROBNP 11,401 (H) 10/13/2021      ABGs: No results found for: PH, PO2, PCO2  Hemoglobin A1C: No components found for: HGBA1C    IMAGING:  Imaging tests were completed and reviewed and discussed radiology and care team involved and reveals     CT CHEST : There is adequate opacification of the pulmonary arteries.  There is no   evidence of filling defect to suggest pulmonary embolism. Pierce Neither is no   evidence of thoracic aortic aneurysm or dissection.  There are small   bilateral pleural effusions.  These are increased compared to prior   examination.  There is small pericardial effusion which appears stable.  Left   hilar lymph node measures approximately 12 mm in short axis.  Right hilar   lymph node measures approximately 12 mm in short axis.  There is bilateral   gynecomastia. Pierce Neither is diffuse subcutaneous edema.      The central airways are patent.  There is no pneumothorax.  There are   bilateral ground-glass opacities and alveolar consolidation.  There has been   interval increase in the airspace disease compared to prior the right. No pneumothorax. Numerous ground-glass densities in the bilateral upper and to a lesser degree lower lungs, in keeping with known COVID-19 pneumonia. Soft Tissues/Bones: Bilateral gynecomastia. No acute osseous abnormality in the thoracic cage. Abdomen and pelvis: The study is degraded by the patient's respiratory motion. Organs: Abdominal organs inadequately evaluated on this motion degraded and unenhanced study. No hydronephrosis on either side. GI/Bowel: Normal appendix. No evidence of bowel obstruction. Pelvis: Normal sized prostate. Urinary bladder grossly intact. Peritoneum/Retroperitoneum: No free air or free fluid. No bulky adenopathy. No abdominal aortic aneurysm. Bones/Soft Tissues: No lytic or sclerotic lesion in the regional skeleton. Limited study by the patient's respiratory motion and lack of intravenous contrast. Extensive multifocal COVID-19 pneumonia bilaterally. Bilateral trace layering pleural effusions. Bilateral gynecomastia. Abdominal organs inadequately evaluated due to the limitations of the study. Normal appendix. No evidence of bowel obstruction. ECHOCARDIOGRAM:  Ejection fraction is visually estimated at 55%. There is late positive bubble suggesting possible shunt from pulmonary   origin. Visually moderately dilated left atrium. Normal right ventricular size and function. There is small pericardial effusion without tamponade physiology    Assessment:  Mr. Todd Dhaliwal is a 36year old male with no significant PMH who was admitted on October 5, 2021 after he presented from an outside clinic after he was found to be hypoxic during 6 minute walking test. Patient tested positive for Covid 19 on August 18th, he is unvaccinated and had Covid 19 last year as well.  Patient states he has quarantined by himself and when his mother recently visited him she was shocked at his appearance as he had lost about 50 pounds in 2 and a half months  Sepsis  COVID-19, mild  HIV   Pneumonia  Gram-positive cocci in clusters bacteremia, etiology unclear  DEISI  + RPR  + BNP        Plan:   Off oxygen  Improved symptoms  Completed Bronchoscopy with BAL with LMAC  - completed results no TB, NOPCP  HIV status CD4  known          Petrosene Roles, DO REAVES, MPH, FCCP, Kayla Corea  Professor of Internal Medicine  Pulmonary, Critical Care and Sleep Medicine

## 2021-10-21 NOTE — CARE COORDINATION
Per nursing rounds, patient for lumbar puncture today. He continues on IV Eraxis Q24, IV Bactrim Q8 and is still pending an Echo.      Rebecca Baer, MSW, LSW [phone]

## 2021-10-21 NOTE — BH NOTE
Behavioral Health Inpatient Follow Up   [address] [place] [address] Medicine        Reason for consult:  Anxiety; Difficulty coping with hospital stressors     Background:  Pt admitted for dyspnea, cough, Covid positive, fever 103, and bilateral pneumonia. HIV positive. Reporting hypnagogic auditory hallucination of his mother's voice and another man's voice.     Psychotherapy Intervention:   Continued intervention for stress management. Discussed how to relax after having these hallucinations. He has been following other stress management techniques discussed during hospitalization (relaxation breathing, writing down information to organize thoughts). Family is dropping off personal belongings. Recommendations/Plan:    Will continue to follow with Family Medicine team during hospitalization.  He wants to be able to do some type of physical activity and to be able to shower. He is in agreement to follow with this psychologist outpatient following discharge.

## 2021-10-21 NOTE — PROGRESS NOTES
[address] [place] [address] Attending    S: 36 y.o. male with history of asthma and smoking history presented with increasing dyspnea and cough over past month. On presentation had fever of 103. Has tested positive for COVID twice, most recently end of August.  CT shows extensive bilateral pneumonia and COVID testing again positive. HIV pos, CD4 43. Has had upper extremity tremors and lower extremity neuropathy symptoms. Anxiety with long hospitalization         O: VS- Blood pressure (!) 113/58, pulse 112, temperature 99.4 °F (37.4 °C), temperature source Temporal, resp. rate 16, height 6' 4\" (1.93 m), weight 173 lb (78.5 kg), SpO2 97 %. Exam is as noted by resident  Awake, alert and oriented. on room air. Heart - RRR, mildly tachy at time of exam  Lungs- CTAB  Ext - no edema. Bilateral hands with mild postural tremor, stable    Impressions:   Principal Problem:    Acute respiratory failure with hypoxia (HCC)  Active Problems:  Repeatedly positive Covid  Stable groundglass opacities  Saturations okay on room air    Acute kidney injury due to COVID-19 ([address]    Asthma    Nephrotic range proteinuria  50 pound weight loss  2 blood cultures positive  Pancytopenia     Plan   Covid pneumonia with sepsis  cta neg for pe  Nephrology, ID, and pulmonology following - their management is appreciated. sinus tachycardia - Echo with EF 55%, small pericardial effusion and possible atrial shunt-cardiology consulted. Pericardial effusion -   Mild sinus tachy, follow. Anemia - FOBT heme positive first time, neg on repeat. Gen surgery has no plans for scopes s/p 1U PRBCs, will trend h/h. Follow. Stable overall. Blood culture with staph epi, on Ancef; antibiotics were changed from ancef to bactrim and cefepime for possible sepsis/PCP pneumonia. ID management appreciated. Awaiting further lab, TB, confirmatory testing pending with hcv ab positive-neg viral load.   Reactive HIV 1 antibodies; resistance testing pending, CD4 count of 43. Nephrology planning biopsy outpatient, nephrology management appreciated. DEISI and mild hyponatremia - nephrology management appreciated. Disposition planning. Follow up outpatient for right kidney lesion-ct/mri when cr improved. At this point, the patient does not desire his mother to know results of hiv/hcv testing, but ok with discussion with his step-father, Sveta Headings. Continue to follow tremor symptoms as medications are changed and treatments are initiated in the near future with further workup as appropriate. RPR positive, awaiting FTA. Discussed patient's concerns. Will continue to address. Per pt request, Dr Desi Burger discussed status on phone with his stepfather, while we were in the room. Dr Sara Walters following  Awaiting lumbar puncture. H/H pending     Attending Physician Statement  I have reviewed the chart and seen the patient with the resident(s). I personally reviewed images, EKG's and similar tests, if present. I personally reviewed and performed key elements of the history and exam.  I have reviewed and confirmed student and/or resident history and exam with changes as indicated above. I agree with the assessment, plan and orders as documented by the resident. Please refer to the resident and/or student note for additional information.       Katy Dias MD

## 2021-10-21 NOTE — PROGRESS NOTES
[facility] Inpatient   Resident Progress Note    S:  Hospital day: 12   Brief Synopsis: Malachi Brown is a 36 y.o. male with no PMH who presented with hypoxia and fever. He was initially seen in PCP office and was hypoxic on exertion with desaturate into the mid low 80s as well as noted to be febrile and tachycardic. Per patient, his symptoms began 8/15/2021 and he tested positive for Covid 8/28. endorse worsening shortness of breath over the past month with sputum production, intermittent chest discomfort, persistent diarrhea, chills, fever, decrease in appetite and 50 pound weight loss since August.  Patient also endorses significantly worsening \"neuropathy\" of bilateral feet, states it feels like he is \"being stabbed by needles\" which is causing him inability to walk due to pain. In the ED, he was found to be anemic, hyperkalemic, hyponatremic, febrile and COVID positive. He also had elevated Creatinine, AST, proBNP and troponin x2. CTA showed extensive multifocal COVID-19 pneumonia bilaterally, bilateral trace layering pleural effusion and bilateral gynecomastia. CT abdomen was limited due to movement and non-contrast, but showed normal appendix and no evidence of bowel obstruction. Admitted for acute hypoxia due to Covid pneumonia. Nephro, pulm and ID consulted. Started on bicarb drip. US showed hyperechoic kidneys, trace b/l perinephric fluid and right kidney lesion (Cyst vs infarction vs abscess vs neoplasia). HIV and Hep C screen were negative. HIV RNA and T and B lymphocytes were ordered. Patient became more hypoxic, tachycardic and hypertensive so  there was concern for PE. CTA was ordered, which came back negative. Echo was completed due to elevated BNP and concern for PE and that revealed a normal EF with late bubble sign possible shunting of pulmonary origin. BNP continued to increase. Cardiology was consulted. Repeat Echo pending.  ID recommends LP with CSF sent for glucose, protein, cell count with differential, VDRL, meningitis/encephalitis PCR panel. Overnight/interim:   Patient was seen and examined at bedside. He has been experiencing auditory hypnagogic hallucinations since he received anesthesia for bronchoscopy on 10/15. States that he hears his mother's voice and \"some man. \" denies other delusions, paranoia. Denies SI/HI. Denies any sob, chest pain, diarrhea, abdominal pain. Going for LP today       Cont meds:    IV infusion builder 50 mL/hr at 10/20/21 2202    sodium chloride      sodium chloride       Scheduled meds:    ascorbic acid  500 mg Oral Daily    enoxaparin  40 mg SubCUTAneous Daily    [Held by provider] metoprolol tartrate  25 mg Oral Daily    anidulafungin  100 mg IntraVENous Q24H    sulfamethoxazole-trimethoprim (BACTRIM) IVPB  400 mg IntraVENous Q8H    pantoprazole  40 mg Oral QAM AC    sodium chloride flush  5-40 mL IntraVENous 2 times per day     PRN meds: perflutren lipid microspheres, hydrOXYzine, heparin (porcine), heparin (porcine), labetalol, sodium chloride, sodium chloride flush, ondansetron **OR** ondansetron, polyethylene glycol, acetaminophen **OR** acetaminophen, perflutren lipid microspheres, sodium chloride     I reviewed the patient's past medical and surgical history, Medications and Allergies. O:  BP (!) 113/57   Pulse 119   Temp 100.1 °F (37.8 °C) (Temporal)   Resp 16   Ht 6' 4\" (1.93 m)   Wt 173 lb (78.5 kg)   SpO2 97%   BMI 21.06 kg/m²   24 hour I&O: I/O last 3 completed shifts: In: 960 [P.O.:960]  Out: 1400 [Urine:1400]  No intake/output data recorded. Physical Exam  Vitals reviewed. Constitutional:       Appearance: Normal appearance. He is not ill-appearing, toxic-appearing or diaphoretic. HENT:      Head: Normocephalic and atraumatic. Nose: No rhinorrhea. Mouth/Throat:      Mouth: Mucous membranes are moist.      Pharynx: Oropharynx is clear. Eyes:      General: No scleral icterus.         Right eye: No discharge. Left eye: No discharge. Extraocular Movements: Extraocular movements intact. Cardiovascular:      Rate and Rhythm: Normal rate and regular rhythm. Pulses: Normal pulses. Heart sounds: Normal heart sounds. No murmur heard. No friction rub. No gallop. Pulmonary:      Effort: Pulmonary effort is normal. No respiratory distress. Breath sounds: No wheezing, rhonchi or rales. Abdominal:      General: Abdomen is flat. Musculoskeletal:         General: No swelling or tenderness. Cervical back: Normal range of motion. Right lower leg: No edema. Left lower leg: No edema. Skin:     General: Skin is warm and dry. Coloration: Skin is not jaundiced. Findings: No bruising or rash. Neurological:      General: No focal deficit present. Mental Status: He is alert and oriented to person, place, and time. Cranial Nerves: No cranial nerve deficit. Motor: No weakness. Psychiatric:         Behavior: Behavior normal.         Thought Content: Thought content normal.           Labs:  Na/K/Cl/CO2:  126/4.7/96/22 (10/20 1526)  BUN/Cr/glu/ALT/AST/amyl/lip:  17/2.1/--/--/--/--/-- (10/20 1526)  WBC/Hgb/Hct/Plts:  1.9/8.6/25.9/142 (10/20 0351)  estimated creatinine clearance is 58 mL/min (A) (based on SCr of 2.1 mg/dL (H)). Other pertinent labs as noted below    Radiology:  XR CHEST PORTABLE   Final Result   Extensive bilateral patchy areas of ground-glass opacity concerning for   atypical pneumonia or viral airway disease, improved         XR CHEST PORTABLE   Final Result   Persistent bilateral infiltrates. No significant changes since October 13. See above comments. See report CT chest October 13. CTA PULMONARY W CONTRAST   Final Result   1. No evidence of pulmonary embolism. 2. Interval increased size of bilateral pleural effusions with little change   in pericardial effusion. 3. Persistent subcutaneous edema.    4. Reactive bilateral hilar lymphadenopathy. 5. Bilateral airspace disease is increased compared to prior examination. The increase is most pronounced within the lower lobes. 6. Bilateral gynecomastia. XR CHEST PORTABLE   Final Result   Development of extensive bilateral pulmonary infiltrates         XR CHEST PORTABLE   Final Result   No acute process. US RETROPERITONEAL COMPLETE   Final Result   1. Hyperechoic kidneys due to underlying parenchymal disease. 2. Trace bilateral perinephric fluid of indeterminate etiology, potentially   due to underlying inflammation. 3. An approximately 1.1 cm x 1.5 cm x 2.3 cm lesion in the right kidney could   be a cyst but could also be seen with infarction, abscess, or neoplasia. Consider further evaluation with renal protocol abdomen MRI (preferred) or   CT. Following resolution of acute kidney injury. XR CHEST PORTABLE   Final Result   Multifocal bilateral pulmonary ground-glass airspace opacities are stable         CT ABDOMEN PELVIS WO CONTRAST Additional Contrast? None   Final Result   Limited study by the patient's respiratory motion and lack of intravenous   contrast.      Extensive multifocal COVID-19 pneumonia bilaterally. Bilateral trace layering pleural effusions. Bilateral gynecomastia. Abdominal organs inadequately evaluated due to the limitations of the study. Normal appendix. No evidence of bowel obstruction. CT CHEST WO CONTRAST   Final Result   Limited study by the patient's respiratory motion and lack of intravenous   contrast.      Extensive multifocal COVID-19 pneumonia bilaterally. Bilateral trace layering pleural effusions. Bilateral gynecomastia. Abdominal organs inadequately evaluated due to the limitations of the study. Normal appendix. No evidence of bowel obstruction.          IR LUMBAR PUNCTURE FOR DIAGNOSIS    (Results Pending)         Resident Assessment and Plan hepatitis A total Ab positive  - RPR reactive, quant 1:2, treponema pallidum ab pending  - Need [id number] result before starting abacavir   - LP today, CSF will be sent for glucose, protein, cell count with differential, VDRL, meningitis/encephalitis PCR panel.     Hepatitis C resolved  Hep C antibody reactive  Viral load undetectable    Anemia   - Likely 2/2 from GI bleed vs HIV vs dilution (all cell lines are decreased today, was on bicarb drip)  - Hb 7.2, trending down   Transfused 1 unit 10/11, post transfusion Hb 8   - General surgery consulted, FOBT negative the second time 10/11  - Trend H/H   - type and screen   - Repeat FOBT     DEISI   Hyponatremia  - Na trending down 126  - Nephro on board   - Likely secondary to dehydration from persistent diarrhea vs decreased PO intake vs bactrim vs HIVANS   - Cr on admission 3.6; coming back up 2.5  - Urine osm and urine nitrogen low    - UA: protein, 1-2 waxy casts, moderate bacteria, moderate blood    - Microalbumin-creatinine ratio elevated   - Kidney bx OP 2 weeks after discharge   Fluid restriction  - bicarb drip stopped      Hypnagogic Hallucinations   - occurring since received anesthesia on 10/15   - no history of psych history or narcolepsy  - could be 2/2 to poor sleep vs anesthesia reaction vs vistaril?  - continue to monitor   - stopped taking vistaril       Elevated troponin and proBNP  - 2/2 myocardial injury vs stress cardiomyopathy due to COVID vs new onset CHF   - initial troponin 78, 78  - initial BNP 1599,  repeat 11,401 on 10/14, trending down 3400   - ECHO: EF 55%, late bubble sign possible shunting of pulmonary origin  - Repeat ECHO pending   Received 1 dose of IV Lasix 40mg on 10/15      Tachycardia  - 2/2 to infectious vs anxiety vs anemia   HR improving   Cardio consulted, recommend to start Beta blocker   - Hold lopressor   Asymptomatic  Continue to monitor    HTN  - BP is controlled   - Lopressor held   - labetalol PRN  - Continue to monitor      Proteinuria  -protein to creatinine ratio 3.8  - microalbumin to creatine ratio elevated   -kidney biopsy OP per nephro, patient agreeable    Hypoalbuminemia   - Alb 1.7 on admission, now 2.7  -Previously received albumin 25 g q6h x 4 doses 10/6  -Received albumin 25 g q6h x 8 doses 10/12  Low C3, normal C4 and cryoglobulin  Fluid restriction  - Continue to monitor             PT/OT evaluation: not indicated   DVT prophylaxis: heparin due to DEISI    GI prophylaxis:protonix  Disposition:home +/- home health/ rehab   Diet: adult         Electronically signed by Dennise Carey MD on 10/21/2021 at 6:47 AM  Attending physician: Dr. Pabon Quiet

## 2021-10-21 NOTE — PROGRESS NOTES
Comprehensive Nutrition Assessment    Type and Reason for Visit:  Reassess    Nutrition Recommendations/Plan: Continue Diet. Will Continue Current ONS and monitor. Nutrition Assessment:  Pt continues to be at nutritional risk w/ noted ~26-50% avg intake since adm d/t poor appetite 2/2 COVID19+. Noted HIV+, Kidney lesion, Hep C, MSSA bacteremia, DEISI/CKD. RRT 10/15; Bronch 10/15; noted plan for LP 10/21. Will continue ONS and monitor. Malnutrition Assessment:  Malnutrition Status: At risk for malnutrition (Comment)    Context:  Acute Illness     Findings of the 6 clinical characteristics of malnutrition:  Energy Intake:  1 - 75% or less of estimated energy requirements for 7 or more days  Weight Loss:  Unable to assess (d/t lack of weight history ; subjective weight loss noted although no evidence of this)     Body Fat Loss:  Unable to assess (d/t COVID isolation)     Muscle Mass Loss:  Unable to assess (d/t COVID isolation)    Fluid Accumulation:  No significant fluid accumulation     Strength:  Not Performed    Estimated Daily Nutrient Needs:  Energy (kcal):  6676-5845 (MSJx1. 2SF); Weight Used for Energy Requirements:  Current     Protein (g):   (1.2-1.4); Weight Used for Protein Requirements:  Current        Fluid (ml/day):  2468-4066; Method Used for Fluid Requirements:  1 ml/kcal      Nutrition Related Findings:  A&O, dentition WNL, Abd/BS WDL, no edema, -I/O's      Wounds:  None       Current Nutrition Therapies:    ADULT DIET; Regular; No Fluids on Tray  Adult Oral Nutrition Supplement;  Fortified Pudding Oral Supplement  Adult Oral Nutrition Supplement; Frozen Oral Supplement    Anthropometric Measures:  · Height: 6' 4\" (193 cm)  · Current Body Weight: 173 lb (78.5 kg) (unknown method 10/6)   · Admission Body Weight: 171 lb (77.6 kg) (10/5, no method)    · Usual Body Weight:  (UTO ; EMR shows only one weight recorded of 171# no method on 10/5/21 ; subjective weight loss of 50# in the

## 2021-10-22 ENCOUNTER — APPOINTMENT (OUTPATIENT)
Dept: GENERAL RADIOLOGY | Age: 31
DRG: 890 | End: 2021-10-22
Payer: COMMERCIAL

## 2021-10-22 LAB
ALBUMIN SERPL-MCNC: 2.8 G/DL (ref 3.5–5.2)
ALP BLD-CCNC: 63 U/L (ref 40–129)
ALT SERPL-CCNC: 21 U/L (ref 0–40)
ANION GAP SERPL CALCULATED.3IONS-SCNC: 15 MMOL/L (ref 7–16)
ANION GAP SERPL CALCULATED.3IONS-SCNC: 15 MMOL/L (ref 7–16)
ANISOCYTOSIS: ABNORMAL
APTT: 34.9 SEC (ref 24.5–35.1)
AST SERPL-CCNC: 80 U/L (ref 0–39)
BASOPHILS ABSOLUTE: 0 E9/L (ref 0–0.2)
BASOPHILS RELATIVE PERCENT: 0 % (ref 0–2)
BILIRUB SERPL-MCNC: 0.2 MG/DL (ref 0–1.2)
BUN BLDV-MCNC: 28 MG/DL (ref 6–20)
BUN BLDV-MCNC: 34 MG/DL (ref 6–20)
CALCIUM SERPL-MCNC: 7.8 MG/DL (ref 8.6–10.2)
CALCIUM SERPL-MCNC: 8.6 MG/DL (ref 8.6–10.2)
CHLORIDE BLD-SCNC: 96 MMOL/L (ref 98–107)
CHLORIDE BLD-SCNC: 96 MMOL/L (ref 98–107)
CO2: 16 MMOL/L (ref 22–29)
CO2: 17 MMOL/L (ref 22–29)
CREAT SERPL-MCNC: 3.7 MG/DL (ref 0.7–1.2)
CREAT SERPL-MCNC: 4.4 MG/DL (ref 0.7–1.2)
EOSINOPHILS ABSOLUTE: 0.03 E9/L (ref 0.05–0.5)
EOSINOPHILS RELATIVE PERCENT: 3 % (ref 0–6)
GFR AFRICAN AMERICAN: 19
GFR AFRICAN AMERICAN: 24
GFR NON-AFRICAN AMERICAN: 19 ML/MIN/1.73
GFR NON-AFRICAN AMERICAN: 24 ML/MIN/1.73
GLUCOSE BLD-MCNC: 103 MG/DL (ref 74–99)
GLUCOSE BLD-MCNC: 81 MG/DL (ref 74–99)
GRAM STAIN ORDERABLE: NORMAL
HCT VFR BLD CALC: 20.8 % (ref 37–54)
HCT VFR BLD CALC: 21.8 % (ref 37–54)
HCT VFR BLD CALC: 22.3 % (ref 37–54)
HEMOGLOBIN: 7 G/DL (ref 12.5–16.5)
HEMOGLOBIN: 7.4 G/DL (ref 12.5–16.5)
HEMOGLOBIN: 7.5 G/DL (ref 12.5–16.5)
HLA-B*5701 GENOTYPING: NEGATIVE
HLA-B*5701 SPECIMEN: NORMAL
LYMPHOCYTES ABSOLUTE: 0.21 E9/L (ref 1.5–4)
LYMPHOCYTES RELATIVE PERCENT: 19 % (ref 20–42)
MAGNESIUM: 1.4 MG/DL (ref 1.6–2.6)
MCH RBC QN AUTO: 29.8 PG (ref 26–35)
MCHC RBC AUTO-ENTMCNC: 33.7 % (ref 32–34.5)
MCV RBC AUTO: 88.5 FL (ref 80–99.9)
MONOCYTES ABSOLUTE: 0 E9/L (ref 0.1–0.95)
MONOCYTES RELATIVE PERCENT: 0 % (ref 2–12)
NEUTROPHILS ABSOLUTE: 0.86 E9/L (ref 1.8–7.3)
NEUTROPHILS RELATIVE PERCENT: 78 % (ref 43–80)
OVALOCYTES: ABNORMAL
PDW BLD-RTO: 14.5 FL (ref 11.5–15)
PHOSPHORUS: 3.8 MG/DL (ref 2.5–4.5)
PLATELET # BLD: 92 E9/L (ref 130–450)
PLATELET CONFIRMATION: NORMAL
PMV BLD AUTO: 13 FL (ref 7–12)
POIKILOCYTES: ABNORMAL
POLYCHROMASIA: ABNORMAL
POTASSIUM REFLEX MAGNESIUM: 4.7 MMOL/L (ref 3.5–5)
POTASSIUM SERPL-SCNC: 5.1 MMOL/L (ref 3.5–5)
PROCALCITONIN: 1.21 NG/ML (ref 0–0.08)
RBC # BLD: 2.35 E12/L (ref 3.8–5.8)
SMUDGE CELLS: ABNORMAL
SODIUM BLD-SCNC: 127 MMOL/L (ref 132–146)
SODIUM BLD-SCNC: 128 MMOL/L (ref 132–146)
TOTAL PROTEIN: 5.6 G/DL (ref 6.4–8.3)
WBC # BLD: 1.1 E9/L (ref 4.5–11.5)

## 2021-10-22 PROCEDURE — 6370000000 HC RX 637 (ALT 250 FOR IP): Performed by: STUDENT IN AN ORGANIZED HEALTH CARE EDUCATION/TRAINING PROGRAM

## 2021-10-22 PROCEDURE — 6370000000 HC RX 637 (ALT 250 FOR IP): Performed by: INTERNAL MEDICINE

## 2021-10-22 PROCEDURE — 6360000002 HC RX W HCPCS: Performed by: INTERNAL MEDICINE

## 2021-10-22 PROCEDURE — 84100 ASSAY OF PHOSPHORUS: CPT

## 2021-10-22 PROCEDURE — 71045 X-RAY EXAM CHEST 1 VIEW: CPT

## 2021-10-22 PROCEDURE — 84145 PROCALCITONIN (PCT): CPT

## 2021-10-22 PROCEDURE — 36415 COLL VENOUS BLD VENIPUNCTURE: CPT

## 2021-10-22 PROCEDURE — 2580000003 HC RX 258: Performed by: INTERNAL MEDICINE

## 2021-10-22 PROCEDURE — 2500000003 HC RX 250 WO HCPCS: Performed by: INTERNAL MEDICINE

## 2021-10-22 PROCEDURE — 80053 COMPREHEN METABOLIC PANEL: CPT

## 2021-10-22 PROCEDURE — 99232 SBSQ HOSP IP/OBS MODERATE 35: CPT | Performed by: FAMILY MEDICINE

## 2021-10-22 PROCEDURE — P9047 ALBUMIN (HUMAN), 25%, 50ML: HCPCS | Performed by: INTERNAL MEDICINE

## 2021-10-22 PROCEDURE — 80048 BASIC METABOLIC PNL TOTAL CA: CPT

## 2021-10-22 PROCEDURE — 6360000002 HC RX W HCPCS: Performed by: STUDENT IN AN ORGANIZED HEALTH CARE EDUCATION/TRAINING PROGRAM

## 2021-10-22 PROCEDURE — 2140000000 HC CCU INTERMEDIATE R&B

## 2021-10-22 PROCEDURE — 85025 COMPLETE CBC W/AUTO DIFF WBC: CPT

## 2021-10-22 PROCEDURE — 85014 HEMATOCRIT: CPT

## 2021-10-22 PROCEDURE — 85730 THROMBOPLASTIN TIME PARTIAL: CPT

## 2021-10-22 PROCEDURE — 83735 ASSAY OF MAGNESIUM: CPT

## 2021-10-22 PROCEDURE — 85018 HEMOGLOBIN: CPT

## 2021-10-22 RX ORDER — BENZONATATE 100 MG/1
100 CAPSULE ORAL 2 TIMES DAILY PRN
Status: DISCONTINUED | OUTPATIENT
Start: 2021-10-22 | End: 2021-11-07 | Stop reason: HOSPADM

## 2021-10-22 RX ORDER — DIMETHICONE, OXYBENZONE, AND PADIMATE O 2; 2.5; 6.6 G/100G; G/100G; G/100G
STICK TOPICAL PRN
Status: DISCONTINUED | OUTPATIENT
Start: 2021-10-22 | End: 2021-11-07 | Stop reason: HOSPADM

## 2021-10-22 RX ORDER — MAGNESIUM SULFATE IN WATER 40 MG/ML
2000 INJECTION, SOLUTION INTRAVENOUS ONCE
Status: COMPLETED | OUTPATIENT
Start: 2021-10-22 | End: 2021-10-22

## 2021-10-22 RX ORDER — LIDOCAINE 4 G/G
1 PATCH TOPICAL DAILY
Status: DISCONTINUED | OUTPATIENT
Start: 2021-10-22 | End: 2021-11-07 | Stop reason: HOSPADM

## 2021-10-22 RX ADMIN — ACETAMINOPHEN 650 MG: 325 TABLET ORAL at 13:32

## 2021-10-22 RX ADMIN — PENICILLIN G BENZATHINE 2.4 MILLION UNITS: 1200000 INJECTION, SUSPENSION INTRAMUSCULAR at 17:32

## 2021-10-22 RX ADMIN — ALBUMIN (HUMAN) 25 G: 0.25 INJECTION, SOLUTION INTRAVENOUS at 13:26

## 2021-10-22 RX ADMIN — SODIUM CHLORIDE, PRESERVATIVE FREE 10 ML: 5 INJECTION INTRAVENOUS at 05:13

## 2021-10-22 RX ADMIN — BICTEGRAVIR SODIUM, EMTRICITABINE, AND TENOFOVIR ALAFENAMIDE FUMARATE 1 TABLET: 50; 200; 25 TABLET ORAL at 08:22

## 2021-10-22 RX ADMIN — SULFAMETHOXAZOLE AND TRIMETHOPRIM 400 MG: 80; 16 INJECTION, SOLUTION, CONCENTRATE INTRAVENOUS at 05:11

## 2021-10-22 RX ADMIN — HYDROXYZINE PAMOATE 25 MG: 25 CAPSULE ORAL at 22:35

## 2021-10-22 RX ADMIN — OXYCODONE HYDROCHLORIDE AND ACETAMINOPHEN 500 MG: 500 TABLET ORAL at 08:22

## 2021-10-22 RX ADMIN — Medication 10 ML: at 08:23

## 2021-10-22 RX ADMIN — ALBUMIN (HUMAN) 25 G: 0.25 INJECTION, SOLUTION INTRAVENOUS at 00:09

## 2021-10-22 RX ADMIN — SULFAMETHOXAZOLE AND TRIMETHOPRIM 400 MG: 80; 16 INJECTION, SOLUTION, CONCENTRATE INTRAVENOUS at 18:29

## 2021-10-22 RX ADMIN — ALBUMIN (HUMAN) 25 G: 0.25 INJECTION, SOLUTION INTRAVENOUS at 17:25

## 2021-10-22 RX ADMIN — ACETAMINOPHEN 650 MG: 325 TABLET ORAL at 18:35

## 2021-10-22 RX ADMIN — PANTOPRAZOLE SODIUM 40 MG: 40 TABLET, DELAYED RELEASE ORAL at 05:17

## 2021-10-22 RX ADMIN — Medication 10 ML: at 22:36

## 2021-10-22 RX ADMIN — ENOXAPARIN SODIUM 40 MG: 40 INJECTION SUBCUTANEOUS at 08:22

## 2021-10-22 RX ADMIN — MAGNESIUM SULFATE HEPTAHYDRATE 2000 MG: 40 INJECTION, SOLUTION INTRAVENOUS at 11:13

## 2021-10-22 RX ADMIN — BENZONATATE 100 MG: 100 CAPSULE ORAL at 22:35

## 2021-10-22 RX ADMIN — ACETAMINOPHEN 650 MG: 325 TABLET ORAL at 05:17

## 2021-10-22 RX ADMIN — ALBUMIN (HUMAN) 25 G: 0.25 INJECTION, SOLUTION INTRAVENOUS at 07:08

## 2021-10-22 ASSESSMENT — PAIN SCALES - GENERAL
PAINLEVEL_OUTOF10: 0
PAINLEVEL_OUTOF10: 4
PAINLEVEL_OUTOF10: 0
PAINLEVEL_OUTOF10: 0
PAINLEVEL_OUTOF10: 4
PAINLEVEL_OUTOF10: 0

## 2021-10-22 ASSESSMENT — PAIN DESCRIPTION - LOCATION: LOCATION: BACK

## 2021-10-22 ASSESSMENT — PAIN DESCRIPTION - PROGRESSION: CLINICAL_PROGRESSION: NOT CHANGED

## 2021-10-22 ASSESSMENT — PAIN DESCRIPTION - PAIN TYPE: TYPE: ACUTE PAIN

## 2021-10-22 ASSESSMENT — PAIN DESCRIPTION - FREQUENCY: FREQUENCY: CONTINUOUS

## 2021-10-22 ASSESSMENT — PAIN DESCRIPTION - DESCRIPTORS: DESCRIPTORS: BURNING

## 2021-10-22 ASSESSMENT — PAIN DESCRIPTION - ORIENTATION: ORIENTATION: LOWER;MID

## 2021-10-22 ASSESSMENT — PAIN DESCRIPTION - ONSET: ONSET: ON-GOING

## 2021-10-22 NOTE — PROGRESS NOTES
Pt stated that he did have a bowel movement but forgot that we needed sample to check FOBT. Asked him to let us know next time he has a BM.

## 2021-10-22 NOTE — PROGRESS NOTES
[facility] PROGRESS NOTE      Chief complaint: Follow-up of Gram-positive cocci in clusters on blood culture    The patient is a 36 y.o. male, not vaccinated against COVID-19, with history of asthma, presented on 10/05 with shortness of breath, found to be febrile at 103 °F, positive SARS-CoV-2 PCR, bilateral groundglass opacities on chest CT, tolerating room air with oxygen saturation of 97%. He reports having tested positive for SARS-CoV-2 for the 3rd time now with previous on in late August at which time he reports having quarantined. Urine Streptococcus pneumonia and Legionella antigens were negative. Blood cultures from 10/05 and 10/06 showed methicillin-susceptible Staphylococcus epidermidis in 1 out of 2 sets. HIV screen was positive with viral load of 195,000. CD4 count was low at 43. RPR was reactive at titer of 1:2. Toxoplasma Ab was negative. Serum beta-d-glucan was positive. Transthoracic echocardiogram showed late positive bubble suggesting possible shunt from pulmonary origin, moderately dilated left atrium, small pericardial effusion without tamponade physiology, ejection fraction visually estimated at 55%. He underwent bronchoscopy on 10/15 during which normal endobronchial evaluation and normal anatomy were noted. BAL Gram stain and culture showed rare polymorphonuclear leukocytes, rare epithelial cells, rare gram-negative rods, rare gram-positive rods, reduced oropharyngeal liana. BAL pneumocystis stain was negative. BAL galactomannan was negative. Serum Cryptococcus antigen was negative. He underwent lumbar puncture on 10/21, yielding CSF with normal glucose, protein, csll count with differential. CSF meningitis/encephalitis PCR panel was negative for HSV, VZV, CMV, Enterovirus, HHV-6, Streptococcus pneumoniae and agalactiae, E coli, Haemophilus, Neisseria meninigitidis, Cryptococcus. Subjective: Patient was seen and examined. No chills, no abdominal pain, no diarrhea, no rash, no itching.  Had fever up to 103.2 F last night. Objective:  /66   Pulse 104   Temp 99.4 °F (37.4 °C) (Temporal)   Resp 18   Ht 6' 4\" (1.93 m)   Wt 173 lb (78.5 kg)   SpO2 93%   BMI 21.06 kg/m²   Constitutional: Alert, not in distress  Respiratory: Clear breath sounds, no crackles, no wheezes  Cardiovascular: Regular rate and rhythm, no murmurs  Gastrointestinal: Bowel sounds present, soft, nontender  Skin: Warm and dry, no active dermatoses  Musculoskeletal: No joint swelling, no joint erythema    Labs, imaging, and medical records/notes were personally reviewed. Assessment:  Fever and pancytopenia, suspect associated with acute retroviral syndrome  Pancytopenia, multifactorial, suspect associated with acute retroviral syndrome and/or medication-induced  COVID-19, mild  Pneumonia, suspected Pneumocystis pneumonia (serum beta-d-glucan was positive, BAL Pneumocystis stain was negative but Pneumocystis PCR was not sent)  Late latent syphilis, neurosyphilis ruled out  Methicillin susceptible Staphylococcus epidermidis bacteremia, etiology unclear  DEISI  Advanced HIV disease (viral load of 195,000; CD4 of 43 as of 10/11/2021). Hepatitis C Ab positive (viral load not detected). Hepatitis A immune  Prolonged QTc    Recommendations:  Start benzathine penicillin IM weekly for 3 doses. Continue Biktarvy 1 tab once daily. Continue high-dose Bactrim at 5 mg trimethoprim dose per kilogram actual body weight every 12 hours dosed according to renal function for suspected Pneumocystis pneumonia with elevated serum beta-d-glucan in the setting of advanced HIV disease with CD4 of 48. Follow up HIV GART, HLAB-5701, urine GC/Chlamydia PCR. Monitor respiratory status. Continue supportive care. Thank you for involving me in the care of Kristin James. I will continue to follow. Please do not hesitate to call for any questions or concerns.     Electronically signed by Luann Cyr MD on 10/22/2021 at 10:56 AM

## 2021-10-22 NOTE — CARE COORDINATION
Patient's script for HIV medication taken to Outpatient pharmacy to determine patient's out of pocket cost; out of pocket cost is $0. Pharmacist reports the medication is considered a specialty drug and they will need to order it. Pharmacy cannot place the order until Monday and the medication will be in on Tuesday (10/26). Unable to set-up patient with infusion center due to them being closed at 3P. Patient will need to be set-up with the Outpatient Infusion Center for weekly injection; script in the soft chart.      Jose A Aguilar, MSW, LSW [phone]

## 2021-10-22 NOTE — PROGRESS NOTES
[address] [place] [address] Attending    S: 36 y.o. male with history of asthma and smoking history presented with increasing dyspnea and cough over past month. On presentation had fever of 103. Has tested positive for COVID twice, most recently end of August.  CT shows extensive bilateral pneumonia and COVID testing again positive. HIV pos, CD4 43. Has had upper extremity tremors and lower extremity neuropathy symptoms. Anxiety with long hospitalization    Currently has low back pain       O: VS- Blood pressure 109/66, pulse 104, temperature 99.4 °F (37.4 °C), temperature source Temporal, resp. rate 18, height 6' 4\" (1.93 m), weight 173 lb (78.5 kg), SpO2 93 %. Exam is as noted by resident  Awake, alert and oriented. on room air. Heart - RRR, mildly tachy at time of exam  Lungs- CTAB  Ext - no edema. Bilateral hands with mild postural tremor, stable    Impressions:   Principal Problem:    Acute respiratory failure with hypoxia (HCC)  Active Problems:  Repeatedly positive Covid  Stable groundglass opacities  Saturations okay on room air    Acute kidney injury due to COVID-19 ([address]    Asthma    Nephrotic range proteinuria  50 pound weight loss  2 blood cultures positive  Pancytopenia     HIV positive, newly discovered    Plan   Covid pneumonia with sepsis  cta neg for pe  Nephrology, ID, and pulmonology following - their management is appreciated. sinus tachycardia - Echo with EF 55%, small pericardial effusion and possible atrial shunt-cardiology consulted. Pericardial effusion -   Mild sinus tachy, follow. Anemia - FOBT heme positive first time, neg on repeat. Gen surgery has no plans for scopes s/p 1U PRBCs, will trend h/h. Follow. Stable overall. Blood culture with staph epi, on Ancef; antibiotics were changed from ancef to bactrim and cefepime for possible sepsis/PCP pneumonia. ID management appreciated.     Awaiting further lab, TB, confirmatory testing pending with hcv ab positive-neg viral load. Reactive HIV 1 antibodies; resistance testing pending, CD4 count of 43. Nephrology planning biopsy outpatient, nephrology management appreciated. DEISI and mild hyponatremia - nephrology management appreciated. Disposition planning. Follow up outpatient for right kidney lesion-ct/mri when cr improved. At this point, the patient does not desire his mother to know results of hiv/hcv testing, but ok with discussion with his step-father, Laurie Ferrari. Continue to follow tremor symptoms as medications are changed and treatments are initiated in the near future with further workup as appropriate. RPR positive, awaiting FTA. Discussed patient's concerns. Will continue to address. Per pt request, Dr Gonzales Jorge discussed status on phone with his stepfather, while we were in the room. Dr Cherylene Nasuti following  Awaiting lumbar puncture results  H/H pending    ID managing antibiotics and antivirals for HIV     Attending Physician Statement  I have reviewed the chart and seen the patient with the resident(s). I personally reviewed images, EKG's and similar tests, if present. I personally reviewed and performed key elements of the history and exam.  I have reviewed and confirmed student and/or resident history and exam with changes as indicated above. I agree with the assessment, plan and orders as documented by the resident. Please refer to the resident and/or student note for additional information.       Kasi Real MD

## 2021-10-22 NOTE — PROGRESS NOTES
Department of Internal Medicine  Nephrology Progress Note      Events reviewed. SUBJECTIVE:  We are following Mr. Nadia Saldana for DEISI. Reports increasing shortness of breath today.     PHYSICAL EXAM:      Vitals:    VITALS:  /72   Pulse 127   Temp 100 °F (37.8 °C) (Temporal)   Resp 20   Ht 6' 4\" (1.93 m)   Wt 173 lb (78.5 kg)   SpO2 92%   BMI 21.06 kg/m²   24HR INTAKE/OUTPUT:      Intake/Output Summary (Last 24 hours) at 10/22/2021 1541  Last data filed at 10/22/2021 0945  Gross per 24 hour   Intake 1781.65 ml   Output 200 ml   Net 1581.65 ml       Constitutional:  Awake, alert, NAD  HEENT:  PERRL, normocephalic, atraumatic  Respiratory: diminished lung sounds  Cardiovascular/Edema:  RRR, S1/S2, -edema  Gastrointestinal:  abd flat, soft, non-tender  Neurologic:  Awake, alert, no focal deficits  Skin:  Warm, dry, no rash or lesions  Other: BLE 1+ edema       Scheduled Meds:   penicillin G benzathine  2.4 Million Units IntraMUSCular Weekly    sulfamethoxazole-trimethoprim (BACTRIM) IVPB  400 mg IntraVENous Q12H    ascorbic acid  500 mg Oral Daily    albumin human  25 g IntraVENous Q6H    bictegravir-emtricitab-tenofovir alafenamide  1 tablet Oral Daily    enoxaparin  40 mg SubCUTAneous Daily    [Held by provider] metoprolol tartrate  25 mg Oral Daily    pantoprazole  40 mg Oral QAM AC    sodium chloride flush  5-40 mL IntraVENous 2 times per day     Continuous Infusions:   sodium chloride      sodium chloride       PRN Meds:.perflutren lipid microspheres, hydrOXYzine, labetalol, sodium chloride, sodium chloride flush, ondansetron **OR** ondansetron, polyethylene glycol, acetaminophen **OR** acetaminophen, sodium chloride    DATA:    CBC:   Lab Results   Component Value Date    WBC 1.1 10/22/2021    RBC 2.35 10/22/2021    HGB 7.0 10/22/2021    HCT 20.8 10/22/2021    MCV 88.5 10/22/2021    MCH 29.8 10/22/2021    MCHC 33.7 10/22/2021    RDW 14.5 10/22/2021    PLT 92 10/22/2021    MPV 13.0 10/22/2021     CMP:    Lab Results   Component Value Date     10/22/2021    K 4.7 10/22/2021    CL 96 10/22/2021    CO2 17 10/22/2021    BUN 28 10/22/2021    CREATININE 3.7 10/22/2021    GFRAA 24 10/22/2021    LABGLOM 24 10/22/2021    GLUCOSE 103 10/22/2021    PROT 5.6 10/22/2021    LABALBU 2.8 10/22/2021    CALCIUM 7.8 10/22/2021    BILITOT 0.2 10/22/2021    ALKPHOS 63 10/22/2021    AST 80 10/22/2021    ALT 21 10/22/2021     Magnesium:    Lab Results   Component Value Date    MG 1.4 10/22/2021     Phosphorus:    Lab Results   Component Value Date    PHOS 3.8 10/22/2021        Radiology Review:       Ejection fraction is visually estimated at 55%. There is late positive bubble suggesting possible shunt from pulmonary   origin. Visually moderately dilated left atrium. Normal right ventricular size and function. There is small pericardial effusion without tamponade physiology        CT Chest October 5, 2021   Limited study by the patient's respiratory motion and lack of intravenous   contrast.       Extensive multifocal COVID-19 pneumonia bilaterally.       Bilateral trace layering pleural effusions.       Bilateral gynecomastia.       Abdominal organs inadequately evaluated due to the limitations of the study.       Normal appendix.       No evidence of bowel obstruction.         Kidney ultrasound October 7, 2021   1. Hyperechoic kidneys due to underlying parenchymal disease. 2. Trace bilateral perinephric fluid of indeterminate etiology, potentially   due to underlying inflammation. 3. An approximately 1.1 cm x 1.5 cm x 2.3 cm lesion in the right kidney could   be a cyst but could also be seen with infarction, abscess, or neoplasia. Consider further evaluation with renal protocol abdomen MRI (preferred) or   CT.  Following resolution of acute kidney injury.         CTA pulmonary with IV contrast October 13, 2021   1. No evidence of pulmonary embolism.    2. Interval increased size of bilateral pleural effusions with little change   in pericardial effusion. 3. Persistent subcutaneous edema. 4. Reactive bilateral hilar lymphadenopathy. 5. Bilateral airspace disease is increased compared to prior examination. The increase is most pronounced within the lower lobes. 6. Bilateral gynecomastia.                 BRIEF SUMMARY OF INITIAL CONSULT:     Briefly, Mr. Shekhar Holland is a 36year old male with no significant PMH who was admitted on October 5, 2021 after he presented from an outside clinic after he was found to be hypoxic during 6 minute walking test. Patient tested positive for Covid 19 on August 18th, he is unvaccinated and had Covid 19 last year as well. Patient states he has quarantined by himself and when his mother recently visited him she was shocked at his appearance as he had lost about 50 pounds in 2 and a half months. On admission labs were significant for sodium of 130, potassium 5.2, bicarbonate 20, BUN 41, creatinine 3.6, magnesium 2.7, calcium 7.7 and proBNP 1,599. We are consulted for DEISI. Problems resolved:    Hyperkalemia, 2/2 DEISI. Low potassium diet  Hypotonic hyponatremia 2/2 intravascular volume from poor oral intake. Bicarb drip started. Sodium levels improving. Low bicarbonate levels with hyperchloremia, most likely NAGMA versus respiratory alkalosis; we need a PH to clarify diagnosis. Awaiting ABG results  High bicarbonate levels, likely metabolic alkalosis 2/2 administration  Hypokalemia, multifactorial, poor intake, probably renal potassium wasting  Severe Hypoalbuminemia, multifactorial, status post albumin administration  DEISI on CKD, volume responsive pre-renal DEISI d/t volume (poor oral intake), urine sodium <20. Resolved. Edematous state, multifactorial, mainly 2/2 nephrotic syndrome and possibly HFpEF 55%, on bumex    IMPRESSION/RECOMMENDATIONS:     Recurrent DEISI on CKD, ATN contrast associated DEISI versus ischemic ATN (hypotension related tachycardia). Nonoliguric. Renal function continues to worsen with increasing creatinine levels. We will continue to monitor, more afraid he might need renal replacement therapy if no improvement of renal function occurs. 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 [facility]an Pulido Work-up so far HIV positive, Hepatitis C positive, MARILEE negative, C4 normal, C3 88 (low), UPEP without monoclonal gammopathy, negative cryoglobulins. Kidney ultrasound with hyperechoic kidneys. Needs kidney biopsy, discussed with interventional radiology they would rather wait for a couple weeks to perform biopsy in view of patient having Covid-19 infection. Hyponatremia, multifactorial, including decreased GFR and hemodynamically mediated in the setting of large hypotonic fluid administration (D5 with Bactrim). Sodium levels decrease, stable overnight    Low bicarbonate levels with hyperchloremia, consistent with either hyperchloremic metabolic acidosis versus respiratory alkalosis. Bicarbonate levels improved with bicarbonate drip. Hypomagnesemia, 2/2 poor intake   Probably HFpEF 55%, proBNP 11,401 > 3421   HTN, on metoprolol   Right kidney lesion, likely a cyst but cannot be ruled out other pathology including infarction, abscess, neoplasm.   We will proceed with MRI when renal function improved  HIV positive, CD4 count 43, started on OLMSTEAD therapy    ---------------------------------------------------------------------  MSSE bacteremia, on cefepime 2 g every 8 hours  Possible pneumocystic pneumonia, on sulfamethoxazole-trimethoprim 400 mg IV every 8 hours, needs TTE  Probably fungemia, (1, 3) beta-D-glucan positive, on anidulafungin  Sinus tachycardia, multifactorial  Hepatitis C antibody positive, awaiting viral load  Covid 19 infection in non vaccinated pt  Normocytic anemia, multifactorial  Leukopenia, WBC 1.1    Plan:    Continue to monitor potassium level while on Bactrim  Continue to monitor renal function, BMP at 4 PM  Continue to monitor Na levels   Continue fluid restriction, dry tray      Electronically signed by Irish Murillo MD on 10/22/2021 at 3:41 PM

## 2021-10-23 ENCOUNTER — APPOINTMENT (OUTPATIENT)
Dept: GENERAL RADIOLOGY | Age: 31
DRG: 890 | End: 2021-10-23
Payer: COMMERCIAL

## 2021-10-23 LAB
ALBUMIN SERPL-MCNC: 3.5 G/DL (ref 3.5–5.2)
ALP BLD-CCNC: 73 U/L (ref 40–129)
ALT SERPL-CCNC: 22 U/L (ref 0–40)
ANION GAP SERPL CALCULATED.3IONS-SCNC: 13 MMOL/L (ref 7–16)
ANION GAP SERPL CALCULATED.3IONS-SCNC: 13 MMOL/L (ref 7–16)
ANION GAP SERPL CALCULATED.3IONS-SCNC: 15 MMOL/L (ref 7–16)
ANION GAP SERPL CALCULATED.3IONS-SCNC: 15 MMOL/L (ref 7–16)
ANISOCYTOSIS: ABNORMAL
APPEARANCE CSF: CLEAR
APTT: 36.7 SEC (ref 24.5–35.1)
AST SERPL-CCNC: 104 U/L (ref 0–39)
B.E.: -4.1 MMOL/L (ref -3–0)
BASOPHILS ABSOLUTE: 0 E9/L (ref 0–0.2)
BASOPHILS RELATIVE PERCENT: 0.6 % (ref 0–2)
BILIRUB SERPL-MCNC: 0.5 MG/DL (ref 0–1.2)
BUN BLDV-MCNC: 36 MG/DL (ref 6–20)
BUN BLDV-MCNC: 39 MG/DL (ref 6–20)
BUN BLDV-MCNC: 42 MG/DL (ref 6–20)
BUN BLDV-MCNC: 45 MG/DL (ref 6–20)
BURR CELLS: ABNORMAL
C-REACTIVE PROTEIN: 2.8 MG/DL (ref 0–0.4)
CALCIUM SERPL-MCNC: 7.9 MG/DL (ref 8.6–10.2)
CALCIUM SERPL-MCNC: 8.2 MG/DL (ref 8.6–10.2)
CALCIUM SERPL-MCNC: 8.3 MG/DL (ref 8.6–10.2)
CALCIUM SERPL-MCNC: 8.8 MG/DL (ref 8.6–10.2)
CHLORIDE BLD-SCNC: 91 MMOL/L (ref 98–107)
CHLORIDE BLD-SCNC: 95 MMOL/L (ref 98–107)
CHLORIDE BLD-SCNC: 96 MMOL/L (ref 98–107)
CHLORIDE BLD-SCNC: 96 MMOL/L (ref 98–107)
CHLORIDE URINE RANDOM: 50 MMOL/L
CO2: 17 MMOL/L (ref 22–29)
CO2: 18 MMOL/L (ref 22–29)
COLOR CSF: COLORLESS
CREAT SERPL-MCNC: 4.6 MG/DL (ref 0.7–1.2)
CREAT SERPL-MCNC: 4.6 MG/DL (ref 0.7–1.2)
CREAT SERPL-MCNC: 4.7 MG/DL (ref 0.7–1.2)
CREAT SERPL-MCNC: 4.9 MG/DL (ref 0.7–1.2)
D DIMER: 672 NG/ML DDU
DELIVERY SYSTEMS: ABNORMAL
DEVICE: ABNORMAL
EKG ATRIAL RATE: 134 BPM
EKG P AXIS: 84 DEGREES
EKG P-R INTERVAL: 126 MS
EKG Q-T INTERVAL: 314 MS
EKG QRS DURATION: 70 MS
EKG QTC CALCULATION (BAZETT): 468 MS
EKG R AXIS: 59 DEGREES
EKG T AXIS: 133 DEGREES
EKG VENTRICULAR RATE: 134 BPM
EOSINOPHILS ABSOLUTE: 0 E9/L (ref 0.05–0.5)
EOSINOPHILS RELATIVE PERCENT: 0.6 % (ref 0–6)
FIBRINOGEN: 453 MG/DL (ref 225–540)
FIO2 ARTERIAL: 15
GFR AFRICAN AMERICAN: 17
GFR AFRICAN AMERICAN: 18
GFR NON-AFRICAN AMERICAN: 17 ML/MIN/1.73
GFR NON-AFRICAN AMERICAN: 18 ML/MIN/1.73
GLUCOSE BLD-MCNC: 117 MG/DL (ref 74–99)
GLUCOSE BLD-MCNC: 174 MG/DL (ref 74–99)
GLUCOSE BLD-MCNC: 71 MG/DL (ref 74–99)
GLUCOSE BLD-MCNC: 93 MG/DL (ref 74–99)
HCO3 ARTERIAL: 19 MMOL/L (ref 22–26)
HCT VFR BLD CALC: 21.1 % (ref 37–54)
HCT VFR BLD CALC: 22.9 % (ref 37–54)
HCT VFR BLD CALC: 24.3 % (ref 37–54)
HCT VFR BLD CALC: 26.4 % (ref 37–54)
HEMOGLOBIN: 7.2 G/DL (ref 12.5–16.5)
HEMOGLOBIN: 7.7 G/DL (ref 12.5–16.5)
HEMOGLOBIN: 8.2 G/DL (ref 12.5–16.5)
HEMOGLOBIN: 8.9 G/DL (ref 12.5–16.5)
LACTIC ACID, SEPSIS: 1.7 MMOL/L (ref 0.5–1.9)
LYMPHOCYTES ABSOLUTE: 0.07 E9/L (ref 1.5–4)
LYMPHOCYTES RELATIVE PERCENT: 3.8 % (ref 20–42)
MAGNESIUM: 2 MG/DL (ref 1.6–2.6)
MCH RBC QN AUTO: 30 PG (ref 26–35)
MCHC RBC AUTO-ENTMCNC: 33.7 % (ref 32–34.5)
MCV RBC AUTO: 88.9 FL (ref 80–99.9)
METAMYELOCYTES RELATIVE PERCENT: 1 % (ref 0–1)
METER GLUCOSE: 103 MG/DL (ref 74–99)
METER GLUCOSE: 106 MG/DL (ref 74–99)
METER GLUCOSE: 107 MG/DL (ref 74–99)
METER GLUCOSE: 109 MG/DL (ref 74–99)
METER GLUCOSE: 182 MG/DL (ref 74–99)
METER GLUCOSE: 95 MG/DL (ref 74–99)
MONOCYTES ABSOLUTE: 0 E9/L (ref 0.1–0.95)
MONOCYTES RELATIVE PERCENT: 3.5 % (ref 2–12)
NEUTROPHILS ABSOLUTE: 1.63 E9/L (ref 1.8–7.3)
NEUTROPHILS RELATIVE PERCENT: 95.2 % (ref 43–80)
NEUTROPHILS, CSF: 0 % (ref 0–10)
NUCLEATED RED BLOOD CELLS: 1 /100 WBC
O2 SATURATION: 96.9 % (ref 92–98.5)
OPERATOR ID: 7490
OSMOLALITY: 277 MOSM/KG (ref 285–310)
OVALOCYTES: ABNORMAL
PCO2 ARTERIAL: 25.9 MMHG (ref 35–45)
PDW BLD-RTO: 14.4 FL (ref 11.5–15)
PH BLOOD GAS: 7.47 (ref 7.35–7.45)
PHOSPHORUS: 4.6 MG/DL (ref 2.5–4.5)
PLATELET # BLD: 79 E9/L (ref 130–450)
PLATELET CONFIRMATION: NORMAL
PMV BLD AUTO: 13.3 FL (ref 7–12)
PO2 ARTERIAL: 80.8 MMHG (ref 80–100)
POIKILOCYTES: ABNORMAL
POLYCHROMASIA: ABNORMAL
POTASSIUM REFLEX MAGNESIUM: 4.8 MMOL/L (ref 3.5–5)
POTASSIUM SERPL-SCNC: 4.9 MMOL/L (ref 3.5–5)
POTASSIUM SERPL-SCNC: 5.4 MMOL/L (ref 3.5–5)
POTASSIUM SERPL-SCNC: 5.4 MMOL/L (ref 3.5–5)
POTASSIUM, UR: 30 MMOL/L
PROCALCITONIN: 1.2 NG/ML (ref 0–0.08)
RBC # BLD: 2.97 E12/L (ref 3.8–5.8)
RBC CSF: <2000 /UL
SCHISTOCYTES: ABNORMAL
SODIUM BLD-SCNC: 124 MMOL/L (ref 132–146)
SODIUM BLD-SCNC: 126 MMOL/L (ref 132–146)
SODIUM BLD-SCNC: 127 MMOL/L (ref 132–146)
SODIUM BLD-SCNC: 128 MMOL/L (ref 132–146)
SODIUM URINE: 88 MMOL/L
SOURCE, BLOOD GAS: ABNORMAL
TEAR DROP CELLS: ABNORMAL
TOTAL PROTEIN: 6.7 G/DL (ref 6.4–8.3)
TUBE NUMBER CSF: NORMAL
WBC # BLD: 1.7 E9/L (ref 4.5–11.5)
WBC CSF: <3 /UL (ref 0–2)

## 2021-10-23 PROCEDURE — 6370000000 HC RX 637 (ALT 250 FOR IP): Performed by: STUDENT IN AN ORGANIZED HEALTH CARE EDUCATION/TRAINING PROGRAM

## 2021-10-23 PROCEDURE — 80048 BASIC METABOLIC PNL TOTAL CA: CPT

## 2021-10-23 PROCEDURE — 85384 FIBRINOGEN ACTIVITY: CPT

## 2021-10-23 PROCEDURE — 82436 ASSAY OF URINE CHLORIDE: CPT

## 2021-10-23 PROCEDURE — 36415 COLL VENOUS BLD VENIPUNCTURE: CPT

## 2021-10-23 PROCEDURE — 99233 SBSQ HOSP IP/OBS HIGH 50: CPT | Performed by: INTERNAL MEDICINE

## 2021-10-23 PROCEDURE — 82803 BLOOD GASES ANY COMBINATION: CPT

## 2021-10-23 PROCEDURE — 87040 BLOOD CULTURE FOR BACTERIA: CPT

## 2021-10-23 PROCEDURE — 51702 INSERT TEMP BLADDER CATH: CPT

## 2021-10-23 PROCEDURE — 2000000000 HC ICU R&B

## 2021-10-23 PROCEDURE — 83930 ASSAY OF BLOOD OSMOLALITY: CPT

## 2021-10-23 PROCEDURE — P9047 ALBUMIN (HUMAN), 25%, 50ML: HCPCS | Performed by: INTERNAL MEDICINE

## 2021-10-23 PROCEDURE — 85730 THROMBOPLASTIN TIME PARTIAL: CPT

## 2021-10-23 PROCEDURE — 93010 ELECTROCARDIOGRAM REPORT: CPT | Performed by: INTERNAL MEDICINE

## 2021-10-23 PROCEDURE — 84300 ASSAY OF URINE SODIUM: CPT

## 2021-10-23 PROCEDURE — 2500000003 HC RX 250 WO HCPCS: Performed by: STUDENT IN AN ORGANIZED HEALTH CARE EDUCATION/TRAINING PROGRAM

## 2021-10-23 PROCEDURE — 83605 ASSAY OF LACTIC ACID: CPT

## 2021-10-23 PROCEDURE — 84100 ASSAY OF PHOSPHORUS: CPT

## 2021-10-23 PROCEDURE — 82570 ASSAY OF URINE CREATININE: CPT

## 2021-10-23 PROCEDURE — 99255 IP/OBS CONSLTJ NEW/EST HI 80: CPT | Performed by: INTERNAL MEDICINE

## 2021-10-23 PROCEDURE — 6360000002 HC RX W HCPCS: Performed by: INTERNAL MEDICINE

## 2021-10-23 PROCEDURE — 6370000000 HC RX 637 (ALT 250 FOR IP): Performed by: INTERNAL MEDICINE

## 2021-10-23 PROCEDURE — 2580000003 HC RX 258: Performed by: INTERNAL MEDICINE

## 2021-10-23 PROCEDURE — 82962 GLUCOSE BLOOD TEST: CPT

## 2021-10-23 PROCEDURE — 84145 PROCALCITONIN (PCT): CPT

## 2021-10-23 PROCEDURE — 93005 ELECTROCARDIOGRAM TRACING: CPT | Performed by: STUDENT IN AN ORGANIZED HEALTH CARE EDUCATION/TRAINING PROGRAM

## 2021-10-23 PROCEDURE — 2580000003 HC RX 258: Performed by: STUDENT IN AN ORGANIZED HEALTH CARE EDUCATION/TRAINING PROGRAM

## 2021-10-23 PROCEDURE — 83735 ASSAY OF MAGNESIUM: CPT

## 2021-10-23 PROCEDURE — 80053 COMPREHEN METABOLIC PANEL: CPT

## 2021-10-23 PROCEDURE — 85018 HEMOGLOBIN: CPT

## 2021-10-23 PROCEDURE — 83935 ASSAY OF URINE OSMOLALITY: CPT

## 2021-10-23 PROCEDURE — 71045 X-RAY EXAM CHEST 1 VIEW: CPT

## 2021-10-23 PROCEDURE — 6360000002 HC RX W HCPCS: Performed by: STUDENT IN AN ORGANIZED HEALTH CARE EDUCATION/TRAINING PROGRAM

## 2021-10-23 PROCEDURE — 85014 HEMATOCRIT: CPT

## 2021-10-23 PROCEDURE — 84133 ASSAY OF URINE POTASSIUM: CPT

## 2021-10-23 PROCEDURE — 85378 FIBRIN DEGRADE SEMIQUANT: CPT

## 2021-10-23 PROCEDURE — 86140 C-REACTIVE PROTEIN: CPT

## 2021-10-23 PROCEDURE — 85025 COMPLETE CBC W/AUTO DIFF WBC: CPT

## 2021-10-23 PROCEDURE — 99232 SBSQ HOSP IP/OBS MODERATE 35: CPT | Performed by: FAMILY MEDICINE

## 2021-10-23 RX ORDER — NICOTINE POLACRILEX 4 MG
15 LOZENGE BUCCAL PRN
Status: DISCONTINUED | OUTPATIENT
Start: 2021-10-23 | End: 2021-10-27

## 2021-10-23 RX ORDER — DEXTROSE MONOHYDRATE 25 G/50ML
25 INJECTION, SOLUTION INTRAVENOUS ONCE
Status: COMPLETED | OUTPATIENT
Start: 2021-10-23 | End: 2021-10-23

## 2021-10-23 RX ORDER — HEPARIN SODIUM 10000 [USP'U]/ML
5000 INJECTION, SOLUTION INTRAVENOUS; SUBCUTANEOUS EVERY 8 HOURS
Status: DISCONTINUED | OUTPATIENT
Start: 2021-10-24 | End: 2021-11-07 | Stop reason: HOSPADM

## 2021-10-23 RX ORDER — DEXTROSE MONOHYDRATE 50 MG/ML
100 INJECTION, SOLUTION INTRAVENOUS PRN
Status: DISCONTINUED | OUTPATIENT
Start: 2021-10-23 | End: 2021-10-23 | Stop reason: SDUPTHER

## 2021-10-23 RX ORDER — METHYLPREDNISOLONE SODIUM SUCCINATE 40 MG/ML
30 INJECTION, POWDER, LYOPHILIZED, FOR SOLUTION INTRAMUSCULAR; INTRAVENOUS EVERY 12 HOURS
Status: DISCONTINUED | OUTPATIENT
Start: 2021-10-23 | End: 2021-10-25

## 2021-10-23 RX ORDER — DEXTROSE MONOHYDRATE 25 G/50ML
12.5 INJECTION, SOLUTION INTRAVENOUS PRN
Status: DISCONTINUED | OUTPATIENT
Start: 2021-10-23 | End: 2021-10-23 | Stop reason: SDUPTHER

## 2021-10-23 RX ORDER — 0.9 % SODIUM CHLORIDE 0.9 %
500 INTRAVENOUS SOLUTION INTRAVENOUS ONCE
Status: COMPLETED | OUTPATIENT
Start: 2021-10-23 | End: 2021-10-23

## 2021-10-23 RX ORDER — DEXTROSE MONOHYDRATE 50 MG/ML
100 INJECTION, SOLUTION INTRAVENOUS PRN
Status: DISCONTINUED | OUTPATIENT
Start: 2021-10-23 | End: 2021-10-27

## 2021-10-23 RX ORDER — SODIUM POLYSTYRENE SULFONATE 15 G/60ML
15 SUSPENSION ORAL; RECTAL ONCE
Status: COMPLETED | OUTPATIENT
Start: 2021-10-23 | End: 2021-10-23

## 2021-10-23 RX ORDER — NICOTINE POLACRILEX 4 MG
15 LOZENGE BUCCAL PRN
Status: DISCONTINUED | OUTPATIENT
Start: 2021-10-23 | End: 2021-10-23 | Stop reason: SDUPTHER

## 2021-10-23 RX ORDER — DEXTROSE MONOHYDRATE 25 G/50ML
12.5 INJECTION, SOLUTION INTRAVENOUS PRN
Status: DISCONTINUED | OUTPATIENT
Start: 2021-10-23 | End: 2021-10-27

## 2021-10-23 RX ORDER — FUROSEMIDE 10 MG/ML
80 INJECTION INTRAMUSCULAR; INTRAVENOUS ONCE
Status: COMPLETED | OUTPATIENT
Start: 2021-10-23 | End: 2021-10-23

## 2021-10-23 RX ADMIN — ACETAMINOPHEN 650 MG: 325 TABLET ORAL at 22:03

## 2021-10-23 RX ADMIN — Medication: at 02:09

## 2021-10-23 RX ADMIN — SODIUM CHLORIDE, PRESERVATIVE FREE 10 ML: 5 INJECTION INTRAVENOUS at 01:09

## 2021-10-23 RX ADMIN — PANTOPRAZOLE SODIUM 40 MG: 40 TABLET, DELAYED RELEASE ORAL at 09:26

## 2021-10-23 RX ADMIN — SODIUM CHLORIDE: 9 INJECTION, SOLUTION INTRAVENOUS at 14:18

## 2021-10-23 RX ADMIN — FUROSEMIDE 80 MG: 10 INJECTION, SOLUTION INTRAMUSCULAR; INTRAVENOUS at 01:09

## 2021-10-23 RX ADMIN — INSULIN HUMAN 10 UNITS: 100 INJECTION, SOLUTION PARENTERAL at 06:54

## 2021-10-23 RX ADMIN — ACETAMINOPHEN 650 MG: 325 TABLET ORAL at 06:54

## 2021-10-23 RX ADMIN — SODIUM CHLORIDE 500 ML: 9 INJECTION, SOLUTION INTRAVENOUS at 07:00

## 2021-10-23 RX ADMIN — ENOXAPARIN SODIUM 40 MG: 40 INJECTION SUBCUTANEOUS at 09:24

## 2021-10-23 RX ADMIN — DEXTROSE MONOHYDRATE 25 G: 25 INJECTION, SOLUTION INTRAVENOUS at 21:00

## 2021-10-23 RX ADMIN — METHYLPREDNISOLONE SODIUM SUCCINATE 30 MG: 40 INJECTION, POWDER, FOR SOLUTION INTRAMUSCULAR; INTRAVENOUS at 20:23

## 2021-10-23 RX ADMIN — OXYCODONE HYDROCHLORIDE AND ACETAMINOPHEN 500 MG: 500 TABLET ORAL at 09:22

## 2021-10-23 RX ADMIN — INSULIN HUMAN 10 UNITS: 100 INJECTION, SOLUTION PARENTERAL at 21:00

## 2021-10-23 RX ADMIN — DEXAMETHASONE SODIUM PHOSPHATE 20 MG: 10 INJECTION INTRAMUSCULAR; INTRAVENOUS at 09:23

## 2021-10-23 RX ADMIN — ALBUMIN (HUMAN) 25 G: 0.25 INJECTION, SOLUTION INTRAVENOUS at 01:08

## 2021-10-23 RX ADMIN — ALBUMIN (HUMAN) 25 G: 0.25 INJECTION, SOLUTION INTRAVENOUS at 06:57

## 2021-10-23 RX ADMIN — CEFEPIME HYDROCHLORIDE 1000 MG: 1 INJECTION, POWDER, FOR SOLUTION INTRAMUSCULAR; INTRAVENOUS at 11:31

## 2021-10-23 RX ADMIN — BICTEGRAVIR SODIUM, EMTRICITABINE, AND TENOFOVIR ALAFENAMIDE FUMARATE 1 TABLET: 50; 200; 25 TABLET ORAL at 10:55

## 2021-10-23 RX ADMIN — Medication 10 ML: at 20:23

## 2021-10-23 RX ADMIN — DEXTROSE MONOHYDRATE 25 G: 25 INJECTION, SOLUTION INTRAVENOUS at 06:54

## 2021-10-23 RX ADMIN — SODIUM POLYSTYRENE SULFONATE 15 G: 15 SUSPENSION ORAL; RECTAL at 21:00

## 2021-10-23 ASSESSMENT — PAIN SCALES - GENERAL
PAINLEVEL_OUTOF10: 0

## 2021-10-23 NOTE — PROGRESS NOTES
[place]  Department of Internal Medicine  Division of Pulmonary, Critical Care and Sleep Medicine  Progress Note    Vu Samuels DO, MPH, Tamara Espinal, Migue Paniagua MD, [facility]  [facility]      Patient: Jose Banks  MRN: [id number]  : 1990    Encounter Time: 1:06 PM     Date of Admission: 10/5/2021  3:07 PM    Primary Care Physician: Arik Braun MD    Reason for Consultation: COVID     SUBJECTIVE:    Hep C and HIV + screen  CD4 is 47  Echo = reviewed  PCP PCR pending  Overnight, RRT called due to tachycardia and oxygen desaturation requiring oxygen from 3L NC to 15LNRB mask. Patient had been febrile overnight as well and transferred to MICU. Mild shortness of breath now. OBJECTIVE:     PHYSICAL EXAM:   VITALS:   Vitals:    10/23/21 0900 10/23/21 1000 10/23/21 1100 10/23/21 1200   BP: 108/75 125/81 127/81 131/85   Pulse: 109 112 105 107   Resp: 27 (!) 32 27 (!) 31   Temp:    100 °F (37.8 °C)   TempSrc:    Bladder   SpO2: 96% 95% 97% 100%   Weight:       Height:            Intake/Output Summary (Last 24 hours) at 10/23/2021 1306  Last data filed at 10/23/2021 1200  Gross per 24 hour   Intake 588.4 ml   Output 455 ml   Net 133.4 ml        CONSTITUTIONAL:   A&O x 3,  SKIN:     No rash,   HEENT:     EOMI, MMM, No thrush  NECK:    No bruits, No JVP apprechiated  CV:      Sinus,  No murmur, No rubs, No gallops  PULMONARY:   Couse BS,  No Wheezing, No Rales, No Rhonchi      No noted egophony  ABDOMEN:     Soft, non-tender. BS normal. No R/R/G  EXT:    No deformities . No clubbing.       + lower extremity edema, No venous stasis  PULSE:   Appears equal and palpable.   PSYCHIATRIC:  Seems appropriate, No acute psycosis  MS:    No fractures, No gross weakness  NEUROLOGIC:   Non-focal     DATA: IMAGING & TESTING:     LABORATORY TESTS:    CBC:   Lab Results   Component Value Date    WBC 1.7 10/23/2021    RBC 2.97 10/23/2021    HGB 7.2 10/23/2021 HCT 21.1 10/23/2021    MCV 88.9 10/23/2021    MCH 30.0 10/23/2021    MCHC 33.7 10/23/2021    RDW 14.4 10/23/2021    PLT 79 10/23/2021    MPV 13.3 10/23/2021     CMP:    Lab Results   Component Value Date     10/23/2021    K 4.8 10/23/2021    CL 96 10/23/2021    CO2 18 10/23/2021    BUN 39 10/23/2021    CREATININE 4.6 10/23/2021    GFRAA 18 10/23/2021    LABGLOM 18 10/23/2021    GLUCOSE 71 10/23/2021    PROT 6.7 10/23/2021    LABALBU 3.5 10/23/2021    CALCIUM 8.2 10/23/2021    BILITOT 0.5 10/23/2021    ALKPHOS 73 10/23/2021     10/23/2021    ALT 22 10/23/2021     Magnesium:    Lab Results   Component Value Date    MG 2.0 10/23/2021     Phosphorus:    Lab Results   Component Value Date    PHOS 4.6 10/23/2021     PT/INR:    Lab Results   Component Value Date    PROTIME 17.7 10/15/2021    INR 1.6 10/15/2021     FERRITIN:    Lab Results   Component Value Date    FERRITIN 3,921 10/06/2021     Fibrinogen Level:  No components found for: FIB     PRO-BNP:   Lab Results   Component Value Date    PROBNP 3,421 (H) 10/18/2021    PROBNP 11,401 (H) 10/13/2021      ABGs:   Lab Results   Component Value Date    PH 7.473 10/23/2021     Hemoglobin A1C: No components found for: HGBA1C    IMAGING:  Imaging tests were completed and reviewed and discussed radiology and care team involved and reveals     CT CHEST : There is adequate opacification of the pulmonary arteries.  There is no   evidence of filling defect to suggest pulmonary embolism. Rheta Abhi is no   evidence of thoracic aortic aneurysm or dissection.  There are small   bilateral pleural effusions.  These are increased compared to prior   examination.  There is small pericardial effusion which appears stable.  Left   hilar lymph node measures approximately 12 mm in short axis.  Right hilar   lymph node measures approximately 12 mm in short axis.  There is bilateral   gynecomastia. Rheta Abhi is diffuse subcutaneous edema.      The central airways are patent. Rheta Abhi is no pneumothorax.  There are   bilateral ground-glass opacities and alveolar consolidation.  There has been   interval increase in the airspace disease compared to prior examination.  The   interval worsening is most pronounced within the lower lobes. Mone Pod is   associated interstitial thickening. CT ABDOMEN PELVIS WO CONTRAST Additional Contrast? None    Result Date: 10/5/2021  FINDINGS: THE STUDY IS LIMITED DUE TO LACK OF INTRAVENOUS CONTRAST. Chest: Mediastinum: No thoracic aortic aneurysm. No definite adenopathy on this unenhanced study. Trace pericardial fluid anteriorly. Lungs/pleura: Trace layering pleural effusions bilaterally, slightly larger on the right. No pneumothorax. Numerous ground-glass densities in the bilateral upper and to a lesser degree lower lungs, in keeping with known COVID-19 pneumonia. Soft Tissues/Bones: Bilateral gynecomastia. No acute osseous abnormality in the thoracic cage. Abdomen and pelvis: The study is degraded by the patient's respiratory motion. Organs: Abdominal organs inadequately evaluated on this motion degraded and unenhanced study. No hydronephrosis on either side. GI/Bowel: Normal appendix. No evidence of bowel obstruction. Pelvis: Normal sized prostate. Urinary bladder grossly intact. Peritoneum/Retroperitoneum: No free air or free fluid. No bulky adenopathy. No abdominal aortic aneurysm. Bones/Soft Tissues: No lytic or sclerotic lesion in the regional skeleton. Limited study by the patient's respiratory motion and lack of intravenous contrast. Extensive multifocal COVID-19 pneumonia bilaterally. Bilateral trace layering pleural effusions. Bilateral gynecomastia. Abdominal organs inadequately evaluated due to the limitations of the study. Normal appendix. No evidence of bowel obstruction. CT CHEST WO CONTRAST    Result Date: 10/5/2021  FINDINGS: THE STUDY IS LIMITED DUE TO LACK OF INTRAVENOUS CONTRAST.  Chest: Mediastinum: No thoracic aortic quarantined by himself and when his mother recently visited him she was shocked at his appearance as he had lost about 50 pounds in 2 and a half months  Sepsis resolved  Acute pulmonary edema r/o aspiration vs MV disease   COVID-19, mild  HIV   Pneumonia  Gram-positive cocci in clusters bacteremia, etiology unclear  DEISI  + RPR  + BNP        Plan:   Wean oxygen  Improved symptoms  Completed Bronchoscopy with BAL with LMAC  - completed  Negative for  TB, PCP  HIV status CD4  Known  Adjust medications   ? Immune reconstitution syndrome?             Zana Haines DO DO, MPH, Shereen Hastings  Professor of Internal Medicine  Pulmonary, Critical Care and Sleep Medicine

## 2021-10-23 NOTE — PROGRESS NOTES
S: Was paged by nursing staff about patient's shortness of breath and tachycardia. On examination patient reports feeling shortness of breath since September. Patient reports feeling chills and feverish at times. He denies any chest pain. O:   Vitals:    10/23/21 0100   BP: 117/70   Pulse: 123   Resp: 20   Temp: 99.8 °F (37.7 °C)   SpO2:      General: Alert and oriented, lying comfortably in bed  Lungs: Clear to auscultation bilaterally  Heart: Regular rhythm and rate, no murmurs gallops or rubs   Abdomen: Soft, nontender nondistended    A/P  -Chest x-ray shows increased bilateral airspace disease suggesting worsening pneumonia. -Bactrim currently held for past 24 hours due to potassium level. Will discuss restarting antibiotics today  -Shortness of breath and tachycardia can be secondary to symptomatic anemia.   Previous hemoglobin 7.4.   -We will repeat CBC, lactic acid, blood cultures and D-dimer

## 2021-10-23 NOTE — PROGRESS NOTES
[facility] Inpatient   Resident Progress Note    S:  Hospital day: 25   Brief Synopsis: Eve Negrete is a 36 y.o. male with no PMH who presented with hypoxia and fever. He was initially seen in PCP office and was hypoxic on exertion with desaturate into the mid low 80s as well as noted to be febrile and tachycardic. Per patient, his symptoms began 8/15/2021 and he tested positive for Covid 8/28. endorse worsening shortness of breath over the past month with sputum production, intermittent chest discomfort, persistent diarrhea, chills, fever, decrease in appetite and 50 pound weight loss since August.  Patient also endorses significantly worsening \"neuropathy\" of bilateral feet, states it feels like he is \"being stabbed by needles\" which is causing him inability to walk due to pain. In the ED, he was found to be anemic, hyperkalemic, hyponatremic, febrile and COVID positive. He also had elevated Creatinine, AST, proBNP and troponin x2. CTA showed extensive multifocal COVID-19 pneumonia bilaterally, bilateral trace layering pleural effusion and bilateral gynecomastia. CT abdomen was limited due to movement and non-contrast, but showed normal appendix and no evidence of bowel obstruction. Admitted for acute hypoxia due to Covid pneumonia. Nephro, pulm and ID consulted. Started on bicarb drip. US showed hyperechoic kidneys, trace b/l perinephric fluid and right kidney lesion (Cyst vs infarction vs abscess vs neoplasia). HIV and Hep C screen were negative. HIV RNA and T and B lymphocytes were ordered. Patient became more hypoxic, tachycardic and hypertensive so  there was concern for PE. CTA was ordered, which came back negative. Echo was completed due to elevated BNP and concern for PE and that revealed a normal EF with late bubble sign possible shunting of pulmonary origin. BNP continued to increase. Cardiology was consulted. Repeat Echo pending.  ID recommends LP with CSF sent for glucose, protein, cell count with differential, VDRL. meningitis/encephalitis PCR panel negative. Overnight/interim:   Patient was seen and examined at bedside. RRT called overnight for hypoxia, increased work of breathing, tachycardia. Patient transferred to ICU for further management. Currently on 15 L nonrebreather, tachypneic. Labs showing worsening sodium, creatinine. Cont meds:    dextrose      sodium chloride      sodium chloride       Scheduled meds:    insulin regular  10 Units IntraVENous Once    And    dextrose  25 g IntraVENous Once    sodium chloride  500 mL IntraVENous Once    dexamethasone  20 mg IntraVENous Q24H    insulin lispro  0-6 Units SubCUTAneous TID WC    insulin lispro  0-3 Units SubCUTAneous Nightly    penicillin G benzathine  2.4 Million Units IntraMUSCular Weekly    [Held by provider] sulfamethoxazole-trimethoprim (BACTRIM) IVPB  400 mg IntraVENous Q12H    lidocaine  1 patch TransDERmal Daily    ascorbic acid  500 mg Oral Daily    albumin human  25 g IntraVENous Q6H    bictegravir-emtricitab-tenofovir alafenamide  1 tablet Oral Daily    enoxaparin  40 mg SubCUTAneous Daily    [Held by provider] metoprolol tartrate  25 mg Oral Daily    pantoprazole  40 mg Oral QAM AC    sodium chloride flush  5-40 mL IntraVENous 2 times per day     PRN meds: glucose, dextrose, glucagon (rDNA), dextrose, medicated lip balm, benzonatate, perflutren lipid microspheres, hydrOXYzine, labetalol, sodium chloride, sodium chloride flush, ondansetron **OR** ondansetron, polyethylene glycol, acetaminophen **OR** acetaminophen, sodium chloride     I reviewed the patient's past medical and surgical history, Medications and Allergies. O:  BP (!) 132/91   Pulse 132   Temp 101.3 °F (38.5 °C) (Axillary)   Resp (!) 36   Ht 6' 4\" (1.93 m)   Wt 173 lb (78.5 kg)   SpO2 94%   BMI 21.06 kg/m²   24 hour I&O: I/O last 3 completed shifts:   In: 1885 [P.O.:50; IV Piggyback:1639]  Out: -   I/O this shift:  In: -   Out: 125 [Urine:125]        Physical Exam  Vitals reviewed. Constitutional:       Appearance: Normal appearance. He is not ill-appearing, toxic-appearing or diaphoretic. HENT:      Head: Normocephalic and atraumatic. Nose: No rhinorrhea. Mouth/Throat:      Mouth: Mucous membranes are moist.      Pharynx: Oropharynx is clear. Eyes:      General: No scleral icterus. Right eye: No discharge. Left eye: No discharge. Extraocular Movements: Extraocular movements intact. Cardiovascular:      Rate and Rhythm: Normal rate and regular rhythm. Pulses: Normal pulses. Heart sounds: Normal heart sounds. No murmur heard. No friction rub. No gallop. Pulmonary:      Effort: Respiratory distress (mild, on NRB) present. Breath sounds: Rales present. No wheezing or rhonchi. Abdominal:      General: Abdomen is flat. Palpations: Abdomen is soft. Tenderness: There is no abdominal tenderness. Musculoskeletal:         General: No swelling. Cervical back: Normal range of motion. Tenderness present. No rigidity. Lumbar back: Bony tenderness present. No swelling or spasms. Normal range of motion. Right lower leg: No edema. Left lower leg: No edema. Skin:     General: Skin is warm and dry. Coloration: Skin is not jaundiced. Findings: No bruising or rash. Neurological:      General: No focal deficit present. Mental Status: He is alert and oriented to person, place, and time. Cranial Nerves: No cranial nerve deficit. Motor: No weakness. Psychiatric:         Behavior: Behavior normal.         Thought Content: Thought content normal.           Labs:  Na/K/Cl/CO2:  124/5.4/91/18 (10/23 0400)  BUN/Cr/glu/ALT/AST/amyl/lip:  36/4.7/--/22/104/--/-- (10/23 0400)  WBC/Hgb/Hct/Plts:  1.7/8.9/26.4/79 (10/23 0400)  estimated creatinine clearance is 26 mL/min (A) (based on SCr of 4.7 mg/dL (H)).   Other pertinent labs as noted below    Radiology:  XR CHEST PORTABLE   Final Result   Mild worsening of extensive bilateral infiltrates         XR CHEST PORTABLE   Final Result   Increasing bilateral airspace disease suggesting worsening pneumonia. FL LUMBAR PUNCTURE DIAG   Final Result   Successful fluoroscopic-guided lumbar puncture. XR CHEST PORTABLE   Final Result   Extensive bilateral patchy areas of ground-glass opacity concerning for   atypical pneumonia or viral airway disease, improved         XR CHEST PORTABLE   Final Result   Persistent bilateral infiltrates. No significant changes since October 13. See above comments. See report CT chest October 13. CTA PULMONARY W CONTRAST   Final Result   1. No evidence of pulmonary embolism. 2. Interval increased size of bilateral pleural effusions with little change   in pericardial effusion. 3. Persistent subcutaneous edema. 4. Reactive bilateral hilar lymphadenopathy. 5. Bilateral airspace disease is increased compared to prior examination. The increase is most pronounced within the lower lobes. 6. Bilateral gynecomastia. XR CHEST PORTABLE   Final Result   Development of extensive bilateral pulmonary infiltrates         XR CHEST PORTABLE   Final Result   No acute process. US RETROPERITONEAL COMPLETE   Final Result   1. Hyperechoic kidneys due to underlying parenchymal disease. 2. Trace bilateral perinephric fluid of indeterminate etiology, potentially   due to underlying inflammation. 3. An approximately 1.1 cm x 1.5 cm x 2.3 cm lesion in the right kidney could   be a cyst but could also be seen with infarction, abscess, or neoplasia. Consider further evaluation with renal protocol abdomen MRI (preferred) or   CT. Following resolution of acute kidney injury.          XR CHEST PORTABLE   Final Result   Multifocal bilateral pulmonary ground-glass airspace opacities are stable         CT ABDOMEN PELVIS WO CONTRAST Additional Contrast? None   Final Result   Limited study by the patient's respiratory motion and lack of intravenous   contrast.      Extensive multifocal COVID-19 pneumonia bilaterally. Bilateral trace layering pleural effusions. Bilateral gynecomastia. Abdominal organs inadequately evaluated due to the limitations of the study. Normal appendix. No evidence of bowel obstruction. CT CHEST WO CONTRAST   Final Result   Limited study by the patient's respiratory motion and lack of intravenous   contrast.      Extensive multifocal COVID-19 pneumonia bilaterally. Bilateral trace layering pleural effusions. Bilateral gynecomastia. Abdominal organs inadequately evaluated due to the limitations of the study. Normal appendix. No evidence of bowel obstruction.                Resident Assessment and Plan       Acute hypoxia 2/2 COVID pneumonia vs PCP  - Maintaining Oxygen saturation in room air  - CTA: COVID pneumonia, Bilateral trace layering pleural effusion, negative for PE   - D dimer elevated on admission, currently stable  - Ferritin 3900, CRP 10.1 on admission   - Continue monitor Oxygen saturation   - Pulmonology and ID on board  - Legionella negative  - T spot TB test negative   - Decadron discontinued on 10/9  - CXR 10/8 shows multifocal groundglass opacities are stable  - Repeat CXR 10/17: b/l pulm infiltrates improving   - Blood cx: staph epidermitis, repeat blood cx shows no growth x 7 days   -Urine culture: NGTD   -Counseled on getting COVID vaccine after patient recovers   - ECHO: EF 55%, late bubble sign possible shunting of pulmonary origin   - Cardiology consulted   - CTA 10/13 negative for PE  Bronchoscopy with BAL performed on 10/15/2021   - Respiratory culture: oral pharyngeal liana decreased, few gram negative rods   Worsening hypoxia 10/23, RRT called, transferred to ICU, currently on nonrebreather, ABG pending, considering BiPAP    Weight loss likely secondary to HIV  - Per patient due to decreased appetite from COVID; 50lb weight loss since August   - R/o other potential origins - SLE, TB, hepatitis  - MARILEE negative  - T spot TB test negative   HIV, hepatitis C antibodies positive  - Elevated IgG and IgA, normal IgM: IgA nephropathy vs MPGN?   - Cryoglobulin normal   - TSH normal   - Sed rate and CRP elevated      HIV  HIV 1/2 antibodies positive  - Leukopenia, normal absolute neutrophil   - Febrile and tachycardic   CD4 44, CD3 153, CD8 100  HIV viral load 195,000  - ID on board: received 2 days of vancomycin 10/6, discontinued ancef, started on cefepime 2g q8h day 7  - Fungitell test positive, on eraxis day 4  - Bactrim 400mg q8h day 7 for suspected Pneumocystis pneumonia.    - Pneumocystis stain negative: will probably change to  Prophylactic Bactrim dose   - tests for opportunistic infections pending: HIV GART, [id number],  urine GC/Chlamydia PCR  - Toxoplasma Ab, cryptococcus negative  - hepatitis A total Ab positive  - RPR reactive, quant 1:2, treponema pallidum ab pending  - Started on biktarvy 10/22    Hepatitis C resolved  Hep C antibody reactive  Viral load undetectable    Anemia   - Likely 2/2 from GI bleed vs HIV vs dilution (all cell lines are decreased today, was on bicarb drip)  - Hb 7.0, trending down   Transfused 1 unit 10/11, post transfusion Hb 8   - General surgery consulted, FOBT negative the second time 10/11  - Trend H/H   - type and screen   - Repeat FOBT     DEISI   Hyponatremia  - Na trending down 128  - Nephro on board   - Likely secondary to dehydration from persistent diarrhea vs decreased PO intake vs bactrim vs HIVANS   - Cr trending up, 4.7  - Urine osm and urine nitrogen low    - UA: protein, 1-2 waxy casts, moderate bacteria, moderate blood    - Microalbumin-creatinine ratio elevated   - Kidney bx OP 2 weeks after discharge   Fluid restriction   Spoke with ICU team who spoke with nephrology, will trend BMP and consider dialyzing    Hypnagogic Hallucinations   - occurring since received anesthesia on 10/15   - no history of psych history or narcolepsy  - could be 2/2 to poor sleep vs anesthesia reaction vs vistaril?  - continue to monitor   - stopped taking vistaril       Elevated troponin and proBNP  - 2/2 myocardial injury vs stress cardiomyopathy due to COVID vs new onset CHF   - initial troponin 78, 78  - initial BNP 1599,  repeat 11,401 on 10/14, trending down 3400   - ECHO: EF 55%, late bubble sign possible shunting of pulmonary origin  - Repeat ECHO pending   Received 1 dose of IV Lasix 40mg on 10/15      Tachycardia  - 2/2 to infectious vs anxiety vs anemia   HR improving   Cardio consulted, recommend to start Beta blocker   - Hold lopressor   Asymptomatic  Continue to monitor    HTN  - BP is controlled   - Lopressor held   - labetalol PRN  - Continue to monitor      Proteinuria  -protein to creatinine ratio 3.8  - microalbumin to creatine ratio elevated   -kidney biopsy OP per nephro, patient agreeable    Hypoalbuminemia   - Alb 2.8  -Previously received albumin 25 g q6h x 4 doses 10/6 and 8 doses 10/12   - Receiving albumin 25g q6h x 8 doses   Low C3, normal C4 and cryoglobulin  Fluid restriction  - Continue to monitor             PT/OT evaluation: not indicated   DVT prophylaxis: heparin due to DEISI    GI prophylaxis:protonix  Disposition:ICU  Diet: adult         Electronically signed by Arsenio Funk MD PGY-3 on 10/23/2021 at 6:29 AM  Attending physician: Dr. Belén Andrews

## 2021-10-23 NOTE — PLAN OF CARE
Problem: Airway Clearance - Ineffective  Goal: Achieve or maintain patent airway  Outcome: Met This Shift     Problem: Gas Exchange - Impaired  Goal: Absence of hypoxia  Outcome: Met This Shift     Problem: Gas Exchange - Impaired  Goal: Promote optimal lung function  Outcome: Met This Shift     Problem: Falls - Risk of:  Goal: Will remain free from falls  Description: Will remain free from falls  Outcome: Met This Shift     Problem: Falls - Risk of:  Goal: Absence of physical injury  Description: Absence of physical injury  Outcome: Met This Shift   Plan of care discussed with patient / family.

## 2021-10-23 NOTE — CONSULTS
Medical Intensive Care Unit     [facility]  Resident Consult Note    Date and time: 10/23/2021 2:05 PM  Patient's name:  Morena Tam  Medical Record Number: [id number]  Patient's account/billing number: [de-identified]  Patient's YOB: 1990  Age: 36 y.o. Date of Admission: 10/5/2021  3:07 PM  Length of stay during current admission: 18    Primary Care Physician: Jonathan Meneses MD  ICU Attending Physician: Dr. Juliano East    Code Status: Full Code    Reason for ICU admission: Respiratory distress    History of Present Illness:   Patient is a 36 y.o. male with no past medical history prio to this admission except for 2 previous COVID-19 infection (early 2021, August 2021) who presented for worsening SOB. Patient has been having worsening SOB for the past month with associated sputum production, intermittent chest discomfort, diarrhea, fever, chills, decreased appetite, weight loss of 50 lbs since August, and needles sensation on his lower extremities. In the ED, patient as found to be hypoxic in the 80%. Patient was (+) for COVID-19 and was admitted. Further workup revealed patient had elevated troponin, ProBNP, AST, creatinine, and had anemia and hyponatremia. The following tests were negative: legionella, strep pneumonia Ag, MARILEE, C4, C3, Hep B, Hep A. Blood culture was initially positive for positive gram cocci. Repeat blood culture was negative for gram positive cocci but patient has already been started on antibiotics. Serum beta-d glucan was positive and patient was treated with anidulafungin for 6 days. ID, nephrology and pulmonoolgy have been consulted. Patient tested positive for HIV-1 Ab with CD4 of 43-44. Patient was also positive for latent syphillis, Hepatitis C and methicillin susceptible staph epidermidies bacteremia. Patient was started on Biktarvy on 10/21/21 and high dose bactrim on 10/13/21 (day 11). Patient was given first dose of penicillin G on 10/22/21. On 10/23/21, an RRT was called because patient had desaturation and was tachycardic HR >130. Patient was transferred to MICU for close monitoring of respiratory status with low threshold for intubation. When patient arrived in the ICU, he was on 15 L nonrebreather mask with saturation >90%. He was tachycardic HR 120s and tachypneic. Patient was started on decadron 20 mg daily. Patient was awake, alert and responsive. He was oriented to person, place and time. When patient was asked if he would like to be intubated in the case he desaturates but needed more time to think of a decision as he is scared he might not 'wake up' after intubation. Patient was reassured and educated that inbuated and extubation is different for each patient but it will help him breath in case he himself cannot do so. In the morning patient's mom was outside his room and was updated on his respiratory status, his thoughts about intubation and antibiotic treatments. Details of positivity to specific diseases were left out with respect for patient's privacy and decision to disclose was left to the patient.           CURRENT VENTILATION STATUS:   [] Ventilator  [] BIPAP  [] Nasal Cannula [] Room Air      SECRETIONS   Amount:  [] Small [] Moderate  [] Large [x] None    Color:     [] White [] Colored  [] Bloody    SEDATION:  RAAS Score:  [] Propofol gtt  [] Versed gtt  [] Fentanyl gtt  [x] No Sedation     PARALYZED:  [] No    [] Yes    VASOPRESSORS:  [x] No    [] Yes    If yes -   [] Levophed       [] Dopamine     [] Vasopressin       [] Dobutamine  [] Phenylephrine         [] Epinephrine    CENTRAL LINES:     [x] No   [] Yes   (Date of Insertion:   )           If yes -     [] Right IJ     [] Left IJ [] Right Femoral [] Left Femoral                   [] Right Subclavian [] Left Subclavian     HORN'S CATHETER:   [x] No   [] Yes  (Date of Insertion:   )     URINE OUTPUT:            [] Good   [x] Low              [] Anuric    REVIEW OF SYSTEMS:    · Constitutional: + fever, + chills, 50 lbs weight loss; decreased appetite  · HEENT: No blurred vision, no ear problems, no sore throat, no rhinorrhea. · Respiratory: No cough, + sputum production, + pleuritic chest pain, + shortness of breath  · Cardiology: No angina, no dyspnea on exertion, no paroxysmal nocturnal dyspnea, no orthopnea, no palpitation, no leg swelling. · Gastroenterology: No dysphagia, no reflux; no abdominal pain, no nausea or vomiting; no constipation, + diarrhea. No hematochezia   · Genitourinary: No dysuria, no frequency, hesitancy; no hematuria  · Musculoskeletal: no joint pain, no myalgia, no change in range of movement  · Neurology: no focal weakness in extremities, no slurred speech, no double vision, no numbness sensation, + tingling sensation of b/l LE  · Endocrinology: no temperature intolerance, no polyphagia, polydipsia or polyuria  · Hematology: no increased bleeding, no bruising, no lymphadenopathy  · Skin: no skin changes noticed by patient  · Psychology: no depressed mood, no suicidal ideation    OBJECTIVE:     VITAL SIGNS:  /85   Pulse 116   Temp 100 °F (37.8 °C) (Bladder)   Resp (!) 32   Ht 6' 4\" (1.93 m)   Wt 173 lb (78.5 kg)   SpO2 100%   BMI 21.06 kg/m²   Tmax over 24 hours:  Temp (24hrs), Av.3 °F (37.9 °C), Min:99.4 °F (37.4 °C), Max:101.3 °F (38.5 °C)      Patient Vitals for the past 6 hrs:   BP Temp Temp src Pulse Resp SpO2   10/23/21 1300    116 (!) 32    10/23/21 1200 131/85 100 °F (37.8 °C) Bladder 107 (!) 31 100 %   10/23/21 1100 127/81   105 27 97 %   10/23/21 1000 125/81   112 (!) 32 95 %   10/23/21 0900 108/75   109 27 96 %         Intake/Output Summary (Last 24 hours) at 10/23/2021 1405  Last data filed at 10/23/2021 1200  Gross per 24 hour   Intake 588.4 ml   Output 455 ml   Net 133.4 ml     Wt Readings from Last 2 Encounters:   10/06/21 173 lb (78.5 kg)   10/05/21 171 lb (77.6 kg)     Body mass index is 21.06 kg/m². PHYSICAL EXAMINATION:  General appearance - alert, awake, conversive, and moderate-severed respiratory distress  Mental status - alert, oriented to person, place, and time  Eyes - pupils equal and reactive, extraocular eye movements intact  Neck - supple, no significant adenopathy  Chest - decreased breath sounds on bilateral upper lung fields, + minimal crackles on left lower lung, no wheezes, rales or rhonchi, symmetric air entry  Heart - tachycardic, regular rhythm, normal S1, S2, no murmurs, rubs, clicks or gallops  Abdomen - soft, nontender, nondistended, no masses or organomegaly  Neurological - alert, oriented, normal speech, no focal findings or movement disorder noted  Extremities - peripheral pulses normal, no pedal edema, no clubbing or cyanosis  Skin - normal coloration and turgor, no rashes, no suspicious skin lesions noted      Any additional physical findings:    MEDICATIONS:  Scheduled Meds:   insulin lispro  0-6 Units SubCUTAneous TID WC    insulin lispro  0-3 Units SubCUTAneous Nightly    cefepime  1,000 mg IntraVENous Q12H    sodium chloride   IntraVENous Q12H    methylPREDNISolone  30 mg IntraVENous Q12H    penicillin G benzathine  2.4 Million Units IntraMUSCular Weekly    [Held by provider] sulfamethoxazole-trimethoprim (BACTRIM) IVPB  400 mg IntraVENous Q12H    lidocaine  1 patch TransDERmal Daily    ascorbic acid  500 mg Oral Daily    bictegravir-emtricitab-tenofovir alafenamide  1 tablet Oral Daily    enoxaparin  40 mg SubCUTAneous Daily    [Held by provider] metoprolol tartrate  25 mg Oral Daily    pantoprazole  40 mg Oral QAM AC    sodium chloride flush  5-40 mL IntraVENous 2 times per day     Continuous Infusions:   dextrose      sodium chloride      sodium chloride       PRN Meds:   glucose, 15 g, PRN  dextrose, 12.5 g, PRN  glucagon (rDNA), 1 mg, PRN  dextrose, 100 mL/hr, PRN  medicated lip balm, , PRN  benzonatate, 100 mg, BID PRN  perflutren lipid microspheres, 1.5 mL, ONCE PRN  hydrOXYzine, 25 mg, TID PRN  labetalol, 10 mg, Q6H PRN  sodium chloride, , PRN  sodium chloride flush, 5-40 mL, PRN  ondansetron, 4 mg, Q8H PRN   Or  ondansetron, 4 mg, Q6H PRN  polyethylene glycol, 17 g, Daily PRN  acetaminophen, 650 mg, Q6H PRN   Or  acetaminophen, 650 mg, Q6H PRN  sodium chloride, 25 mL, PRN        VENT SETTINGS (Comprehensive) (if applicable):  Vent Information  SpO2: 100 %  Additional Respiratory  Assessments  Pulse: 116  Resp: (!) 32  SpO2: 100 %  Oral Care: Teeth brushed, Mouthwash    ABGs:   Recent Labs     10/23/21  0859   PH 7.473*   BE -4.1*   O2SAT 96.9       Laboratory findings:  Complete Blood Count:   Recent Labs     10/21/21  0604 10/21/21  1123 10/22/21  0716 10/22/21  1523 10/22/21  2203 10/23/21  0400 10/23/21  0632   WBC 1.2*  --  1.1*  --   --  1.7*  --    HGB 7.5*   < > 7.0*   < > 7.4* 8.9* 7.2*   HCT 22.5*   < > 20.8*   < > 21.8* 26.4* 21.1*   *  --  92*  --   --  79*  --     < > = values in this interval not displayed. Last 3 Blood Glucose:   Recent Labs     10/22/21  2203 10/23/21  0400 10/23/21  0934   GLUCOSE 81 93 71*        PT/INR:    Lab Results   Component Value Date    PROTIME 17.7 10/15/2021    INR 1.6 10/15/2021     PTT:    Lab Results   Component Value Date    APTT 36.7 10/23/2021       Comprehensive Metabolic Profile:   Recent Labs     10/22/21  0716 10/22/21  2203 10/23/21  0400 10/23/21  0934   NA  --  127* 124* 127*   K  --  5.1* 5.4* 4.8   CL  --  96* 91* 96*   CO2  --  16* 18* 18*   BUN  --  34* 36* 39*   CREATININE  --  4.4* 4.7* 4.6*   GLUCOSE  --  81 93 71*   CALCIUM  --  8.6 8.8 8.2*   PROT   < >  --  6.7  --    LABALBU   < >  --  3.5  --    BILITOT   < >  --  0.5  --    ALKPHOS   < >  --  73  --    AST   < >  --  104*  --    ALT   < >  --  22  --     < > = values in this interval not displayed.       Magnesium:   Lab Results   Component Value Date    MG 2.0 10/23/2021     Phosphorus:   Lab Results   Component Value Date    PHOS 4.6 10/23/2021     Ionized Calcium:   Lab Results   Component Value Date    CAION 1.13 10/15/2021      Troponin: No results for input(s): TROPONINI in the last 72 hours. Radiology/Imaging:   XR CHEST PORTABLE   Final Result   Mild worsening of extensive bilateral infiltrates         XR CHEST PORTABLE   Final Result   Increasing bilateral airspace disease suggesting worsening pneumonia. FL LUMBAR PUNCTURE DIAG   Final Result   Successful fluoroscopic-guided lumbar puncture. XR CHEST PORTABLE   Final Result   Extensive bilateral patchy areas of ground-glass opacity concerning for   atypical pneumonia or viral airway disease, improved         XR CHEST PORTABLE   Final Result   Persistent bilateral infiltrates. No significant changes since October 13. See above comments. See report CT chest October 13. CTA PULMONARY W CONTRAST   Final Result   1. No evidence of pulmonary embolism. 2. Interval increased size of bilateral pleural effusions with little change   in pericardial effusion. 3. Persistent subcutaneous edema. 4. Reactive bilateral hilar lymphadenopathy. 5. Bilateral airspace disease is increased compared to prior examination. The increase is most pronounced within the lower lobes. 6. Bilateral gynecomastia. XR CHEST PORTABLE   Final Result   Development of extensive bilateral pulmonary infiltrates         XR CHEST PORTABLE   Final Result   No acute process. US RETROPERITONEAL COMPLETE   Final Result   1. Hyperechoic kidneys due to underlying parenchymal disease. 2. Trace bilateral perinephric fluid of indeterminate etiology, potentially   due to underlying inflammation. 3. An approximately 1.1 cm x 1.5 cm x 2.3 cm lesion in the right kidney could   be a cyst but could also be seen with infarction, abscess, or neoplasia. Consider further evaluation with renal protocol abdomen MRI (preferred) or   CT. Following resolution of acute kidney injury. XR CHEST PORTABLE   Final Result   Multifocal bilateral pulmonary ground-glass airspace opacities are stable         CT ABDOMEN PELVIS WO CONTRAST Additional Contrast? None   Final Result   Limited study by the patient's respiratory motion and lack of intravenous   contrast.      Extensive multifocal COVID-19 pneumonia bilaterally. Bilateral trace layering pleural effusions. Bilateral gynecomastia. Abdominal organs inadequately evaluated due to the limitations of the study. Normal appendix. No evidence of bowel obstruction. CT CHEST WO CONTRAST   Final Result   Limited study by the patient's respiratory motion and lack of intravenous   contrast.      Extensive multifocal COVID-19 pneumonia bilaterally. Bilateral trace layering pleural effusions. Bilateral gynecomastia. Abdominal organs inadequately evaluated due to the limitations of the study. Normal appendix. No evidence of bowel obstruction.                ASSESSMENT  And PLAN:     Cardiology  Elevated troponin likely type 2 MI  Troponin 78--> 45  EKG: sinus tachycardia    QTc prolongation  Max 535 on 10/07/21  Avoid QTc prolonging meds    Pulmonary  Acute hypoxic respiratory failure 2/2 PCP vs COVID-19  Initially spO2 80s% in ED  RRT called due to desaturation spO2 <90%   (+) COVID-19 x3 (early 2021, August 2021, October 2021)  Aspergillus negative, cryptococcus negative, PCP DFA negative, BAL gram stain and culture negative   meningitis panel: negative for HSV, VZV, CMV, Enterovirus, HHV-6, Streptococcus pneumoniae and agalactiae, E coli, Haemophilus, Neisseria meninigitidis, Cryptococcus  CXR: worse right upper lung field  Currently requiring 15 L on nonrebreather mask  Decadron 20 mg daily started  Continue high dose bactrim  Pending blood culture and respiratory culture, BAL fungus pending  Pulmonology following, follow recs  Monitor respiratory status, low threshold for and revisions were made as appropriate. I agree with the above documented exam, problem list and plan of care. HIV   Multiple COVID   DEISI /hyperkalemia   Pancytopenia   PCP though negative test ,I still believe he has   ID following   DEISI. creatinine 4.7   Possible IRIS ,ons steroids and ART   MSSA /endocriditis ,will check echo  Keep in icu   Possible intubation if worsen and will repeat bronch if needed  Latent Syphilis ,on Pen G   Received Lasix 80 mg IV   Jelani Hall MD,[facility]  Pulmonary&Critical Care Medicine   Director of [address] [place] [address] Director of [address] [place] [address]    Ruma Wolf

## 2021-10-23 NOTE — PROGRESS NOTES
scopes s/p 1U PRBCs, will trend h/h. Follow. Stable overall  . Blood culture with staph epidermidis, finished treatment with Ancef, Then antibiotics were changed from ancef to bactrim and cefepime for possible sepsis/PCP pneumonia. Bactrim now on hold due to DEISI. Treatment for HAP started with cefepime and increased steroid dosing, wean oxygen as tolerated s/p RRT 10/23 am .1,3 Beta-D-glucan was positive. Nephrology planning biopsy outpatient with nephrotic range protienuria, also with hyponatremia     On insulin for elevated bg on steroids, check a1c    Follow up outpatient for right kidney lesion-ct/mri when cr improved. He has discussed test results with both his mother and step-father. Attending Physician Statement  I have reviewed the chart and seen the patient with the resident(s). I personally reviewed images, EKG's and similar tests, if present. I personally reviewed and performed key elements of the history and exam.  I have reviewed and confirmed student and/or resident history and exam with changes as indicated above. I agree with the assessment, plan and orders as documented by the resident. Please refer to the resident and/or student note for additional information.       Mony Plasencia MD

## 2021-10-23 NOTE — PROGRESS NOTES
Department of Internal Medicine  Nephrology Progress Note      Events reviewed with the ICU RN. SUBJECTIVE:  We are following Mr. Bobby Santiago for DEISI.   No acute overnight events  PHYSICAL EXAM:      Vitals:    VITALS:  /87   Pulse 103   Temp 100 °F (37.8 °C) (Bladder)   Resp 27   Ht 6' 4\" (1.93 m)   Wt 173 lb (78.5 kg)   SpO2 98%   BMI 21.06 kg/m²   24HR INTAKE/OUTPUT:      Intake/Output Summary (Last 24 hours) at 10/23/2021 1646  Last data filed at 10/23/2021 1400  Gross per 24 hour   Intake 1295.4 ml   Output 535 ml   Net 760.4 ml       Constitutional:  Awake, alert, NAD  HEENT:  PERRL, normocephalic, atraumatic  Respiratory: diminished lung sounds  Cardiovascular/Edema:  RRR, S1/S2, -edema  Gastrointestinal:  abd flat, soft, non-tender, Perkins in place  Neurologic:  Awake, alert, no focal deficits  Skin:  Warm, dry, no rash or lesions  Other: BLE 1+ edema       Scheduled Meds:   insulin lispro  0-6 Units SubCUTAneous TID WC    insulin lispro  0-3 Units SubCUTAneous Nightly    cefepime  1,000 mg IntraVENous Q12H    sodium chloride   IntraVENous Q12H    methylPREDNISolone  30 mg IntraVENous Q12H    penicillin G benzathine  2.4 Million Units IntraMUSCular Weekly    [Held by provider] sulfamethoxazole-trimethoprim (BACTRIM) IVPB  400 mg IntraVENous Q12H    lidocaine  1 patch TransDERmal Daily    ascorbic acid  500 mg Oral Daily    bictegravir-emtricitab-tenofovir alafenamide  1 tablet Oral Daily    enoxaparin  40 mg SubCUTAneous Daily    [Held by provider] metoprolol tartrate  25 mg Oral Daily    pantoprazole  40 mg Oral QAM AC    sodium chloride flush  5-40 mL IntraVENous 2 times per day     Continuous Infusions:   dextrose      sodium chloride      sodium chloride       PRN Meds:.glucose, dextrose, glucagon (rDNA), dextrose, medicated lip balm, benzonatate, perflutren lipid microspheres, hydrOXYzine, labetalol, sodium chloride, sodium chloride flush, ondansetron **OR** ondansetron, polyethylene glycol, acetaminophen **OR** acetaminophen, sodium chloride    DATA:    CBC:   Lab Results   Component Value Date    WBC 1.7 10/23/2021    RBC 2.97 10/23/2021    HGB 8.2 10/23/2021    HCT 24.3 10/23/2021    MCV 88.9 10/23/2021    MCH 30.0 10/23/2021    MCHC 33.7 10/23/2021    RDW 14.4 10/23/2021    PLT 79 10/23/2021    MPV 13.3 10/23/2021     CMP:    Lab Results   Component Value Date     10/23/2021    K 4.9 10/23/2021    K 4.8 10/23/2021    CL 96 10/23/2021    CO2 17 10/23/2021    BUN 42 10/23/2021    CREATININE 4.6 10/23/2021    GFRAA 18 10/23/2021    LABGLOM 18 10/23/2021    GLUCOSE 117 10/23/2021    PROT 6.7 10/23/2021    LABALBU 3.5 10/23/2021    CALCIUM 7.9 10/23/2021    BILITOT 0.5 10/23/2021    ALKPHOS 73 10/23/2021     10/23/2021    ALT 22 10/23/2021     Magnesium:    Lab Results   Component Value Date    MG 2.0 10/23/2021     Phosphorus:    Lab Results   Component Value Date    PHOS 4.6 10/23/2021        Radiology Review:       Ejection fraction is visually estimated at 55%. There is late positive bubble suggesting possible shunt from pulmonary   origin. Visually moderately dilated left atrium. Normal right ventricular size and function. There is small pericardial effusion without tamponade physiology        CT Chest October 5, 2021   Limited study by the patient's respiratory motion and lack of intravenous   contrast.       Extensive multifocal COVID-19 pneumonia bilaterally.       Bilateral trace layering pleural effusions.       Bilateral gynecomastia.       Abdominal organs inadequately evaluated due to the limitations of the study.       Normal appendix.       No evidence of bowel obstruction.         Kidney ultrasound October 7, 2021   1. Hyperechoic kidneys due to underlying parenchymal disease. 2. Trace bilateral perinephric fluid of indeterminate etiology, potentially   due to underlying inflammation.    3. An approximately 1.1 cm x 1.5 cm x 2.3 cm lesion in the right kidney could   be a cyst but could also be seen with infarction, abscess, or neoplasia. Consider further evaluation with renal protocol abdomen MRI (preferred) or   CT.  Following resolution of acute kidney injury.         CTA pulmonary with IV contrast October 13, 2021   1. No evidence of pulmonary embolism. 2. Interval increased size of bilateral pleural effusions with little change   in pericardial effusion. 3. Persistent subcutaneous edema. 4. Reactive bilateral hilar lymphadenopathy. 5. Bilateral airspace disease is increased compared to prior examination. The increase is most pronounced within the lower lobes. 6. Bilateral gynecomastia.                 BRIEF SUMMARY OF INITIAL CONSULT:     Briefly, Mr. Bernell Meckel is a 36year old male with no significant PMH who was admitted on October 5, 2021 after he presented from an outside clinic after he was found to be hypoxic during 6 minute walking test. Patient tested positive for Covid 19 on August 18th, he is unvaccinated and had Covid 19 last year as well. Patient states he has quarantined by himself and when his mother recently visited him she was shocked at his appearance as he had lost about 50 pounds in 2 and a half months. On admission labs were significant for sodium of 130, potassium 5.2, bicarbonate 20, BUN 41, creatinine 3.6, magnesium 2.7, calcium 7.7 and proBNP 1,599. We are consulted for DEISI. Problems resolved:    Hyperkalemia, 2/2 DEISI. Low potassium diet  Hypotonic hyponatremia 2/2 intravascular volume from poor oral intake. Bicarb drip started. Sodium levels improving. Low bicarbonate levels with hyperchloremia, most likely NAGMA versus respiratory alkalosis; we need a PH to clarify diagnosis.  Awaiting ABG results  High bicarbonate levels, likely metabolic alkalosis 2/2 administration  Hypokalemia, multifactorial, poor intake, probably renal potassium wasting  Severe Hypoalbuminemia, multifactorial, status post albumin administration  DEISI on CKD, volume responsive pre-renal DEISI d/t volume (poor oral intake), urine sodium <20. Resolved. Edematous state, multifactorial, mainly 2/2 nephrotic syndrome and possibly HFpEF 55%, on bumex    IMPRESSION/RECOMMENDATIONS:     Recurrent DEISI on CKD, ATN contrast associated DEISI versus ischemic ATN (hypotension related tachycardia). Nonoliguric. Renal function continues to worsen with increasing creatinine levels. We will continue to monitor, more afraid he might need renal replacement therapy if no improvement of renal function occurs. 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 [facility] Work-up so far HIV positive, Hepatitis C positive, MARILEE negative, C4 normal, C3 88 (low), UPEP without monoclonal gammopathy, negative cryoglobulins. Kidney ultrasound with hyperechoic kidneys. Needs kidney biopsy, discussed with interventional radiology they would rather wait for a couple weeks to perform biopsy in view of patient having Covid-19 infection. Hyponatremia, multifactorial, including decreased GFR and hemodynamically mediated in the setting of large hypotonic fluid administration (D5 with Bactrim). Sodium levels decrease, stable overnight    Low bicarbonate levels with hyperchloremia, consistent with either hyperchloremic metabolic acidosis versus respiratory alkalosis. Bicarbonate levels improved with bicarbonate drip. Hypomagnesemia, 2/2 poor intake   Probably HFpEF 55%, proBNP 11,401 > 3421   HTN, on metoprolol   Right kidney lesion, likely a cyst but cannot be ruled out other pathology including infarction, abscess, neoplasm.   We will proceed with MRI when renal function improved  HIV positive, CD4 count 43, started on OLMSTEAD therapy    ---------------------------------------------------------------------  MSSE bacteremia, on cefepime 2 g every 8 hours  Possible pneumocystic pneumonia, on sulfamethoxazole-trimethoprim 400 mg IV every 8 hours, needs TTE  Probably fungemia, (1, 3) beta-D-glucan positive, on anidulafungin  Sinus tachycardia, multifactorial  Hepatitis C antibody positive, awaiting viral load  Covid 19 infection in non vaccinated pt  Normocytic anemia, multifactorial  Leukopenia, WBC 1.1    Plan:    1. Urine output remains marginal, he also has moderate metabolic acidosis with a bicarbonate of 18  2. With no signs of renal recovery he may need dialysis soon  3. Obtain urine electrolytes, creatinine and osmolality  4. Hyponatremia is likely secondary to impaired free water excretion as he remains oliguric  5. Continue to monitor the renal function and urine output closely  6.  Okay to remove Perkins    Discussed with the ICU RN    Electronically signed by Rickey Rivas MD on 10/23/2021 at 4:46 PM

## 2021-10-23 NOTE — SIGNIFICANT EVENT
Rapid Response Team Note  Date of event: 10/23/2021   Time of event: 03:43   Jose Banks 36y.o. year old male   YOB: 1990   Admit date:  10/5/2021   [facility]   Witnessed? : [x]Yes  [] No  Monitored? : [x]Yes  [] No  Code status: [x] Full  [] DNR-CCA  []DNR-CC  ______________________________________________________________________  Reason for RRT:    [] RR < 8     [] RR > 28   [x] SpO2 <90%   [] HR < 40 bpm   [x] HR > 130 bpm  [] SBP < 90 mmHg    [] SpO2 <90%   [] LOC   [] Seizures    [] Significant Bleeding Event    [] Other:    Subjective:   CTSP regarding the above event, RRT called for hypoxia and tachycardia cardia. On arrival patient was agitated, anxious, and struggling to breathe. He was placed on 15 L of form 3 L nasal cannula.   Objective:   Vital signs: Temp: 100.9/BP: 96/52/RR: 24/HR: 142  Initial Condition:  Conscious   [x] Yes  [] No     Breathing [x] Yes  [] No     Pulse  [x] Yes  [] No    Airway:   [x] Open/ Clear     Intervention: [] None  [] Pooled secretions     [] Suctioned  [] Stridor      [] Intubation    Lungs:   [] Symmetrical chest rise/ CTABL Intervention: [] None  [] Use of accessory muscles    [] NIV (CPAP/BiPAP)  [] Cyanosis      [] Nasal Oxygen/Mask  [] Wheezing       [] ABG             [x] CXR  [] Other:     Circulation:   Rhythm:  [x] Sinus [] Other:  Intervention: [] None            [] IV Access  [] Peripheral              [] Central            [x] EKG            [] Cardioversion            [] Defibrillation     Capillary Refill:  [] > 2 seconds [] < 2 seconds    Neurologic:   [] NIHSS      [] Pupillary Response:     Response to pain:   [] Yes  [] No  Follow commands:  [] Yes  [] No  Facial asymmetry:  [] Yes  [] No  Motor strength:  [] Equal  [] Focal deficit:   Diagnostic Test:  Blood Sugar:  107 mg/dL  EKG:    ST   ABG:    No results found for: PH, PCO2, PO2, HCO3, O2SAT  Medication(s) Given:  []  Narcan (Naloxone)   []  Romazicon (Flumazenil)    [] Breathing Treatment    []  Steroids (Prednisone, Solu-Medrol)  []  Adenosine  [] Cardiac Medicines:     Infusion(s):   [] Normal Saline    Amount:  cc/hr      [] Lactate Ringers      [] Other:     Imaging:   [x] CXR:   [] Normal         [] Pneumothorax         [] Pulmonary Edema  [x] Infiltrate          [] CT Head  [] Normal          [] ICB          [] [facility]          [] Other:     Laboratory Tests:     CBC:   Lab Results   Component Value Date    WBC 1.7 10/23/2021    RBC 2.97 10/23/2021    HGB 8.9 10/23/2021    HCT 26.4 10/23/2021    MCV 88.9 10/23/2021    MCH 30.0 10/23/2021    MCHC 33.7 10/23/2021    RDW 14.4 10/23/2021    PLT 79 10/23/2021    MPV 13.3 10/23/2021     BMP:    Lab Results   Component Value Date     10/23/2021    K 5.4 10/23/2021    K 4.7 10/22/2021    CL 91 10/23/2021    CO2 18 10/23/2021    BUN 36 10/23/2021    LABALBU 3.5 10/23/2021    CREATININE 4.7 10/23/2021    CALCIUM 8.8 10/23/2021    GFRAA 18 10/23/2021    LABGLOM 18 10/23/2021    GLUCOSE 93 10/23/2021         Teams Assessment and Plan:  1. Acute hypoxic respiratory failure, secondary to Covid pneumonia, versus PCP in the setting of an immunocompromised host.  Chest x-ray showing worsening bilateral infiltrates. 2. Fever, after initiation of benzathine penicillin within the first 24 hours, J-H reaction ? Please refer to the ICU consult note for a complete Assessment and Plan      ?  ? Disposition:  [] No transfer   [] Transfer to monitor floor  [x] Transfer to: [] MICU [] NICU [] CCU [] SICU    Patients family updated: [x] Yes  [] No   Discussed with:  [x] Critical Care Intensivist: Dr. Gonzales Gooden       [x] Primary Care Provider:       [] Other: ?     Ericka Madera MD PGY-3  10/23/2021 5:46 AM  Attending Hardeep John MD

## 2021-10-23 NOTE — PROGRESS NOTES
[facility] PROGRESS NOTE      Chief complaint: Follow-up of Gram-positive cocci in clusters on blood culture    The patient is a 36 y.o. male, not vaccinated against COVID-19, with history of asthma, presented on 10/05 with shortness of breath, found to be febrile at 103 °F, positive SARS-CoV-2 PCR, bilateral groundglass opacities on chest CT, tolerating room air with oxygen saturation of 97%. He reports having tested positive for SARS-CoV-2 for the 3rd time now with previous on in late August at which time he reports having quarantined. Urine Streptococcus pneumonia and Legionella antigens were negative. Blood cultures from 10/05 and 10/06 showed methicillin-susceptible Staphylococcus epidermidis in 1 out of 2 sets. HIV screen was positive with viral load of 195,000. CD4 count was low at 43. RPR was reactive at titer of 1:2. Toxoplasma Ab was negative. HLA- was negative. Serum beta-d-glucan was positive. Transthoracic echocardiogram showed late positive bubble suggesting possible shunt from pulmonary origin, moderately dilated left atrium, small pericardial effusion without tamponade physiology, ejection fraction visually estimated at 55%. He underwent bronchoscopy on 10/15 during which normal endobronchial evaluation and normal anatomy were noted. BAL Gram stain and culture showed rare polymorphonuclear leukocytes, rare epithelial cells, rare gram-negative rods, rare gram-positive rods, reduced oropharyngeal liana. BAL pneumocystis stain was negative. BAL galactomannan was negative. Serum Cryptococcus antigen was negative. He underwent lumbar puncture on 10/21, yielding CSF with normal glucose, protein, csll count with differential. CSF meningitis/encephalitis PCR panel was negative for HSV, VZV, CMV, Enterovirus, HHV-6, Streptococcus pneumoniae and agalactiae, E coli, Haemophilus, Neisseria meninigitidis, Cryptococcus.  He was transferred to MICU on 10/23 for worsening shortness of breath      Subjective: Patient was seen and examined. No chills, no abdominal pain, no diarrhea, no rash, no itching. Had fever of to 101.3 F today. Objective:  /75   Pulse 109   Temp 101.1 °F (38.4 °C) (Axillary)   Resp 27   Ht 6' 4\" (1.93 m)   Wt 173 lb (78.5 kg)   SpO2 96%   BMI 21.06 kg/m²   Constitutional: Alert, not in distress  Respiratory: Clear breath sounds, no crackles, no wheezes  Cardiovascular: Regular rate and rhythm, no murmurs  Gastrointestinal: Bowel sounds present, soft, nontender  Skin: Warm and dry, no active dermatoses  Musculoskeletal: No joint swelling, no joint erythema    Labs, imaging, and medical records/notes were personally reviewed. Assessment:  Fever and pancytopenia, suspect associated with acute retroviral syndrome. Jarische-Herxheimer reaction is less likely in late latent syphilis with low RPR titers. Pancytopenia, multifactorial, suspect associated with acute retroviral syndrome and/or medication-induced  COVID-19, mild  Pneumonia, suspected Pneumocystis pneumonia (serum beta-d-glucan was positive, BAL Pneumocystis stain was negative but Pneumocystis PCR was not sent)  Late latent syphilis, neurosyphilis ruled out  Methicillin susceptible Staphylococcus epidermidis bacteremia, etiology unclear  DEISI  Advanced HIV disease (viral load of 195,000; CD4 of 43 as of 10/11/2021). Hepatitis C Ab positive (viral load not detected). Hepatitis A immune  Prolonged QTc  HAP    Recommendations:  Start cefepime 1g q12h. Continue benzathine penicillin IM weekly for 3 doses. Continue Biktarvy 1 tab once daily. Resume high-dose Bactrim at 5 mg trimethoprim dose per kilogram actual body weight every 12 hours dosed according to renal function once ok per Nephrology for suspected Pneumocystis pneumonia with elevated serum beta-d-glucan in the setting of advanced HIV disease with CD4 of 48 together with adjunctive corticosteroids for severe Pneumocystis pneumonia.  (Discussed with  Roya)  Change dexamethasone to methylprednisolone 30 mg q12h for 5 days followed by 30 mg daily for 5 days then 15 mg daily for 11 days. Switch to oral prednisone once able. Follow up HIV GART, urine GC/Chlamydia PCR. Monitor respiratory status. Continue supportive care. Thank you for involving me in the care of Kristin James. I will continue to follow. Please do not hesitate to call for any questions or concerns.     Electronically signed by Michaelle Wang MD on 10/23/2021 at 10:06 AM

## 2021-10-23 NOTE — PLAN OF CARE
Problem: Airway Clearance - Ineffective  Goal: Achieve or maintain patent airway  10/23/2021 1853 by Sita Molina RN  Outcome: Met This Shift  10/23/2021 0823 by Rayna Cole RN  Outcome: Met This Shift     Problem: Gas Exchange - Impaired  Goal: Absence of hypoxia  10/23/2021 1853 by Sita Molina RN  Outcome: Met This Shift  10/23/2021 0823 by Rayna Cole RN  Outcome: Met This Shift  Goal: Promote optimal lung function  10/23/2021 1853 by Sita Molina RN  Outcome: Met This Shift  10/23/2021 0823 by Rayna Cole RN  Outcome: Met This Shift     Problem: Breathing Pattern - Ineffective  Goal: Ability to achieve and maintain a regular respiratory rate  Outcome: Met This Shift     Problem:  Body Temperature -  Risk of, Imbalanced  Goal: Ability to maintain a body temperature within defined limits  Outcome: Met This Shift  Goal: Will regain or maintain usual level of consciousness  Outcome: Met This Shift  Goal: Complications related to the disease process, condition or treatment will be avoided or minimized  Outcome: Met This Shift     Problem: Isolation Precautions - Risk of Spread of Infection  Goal: Prevent transmission of infection  Outcome: Met This Shift     Problem: Nutrition Deficits  Goal: Optimize nutritional status  Outcome: Met This Shift     Problem: Risk for Fluid Volume Deficit  Goal: Maintain normal heart rhythm  Outcome: Met This Shift  Goal: Maintain absence of muscle cramping  Outcome: Met This Shift  Goal: Maintain normal serum potassium, sodium, calcium, phosphorus, and pH  Outcome: Met This Shift     Problem: Loneliness or Risk for Loneliness  Goal: Demonstrate positive use of time alone when socialization is not possible  Outcome: Met This Shift     Problem: Fatigue  Goal: Verbalize increase energy and improved vitality  Outcome: Met This Shift     Problem: Patient Education: Go to Patient Education Activity  Goal: Patient/Family Education  Outcome: Met This Shift     Problem: Falls - Risk of:  Goal: Will remain free from falls  Description: Will remain free from falls  10/23/2021 1853 by Laurie Dela Cruz RN  Outcome: Met This Shift  10/23/2021 0823 by Maude Charles RN  Outcome: Met This Shift  Goal: Absence of physical injury  Description: Absence of physical injury  10/23/2021 1853 by Laurie Dela Cruz RN  Outcome: Met This Shift  10/23/2021 0823 by Maude Charles RN  Outcome: Met This Shift     Problem: Pain:  Description: Pain management should include both nonpharmacologic and pharmacologic interventions.   Goal: Pain level will decrease  Description: Pain level will decrease  Outcome: Met This Shift  Goal: Control of acute pain  Description: Control of acute pain  Outcome: Met This Shift  Goal: Control of chronic pain  Description: Control of chronic pain  Outcome: Met This Shift     Problem: Skin Integrity:  Goal: Will show no infection signs and symptoms  Description: Will show no infection signs and symptoms  Outcome: Met This Shift  Goal: Absence of new skin breakdown  Description: Absence of new skin breakdown  Outcome: Met This Shift

## 2021-10-24 LAB
ALBUMIN SERPL-MCNC: 2.6 G/DL (ref 3.5–5.2)
ALP BLD-CCNC: 66 U/L (ref 40–129)
ALT SERPL-CCNC: 22 U/L (ref 0–40)
ANION GAP SERPL CALCULATED.3IONS-SCNC: 15 MMOL/L (ref 7–16)
ANION GAP SERPL CALCULATED.3IONS-SCNC: 16 MMOL/L (ref 7–16)
ANION GAP SERPL CALCULATED.3IONS-SCNC: 17 MMOL/L (ref 7–16)
ANISOCYTOSIS: ABNORMAL
APTT: 34.4 SEC (ref 24.5–35.1)
AST SERPL-CCNC: 85 U/L (ref 0–39)
B.E.: -3.7 MMOL/L (ref -3–3)
BASOPHILS ABSOLUTE: 0 E9/L (ref 0–0.2)
BASOPHILS RELATIVE PERCENT: 0 % (ref 0–2)
BILIRUB SERPL-MCNC: 0.3 MG/DL (ref 0–1.2)
BUN BLDV-MCNC: 51 MG/DL (ref 6–20)
BUN BLDV-MCNC: 56 MG/DL (ref 6–20)
BUN BLDV-MCNC: 63 MG/DL (ref 6–20)
BUN BLDV-MCNC: 66 MG/DL (ref 6–20)
BUN BLDV-MCNC: 67 MG/DL (ref 6–20)
BURR CELLS: ABNORMAL
CALCIUM SERPL-MCNC: 7.8 MG/DL (ref 8.6–10.2)
CALCIUM SERPL-MCNC: 7.9 MG/DL (ref 8.6–10.2)
CALCIUM SERPL-MCNC: 8 MG/DL (ref 8.6–10.2)
CALCIUM SERPL-MCNC: 8.2 MG/DL (ref 8.6–10.2)
CALCIUM SERPL-MCNC: 8.2 MG/DL (ref 8.6–10.2)
CHLORIDE BLD-SCNC: 94 MMOL/L (ref 98–107)
CHLORIDE BLD-SCNC: 95 MMOL/L (ref 98–107)
CHLORIDE BLD-SCNC: 96 MMOL/L (ref 98–107)
CHLORIDE URINE RANDOM: <20 MMOL/L
CO2: 16 MMOL/L (ref 22–29)
CO2: 16 MMOL/L (ref 22–29)
CO2: 17 MMOL/L (ref 22–29)
COHB: 0.1 % (ref 0–1.5)
CREAT SERPL-MCNC: 5.3 MG/DL (ref 0.7–1.2)
CREAT SERPL-MCNC: 5.4 MG/DL (ref 0.7–1.2)
CREAT SERPL-MCNC: 5.4 MG/DL (ref 0.7–1.2)
CREAT SERPL-MCNC: 5.6 MG/DL (ref 0.7–1.2)
CREAT SERPL-MCNC: 5.8 MG/DL (ref 0.7–1.2)
CREATININE URINE: 136 MG/DL (ref 40–278)
CRITICAL: ABNORMAL
DATE ANALYZED: ABNORMAL
DATE OF COLLECTION: ABNORMAL
EOSINOPHILS ABSOLUTE: 0 E9/L (ref 0.05–0.5)
EOSINOPHILS RELATIVE PERCENT: 0 % (ref 0–6)
GFR AFRICAN AMERICAN: 14
GFR AFRICAN AMERICAN: 15
GFR AFRICAN AMERICAN: 16
GFR NON-AFRICAN AMERICAN: 14 ML/MIN/1.73
GFR NON-AFRICAN AMERICAN: 15 ML/MIN/1.73
GFR NON-AFRICAN AMERICAN: 16 ML/MIN/1.73
GLUCOSE BLD-MCNC: 115 MG/DL (ref 74–99)
GLUCOSE BLD-MCNC: 124 MG/DL (ref 74–99)
GLUCOSE BLD-MCNC: 131 MG/DL (ref 74–99)
GLUCOSE BLD-MCNC: 160 MG/DL (ref 74–99)
GLUCOSE BLD-MCNC: 166 MG/DL (ref 74–99)
HCO3: 19 MMOL/L (ref 22–26)
HCT VFR BLD CALC: 22.7 % (ref 37–54)
HCT VFR BLD CALC: 23.3 % (ref 37–54)
HCT VFR BLD CALC: 23.5 % (ref 37–54)
HEMOGLOBIN: 7.7 G/DL (ref 12.5–16.5)
HEMOGLOBIN: 7.8 G/DL (ref 12.5–16.5)
HEMOGLOBIN: 8 G/DL (ref 12.5–16.5)
HHB: 0.7 % (ref 0–5)
IMMATURE GRANULOCYTES #: 0.01 E9/L
IMMATURE GRANULOCYTES %: 1.2 % (ref 0–5)
LAB: ABNORMAL
LYMPHOCYTES ABSOLUTE: 0.13 E9/L (ref 1.5–4)
LYMPHOCYTES RELATIVE PERCENT: 15.1 % (ref 20–42)
Lab: ABNORMAL
MCH RBC QN AUTO: 29.9 PG (ref 26–35)
MCHC RBC AUTO-ENTMCNC: 33.2 % (ref 32–34.5)
MCV RBC AUTO: 90 FL (ref 80–99.9)
METER GLUCOSE: 114 MG/DL (ref 74–99)
METER GLUCOSE: 128 MG/DL (ref 74–99)
METER GLUCOSE: 151 MG/DL (ref 74–99)
METER GLUCOSE: 166 MG/DL (ref 74–99)
METHB: 1.2 % (ref 0–1.5)
MODE: ABNORMAL
MONOCYTES ABSOLUTE: 0.03 E9/L (ref 0.1–0.95)
MONOCYTES RELATIVE PERCENT: 3.5 % (ref 2–12)
NEUTROPHILS ABSOLUTE: 0.69 E9/L (ref 1.8–7.3)
NEUTROPHILS RELATIVE PERCENT: 80.2 % (ref 43–80)
O2 CONTENT: 13.1 ML/DL
O2 SATURATION: 99.3 % (ref 92–98.5)
O2HB: 98 % (ref 94–97)
OPERATOR ID: 1926
OSMOLALITY URINE: 400 MOSM/KG (ref 300–900)
OVALOCYTES: ABNORMAL
PATIENT TEMP: 37 C
PCO2: 26.1 MMHG (ref 35–45)
PDW BLD-RTO: 14.6 FL (ref 11.5–15)
PH BLOOD GAS: 7.48 (ref 7.35–7.45)
PLATELET # BLD: 60 E9/L (ref 130–450)
PLATELET CONFIRMATION: NORMAL
PMV BLD AUTO: ABNORMAL FL (ref 7–12)
PO2: 306.9 MMHG (ref 75–100)
POIKILOCYTES: ABNORMAL
POLYCHROMASIA: ABNORMAL
POTASSIUM REFLEX MAGNESIUM: 4 MMOL/L (ref 3.5–5)
POTASSIUM REFLEX MAGNESIUM: 4.7 MMOL/L (ref 3.5–5)
POTASSIUM SERPL-SCNC: 4.3 MMOL/L (ref 3.5–5)
POTASSIUM SERPL-SCNC: 4.3 MMOL/L (ref 3.5–5)
POTASSIUM SERPL-SCNC: 4.7 MMOL/L (ref 3.5–5)
POTASSIUM SERPL-SCNC: 4.9 MMOL/L (ref 3.5–5)
POTASSIUM, UR: 28.4 MMOL/L
RBC # BLD: 2.61 E12/L (ref 3.8–5.8)
SCHISTOCYTES: ABNORMAL
SODIUM BLD-SCNC: 125 MMOL/L (ref 132–146)
SODIUM BLD-SCNC: 127 MMOL/L (ref 132–146)
SODIUM BLD-SCNC: 128 MMOL/L (ref 132–146)
SODIUM BLD-SCNC: 129 MMOL/L (ref 132–146)
SODIUM BLD-SCNC: 129 MMOL/L (ref 132–146)
SODIUM URINE: 52 MMOL/L
SOURCE, BLOOD GAS: ABNORMAL
THB: 8.9 G/DL (ref 11.5–16.5)
TIME ANALYZED: 1106
TOTAL PROTEIN: 5.6 G/DL (ref 6.4–8.3)
WBC # BLD: 0.9 E9/L (ref 4.5–11.5)

## 2021-10-24 PROCEDURE — 85018 HEMOGLOBIN: CPT

## 2021-10-24 PROCEDURE — 6370000000 HC RX 637 (ALT 250 FOR IP): Performed by: INTERNAL MEDICINE

## 2021-10-24 PROCEDURE — 85730 THROMBOPLASTIN TIME PARTIAL: CPT

## 2021-10-24 PROCEDURE — 2500000003 HC RX 250 WO HCPCS: Performed by: INTERNAL MEDICINE

## 2021-10-24 PROCEDURE — 2580000003 HC RX 258: Performed by: INTERNAL MEDICINE

## 2021-10-24 PROCEDURE — 85014 HEMATOCRIT: CPT

## 2021-10-24 PROCEDURE — 6370000000 HC RX 637 (ALT 250 FOR IP): Performed by: STUDENT IN AN ORGANIZED HEALTH CARE EDUCATION/TRAINING PROGRAM

## 2021-10-24 PROCEDURE — 85025 COMPLETE CBC W/AUTO DIFF WBC: CPT

## 2021-10-24 PROCEDURE — 2000000000 HC ICU R&B

## 2021-10-24 PROCEDURE — 87449 NOS EACH ORGANISM AG IA: CPT

## 2021-10-24 PROCEDURE — 82962 GLUCOSE BLOOD TEST: CPT

## 2021-10-24 PROCEDURE — 36415 COLL VENOUS BLD VENIPUNCTURE: CPT

## 2021-10-24 PROCEDURE — 99233 SBSQ HOSP IP/OBS HIGH 50: CPT | Performed by: INTERNAL MEDICINE

## 2021-10-24 PROCEDURE — 6360000002 HC RX W HCPCS: Performed by: INTERNAL MEDICINE

## 2021-10-24 PROCEDURE — 82805 BLOOD GASES W/O2 SATURATION: CPT

## 2021-10-24 PROCEDURE — 99232 SBSQ HOSP IP/OBS MODERATE 35: CPT | Performed by: FAMILY MEDICINE

## 2021-10-24 PROCEDURE — 99254 IP/OBS CNSLTJ NEW/EST MOD 60: CPT | Performed by: INTERNAL MEDICINE

## 2021-10-24 PROCEDURE — 6360000002 HC RX W HCPCS: Performed by: STUDENT IN AN ORGANIZED HEALTH CARE EDUCATION/TRAINING PROGRAM

## 2021-10-24 PROCEDURE — 80048 BASIC METABOLIC PNL TOTAL CA: CPT

## 2021-10-24 PROCEDURE — 80053 COMPREHEN METABOLIC PANEL: CPT

## 2021-10-24 RX ORDER — PENTAMIDINE ISETHIONATE 300 MG/300MG
4 INJECTION, POWDER, LYOPHILIZED, FOR SOLUTION INTRAMUSCULAR; INTRAVENOUS DAILY
Status: DISCONTINUED | OUTPATIENT
Start: 2021-10-24 | End: 2021-10-24 | Stop reason: SDUPTHER

## 2021-10-24 RX ADMIN — ACETAMINOPHEN 650 MG: 325 TABLET ORAL at 16:46

## 2021-10-24 RX ADMIN — METHYLPREDNISOLONE SODIUM SUCCINATE 30 MG: 40 INJECTION, POWDER, FOR SOLUTION INTRAMUSCULAR; INTRAVENOUS at 09:22

## 2021-10-24 RX ADMIN — TBO-FILGRASTIM 480 MCG: 480 INJECTION, SOLUTION SUBCUTANEOUS at 22:44

## 2021-10-24 RX ADMIN — HEPARIN SODIUM 5000 UNITS: 10000 INJECTION INTRAVENOUS; SUBCUTANEOUS at 06:09

## 2021-10-24 RX ADMIN — SODIUM CHLORIDE: 9 INJECTION, SOLUTION INTRAVENOUS at 14:05

## 2021-10-24 RX ADMIN — INSULIN LISPRO 1 UNITS: 100 INJECTION, SOLUTION INTRAVENOUS; SUBCUTANEOUS at 16:36

## 2021-10-24 RX ADMIN — Medication 10 ML: at 20:43

## 2021-10-24 RX ADMIN — HEPARIN SODIUM 5000 UNITS: 10000 INJECTION INTRAVENOUS; SUBCUTANEOUS at 14:04

## 2021-10-24 RX ADMIN — SULFAMETHOXAZOLE AND TRIMETHOPRIM 400 MG: 80; 16 INJECTION, SOLUTION, CONCENTRATE INTRAVENOUS at 16:38

## 2021-10-24 RX ADMIN — HEPARIN SODIUM 5000 UNITS: 10000 INJECTION INTRAVENOUS; SUBCUTANEOUS at 22:44

## 2021-10-24 RX ADMIN — CEFEPIME HYDROCHLORIDE 1000 MG: 1 INJECTION, POWDER, FOR SOLUTION INTRAMUSCULAR; INTRAVENOUS at 22:44

## 2021-10-24 RX ADMIN — SULFAMETHOXAZOLE AND TRIMETHOPRIM 400 MG: 80; 16 INJECTION, SOLUTION, CONCENTRATE INTRAVENOUS at 04:50

## 2021-10-24 RX ADMIN — OXYCODONE HYDROCHLORIDE AND ACETAMINOPHEN 500 MG: 500 TABLET ORAL at 09:22

## 2021-10-24 RX ADMIN — METHYLPREDNISOLONE SODIUM SUCCINATE 30 MG: 40 INJECTION, POWDER, FOR SOLUTION INTRAMUSCULAR; INTRAVENOUS at 20:43

## 2021-10-24 RX ADMIN — INSULIN LISPRO 1 UNITS: 100 INJECTION, SOLUTION INTRAVENOUS; SUBCUTANEOUS at 07:53

## 2021-10-24 RX ADMIN — SODIUM CHLORIDE: 9 INJECTION, SOLUTION INTRAVENOUS at 04:38

## 2021-10-24 RX ADMIN — CEFEPIME HYDROCHLORIDE 1000 MG: 1 INJECTION, POWDER, FOR SOLUTION INTRAMUSCULAR; INTRAVENOUS at 00:15

## 2021-10-24 RX ADMIN — CEFEPIME HYDROCHLORIDE 1000 MG: 1 INJECTION, POWDER, FOR SOLUTION INTRAMUSCULAR; INTRAVENOUS at 10:21

## 2021-10-24 RX ADMIN — PANTOPRAZOLE SODIUM 40 MG: 40 TABLET, DELAYED RELEASE ORAL at 05:55

## 2021-10-24 RX ADMIN — Medication 10 ML: at 09:23

## 2021-10-24 ASSESSMENT — PAIN SCALES - GENERAL
PAINLEVEL_OUTOF10: 0
PAINLEVEL_OUTOF10: 3
PAINLEVEL_OUTOF10: 0
PAINLEVEL_OUTOF10: 0

## 2021-10-24 NOTE — PROGRESS NOTES
[facility] PROGRESS NOTE      Chief complaint: Follow-up of Gram-positive cocci in clusters on blood culture    The patient is a 36 y.o. male, not vaccinated against COVID-19, with history of asthma, presented on 10/05 with shortness of breath, found to be febrile at 103 °F, positive SARS-CoV-2 PCR, bilateral groundglass opacities on chest CT, tolerating room air with oxygen saturation of 97%. He reports having tested positive for SARS-CoV-2 for the 3rd time now with previous on in late August at which time he reports having quarantined. Urine Streptococcus pneumonia and Legionella antigens were negative. Blood cultures from 10/05 and 10/06 showed methicillin-susceptible Staphylococcus epidermidis in 1 out of 2 sets. HIV screen was positive with viral load of 195,000. CD4 count was low at 43. RPR was reactive at titer of 1:2. Toxoplasma Ab was negative. HLA- was negative. Serum beta-d-glucan was positive. Transthoracic echocardiogram showed late positive bubble suggesting possible shunt from pulmonary origin, moderately dilated left atrium, small pericardial effusion without tamponade physiology, ejection fraction visually estimated at 55%. He underwent bronchoscopy on 10/15 during which normal endobronchial evaluation and normal anatomy were noted. BAL Gram stain and culture showed rare polymorphonuclear leukocytes, rare epithelial cells, rare gram-negative rods, rare gram-positive rods, reduced oropharyngeal liana. BAL pneumocystis stain was negative. BAL galactomannan was negative. Serum Cryptococcus antigen was negative. He underwent lumbar puncture on 10/21, yielding CSF with normal glucose, protein, csll count with differential. CSF meningitis/encephalitis PCR panel was negative for HSV, VZV, CMV, Enterovirus, HHV-6, Streptococcus pneumoniae and agalactiae, E coli, Haemophilus, Neisseria meninigitidis, Cryptococcus.  He was transferred to MICU on 10/23 for worsening shortness of breath      Subjective: Pneumocystis pneumonia. Change high-dose Bactrim to pentamidine 4 mg/kg IV q24h, once available. Will need to finish 21 days of treatment until 11/02. Continue methylprednisolone 30 mg q12h for 5 days followed by 30 mg daily for 5 days then 15 mg daily for 11 days. Switch to oral prednisone once able. Repeat serum beta-d-glucan. Follow up HIV GART, INSTI resistance, urine GC/Chlamydia PCR. Monitor respiratory status. Continue supportive care. Thank you for involving me in the care of Kristin James. I will continue to follow. Please do not hesitate to call for any questions or concerns.     Electronically signed by Wilmer Cr MD on 10/24/2021 at 8:56 AM

## 2021-10-24 NOTE — PROGRESS NOTES
Department of Internal Medicine  Nephrology Progress Note      Events reviewed with the ICU RN. SUBJECTIVE:  We are following Mr. Neida Agosto for DEISI. No acute overnight events.  Remains oliguric  PHYSICAL EXAM:      Vitals:    VITALS:  /72   Pulse 88   Temp 96.6 °F (35.9 °C) (Axillary)   Resp 21   Ht 6' 4\" (1.93 m)   Wt 200 lb (90.7 kg)   SpO2 100%   BMI 24.34 kg/m²   24HR INTAKE/OUTPUT:      Intake/Output Summary (Last 24 hours) at 10/24/2021 1550  Last data filed at 10/24/2021 1400  Gross per 24 hour   Intake 1360 ml   Output 375 ml   Net 985 ml     Young cachectic looking male  Constitutional:  Awake, alert,in moderate respiratory distress, OOB sitting in chair, Fio2 at 4 L via NC  HEENT:  PERRL, normocephalic, atraumatic  Respiratory: diminished lung sounds  Cardiovascular/Edema:  RRR, S1/S2, -edema  Gastrointestinal:  abd flat, soft, non-tender, Perkins in place  Neurologic:  Awake, alert, no focal deficits  Skin:  Warm, dry, no rash or lesions  Other: No LE edema      Scheduled Meds:   sulfamethoxazole-trimethoprim (BACTRIM) IVPB  400 mg IntraVENous Q12H    insulin lispro  0-6 Units SubCUTAneous TID WC    insulin lispro  0-3 Units SubCUTAneous Nightly    cefepime  1,000 mg IntraVENous Q12H    sodium chloride   IntraVENous Q12H    methylPREDNISolone  30 mg IntraVENous Q12H    heparin (porcine)  5,000 Units SubCUTAneous Q8H    penicillin G benzathine  2.4 Million Units IntraMUSCular Weekly    lidocaine  1 patch TransDERmal Daily    ascorbic acid  500 mg Oral Daily    [Held by provider] bictegravir-emtricitab-tenofovir alafenamide  1 tablet Oral Daily    [Held by provider] metoprolol tartrate  25 mg Oral Daily    pantoprazole  40 mg Oral QAM AC    sodium chloride flush  5-40 mL IntraVENous 2 times per day     Continuous Infusions:   dextrose      sodium chloride      sodium chloride       PRN Meds:.glucose, dextrose, glucagon (rDNA), dextrose, medicated lip balm, benzonatate, perflutren lipid microspheres, hydrOXYzine, labetalol, sodium chloride, sodium chloride flush, ondansetron **OR** ondansetron, polyethylene glycol, acetaminophen **OR** acetaminophen, sodium chloride    DATA:    CBC:   Lab Results   Component Value Date    WBC 0.9 10/24/2021    RBC 2.61 10/24/2021    HGB 8.0 10/24/2021    HCT 23.3 10/24/2021    MCV 90.0 10/24/2021    MCH 29.9 10/24/2021    MCHC 33.2 10/24/2021    RDW 14.6 10/24/2021    PLT 60 10/24/2021    MPV NOT CALC 10/24/2021     CMP:    Lab Results   Component Value Date     10/24/2021    K 4.3 10/24/2021    K 4.7 10/24/2021    CL 94 10/24/2021    CO2 16 10/24/2021    BUN 63 10/24/2021    CREATININE 5.3 10/24/2021    GFRAA 16 10/24/2021    LABGLOM 16 10/24/2021    GLUCOSE 166 10/24/2021    PROT 5.6 10/24/2021    LABALBU 2.6 10/24/2021    CALCIUM 8.0 10/24/2021    BILITOT 0.3 10/24/2021    ALKPHOS 66 10/24/2021    AST 85 10/24/2021    ALT 22 10/24/2021     Magnesium:    Lab Results   Component Value Date    MG 2.0 10/23/2021     Phosphorus:    Lab Results   Component Value Date    PHOS 4.6 10/23/2021        Radiology Review:       Ejection fraction is visually estimated at 55%. There is late positive bubble suggesting possible shunt from pulmonary   origin. Visually moderately dilated left atrium. Normal right ventricular size and function. There is small pericardial effusion without tamponade physiology        CT Chest October 5, 2021   Limited study by the patient's respiratory motion and lack of intravenous   contrast.       Extensive multifocal COVID-19 pneumonia bilaterally.       Bilateral trace layering pleural effusions.       Bilateral gynecomastia.       Abdominal organs inadequately evaluated due to the limitations of the study.       Normal appendix.       No evidence of bowel obstruction.         Kidney ultrasound October 7, 2021   1. Hyperechoic kidneys due to underlying parenchymal disease.    2. Trace bilateral perinephric fluid of indeterminate etiology, potentially   due to underlying inflammation. 3. An approximately 1.1 cm x 1.5 cm x 2.3 cm lesion in the right kidney could   be a cyst but could also be seen with infarction, abscess, or neoplasia. Consider further evaluation with renal protocol abdomen MRI (preferred) or   CT.  Following resolution of acute kidney injury.         CTA pulmonary with IV contrast October 13, 2021   1. No evidence of pulmonary embolism. 2. Interval increased size of bilateral pleural effusions with little change   in pericardial effusion. 3. Persistent subcutaneous edema. 4. Reactive bilateral hilar lymphadenopathy. 5. Bilateral airspace disease is increased compared to prior examination. The increase is most pronounced within the lower lobes. 6. Bilateral gynecomastia.                 BRIEF SUMMARY OF INITIAL CONSULT:     Briefly, Mr. Bobby Santiago is a 36year old male with no significant PMH who was admitted on October 5, 2021 after he presented from an outside clinic after he was found to be hypoxic during 6 minute walking test. Patient tested positive for Covid 19 on August 18th, he is unvaccinated and had Covid 19 last year as well. Patient states he has quarantined by himself and when his mother recently visited him she was shocked at his appearance as he had lost about 50 pounds in 2 and a half months. On admission labs were significant for sodium of 130, potassium 5.2, bicarbonate 20, BUN 41, creatinine 3.6, magnesium 2.7, calcium 7.7 and proBNP 1,599. We are consulted for DEISI. Problems resolved:    Hyperkalemia, 2/2 DEISI. Low potassium diet  Hypotonic hyponatremia 2/2 intravascular volume from poor oral intake. Bicarb drip started. Sodium levels improving. Low bicarbonate levels with hyperchloremia, most likely NAGMA versus respiratory alkalosis; we need a PH to clarify diagnosis.  Awaiting ABG results  High bicarbonate levels, likely metabolic alkalosis 2/2 administration  Hypokalemia, multifactorial, poor intake, probably renal potassium wasting  Severe Hypoalbuminemia, multifactorial, status post albumin administration  DEISI on CKD, volume responsive pre-renal DEISI d/t volume (poor oral intake), urine sodium <20. Resolved. Edematous state, multifactorial, mainly 2/2 nephrotic syndrome and possibly HFpEF 55%, on bumex    IMPRESSION/RECOMMENDATIONS:     Recurrent DEISI on CKD, ATN contrast associated DEISI versus ischemic ATN (hypotension related tachycardia). Nonoliguric. Renal function continues to worsen with increasing creatinine levels. We will continue to monitor, more afraid he might need renal replacement therapy if no improvement of renal function occurs. 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 [facility]on[facility] Work-up so far HIV positive, Hepatitis C positive, MARILEE negative, C4 normal, C3 88 (low), UPEP without monoclonal gammopathy, negative cryoglobulins. Kidney ultrasound with hyperechoic kidneys. Needs kidney biopsy, discussed with interventional radiology they would rather wait for a couple weeks to perform biopsy in view of patient having Covid-19 infection. Hyponatremia, multifactorial, including decreased GFR and hemodynamically mediated in the setting of large hypotonic fluid administration (D5 with Bactrim). Sodium levels decrease, stable overnight    Low bicarbonate levels with hyperchloremia, consistent with either hyperchloremic metabolic acidosis versus respiratory alkalosis. Bicarbonate levels improved with bicarbonate drip. Hypomagnesemia, 2/2 poor intake   Probably HFpEF 55%, proBNP 11,401 > 3421   HTN, on metoprolol   Right kidney lesion, likely a cyst but cannot be ruled out other pathology including infarction, abscess, neoplasm.   We will proceed with MRI when renal function improved  HIV positive, CD4 count 43, started on OLMSTEAD therapy    ---------------------------------------------------------------------  MSSE bacteremia, on cefepime 2 g every 8 hours  Possible pneumocystic pneumonia, on sulfamethoxazole-trimethoprim 400 mg IV every 8 hours, needs TTE  Probably fungemia, (1, 3) beta-D-glucan positive, on anidulafungin  Sinus tachycardia, multifactorial  Hepatitis C antibody positive, awaiting viral load  Covid 19 infection in non vaccinated pt  Normocytic anemia, multifactorial  Leukopenia, WBC 1.1    Plan:    1. Remains oliguric, BUN, creatinine and bicarbonate are worse  2. Discussed at length at bedside about starting dialysis, patient wants to talk to his family tonight. 3. No electrolyte, volume or acid base issues needing HD tonight.  Will consult vascular after the patient consents for dialysis in AM    Discussed with the ICU RN      Discussed with the ICU Resident    Electronically signed by Milvia Blankenship MD on 10/24/2021 at 3:50 PM

## 2021-10-24 NOTE — PLAN OF CARE
Problem: Gas Exchange - Impaired  Goal: Absence of hypoxia  10/24/2021 0124 by Charity Cook RN  Outcome: Met This Shift     Problem: Gas Exchange - Impaired  Goal: Promote optimal lung function  10/24/2021 0124 by Charity Cook RN  Outcome: Met This Shift     Problem: Falls - Risk of:  Goal: Absence of physical injury  Description: Absence of physical injury  10/24/2021 0124 by Charity Cook RN  Outcome: Met This Shift     Problem: Falls - Risk of:  Goal: Will remain free from falls  Description: Will remain free from falls  10/24/2021 0124 by Charity Cook RN  Outcome: Met This Shift

## 2021-10-24 NOTE — PROGRESS NOTES
Patient's wireless head phone was reported missing from possessions in the room. Staff searched this room [phone] and item was not found.

## 2021-10-24 NOTE — CONSULTS
[address]WE MICU  [address]  [address] [address]  Dept: [phone]  Loc: [phone]  Attending Consult Note      Reason for Visit:   Pancytopenia. Referring Physician:  Luis Enrique Gilliland MD    PCP:  Júnior Che MD     Due to the current efforts to prevent transmission of COVID-19 and also the need to preserve PPE for other caregivers, a face-to-face encounter with the patient was not performed. That being said, all relevant records and diagnostic tests were reviewed, including laboratory results and imaging. Please reference any relevant documentation elsewhere. Care will be coordinated with the primary service. History of Present Illness:     Mr. Jay Lowery is a 30-year-old male patient, who had presented to the hospital on 10/5/2021 with fever, shortness of breath, weight loss, he was sent by his PCPs office due to hypoxic respiratory failure. He was found to be COVID-19 positive. He does have a history of COVID-19 infection in early 2021 and again in August 2021. Since his admission, the patient was diagnosed with HIV with CD4 of43-44, also for latent syphilis, hepatitis C and methicillin susceptible staph epidermidis bacteremia. Serum beta D glucan was positive, he was treated with anidulafungin. The patient was started on Biktarvy on 10/21/2021, high-dose Bactrim on 10/13/2021, and penicillin GI on 10/22/2012. Upon presentation on 10/5/2021 white count was 4.2, hemoglobin 9.9, hematocrit 30, platelet count 924N, , ANC 3360. Patient count started trending down about 10 days ago, had normalized, then had decreased to 105K, 90 2K, 70 9K and today they are 60K. His white count had normalized and started decreasing about a week ago, on 10/23/2021 white count had decreased to 1.7, today 0.9, ANC is 700, . T-max yesterday was 101.3. Currently temp 96.6. Fibrinogen from yesterday was 453, PTT today 34.4.       Past Medical History:      Diagnosis Date  Asthma      Patient Active Problem List   Diagnosis    Acute respiratory failure with hypoxia (HCC)    [facility] kidney injury due to COVID-19 ([facility] [address]    Asthma    Nephrotic range proteinuria        Past Surgical History:      Procedure Laterality Date    BRONCHOSCOPY N/A 10/15/2021    BRONCHOSCOPY ALVEOLAR LAVAGE performed by Cassie Cruz DO at [facility] ENDOSCOPY    FRACTURE SURGERY         Family History:  Family History   Problem Relation Age of Onset    Diabetes type 2  Mother     Diabetes type 2  Father        Medications:  Reviewed and reconciled. Social History:  Social History     Socioeconomic History    Marital status: Single     Spouse name: Not on file    Number of children: Not on file    Years of education: Not on file    Highest education level: Not on file   Occupational History    Not on file   Tobacco Use    Smoking status: Former Smoker     Quit date: 2021     Years since quittin.1    Smokeless tobacco: Never Used   Vaping Use    Vaping Use: Never used   Substance and Sexual Activity    Alcohol use: No    Drug use: No    Sexual activity: Not on file   Other Topics Concern    Not on file   Social History Narrative    Not on file     Social Determinants of Health     Financial Resource Strain: Low Risk     Difficulty of Paying Living Expenses: Not very hard   Food Insecurity: Food Insecurity Present    Worried About [facility] in the Last Year: Sometimes true    Daniel of Food in the Last Year: Sometimes true   Transportation Needs:     Lack of Transportation (Medical):      Lack of Transportation (Non-Medical):    Physical Activity:     Days of Exercise per Week:     Minutes of Exercise per Session:    Stress:     Feeling of Stress :    Social Connections:     Frequency of Communication with Friends and Family:     Frequency of Social Gatherings with Friends and Family:     Attends Shinto Services:     Active Member of Clubs or Organizations:     Attends Club or Organization Meetings:     Marital Status:    Intimate Partner Violence:     Fear of Current or Ex-Partner:     Emotionally Abused:     Physically Abused:     Sexually Abused: Allergies:  No Known Allergies    Physical Exam:  /71   Pulse 88   Temp 96.6 °F (35.9 °C) (Axillary)   Resp 29   Ht 6' 4\" (1.93 m)   Wt 200 lb (90.7 kg)   SpO2 100%   BMI 24.34 kg/m²   GENERAL: The patient was seen through the window, he is awake and alert. Impression/Plan:       Mr. Vilma Almaguer is a 33-year-old male patient, who had presented to the hospital on 10/5/2021 with fever, shortness of breath, weight loss, he was sent by his PCPs office due to hypoxic respiratory failure. He was found to be COVID-19 positive. He does have a history of COVID-19 infection in early 2021 and again in August 2021. Since his admission, the patient was diagnosed with HIV with CD4 of43-44, also for latent syphilis, hepatitis C and methicillin susceptible staph epidermidis bacteremia. Serum beta D glucan was positive, he was treated with anidulafungin. The patient was started on Biktarvy on 10/21/2021, high-dose Bactrim on 10/13/2021, and penicillin GI on 10/22/2012. Upon presentation on 10/5/2021 white count was 4.2, hemoglobin 9.9, hematocrit 30, platelet count 846X, , ANC 3360. Patient count started trending down about 10 days ago, had normalized, then had decreased to 105K, 90 2K, 70 9K and today they are 60K. His white count had normalized and started decreasing about a week ago, on 10/23/2021 white count had decreased to 1.7, today 0.9, ANC is 700, . T-max yesterday was 101.3. Currently temp 96.6. Fibrinogen from yesterday was 453, PTT today 34.4. Pancytopenia is multifactorial, secondary to HIV, COVID-19, possible PCP and drug-induced. He is not in DIC. Low probability for HIT.   He does not have vitamin B12 or folate deficiency, iron studies are consistent with iron deficiency and anemia of chronic inflammation. He does not have hypogammaglobinemia. SPEP is neg for monoclonal proteins.  -Will order Granix to improve his anc so he can tolerate the antiretroviral therapy.  -Continue to monitor his CBCD. -Continue prophylactic dose of heparin as long as platelet count is greater than 50K. -Peripheral blood flowcytometry.  -Continue antimicrobial therapy per ID team.  -Renal Failure, HD was recommended. Will continue to follow. Thank you for allowing us to participate in the care of Mr. Mir Pillai.     Bandar Perez MD   HEMATOLOGY/MEDICAL ONCOLOGY  [address] MICU  [address]  [address] [address]  Dept: [address]: [phone]

## 2021-10-24 NOTE — PLAN OF CARE
Problem: Airway Clearance - Ineffective  Goal: Achieve or maintain patent airway  10/24/2021 0826 by Jackson Bowser RN  Outcome: Met This Shift     Problem: Gas Exchange - Impaired  Goal: Absence of hypoxia  10/24/2021 0826 by Jackson Bowser RN  Outcome: Met This Shift     Problem: Gas Exchange - Impaired  Goal: Promote optimal lung function  10/24/2021 0826 by Jackson Bowser RN  Outcome: Met This Shift     Problem: Falls - Risk of:  Goal: Will remain free from falls  Description: Will remain free from falls  10/24/2021 0826 by Jackson Bowser RN  Outcome: Met This Shift     Problem: Falls - Risk of:  Goal: Absence of physical injury  Description: Absence of physical injury  10/24/2021 0826 by Jackson Bowser RN  Outcome: Met This Shift     Problem: Pain:  Goal: Control of acute pain  Description: Control of acute pain  10/24/2021 0826 by Jackson Bowser RN  Outcome: Met This Shift   Plan of care discussed with patient / family.

## 2021-10-24 NOTE — PROGRESS NOTES
[facility]   Department of Internal Medicine   Internal Medicine Residency  MICU Progress Note    Patient:  Fredi Willard 36 y.o. male   MRN: [id number]       Date of Service: 10/24/2021    Allergy: Patient has no known allergies. Cc SOB     Subjective     Patient was seen and examined this morning at bedside in no acute distress. Overnight, patient had one episode of desaturation in the 80s but was able to return to spO2 >90% without escalation of O2 supplementation. He had a fever T max 100.6 but was hypothermic at 96.8 in the morning. He was tachycardic at 151 but returned to normal HR without intervention. Objective     TEMPERATURE:  Current - Temp: 96.6 °F (35.9 °C); Max - Temp  Av °F (36.7 °C)  Min: 96.3 °F (35.7 °C)  Max: 100.6 °F (38.1 °C)  RESPIRATIONS RANGE: Resp  Av.1  Min: 19  Max: 32  PULSE RANGE: Pulse  Av  Min: 76  Max: 151  BLOOD PRESSURE RANGE:  Systolic (12UHN), IMJ:605 , Min:84 , QDJ:624   ; Diastolic (08QGV), CML:18, Min:60, Max:82    PULSE OXIMETRY RANGE: SpO2  Av %  Min: 87 %  Max: 100 %    I & O - 24hr:    Intake/Output Summary (Last 24 hours) at 10/24/2021 1432  Last data filed at 10/24/2021 1400  Gross per 24 hour   Intake 1360 ml   Output 375 ml   Net 985 ml     I/O last 3 completed shifts: In: 9096 [P.O.:150; I.V.:667; IV Piggyback:550]  Out: 435 [Urine:435] I/O this shift: In: 750 [P.O.:120; I.V.:630]  Out: 200 [Urine:200]   Weight change:     Physical Exam:  General Appearance:    alert, awake, conversive, and mild respiratory distress   HEENT:    NC/AT, mucous membranes are moist   Neck:   Supple, no jugular venous distention. Resp:     Course breath sounds bilaterally, No wheezes, No rhonchi, no use of accessory muscles   Heart:    RRR, S1 and S2 normal, no murmur, rub or gallop.     Abdomen:     Soft, non-tender, non-distended with normal bowel sounds   Extremities:   Atraumatic, no cyanosis or edema   Pulses:  Radial and pedal pulses are intact bilaterally   Neurologic:   AAOx3, CN 2-12 grossly intact       Medications     Continuous Infusions:   dextrose      sodium chloride      sodium chloride       Scheduled Meds:   pentamidine isethionate (PENTAM) IVPB  4 mg/kg IntraVENous Daily    insulin lispro  0-6 Units SubCUTAneous TID WC    insulin lispro  0-3 Units SubCUTAneous Nightly    cefepime  1,000 mg IntraVENous Q12H    sodium chloride   IntraVENous Q12H    methylPREDNISolone  30 mg IntraVENous Q12H    heparin (porcine)  5,000 Units SubCUTAneous Q8H    penicillin G benzathine  2.4 Million Units IntraMUSCular Weekly    lidocaine  1 patch TransDERmal Daily    ascorbic acid  500 mg Oral Daily    [Held by provider] bictegravir-emtricitab-tenofovir alafenamide  1 tablet Oral Daily    [Held by provider] metoprolol tartrate  25 mg Oral Daily    pantoprazole  40 mg Oral QAM AC    sodium chloride flush  5-40 mL IntraVENous 2 times per day     PRN Meds: glucose, dextrose, glucagon (rDNA), dextrose, medicated lip balm, benzonatate, perflutren lipid microspheres, hydrOXYzine, labetalol, sodium chloride, sodium chloride flush, ondansetron **OR** ondansetron, polyethylene glycol, acetaminophen **OR** acetaminophen, sodium chloride  Nutrition:   NG/OG tube TF type: Pulmocare/Nephro/Glucerna/Jevity        At rate: regular adult diet    Labs and Imaging Studies     CBC:   Recent Labs     10/22/21  0716 10/22/21  1523 10/23/21  0400 10/23/21  0632 10/23/21  2030 10/24/21  0445 10/24/21  1302   WBC 1.1*  --  1.7*  --   --  0.9*  --    HGB 7.0*   < > 8.9*   < > 7.7* 7.8* 8.0*   HCT 20.8*   < > 26.4*   < > 22.9* 23.5* 23.3*   MCV 88.5  --  88.9  --   --  90.0  --    PLT 92*  --  79*  --   --  60*  --     < > = values in this interval not displayed.        BMP:    Recent Labs     10/23/21  0934 10/23/21  2355 10/24/21  0445 10/24/21  1302   NA  --  129* 129* 125*   K   < > 4.9 4.7  4.7 4.3   CL  --  96* 95* 94*   CO2  --  17* 17* 16*   BUN  --  51* 56* 63*   CREATININE  --  5.4* 5.4* 5.3*   GLUCOSE  --  124* 131* 166*    < > = values in this interval not displayed. LIVER PROFILE:   Recent Labs     10/22/21  0716 10/23/21  0400 10/24/21  0445   AST 80* 104* 85*   ALT 21 22 22   BILITOT 0.2 0.5 0.3   ALKPHOS 63 73 66       PT/INR:   No results for input(s): PROTIME, INR in the last 72 hours. APTT:   Recent Labs     10/22/21  0716 10/23/21  0400 10/24/21  0445   APTT 34.9 36.7* 34.4       Fasting Lipid Panel:    No results found for: CHOL, TRIG, HDL    Cardiac Enzymes:    Lab Results   Component Value Date    CKTOTAL 605 (H) 10/06/2021       Notable Cultures:      Blood cultures   Blood Culture, Routine   Date Value Ref Range Status   10/23/2021 24 Hours no growth  Preliminary     Respiratory cultures No results found for: RESPCULTURE   Gram Stain Result   Date Value Ref Range Status   10/21/2021 Refer to ordered Gram stain for results  Final     Urine   Urine Culture, Routine   Date Value Ref Range Status   10/06/2021 Growth not present  Final     Legionella No results found for: LABLEGI  C Diff PCR No results found for: CDIFPCR  Wound culture/abscess: No results for input(s): WNDABS in the last 72 hours. Tip culture:No results for input(s): CXCATHTIP in the last 72 hours. Oxygen:     Vent Information  SpO2: 100 %  Additional Respiratory  Assessments  Pulse: 88  Resp: 21  SpO2: 100 %  Oral Care: Teeth brushed, Mouthwash       [REMOVED] Urethral Catheter Temperature probe 16 fr-Output (mL): 175 mL    Imaging Studies:  XR CHEST PORTABLE   Final Result   Mild worsening of extensive bilateral infiltrates         XR CHEST PORTABLE   Final Result   Increasing bilateral airspace disease suggesting worsening pneumonia. FL LUMBAR PUNCTURE DIAG   Final Result   Successful fluoroscopic-guided lumbar puncture.          XR CHEST PORTABLE   Final Result   Extensive bilateral patchy areas of ground-glass opacity concerning for   atypical pneumonia or viral airway disease, improved         XR CHEST PORTABLE   Final Result   Persistent bilateral infiltrates. No significant changes since October 13. See above comments. See report CT chest October 13. CTA PULMONARY W CONTRAST   Final Result   1. No evidence of pulmonary embolism. 2. Interval increased size of bilateral pleural effusions with little change   in pericardial effusion. 3. Persistent subcutaneous edema. 4. Reactive bilateral hilar lymphadenopathy. 5. Bilateral airspace disease is increased compared to prior examination. The increase is most pronounced within the lower lobes. 6. Bilateral gynecomastia. XR CHEST PORTABLE   Final Result   Development of extensive bilateral pulmonary infiltrates         XR CHEST PORTABLE   Final Result   No acute process. US RETROPERITONEAL COMPLETE   Final Result   1. Hyperechoic kidneys due to underlying parenchymal disease. 2. Trace bilateral perinephric fluid of indeterminate etiology, potentially   due to underlying inflammation. 3. An approximately 1.1 cm x 1.5 cm x 2.3 cm lesion in the right kidney could   be a cyst but could also be seen with infarction, abscess, or neoplasia. Consider further evaluation with renal protocol abdomen MRI (preferred) or   CT. Following resolution of acute kidney injury. XR CHEST PORTABLE   Final Result   Multifocal bilateral pulmonary ground-glass airspace opacities are stable         CT ABDOMEN PELVIS WO CONTRAST Additional Contrast? None   Final Result   Limited study by the patient's respiratory motion and lack of intravenous   contrast.      Extensive multifocal COVID-19 pneumonia bilaterally. Bilateral trace layering pleural effusions. Bilateral gynecomastia. Abdominal organs inadequately evaluated due to the limitations of the study. Normal appendix. No evidence of bowel obstruction.          CT CHEST WO CONTRAST   Final Result   Limited study by the patient's respiratory motion and lack of intravenous   contrast.      Extensive multifocal COVID-19 pneumonia bilaterally. Bilateral trace layering pleural effusions. Bilateral gynecomastia. Abdominal organs inadequately evaluated due to the limitations of the study. Normal appendix. No evidence of bowel obstruction.               Resident's Assessment and Plan     Assessment and Plan:    Cardiology  Elevated troponin likely type 2 MI  Troponin 78--> 45  EKG: sinus tachycardia     QTc prolongation  Max 535 on 10/07/21  Avoid QTc prolonging meds     Pulmonary  Acute hypoxic respiratory failure 2/2 PCP vs COVID-19  Initially spO2 80s% in ED  RRT called due to desaturation spO2 <90%   (+) COVID-19 x3 (early 2021, August 2021, October 2021)  Aspergillus negative, cryptococcus negative, PCP DFA negative, BAL gram stain and culture negative   meningitis panel: negative for HSV, VZV, CMV, Enterovirus, HHV-6, Streptococcus pneumoniae and agalactiae, E coli, Haemophilus, Neisseria meninigitidis, Cryptococcus  CXR 10/23/21: worse right upper lung field  Currently requiring 15 L on nonrebreather mask ---was able to wean down to 4 L nonrebreather mask  ID recs: Decadron switched to solumedrol, follow tapering plan, Continue high dose bactrim once ok with nephro  Pending blood culture and respiratory culture, BAL fungus pending  Pulmonology following, follow recs  Monitor respiratory status, low threshold for intubation     Infectious Disease  HIV infection  Viral load 195,000  CD4 43-44 (10/11/21)  HIV-1 Ab positive  Started on [facility] 10/21/21  ID following, follow recs     Latent syphilis  VDRL CSF pending  Pen G given 10/22/21, once per week for 3 doses     Hepatitis C reactivity  Hepatitis C Ab positive, viral load not detected     Genitourinary/Renal  Hyponatremia 2/2 poor oral intake, improving  Na 129  Monitor Na level  Fluid restriction  Nephro consulted, follow recs     DEISI 2/2 poor oral intake  Cr 5.4  Monitor BMP while on bactrim  Nephro consulted, follow recs---may need HD in future per nephro     Hyperkalemia 2/2 poor oral intake  K 5.4 --given kayexalate 15, 10 units regular insulin and d50 25 cc --> K 4.7  Low K diet  Monitor K while on bactrim  Nephro consulted, follow recs     Hematology  Pancytopenia  WBC 0.9  RBC 2.61  Hg 7.8  Platelet 60  Monitor CBC  HemoOnc consulted      # Peptic ulcer prophylaxis: protonix  # DVT Prophylaxis: heparin  # Disposition: Cont current care    Corrinne Augusta, MD, PGY-1    Attending Physician: Dr. Nova Castle    I personally saw, examined and provided care for the patient. Radiographs, labs and medication list were reviewed by me independently. I spoke with bedside nursing, therapists and consultants. Critical care services and times documented are independent of procedures and multidisciplinary rounds with Residents. Additionally comprehensive, multidisciplinary rounds were conducted with the MICU team. The case was discussed in detail and plans for care were established. Review of Residents documentation was conducted and revisions were made as appropriate. I agree with the above documented exam, problem list and plan of care.   PCJ infection   HIV  COVID  DEISI   Changed to pentamidine   Hypoxia   Hyponateremia  Concern for need intubation         Chivo Swenson M.D.   10/24/2021  8:54 PM

## 2021-10-24 NOTE — PROGRESS NOTES
[facility] In[facility]   Resident Progress Note    S:  Hospital day: 23   Brief Synopsis: Gilberto Bedoya is a 36 y.o. male with no PMH who presented with hypoxia and fever. He was initially seen in PCP office and was hypoxic on exertion with desaturate into the mid low 80s as well as noted to be febrile and tachycardic. Per patient, his symptoms began 8/15/2021 and he tested positive for Covid 8/28. endorse worsening shortness of breath over the past month with sputum production, intermittent chest discomfort, persistent diarrhea, chills, fever, decrease in appetite and 50 pound weight loss since August.  Patient also endorses significantly worsening \"neuropathy\" of bilateral feet, states it feels like he is \"being stabbed by needles\" which is causing him inability to walk due to pain. In the ED, he was found to be anemic, hyperkalemic, hyponatremic, febrile and COVID positive. He also had elevated Creatinine, AST, proBNP and troponin x2. CTA showed extensive multifocal COVID-19 pneumonia bilaterally, bilateral trace layering pleural effusion and bilateral gynecomastia. CT abdomen was limited due to movement and non-contrast, but showed normal appendix and no evidence of bowel obstruction. Admitted for acute hypoxia due to Covid pneumonia. Nephro, pulm and ID consulted. Started on bicarb drip. US showed hyperechoic kidneys, trace b/l perinephric fluid and right kidney lesion (Cyst vs infarction vs abscess vs neoplasia). HIV and Hep C screen were negative. HIV RNA and T and B lymphocytes were ordered. Patient became more hypoxic, tachycardic and hypertensive so  there was concern for PE. CTA was ordered, which came back negative. Echo was completed due to elevated BNP and concern for PE and that revealed a normal EF with late bubble sign possible shunting of pulmonary origin. BNP continued to increase. Cardiology was consulted. Repeat Echo pending.  ID recommends LP with CSF sent for glucose, protein, cell count with differential, VDRL. meningitis/encephalitis PCR panel negative. RRT called 10/23 for hypoxia and tachycardia, transferred to medical intensive. Remains on 15 L nonrebreather, creatinine up trending. Overnight/interim:   Patient was seen and examined at bedside. Reports breathing improved from yesterday. Endorsing ongoing pain in bilateral feet, worse on right. No chest pain or abdominal pain. Cont meds:    dextrose      sodium chloride      sodium chloride       Scheduled meds:    insulin lispro  0-6 Units SubCUTAneous TID WC    insulin lispro  0-3 Units SubCUTAneous Nightly    cefepime  1,000 mg IntraVENous Q12H    sodium chloride   IntraVENous Q12H    methylPREDNISolone  30 mg IntraVENous Q12H    heparin (porcine)  5,000 Units SubCUTAneous Q8H    penicillin G benzathine  2.4 Million Units IntraMUSCular Weekly    sulfamethoxazole-trimethoprim (BACTRIM) IVPB  400 mg IntraVENous Q12H    lidocaine  1 patch TransDERmal Daily    ascorbic acid  500 mg Oral Daily    bictegravir-emtricitab-tenofovir alafenamide  1 tablet Oral Daily    [Held by provider] metoprolol tartrate  25 mg Oral Daily    pantoprazole  40 mg Oral QAM AC    sodium chloride flush  5-40 mL IntraVENous 2 times per day     PRN meds: glucose, dextrose, glucagon (rDNA), dextrose, medicated lip balm, benzonatate, perflutren lipid microspheres, hydrOXYzine, labetalol, sodium chloride, sodium chloride flush, ondansetron **OR** ondansetron, polyethylene glycol, acetaminophen **OR** acetaminophen, sodium chloride     I reviewed the patient's past medical and surgical history, Medications and Allergies. O:  /75   Pulse 86   Temp 96.3 °F (35.7 °C) (Axillary)   Resp 24   Ht 6' 4\" (1.93 m)   Wt 200 lb (90.7 kg)   SpO2 93%   BMI 24.34 kg/m²   24 hour I&O: I/O last 3 completed shifts: In: 805 [P.O.:180; I.V.:667]  Out: 710 [Urine:710]  I/O this shift:   In: 550 [IV Piggyback:550]  Out: -         Physical Exam  Vitals reviewed. Constitutional:       Appearance: Normal appearance. He is not ill-appearing, toxic-appearing or diaphoretic. HENT:      Head: Normocephalic and atraumatic. Nose: No rhinorrhea. Mouth/Throat:      Mouth: Mucous membranes are moist.      Pharynx: Oropharynx is clear. Eyes:      General: No scleral icterus. Right eye: No discharge. Left eye: No discharge. Extraocular Movements: Extraocular movements intact. Cardiovascular:      Rate and Rhythm: Normal rate and regular rhythm. Pulses: Normal pulses. Heart sounds: Normal heart sounds. No murmur heard. No friction rub. No gallop. Pulmonary:      Effort: Respiratory distress (mild, on NRB) present. Breath sounds: Rales present. No wheezing or rhonchi. Abdominal:      General: Abdomen is flat. Palpations: Abdomen is soft. Tenderness: There is no abdominal tenderness. Musculoskeletal:         General: No swelling. Cervical back: Normal range of motion. Tenderness present. No rigidity. Lumbar back: Bony tenderness present. No swelling or spasms. Normal range of motion. Right lower leg: No edema. Left lower leg: No edema. Skin:     General: Skin is warm and dry. Coloration: Skin is not jaundiced. Findings: No bruising or rash. Neurological:      General: No focal deficit present. Mental Status: He is alert and oriented to person, place, and time. Cranial Nerves: No cranial nerve deficit. Motor: No weakness. Psychiatric:         Behavior: Behavior normal.         Thought Content: Thought content normal.           Labs:  Na/K/Cl/CO2:  129/4.7, 4.7/95/17 (10/24 0445)  BUN/Cr/glu/ALT/AST/amyl/lip:  56/5.4/--/22/85/--/-- (10/24 0445)  WBC/Hgb/Hct/Plts:  0.9/7.8/23.5/60 (10/24 0445)  estimated creatinine clearance is 25 mL/min (A) (based on SCr of 5.4 mg/dL (H)).   Other pertinent labs as noted below    Radiology:  XR CHEST PORTABLE Final Result   Mild worsening of extensive bilateral infiltrates         XR CHEST PORTABLE   Final Result   Increasing bilateral airspace disease suggesting worsening pneumonia. FL LUMBAR PUNCTURE DIAG   Final Result   Successful fluoroscopic-guided lumbar puncture. XR CHEST PORTABLE   Final Result   Extensive bilateral patchy areas of ground-glass opacity concerning for   atypical pneumonia or viral airway disease, improved         XR CHEST PORTABLE   Final Result   Persistent bilateral infiltrates. No significant changes since October 13. See above comments. See report CT chest October 13. CTA PULMONARY W CONTRAST   Final Result   1. No evidence of pulmonary embolism. 2. Interval increased size of bilateral pleural effusions with little change   in pericardial effusion. 3. Persistent subcutaneous edema. 4. Reactive bilateral hilar lymphadenopathy. 5. Bilateral airspace disease is increased compared to prior examination. The increase is most pronounced within the lower lobes. 6. Bilateral gynecomastia. XR CHEST PORTABLE   Final Result   Development of extensive bilateral pulmonary infiltrates         XR CHEST PORTABLE   Final Result   No acute process. US RETROPERITONEAL COMPLETE   Final Result   1. Hyperechoic kidneys due to underlying parenchymal disease. 2. Trace bilateral perinephric fluid of indeterminate etiology, potentially   due to underlying inflammation. 3. An approximately 1.1 cm x 1.5 cm x 2.3 cm lesion in the right kidney could   be a cyst but could also be seen with infarction, abscess, or neoplasia. Consider further evaluation with renal protocol abdomen MRI (preferred) or   CT. Following resolution of acute kidney injury.          XR CHEST PORTABLE   Final Result   Multifocal bilateral pulmonary ground-glass airspace opacities are stable         CT ABDOMEN PELVIS WO CONTRAST Additional Contrast? None   Final Result   Limited study by the patient's respiratory motion and lack of intravenous   contrast.      Extensive multifocal COVID-19 pneumonia bilaterally. Bilateral trace layering pleural effusions. Bilateral gynecomastia. Abdominal organs inadequately evaluated due to the limitations of the study. Normal appendix. No evidence of bowel obstruction. CT CHEST WO CONTRAST   Final Result   Limited study by the patient's respiratory motion and lack of intravenous   contrast.      Extensive multifocal COVID-19 pneumonia bilaterally. Bilateral trace layering pleural effusions. Bilateral gynecomastia. Abdominal organs inadequately evaluated due to the limitations of the study. Normal appendix. No evidence of bowel obstruction.                Resident Assessment and Plan       Acute hypoxia 2/2 COVID pneumonia vs PCP  - Maintaining Oxygen saturation in room air  - CTA: COVID pneumonia, Bilateral trace layering pleural effusion, negative for PE   - D dimer elevated on admission, currently stable  - Ferritin 3900, CRP 10.1 on admission   - Continue monitor Oxygen saturation   - Pulmonology and ID on board  - Legionella negative  - T spot TB test negative   - Decadron discontinued on 10/9  - CXR 10/8 shows multifocal groundglass opacities are stable  - Repeat CXR 10/17: b/l pulm infiltrates improving   - Blood cx: staph epidermitis, repeat blood cx shows no growth x 7 days   -Urine culture: NGTD   -Counseled on getting COVID vaccine after patient recovers   - ECHO: EF 55%, late bubble sign possible shunting of pulmonary origin   - Cardiology consulted   - CTA 10/13 negative for PE  Bronchoscopy with BAL performed on 10/15/2021   - Respiratory culture: oral pharyngeal liana decreased, few gram negative rods   Worsening hypoxia 10/23, RRT called, transferred to ICU, currently on nonrebreather, ABG pending, considering BiPAP    Weight loss likely secondary to HIV  - Per patient due to decreased appetite from COVID; 50lb weight loss since August   - R/o other potential origins - SLE, TB, hepatitis  - MARILEE negative  - T spot TB test negative   HIV, hepatitis C antibodies positive  - Elevated IgG and IgA, normal IgM: IgA nephropathy vs MPGN?   - Cryoglobulin normal   - TSH normal   - Sed rate and CRP elevated      HIV  HIV 1/2 antibodies positive  - Leukopenia, normal absolute neutrophil   - Febrile and tachycardic   CD4 44, CD3 153, CD8 100  HIV viral load 195,000  - ID on board: received 2 days of vancomycin 10/6, discontinued ancef, started on cefepime 2g q8h day 7  - Fungitell test positive, on eraxis day 4  - Bactrim 400mg q8h day 7 for suspected Pneumocystis pneumonia.    - Pneumocystis stain negative: will probably change to  Prophylactic Bactrim dose   - tests for opportunistic infections pending: HIV GART, [id number],  urine GC/Chlamydia PCR  - Toxoplasma Ab, cryptococcus negative  - hepatitis A total Ab positive  - RPR reactive, quant 1:2, treponema pallidum ab pending  - Started on biktarvy 10/22    Hepatitis C resolved  Hep C antibody reactive  Viral load undetectable    Anemia   - Likely 2/2 from GI bleed vs HIV vs dilution (all cell lines are decreased today, was on bicarb drip)  - Hb 7.0, trending down   Transfused 1 unit 10/11, post transfusion Hb 8   - General surgery consulted, FOBT negative the second time 10/11  - Trend H/H   - type and screen   - Repeat FOBT     DEISI    Hyponatremia  - Na trending down 128  - Nephro on board   - Likely secondary to dehydration from persistent diarrhea vs decreased PO intake vs bactrim vs HIVANS   - Cr trending up, 5.7  - Urine osm and urine nitrogen low    - UA: protein, 1-2 waxy casts, moderate bacteria, moderate blood    - Microalbumin-creatinine ratio elevated   - Kidney bx OP 2 weeks after discharge   Fluid restriction   Spoke with ICU team who spoke with nephrology, will trend BMP and consider dialyzing      Hypnagogic Hallucinations   - occurring since received anesthesia on 10/15   - no history of psych history or narcolepsy  - could be 2/2 to poor sleep vs anesthesia reaction vs vistaril?  - continue to monitor   - stopped taking vistaril       Elevated troponin and proBNP  - 2/2 myocardial injury vs stress cardiomyopathy due to COVID vs new onset CHF   - initial troponin 78, 78  - initial BNP 1599,  repeat 11,401 on 10/14, trending down 3400   - ECHO: EF 55%, late bubble sign possible shunting of pulmonary origin  - Repeat ECHO pending   Received 1 dose of IV Lasix 40mg on 10/15      Tachycardia  - 2/2 to infectious vs anxiety vs anemia   HR improving   Cardio consulted, recommend to start Beta blocker   - Hold lopressor   Asymptomatic  Continue to monitor    HTN  - BP is controlled   - Lopressor held   - labetalol PRN  - Continue to monitor      Proteinuria  -protein to creatinine ratio 3.8  - microalbumin to creatine ratio elevated   -kidney biopsy OP per nephro, patient agreeable    Hypoalbuminemia   - Alb 2.8  -Previously received albumin 25 g q6h x 4 doses 10/6 and 8 doses 10/12   - Receiving albumin 25g q6h x 8 doses   Low C3, normal C4 and cryoglobulin  Fluid restriction  - Continue to monitor          PT/OT evaluation: not indicated   DVT prophylaxis: heparin due to DEISI    GI prophylaxis:protonix  Disposition:ICU  Diet: adult         Electronically signed by Olga Umana MD PGY-3 on 10/24/2021 at 6:45 AM  Attending physician: Dr. Blanca Corona

## 2021-10-24 NOTE — PROGRESS NOTES
[address] [place] [address] Attending    S: 36 y.o. male with history of asthma and smoking history presented with increasing dyspnea and cough over past month. On presentation had fever of 103. Has tested positive for COVID twice, most recently end of August.  CT shows extensive bilateral pneumonia and COVID testing again positive. HIV pos, CD4 43. Early AM 10/23,  RRT called due to tachycardia and oxygen desaturation requiring oxygen from 3L NC to 15LNRB mask. Patient had been febrile overnight as well and transferred to MICU. Has had upper extremity tremors and lower extremity neuropathy symptoms. Anxiety with long hospitalization. Today, reports breathing is better, now on 10L NC.     O: VS- Blood pressure 114/68, pulse 89, temperature 96.8 °F (36 °C), temperature source Axillary, resp. rate 20, height 6' 4\" (1.93 m), weight 200 lb (90.7 kg), SpO2 96 %. Exam is as noted by resident   Awake, alert and oriented. Heart - RRR  Lungs- Coarse bs b/l  Ext - no edema. Impressions:   Principal Problem:    Acute respiratory failure with hypoxia (HCC)  Active Problems:  Repeatedly positive Covid  Stable groundglass opacities    Acute kidney injury due to COVID-19 ([address]    Asthma    Nephrotic range proteinuria  50 pound weight loss  2 blood cultures positive  Pancytopenia   HIV positive, new diagnosis  late latent syphillis    Plan   Appreciate Nephrology, ID, MICU, cardiology, gen surgery, and pulmonology. Covid pneumonia with sepsis, Reactive HIV 1 antibodies; resistance testing pending, CD4 count of 43. Viral load 195,000. ID managing antibiotics and antivirals for HIV, Biktarvy. RPR positive, awaiting FTA. On Penicillin G for late latent syphillis. S/p LP. VDRL CSF pending. Sinus tachycardia - Echo with EF 55%, small pericardial effusion and possible atrial shunt. Anemia - FOBT heme positive first time, neg on repeat.  Gen surgery has no plans for scopes s/p 1U PRBCs, will trend h/h. Follow. Stable overall  . Blood culture with staph epidermidis, finished treatment with Ancef, Then antibiotics were changed from ancef to bactrim and cefepime for possible sepsis/PCP pneumonia. Bactrim now on hold due to DEISI. Treatment for HAP started with cefepime and increased steroid dosing, wean oxygen as tolerated. On cefepime and pentamidine. Nephrology planning biopsy outpatient with nephrotic range protienuria, also with hyponatremia. Noted worsening creatinine. On insulin for elevated bg on steroids, check a1c    Follow up outpatient for right kidney lesion-ct/mri when cr improved. H/O consulted with neutropenia. Attending Physician Statement  I have reviewed the chart and seen the patient with the resident(s). I personally reviewed images, EKG's and similar tests, if present. I personally reviewed and performed key elements of the history and exam.  I have reviewed and confirmed student and/or resident history and exam with changes as indicated above. I agree with the assessment, plan and orders as documented by the resident. Please refer to the resident and/or student note for additional information.       Andres Wheat MD

## 2021-10-25 LAB
ABO/RH: NORMAL
ALBUMIN SERPL-MCNC: 2.7 G/DL (ref 3.5–5.2)
ALP BLD-CCNC: 63 U/L (ref 40–129)
ALT SERPL-CCNC: 26 U/L (ref 0–40)
ANION GAP SERPL CALCULATED.3IONS-SCNC: 11 MMOL/L (ref 7–16)
ANION GAP SERPL CALCULATED.3IONS-SCNC: 12 MMOL/L (ref 7–16)
ANION GAP SERPL CALCULATED.3IONS-SCNC: 13 MMOL/L (ref 7–16)
ANION GAP SERPL CALCULATED.3IONS-SCNC: 14 MMOL/L (ref 7–16)
ANISOCYTOSIS: ABNORMAL
ANTIBODY SCREEN: NORMAL
APTT: 28.9 SEC (ref 24.5–35.1)
AST SERPL-CCNC: 70 U/L (ref 0–39)
BASOPHILS ABSOLUTE: 0 E9/L (ref 0–0.2)
BASOPHILS RELATIVE PERCENT: 0 % (ref 0–2)
BILIRUB SERPL-MCNC: 0.2 MG/DL (ref 0–1.2)
BUN BLDV-MCNC: 46 MG/DL (ref 6–20)
BUN BLDV-MCNC: 67 MG/DL (ref 6–20)
BUN BLDV-MCNC: 76 MG/DL (ref 6–20)
BUN BLDV-MCNC: 79 MG/DL (ref 6–20)
CALCIUM SERPL-MCNC: 8 MG/DL (ref 8.6–10.2)
CALCIUM SERPL-MCNC: 8.1 MG/DL (ref 8.6–10.2)
CALCIUM SERPL-MCNC: 8.2 MG/DL (ref 8.6–10.2)
CALCIUM SERPL-MCNC: 8.3 MG/DL (ref 8.6–10.2)
CHLORIDE BLD-SCNC: 97 MMOL/L (ref 98–107)
CHLORIDE BLD-SCNC: 98 MMOL/L (ref 98–107)
CHLORIDE BLD-SCNC: 98 MMOL/L (ref 98–107)
CHLORIDE BLD-SCNC: 99 MMOL/L (ref 98–107)
CO2: 17 MMOL/L (ref 22–29)
CO2: 17 MMOL/L (ref 22–29)
CO2: 21 MMOL/L (ref 22–29)
CO2: 23 MMOL/L (ref 22–29)
CREAT SERPL-MCNC: 4.3 MG/DL (ref 0.7–1.2)
CREAT SERPL-MCNC: 5.2 MG/DL (ref 0.7–1.2)
CREAT SERPL-MCNC: 6.1 MG/DL (ref 0.7–1.2)
CREAT SERPL-MCNC: 6.4 MG/DL (ref 0.7–1.2)
EOSINOPHILS ABSOLUTE: 0 E9/L (ref 0.05–0.5)
EOSINOPHILS RELATIVE PERCENT: 0 % (ref 0–6)
GFR AFRICAN AMERICAN: 13
GFR AFRICAN AMERICAN: 13
GFR AFRICAN AMERICAN: 16
GFR AFRICAN AMERICAN: 20
GFR NON-AFRICAN AMERICAN: 13 ML/MIN/1.73
GFR NON-AFRICAN AMERICAN: 13 ML/MIN/1.73
GFR NON-AFRICAN AMERICAN: 16 ML/MIN/1.73
GFR NON-AFRICAN AMERICAN: 20 ML/MIN/1.73
GLUCOSE BLD-MCNC: 111 MG/DL (ref 74–99)
GLUCOSE BLD-MCNC: 126 MG/DL (ref 74–99)
GLUCOSE BLD-MCNC: 130 MG/DL (ref 74–99)
GLUCOSE BLD-MCNC: 147 MG/DL (ref 74–99)
HCT VFR BLD CALC: 20.5 % (ref 37–54)
HCT VFR BLD CALC: 20.9 % (ref 37–54)
HCT VFR BLD CALC: 21.6 % (ref 37–54)
HCT VFR BLD CALC: 21.8 % (ref 37–54)
HEMOGLOBIN: 6.8 G/DL (ref 12.5–16.5)
HEMOGLOBIN: 6.9 G/DL (ref 12.5–16.5)
HEMOGLOBIN: 7 G/DL (ref 12.5–16.5)
HEMOGLOBIN: 7.1 G/DL (ref 12.5–16.5)
HYPOCHROMIA: ABNORMAL
IMMATURE GRANULOCYTES #: 0.03 E9/L
IMMATURE GRANULOCYTES %: 0.6 % (ref 0–5)
IMMATURE RETIC FRACT: 7.9 % (ref 2.3–13.4)
LYMPHOCYTES ABSOLUTE: 0.11 E9/L (ref 1.5–4)
LYMPHOCYTES RELATIVE PERCENT: 2.3 % (ref 20–42)
MCH RBC QN AUTO: 29.7 PG (ref 26–35)
MCHC RBC AUTO-ENTMCNC: 33.5 % (ref 32–34.5)
MCV RBC AUTO: 88.6 FL (ref 80–99.9)
METER GLUCOSE: 102 MG/DL (ref 74–99)
METER GLUCOSE: 119 MG/DL (ref 74–99)
METER GLUCOSE: 123 MG/DL (ref 74–99)
METER GLUCOSE: 138 MG/DL (ref 74–99)
METER GLUCOSE: 144 MG/DL (ref 74–99)
MONOCYTES ABSOLUTE: 0.11 E9/L (ref 0.1–0.95)
MONOCYTES RELATIVE PERCENT: 2.3 % (ref 2–12)
NEUTROPHILS ABSOLUTE: 4.45 E9/L (ref 1.8–7.3)
NEUTROPHILS RELATIVE PERCENT: 94.8 % (ref 43–80)
OVALOCYTES: ABNORMAL
PATHOLOGIST REVIEW: NORMAL
PDW BLD-RTO: 14.3 FL (ref 11.5–15)
PLATELET # BLD: 60 E9/L (ref 130–450)
PLATELET CONFIRMATION: NORMAL
PMV BLD AUTO: 14.8 FL (ref 7–12)
POIKILOCYTES: ABNORMAL
POLYCHROMASIA: ABNORMAL
POTASSIUM REFLEX MAGNESIUM: 4.3 MMOL/L (ref 3.5–5)
POTASSIUM SERPL-SCNC: 3.9 MMOL/L (ref 3.5–5)
POTASSIUM SERPL-SCNC: 4.2 MMOL/L (ref 3.5–5)
POTASSIUM SERPL-SCNC: 4.3 MMOL/L (ref 3.5–5)
POTASSIUM SERPL-SCNC: 4.3 MMOL/L (ref 3.5–5)
RBC # BLD: 2.36 E12/L (ref 3.8–5.8)
RETIC HGB EQUIVALENT: 37.9 PG (ref 28.2–36.6)
RETICULOCYTE ABSOLUTE COUNT: 0.01 E12/L
RETICULOCYTE COUNT PCT: 0.6 % (ref 0.4–1.9)
SODIUM BLD-SCNC: 127 MMOL/L (ref 132–146)
SODIUM BLD-SCNC: 128 MMOL/L (ref 132–146)
SODIUM BLD-SCNC: 130 MMOL/L (ref 132–146)
SODIUM BLD-SCNC: 135 MMOL/L (ref 132–146)
TARGET CELLS: ABNORMAL
TOTAL PROTEIN: 5.2 G/DL (ref 6.4–8.3)
WBC # BLD: 4.7 E9/L (ref 4.5–11.5)

## 2021-10-25 PROCEDURE — 86900 BLOOD TYPING SEROLOGIC ABO: CPT

## 2021-10-25 PROCEDURE — P9016 RBC LEUKOCYTES REDUCED: HCPCS

## 2021-10-25 PROCEDURE — 36415 COLL VENOUS BLD VENIPUNCTURE: CPT

## 2021-10-25 PROCEDURE — 2580000003 HC RX 258: Performed by: INTERNAL MEDICINE

## 2021-10-25 PROCEDURE — 88185 FLOWCYTOMETRY/TC ADD-ON: CPT

## 2021-10-25 PROCEDURE — 06HY33Z INSERTION OF INFUSION DEVICE INTO LOWER VEIN, PERCUTANEOUS APPROACH: ICD-10-PCS | Performed by: SURGERY

## 2021-10-25 PROCEDURE — 6370000000 HC RX 637 (ALT 250 FOR IP): Performed by: INTERNAL MEDICINE

## 2021-10-25 PROCEDURE — 86923 COMPATIBILITY TEST ELECTRIC: CPT

## 2021-10-25 PROCEDURE — P9073 PLATELETS PHERESIS PATH REDU: HCPCS

## 2021-10-25 PROCEDURE — 80053 COMPREHEN METABOLIC PANEL: CPT

## 2021-10-25 PROCEDURE — 6360000002 HC RX W HCPCS: Performed by: STUDENT IN AN ORGANIZED HEALTH CARE EDUCATION/TRAINING PROGRAM

## 2021-10-25 PROCEDURE — 85014 HEMATOCRIT: CPT

## 2021-10-25 PROCEDURE — 80048 BASIC METABOLIC PNL TOTAL CA: CPT

## 2021-10-25 PROCEDURE — 82962 GLUCOSE BLOOD TEST: CPT

## 2021-10-25 PROCEDURE — 99232 SBSQ HOSP IP/OBS MODERATE 35: CPT | Performed by: FAMILY MEDICINE

## 2021-10-25 PROCEDURE — 86901 BLOOD TYPING SEROLOGIC RH(D): CPT

## 2021-10-25 PROCEDURE — 2500000003 HC RX 250 WO HCPCS: Performed by: INTERNAL MEDICINE

## 2021-10-25 PROCEDURE — 99232 SBSQ HOSP IP/OBS MODERATE 35: CPT | Performed by: INTERNAL MEDICINE

## 2021-10-25 PROCEDURE — 85025 COMPLETE CBC W/AUTO DIFF WBC: CPT

## 2021-10-25 PROCEDURE — 90935 HEMODIALYSIS ONE EVALUATION: CPT

## 2021-10-25 PROCEDURE — 6360000002 HC RX W HCPCS: Performed by: INTERNAL MEDICINE

## 2021-10-25 PROCEDURE — 6370000000 HC RX 637 (ALT 250 FOR IP): Performed by: STUDENT IN AN ORGANIZED HEALTH CARE EDUCATION/TRAINING PROGRAM

## 2021-10-25 PROCEDURE — 88184 FLOWCYTOMETRY/ TC 1 MARKER: CPT

## 2021-10-25 PROCEDURE — 5A1D70Z PERFORMANCE OF URINARY FILTRATION, INTERMITTENT, LESS THAN 6 HOURS PER DAY: ICD-10-PCS | Performed by: INTERNAL MEDICINE

## 2021-10-25 PROCEDURE — 2140000000 HC CCU INTERMEDIATE R&B

## 2021-10-25 PROCEDURE — 86317 IMMUNOASSAY INFECTIOUS AGENT: CPT

## 2021-10-25 PROCEDURE — 85730 THROMBOPLASTIN TIME PARTIAL: CPT

## 2021-10-25 PROCEDURE — 85045 AUTOMATED RETICULOCYTE COUNT: CPT

## 2021-10-25 PROCEDURE — 85018 HEMOGLOBIN: CPT

## 2021-10-25 PROCEDURE — 99233 SBSQ HOSP IP/OBS HIGH 50: CPT | Performed by: INTERNAL MEDICINE

## 2021-10-25 PROCEDURE — 86850 RBC ANTIBODY SCREEN: CPT

## 2021-10-25 RX ORDER — PREDNISONE 20 MG/1
20 TABLET ORAL DAILY
Status: COMPLETED | OUTPATIENT
Start: 2021-10-29 | End: 2021-11-02

## 2021-10-25 RX ORDER — SODIUM CHLORIDE 9 MG/ML
INJECTION, SOLUTION INTRAVENOUS PRN
Status: DISCONTINUED | OUTPATIENT
Start: 2021-10-25 | End: 2021-11-04 | Stop reason: SDUPTHER

## 2021-10-25 RX ORDER — ABACAVIR 300 MG/1
600 TABLET ORAL DAILY
Status: DISCONTINUED | OUTPATIENT
Start: 2021-10-25 | End: 2021-10-26

## 2021-10-25 RX ORDER — PREDNISONE 20 MG/1
40 TABLET ORAL DAILY
Status: COMPLETED | OUTPATIENT
Start: 2021-10-26 | End: 2021-10-28

## 2021-10-25 RX ORDER — PENTAMIDINE ISETHIONATE 300 MG/300MG
4 INJECTION, POWDER, LYOPHILIZED, FOR SOLUTION INTRAMUSCULAR; INTRAVENOUS DAILY
Status: DISCONTINUED | OUTPATIENT
Start: 2021-10-25 | End: 2021-10-25 | Stop reason: SDUPTHER

## 2021-10-25 RX ORDER — LAMIVUDINE 100 MG/1
50 TABLET, FILM COATED ORAL DAILY
Status: DISCONTINUED | OUTPATIENT
Start: 2021-10-25 | End: 2021-10-26

## 2021-10-25 RX ADMIN — CEFEPIME HYDROCHLORIDE 1000 MG: 1 INJECTION, POWDER, FOR SOLUTION INTRAMUSCULAR; INTRAVENOUS at 10:28

## 2021-10-25 RX ADMIN — PENTAMIDINE ISETHIONATE 362.8 MG: 300 INJECTION, POWDER, LYOPHILIZED, FOR SOLUTION INTRAMUSCULAR; INTRAVENOUS at 15:28

## 2021-10-25 RX ADMIN — OXYCODONE HYDROCHLORIDE AND ACETAMINOPHEN 500 MG: 500 TABLET ORAL at 08:01

## 2021-10-25 RX ADMIN — HEPARIN SODIUM 5000 UNITS: 10000 INJECTION INTRAVENOUS; SUBCUTANEOUS at 13:45

## 2021-10-25 RX ADMIN — DOLUTEGRAVIR SODIUM 50 MG: 50 TABLET, FILM COATED ORAL at 15:27

## 2021-10-25 RX ADMIN — ACETAMINOPHEN 650 MG: 325 TABLET ORAL at 17:33

## 2021-10-25 RX ADMIN — LAMIVUDINE 50 MG: 100 TABLET, FILM COATED ORAL at 15:28

## 2021-10-25 RX ADMIN — HEPARIN SODIUM 5000 UNITS: 10000 INJECTION INTRAVENOUS; SUBCUTANEOUS at 06:07

## 2021-10-25 RX ADMIN — METHYLPREDNISOLONE SODIUM SUCCINATE 30 MG: 40 INJECTION, POWDER, FOR SOLUTION INTRAMUSCULAR; INTRAVENOUS at 08:01

## 2021-10-25 RX ADMIN — ABACAVIR 600 MG: 300 TABLET, FILM COATED ORAL at 15:27

## 2021-10-25 RX ADMIN — HEPARIN SODIUM 5000 UNITS: 10000 INJECTION INTRAVENOUS; SUBCUTANEOUS at 23:14

## 2021-10-25 RX ADMIN — SODIUM CHLORIDE: 9 INJECTION, SOLUTION INTRAVENOUS at 02:25

## 2021-10-25 RX ADMIN — Medication 10 ML: at 22:59

## 2021-10-25 RX ADMIN — CEFEPIME HYDROCHLORIDE 1000 MG: 1 INJECTION, POWDER, FOR SOLUTION INTRAMUSCULAR; INTRAVENOUS at 23:12

## 2021-10-25 RX ADMIN — SODIUM CHLORIDE: 9 INJECTION, SOLUTION INTRAVENOUS at 13:30

## 2021-10-25 RX ADMIN — PANTOPRAZOLE SODIUM 40 MG: 40 TABLET, DELAYED RELEASE ORAL at 08:01

## 2021-10-25 RX ADMIN — INSULIN LISPRO 1 UNITS: 100 INJECTION, SOLUTION INTRAVENOUS; SUBCUTANEOUS at 23:02

## 2021-10-25 RX ADMIN — Medication 10 ML: at 08:02

## 2021-10-25 ASSESSMENT — PAIN SCALES - GENERAL
PAINLEVEL_OUTOF10: 0
PAINLEVEL_OUTOF10: 8
PAINLEVEL_OUTOF10: 0
PAINLEVEL_OUTOF10: 0
PAINLEVEL_OUTOF10: 3
PAINLEVEL_OUTOF10: 0
PAINLEVEL_OUTOF10: 0

## 2021-10-25 ASSESSMENT — PAIN DESCRIPTION - ORIENTATION: ORIENTATION: RIGHT

## 2021-10-25 ASSESSMENT — PAIN DESCRIPTION - LOCATION: LOCATION: GROIN

## 2021-10-25 ASSESSMENT — PAIN - FUNCTIONAL ASSESSMENT: PAIN_FUNCTIONAL_ASSESSMENT: PREVENTS OR INTERFERES SOME ACTIVE ACTIVITIES AND ADLS

## 2021-10-25 ASSESSMENT — PAIN DESCRIPTION - PAIN TYPE: TYPE: SURGICAL PAIN

## 2021-10-25 NOTE — PROGRESS NOTES
[facility] PROGRESS NOTE      Chief complaint: Follow-up of Gram-positive cocci in clusters on blood culture    The patient is a 36 y.o. male, not vaccinated against COVID-19, with history of asthma, presented on 10/05 with shortness of breath, found to be febrile at 103 °F, positive SARS-CoV-2 PCR, bilateral groundglass opacities on chest CT, tolerating room air with oxygen saturation of 97%. He reports having tested positive for SARS-CoV-2 for the 3rd time now with previous on in late August at which time he reports having quarantined. Urine Streptococcus pneumonia and Legionella antigens were negative. Blood cultures from 10/05 and 10/06 showed methicillin-susceptible Staphylococcus epidermidis in 1 out of 2 sets. HIV screen was positive with viral load of 195,000. CD4 count was low at 43. RPR was reactive at titer of 1:2. Toxoplasma Ab was negative. HLA- was negative. Serum beta-d-glucan was positive. Transthoracic echocardiogram showed late positive bubble suggesting possible shunt from pulmonary origin, moderately dilated left atrium, small pericardial effusion without tamponade physiology, ejection fraction visually estimated at 55%. He underwent bronchoscopy on 10/15 during which normal endobronchial evaluation and normal anatomy were noted. BAL Gram stain and culture showed rare polymorphonuclear leukocytes, rare epithelial cells, rare gram-negative rods, rare gram-positive rods, reduced oropharyngeal liana. BAL pneumocystis stain was negative. BAL galactomannan was negative. Serum Cryptococcus antigen was negative. He underwent lumbar puncture on 10/21, yielding CSF with normal glucose, protein, csll count with differential. CSF meningitis/encephalitis PCR panel was negative for HSV, VZV, CMV, Enterovirus, HHV-6, Streptococcus pneumoniae and agalactiae, E coli, Haemophilus, Neisseria meninigitidis, Cryptococcus.  He was transferred to MICU on 10/23 for worsening shortness of breath      Subjective: Patient was seen and examined. No chills, no abdominal pain, no diarrhea, no rash, no itching. Afebrile today. Objective:  /70   Pulse 89   Temp 97.8 °F (36.6 °C) (Axillary)   Resp 21   Ht 6' 4\" (1.93 m)   Wt 200 lb (90.7 kg)   SpO2 100%   BMI 24.34 kg/m²   Constitutional: Alert, not in distress  Respiratory: Clear breath sounds, no crackles, no wheezes  Cardiovascular: Regular rate and rhythm, no murmurs  Gastrointestinal: Bowel sounds present, soft, nontender  Skin: Warm and dry, no active dermatoses  Musculoskeletal: No joint swelling, no joint erythema    Labs, imaging, and medical records/notes were personally reviewed. Assessment:  Fever and pancytopenia, suspect associated with acute retroviral syndrome. Jarische-Herxheimer reaction is less likely in late latent syphilis with low RPR titers. Pancytopenia, multifactorial, suspect associated with acute retroviral syndrome and/or medication-induced  COVID-19, mild  Pneumonia, suspected Pneumocystis pneumonia (serum beta-d-glucan was positive at >500 pg/mL, BAL Pneumocystis stain was negative but Pneumocystis PCR was not sent)  Late latent syphilis, neurosyphilis ruled out  Methicillin susceptible Staphylococcus epidermidis bacteremia, etiology unclear  DEISI  Advanced HIV disease (viral load of 195,000; CD4 of 43 as of 10/11/2021). Hepatitis C Ab positive (viral load not detected). Hepatitis A immune  Prolonged QTc  HAP    Recommendations:  Continue cefepime 1g q12h. Continue benzathine penicillin IM weekly for 3 doses. Start lamivudine 50 mg q24h, dolutegravir 50 mg q24h, abacavir 600 mg q24h. Change high-dose Bactrim to pentamidine 4 mg/kg IV q24h then switch to q48h once on hemodialysis to finish 21 days until 11/02. Change methylprednisolone to oral prednisone 40 mg q12h  Followed by 40 mg q24h for another 3 days then 20 mg q24h thereafter until 11//02. Follow up repeat serum beta-d-glucan.   Follow up HIV GART, INSTI resistance, urine GC/Chlamydia PCR. Monitor respiratory status. Continue supportive care. Thank you for involving me in the care of Kristin James. I will continue to follow. Please do not hesitate to call for any questions or concerns.     Electronically signed by Mi Chavez MD on 10/25/2021 at 10:59 AM

## 2021-10-25 NOTE — PROGRESS NOTES
[facility] Inpatient   Resident Progress Note    S:  Hospital day: 20   Brief Synopsis: Jonathan Franklin is a 36 y.o. male with no PMH who presented with hypoxia and fever. He was initially seen in PCP office and was hypoxic on exertion with desaturate into the mid low 80s as well as noted to be febrile and tachycardic. Per patient, his symptoms began 8/15/2021 and he tested positive for Covid 8/28. endorse worsening shortness of breath over the past month with sputum production, intermittent chest discomfort, persistent diarrhea, chills, fever, decrease in appetite and 50 pound weight loss since August.  Patient also endorses significantly worsening \"neuropathy\" of bilateral feet, states it feels like he is \"being stabbed by needles\" which is causing him inability to walk due to pain. In the ED, he was found to be anemic, hyperkalemic, hyponatremic, febrile and COVID positive. He also had elevated Creatinine, AST, proBNP and troponin x2. CTA showed extensive multifocal COVID-19 pneumonia bilaterally, bilateral trace layering pleural effusion and bilateral gynecomastia. CT abdomen was limited due to movement and non-contrast, but showed normal appendix and no evidence of bowel obstruction. Admitted for acute hypoxia due to Covid pneumonia. Nephro, pulm and ID consulted. Started on bicarb drip. US showed hyperechoic kidneys, trace b/l perinephric fluid and right kidney lesion (Cyst vs infarction vs abscess vs neoplasia). HIV and Hep C screen were negative. HIV RNA and T and B lymphocytes were ordered. Patient became more hypoxic, tachycardic and hypertensive so  there was concern for PE. CTA was ordered, which came back negative. Echo was completed due to elevated BNP and concern for PE and that revealed a normal EF with late bubble sign possible shunting of pulmonary origin. BNP continued to increase. Cardiology was consulted. Repeat Echo pending.  ID recommends LP with CSF sent for glucose, protein, cell count with differential, which were all  negative. meningitis/encephalitis PCR panel negative. RRT called 10/23 for hypoxia and tachycardia, transferred to medical intensive on 15 L NRB. Creatinine up trending. Currently saturating well on room air. Hemodialysis for worsening renal function      Overnight/interim:   Patient was seen and examined at bedside. Reports breathing improved from yesterday. No chest pain or abdominal pain. Endorse some dizziness, but Headache or visual changes. Cont meds:    dextrose      sodium chloride      sodium chloride       Scheduled meds:    [Held by provider] sulfamethoxazole-trimethoprim (BACTRIM) IVPB  200 mg IntraVENous Q12H    insulin lispro  0-6 Units SubCUTAneous TID WC    insulin lispro  0-3 Units SubCUTAneous Nightly    cefepime  1,000 mg IntraVENous Q12H    sodium chloride   IntraVENous Q12H    methylPREDNISolone  30 mg IntraVENous Q12H    heparin (porcine)  5,000 Units SubCUTAneous Q8H    penicillin G benzathine  2.4 Million Units IntraMUSCular Weekly    lidocaine  1 patch TransDERmal Daily    ascorbic acid  500 mg Oral Daily    [Held by provider] bictegravir-emtricitab-tenofovir alafenamide  1 tablet Oral Daily    [Held by provider] metoprolol tartrate  25 mg Oral Daily    pantoprazole  40 mg Oral QAM AC    sodium chloride flush  5-40 mL IntraVENous 2 times per day     PRN meds: glucose, dextrose, glucagon (rDNA), dextrose, medicated lip balm, benzonatate, perflutren lipid microspheres, hydrOXYzine, labetalol, sodium chloride, sodium chloride flush, ondansetron **OR** ondansetron, polyethylene glycol, acetaminophen **OR** acetaminophen, sodium chloride     I reviewed the patient's past medical and surgical history, Medications and Allergies.     O:  BP (!) 125/54   Pulse 77   Temp 97.5 °F (36.4 °C) (Temporal)   Resp 17   Ht 6' 4\" (1.93 m)   Wt 200 lb (90.7 kg)   SpO2 97%   BMI 24.34 kg/m²   24 hour I&O: I/O last 3 completed shifts: In: 1300 [P.O.:120; I.V.:630; IV Piggyback:550]  Out: 425 [Urine:425]  I/O this shift: In: 48 [I.V.:50]  Out: 200 [Urine:200]        Physical Exam  Vitals reviewed. Constitutional:       General: He is not in acute distress. Appearance: Normal appearance. He is not ill-appearing, toxic-appearing or diaphoretic. HENT:      Head: Normocephalic and atraumatic. Nose: No rhinorrhea. Mouth/Throat:      Mouth: Mucous membranes are moist.      Pharynx: Oropharynx is clear. Eyes:      General: No scleral icterus. Right eye: No discharge. Left eye: No discharge. Extraocular Movements: Extraocular movements intact. Cardiovascular:      Rate and Rhythm: Normal rate and regular rhythm. Pulses: Normal pulses. Heart sounds: Normal heart sounds. No murmur heard. No friction rub. No gallop. Pulmonary:      Effort: No respiratory distress. Breath sounds: No wheezing, rhonchi or rales. Abdominal:      General: Abdomen is flat. Palpations: Abdomen is soft. Tenderness: There is no abdominal tenderness. Musculoskeletal:         General: No swelling. Cervical back: Normal range of motion. No rigidity or tenderness. Lumbar back: No swelling or spasms. Normal range of motion. Right lower leg: No edema. Left lower leg: No edema. Skin:     General: Skin is warm and dry. Coloration: Skin is not jaundiced. Findings: No bruising or rash. Neurological:      General: No focal deficit present. Mental Status: He is alert and oriented to person, place, and time. Cranial Nerves: No cranial nerve deficit. Motor: No weakness. Psychiatric:         Behavior: Behavior normal.         Thought Content:  Thought content normal.           Labs:  Na/K/Cl/CO2:  127/4.3, 4.3/98/17 (10/25 8681)  BUN/Cr/glu/ALT/AST/amyl/lip:  76/6.1/--/26/70/--/-- (10/25 8616)  WBC/Hgb/Hct/Plts:  4.7/7.0/20.9/60 (10/25 9038)  estimated creatinine clearance is 22 mL/min (A) (based on SCr of 6.1 mg/dL (H)). Other pertinent labs as noted below    Radiology:  XR CHEST PORTABLE   Final Result   Mild worsening of extensive bilateral infiltrates         XR CHEST PORTABLE   Final Result   Increasing bilateral airspace disease suggesting worsening pneumonia. FL LUMBAR PUNCTURE DIAG   Final Result   Successful fluoroscopic-guided lumbar puncture. XR CHEST PORTABLE   Final Result   Extensive bilateral patchy areas of ground-glass opacity concerning for   atypical pneumonia or viral airway disease, improved         XR CHEST PORTABLE   Final Result   Persistent bilateral infiltrates. No significant changes since October 13. See above comments. See report CT chest October 13. CTA PULMONARY W CONTRAST   Final Result   1. No evidence of pulmonary embolism. 2. Interval increased size of bilateral pleural effusions with little change   in pericardial effusion. 3. Persistent subcutaneous edema. 4. Reactive bilateral hilar lymphadenopathy. 5. Bilateral airspace disease is increased compared to prior examination. The increase is most pronounced within the lower lobes. 6. Bilateral gynecomastia. XR CHEST PORTABLE   Final Result   Development of extensive bilateral pulmonary infiltrates         XR CHEST PORTABLE   Final Result   No acute process. US RETROPERITONEAL COMPLETE   Final Result   1. Hyperechoic kidneys due to underlying parenchymal disease. 2. Trace bilateral perinephric fluid of indeterminate etiology, potentially   due to underlying inflammation. 3. An approximately 1.1 cm x 1.5 cm x 2.3 cm lesion in the right kidney could   be a cyst but could also be seen with infarction, abscess, or neoplasia. Consider further evaluation with renal protocol abdomen MRI (preferred) or   CT. Following resolution of acute kidney injury.          XR CHEST PORTABLE   Final Result   Multifocal bilateral pulmonary ground-glass airspace opacities are stable         CT ABDOMEN PELVIS WO CONTRAST Additional Contrast? None   Final Result   Limited study by the patient's respiratory motion and lack of intravenous   contrast.      Extensive multifocal COVID-19 pneumonia bilaterally. Bilateral trace layering pleural effusions. Bilateral gynecomastia. Abdominal organs inadequately evaluated due to the limitations of the study. Normal appendix. No evidence of bowel obstruction. CT CHEST WO CONTRAST   Final Result   Limited study by the patient's respiratory motion and lack of intravenous   contrast.      Extensive multifocal COVID-19 pneumonia bilaterally. Bilateral trace layering pleural effusions. Bilateral gynecomastia. Abdominal organs inadequately evaluated due to the limitations of the study. Normal appendix. No evidence of bowel obstruction.                Resident Assessment and Plan       Acute hypoxia 2/2 COVID pneumonia vs PCP  - CTA on admission: COVID pneumonia, Bilateral trace layering pleural effusion, negative for PE   - Ferritin 3900, CRP 10.1 on admission   - D dimer elevated on admission, currently stable  - Pulmonology and ID on board  - Legionella, spot TB test negative   - CXR 10/8 shows multifocal groundglass opacities are stable  - Repeat CXR 10/17: b/l pulm infiltrates improving   - Blood cx: staph epidermitis, repeat blood cx shows no growth x 7 days   -Urine culture: NGTD   -Counseled on getting COVID vaccine after patient recovers   - ECHO 10/13: EF 55%, late bubble sign possible shunting of pulmonary origin   - Cardiology consulted   - CTA 10/13 negative for PE  Bronchoscopy with BAL performed on 10/15/2021   - Respiratory culture: oral pharyngeal liana decreased, few gram negative rods  CXR 10/23: Mild worsening extensive bilateral infiltrates  - Worsening hypoxia 10/23, RRT called, transferred to ICU, was on nonrebreather, ABG showed resp alkalosis with some metabolic acidosis compensation   - Currently maintaining saturation on room air, continue to monitor  - on IV solumedrol 30mg q12h, day 3    Weight loss likely secondary to HIV  - Per patient due to decreased appetite from COVID; 50lb weight loss since August   - R/o other potential origins: SLE, TB, hepatitis  - MARILEE negative  - T spot TB test negative   HIV, hepatitis C antibodies positive  - Elevated IgG and IgA, normal IgM: IgA nephropathy vs MPGN?   - Cryoglobulin normal   - TSH normal   - Sed rate and CRP elevated      HIV  HIV 1/2 antibodies positive  - Leukopenia, normal absolute neutrophil   - Febrile and tachycardic   CD4 44, CD3 153, CD8 100  HIV viral load 195,000  - ID on board: received 2 days of vancomycin 10/6, discontinued ancef, started on cefepime 2g q8h day 7  - Fungitell test positive, on eraxis day 4  - Bactrim 400mg q8h for suspected Pneumocystis pneumonia, held due to renal failure  - Pneumocystis stain negative, but PCR pending; still suspect PCP pna  - tests for opportunistic infections pending: HIV GART, urine GC/Chlamydia PCR  - [id number] negative, genotype pending  - Toxoplasma Ab, cryptococcus negative  - hepatitis A total Ab positive  - RPR reactive, quant 1:2, treponema pallidum ab pending  - On PCN G weekly for latent syphilis   - Started on biktarvy 10/22, held due to renal failure     Anemia  Pancytopenia  - Likely 2/2 from GI bleed vs HIV   - Hb 7.0, trending down   Transfused 1 unit 10/11  - General surgery consulted, FOBT negative the second time 10/11  - Trend H/H   - type and screen   - Repeat FOBT negative 10/23  - Heme Onc consulted: started on granix to improve ANC  Platelets trending down, 60 K; can remain on heparin if plt >50,000  Peripheral blood smear shows normocytic anemia, absolute lymphocytopenia; no platelet clumping, schistocytes or dysplasia  - Retic count wnl     DEISI    Hyponatremia  - Na trending down 127  - Nephro on board   - Likely secondary to dehydration from persistent diarrhea vs decreased PO intake vs bactrim vs HIVANS vs Biktarvy  - Cr trending up, 6.1  Help Biktarvy and Bactrim  - urine studies show intrinsic cause of DEISI  - Microalbumin-creatinine ratio elevated   - Kidney bx OP 2 weeks after discharge; IR will not perform inpatient due to COVID pna   Fluid restriction  - Oliguric (0.3 mL/kg/hr)   - HD today       Hypoalbuminemia   -2/2 poor intake vs CHF vs nephrotic syndrome  - Alb 2.7  -Previously received albumin 25 g q6h x 4 doses 10/6, 8 doses 10/12 and 8 doses 10/23  Low C3, normal C4 and cryoglobulin  Fluid restriction   - Continue to monitor     Hypnagogic Hallucinationsimproving  - occurring since received anesthesia on 10/15   - no history of psych history or narcolepsy  - could be 2/2 to poor sleep vs anesthesia reaction vs vistaril?  - continue to monitor       Elevated troponin and proBNP  - 2/2 myocardial injury vs stress cardiomyopathy due to COVID vs new onset CHF   - initial troponin 78, 78  - initial BNP 1599,  repeat 11,401 on 10/14, trending down 3400   - ECHO: EF 55%, late bubble sign possible shunting of pulmonary origin  - Repeat ECHO pending   Received 1 dose of IV Lasix 40mg on 10/15    Tachycardia  - 2/2 to infectious vs anxiety vs anemia   HR improving   Cardio consulted, recommend to start Beta blocker   - Hold lopressor   Asymptomatic  Continue to monitor    HTN -improving  - BP on lower end  - Lopressor held   - labetalol PRN  - Continue to monitor               PT/OT evaluation: Consulted  DVT prophylaxis: heparin due to DEISI, monitor platelets  GI prophylaxis:protonix  Disposition:ICU  Diet: adult         Electronically signed by Jon Orr MD PGY-1 on 10/25/2021 at 6:45 AM  Attending physician: Dr. Deborah Ayon

## 2021-10-25 NOTE — BH NOTE
Behavioral Health Inpatient Follow Up   [facility] Family Medicine     Psychology following for anxiety, difficulty coping with hospital stressors. Pt is a 36 y.o. male with pneumonia, COVID,  HIV pos. Transferred to MICU due to tachycardia and oxygen desaturation. Starting dialysis today. Continued intervention for stress management. Stated he is accepting circumstances. Has concerns for his emotional functioning post-hospitalization. Continued to discuss how to manage hospital stress:  1) focus on short-term daily goals; 2) write down information related to his care. He has been able to get personal belongings from family. Will continue to follow with Family Medicine team during hospitalization.  He is in agreement to follow with this psychologist outpatient following discharge.       Electronically signed by Jesse Ortiz, PhD

## 2021-10-25 NOTE — CARE COORDINATION
10/25 Care Coordination: Pt in MICU,, on 10/23 was a RRT,called for hypoxia and tachycardia cardia. Pt now off O2 on RA. Pt remains oliguric. Renal following. Dr Issa Gloria Discussed at length at bedside about starting dialysis, patient talked  to his family and agrees to dialysis. Will consult vascular. Per SW note prior to RRT Pt's d/c plan is to return home. Will need to set up HD is determined to be permanent. Will need HD access, not Temp access. ... CM/SW will continue to follow for discharge planning.    Tatyana GARDNERN,RN-CV-BC  [phone]

## 2021-10-25 NOTE — PROGRESS NOTES
[address] [facility]   Department of Internal Medicine   Internal Medicine Residency  MICU Progress Note    Patient:  Cam Rangel 36 y.o. male   MRN: [id number]       Date of Service: 10/24/2021    Allergy: Patient has no known allergies. Cc SOB     Subjective     Patient was seen and examined this morning at bedside in no acute distress. Overnight, patient was afebrile and was able to wean off oxygen supplement. He was seen this morning breathing on room air. Patient claimed he felt better compared to previous days. Patient's decision regarding HD catheter placement for initiation of hemodialysis was inquired and patient said that he has decided to proceed with catheter placement and HD. Objective     TEMPERATURE:  Current - Temp: 96.1 °F (35.6 °C); Max - Temp  Av.8 °F (36 °C)  Min: 96.1 °F (35.6 °C)  Max: 98 °F (36.7 °C)  RESPIRATIONS RANGE: Resp  Av.9  Min: 19  Max: 35  PULSE RANGE: Pulse  Av.9  Min: 76  Max: 151  BLOOD PRESSURE RANGE:  Systolic (51SVZ), DPN:568 , Min:87 , CPV:043   ; Diastolic (76AMI), IWP:42, Min:53, Max:79    PULSE OXIMETRY RANGE: SpO2  Av.5 %  Min: 93 %  Max: 100 %    I & O - 24hr:    Intake/Output Summary (Last 24 hours) at 10/24/2021 2109  Last data filed at 10/24/2021 1603  Gross per 24 hour   Intake 1350 ml   Output 325 ml   Net 1025 ml     I/O last 3 completed shifts: In: 1360 [P.O.:170; I.V.:640; IV Piggyback:550]  Out: 375 [Urine:375] I/O this shift:  In: -   Out: 125 [Urine:125]   Weight change:     Physical Exam:  General Appearance:    alert, awake, conversive, and no respiratory distress   HEENT:    NC/AT, mucous membranes are moist   Neck:   Supple, no jugular venous distention. Resp:     Clear breath sounds bilaterally, No wheezes, No rhonchi, no use of accessory muscles   Heart:    RRR, S1 and S2 normal, no murmur, rub or gallop.     Abdomen:     Soft, non-tender, non-distended with normal bowel sounds   Extremities:   Atraumatic, no cyanosis 125* 127*   K 4.7  4.7 4.3 4.0   CL 95* 94* 96*   CO2 17* 16* 16*   BUN 56* 63* 66*   CREATININE 5.4* 5.3* 5.6*   GLUCOSE 131* 166* 160*       LIVER PROFILE:   Recent Labs     10/22/21  0716 10/23/21  0400 10/24/21  0445   AST 80* 104* 85*   ALT 21 22 22   BILITOT 0.2 0.5 0.3   ALKPHOS 63 73 66       PT/INR:   No results for input(s): PROTIME, INR in the last 72 hours. APTT:   Recent Labs     10/22/21  0716 10/23/21  0400 10/24/21  0445   APTT 34.9 36.7* 34.4       Fasting Lipid Panel:    No results found for: CHOL, TRIG, HDL    Cardiac Enzymes:    Lab Results   Component Value Date    CKTOTAL 605 (H) 10/06/2021       Notable Cultures:      Blood cultures   Blood Culture, Routine   Date Value Ref Range Status   10/23/2021 24 Hours no growth  Preliminary     Respiratory cultures No results found for: RESPCULTURE   Gram Stain Result   Date Value Ref Range Status   10/21/2021 Refer to ordered Gram stain for results  Final     Urine   Urine Culture, Routine   Date Value Ref Range Status   10/06/2021 Growth not present  Final     Legionella No results found for: LABLEGI  C Diff PCR No results found for: CDIFPCR  Wound culture/abscess: No results for input(s): WNDABS in the last 72 hours. Tip culture:No results for input(s): CXCATHTIP in the last 72 hours. Oxygen:     Vent Information  SpO2: 100 %  Additional Respiratory  Assessments  Pulse: 87  Resp: (!) 33  SpO2: 100 %  Oral Care: Teeth brushed, Mouthwash       [REMOVED] Urethral Catheter Temperature probe 16 fr-Output (mL): 175 mL    Imaging Studies:  XR CHEST PORTABLE   Final Result   Mild worsening of extensive bilateral infiltrates         XR CHEST PORTABLE   Final Result   Increasing bilateral airspace disease suggesting worsening pneumonia. FL LUMBAR PUNCTURE DIAG   Final Result   Successful fluoroscopic-guided lumbar puncture.          XR CHEST PORTABLE   Final Result   Extensive bilateral patchy areas of ground-glass opacity concerning for atypical pneumonia or viral airway disease, improved         XR CHEST PORTABLE   Final Result   Persistent bilateral infiltrates. No significant changes since October 13. See above comments. See report CT chest October 13. CTA PULMONARY W CONTRAST   Final Result   1. No evidence of pulmonary embolism. 2. Interval increased size of bilateral pleural effusions with little change   in pericardial effusion. 3. Persistent subcutaneous edema. 4. Reactive bilateral hilar lymphadenopathy. 5. Bilateral airspace disease is increased compared to prior examination. The increase is most pronounced within the lower lobes. 6. Bilateral gynecomastia. XR CHEST PORTABLE   Final Result   Development of extensive bilateral pulmonary infiltrates         XR CHEST PORTABLE   Final Result   No acute process. US RETROPERITONEAL COMPLETE   Final Result   1. Hyperechoic kidneys due to underlying parenchymal disease. 2. Trace bilateral perinephric fluid of indeterminate etiology, potentially   due to underlying inflammation. 3. An approximately 1.1 cm x 1.5 cm x 2.3 cm lesion in the right kidney could   be a cyst but could also be seen with infarction, abscess, or neoplasia. Consider further evaluation with renal protocol abdomen MRI (preferred) or   CT. Following resolution of acute kidney injury. XR CHEST PORTABLE   Final Result   Multifocal bilateral pulmonary ground-glass airspace opacities are stable         CT ABDOMEN PELVIS WO CONTRAST Additional Contrast? None   Final Result   Limited study by the patient's respiratory motion and lack of intravenous   contrast.      Extensive multifocal COVID-19 pneumonia bilaterally. Bilateral trace layering pleural effusions. Bilateral gynecomastia. Abdominal organs inadequately evaluated due to the limitations of the study. Normal appendix. No evidence of bowel obstruction.          CT CHEST WO CONTRAST   Final Result Limited study by the patient's respiratory motion and lack of intravenous   contrast.      Extensive multifocal COVID-19 pneumonia bilaterally. Bilateral trace layering pleural effusions. Bilateral gynecomastia. Abdominal organs inadequately evaluated due to the limitations of the study. Normal appendix. No evidence of bowel obstruction. Resident's Assessment and Plan     Assessment and Plan:    Cardiology  Elevated troponin likely type 2 MI  Troponin 78--> 45  EKG: sinus tachycardia     QTc prolongation  Max 535 on 10/07/21  Avoid QTc prolonging meds     Pulmonary  Acute hypoxic respiratory failure likely 2/2 PCP than COVID-19  Initially spO2 80s% in ED  RRT called due to desaturation spO2 <90%   (+) COVID-19 x3 (early 2021, August 2021, current admission)  Aspergillus negative, cryptococcus negative, PCP DFA negative, BAL gram stain and culture negative, negative t-spot TB test   meningitis panel: negative for HSV, VZV, CMV, Enterovirus, HHV-6, Streptococcus pneumoniae and agalactiae, E coli, Haemophilus, Neisseria meninigitidis, Cryptococcus  BAL 10/15/21: negative for malignant cells  Blood culture 10/23: negative  CSF culture and gram stain10/21: negative  Respiratory culture: no result  CXR 10/23/21: worsened bilateral upper lobe infiltrates  Currently breathing on room air  ID recs: Continue cefepime 1g q12h. Continue benzathine penicillin IM weekly for 3 doses. Start lamivudine 50 mg q24h, dolutegravir 50 mg q24h, abacavir 600 mg q24h. Change high-dose Bactrim to pentamidine 4 mg/kg IV q24h then switch to q48h once on hemodialysis to finish 21 days until 11/02. Change methylprednisolone to oral prednisone 40 mg q12h  Followed by 40 mg q24h for another 3 days then 20 mg q24h thereafter until 11//02.  repeat beta d glucan  Pulmonology following, follow recs  Monitor respiratory status, low threshold for intubation     Infectious Disease  HIV infection  Viral load radiologist or consultant if felt dis-concordant with the exam or history. The above findings were corroborated, plans confirmed and changes made if needed. Family is updated at the bedside as available. Key issues of the case were discussed among consultants. Critical Care time is documented if appropriate.       Quan Batista DO, FACP, FCCP, [place],

## 2021-10-25 NOTE — PROGRESS NOTES
[address] MICU  [address]  [address] [place] [address]  Dept: [phone]  Loc: [phone]  Attending progress note      Reason for Visit:   Pancytopenia. Referring Physician:  Edda Pittman MD    PCP:  Joe Hawthorne MD     Due to the current efforts to prevent transmission of COVID-19 and also the need to preserve PPE for other caregivers, a face-to-face encounter with the patient was not performed. That being said, all relevant records and diagnostic tests were reviewed, including laboratory results and imaging. Please reference any relevant documentation elsewhere. Care will be coordinated with the primary service. Subjective:  Patient is awake and alert. No bleeding, but hemoglobin/hematocrit had decreased to 6.9/20.5, packed RBCs transfusion was ordered. Past Medical History:      Diagnosis Date    Asthma      Patient Active Problem List   Diagnosis    Acute respiratory failure with hypoxia (HCC)    Acute kidney injury due to COVID-19 ([address]    Asthma    Nephrotic range proteinuria    COVID-19        Past Surgical History:      Procedure Laterality Date    BRONCHOSCOPY N/A 10/15/2021    BRONCHOSCOPY ALVEOLAR LAVAGE performed by Italo Horner DO at [address] [place] [address]         Family History:  Family History   Problem Relation Age of Onset    Diabetes type 2  Mother     Diabetes type 2  Father        Medications:  Reviewed and reconciled.     Social History:  Social History     Socioeconomic History    Marital status: Single     Spouse name: Not on file    Number of children: Not on file    Years of education: Not on file    Highest education level: Not on file   Occupational History    Not on file   Tobacco Use    Smoking status: Former Smoker     Quit date: 2021     Years since quittin.1    Smokeless tobacco: Never Used   Vaping Use    Vaping Use: Never used   Substance and Sexual Activity    Alcohol use: No    Drug use: patient was started on Biktarvy on 10/21/2021, high-dose Bactrim on 10/13/2021, and penicillin GI on 10/22/2012. Upon presentation on 10/5/2021 white count was 4.2, hemoglobin 9.9, hematocrit 30, platelet count 075H, , ANC 3360. Patient count started trending down about 10 days ago, had normalized, then had decreased to 105K, 90 2K, 70 9K and today they are 60K. His white count had normalized and started decreasing about a week ago, on 10/23/2021 white count had decreased to 1.7, today 0.9, ANC is 700, . T-max yesterday was 101.3. Currently temp 96.6. Fibrinogen from yesterday was 453, PTT today 34.4. Pancytopenia is multifactorial, secondary to HIV, COVID-19, possible PCP and drug-induced. He is not in DIC. Low probability for HIT. He does not have vitamin B12 or folate deficiency, iron studies are consistent with iron deficiency and anemia of chronic inflammation. He does not have hypogammaglobinemia. SPEP is neg for monoclonal proteins.  -He received Granix on 10/24/2021 to improve his anc so he can tolerate the antiretroviral therapy. White count had normalized, 4.7.  -Continue to monitor his CBCD. Hemoglobin/hematocrit 6.9/20.5, packed RBCs transfusion has been ordered.  -Continue prophylactic dose of heparin as long as platelet count is greater than 50K, platelet count overall stable at 60K. -Peripheral blood flowcytometry pending.  -Continue antimicrobial therapy per ID team.  -Renal Failure, HD was recommended. Will continue to follow. Thank you for allowing us to participate in the care of Mr. Jordy Shea.     Kirill Ashford MD   HEMATOLOGY/MEDICAL ONCOLOGY  [facility] [address] [facility]  [address]  [place] [id number]  Dept: [address]: [phone]

## 2021-10-25 NOTE — CONSULTS
Vascular Surgery Consultation Note    Reason for Consult:  Temp HD catheter placement     HPI:    This is a 36 y.o. male with HIV and Hep C who was admitted 10/11 for acute resp. Failure 2/2 Covid PNA now presenting with DEISI in need of hemodialysis. Vascular surgery is consulted for placement of temporary HD catheter.        ROS: Negative if blank [], Positive if [x]  General Vascular   [] Fevers [] Claudication (Blocks)   [] Chills [] Rest Pain   [] Weight Loss [] Tissue Loss   [] Chest Pain [] Clotting Disorder   [] SOB at rest [] Leg Swelling   [x] SOB with exertion [] DVT/PE      [] Nausea    [] Vomiting [] Stroke/TIA   [] Abdominal Pain [] Focal weakness   [] Melena [] Slurred Speech   [] Hematochezia [] Vision Changes   [] Hematuria    [] Dysuria [] Hx of Central Catheters   [] Wears Glasses/Contacts [] Dialysis and If so date initiated   [] Blindness       [] Difficulty swallowing        Past Medical History:   Diagnosis Date    Asthma         Past Surgical History:   Procedure Laterality Date    BRONCHOSCOPY N/A 10/15/2021    BRONCHOSCOPY ALVEOLAR LAVAGE performed by Cassidy Chacon DO at [address]         Current Medications:    sodium chloride      sodium chloride      sodium chloride      dextrose      sodium chloride        sodium chloride, sodium chloride, sodium chloride, glucose, dextrose, glucagon (rDNA), dextrose, medicated lip balm, benzonatate, perflutren lipid microspheres, hydrOXYzine, labetalol, sodium chloride flush, ondansetron **OR** ondansetron, polyethylene glycol, acetaminophen **OR** acetaminophen, sodium chloride    pentamidine isethionate (PENTAM) IVPB  4 mg/kg IntraVENous Q24H    abacavir  600 mg Oral Daily    lamiVUDine  50 mg Oral Daily    dolutegravir sodium  50 mg Oral Daily    [START ON 10/26/2021] predniSONE  40 mg Oral Daily    Followed by   Sarai Glover ON 10/29/2021] predniSONE  20 mg Oral Daily    insulin lispro  0-6 Units SubCUTAneous TID WC    insulin lispro  0-3 Units SubCUTAneous Nightly    cefepime  1,000 mg IntraVENous Q12H    sodium chloride   IntraVENous Q12H    heparin (porcine)  5,000 Units SubCUTAneous Q8H    penicillin G benzathine  2.4 Million Units IntraMUSCular Weekly    lidocaine  1 patch TransDERmal Daily    ascorbic acid  500 mg Oral Daily    [Held by provider] metoprolol tartrate  25 mg Oral Daily    pantoprazole  40 mg Oral QAM AC    sodium chloride flush  5-40 mL IntraVENous 2 times per day        Allergies:  Patient has no known allergies. Social History     Socioeconomic History    Marital status: Single     Spouse name: Not on file    Number of children: Not on file    Years of education: Not on file    Highest education level: Not on file   Occupational History    Not on file   Tobacco Use    Smoking status: Former Smoker     Quit date: 2021     Years since quittin.1    Smokeless tobacco: Never Used   Vaping Use    Vaping Use: Never used   Substance and Sexual Activity    Alcohol use: No    Drug use: No    Sexual activity: Not on file   Other Topics Concern    Not on file   Social History Narrative    Not on file     Social Determinants of Health     Financial Resource Strain: Low Risk     Difficulty of Paying Living Expenses: Not very hard   Food Insecurity: Food Insecurity Present    Worried About [facility] in the Last Year: Sometimes true    Daniel of Food in the Last Year: Sometimes true   Transportation Needs:     Lack of Transportation (Medical):      Lack of Transportation (Non-Medical):    Physical Activity:     Days of Exercise per Week:     Minutes of Exercise per Session:    Stress:     Feeling of Stress :    Social Connections:     Frequency of Communication with Friends and Family:     Frequency of Social Gatherings with Friends and Family:     Attends [employer] Services:     Active Member of Clubs or Organizations:     Attends Club or Organization Meetings:     Marital Status:    Intimate Partner Violence:     Fear of Current or Ex-Partner:     Emotionally Abused:     Physically Abused:     Sexually Abused:         Family History   Problem Relation Age of Onset    Diabetes type 2  Mother     Diabetes type 2  Father        PHYSICAL EXAM:    /82   Pulse 85   Temp 98 °F (36.7 °C) (Axillary)   Resp 20   Ht 6' 4\" (1.93 m)   Wt 200 lb (90.7 kg)   SpO2 98%   BMI 24.34 kg/m²   CONSTITUTIONAL:  awake, alert, cooperative, no apparent distress, and appears stated age  NEURO:  Normal  EYES:  lids and lashes normal, sclera clear and conjunctiva normal  ENT:  normocepalic, without obvious abnormality, external ears without lesions  NECK:  supple, symmetrical, trachea midline, no jugular venous distension, no carotid bruits  LUNGS:  no increased work of breathing  CARDIOVASCULAR:  regular rate and rhythm  ABDOMEN:  soft, non-distended, non-tender, Aorta is not palpable  SKIN:  no bruising or bleeding    EXTREMITIES:    R UE Swelling absent Incisions absent       5/5 Strength    L UE Swelling absent Incisions absent       5/5 Strength    R LE Edema absent     Incisions absent    Varicose veins absent    Wounds absent    normalcaprefill   5/5 Strength   Neuropathy is absent    L LE Edema absent     Incisions absent    Varicose veins absent    Wounds absent    normalcaprefill   5/5 Strength   Neuropathy is absent    R brachial Palpable  L brachial Palpable    R radial Palpable  L radial Palpable    R femoral Palpable  L femoral Palpable    R popliteal Palpable  L popliteal Palpable    R posterior tibial Palpable  L posterior tibial Palpable    R dorsalis pedis Palpable  L dorsalis pedis Palpable        LABS:    Lab Results   Component Value Date    WBC 4.7 10/25/2021    HGB 6.9 (LL) 10/25/2021    HCT 20.5 (L) 10/25/2021    PLT 60 (L) 10/25/2021    PROTIME 17.7 (H) 10/15/2021    INR 1.6 10/15/2021    K 4.3 10/25/2021    BUN 79 (H) 10/25/2021    CREATININE 6.4 (H) 10/25/2021       RADIOLOGY:  CT ABDOMEN PELVIS WO CONTRAST Additional Contrast? None    Result Date: 10/5/2021  EXAMINATION: CT OF THE CHEST WITHOUT CONTRAST; CT OF THE ABDOMEN AND PELVIS WITHOUT CONTRAST 10/5/2021 7:15 pm TECHNIQUE: CT of the chest was performed without the administration of intravenous contrast. Multiplanar reformatted images are provided for review. Dose modulation, iterative reconstruction, and/or weight based adjustment of the mA/kV was utilized to reduce the radiation dose to as low as reasonably achievable.; CT of the abdomen and pelvis was performed without the administration of intravenous contrast. Multiplanar reformatted images are provided for review. Dose modulation, iterative reconstruction, and/or weight based adjustment of the mA/kV was utilized to reduce the radiation dose to as low as reasonably achievable. COMPARISON: None. HISTORY: ORDERING SYSTEM PROVIDED HISTORY: ro mass, weight loss? TECHNOLOGIST PROVIDED HISTORY: Reason for exam:->ro mass, weight loss? Decision Support Exception - unselect if not a suspected or confirmed emergency medical condition->Emergency Medical Condition ([place]) What reading provider will be dictating this exam?->CRC FINDINGS: THE STUDY IS LIMITED DUE TO LACK OF INTRAVENOUS CONTRAST. Chest: Mediastinum: No thoracic aortic aneurysm. No definite adenopathy on this unenhanced study. Trace pericardial fluid anteriorly. Lungs/pleura: Trace layering pleural effusions bilaterally, slightly larger on the right. No pneumothorax. Numerous ground-glass densities in the bilateral upper and to a lesser degree lower lungs, in keeping with known COVID-19 pneumonia. Soft Tissues/Bones: Bilateral gynecomastia. No acute osseous abnormality in the thoracic cage. Abdomen and pelvis: The study is degraded by the patient's respiratory motion. Organs: Abdominal organs inadequately evaluated on this motion degraded and unenhanced study.   No hydronephrosis on either side. GI/Bowel: Normal appendix. No evidence of bowel obstruction. Pelvis: Normal sized prostate. Urinary bladder grossly intact. Peritoneum/Retroperitoneum: No free air or free fluid. No bulky adenopathy. No abdominal aortic aneurysm. Bones/Soft Tissues: No lytic or sclerotic lesion in the regional skeleton. Limited study by the patient's respiratory motion and lack of intravenous contrast. Extensive multifocal COVID-19 pneumonia bilaterally. Bilateral trace layering pleural effusions. Bilateral gynecomastia. Abdominal organs inadequately evaluated due to the limitations of the study. Normal appendix. No evidence of bowel obstruction. CT CHEST WO CONTRAST    Result Date: 10/5/2021  EXAMINATION: CT OF THE CHEST WITHOUT CONTRAST; CT OF THE ABDOMEN AND PELVIS WITHOUT CONTRAST 10/5/2021 7:15 pm TECHNIQUE: CT of the chest was performed without the administration of intravenous contrast. Multiplanar reformatted images are provided for review. Dose modulation, iterative reconstruction, and/or weight based adjustment of the mA/kV was utilized to reduce the radiation dose to as low as reasonably achievable.; CT of the abdomen and pelvis was performed without the administration of intravenous contrast. Multiplanar reformatted images are provided for review. Dose modulation, iterative reconstruction, and/or weight based adjustment of the mA/kV was utilized to reduce the radiation dose to as low as reasonably achievable. COMPARISON: None. HISTORY: ORDERING SYSTEM PROVIDED HISTORY: ro mass, weight loss? TECHNOLOGIST PROVIDED HISTORY: Reason for exam:->ro mass, weight loss? Decision Support Exception - unselect if not a suspected or confirmed emergency medical condition->Emergency Medical Condition ([place]) What reading provider will be dictating this exam?->CRC FINDINGS: THE STUDY IS LIMITED DUE TO LACK OF INTRAVENOUS CONTRAST. Chest: Mediastinum: No thoracic aortic aneurysm.   No definite adenopathy on this unenhanced study.  Trace pericardial fluid anteriorly. Lungs/pleura: Trace layering pleural effusions bilaterally, slightly larger on the right. No pneumothorax. Numerous ground-glass densities in the bilateral upper and to a lesser degree lower lungs, in keeping with known COVID-19 pneumonia. Soft Tissues/Bones: Bilateral gynecomastia. No acute osseous abnormality in the thoracic cage. Abdomen and pelvis: The study is degraded by the patient's respiratory motion. Organs: Abdominal organs inadequately evaluated on this motion degraded and unenhanced study. No hydronephrosis on either side. GI/Bowel: Normal appendix. No evidence of bowel obstruction. Pelvis: Normal sized prostate. Urinary bladder grossly intact. Peritoneum/Retroperitoneum: No free air or free fluid. No bulky adenopathy. No abdominal aortic aneurysm. Bones/Soft Tissues: No lytic or sclerotic lesion in the regional skeleton. Limited study by the patient's respiratory motion and lack of intravenous contrast. Extensive multifocal COVID-19 pneumonia bilaterally. Bilateral trace layering pleural effusions. Bilateral gynecomastia. Abdominal organs inadequately evaluated due to the limitations of the study. Normal appendix. No evidence of bowel obstruction. Assesment/Plan  36 y.o. male with acute resp.  Failure 2/2 covid PNA and worsening DEISI needing HD catheter placement     Temp HD catheter placement     Discussed with Dr. Yoseph Ch MD  10/25/21  3:35 PM EDT    Please call if tunn line needeed    Arminda Leslie MD

## 2021-10-25 NOTE — PROCEDURES
Alee Salas is a 36 y.o. male patient. 1. COVID-19    2. DEISI (acute kidney injury) ([facility] [address]      Past Medical History:   Diagnosis Date    Asthma      Blood pressure 115/66, pulse 92, temperature 98 °F (36.7 °C), resp. rate 17, height 6' 4\" (1.93 m), weight 199 lb 15.3 oz (90.7 kg), SpO2 100 %. HD Catheter Placement     Date/Time: 10/25/2021 6:40 PM  Performed by: Becca Ma MD  Authorized by: Becca Ma MD   Consent: Verbal consent obtained. Written consent obtained. Risks and benefits: risks, benefits and alternatives were discussed  Consent given by: patient  Patient identity confirmed: arm band  Time out: Immediately prior to procedure a \"time out\" was called to verify the correct patient, procedure, equipment, support staff and site/side marked as required.   Indications: vascular access  Anesthesia: see MAR for details    Anesthesia:  Local Anesthetic: lidocaine 1% with epinephrine    Sedation:  Patient sedated: no    Preparation: skin prepped with 2% chlorhexidine  Skin prep agent dried: skin prep agent completely dried prior to procedure  Sterile barriers: all five maximum sterile barriers used - cap, mask, sterile gown, sterile gloves, and large sterile sheet  Hand hygiene: hand hygiene performed prior to central venous catheter insertion  Location details: right femoral  Patient position: flat  Catheter type: double lumen  Catheter size: 14 Fr  Pre-procedure: landmarks identified  Ultrasound guidance: yes  Sterile ultrasound techniques: sterile gel and sterile probe covers were used  Number of attempts: 1  Successful placement: yes  Post-procedure: line sutured and dressing applied  Assessment: blood return through all ports and free fluid flow          Vicki Matt MD  10/25/2021    Antonia Adames MD

## 2021-10-25 NOTE — PROGRESS NOTES
[facility] Attending    S: 36 y.o. male with history of asthma and smoking history presented with increasing dyspnea and cough over past month. On presentation had fever of 103. Has tested positive for COVID twice, most recently end of August.  CT shows extensive bilateral pneumonia and COVID testing again positive. HIV pos, CD4 43. Early AM 10/23,  RRT called due to tachycardia and oxygen desaturation requiring oxygen from 3L NC to 15LNRB mask. Patient had been febrile overnight as well and transferred to MICU. Has had upper extremity tremors and lower extremity neuropathy symptoms. Anxiety with long hospitalization. Today, reports breathing is better, now on room air. Is anxious about the prospect of starting dialysis but is agreeable. O: VS- Blood pressure (!) 125/54, pulse 77, temperature 97.5 °F (36.4 °C), temperature source Temporal, resp. rate 17, height 6' 4\" (1.93 m), weight 200 lb (90.7 kg), SpO2 97 %. Exam is as noted by resident   Awake, alert and oriented. Heart - RRR  Lungs- Coarse bs b/l  Ext - no edema. Impressions:   Principal Problem:    Acute respiratory failure with hypoxia (HCC)  Active Problems:  Repeatedly positive Covid  Stable groundglass opacities    Acute kidney injury due to COVID-19 ([address]    Asthma    Nephrotic range proteinuria  50 pound weight loss  2 blood cultures positive  Pancytopenia   HIV positive, new diagnosis  late latent syphillis    Plan   Appreciate Nephrology, ID, MICU, cardiology, gen surgery, and pulmonology. Covid pneumonia/PCP pneumonia with sepsis, New diagnosis of HIV- resistance testing pending, CD4 count of 43. Viral load 195,000. ID managing antibiotics and antivirals for HIV, Biktarvy. Biktarvy on hold due to kidney function. On Bactrim and cefepime     RPR positive. On Penicillin G for late latent syphillis. S/p LP. VDRL CSF pending.     Sinus tachycardia - Echo with EF 55%, small pericardial effusion and possible atrial shunt. Anemia - FOBT heme positive first time, neg on repeat. Gen surgery has no plans for scopes s/p 1U PRBCs, will trend h/h. Follow. Stable overall    Nephrology planning biopsy outpatient with nephrotic range protienuria, also with hyponatremia. Noted worsening creatinine , DEISI on CKD . Plan for dialysis. Will need vascular surgery consult for dialysis catheter. On insulin for elevated bg on steroids, check a1c    Follow up outpatient for right kidney lesion-ct/mri when cr improved. H/O consulted due to pancytopenia including neutropenia. granix ordered. Will monitor CBC. Per heme/onc continue heparin unless  Platelet count less than 50K        Attending Physician Statement  I have reviewed the chart and seen the patient with the resident(s). I personally reviewed images, EKG's and similar tests, if present. I personally reviewed and performed key elements of the history and exam.  I have reviewed and confirmed student and/or resident history and exam with changes as indicated above. I agree with the assessment, plan and orders as documented by the resident. Please refer to the resident and/or student note for additional information. Mechelle Marie.  Domitila Jackson MD

## 2021-10-25 NOTE — PROGRESS NOTES
Department of Internal Medicine  Nephrology Progress Note      Events reviewed with the ICU RN. SUBJECTIVE:  We are following Mr. Alfredo Barkley for DEISI. No acute overnight events.  Remains oliguric  PHYSICAL EXAM:      Vitals:    VITALS:  /82   Pulse 85   Temp 98 °F (36.7 °C) (Axillary)   Resp 20   Ht 6' 4\" (1.93 m)   Wt 200 lb (90.7 kg)   SpO2 98%   BMI 24.34 kg/m²   24HR INTAKE/OUTPUT:      Intake/Output Summary (Last 24 hours) at 10/25/2021 1300  Last data filed at 10/25/2021 0900  Gross per 24 hour   Intake 740 ml   Output 600 ml   Net 140 ml     Young cachectic looking male  Constitutional:  Awake, alert,in moderate respiratory distress, OOB sitting in chair, Fio2 at 4 L via NC  HEENT:  PERRL, normocephalic, atraumatic  Respiratory: diminished lung sounds  Cardiovascular/Edema:  RRR, S1/S2, -edema  Gastrointestinal:  abd flat, soft, non-tender, Perkins in place  Neurologic:  Awake, alert, no focal deficits  Skin:  Warm, dry, no rash or lesions  Other: No LE edema      Scheduled Meds:   insulin lispro  0-6 Units SubCUTAneous TID WC    insulin lispro  0-3 Units SubCUTAneous Nightly    cefepime  1,000 mg IntraVENous Q12H    sodium chloride   IntraVENous Q12H    methylPREDNISolone  30 mg IntraVENous Q12H    heparin (porcine)  5,000 Units SubCUTAneous Q8H    penicillin G benzathine  2.4 Million Units IntraMUSCular Weekly    lidocaine  1 patch TransDERmal Daily    ascorbic acid  500 mg Oral Daily    [Held by provider] bictegravir-emtricitab-tenofovir alafenamide  1 tablet Oral Daily    [Held by provider] metoprolol tartrate  25 mg Oral Daily    pantoprazole  40 mg Oral QAM AC    sodium chloride flush  5-40 mL IntraVENous 2 times per day     Continuous Infusions:   dextrose      sodium chloride       PRN Meds:.glucose, dextrose, glucagon (rDNA), dextrose, medicated lip balm, benzonatate, perflutren lipid microspheres, hydrOXYzine, labetalol, sodium chloride flush, ondansetron **OR** ondansetron, polyethylene glycol, acetaminophen **OR** acetaminophen, sodium chloride    DATA:    CBC:   Lab Results   Component Value Date    WBC 4.7 10/25/2021    RBC 2.36 10/25/2021    HGB 7.1 10/25/2021    HCT 21.6 10/25/2021    MCV 88.6 10/25/2021    MCH 29.7 10/25/2021    MCHC 33.5 10/25/2021    RDW 14.3 10/25/2021    PLT 60 10/25/2021    MPV 14.8 10/25/2021     CMP:    Lab Results   Component Value Date     10/25/2021    K 4.3 10/25/2021    K 4.3 10/25/2021    CL 98 10/25/2021    CO2 17 10/25/2021    BUN 76 10/25/2021    CREATININE 6.1 10/25/2021    GFRAA 13 10/25/2021    LABGLOM 13 10/25/2021    GLUCOSE 126 10/25/2021    PROT 5.2 10/25/2021    LABALBU 2.7 10/25/2021    CALCIUM 8.2 10/25/2021    BILITOT 0.2 10/25/2021    ALKPHOS 63 10/25/2021    AST 70 10/25/2021    ALT 26 10/25/2021     Magnesium:    Lab Results   Component Value Date    MG 2.0 10/23/2021     Phosphorus:    Lab Results   Component Value Date    PHOS 4.6 10/23/2021        Radiology Review:       Ejection fraction is visually estimated at 55%. There is late positive bubble suggesting possible shunt from pulmonary   origin. Visually moderately dilated left atrium. Normal right ventricular size and function. There is small pericardial effusion without tamponade physiology        CT Chest October 5, 2021   Limited study by the patient's respiratory motion and lack of intravenous   contrast.       Extensive multifocal COVID-19 pneumonia bilaterally.       Bilateral trace layering pleural effusions.       Bilateral gynecomastia.       Abdominal organs inadequately evaluated due to the limitations of the study.       Normal appendix.       No evidence of bowel obstruction.         Kidney ultrasound October 7, 2021   1. Hyperechoic kidneys due to underlying parenchymal disease. 2. Trace bilateral perinephric fluid of indeterminate etiology, potentially   due to underlying inflammation.    3. An approximately 1.1 cm x 1.5 cm x 2.3 cm lesion in the right kidney could   be a cyst but could also be seen with infarction, abscess, or neoplasia. Consider further evaluation with renal protocol abdomen MRI (preferred) or   CT.  Following resolution of acute kidney injury.         CTA pulmonary with IV contrast October 13, 2021   1. No evidence of pulmonary embolism. 2. Interval increased size of bilateral pleural effusions with little change   in pericardial effusion. 3. Persistent subcutaneous edema. 4. Reactive bilateral hilar lymphadenopathy. 5. Bilateral airspace disease is increased compared to prior examination. The increase is most pronounced within the lower lobes. 6. Bilateral gynecomastia.                 BRIEF SUMMARY OF INITIAL CONSULT:     Briefly, Mr. Virgen Sommers is a 36year old male with no significant PMH who was admitted on October 5, 2021 after he presented from an outside clinic after he was found to be hypoxic during 6 minute walking test. Patient tested positive for Covid 19 on August 18th, he is unvaccinated and had Covid 19 last year as well. Patient states he has quarantined by himself and when his mother recently visited him she was shocked at his appearance as he had lost about 50 pounds in 2 and a half months. On admission labs were significant for sodium of 130, potassium 5.2, bicarbonate 20, BUN 41, creatinine 3.6, magnesium 2.7, calcium 7.7 and proBNP 1,599. We are consulted for DEISI. Problems resolved:    Hyperkalemia, 2/2 DEISI. Low potassium diet  Hypotonic hyponatremia 2/2 intravascular volume from poor oral intake. Bicarb drip started. Sodium levels improving. Low bicarbonate levels with hyperchloremia, most likely NAGMA versus respiratory alkalosis; we need a PH to clarify diagnosis.  Awaiting ABG results  High bicarbonate levels, likely metabolic alkalosis 2/2 administration  Hypokalemia, multifactorial, poor intake, probably renal potassium wasting  Severe Hypoalbuminemia, multifactorial, status post albumin administration  DEISI on CKD, volume responsive pre-renal DEISI d/t volume (poor oral intake), urine sodium <20. Resolved. Edematous state, multifactorial, mainly 2/2 nephrotic syndrome and possibly HFpEF 55%, on bumex    IMPRESSION/RECOMMENDATIONS:     Recurrent DEISI on CKD, ATN contrast associated DEISI versus ischemic ATN (hypotension related tachycardia). Nonoliguric. Renal function continues to worsen with increasing creatinine levels. We will continue to monitor, more afraid he might need renal replacement therapy if no improvement of renal function occurs. 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 [facility] Work-up so far HIV positive, Hepatitis C positive, MARILEE negative, C4 normal, C3 88 (low), UPEP without monoclonal gammopathy, negative cryoglobulins. Kidney ultrasound with hyperechoic kidneys. Needs kidney biopsy, discussed with interventional radiology they would rather wait for a couple weeks to perform biopsy in view of patient having Covid-19 infection. Hyponatremia, multifactorial, including decreased GFR and hemodynamically mediated in the setting of large hypotonic fluid administration (D5 with Bactrim). Sodium levels decrease, stable overnight    Low bicarbonate levels with hyperchloremia, consistent with either hyperchloremic metabolic acidosis versus respiratory alkalosis. Bicarbonate levels improved with bicarbonate drip. Hypomagnesemia, 2/2 poor intake   Probably HFpEF 55%, proBNP 11,401 > 3421   HTN, on metoprolol   Right kidney lesion, likely a cyst but cannot be ruled out other pathology including infarction, abscess, neoplasm.   We will proceed with MRI when renal function improved  HIV positive, CD4 count 43, started on OLMSTEAD therapy    ---------------------------------------------------------------------  MSSE bacteremia, on cefepime 2 g every 8 hours  Possible pneumocystic pneumonia, on sulfamethoxazole-trimethoprim 400 mg IV every 8 hours, needs TTE  Probably fungemia, (1, 3) beta-D-glucan positive, on anidulafungin  Sinus tachycardia, multifactorial  Hepatitis C antibody positive, awaiting viral load  Covid 19 infection in non vaccinated pt  Normocytic anemia, multifactorial  Leukopenia, WBC 1.1    Plan:    1. Remains oliguric, BUN, creatinine and bicarbonate are worse  2. Dr Sherrie Terrazas Discussed at length at bedside about starting dialysis, patient talked to his family and agrees to dialysis   3. No electrolyte, volume or acid base issues needing HD tonight. Will consult vascular after the patient consents for dialysis today.     Discussed with the ICU RN      Electronically signed by William Briseno MD on 10/25/2021 at 1:00 PM

## 2021-10-26 LAB
ALBUMIN SERPL-MCNC: 2.7 G/DL (ref 3.5–5.2)
ALP BLD-CCNC: 70 U/L (ref 40–129)
ALT SERPL-CCNC: 30 U/L (ref 0–40)
ANION GAP SERPL CALCULATED.3IONS-SCNC: 11 MMOL/L (ref 7–16)
ANION GAP SERPL CALCULATED.3IONS-SCNC: 9 MMOL/L (ref 7–16)
ANISOCYTOSIS: ABNORMAL
APTT: 28.7 SEC (ref 24.5–35.1)
AST SERPL-CCNC: 58 U/L (ref 0–39)
BASOPHILS ABSOLUTE: 0 E9/L (ref 0–0.2)
BASOPHILS RELATIVE PERCENT: 0.1 % (ref 0–2)
BILIRUB SERPL-MCNC: 0.5 MG/DL (ref 0–1.2)
BLOOD BANK DISPENSE STATUS: NORMAL
BLOOD BANK PRODUCT CODE: NORMAL
BPU ID: NORMAL
BUN BLDV-MCNC: 28 MG/DL (ref 6–20)
BUN BLDV-MCNC: 51 MG/DL (ref 6–20)
C. TRACHOMATIS DNA ,URINE: ABNORMAL
CALCIUM SERPL-MCNC: 7.9 MG/DL (ref 8.6–10.2)
CALCIUM SERPL-MCNC: 8.3 MG/DL (ref 8.6–10.2)
CHLORIDE BLD-SCNC: 100 MMOL/L (ref 98–107)
CHLORIDE BLD-SCNC: 100 MMOL/L (ref 98–107)
CO2: 24 MMOL/L (ref 22–29)
CO2: 26 MMOL/L (ref 22–29)
CREAT SERPL-MCNC: 3.3 MG/DL (ref 0.7–1.2)
CREAT SERPL-MCNC: 4.7 MG/DL (ref 0.7–1.2)
CSF CULTURE: NORMAL
DESCRIPTION BLOOD BANK: NORMAL
EOSINOPHILS ABSOLUTE: 0.07 E9/L (ref 0.05–0.5)
EOSINOPHILS RELATIVE PERCENT: 0.9 % (ref 0–6)
GFR AFRICAN AMERICAN: 18
GFR AFRICAN AMERICAN: 27
GFR NON-AFRICAN AMERICAN: 18 ML/MIN/1.73
GFR NON-AFRICAN AMERICAN: 27 ML/MIN/1.73
GLUCOSE BLD-MCNC: 144 MG/DL (ref 74–99)
GLUCOSE BLD-MCNC: 98 MG/DL (ref 74–99)
GRAM STAIN RESULT: NORMAL
HCT VFR BLD CALC: 23.8 % (ref 37–54)
HCT VFR BLD CALC: 23.9 % (ref 37–54)
HEMOGLOBIN: 7.9 G/DL (ref 12.5–16.5)
HEMOGLOBIN: 8.2 G/DL (ref 12.5–16.5)
HYPOCHROMIA: ABNORMAL
LYMPHOCYTES ABSOLUTE: 0.07 E9/L (ref 1.5–4)
LYMPHOCYTES RELATIVE PERCENT: 0.9 % (ref 20–42)
MCH RBC QN AUTO: 29.6 PG (ref 26–35)
MCHC RBC AUTO-ENTMCNC: 33.1 % (ref 32–34.5)
MCV RBC AUTO: 89.5 FL (ref 80–99.9)
METER GLUCOSE: 105 MG/DL (ref 74–99)
METER GLUCOSE: 131 MG/DL (ref 74–99)
METER GLUCOSE: 94 MG/DL (ref 74–99)
METER GLUCOSE: 96 MG/DL (ref 74–99)
MONOCYTES ABSOLUTE: 0 E9/L (ref 0.1–0.95)
MONOCYTES RELATIVE PERCENT: 2.6 % (ref 2–12)
N. GONORRHOEAE DNA, URINE: ABNORMAL
NEUTROPHILS ABSOLUTE: 7.25 E9/L (ref 1.8–7.3)
NEUTROPHILS RELATIVE PERCENT: 98.2 % (ref 43–80)
OVALOCYTES: ABNORMAL
PDW BLD-RTO: 14 FL (ref 11.5–15)
PLATELET # BLD: 44 E9/L (ref 130–450)
PLATELET CONFIRMATION: NORMAL
PMV BLD AUTO: ABNORMAL FL (ref 7–12)
POIKILOCYTES: ABNORMAL
POLYCHROMASIA: ABNORMAL
POTASSIUM REFLEX MAGNESIUM: 3.7 MMOL/L (ref 3.5–5)
POTASSIUM SERPL-SCNC: 3.7 MMOL/L (ref 3.5–5)
POTASSIUM SERPL-SCNC: 4.1 MMOL/L (ref 3.5–5)
RBC # BLD: 2.67 E12/L (ref 3.8–5.8)
SODIUM BLD-SCNC: 135 MMOL/L (ref 132–146)
SODIUM BLD-SCNC: 135 MMOL/L (ref 132–146)
SOURCE: ABNORMAL
TEAR DROP CELLS: ABNORMAL
TOTAL PROTEIN: 5.3 G/DL (ref 6.4–8.3)
WBC # BLD: 7.4 E9/L (ref 4.5–11.5)

## 2021-10-26 PROCEDURE — 2580000003 HC RX 258: Performed by: INTERNAL MEDICINE

## 2021-10-26 PROCEDURE — 6370000000 HC RX 637 (ALT 250 FOR IP): Performed by: INTERNAL MEDICINE

## 2021-10-26 PROCEDURE — 2500000003 HC RX 250 WO HCPCS: Performed by: INTERNAL MEDICINE

## 2021-10-26 PROCEDURE — 85025 COMPLETE CBC W/AUTO DIFF WBC: CPT

## 2021-10-26 PROCEDURE — 6370000000 HC RX 637 (ALT 250 FOR IP): Performed by: STUDENT IN AN ORGANIZED HEALTH CARE EDUCATION/TRAINING PROGRAM

## 2021-10-26 PROCEDURE — 85014 HEMATOCRIT: CPT

## 2021-10-26 PROCEDURE — 6360000002 HC RX W HCPCS: Performed by: INTERNAL MEDICINE

## 2021-10-26 PROCEDURE — 6360000002 HC RX W HCPCS: Performed by: STUDENT IN AN ORGANIZED HEALTH CARE EDUCATION/TRAINING PROGRAM

## 2021-10-26 PROCEDURE — 80048 BASIC METABOLIC PNL TOTAL CA: CPT

## 2021-10-26 PROCEDURE — 99232 SBSQ HOSP IP/OBS MODERATE 35: CPT | Performed by: INTERNAL MEDICINE

## 2021-10-26 PROCEDURE — 82962 GLUCOSE BLOOD TEST: CPT

## 2021-10-26 PROCEDURE — 99232 SBSQ HOSP IP/OBS MODERATE 35: CPT | Performed by: FAMILY MEDICINE

## 2021-10-26 PROCEDURE — 85730 THROMBOPLASTIN TIME PARTIAL: CPT

## 2021-10-26 PROCEDURE — 2140000000 HC CCU INTERMEDIATE R&B

## 2021-10-26 PROCEDURE — 36415 COLL VENOUS BLD VENIPUNCTURE: CPT

## 2021-10-26 PROCEDURE — 80053 COMPREHEN METABOLIC PANEL: CPT

## 2021-10-26 PROCEDURE — 85018 HEMOGLOBIN: CPT

## 2021-10-26 PROCEDURE — 36430 TRANSFUSION BLD/BLD COMPNT: CPT

## 2021-10-26 PROCEDURE — 99233 SBSQ HOSP IP/OBS HIGH 50: CPT | Performed by: INTERNAL MEDICINE

## 2021-10-26 RX ORDER — SODIUM CHLORIDE 9 MG/ML
INJECTION, SOLUTION INTRAVENOUS PRN
Status: DISCONTINUED | OUTPATIENT
Start: 2021-10-26 | End: 2021-11-04 | Stop reason: SDUPTHER

## 2021-10-26 RX ORDER — ABACAVIR 300 MG/1
600 TABLET ORAL NIGHTLY
Status: DISCONTINUED | OUTPATIENT
Start: 2021-10-27 | End: 2021-11-07 | Stop reason: HOSPADM

## 2021-10-26 RX ORDER — LAMIVUDINE 100 MG/1
50 TABLET, FILM COATED ORAL NIGHTLY
Status: DISCONTINUED | OUTPATIENT
Start: 2021-10-26 | End: 2021-11-03

## 2021-10-26 RX ADMIN — PREDNISONE 40 MG: 20 TABLET ORAL at 08:13

## 2021-10-26 RX ADMIN — LAMIVUDINE 50 MG: 100 TABLET, FILM COATED ORAL at 20:35

## 2021-10-26 RX ADMIN — SODIUM CHLORIDE 25 ML: 9 INJECTION, SOLUTION INTRAVENOUS at 03:45

## 2021-10-26 RX ADMIN — PANTOPRAZOLE SODIUM 40 MG: 40 TABLET, DELAYED RELEASE ORAL at 05:53

## 2021-10-26 RX ADMIN — DOLUTEGRAVIR SODIUM 50 MG: 50 TABLET, FILM COATED ORAL at 08:12

## 2021-10-26 RX ADMIN — Medication 10 ML: at 08:12

## 2021-10-26 RX ADMIN — OXYCODONE HYDROCHLORIDE AND ACETAMINOPHEN 500 MG: 500 TABLET ORAL at 08:14

## 2021-10-26 RX ADMIN — PENTAMIDINE ISETHIONATE 362.8 MG: 300 INJECTION, POWDER, LYOPHILIZED, FOR SOLUTION INTRAMUSCULAR; INTRAVENOUS at 16:23

## 2021-10-26 RX ADMIN — CEFEPIME HYDROCHLORIDE 1000 MG: 1 INJECTION, POWDER, FOR SOLUTION INTRAMUSCULAR; INTRAVENOUS at 11:14

## 2021-10-26 RX ADMIN — LAMIVUDINE 50 MG: 100 TABLET, FILM COATED ORAL at 08:12

## 2021-10-26 RX ADMIN — ABACAVIR 600 MG: 300 TABLET, FILM COATED ORAL at 08:12

## 2021-10-26 RX ADMIN — HEPARIN SODIUM 5000 UNITS: 10000 INJECTION INTRAVENOUS; SUBCUTANEOUS at 05:53

## 2021-10-26 ASSESSMENT — PAIN SCALES - GENERAL
PAINLEVEL_OUTOF10: 0

## 2021-10-26 ASSESSMENT — PAIN DESCRIPTION - PAIN TYPE
TYPE: SURGICAL PAIN
TYPE: SURGICAL PAIN

## 2021-10-26 ASSESSMENT — PAIN - FUNCTIONAL ASSESSMENT
PAIN_FUNCTIONAL_ASSESSMENT: PREVENTS OR INTERFERES WITH ALL ACTIVE AND SOME PASSIVE ACTIVITIES
PAIN_FUNCTIONAL_ASSESSMENT: PREVENTS OR INTERFERES SOME ACTIVE ACTIVITIES AND ADLS

## 2021-10-26 ASSESSMENT — PAIN DESCRIPTION - ORIENTATION
ORIENTATION: RIGHT
ORIENTATION: RIGHT

## 2021-10-26 ASSESSMENT — PAIN DESCRIPTION - LOCATION
LOCATION: GROIN
LOCATION: GROIN

## 2021-10-26 NOTE — PROGRESS NOTES
Family medicine resident Dr. Carrie Aparicio placed order to transfuse blood this am.   Yesterday's order for 1 unit PRBC is infusing now and will be done about 730 - 8 am. Dr. Abhinav Wharton notified that TAMMY Omalley placed new orders. Awaiting follow up if Dr. Osa Lennox H& H drawn before the next unit of blood transfused.  Heri Flowers RN  10/26/2021

## 2021-10-26 NOTE — PROGRESS NOTES
Family Attending resident rounding on patient this am. She was notified patient was receiving 1 unit of blood now that was ordered yesterday.  Madonna Shirley RN  10/26/2021

## 2021-10-26 NOTE — PROGRESS NOTES
[facility] Inpatient   Resident Progress Note    S:  Hospital day: 24   Brief Synopsis: Lynn Lyles is a 36 y.o. male with no PMH who presented with hypoxia and fever. He was initially seen in PCP office and was hypoxic on exertion with desaturate into the mid low 80s as well as noted to be febrile and tachycardic. Per patient, his symptoms began 8/15/2021 and he tested positive for Covid 8/28. endorse worsening shortness of breath over the past month with sputum production, intermittent chest discomfort, persistent diarrhea, chills, fever, decrease in appetite and 50 pound weight loss since August.  Patient also endorses significantly worsening \"neuropathy\" of bilateral feet, states it feels like he is \"being stabbed by needles\" which is causing him inability to walk due to pain. In the ED, he was found to be anemic, hyperkalemic, hyponatremic, febrile and COVID positive. He also had elevated Creatinine, AST, proBNP and troponin x2. CTA showed extensive multifocal COVID-19 pneumonia bilaterally, bilateral trace layering pleural effusion and bilateral gynecomastia. CT abdomen was limited due to movement and non-contrast, but showed normal appendix and no evidence of bowel obstruction. Admitted for acute hypoxia due to Covid pneumonia. Nephro, pulm and ID consulted. Started on bicarb drip. US showed hyperechoic kidneys, trace b/l perinephric fluid and right kidney lesion (Cyst vs infarction vs abscess vs neoplasia). HIV and Hep C screen were negative. HIV RNA and T and B lymphocytes were ordered. Patient became more hypoxic, tachycardic and hypertensive so  there was concern for PE. CTA was ordered, which came back negative. Echo was completed due to elevated BNP and concern for PE and that revealed a normal EF with late bubble sign possible shunting of pulmonary origin. BNP continued to increase. Cardiology was consulted. Repeat Echo pending.  ID recommends LP with CSF sent for glucose, protein, cell count with differential, which were all  negative. meningitis/encephalitis PCR panel negative. RRT called 10/23 for hypoxia and tachycardia, transferred to medical intensive on 15 L NRB. Creatinine up trending. Currently saturating well on room air. Hemodialysis for worsening renal function. CSF VDRL negative, but continue to treat for latent syphilis       Overnight/interim:   Patient was seen and examined at bedside. Transferred out of ICU. He is having some soreness in his right leg due to HD port. Also having some abdominal pain from not having a BM yet. Denies CP, SOB, Headache or dizziness.        Cont meds:    sodium chloride      sodium chloride      sodium chloride      sodium chloride      dextrose      sodium chloride       Scheduled meds:    pentamidine isethionate (PENTAM) IVPB  4 mg/kg IntraVENous Q24H    abacavir  600 mg Oral Daily    lamiVUDine  50 mg Oral Daily    dolutegravir sodium  50 mg Oral Daily    predniSONE  40 mg Oral Daily    Followed by   Sarai Glover ON 10/29/2021] predniSONE  20 mg Oral Daily    insulin lispro  0-6 Units SubCUTAneous TID WC    insulin lispro  0-3 Units SubCUTAneous Nightly    cefepime  1,000 mg IntraVENous Q12H    sodium chloride   IntraVENous Q12H    heparin (porcine)  5,000 Units SubCUTAneous Q8H    penicillin G benzathine  2.4 Million Units IntraMUSCular Weekly    lidocaine  1 patch TransDERmal Daily    ascorbic acid  500 mg Oral Daily    [Held by provider] metoprolol tartrate  25 mg Oral Daily    pantoprazole  40 mg Oral QAM AC    sodium chloride flush  5-40 mL IntraVENous 2 times per day     PRN meds: sodium chloride, sodium chloride, sodium chloride, sodium chloride, glucose, dextrose, glucagon (rDNA), dextrose, medicated lip balm, benzonatate, perflutren lipid microspheres, hydrOXYzine, labetalol, sodium chloride flush, ondansetron **OR** ondansetron, polyethylene glycol, acetaminophen **OR** acetaminophen, sodium chloride     I reviewed the patient's past medical and surgical history, Medications and Allergies. O:  /73   Pulse 82   Temp 97.4 °F (36.3 °C) (Oral)   Resp 18   Ht 6' 4\" (1.93 m)   Wt 199 lb 15.3 oz (90.7 kg)   SpO2 96%   BMI 24.34 kg/m²   24 hour I&O: I/O last 3 completed shifts: In: 579 [P.O.:180; I.V.:25; IV Piggyback:150]  Out: 1025 [Urine:625]  I/O this shift: In: 492.5 [Blood:492.5]  Out: -         Physical Exam  Vitals reviewed. Constitutional:       General: He is not in acute distress. Appearance: Normal appearance. He is not ill-appearing, toxic-appearing or diaphoretic. HENT:      Head: Normocephalic and atraumatic. Nose: No rhinorrhea. Mouth/Throat:      Mouth: Mucous membranes are moist.      Pharynx: Oropharynx is clear. Eyes:      General: No scleral icterus. Right eye: No discharge. Left eye: No discharge. Extraocular Movements: Extraocular movements intact. Cardiovascular:      Rate and Rhythm: Normal rate and regular rhythm. Pulses: Normal pulses. Heart sounds: Normal heart sounds. No murmur heard. No friction rub. No gallop. Pulmonary:      Effort: No respiratory distress. Breath sounds: No wheezing, rhonchi or rales. Abdominal:      General: Abdomen is flat. Palpations: Abdomen is soft. Tenderness: There is abdominal tenderness (epigastric ). Musculoskeletal:         General: No swelling. Cervical back: Normal range of motion. No rigidity or tenderness. Lumbar back: No swelling or spasms. Normal range of motion. Right lower leg: No edema. Left lower leg: No edema. Skin:     General: Skin is warm and dry. Coloration: Skin is not jaundiced. Findings: No bruising or rash. Comments: Right femoral HD port, c/d/i   Neurological:      General: No focal deficit present. Mental Status: He is alert and oriented to person, place, and time. Cranial Nerves: No cranial nerve deficit. the right kidney could   be a cyst but could also be seen with infarction, abscess, or neoplasia. Consider further evaluation with renal protocol abdomen MRI (preferred) or   CT. Following resolution of acute kidney injury. XR CHEST PORTABLE   Final Result   Multifocal bilateral pulmonary ground-glass airspace opacities are stable         CT ABDOMEN PELVIS WO CONTRAST Additional Contrast? None   Final Result   Limited study by the patient's respiratory motion and lack of intravenous   contrast.      Extensive multifocal COVID-19 pneumonia bilaterally. Bilateral trace layering pleural effusions. Bilateral gynecomastia. Abdominal organs inadequately evaluated due to the limitations of the study. Normal appendix. No evidence of bowel obstruction. CT CHEST WO CONTRAST   Final Result   Limited study by the patient's respiratory motion and lack of intravenous   contrast.      Extensive multifocal COVID-19 pneumonia bilaterally. Bilateral trace layering pleural effusions. Bilateral gynecomastia. Abdominal organs inadequately evaluated due to the limitations of the study. Normal appendix. No evidence of bowel obstruction.                Resident Assessment and Plan       Acute hypoxia 2/2 COVID pneumonia vs PCP  - CTA on admission: COVID pneumonia, Bilateral trace layering pleural effusion, negative for PE   - Ferritin 3900, CRP 10.1 on admission   - D dimer elevated on admission, currently stable  - Pulmonology and ID on board  - Legionella, spot TB test negative   - CXR 10/8 shows multifocal groundglass opacities are stable  - Repeat CXR 10/17: b/l pulm infiltrates improving   - Blood cx: staph epidermitis, repeat blood cx shows no growth x 7 days   -Urine culture: NGTD   -Counseled on getting COVID vaccine after patient recovers   - ECHO 10/13: EF 55%, late bubble sign possible shunting of pulmonary origin   - Cardiology consulted   - CTA 10/13 negative for PE  Bronchoscopy with BAL performed on 10/15/2021   - Respiratory culture: oral pharyngeal liana decreased, few gram negative rods  CXR 10/23: Mild worsening extensive bilateral infiltrates  - Worsening hypoxia 10/23, RRT called, transferred to ICU, was on nonrebreather, ABG showed resp alkalosis with some metabolic acidosis compensation   - Currently maintaining saturation on room air, continue to monitor  - solumedrol switched to prednisone      Weight loss likely secondary to HIV  - Per patient due to decreased appetite from COVID; 50lb weight loss since August   - R/o other potential origins: SLE, TB, hepatitis  - MARILEE negative  - T spot TB test negative   HIV, hepatitis C antibodies positive  - Elevated IgG and IgA, normal IgM: IgA nephropathy vs MPGN?   - Cryoglobulin normal   - TSH normal   - Sed rate and CRP elevated      HIV  HIV 1/2 antibodies positive  - Leukopenia, normal absolute neutrophil   - Febrile and tachycardic   CD4 44, CD3 153, CD8 100  HIV viral load 195,000  - ID on board: received 2 days of vancomycin 10/6, discontinued ancef, started on cefepime 2g q8h day 7  - Fungitell test positive, on eraxis day 4  - Bactrim 400mg q8h for suspected Pneumocystis pneumonia, held due to renal failure  - Pneumocystis stain negative, but PCR pending; still suspect PCP pna  - tests for opportunistic infections pending: HIV GART, urine GC/Chlamydia PCR  - [id number] negative, genotype pending  - Toxoplasma Ab, cryptococcus negative  - hepatitis A total Ab positive  - RPR reactive, quant 1:2, CSF VDRL negative   - On PCN G weekly for latent syphilis   - Started on biktarvy 10/22, held due to renal failure   - Started on lamivudine, dolutegravir, and abacavir  - Repeat fungitell test pending     Anemia  Pancytopenia  - Likely 2/2 from GI bleed vs HIV   Transfused 1 unit 10/11  - General surgery consulted, FOBT negative the second time 10/11  - Repeat FOBT negative 10/23  - Heme Onc consulted: started on granix to improve ANC  Platelets trending down, 60K>44k; hold heparin   Peripheral blood smear shows normocytic anemia, absolute lymphocytopenia; no platelet clumping, schistocytes or dysplasia  - transfused another unit today, f/u post transfusion H/H 7.9  - Trend H/H     DEISI    Hyponatremia - improving   - Na normalized    - Nephro on board   - Likely secondary to dehydration from persistent diarrhea vs decreased PO intake vs bactrim vs HIVANS vs Biktarvy  - Cr improving after HD, 4.3  Held Biktarvy  - urine studies show intrinsic cause of DEISI  - Microalbumin-creatinine ratio elevated   - Kidney bx OP 2 weeks after discharge; IR will not perform inpatient due to COVID pna   Fluid restriction  - Oliguric (0.3 mL/kg/hr)   - Vascular surgery was consulted, placed temporary HD port  - Dialyzed 10/25      Hypoalbuminemia   -2/2 poor intake vs CHF vs nephrotic syndrome  - Alb 2.7  -Previously received albumin 25 g q6h x 4 doses 10/6, 8 doses 10/12 and 8 doses 10/23  Low C3, normal C4 and cryoglobulin  Fluid restriction   - Continue to monitor     Hypnagogic Hallucinationsimproving  - occurring since received anesthesia on 10/15   - no history of psych history or narcolepsy  - could be 2/2 to poor sleep vs anesthesia reaction vs vistaril?  - continue to monitor       Elevated troponin and proBNP  - 2/2 myocardial injury vs stress cardiomyopathy due to COVID vs new onset CHF   - initial troponin 78, 78  - initial BNP 1599,  repeat 11,401 on 10/14, trending down 3400   - ECHO: EF 55%, late bubble sign possible shunting of pulmonary origin  - Repeat ECHO pending   Received 1 dose of IV Lasix 40mg on 10/15     Tachycardia  - 2/2 to infectious vs anxiety vs anemia   HR improving   Cardio consulted, recommend to start Beta blocker   - Hold lopressor   Asymptomatic  Continue to monitor    HTN -improving  - BP on lower end  - Lopressor held   - labetalol PRN  - Continue to monitor               PT/OT evaluation: Consulted  DVT prophylaxis: held heparin   GI prophylaxis:protonix  Disposition: intermediate   Diet: adult         Electronically signed by Henok Whitlock MD PGY-1 on 10/26/2021 at 9:00 AM  Attending physician: Dr. Moriah Baker

## 2021-10-26 NOTE — PROGRESS NOTES
[address]  [facility] [place] [address]  Dept: [phone]  Loc: [phone]  Attending progress note      Reason for Visit:   Pancytopenia. Referring Physician:  Love Roland MD    PCP:  Patti Charles MD     Due to the current efforts to prevent transmission of COVID-19 and also the need to preserve PPE for other caregivers, a face-to-face encounter with the patient was not performed. That being said, all relevant records and diagnostic tests were reviewed, including laboratory results and imaging. Please reference any relevant documentation elsewhere. Care will be coordinated with the primary service. Subjective: The patient was transferred out of the ICU. He received packed RBCs transfusion today. No bleeding. Past Medical History:      Diagnosis Date    Asthma      Patient Active Problem List   Diagnosis    Acute respiratory failure with hypoxia (HCC)    Acute kidney injury due to COVID-19 ([facility] [address]    Asthma    Nephrotic range proteinuria    COVID-19        Past Surgical History:      Procedure Laterality Date    BRONCHOSCOPY N/A 10/15/2021    BRONCHOSCOPY ALVEOLAR LAVAGE performed by Nazia Holloway DO at [facility]         Family History:  Family History   Problem Relation Age of Onset    Diabetes type 2  Mother     Diabetes type 2  Father        Medications:  Reviewed and reconciled.     Social History:  Social History     Socioeconomic History    Marital status: Single     Spouse name: Not on file    Number of children: Not on file    Years of education: Not on file    Highest education level: Not on file   Occupational History    Not on file   Tobacco Use    Smoking status: Former Smoker     Quit date: 2021     Years since quittin.1    Smokeless tobacco: Never Used   Vaping Use    Vaping Use: Never used   Substance and Sexual Activity    Alcohol use: No    Drug use: No    Sexual activity: Not on file   Other Topics Concern    Not on file   Social History Narrative    Not on file     Social Determinants of Health     Financial Resource Strain: Low Risk     Difficulty of Paying Living Expenses: Not very hard   Food Insecurity: Food Insecurity Present    Worried About Running Out of Food in the Last Year: Sometimes true    Daniel of Food in the Last Year: Sometimes true   Transportation Needs:     Lack of Transportation (Medical):  Lack of Transportation (Non-Medical):    Physical Activity:     Days of Exercise per Week:     Minutes of Exercise per Session:    Stress:     Feeling of Stress :    Social Connections:     Frequency of Communication with Friends and Family:     Frequency of Social Gatherings with Friends and Family:     Attends Mandaeism Services:     Active Member of Clubs or Organizations:     Attends Club or Organization Meetings:     Marital Status:    Intimate Partner Violence:     Fear of Current or Ex-Partner:     Emotionally Abused:     Physically Abused:     Sexually Abused: Allergies:  No Known Allergies    Physical Exam:  /79   Pulse 100   Temp 98.5 °F (36.9 °C)   Resp 18   Ht 6' 4\" (1.93 m)   Wt 199 lb 15.3 oz (90.7 kg)   SpO2 96%   BMI 24.34 kg/m²   GENERAL: The patient was seen through the window, he is awake and alert. Impression/Plan:       Mr. Tatiana Jones is a 72-year-old male patient, who had presented to the hospital on 10/5/2021 with fever, shortness of breath, weight loss, he was sent by his PCPs office due to hypoxic respiratory failure. He was found to be COVID-19 positive. He does have a history of COVID-19 infection in early 2021 and again in August 2021. Since his admission, the patient was diagnosed with HIV with CD4 of43-44, also for latent syphilis, hepatitis C and methicillin susceptible staph epidermidis bacteremia. Serum beta D glucan was positive, he was treated with anidulafungin.   The patient was started on [facility] on 10/21/2021, high-dose Bactrim on 10/13/2021, and penicillin GI on 10/22/2012. Upon presentation on 10/5/2021 white count was 4.2, hemoglobin 9.9, hematocrit 30, platelet count 729S, , ANC 3360. Patient count started trending down about 10 days ago, had normalized, then had decreased to 105K, 90 2K, 70 9K and today they are 60K. His white count had normalized and started decreasing about a week ago, on 10/23/2021 white count had decreased to 1.7, then 0.9, , . Currently temp 96.6. Fibrinogen 453, PTT 34.4. Pancytopenia is multifactorial, secondary to HIV, COVID-19, possible PCP and drug-induced. He is not in DIC. Low probability for HIT. He does not have vitamin B12 or folate deficiency, iron studies are consistent with iron deficiency and anemia of chronic inflammation. He does not have hypogammaglobinemia. SPEP is neg for monoclonal proteins.  -He received Granix on 10/24/2021 to improve his anc so he can tolerate the antiretroviral therapy. White count had normalized, 7.4 today.  -Continue to monitor his CBCD. Hemoglobin/hematocrit 7.9/23.9, he received packed RBCs transfusion this morning.  -Platelet count 70J today, heparin is on hold. Transfuse with plts if bleeding or if platelet count is 20 or less. -Peripheral blood flowcytometry pending.  -Continue antimicrobial therapy per ID team.  -Renal Failure, was started on HD. Will need platelets transfusion prior to kidney biopsy. Will continue to follow. Thank you for allowing us to participate in the care of Mr. Ronnie Casey.     [address], MD   HEMATOLOGY/MEDICAL ONCOLOGY  [facility] [address]  [address] [place] [address]  Dept: [address] [place] [address] Road: [phone]

## 2021-10-26 NOTE — PROGRESS NOTES
[place]  Department of Internal Medicine  Division of Pulmonary, Critical Care and Sleep Medicine  Progress Note    Zana Haines DO, MPH, [facility], [facility], [place] [address] Chau Hargrove MD, [facility]  [facility]      Patient: Noemy Sahu  MRN: [id number]  : 1990    Encounter Time: 6:08 PM     Date of Admission: 10/5/2021  3:07 PM    Primary Care Physician: Modesto Alexandra MD    Reason for Consultation: COVID     SUBJECTIVE:    Hep C and HIV + screen  CD4 is 54  Echo = reviewed  HD cath place to start HD  PCP PCR pending      OBJECTIVE:     PHYSICAL EXAM:   VITALS:   Vitals:    10/26/21 1445 10/26/21 1515 10/26/21 1530 10/26/21 1619   BP: 133/74 126/79 (!) 140/82 125/72   Pulse: 75 100 96 80   Resp: 18 18 18 18   Temp:  98.5 °F (36.9 °C)  98 °F (36.7 °C)   TempSrc:    Oral   SpO2:    96%   Weight:       Height:            Intake/Output Summary (Last 24 hours) at 10/26/2021 1808  Last data filed at 10/26/2021 1445  Gross per 24 hour   Intake 1687.5 ml   Output 1700 ml   Net -12.5 ml        CONSTITUTIONAL:   A&O x 3, NAD  SKIN:     No rash,   HEENT:     EOMI, MMM, No thrush  NECK:    No bruits, No JVP apprechiated  CV:      Sinus,  No murmur, No rubs, No gallops  PULMONARY:   Couse BS,  No Wheezing, No Rales, No Rhonchi      No noted egophony  ABDOMEN:     Soft, non-tender. BS normal. No R/R/G  EXT:    No deformities . No clubbing.       + lower extremity edema, No venous stasis  PULSE:   Appears equal and palpable.   PSYCHIATRIC:  Seems appropriate, No acute psycosis  MS:    No fractures, No gross weakness  NEUROLOGIC:   Non-focal     DATA: IMAGING & TESTING:     LABORATORY TESTS:    CBC:   Lab Results   Component Value Date    WBC 7.4 10/26/2021    RBC 2.67 10/26/2021    HGB 7.9 10/26/2021    HCT 23.9 10/26/2021    MCV 89.5 10/26/2021    MCH 29.6 10/26/2021    MCHC 33.1 10/26/2021    RDW 14.0 10/26/2021    PLT 44 10/26/2021    MPV NOT CALC 10/26/2021     CMP:    Lab Results   Component Value Date     10/26/2021    K 3.7 10/26/2021    K 3.7 10/26/2021     10/26/2021    CO2 24 10/26/2021    BUN 51 10/26/2021    CREATININE 4.7 10/26/2021    GFRAA 18 10/26/2021    LABGLOM 18 10/26/2021    GLUCOSE 98 10/26/2021    PROT 5.3 10/26/2021    LABALBU 2.7 10/26/2021    CALCIUM 7.9 10/26/2021    BILITOT 0.5 10/26/2021    ALKPHOS 70 10/26/2021    AST 58 10/26/2021    ALT 30 10/26/2021     Magnesium:    Lab Results   Component Value Date    MG 2.0 10/23/2021     Phosphorus:    Lab Results   Component Value Date    PHOS 4.6 10/23/2021     PT/INR:    Lab Results   Component Value Date    PROTIME 17.7 10/15/2021    INR 1.6 10/15/2021     FERRITIN:    Lab Results   Component Value Date    FERRITIN 3,921 10/06/2021     Fibrinogen Level:  No components found for: FIB     PRO-BNP:   Lab Results   Component Value Date    PROBNP 3,421 (H) 10/18/2021    PROBNP 11,401 (H) 10/13/2021      ABGs:   Lab Results   Component Value Date    PH 7.479 10/24/2021    PO2 306.9 10/24/2021    PCO2 26.1 10/24/2021     Hemoglobin A1C: No components found for: HGBA1C    IMAGING:  Imaging tests were completed and reviewed and discussed radiology and care team involved and reveals     CT CHEST : There is adequate opacification of the pulmonary arteries.  There is no   evidence of filling defect to suggest pulmonary embolism. Pierce Neither is no   evidence of thoracic aortic aneurysm or dissection.  There are small   bilateral pleural effusions.  These are increased compared to prior   examination.  There is small pericardial effusion which appears stable.  Left   hilar lymph node measures approximately 12 mm in short axis.  Right hilar   lymph node measures approximately 12 mm in short axis.  There is bilateral   gynecomastia. Pierce Neither is diffuse subcutaneous edema.      The central airways are patent.  There is no pneumothorax.  There are   bilateral ground-glass opacities and alveolar consolidation. Pierce Neither has been interval increase in the airspace disease compared to prior examination.  The   interval worsening is most pronounced within the lower lobes. Omelia Sovereign is   associated interstitial thickening. CT ABDOMEN PELVIS WO CONTRAST Additional Contrast? None    Result Date: 10/5/2021  FINDINGS: THE STUDY IS LIMITED DUE TO LACK OF INTRAVENOUS CONTRAST. Chest: Mediastinum: No thoracic aortic aneurysm. No definite adenopathy on this unenhanced study. Trace pericardial fluid anteriorly. Lungs/pleura: Trace layering pleural effusions bilaterally, slightly larger on the right. No pneumothorax. Numerous ground-glass densities in the bilateral upper and to a lesser degree lower lungs, in keeping with known COVID-19 pneumonia. Soft Tissues/Bones: Bilateral gynecomastia. No acute osseous abnormality in the thoracic cage. Abdomen and pelvis: The study is degraded by the patient's respiratory motion. Organs: Abdominal organs inadequately evaluated on this motion degraded and unenhanced study. No hydronephrosis on either side. GI/Bowel: Normal appendix. No evidence of bowel obstruction. Pelvis: Normal sized prostate. Urinary bladder grossly intact. Peritoneum/Retroperitoneum: No free air or free fluid. No bulky adenopathy. No abdominal aortic aneurysm. Bones/Soft Tissues: No lytic or sclerotic lesion in the regional skeleton. Limited study by the patient's respiratory motion and lack of intravenous contrast. Extensive multifocal COVID-19 pneumonia bilaterally. Bilateral trace layering pleural effusions. Bilateral gynecomastia. Abdominal organs inadequately evaluated due to the limitations of the study. Normal appendix. No evidence of bowel obstruction. CT CHEST WO CONTRAST    Result Date: 10/5/2021  FINDINGS: THE STUDY IS LIMITED DUE TO LACK OF INTRAVENOUS CONTRAST. Chest: Mediastinum: No thoracic aortic aneurysm. No definite adenopathy on this unenhanced study. Trace pericardial fluid anteriorly.  Lungs/pleura: Trace layering pleural effusions bilaterally, slightly larger on the right. No pneumothorax. Numerous ground-glass densities in the bilateral upper and to a lesser degree lower lungs, in keeping with known COVID-19 pneumonia. Soft Tissues/Bones: Bilateral gynecomastia. No acute osseous abnormality in the thoracic cage. Abdomen and pelvis: The study is degraded by the patient's respiratory motion. Organs: Abdominal organs inadequately evaluated on this motion degraded and unenhanced study. No hydronephrosis on either side. GI/Bowel: Normal appendix. No evidence of bowel obstruction. Pelvis: Normal sized prostate. Urinary bladder grossly intact. Peritoneum/Retroperitoneum: No free air or free fluid. No bulky adenopathy. No abdominal aortic aneurysm. Bones/Soft Tissues: No lytic or sclerotic lesion in the regional skeleton. Limited study by the patient's respiratory motion and lack of intravenous contrast. Extensive multifocal COVID-19 pneumonia bilaterally. Bilateral trace layering pleural effusions. Bilateral gynecomastia. Abdominal organs inadequately evaluated due to the limitations of the study. Normal appendix. No evidence of bowel obstruction. ECHOCARDIOGRAM:  Ejection fraction is visually estimated at 55%. There is late positive bubble suggesting possible shunt from pulmonary   origin. Visually moderately dilated left atrium. Normal right ventricular size and function. There is small pericardial effusion without tamponade physiology    Assessment:  Mr. Kindra Serrano is a 36year old male with no significant PMH who was admitted on October 5, 2021 after he presented from an outside clinic after he was found to be hypoxic during 6 minute walking test. Patient tested positive for Covid 19 on August 18th, he is unvaccinated and had Covid 19 last year as well.  Patient states he has quarantined by himself and when his mother recently visited him she was shocked at his appearance as he had lost

## 2021-10-26 NOTE — PROGRESS NOTES
[address] [place] [address] Attending    S: 36 y.o. male with history of asthma and smoking history presented with increasing dyspnea and cough over past month. On presentation had fever of 103. Has tested positive for COVID twice, most recently end of August.  CT shows extensive bilateral pneumonia and COVID testing again positive. HIV pos, CD4 43. Early AM 10/23,  RRT called due to tachycardia and oxygen desaturation requiring oxygen from 3L NC to 15LNRB mask. Patient had been febrile overnight as well and transferred to MICU. Has had upper extremity tremors and lower extremity neuropathy symptoms. Anxiety with long hospitalization. Had dialysis yesterday. Felt very worn out and weak in the evening. Was having a hard time ambulating to the bathroom. Did not sleep well. O: VS- Blood pressure 131/73, pulse 82, temperature 97.4 °F (36.3 °C), temperature source Oral, resp. rate 18, height 6' 4\" (1.93 m), weight 199 lb 15.3 oz (90.7 kg), SpO2 96 %. Exam is as noted by resident   Awake, alert and oriented. Heart - RRR  Lungs- clear breath sounds bilaterally. Ext - no edema. Impressions:   Principal Problem:    Acute respiratory failure with hypoxia (HCC)  Active Problems:  Repeatedly positive Covid  Stable groundglass opacities    Acute kidney injury due to COVID-19 ([address]    Asthma    Nephrotic range proteinuria  50 pound weight loss  2 blood cultures positive  Pancytopenia   HIV positive, new diagnosis  late latent syphillis    Plan   Appreciate Nephrology, ID, MICU, cardiology, gen surgery, and pulmonology. Covid pneumonia/PCP pneumonia with sepsis, New diagnosis of HIV- resistance testing pending, CD4 count of 43. Viral load 195,000. ID managing antibiotics and antivirals for HIV, currently on lamivudine/abacavir/dolutegravir , awaiting resistance testing. Bactrim and cefepime discontinued. Per ID pentamidine 4mg/kg IV q48H on dialysis to finish 21 days until 11/2. RPR positive. S/p LP. VDRL CSF negative. Continue benzathine PCN IM weekly x 3 doses. Sinus tachycardia - Echo with EF 55%, small pericardial effusion and possible atrial shunt. Anemia - FOBT heme positive first time, neg on repeat. Gen surgery has no plans for scopes s/p 2U PRBCs, will trend h/h. Follow. Posttransfusion H&H today 7.9    Nephrology planning biopsy outpatient with nephrotic range protienuria, also with hyponatremia. Noted worsening creatinine , DEISI on CKD . S/p dialysis catheter and dialysis. Plan for dialysis today. Follow up outpatient for right kidney lesion-ct/mri when cr improved. H/O consulted due to pancytopenia including neutropenia. granix ordered. Will monitor CBC. Platelet count less than 50K will d/c heparin per heme/onc recommendations. Attending Physician Statement  I have reviewed the chart and seen the patient with the resident(s). I personally reviewed images, EKG's and similar tests, if present. I personally reviewed and performed key elements of the history and exam.  I have reviewed and confirmed student and/or resident history and exam with changes as indicated above. I agree with the assessment, plan and orders as documented by the resident. Please refer to the resident and/or student note for additional information. Rashel Longoria.  Britta Ya MD

## 2021-10-26 NOTE — PROGRESS NOTES
[facility] PROGRESS NOTE      Chief complaint: Follow-up of Gram-positive cocci in clusters on blood culture    The patient is a 36 y.o. male, not vaccinated against COVID-19, with history of asthma, presented on 10/05 with shortness of breath, found to be febrile at 103 °F, positive SARS-CoV-2 PCR, bilateral groundglass opacities on chest CT, tolerating room air with oxygen saturation of 97%. He reports having tested positive for SARS-CoV-2 for the 3rd time now with previous on in late August at which time he reports having quarantined. Urine Streptococcus pneumonia and Legionella antigens were negative. Blood cultures from 10/05 and 10/06 showed methicillin-susceptible Staphylococcus epidermidis in 1 out of 2 sets. HIV screen was positive with viral load of 195,000. CD4 count was low at 43. RPR was reactive at titer of 1:2. Toxoplasma Ab was negative. HLA- was negative. Serum beta-d-glucan was positive. Transthoracic echocardiogram showed late positive bubble suggesting possible shunt from pulmonary origin, moderately dilated left atrium, small pericardial effusion without tamponade physiology, ejection fraction visually estimated at 55%. He underwent bronchoscopy on 10/15 during which normal endobronchial evaluation and normal anatomy were noted. BAL Gram stain and culture showed rare polymorphonuclear leukocytes, rare epithelial cells, rare gram-negative rods, rare gram-positive rods, reduced oropharyngeal liana. BAL pneumocystis stain was negative. BAL galactomannan was negative. Serum Cryptococcus antigen was negative. He underwent lumbar puncture on 10/21, yielding CSF with normal glucose, protein, csll count with differential. CSF meningitis/encephalitis PCR panel was negative for HSV, VZV, CMV, Enterovirus, HHV-6, Streptococcus pneumoniae and agalactiae, E coli, Haemophilus, Neisseria meninigitidis, Cryptococcus.  He was transferred to MICU on 10/23 for worsening shortness of breath      Subjective: Patient was seen and examined while undergoing hemodialysis. No chills, no abdominal pain, no diarrhea, no rash, no itching. Afebrile today. Objective:  /73   Pulse 82   Temp 97.4 °F (36.3 °C) (Oral)   Resp 18   Ht 6' 4\" (1.93 m)   Wt 199 lb 15.3 oz (90.7 kg)   SpO2 96%   BMI 24.34 kg/m²   Constitutional: Alert, not in distress  Respiratory: Clear breath sounds, no crackles, no wheezes  Cardiovascular: Regular rate and rhythm, no murmurs  Gastrointestinal: Bowel sounds present, soft, nontender  Skin: Warm and dry, no active dermatoses  Musculoskeletal: No joint swelling, no joint erythema    Labs, imaging, and medical records/notes were personally reviewed. Assessment:  Fever and pancytopenia, suspect associated with acute retroviral syndrome. Jarische-Herxheimer reaction is less likely in late latent syphilis with low RPR titers. Pancytopenia, multifactorial, suspect associated with acute retroviral syndrome and/or medication-induced  COVID-19, mild  Pneumonia, suspected Pneumocystis pneumonia (serum beta-d-glucan was positive at >500 pg/mL, BAL Pneumocystis stain was negative but Pneumocystis PCR was not sent)  Late latent syphilis, neurosyphilis ruled out  Methicillin susceptible Staphylococcus epidermidis bacteremia, etiology unclear  DEISI  Advanced HIV disease (viral load of 195,000; CD4 of 43 as of 10/11/2021). Hepatitis C Ab positive (viral load not detected). Hepatitis A immune  Prolonged QTc  HAP    Recommendations:  Stop cefepime. Continue benzathine penicillin IM weekly for 3 doses. Continue lamivudine 50 mg q24h, dolutegravir 50 mg q24h, abacavir 600 mg q24h. Continue pentamidine 4 mg/kg IV q48h after hemodialysis to finish 21 days until 11/02. Continue oral prednisone 40 mg q12h  Followed by 40 mg q24h for another 3 days until 10/28 then 20 mg q24h thereafter until 11//02. Follow up repeat serum beta-d-glucan. Follow up HIV GART, INSTI resistance.   Repeat urine GC/Chlamydia PCR, if able. Monitor respiratory status. Continue supportive care. Thank you for involving me in the care of Kristin James. I will continue to follow. Please do not hesitate to call for any questions or concerns.     Electronically signed by Yola Sutton MD on 10/26/2021 at 11:17 AM

## 2021-10-26 NOTE — PROGRESS NOTES
CLINICAL PHARMACY NOTE: MEDS TO BEDS    Total # of Prescriptions Filled: 1   The following medications were delivered to the patient:  · biktarvy -25 tab  · Gave prescription to SAMUEL Murillo    Additional Documentation:

## 2021-10-26 NOTE — PROGRESS NOTES
Department of Internal Medicine  Nephrology Progress Note      Events reviewed. Patient seen on dialysis. SUBJECTIVE:  We are following Mr. Martin Machado for DEISI. Reports some shortness of breath, but feeling better.     PHYSICAL EXAM:      Vitals:    VITALS:  /79   Pulse 100   Temp 98.5 °F (36.9 °C)   Resp 18   Ht 6' 4\" (1.93 m)   Wt 199 lb 15.3 oz (90.7 kg)   SpO2 96%   BMI 24.34 kg/m²   24HR INTAKE/OUTPUT:      Intake/Output Summary (Last 24 hours) at 10/26/2021 1549  Last data filed at 10/26/2021 1214  Gross per 24 hour   Intake 1087.5 ml   Output 1050 ml   Net 37.5 ml     Young cachectic looking male  Constitutional:  Awake, alert,in moderate respiratory distress, OOB sitting in chair, Fio2 at 4 L via NC  HEENT:  PERRL, normocephalic, atraumatic  Respiratory: diminished lung sounds  Cardiovascular/Edema:  RRR, S1/S2, -edema  Gastrointestinal:  abd flat, soft, non-tender, Perkins in place  Neurologic:  Awake, alert, no focal deficits  Skin:  Warm, dry, no rash or lesions  Other: No LE edema      Scheduled Meds:   pentamidine isethionate (PENTAM) IVPB  4 mg/kg IntraVENous Daily    [START ON 10/27/2021] abacavir  600 mg Oral Nightly    [START ON 10/27/2021] dolutegravir sodium  50 mg Oral Nightly    lamiVUDine  50 mg Oral Nightly    predniSONE  40 mg Oral Daily    Followed by   [facility] ON 10/29/2021] predniSONE  20 mg Oral Daily    insulin lispro  0-6 Units SubCUTAneous TID     insulin lispro  0-3 Units SubCUTAneous Nightly    sodium chloride   IntraVENous Q12H    [Held by provider] heparin (porcine)  5,000 Units SubCUTAneous Q8H    penicillin G benzathine  2.4 Million Units IntraMUSCular Weekly    lidocaine  1 patch TransDERmal Daily    ascorbic acid  500 mg Oral Daily    [Held by provider] metoprolol tartrate  25 mg Oral Daily    pantoprazole  40 mg Oral QAM AC    sodium chloride flush  5-40 mL IntraVENous 2 times per day     Continuous Infusions:   sodium chloride      sodium limitations of the study.       Normal appendix.       No evidence of bowel obstruction.         Kidney ultrasound October 7, 2021   1. Hyperechoic kidneys due to underlying parenchymal disease. 2. Trace bilateral perinephric fluid of indeterminate etiology, potentially   due to underlying inflammation. 3. An approximately 1.1 cm x 1.5 cm x 2.3 cm lesion in the right kidney could   be a cyst but could also be seen with infarction, abscess, or neoplasia. Consider further evaluation with renal protocol abdomen MRI (preferred) or   CT.  Following resolution of acute kidney injury.         CTA pulmonary with IV contrast October 13, 2021   1. No evidence of pulmonary embolism. 2. Interval increased size of bilateral pleural effusions with little change   in pericardial effusion. 3. Persistent subcutaneous edema. 4. Reactive bilateral hilar lymphadenopathy. 5. Bilateral airspace disease is increased compared to prior examination. The increase is most pronounced within the lower lobes. 6. Bilateral gynecomastia.                 BRIEF SUMMARY OF INITIAL CONSULT:     Briefly, Mr. Sonjia Peabody is a 36year old male with no significant PMH who was admitted on October 5, 2021 after he presented from an outside clinic after he was found to be hypoxic during 6 minute walking test. Patient tested positive for Covid 19 on August 18th, he is unvaccinated and had Covid 19 last year as well. Patient states he has quarantined by himself and when his mother recently visited him she was shocked at his appearance as he had lost about 50 pounds in 2 and a half months. On admission labs were significant for sodium of 130, potassium 5.2, bicarbonate 20, BUN 41, creatinine 3.6, magnesium 2.7, calcium 7.7 and proBNP 1,599. We are consulted for DEISI. Problems resolved:    Hyperkalemia, 2/2 DEISI. Low potassium diet  Hypotonic hyponatremia 2/2 intravascular volume from poor oral intake. Bicarb drip started.  Sodium levels improving. Low bicarbonate levels with hyperchloremia, most likely NAGMA versus respiratory alkalosis; we need a PH to clarify diagnosis. Awaiting ABG results  High bicarbonate levels, likely metabolic alkalosis 2/2 administration  Hypokalemia, multifactorial, poor intake, probably renal potassium wasting  Severe Hypoalbuminemia, multifactorial, status post albumin administration  DEISI on CKD, volume responsive pre-renal DEISI d/t volume (poor oral intake), urine sodium <20. Resolved. Edematous state, multifactorial, mainly 2/2 nephrotic syndrome and possibly HFpEF 55%, on bumex  Hyponatremia, multifactorial, including decreased GFR and hemodynamically mediated in the setting of large hypotonic fluid administration (D5 with Bactrim). Sodium levels decrease, stable overnight  Low bicarbonate levels with hyperchloremia, consistent with either hyperchloremic metabolic acidosis versus respiratory alkalosis. Bicarbonate levels improved with bicarbonate drip. HTN, on metoprolol   Probably fungemia, (1, 3) beta-D-glucan positive, on anidulafungin  MSSE bacteremia, on cefepime 2 g every 8 hours  Sinus tachycardia, multifactorial    IMPRESSION/RECOMMENDATIONS:     Recurrent DEISI on CKD, ATN contrast associated DEISI versus ischemic ATN (hypotension related tachycardia). Nonoliguric. Started on renal replacement therapy on October 25, 2021. Had HD last night x3 hours, tolerated well. Having HD again today. We will need a kidney biopsy. 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 Pinky Angles Work-up so far HIV positive, Hepatitis C positive, MARILEE negative, C4 normal, C3 88 (low), UPEP without monoclonal gammopathy, negative cryoglobulins. Kidney ultrasound with hyperechoic kidneys. Needs kidney biopsy. To reconsult interventional radiology.     Hypomagnesemia, 2/2 poor intake   Probably HFpEF 55%, proBNP 11,401 > 3421

## 2021-10-27 ENCOUNTER — APPOINTMENT (OUTPATIENT)
Dept: CT IMAGING | Age: 31
DRG: 890 | End: 2021-10-27
Payer: COMMERCIAL

## 2021-10-27 LAB
(1,3)-BETA-D-GLUCAN (FUNGITELL) INTERPRETATION: POSITIVE
(1,3)-BETA-D-GLUCAN (FUNGITELL): >500 PG/ML
ALBUMIN SERPL-MCNC: 2.6 G/DL (ref 3.5–5.2)
ALP BLD-CCNC: 71 U/L (ref 40–129)
ALT SERPL-CCNC: 26 U/L (ref 0–40)
ANION GAP SERPL CALCULATED.3IONS-SCNC: 9 MMOL/L (ref 7–16)
APTT: 26.4 SEC (ref 24.5–35.1)
AST SERPL-CCNC: 44 U/L (ref 0–39)
BASOPHILS ABSOLUTE: 0 E9/L (ref 0–0.2)
BASOPHILS RELATIVE PERCENT: 0.1 % (ref 0–2)
BILIRUB SERPL-MCNC: 0.5 MG/DL (ref 0–1.2)
BLOOD BANK DISPENSE STATUS: NORMAL
BLOOD BANK DISPENSE STATUS: NORMAL
BLOOD BANK PRODUCT CODE: NORMAL
BLOOD BANK PRODUCT CODE: NORMAL
BLOOD CULTURE, ROUTINE: NORMAL
BPU ID: NORMAL
BPU ID: NORMAL
BUN BLDV-MCNC: 30 MG/DL (ref 6–20)
BUN BLDV-MCNC: 32 MG/DL (ref 6–20)
BUN BLDV-MCNC: 34 MG/DL (ref 6–20)
CALCIUM SERPL-MCNC: 8.1 MG/DL (ref 8.6–10.2)
CALCIUM SERPL-MCNC: 8.1 MG/DL (ref 8.6–10.2)
CALCIUM SERPL-MCNC: 8.4 MG/DL (ref 8.6–10.2)
CHLORIDE BLD-SCNC: 100 MMOL/L (ref 98–107)
CHLORIDE BLD-SCNC: 101 MMOL/L (ref 98–107)
CHLORIDE BLD-SCNC: 99 MMOL/L (ref 98–107)
CO2: 25 MMOL/L (ref 22–29)
CO2: 26 MMOL/L (ref 22–29)
CO2: 27 MMOL/L (ref 22–29)
CREAT SERPL-MCNC: 3.6 MG/DL (ref 0.7–1.2)
CREAT SERPL-MCNC: 3.7 MG/DL (ref 0.7–1.2)
CREAT SERPL-MCNC: 3.8 MG/DL (ref 0.7–1.2)
CULTURE, BLOOD 2: NORMAL
DESCRIPTION BLOOD BANK: NORMAL
DESCRIPTION BLOOD BANK: NORMAL
EOSINOPHILS ABSOLUTE: 0 E9/L (ref 0.05–0.5)
EOSINOPHILS RELATIVE PERCENT: 0.1 % (ref 0–6)
GFR AFRICAN AMERICAN: 23
GFR AFRICAN AMERICAN: 24
GFR AFRICAN AMERICAN: 24
GFR NON-AFRICAN AMERICAN: 23 ML/MIN/1.73
GFR NON-AFRICAN AMERICAN: 24 ML/MIN/1.73
GFR NON-AFRICAN AMERICAN: 24 ML/MIN/1.73
GLUCOSE BLD-MCNC: 133 MG/DL (ref 74–99)
GLUCOSE BLD-MCNC: 98 MG/DL (ref 74–99)
GLUCOSE BLD-MCNC: 99 MG/DL (ref 74–99)
HCT VFR BLD CALC: 22.2 % (ref 37–54)
HCT VFR BLD CALC: 24 % (ref 37–54)
HCT VFR BLD CALC: 24.4 % (ref 37–54)
HEMOGLOBIN: 7.5 G/DL (ref 12.5–16.5)
HEMOGLOBIN: 7.8 G/DL (ref 12.5–16.5)
HEMOGLOBIN: 8.1 G/DL (ref 12.5–16.5)
HYPOCHROMIA: ABNORMAL
LYMPHOCYTES ABSOLUTE: 0.21 E9/L (ref 1.5–4)
LYMPHOCYTES RELATIVE PERCENT: 2.6 % (ref 20–42)
Lab: NORMAL
MAGNESIUM: 1.9 MG/DL (ref 1.6–2.6)
MCH RBC QN AUTO: 29.9 PG (ref 26–35)
MCH RBC QN AUTO: 30.5 PG (ref 26–35)
MCHC RBC AUTO-ENTMCNC: 33.2 % (ref 32–34.5)
MCHC RBC AUTO-ENTMCNC: 33.8 % (ref 32–34.5)
MCV RBC AUTO: 90 FL (ref 80–99.9)
MCV RBC AUTO: 90.2 FL (ref 80–99.9)
METER GLUCOSE: 95 MG/DL (ref 74–99)
MONOCYTES ABSOLUTE: 0.14 E9/L (ref 0.1–0.95)
MONOCYTES RELATIVE PERCENT: 1.7 % (ref 2–12)
NEUTROPHILS ABSOLUTE: 6.62 E9/L (ref 1.8–7.3)
NEUTROPHILS RELATIVE PERCENT: 95.7 % (ref 43–80)
OVALOCYTES: ABNORMAL
PDW BLD-RTO: 13.7 FL (ref 11.5–15)
PDW BLD-RTO: 14 FL (ref 11.5–15)
PHOSPHORUS: 2.6 MG/DL (ref 2.5–4.5)
PLATELET # BLD: 34 E9/L (ref 130–450)
PLATELET # BLD: 66 E9/L (ref 130–450)
PLATELET CONFIRMATION: NORMAL
PLATELET CONFIRMATION: NORMAL
PMV BLD AUTO: 12 FL (ref 7–12)
PMV BLD AUTO: ABNORMAL FL (ref 7–12)
POIKILOCYTES: ABNORMAL
POLYCHROMASIA: ABNORMAL
POTASSIUM REFLEX MAGNESIUM: 4 MMOL/L (ref 3.5–5)
POTASSIUM SERPL-SCNC: 3.7 MMOL/L (ref 3.5–5)
POTASSIUM SERPL-SCNC: 4 MMOL/L (ref 3.5–5)
POTASSIUM SERPL-SCNC: 4.2 MMOL/L (ref 3.5–5)
RBC # BLD: 2.46 E12/L (ref 3.8–5.8)
RBC # BLD: 2.71 E12/L (ref 3.8–5.8)
REPORT: NORMAL
SARS-COV-2, NAAT: DETECTED
SODIUM BLD-SCNC: 134 MMOL/L (ref 132–146)
SODIUM BLD-SCNC: 135 MMOL/L (ref 132–146)
SODIUM BLD-SCNC: 136 MMOL/L (ref 132–146)
TARGET CELLS: ABNORMAL
THIS TEST SENT TO: NORMAL
TOTAL PROTEIN: 4.9 G/DL (ref 6.4–8.3)
WBC # BLD: 6.9 E9/L (ref 4.5–11.5)
WBC # BLD: 8 E9/L (ref 4.5–11.5)

## 2021-10-27 PROCEDURE — 82962 GLUCOSE BLOOD TEST: CPT

## 2021-10-27 PROCEDURE — 84100 ASSAY OF PHOSPHORUS: CPT

## 2021-10-27 PROCEDURE — 85014 HEMATOCRIT: CPT

## 2021-10-27 PROCEDURE — 85027 COMPLETE CBC AUTOMATED: CPT

## 2021-10-27 PROCEDURE — 85025 COMPLETE CBC W/AUTO DIFF WBC: CPT

## 2021-10-27 PROCEDURE — 36415 COLL VENOUS BLD VENIPUNCTURE: CPT

## 2021-10-27 PROCEDURE — 50200 RENAL BIOPSY PERQ: CPT | Performed by: RADIOLOGY

## 2021-10-27 PROCEDURE — 2709999900 CT BIOPSY RENAL

## 2021-10-27 PROCEDURE — 90935 HEMODIALYSIS ONE EVALUATION: CPT

## 2021-10-27 PROCEDURE — 2500000003 HC RX 250 WO HCPCS: Performed by: RADIOLOGY

## 2021-10-27 PROCEDURE — 6370000000 HC RX 637 (ALT 250 FOR IP): Performed by: INTERNAL MEDICINE

## 2021-10-27 PROCEDURE — 80053 COMPREHEN METABOLIC PANEL: CPT

## 2021-10-27 PROCEDURE — 80048 BASIC METABOLIC PNL TOTAL CA: CPT

## 2021-10-27 PROCEDURE — 85018 HEMOGLOBIN: CPT

## 2021-10-27 PROCEDURE — 0TB03ZX EXCISION OF RIGHT KIDNEY, PERCUTANEOUS APPROACH, DIAGNOSTIC: ICD-10-PCS | Performed by: RADIOLOGY

## 2021-10-27 PROCEDURE — 77012 CT SCAN FOR NEEDLE BIOPSY: CPT | Performed by: RADIOLOGY

## 2021-10-27 PROCEDURE — 6360000002 HC RX W HCPCS: Performed by: RADIOLOGY

## 2021-10-27 PROCEDURE — 87591 N.GONORRHOEAE DNA AMP PROB: CPT

## 2021-10-27 PROCEDURE — 87491 CHLMYD TRACH DNA AMP PROBE: CPT

## 2021-10-27 PROCEDURE — 99232 SBSQ HOSP IP/OBS MODERATE 35: CPT | Performed by: FAMILY MEDICINE

## 2021-10-27 PROCEDURE — 87635 SARS-COV-2 COVID-19 AMP PRB: CPT

## 2021-10-27 PROCEDURE — 2580000003 HC RX 258: Performed by: INTERNAL MEDICINE

## 2021-10-27 PROCEDURE — 6370000000 HC RX 637 (ALT 250 FOR IP): Performed by: FAMILY MEDICINE

## 2021-10-27 PROCEDURE — 99232 SBSQ HOSP IP/OBS MODERATE 35: CPT | Performed by: INTERNAL MEDICINE

## 2021-10-27 PROCEDURE — 6360000004 HC RX CONTRAST MEDICATION: Performed by: RADIOLOGY

## 2021-10-27 PROCEDURE — 83735 ASSAY OF MAGNESIUM: CPT

## 2021-10-27 PROCEDURE — 85730 THROMBOPLASTIN TIME PARTIAL: CPT

## 2021-10-27 PROCEDURE — 77012 CT SCAN FOR NEEDLE BIOPSY: CPT

## 2021-10-27 PROCEDURE — 74176 CT ABD & PELVIS W/O CONTRAST: CPT | Performed by: RADIOLOGY

## 2021-10-27 PROCEDURE — 74176 CT ABD & PELVIS W/O CONTRAST: CPT

## 2021-10-27 PROCEDURE — 2140000000 HC CCU INTERMEDIATE R&B

## 2021-10-27 PROCEDURE — 6370000000 HC RX 637 (ALT 250 FOR IP): Performed by: STUDENT IN AN ORGANIZED HEALTH CARE EDUCATION/TRAINING PROGRAM

## 2021-10-27 RX ORDER — FENTANYL CITRATE 50 UG/ML
INJECTION, SOLUTION INTRAMUSCULAR; INTRAVENOUS
Status: COMPLETED | OUTPATIENT
Start: 2021-10-27 | End: 2021-10-27

## 2021-10-27 RX ORDER — LIDOCAINE HYDROCHLORIDE 20 MG/ML
INJECTION, SOLUTION INFILTRATION; PERINEURAL
Status: COMPLETED | OUTPATIENT
Start: 2021-10-27 | End: 2021-10-27

## 2021-10-27 RX ORDER — SODIUM CHLORIDE 9 MG/ML
INJECTION, SOLUTION INTRAVENOUS PRN
Status: DISCONTINUED | OUTPATIENT
Start: 2021-10-27 | End: 2021-11-04 | Stop reason: SDUPTHER

## 2021-10-27 RX ORDER — MIDAZOLAM HYDROCHLORIDE 1 MG/ML
INJECTION INTRAMUSCULAR; INTRAVENOUS
Status: COMPLETED | OUTPATIENT
Start: 2021-10-27 | End: 2021-10-27

## 2021-10-27 RX ORDER — OXYCODONE HYDROCHLORIDE 5 MG/1
5 TABLET ORAL ONCE
Status: COMPLETED | OUTPATIENT
Start: 2021-10-27 | End: 2021-10-27

## 2021-10-27 RX ADMIN — Medication 10 ML: at 09:01

## 2021-10-27 RX ADMIN — POLYETHYLENE GLYCOL 3350 17 G: 17 POWDER, FOR SOLUTION ORAL at 08:57

## 2021-10-27 RX ADMIN — OXYCODONE HYDROCHLORIDE AND ACETAMINOPHEN 500 MG: 500 TABLET ORAL at 08:58

## 2021-10-27 RX ADMIN — MIDAZOLAM 1 MG: 1 INJECTION INTRAMUSCULAR; INTRAVENOUS at 15:13

## 2021-10-27 RX ADMIN — FENTANYL CITRATE 50 MCG: 50 INJECTION, SOLUTION INTRAMUSCULAR; INTRAVENOUS at 15:13

## 2021-10-27 RX ADMIN — OXYCODONE 5 MG: 5 TABLET ORAL at 18:34

## 2021-10-27 RX ADMIN — LIDOCAINE HYDROCHLORIDE 7 ML: 20 INJECTION, SOLUTION INFILTRATION; PERINEURAL at 15:24

## 2021-10-27 RX ADMIN — PANTOPRAZOLE SODIUM 40 MG: 40 TABLET, DELAYED RELEASE ORAL at 06:43

## 2021-10-27 RX ADMIN — PREDNISONE 40 MG: 20 TABLET ORAL at 08:58

## 2021-10-27 RX ADMIN — IOPAMIDOL 1 ML: 612 INJECTION, SOLUTION INTRAVENOUS at 15:53

## 2021-10-27 RX ADMIN — ABACAVIR 600 MG: 300 TABLET, FILM COATED ORAL at 20:36

## 2021-10-27 RX ADMIN — DOLUTEGRAVIR SODIUM 50 MG: 50 TABLET, FILM COATED ORAL at 20:36

## 2021-10-27 RX ADMIN — LAMIVUDINE 50 MG: 100 TABLET, FILM COATED ORAL at 20:36

## 2021-10-27 ASSESSMENT — PAIN SCALES - GENERAL
PAINLEVEL_OUTOF10: 0
PAINLEVEL_OUTOF10: 9
PAINLEVEL_OUTOF10: 0
PAINLEVEL_OUTOF10: 0

## 2021-10-27 NOTE — CARE COORDINATION
Call made to the [address] [place] [address]  to get patient set-up for weekly injections of penicillin G benzathine but no answer. Left a message to return the call. Contacted JAYLA at [employer] to start the process of getting the patient set-up for hemodialysis if continued treatments are needed. No answer, left a voicemail to return the call.     Ming Duncan, MSW, LSW [phone]

## 2021-10-27 NOTE — PROGRESS NOTES
Pt returns to floor from renal biopsy. Pt immediately voids gross hematuria with large clots. Pt states he \"repeatedly\" told staff in IR that he \"felt a lot of pressure down there\" and felt \"extreme urgency\" to void but IR staff \"could not find a urinal\" and told him he had to \"wait until he got back up to the floor. \" Right sided biopsy site dressing clean, dry and intact. Pt denies pain. IR notified and new orders received for CT A&P. Pt immediately transported to CT.

## 2021-10-27 NOTE — PRE SEDATION
Patrick Guerra II, MD  10/27/2021  3:19 PM        PRE-SEDATION PHYSICIAN ASSESSMENT:      1. HISTORY & PHYSICAL EXAMINATION:  Comments: none    Vitals:    10/27/21 1511   BP: 125/80   Pulse: 85   Resp: 21   Temp:    SpO2: 100%       Allergies: Patient has no known allergies. 2. Heart and Lungs immediately prior to procedure demonstrate no contraindications to proceed      Chief Complaint: Acute respiratory failure with hypoxia ([address]    Drug: unknown  Tobacco: unknown    3. PAST ANESTHESIA EXPERIENCE:  unknown. 4. AIRWAY/TEETH/HEAD & NECK(Mallampati Classification):  II (soft palate, uvula, fauces visible)    5: NORMAL RANGE OF MOTION OF NECK: No    6. PATIENT WILL LIKELY TOLERATE PLAN OF MODERATE SEDATION    7. ASA 2.     Danny Garcia MD

## 2021-10-27 NOTE — BRIEF OP NOTE
Brief Postoperative Note    Usha Harris  YOB: 1990  [id number]    Pre-operative Diagnosis and Procedure: OTIS, plan renal biopsy    Post-operative Diagnosis: Same    Anesthesia: Local    Estimated Blood Loss: < 10 cc    Surgeon: Skylar CANTU     Complications: none    Specimen obtained: yes  Findings: none     Aparna Mckinnon II, MD   10/27/2021 3:20 PM

## 2021-10-27 NOTE — POST SEDATION
POST SEDATION NOTE:  Time: 3:19 PM    Cardiopulmonary: Vitals Signs Stable: yes    Level of Consciousness: alert    Reversal Agent Used: No    Complications: none    Follow-up/Observations: none    Pain Score: 1    Halima Thorpe MD

## 2021-10-27 NOTE — PROGRESS NOTES
H&H not drawn by phlebotomy at 0900 per order.  Notified stat lab desk that labs still need drawn- states will notify phlebotomy to draw asap

## 2021-10-27 NOTE — PROGRESS NOTES
Notified Dr. William Kam about 1 unit platelet prior to procedure and 1 unit during procedure in IR per Dr. Shakira Zamarripa. Dr. William Kam states he will put orders in per Dr. Fiona Begum requests.

## 2021-10-27 NOTE — PROGRESS NOTES
[facility] PROGRESS NOTE      Chief complaint: Follow-up of Gram-positive cocci in clusters on blood culture    The patient is a 36 y.o. male, not vaccinated against COVID-19, with history of asthma, presented on 10/05 with shortness of breath, found to be febrile at 103 °F, positive SARS-CoV-2 PCR, bilateral groundglass opacities on chest CT, tolerating room air with oxygen saturation of 97%. He reports having tested positive for SARS-CoV-2 for the 3rd time now with previous on in late August at which time he reports having quarantined. Urine Streptococcus pneumonia and Legionella antigens were negative. Blood cultures from 10/05 and 10/06 showed methicillin-susceptible Staphylococcus epidermidis in 1 out of 2 sets. HIV screen was positive with viral load of 195,000. CD4 count was low at 43. RPR was reactive at titer of 1:2. Toxoplasma Ab was negative. HLA- was negative. Serum beta-d-glucan was positive. Transthoracic echocardiogram showed late positive bubble suggesting possible shunt from pulmonary origin, moderately dilated left atrium, small pericardial effusion without tamponade physiology, ejection fraction visually estimated at 55%. He underwent bronchoscopy on 10/15 during which normal endobronchial evaluation and normal anatomy were noted. BAL Gram stain and culture showed rare polymorphonuclear leukocytes, rare epithelial cells, rare gram-negative rods, rare gram-positive rods, reduced oropharyngeal liana. BAL pneumocystis stain was negative. BAL galactomannan was negative. Serum Cryptococcus antigen was negative. He underwent lumbar puncture on 10/21, yielding CSF with normal glucose, protein, csll count with differential. CSF meningitis/encephalitis PCR panel was negative for HSV, VZV, CMV, Enterovirus, HHV-6, Streptococcus pneumoniae and agalactiae, E coli, Haemophilus, Neisseria meninigitidis, Cryptococcus. CSF VDRL was negative.  He was transferred to MICU on 10/23 for worsening shortness of breath      Subjective: Patient was seen and examined while undergoing hemodialysis. No chills, no abdominal pain, no diarrhea, no rash, no itching. Afebrile. Objective:  /65   Pulse 76   Temp 97.4 °F (36.3 °C) (Oral)   Resp 18   Ht 6' 4\" (1.93 m)   Wt 199 lb 15.3 oz (90.7 kg)   SpO2 97%   BMI 24.34 kg/m²   Constitutional: Alert, not in distress  Respiratory: Clear breath sounds, no crackles, no wheezes  Cardiovascular: Regular rate and rhythm, no murmurs  Gastrointestinal: Bowel sounds present, soft, nontender  Skin: Warm and dry, no active dermatoses  Musculoskeletal: No joint swelling, no joint erythema    Labs, imaging, and medical records/notes were personally reviewed. Assessment:  Fever and pancytopenia, suspect associated with acute retroviral syndrome. Jarische-Herxheimer reaction is less likely in late latent syphilis with low RPR titers. Pancytopenia, multifactorial, suspect associated with acute retroviral syndrome and/or medication-induced  COVID-19, mild  Pneumonia, suspected Pneumocystis pneumonia (serum beta-d-glucan was positive at >500 pg/mL, BAL Pneumocystis stain was negative but Pneumocystis PCR was not sent)  Late latent syphilis, neurosyphilis ruled out  Methicillin susceptible Staphylococcus epidermidis bacteremia, etiology unclear  DEISI  Advanced HIV disease (viral load of 195,000; CD4 of 43 as of 10/11/2021). Hepatitis C Ab positive (viral load not detected). Hepatitis A immune  Prolonged QTc  HAP    Recommendations:  Continue benzathine penicillin IM weekly for 3 doses. Continue lamivudine 50 mg q24h, dolutegravir 50 mg q24h, abacavir 600 mg q24h. Continue pentamidine 4 mg/kg IV q48h after hemodialysis to finish 21 days until 11/02. Continue oral prednisone 40 mg q12h  Followed by 40 mg q24h for another 3 days until 10/28 then 20 mg q24h thereafter until 11//02. Follow up repeat serum beta-d-glucan. Follow up HIV GART, INSTI resistance.   Repeat urine GC/Chlamydia PCR, if able. Monitor respiratory status. Continue supportive care. Thank you for involving me in the care of Kristin James. I will continue to follow. Please do not hesitate to call for any questions or concerns.     Electronically signed by Desiree Pineda MD on 10/27/2021 at 11:29 AM

## 2021-10-27 NOTE — PROGRESS NOTES
[place]  Department of Internal Medicine  Division of Pulmonary, Critical Care and Sleep Medicine  Progress Note    Batool Hernandez DO, MPH, [facility], [facility], [place] [address] Chau Hargrove MD, [facility]  [facility]      Patient: Everett Hodge  MRN: [id number]  : 1990    Encounter Time: 12:05 PM     Date of Admission: 10/5/2021  3:07 PM    Primary Care Physician: Júnior Che MD    Reason for Consultation: COVID     SUBJECTIVE:    Hep C and HIV + screen  CD4 is 54  Echo = reviewed  HD cath place to start HD        OBJECTIVE:     PHYSICAL EXAM:   VITALS:   Vitals:    10/26/21 1530 10/26/21 1619 10/26/21 2030 10/27/21 0754   BP: (!) 140/82 125/72 115/70 114/65   Pulse: 96 80 95 76   Resp: 18 18 16 18   Temp:  98 °F (36.7 °C) 98.5 °F (36.9 °C) 97.4 °F (36.3 °C)   TempSrc:  Oral Temporal Oral   SpO2:  96% 100% 97%   Weight:       Height:            Intake/Output Summary (Last 24 hours) at 10/27/2021 1205  Last data filed at 10/27/2021 0758  Gross per 24 hour   Intake 600 ml   Output 1325 ml   Net -725 ml        CONSTITUTIONAL:   A&O x 3, NAD  SKIN:     No rash,   HEENT:     EOMI, MMM, No thrush  NECK:    No bruits, No JVP apprechiated  CV:      Sinus,  No murmur, No rubs, No gallops  PULMONARY:   Couse BS,  No Wheezing, No Rales, No Rhonchi      No noted egophony  ABDOMEN:     Soft, non-tender. BS normal. No R/R/G  EXT:    No deformities . No clubbing.       + lower extremity edema, No venous stasis  PULSE:   Appears equal and palpable.   PSYCHIATRIC:  Seems appropriate, No acute psycosis  MS:    No fractures, No gross weakness  NEUROLOGIC:   Non-focal     DATA: IMAGING & TESTING:     LABORATORY TESTS:    CBC:   Lab Results   Component Value Date    WBC 6.9 10/27/2021    RBC 2.46 10/27/2021    HGB 7.5 10/27/2021    HCT 22.2 10/27/2021    MCV 90.2 10/27/2021    MCH 30.5 10/27/2021    MCHC 33.8 10/27/2021    RDW 13.7 10/27/2021    PLT 34 10/27/2021    MPV NOT CALC 10/27/2021     CMP:    Lab Results   Component Value Date     10/27/2021    K 4.0 10/27/2021    K 4.0 10/27/2021     10/27/2021    CO2 27 10/27/2021    BUN 32 10/27/2021    CREATININE 3.7 10/27/2021    GFRAA 24 10/27/2021    LABGLOM 24 10/27/2021    GLUCOSE 98 10/27/2021    PROT 4.9 10/27/2021    LABALBU 2.6 10/27/2021    CALCIUM 8.1 10/27/2021    BILITOT 0.5 10/27/2021    ALKPHOS 71 10/27/2021    AST 44 10/27/2021    ALT 26 10/27/2021     Magnesium:    Lab Results   Component Value Date    MG 1.9 10/27/2021     Phosphorus:    Lab Results   Component Value Date    PHOS 2.6 10/27/2021     PT/INR:    Lab Results   Component Value Date    PROTIME 17.7 10/15/2021    INR 1.6 10/15/2021     FERRITIN:    Lab Results   Component Value Date    FERRITIN 3,921 10/06/2021     Fibrinogen Level:  No components found for: FIB     PRO-BNP:   Lab Results   Component Value Date    PROBNP 3,421 (H) 10/18/2021    PROBNP 11,401 (H) 10/13/2021      ABGs:   Lab Results   Component Value Date    PH 7.479 10/24/2021    PO2 306.9 10/24/2021    PCO2 26.1 10/24/2021     Hemoglobin A1C: No components found for: HGBA1C    IMAGING:  Imaging tests were completed and reviewed and discussed radiology and care team involved and reveals     CT CHEST : There is adequate opacification of the pulmonary arteries.  There is no   evidence of filling defect to suggest pulmonary embolism. Jet Duff is no   evidence of thoracic aortic aneurysm or dissection.  There are small   bilateral pleural effusions.  These are increased compared to prior   examination.  There is small pericardial effusion which appears stable.  Left   hilar lymph node measures approximately 12 mm in short axis.  Right hilar   lymph node measures approximately 12 mm in short axis.  There is bilateral   gynecomastia. Jet Duff is diffuse subcutaneous edema.      The central airways are patent.  There is no pneumothorax.  There are   bilateral ground-glass opacities and alveolar consolidation. Gweneth Vermillion has been   interval increase in the airspace disease compared to prior examination.  The   interval worsening is most pronounced within the lower lobes. Gweneth Vermillion is   associated interstitial thickening. CT ABDOMEN PELVIS WO CONTRAST Additional Contrast? None    Result Date: 10/5/2021  FINDINGS: THE STUDY IS LIMITED DUE TO LACK OF INTRAVENOUS CONTRAST. Chest: Mediastinum: No thoracic aortic aneurysm. No definite adenopathy on this unenhanced study. Trace pericardial fluid anteriorly. Lungs/pleura: Trace layering pleural effusions bilaterally, slightly larger on the right. No pneumothorax. Numerous ground-glass densities in the bilateral upper and to a lesser degree lower lungs, in keeping with known COVID-19 pneumonia. Soft Tissues/Bones: Bilateral gynecomastia. No acute osseous abnormality in the thoracic cage. Abdomen and pelvis: The study is degraded by the patient's respiratory motion. Organs: Abdominal organs inadequately evaluated on this motion degraded and unenhanced study. No hydronephrosis on either side. GI/Bowel: Normal appendix. No evidence of bowel obstruction. Pelvis: Normal sized prostate. Urinary bladder grossly intact. Peritoneum/Retroperitoneum: No free air or free fluid. No bulky adenopathy. No abdominal aortic aneurysm. Bones/Soft Tissues: No lytic or sclerotic lesion in the regional skeleton. Limited study by the patient's respiratory motion and lack of intravenous contrast. Extensive multifocal COVID-19 pneumonia bilaterally. Bilateral trace layering pleural effusions. Bilateral gynecomastia. Abdominal organs inadequately evaluated due to the limitations of the study. Normal appendix. No evidence of bowel obstruction. CT CHEST WO CONTRAST    Result Date: 10/5/2021  FINDINGS: THE STUDY IS LIMITED DUE TO LACK OF INTRAVENOUS CONTRAST. Chest: Mediastinum: No thoracic aortic aneurysm. No definite adenopathy on this unenhanced study.   Trace pericardial fluid anteriorly. Lungs/pleura: Trace layering pleural effusions bilaterally, slightly larger on the right. No pneumothorax. Numerous ground-glass densities in the bilateral upper and to a lesser degree lower lungs, in keeping with known COVID-19 pneumonia. Soft Tissues/Bones: Bilateral gynecomastia. No acute osseous abnormality in the thoracic cage. Abdomen and pelvis: The study is degraded by the patient's respiratory motion. Organs: Abdominal organs inadequately evaluated on this motion degraded and unenhanced study. No hydronephrosis on either side. GI/Bowel: Normal appendix. No evidence of bowel obstruction. Pelvis: Normal sized prostate. Urinary bladder grossly intact. Peritoneum/Retroperitoneum: No free air or free fluid. No bulky adenopathy. No abdominal aortic aneurysm. Bones/Soft Tissues: No lytic or sclerotic lesion in the regional skeleton. Limited study by the patient's respiratory motion and lack of intravenous contrast. Extensive multifocal COVID-19 pneumonia bilaterally. Bilateral trace layering pleural effusions. Bilateral gynecomastia. Abdominal organs inadequately evaluated due to the limitations of the study. Normal appendix. No evidence of bowel obstruction. ECHOCARDIOGRAM:  Ejection fraction is visually estimated at 55%. There is late positive bubble suggesting possible shunt from pulmonary   origin. Visually moderately dilated left atrium. Normal right ventricular size and function. There is small pericardial effusion without tamponade physiology    Assessment:  Mr. Reg Hagan is a 36year old male with no significant PMH who was admitted on October 5, 2021 after he presented from an outside clinic after he was found to be hypoxic during 6 minute walking test. Patient tested positive for Covid 19 on August 18th, he is unvaccinated and had Covid 19 last year as well.  Patient states he has quarantined by himself and when his mother recently visited him she was shocked at his appearance as he had lost about 50 pounds in 2 and a half months  Sepsis  COVID-19, mild  HIV   Pneumonia  Gram-positive cocci in clusters bacteremia, etiology unclear  DEISI  + RPR  + BNP  Ejection fraction is visually estimated at 55%. There is late positive bubble suggesting possible shunt from pulmonary   origin. Visually moderately dilated left atrium. Normal right ventricular size and function. There is small pericardial effusion without tamponade physiology      Plan:   Off oxygen  Symptoms may be related to SCAPE phenomenon?   Will see as needed  Completed Bronchoscopy with BAL with [facility]  - completed results no TB          Lashonda Slater DO DO, MPH, [facility]  Professor of Internal Medicine  Pulmonary, Critical Care and Sleep Medicine

## 2021-10-27 NOTE — PROGRESS NOTES
Department of Internal Medicine  Nephrology Progress Note      Events reviewed. SUBJECTIVE:  We are following MrRandal Hand for DEISI. Reports some shortness of breath, but feeling better.     PHYSICAL EXAM:      Vitals:    VITALS:  /65   Pulse 76   Temp 97.4 °F (36.3 °C) (Oral)   Resp 18   Ht 6' 4\" (1.93 m)   Wt 199 lb 15.3 oz (90.7 kg)   SpO2 97%   BMI 24.34 kg/m²   24HR INTAKE/OUTPUT:      Intake/Output Summary (Last 24 hours) at 10/27/2021 0912  Last data filed at 10/27/2021 0758  Gross per 24 hour   Intake 600 ml   Output 1325 ml   Net -725 ml     Young cachectic looking male  Constitutional:  Awake, alert,in moderate respiratory distress, OOB sitting in chair, Fio2 at 4 L via NC  HEENT:  PERRL, normocephalic, atraumatic  Respiratory: diminished lung sounds  Cardiovascular/Edema:  RRR, S1/S2, -edema  Gastrointestinal:  abd flat, soft, non-tender, Perkins in place  Neurologic:  Awake, alert, no focal deficits  Skin:  Warm, dry, no rash or lesions  Other: No LE edema      Scheduled Meds:   pentamidine isethionate (PENTAM) IVPB  4 mg/kg IntraVENous Daily    abacavir  600 mg Oral Nightly    dolutegravir sodium  50 mg Oral Nightly    lamiVUDine  50 mg Oral Nightly    predniSONE  40 mg Oral Daily    Followed by   Nadeem Kirkland ON 10/29/2021] predniSONE  20 mg Oral Daily    sodium chloride   IntraVENous Q12H    [Held by provider] heparin (porcine)  5,000 Units SubCUTAneous Q8H    penicillin G benzathine  2.4 Million Units IntraMUSCular Weekly    lidocaine  1 patch TransDERmal Daily    ascorbic acid  500 mg Oral Daily    [Held by provider] metoprolol tartrate  25 mg Oral Daily    pantoprazole  40 mg Oral QAM AC    sodium chloride flush  5-40 mL IntraVENous 2 times per day     Continuous Infusions:   sodium chloride      sodium chloride      sodium chloride      sodium chloride      sodium chloride       PRN Meds:.sodium chloride, sodium chloride, sodium chloride, sodium chloride, medicated lip balm, benzonatate, perflutren lipid microspheres, hydrOXYzine, labetalol, sodium chloride flush, ondansetron **OR** ondansetron, polyethylene glycol, acetaminophen **OR** acetaminophen, sodium chloride    DATA:    CBC:   Lab Results   Component Value Date    WBC 6.9 10/27/2021    RBC 2.46 10/27/2021    HGB 7.5 10/27/2021    HCT 22.2 10/27/2021    MCV 90.2 10/27/2021    MCH 30.5 10/27/2021    MCHC 33.8 10/27/2021    RDW 13.7 10/27/2021    PLT 34 10/27/2021    MPV NOT CALC 10/27/2021     CMP:    Lab Results   Component Value Date     10/27/2021    K 4.0 10/27/2021    K 4.0 10/27/2021     10/27/2021    CO2 27 10/27/2021    BUN 32 10/27/2021    CREATININE 3.7 10/27/2021    GFRAA 24 10/27/2021    LABGLOM 24 10/27/2021    GLUCOSE 98 10/27/2021    PROT 4.9 10/27/2021    LABALBU 2.6 10/27/2021    CALCIUM 8.1 10/27/2021    BILITOT 0.5 10/27/2021    ALKPHOS 71 10/27/2021    AST 44 10/27/2021    ALT 26 10/27/2021     Magnesium:    Lab Results   Component Value Date    MG 1.9 10/27/2021     Phosphorus:    Lab Results   Component Value Date    PHOS 2.6 10/27/2021        Radiology Review:       Ejection fraction is visually estimated at 55%. There is late positive bubble suggesting possible shunt from pulmonary   origin. Visually moderately dilated left atrium. Normal right ventricular size and function. There is small pericardial effusion without tamponade physiology        CT Chest October 5, 2021   Limited study by the patient's respiratory motion and lack of intravenous   contrast.       Extensive multifocal COVID-19 pneumonia bilaterally.       Bilateral trace layering pleural effusions.       Bilateral gynecomastia.       Abdominal organs inadequately evaluated due to the limitations of the study.       Normal appendix.       No evidence of bowel obstruction.         Kidney ultrasound October 7, 2021   1. Hyperechoic kidneys due to underlying parenchymal disease.    2. Trace bilateral perinephric fluid of indeterminate etiology, potentially   due to underlying inflammation. 3. An approximately 1.1 cm x 1.5 cm x 2.3 cm lesion in the right kidney could   be a cyst but could also be seen with infarction, abscess, or neoplasia. Consider further evaluation with renal protocol abdomen MRI (preferred) or   CT.  Following resolution of acute kidney injury.         CTA pulmonary with IV contrast October 13, 2021   1. No evidence of pulmonary embolism. 2. Interval increased size of bilateral pleural effusions with little change   in pericardial effusion. 3. Persistent subcutaneous edema. 4. Reactive bilateral hilar lymphadenopathy. 5. Bilateral airspace disease is increased compared to prior examination. The increase is most pronounced within the lower lobes. 6. Bilateral gynecomastia.                 BRIEF SUMMARY OF INITIAL CONSULT:     Briefly, Mr. Martin Machado is a 36year old male with no significant PMH who was admitted on October 5, 2021 after he presented from an outside clinic after he was found to be hypoxic during 6 minute walking test. Patient tested positive for Covid 19 on August 18th, he is unvaccinated and had Covid 19 last year as well. Patient states he has quarantined by himself and when his mother recently visited him she was shocked at his appearance as he had lost about 50 pounds in 2 and a half months. On admission labs were significant for sodium of 130, potassium 5.2, bicarbonate 20, BUN 41, creatinine 3.6, magnesium 2.7, calcium 7.7 and proBNP 1,599. We are consulted for DEISI. Problems resolved:    Hyperkalemia, 2/2 DEISI. Low potassium diet  Hypotonic hyponatremia 2/2 intravascular volume from poor oral intake. Bicarb drip started. Sodium levels improving. Low bicarbonate levels with hyperchloremia, most likely NAGMA versus respiratory alkalosis; we need a PH to clarify diagnosis.  Awaiting ABG results  High bicarbonate levels, likely metabolic alkalosis 2/2 administration  Hypokalemia, multifactorial, poor intake, probably renal potassium wasting  Severe Hypoalbuminemia, multifactorial, status post albumin administration  DEISI on CKD, volume responsive pre-renal DEISI d/t volume (poor oral intake), urine sodium <20. Resolved. Edematous state, multifactorial, mainly 2/2 nephrotic syndrome and possibly HFpEF 55%, on bumex  Hyponatremia, multifactorial, including decreased GFR and hemodynamically mediated in the setting of large hypotonic fluid administration (D5 with Bactrim). Sodium levels decrease, stable overnight  Low bicarbonate levels with hyperchloremia, consistent with either hyperchloremic metabolic acidosis versus respiratory alkalosis. Bicarbonate levels improved with bicarbonate drip. HTN, on metoprolol   Probably fungemia, (1, 3) beta-D-glucan positive, on anidulafungin  MSSE bacteremia, on cefepime 2 g every 8 hours  Sinus tachycardia, multifactorial    IMPRESSION/RECOMMENDATIONS:     Recurrent DEISI on CKD, ATN contrast associated DEISI versus ischemic ATN (hypotension related tachycardia). Nonoliguric. Started on renal replacement therapy on October 25, 2021. Had second HD treatment yesterday, tolerated well. 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 Bridget Marte Work-up so far HIV positive, Hepatitis C positive, MARILEE negative, C4 normal, C3 88 (low), UPEP without monoclonal gammopathy, negative cryoglobulins. Kidney ultrasound with hyperechoic kidneys. Needs kidney biopsy. Hypomagnesemia, 2/2 poor intake   Probably HFpEF 55%, proBNP 11,401 > 3421   Right kidney lesion, likely a cyst but cannot be ruled out other pathology including infarction, abscess, neoplasm.   We will proceed with MRI when renal function improved  HIV positive, CD4 count 43, started on OLMSTEAD therapy    ---------------------------------------------------------------------    Possible pneumocystic pneumonia, on pentamidine  Possibly acute retroviral syndrome Jarische-Herxheimer reaction?   Hepatitis C antibody positive, viral load not detected  Covid 19 infection in non vaccinated pt  Normocytic anemia, multifactorial  Leukopenia, WBC 1.1    Plan:    HD x3 hours today  IR re-consulted for kidney biopsy  Continue to monitor renal function for recovery         Electronically signed by Roe Moss MD on 10/27/2021 at 9:12 AM

## 2021-10-27 NOTE — PROGRESS NOTES
[address] [place] [address] Attending    S: 36 y.o. male with history of asthma and smoking history presented with increasing dyspnea and cough over past month. On presentation had fever of 103. Has tested positive for COVID twice, most recently end of August.  CT shows extensive bilateral pneumonia and COVID testing again positive. HIV pos, CD4 43. Early AM 10/23,  RRT called due to tachycardia and oxygen desaturation requiring oxygen from 3L NC to 15LNRB mask. Patient had been febrile overnight as well and transferred to MICU. Has had upper extremity tremors and lower extremity neuropathy symptoms. Anxiety with long hospitalization. Plan for dialysis today as well as kidney biopsy by IR. Will need platelet transfusion and this was discussed with him in detail. He is agreeable to plans moving forward. He feels tired but denies chest pain, sob, bleeding, bruising. O: VS- Blood pressure 114/65, pulse 76, temperature 97.4 °F (36.3 °C), temperature source Oral, resp. rate 18, height 6' 4\" (1.93 m), weight 199 lb 15.3 oz (90.7 kg), SpO2 97 %. Exam is as noted by resident   Awake, alert and oriented. Heart - RRR  Lungs- clear breath sounds bilaterally. Ext - no edema. Impressions:   Principal Problem:    Acute respiratory failure with hypoxia (HCC)  Active Problems:  Repeatedly positive Covid  Stable groundglass opacities    Acute kidney injury due to COVID-19 ([address]    Asthma    Nephrotic range proteinuria  50 pound weight loss  2 blood cultures positive  Pancytopenia   HIV positive, new diagnosis  late latent syphillis    Plan   Appreciate Nephrology, ID, MICU, cardiology, gen surgery, and pulmonology. Covid pneumonia/PCP pneumonia with sepsis, New diagnosis of HIV- resistance testing pending, CD4 count of 43. Viral load 195,000. ID managing antibiotics and antivirals for HIV, currently on lamivudine/abacavir/dolutegravir , awaiting resistance testing.    Bactrim and cefepime discontinued. Per ID pentamidine 4mg/kg IV q48H on dialysis to finish 21 days until 11/2. RPR positive. S/p LP. VDRL CSF negative. Continue benzathine PCN IM weekly x 3 doses. Sinus tachycardia - Echo with EF 55%, small pericardial effusion and possible atrial shunt. Anemia - FOBT heme positive first time, neg on repeat. Gen surgery has no plans for scopes s/p 2U PRBCs, will trend h/h. Follow. Posttransfusion H&H today 7.9 continue to monitor H&H    IR guided kidney biopsy today. Will need platelet transfusion before and during biopsy. Noted worsening creatinine , DEISI on CKD . S/p dialysis catheter and dialysis. on HD per nephro. H/O consulted due to pancytopenia including neutropenia. granix ordered. Will monitor CBC. Platelet count less than 50K will d/c heparin per heme/onc recommendations. Attending Physician Statement  I have reviewed the chart and seen the patient with the resident(s). I personally reviewed images, EKG's and similar tests, if present. I personally reviewed and performed key elements of the history and exam.  I have reviewed and confirmed student and/or resident history and exam with changes as indicated above. I agree with the assessment, plan and orders as documented by the resident. Please refer to the resident and/or student note for additional information. Jordi Dobbins.  Balta Campoverde MD

## 2021-10-27 NOTE — PROGRESS NOTES
Pt returns to floor from CT. No significant hemorrhage identified per CT scan. IR RN states gross hematuria and clots \"normal\" after renal biopsy. Will continue to monitor. Vitals stable, biopsy site clean, dry and intact. Pt denies pain at this time. Dr. Gene Lomax notified.

## 2021-10-27 NOTE — PROGRESS NOTES
[facility] Inpatient   Resident Progress Note    S:  Hospital day: 25   Brief Synopsis: Priya Wilkerson is a 36 y.o. male with no PMH who presented with hypoxia and fever. He was initially seen in PCP office and was hypoxic on exertion with desaturate into the mid low 80s as well as noted to be febrile and tachycardic. Per patient, his symptoms began 8/15/2021 and he tested positive for Covid 8/28. endorse worsening shortness of breath over the past month with sputum production, intermittent chest discomfort, persistent diarrhea, chills, fever, decrease in appetite and 50 pound weight loss since August.  Patient also endorses significantly worsening \"neuropathy\" of bilateral feet, states it feels like he is \"being stabbed by needles\" which is causing him inability to walk due to pain. In the ED, he was found to be anemic, hyperkalemic, hyponatremic, febrile and COVID positive. He also had elevated Creatinine, AST, proBNP and troponin x2. CTA showed extensive multifocal COVID-19 pneumonia bilaterally, bilateral trace layering pleural effusion and bilateral gynecomastia. CT abdomen was limited due to movement and non-contrast, but showed normal appendix and no evidence of bowel obstruction. Admitted for acute hypoxia due to Covid pneumonia. Nephro, pulm and ID consulted. Started on bicarb drip. US showed hyperechoic kidneys, trace b/l perinephric fluid and right kidney lesion (Cyst vs infarction vs abscess vs neoplasia). HIV and Hep C screen were negative. HIV RNA and T and B lymphocytes were ordered. Patient became more hypoxic, tachycardic and hypertensive so  there was concern for PE. CTA was ordered, which came back negative. Echo was completed due to elevated BNP and concern for PE and that revealed a normal EF with late bubble sign possible shunting of pulmonary origin. BNP continued to increase. Cardiology was consulted. Repeat Echo pending.  ID recommends LP with CSF sent for glucose, protein, cell count with differential, which were all  negative. meningitis/encephalitis PCR panel negative. RRT called 10/23 for hypoxia and tachycardia, transferred to medical intensive on 15 L NRB. Creatinine up trending. Currently saturating well on room air. Hemodialysis for worsening renal function. CSF VDRL negative, but continue to treat for latent syphilis       Overnight/interim:   Patient was seen and examined at bedside. Endorses dizziness upon getting up. Continues to feel weak while walking and occasionally gets dizzy. Denies CP, SOB, Headache, chills or rashes.       Cont meds:    sodium chloride      sodium chloride      sodium chloride      sodium chloride      dextrose      sodium chloride       Scheduled meds:    pentamidine isethionate (PENTAM) IVPB  4 mg/kg IntraVENous Daily    abacavir  600 mg Oral Nightly    dolutegravir sodium  50 mg Oral Nightly    lamiVUDine  50 mg Oral Nightly    predniSONE  40 mg Oral Daily    Followed by   Marcy Silvestre ON 10/29/2021] predniSONE  20 mg Oral Daily    insulin lispro  0-6 Units SubCUTAneous TID WC    insulin lispro  0-3 Units SubCUTAneous Nightly    sodium chloride   IntraVENous Q12H    [Held by provider] heparin (porcine)  5,000 Units SubCUTAneous Q8H    penicillin G benzathine  2.4 Million Units IntraMUSCular Weekly    lidocaine  1 patch TransDERmal Daily    ascorbic acid  500 mg Oral Daily    [Held by provider] metoprolol tartrate  25 mg Oral Daily    pantoprazole  40 mg Oral QAM AC    sodium chloride flush  5-40 mL IntraVENous 2 times per day     PRN meds: sodium chloride, sodium chloride, sodium chloride, sodium chloride, glucose, dextrose, glucagon (rDNA), dextrose, medicated lip balm, benzonatate, perflutren lipid microspheres, hydrOXYzine, labetalol, sodium chloride flush, ondansetron **OR** ondansetron, polyethylene glycol, acetaminophen **OR** acetaminophen, sodium chloride     I reviewed the patient's past medical and surgical history, Medications and Allergies. O:  /70   Pulse 95   Temp 98.5 °F (36.9 °C) (Temporal)   Resp 16   Ht 6' 4\" (1.93 m)   Wt 199 lb 15.3 oz (90.7 kg)   SpO2 100%   BMI 24.34 kg/m²   24 hour I&O: I/O last 3 completed shifts: In: 1287.5 [P.O.:120; I.V.:25; Blood:492.5; IV Piggyback:50]  Out: 1400 [Urine:500]  No intake/output data recorded. Physical Exam  Vitals reviewed. Constitutional:       General: He is not in acute distress. Appearance: Normal appearance. He is not ill-appearing, toxic-appearing or diaphoretic. HENT:      Head: Normocephalic and atraumatic. Nose: No rhinorrhea. Mouth/Throat:      Mouth: Mucous membranes are moist.      Pharynx: Oropharynx is clear. Eyes:      General: No scleral icterus. Right eye: No discharge. Left eye: No discharge. Extraocular Movements: Extraocular movements intact. Cardiovascular:      Rate and Rhythm: Normal rate and regular rhythm. Pulses: Normal pulses. Heart sounds: Normal heart sounds. No murmur heard. No friction rub. No gallop. Pulmonary:      Effort: No respiratory distress. Breath sounds: No wheezing, rhonchi or rales. Abdominal:      General: Abdomen is flat. Palpations: Abdomen is soft. Tenderness: There is abdominal tenderness (epigastric ). Musculoskeletal:         General: No swelling. Cervical back: Normal range of motion. No rigidity or tenderness. Lumbar back: No swelling or spasms. Normal range of motion. Right lower leg: No edema. Left lower leg: No edema. Skin:     General: Skin is warm and dry. Coloration: Skin is not jaundiced. Findings: No bruising or rash. Comments: Right femoral HD port, c/d/i   Neurological:      General: No focal deficit present. Mental Status: He is alert and oriented to person, place, and time. Cranial Nerves: No cranial nerve deficit. Motor: No weakness.    Psychiatric: Behavior: Behavior normal.         Thought Content: Thought content normal.         Labs:  Na/K/Cl/CO2:  136/4.0, 4.0/100/27 (10/27 0348)  BUN/Cr/glu/ALT/AST/amyl/lip:  32/3.7/--/26/44/--/-- (10/27 6721)  WBC/Hgb/Hct/Plts:  6.9/7.5/22.2/34 (10/27 8538)  estimated creatinine clearance is 36 mL/min (A) (based on SCr of 3.7 mg/dL (H)). Other pertinent labs as noted below    Radiology:  XR CHEST PORTABLE   Final Result   Mild worsening of extensive bilateral infiltrates         XR CHEST PORTABLE   Final Result   Increasing bilateral airspace disease suggesting worsening pneumonia. FL LUMBAR PUNCTURE DIAG   Final Result   Successful fluoroscopic-guided lumbar puncture. XR CHEST PORTABLE   Final Result   Extensive bilateral patchy areas of ground-glass opacity concerning for   atypical pneumonia or viral airway disease, improved         XR CHEST PORTABLE   Final Result   Persistent bilateral infiltrates. No significant changes since October 13. See above comments. See report CT chest October 13. CTA PULMONARY W CONTRAST   Final Result   1. No evidence of pulmonary embolism. 2. Interval increased size of bilateral pleural effusions with little change   in pericardial effusion. 3. Persistent subcutaneous edema. 4. Reactive bilateral hilar lymphadenopathy. 5. Bilateral airspace disease is increased compared to prior examination. The increase is most pronounced within the lower lobes. 6. Bilateral gynecomastia. XR CHEST PORTABLE   Final Result   Development of extensive bilateral pulmonary infiltrates         XR CHEST PORTABLE   Final Result   No acute process. US RETROPERITONEAL COMPLETE   Final Result   1. Hyperechoic kidneys due to underlying parenchymal disease. 2. Trace bilateral perinephric fluid of indeterminate etiology, potentially   due to underlying inflammation.    3. An approximately 1.1 cm x 1.5 cm x 2.3 cm lesion in the right kidney could   be a cyst but could also be seen with infarction, abscess, or neoplasia. Consider further evaluation with renal protocol abdomen MRI (preferred) or   CT. Following resolution of acute kidney injury. XR CHEST PORTABLE   Final Result   Multifocal bilateral pulmonary ground-glass airspace opacities are stable         CT ABDOMEN PELVIS WO CONTRAST Additional Contrast? None   Final Result   Limited study by the patient's respiratory motion and lack of intravenous   contrast.      Extensive multifocal COVID-19 pneumonia bilaterally. Bilateral trace layering pleural effusions. Bilateral gynecomastia. Abdominal organs inadequately evaluated due to the limitations of the study. Normal appendix. No evidence of bowel obstruction. CT CHEST WO CONTRAST   Final Result   Limited study by the patient's respiratory motion and lack of intravenous   contrast.      Extensive multifocal COVID-19 pneumonia bilaterally. Bilateral trace layering pleural effusions. Bilateral gynecomastia. Abdominal organs inadequately evaluated due to the limitations of the study. Normal appendix. No evidence of bowel obstruction.                Resident Assessment and Plan       Acute hypoxia 2/2 COVID pneumonia vs PCP  - CTA on admission: COVID pneumonia, Bilateral trace layering pleural effusion, negative for PE   - Ferritin 3900, CRP 10.1 on admission   - D dimer elevated on admission, currently stable  - Pulmonology and ID on board  - Legionella, spot TB test negative   - CXR 10/8 shows multifocal groundglass opacities are stable  - Repeat CXR 10/17: b/l pulm infiltrates improving   - Blood cx: staph epidermitis, repeat blood cx shows no growth x 7 days   -Urine culture: NGTD   -Counseled on getting COVID vaccine after patient recovers   - ECHO 10/13: EF 55%, late bubble sign possible shunting of pulmonary origin   - Cardiology consulted   - CTA 10/13 negative for PE  Bronchoscopy with BAL performed on 10/15/2021   - Respiratory culture: oral pharyngeal liana decreased, few gram negative rods   CXR 10/23: Mild worsening extensive bilateral infiltrates  - Worsening hypoxia 10/23, RRT called, transferred to ICU, was on nonrebreather, ABG showed resp alkalosis with some metabolic acidosis compensation   - Currently maintaining saturation on room air, continue to monitor  - solumedrol switched to prednisone    - Repeat blood culture 10/21 showed NGTD x5days      Weight loss likely secondary to HIV  - Per patient due to decreased appetite from COVID; 50lb weight loss since August   - R/o other potential origins: SLE, TB, hepatitis  - MARILEE negative  - T spot TB test negative   HIV, hepatitis C antibodies positive  - Elevated IgG and IgA, normal IgM: IgA nephropathy vs MPGN?   - Cryoglobulin normal   - TSH normal   - Sed rate and CRP elevated      HIV  HIV 1/2 antibodies positive    CD4 44, CD3 153, CD8 100  HIV viral load 195,000  - ID on board: received vancomycin, ancef; stopped cefepime 10/26  - Fungitell test positive, received eraxis  - Bactrim d/c'd due to renal failure; on pentamidine for suspected Pneumocystis pneumonia  - Pneumocystis stain negative, but PCR pending; still suspect PCP pna  - tests for opportunistic infections pending: HIV GART, urine GC/Chlamydia PCR  - [id number] negative, genotype pending  - Toxoplasma Ab, cryptococcus negative  - hepatitis A total Ab positive  - RPR reactive, quant 1:2, CSF VDRL negative   - On PCN G weekly for latent syphilis   - Started on biktarvy 10/22, d/c'd due to renal failure   - Started on lamivudine, dolutegravir, and abacavir 10/25  - Repeat fungitell test pending     Anemia  Pancytopenia  - Likely 2/2 from GI bleed vs HIV   Transfused 2 units pRBC since admission  - FOBT negative x3  - Heme Onc consulted: received granix to improve ANC  Platelets trending down, 44k>34k; hold heparin, will need transfusion if bleeding or plts <20k   - Transfuse platelets prior to and during kidney biopsy   Peripheral blood smear shows normocytic anemia, absolute lymphocytopenia; no platelet clumping, schistocytes or dysplasia  - Hb 7.5, trending down; trend H/H q8h    DEISI    Hyponatremia - improving   - Na normalized    - Nephro on board   - Likely secondary to dehydration from persistent diarrhea vs decreased PO intake vs bactrim vs HIVANS vs Biktarvy  - Cr improving after HD,3.7  [employer]  - urine studies show intrinsic cause of DEISI  - Microalbumin-creatinine ratio elevated   - Reconsulted IR to inquire about kidney biopsy inpatient   Fluid restriction  - Oliguric (0.1 mL/kg/hr)   - Vascular surgery was consulted, placed temporary HD port  - receiving dialysis,today is day 3    Hypoalbuminemia   -2/2 poor intake vs CHF vs nephrotic syndrome  - Alb 2.7  -Previously received albumin 25 g q6h x 4 doses 10/6, 8 doses 10/12 and 8 doses 10/23  Low C3, normal C4 and cryoglobulin  Fluid restriction   - Continue to monitor     Hypnagogic Hallucinationsimproving  - occurring since received anesthesia on 10/15   - no history of psych history or narcolepsy  - could be 2/2 to poor sleep vs anesthesia reaction vs vistaril?  - continue to monitor       Elevated troponin and proBNP  - 2/2 myocardial injury vs stress cardiomyopathy due to COVID vs new onset CHF   - initial troponin 78, 78  - initial BNP 1599,  repeat 11,401 on 10/14, trending down 3400   - ECHO: EF 55%, late bubble sign possible shunting of pulmonary origin  - Repeat ECHO pending      Tachycardia  - 2/2 to infectious vs anxiety vs anemia   HR improving   Cardio on board   - Hold lopressor   Asymptomatic  Continue to monitor    HTN -improving  - BP on lower end  - Lopressor held   - labetalol PRN  - Continue to monitor               PT/OT evaluation: Consulted  DVT prophylaxis: held heparin, PCDs  GI prophylaxis:protonix  Disposition: intermediate   Diet: adult         Electronically signed by Ira Zendejas MD PGY-1 on 10/27/2021 at 5:30 AM  Attending physician: Dr. Ana Kohli

## 2021-10-27 NOTE — CARE COORDINATION
Received a return call from [facility] at [employer] regarding patient being set-up for hemodialysis. JAYLA states he needs to know what location [facility] prefers the patient to be set-up at and will need a Hep B panel done before patient is able to be set-up. SW to follow up with JAYLA once location has been confirmed. JAYLA will continue to follow.     Carin Wallace, MSW, LSW [phone]

## 2021-10-28 LAB
ALBUMIN SERPL-MCNC: 2.8 G/DL (ref 3.5–5.2)
ALP BLD-CCNC: 89 U/L (ref 40–129)
ALT SERPL-CCNC: 27 U/L (ref 0–40)
ANION GAP SERPL CALCULATED.3IONS-SCNC: 11 MMOL/L (ref 7–16)
ANION GAP SERPL CALCULATED.3IONS-SCNC: 8 MMOL/L (ref 7–16)
ANION GAP SERPL CALCULATED.3IONS-SCNC: 9 MMOL/L (ref 7–16)
ANION GAP SERPL CALCULATED.3IONS-SCNC: 9 MMOL/L (ref 7–16)
ANISOCYTOSIS: ABNORMAL
AST SERPL-CCNC: 39 U/L (ref 0–39)
BASOPHILS ABSOLUTE: 0 E9/L (ref 0–0.2)
BASOPHILS RELATIVE PERCENT: 0.1 % (ref 0–2)
BILIRUB SERPL-MCNC: 0.4 MG/DL (ref 0–1.2)
BLOOD CULTURE, ROUTINE: NORMAL
BUN BLDV-MCNC: 14 MG/DL (ref 6–20)
BUN BLDV-MCNC: 36 MG/DL (ref 6–20)
BUN BLDV-MCNC: 36 MG/DL (ref 6–20)
BUN BLDV-MCNC: 38 MG/DL (ref 6–20)
CALCIUM SERPL-MCNC: 8.1 MG/DL (ref 8.6–10.2)
CALCIUM SERPL-MCNC: 8.2 MG/DL (ref 8.6–10.2)
CALCIUM SERPL-MCNC: 8.4 MG/DL (ref 8.6–10.2)
CALCIUM SERPL-MCNC: 8.4 MG/DL (ref 8.6–10.2)
CHLORIDE BLD-SCNC: 102 MMOL/L (ref 98–107)
CHLORIDE BLD-SCNC: 103 MMOL/L (ref 98–107)
CHLORIDE BLD-SCNC: 104 MMOL/L (ref 98–107)
CHLORIDE BLD-SCNC: 99 MMOL/L (ref 98–107)
CO2: 25 MMOL/L (ref 22–29)
CO2: 26 MMOL/L (ref 22–29)
CO2: 26 MMOL/L (ref 22–29)
CO2: 28 MMOL/L (ref 22–29)
CREAT SERPL-MCNC: 2.2 MG/DL (ref 0.7–1.2)
CREAT SERPL-MCNC: 4.1 MG/DL (ref 0.7–1.2)
CREAT SERPL-MCNC: 4.3 MG/DL (ref 0.7–1.2)
CREAT SERPL-MCNC: 4.6 MG/DL (ref 0.7–1.2)
CULTURE, BLOOD 2: NORMAL
EOSINOPHILS ABSOLUTE: 0.06 E9/L (ref 0.05–0.5)
EOSINOPHILS RELATIVE PERCENT: 0.9 % (ref 0–6)
GFR AFRICAN AMERICAN: 18
GFR AFRICAN AMERICAN: 20
GFR AFRICAN AMERICAN: 21
GFR AFRICAN AMERICAN: 43
GFR NON-AFRICAN AMERICAN: 18 ML/MIN/1.73
GFR NON-AFRICAN AMERICAN: 20 ML/MIN/1.73
GFR NON-AFRICAN AMERICAN: 21 ML/MIN/1.73
GFR NON-AFRICAN AMERICAN: 43 ML/MIN/1.73
GLUCOSE BLD-MCNC: 106 MG/DL (ref 74–99)
GLUCOSE BLD-MCNC: 160 MG/DL (ref 74–99)
GLUCOSE BLD-MCNC: 85 MG/DL (ref 74–99)
GLUCOSE BLD-MCNC: 96 MG/DL (ref 74–99)
HCT VFR BLD CALC: 23 % (ref 37–54)
HCT VFR BLD CALC: 23 % (ref 37–54)
HCT VFR BLD CALC: 24.4 % (ref 37–54)
HEMOGLOBIN: 7.6 G/DL (ref 12.5–16.5)
HEMOGLOBIN: 7.6 G/DL (ref 12.5–16.5)
HEMOGLOBIN: 8.1 G/DL (ref 12.5–16.5)
HYPOCHROMIA: ABNORMAL
LYMPHOCYTES ABSOLUTE: 0.27 E9/L (ref 1.5–4)
LYMPHOCYTES RELATIVE PERCENT: 4.3 % (ref 20–42)
MAGNESIUM: 1.8 MG/DL (ref 1.6–2.6)
MCH RBC QN AUTO: 29.8 PG (ref 26–35)
MCHC RBC AUTO-ENTMCNC: 33 % (ref 32–34.5)
MCV RBC AUTO: 90.2 FL (ref 80–99.9)
MONOCYTES ABSOLUTE: 0.41 E9/L (ref 0.1–0.95)
MONOCYTES RELATIVE PERCENT: 6.1 % (ref 2–12)
NEUTROPHILS ABSOLUTE: 6.05 E9/L (ref 1.8–7.3)
NEUTROPHILS RELATIVE PERCENT: 88.7 % (ref 43–80)
OVALOCYTES: ABNORMAL
PDW BLD-RTO: 13.9 FL (ref 11.5–15)
PHOSPHORUS: 3.6 MG/DL (ref 2.5–4.5)
PLATELET # BLD: 61 E9/L (ref 130–450)
PLATELET CONFIRMATION: NORMAL
PMV BLD AUTO: 12.3 FL (ref 7–12)
POIKILOCYTES: ABNORMAL
POTASSIUM REFLEX MAGNESIUM: 3.9 MMOL/L (ref 3.5–5)
POTASSIUM SERPL-SCNC: 3.7 MMOL/L (ref 3.5–5)
POTASSIUM SERPL-SCNC: 3.8 MMOL/L (ref 3.5–5)
POTASSIUM SERPL-SCNC: 3.9 MMOL/L (ref 3.5–5)
POTASSIUM SERPL-SCNC: 4 MMOL/L (ref 3.5–5)
RBC # BLD: 2.55 E12/L (ref 3.8–5.8)
REASON FOR REJECTION: NORMAL
REJECTED TEST: NORMAL
SCHISTOCYTES: ABNORMAL
SODIUM BLD-SCNC: 135 MMOL/L (ref 132–146)
SODIUM BLD-SCNC: 137 MMOL/L (ref 132–146)
SODIUM BLD-SCNC: 139 MMOL/L (ref 132–146)
SODIUM BLD-SCNC: 139 MMOL/L (ref 132–146)
TEAR DROP CELLS: ABNORMAL
TOTAL PROTEIN: 5 G/DL (ref 6.4–8.3)
WBC # BLD: 6.8 E9/L (ref 4.5–11.5)

## 2021-10-28 PROCEDURE — 83735 ASSAY OF MAGNESIUM: CPT

## 2021-10-28 PROCEDURE — 2580000003 HC RX 258: Performed by: INTERNAL MEDICINE

## 2021-10-28 PROCEDURE — 80053 COMPREHEN METABOLIC PANEL: CPT

## 2021-10-28 PROCEDURE — 2500000003 HC RX 250 WO HCPCS: Performed by: INTERNAL MEDICINE

## 2021-10-28 PROCEDURE — 6370000000 HC RX 637 (ALT 250 FOR IP): Performed by: INTERNAL MEDICINE

## 2021-10-28 PROCEDURE — 84100 ASSAY OF PHOSPHORUS: CPT

## 2021-10-28 PROCEDURE — 6370000000 HC RX 637 (ALT 250 FOR IP): Performed by: STUDENT IN AN ORGANIZED HEALTH CARE EDUCATION/TRAINING PROGRAM

## 2021-10-28 PROCEDURE — 99232 SBSQ HOSP IP/OBS MODERATE 35: CPT | Performed by: FAMILY MEDICINE

## 2021-10-28 PROCEDURE — 85014 HEMATOCRIT: CPT

## 2021-10-28 PROCEDURE — 80048 BASIC METABOLIC PNL TOTAL CA: CPT

## 2021-10-28 PROCEDURE — 99232 SBSQ HOSP IP/OBS MODERATE 35: CPT | Performed by: INTERNAL MEDICINE

## 2021-10-28 PROCEDURE — 90935 HEMODIALYSIS ONE EVALUATION: CPT

## 2021-10-28 PROCEDURE — 85025 COMPLETE CBC W/AUTO DIFF WBC: CPT

## 2021-10-28 PROCEDURE — 36415 COLL VENOUS BLD VENIPUNCTURE: CPT

## 2021-10-28 PROCEDURE — 85018 HEMOGLOBIN: CPT

## 2021-10-28 PROCEDURE — 2140000000 HC CCU INTERMEDIATE R&B

## 2021-10-28 RX ADMIN — OXYCODONE HYDROCHLORIDE AND ACETAMINOPHEN 500 MG: 500 TABLET ORAL at 09:18

## 2021-10-28 RX ADMIN — PENTAMIDINE ISETHIONATE 362.8 MG: 300 INJECTION, POWDER, LYOPHILIZED, FOR SOLUTION INTRAMUSCULAR; INTRAVENOUS at 18:21

## 2021-10-28 RX ADMIN — SODIUM CHLORIDE, PRESERVATIVE FREE 10 ML: 5 INJECTION INTRAVENOUS at 20:16

## 2021-10-28 RX ADMIN — DOLUTEGRAVIR SODIUM 50 MG: 50 TABLET, FILM COATED ORAL at 20:16

## 2021-10-28 RX ADMIN — ABACAVIR 600 MG: 300 TABLET, FILM COATED ORAL at 20:16

## 2021-10-28 RX ADMIN — Medication 10 ML: at 20:16

## 2021-10-28 RX ADMIN — PANTOPRAZOLE SODIUM 40 MG: 40 TABLET, DELAYED RELEASE ORAL at 09:18

## 2021-10-28 RX ADMIN — PREDNISONE 40 MG: 20 TABLET ORAL at 09:18

## 2021-10-28 RX ADMIN — LAMIVUDINE 50 MG: 100 TABLET, FILM COATED ORAL at 20:15

## 2021-10-28 RX ADMIN — Medication 10 ML: at 09:45

## 2021-10-28 ASSESSMENT — PAIN SCALES - GENERAL: PAINLEVEL_OUTOF10: 0

## 2021-10-28 NOTE — PROGRESS NOTES
Department of Internal Medicine  Nephrology Progress Note      Events reviewed. Had kidney biopsy yesterday complicated with gross hematuria. SUBJECTIVE:  We are following Mr. Jerald Cintron for DEISI on CKD. Reports gross hematuria, although improved presently pinkish color.     PHYSICAL EXAM:      Vitals:    VITALS:  /88   Pulse 92   Temp 97.9 °F (36.6 °C) (Oral)   Resp 20   Ht 6' 4\" (1.93 m)   Wt 199 lb 15.3 oz (90.7 kg)   SpO2 100%   BMI 24.34 kg/m²   24HR INTAKE/OUTPUT:      Intake/Output Summary (Last 24 hours) at 10/28/2021 0908  Last data filed at 10/27/2021 1757  Gross per 24 hour   Intake 160 ml   Output 300 ml   Net -140 ml     Young cachectic looking male  Constitutional:  Awake, alert,in moderate respiratory distress, OOB sitting in chair, Fio2 at 4 L via NC  HEENT:  PERRL, normocephalic, atraumatic  Respiratory: diminished lung sounds  Cardiovascular/Edema:  RRR, S1/S2, -edema  Gastrointestinal:  abd flat, soft, non-tender, Perkins in place  Neurologic:  Awake, alert, no focal deficits  Skin:  Warm, dry, no rash or lesions  Other: No LE edema      Scheduled Meds:   pentamidine isethionate (PENTAM) IVPB  4 mg/kg IntraVENous Daily    abacavir  600 mg Oral Nightly    dolutegravir sodium  50 mg Oral Nightly    lamiVUDine  50 mg Oral Nightly    predniSONE  40 mg Oral Daily    Followed by   Mauricio Payton ON 10/29/2021] predniSONE  20 mg Oral Daily    [Held by provider] heparin (porcine)  5,000 Units SubCUTAneous Q8H    penicillin G benzathine  2.4 Million Units IntraMUSCular Weekly    lidocaine  1 patch TransDERmal Daily    ascorbic acid  500 mg Oral Daily    [Held by provider] metoprolol tartrate  25 mg Oral Daily    pantoprazole  40 mg Oral QAM AC    sodium chloride flush  5-40 mL IntraVENous 2 times per day     Continuous Infusions:   sodium chloride      sodium chloride      sodium chloride      sodium chloride      sodium chloride      sodium chloride       PRN Meds:.sodium chloride, sodium chloride, sodium chloride, sodium chloride, sodium chloride, medicated lip balm, benzonatate, perflutren lipid microspheres, hydrOXYzine, labetalol, sodium chloride flush, ondansetron **OR** ondansetron, polyethylene glycol, acetaminophen **OR** acetaminophen, sodium chloride    DATA:    CBC:   Lab Results   Component Value Date    WBC 6.8 10/28/2021    RBC 2.55 10/28/2021    HGB 7.6 10/28/2021    HCT 23.0 10/28/2021    MCV 90.2 10/28/2021    MCH 29.8 10/28/2021    MCHC 33.0 10/28/2021    RDW 13.9 10/28/2021    PLT 61 10/28/2021    MPV 12.3 10/28/2021     CMP:    Lab Results   Component Value Date     10/28/2021    K 3.9 10/28/2021    K 3.9 10/28/2021     10/28/2021    CO2 25 10/28/2021    BUN 36 10/28/2021    CREATININE 4.3 10/28/2021    GFRAA 20 10/28/2021    LABGLOM 20 10/28/2021    GLUCOSE 85 10/28/2021    PROT 5.0 10/28/2021    LABALBU 2.8 10/28/2021    CALCIUM 8.1 10/28/2021    BILITOT 0.4 10/28/2021    ALKPHOS 89 10/28/2021    AST 39 10/28/2021    ALT 27 10/28/2021     Magnesium:    Lab Results   Component Value Date    MG 1.8 10/28/2021     Phosphorus:    Lab Results   Component Value Date    PHOS 3.6 10/28/2021        Radiology Review:       Ejection fraction is visually estimated at 55%. There is late positive bubble suggesting possible shunt from pulmonary   origin. Visually moderately dilated left atrium. Normal right ventricular size and function. There is small pericardial effusion without tamponade physiology        CT Chest October 5, 2021   Limited study by the patient's respiratory motion and lack of intravenous   contrast.       Extensive multifocal COVID-19 pneumonia bilaterally.       Bilateral trace layering pleural effusions.       Bilateral gynecomastia.       Abdominal organs inadequately evaluated due to the limitations of the study.       Normal appendix.       No evidence of bowel obstruction.         Kidney ultrasound October 7, 2021   1.  Hyperechoic kidneys due to underlying parenchymal disease. 2. Trace bilateral perinephric fluid of indeterminate etiology, potentially   due to underlying inflammation. 3. An approximately 1.1 cm x 1.5 cm x 2.3 cm lesion in the right kidney could   be a cyst but could also be seen with infarction, abscess, or neoplasia. Consider further evaluation with renal protocol abdomen MRI (preferred) or   CT.  Following resolution of acute kidney injury.         CTA pulmonary with IV contrast October 13, 2021   1. No evidence of pulmonary embolism. 2. Interval increased size of bilateral pleural effusions with little change   in pericardial effusion. 3. Persistent subcutaneous edema. 4. Reactive bilateral hilar lymphadenopathy. 5. Bilateral airspace disease is increased compared to prior examination. The increase is most pronounced within the lower lobes. 6. Bilateral gynecomastia.                 BRIEF SUMMARY OF INITIAL CONSULT:     Briefly, Mr. Gilda Sandhoff is a 36year old male with no significant PMH who was admitted on October 5, 2021 after he presented from an outside clinic after he was found to be hypoxic during 6 minute walking test. Patient tested positive for Covid 19 on August 18th, he is unvaccinated and had Covid 19 last year as well. Patient states he has quarantined by himself and when his mother recently visited him she was shocked at his appearance as he had lost about 50 pounds in 2 and a half months. On admission labs were significant for sodium of 130, potassium 5.2, bicarbonate 20, BUN 41, creatinine 3.6, magnesium 2.7, calcium 7.7 and proBNP 1,599. We are consulted for DEISI. Problems resolved:    Hyperkalemia, 2/2 DEISI. Low potassium diet  Hypotonic hyponatremia 2/2 intravascular volume from poor oral intake. Bicarb drip started. Sodium levels improving. Low bicarbonate levels with hyperchloremia, most likely NAGMA versus respiratory alkalosis; we need a PH to clarify diagnosis.  Awaiting ABG results  High bicarbonate levels, likely metabolic alkalosis 2/2 administration  Hypokalemia, multifactorial, poor intake, probably renal potassium wasting  Severe Hypoalbuminemia, multifactorial, status post albumin administration  DEISI on CKD, volume responsive pre-renal DEISI d/t volume (poor oral intake), urine sodium <20. Resolved. Edematous state, multifactorial, mainly 2/2 nephrotic syndrome and possibly HFpEF 55%, on bumex  Hyponatremia, multifactorial, including decreased GFR and hemodynamically mediated in the setting of large hypotonic fluid administration (D5 with Bactrim). Sodium levels decrease, stable overnight  Low bicarbonate levels with hyperchloremia, consistent with either hyperchloremic metabolic acidosis versus respiratory alkalosis. Bicarbonate levels improved with bicarbonate drip. HTN, on metoprolol   Probably fungemia, (1, 3) beta-D-glucan positive, on anidulafungin  MSSE bacteremia, on cefepime 2 g every 8 hours  Sinus tachycardia, multifactorial  Hypomagnesemia, 2/2 poor intake   Leukopenia, WBC 1.1    IMPRESSION/RECOMMENDATIONS:     Recurrent DEISI on CKD, ATN contrast associated DEISI versus ischemic ATN (hypotension related tachycardia). Nonoliguric. Started on renal replacement therapy on October 25, 2021. Dialysis was not done yesterday due to kidney biopsy. For HD today. 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 Hannah Morrelldle Work-up so far HIV positive, Hepatitis C positive, MARILEE negative, C4 normal, C3 88 (low), UPEP without monoclonal gammopathy, negative cryoglobulins. Kidney ultrasound with hyperechoic kidneys. Status post kidney biopsy October 27, 2021    Gross hematuria, status post kidney biopsy  Probably HFpEF 55%, proBNP 11,401 > 3421   Right kidney lesion, likely a cyst but cannot be ruled out other pathology including infarction, abscess, neoplasm.   We will proceed

## 2021-10-28 NOTE — PROGRESS NOTES
[facility] Attending    S: 36 y.o. male with history of asthma and smoking history presented with increasing dyspnea and cough over past month. On presentation had fever of 103. Has tested positive for COVID twice, most recently end of August.  CT shows extensive bilateral pneumonia and COVID testing again positive. HIV pos, CD4 43. Early AM 10/23,  RRT called due to tachycardia and oxygen desaturation requiring oxygen from 3L NC to 15LNRB mask. Patient had been febrile overnight as well and transferred to MICU. Has had upper extremity tremors and lower extremity neuropathy symptoms. Anxiety with long hospitalization. Plan for dialysis today . Had kidney biopsy. Overnight had multiple episodes of bloots clots in urine. This is improving today. Back pain is resolving. Still feels weak when he walks but does not want physical therapy yet       O: VS- Blood pressure 137/88, pulse 92, temperature 97.9 °F (36.6 °C), temperature source Oral, resp. rate 20, height 6' 4\" (1.93 m), weight 199 lb 15.3 oz (90.7 kg), SpO2 100 %. Exam is as noted by resident   Awake, alert and oriented. Heart - RRR  Lungs- clear breath sounds bilaterally. Ext - no edema. Impressions:   Principal Problem:    Acute respiratory failure with hypoxia (HCC)  Active Problems:  Repeatedly positive Covid  Stable groundglass opacities    Acute kidney injury due to COVID-19 ([address]    Asthma    Nephrotic range proteinuria  50 pound weight loss  2 blood cultures positive  Pancytopenia   HIV positive, new diagnosis  late latent syphillis    Plan   Appreciate Nephrology, ID, MICU, cardiology, gen surgery, and pulmonology. Covid pneumonia/PCP pneumonia with sepsis, New diagnosis of HIV- resistance testing pending, CD4 count of 43. Viral load 195,000. ID managing antibiotics and antivirals for HIV, currently on lamivudine/abacavir/dolutegravir , awaiting resistance testing.    Per ID pentamidine 4mg/kg IV q48H on dialysis to finish 21 days until 11/2. RPR positive. S/p LP. VDRL CSF negative. Continue benzathine PCN IM weekly x 3 doses. Sinus tachycardia - Echo with EF 55%, small pericardial effusion and possible atrial shunt. Anemia - FOBT heme positive first time, neg on repeat. Gen surgery has no plans for scopes s/p 2U PRBCs, will trend h/h. Follow. Hb 7.6 continue to monitor H&H    IR guided kidney biopsy today. Will need platelet transfusion before and during biopsy. Noted worsening creatinine , DEISI on CKD . S/p dialysis catheter and dialysis. on HD per nephro. H/O consulted due to pancytopenia including neutropenia. granix ordered. Will monitor CBC. Platelet count less than 50K will d/c heparin per heme/onc recommendations. Platelets now 61 s/p 2 platelet transfusions. Attending Physician Statement  I have reviewed the chart and seen the patient with the resident(s). I personally reviewed images, EKG's and similar tests, if present. I personally reviewed and performed key elements of the history and exam.  I have reviewed and confirmed student and/or resident history and exam with changes as indicated above. I agree with the assessment, plan and orders as documented by the resident. Please refer to the resident and/or student note for additional information. Silva Garcia.  Luis Cody MD

## 2021-10-28 NOTE — PROGRESS NOTES
[place]  Department of Internal Medicine  Division of Pulmonary, Critical Care and Sleep Medicine  Progress Note    Bismark Chávez DO, MPH, Jose Sams MD, [facility]  [facility]      Patient: Abel Sosa  MRN: [id number]  : 1990    Encounter Time: 12:21 PM     Date of Admission: 10/5/2021  3:07 PM    Primary Care Physician: Riri Quinn MD    Reason for Consultation: COVID     SUBJECTIVE:    Hep C and HIV + screen  CD4 is 47  Echo = reviewed  HD cath place to start HD  Patient was seen and examined at bedside. S/p renal biopsy yesterday. Endorsed gross hematuria and abdominal pain. Denies CP, SOB, Headache, chills, rashes, hematochezia or hemoptysis.       Cont meds:   Infusions Meds    sodium chloride      sodium chloride      sodium chloride      sodium chloride      sodium chloride      sodium chloride                   OBJECTIVE:     PHYSICAL EXAM:   VITALS:   Vitals:    10/27/21 2030 10/27/21 2315 10/28/21 0900 10/28/21 1115   BP: (!) 144/88 137/88 116/76    Pulse: 90 92 94    Resp: 20 20 30    Temp: 97.9 °F (36.6 °C) 97.9 °F (36.6 °C) 97.6 °F (36.4 °C)    TempSrc: Oral Oral Oral    SpO2: 100% 100% 98%    Weight:       Height:    6' 4\" (1.93 m)        Intake/Output Summary (Last 24 hours) at 10/28/2021 1221  Last data filed at 10/28/2021 0931  Gross per 24 hour   Intake 160 ml   Output 500 ml   Net -340 ml        CONSTITUTIONAL:   A&O x 3, NAD  SKIN:     No rash,   HEENT:     EOMI, MMM, No thrush  NECK:    No bruits, No JVP apprechiated  CV:      Sinus,  No murmur, No rubs, No gallops  PULMONARY:   Couse BS,  No Wheezing, No Rales, No Rhonchi      No noted egophony  ABDOMEN:     Soft, non-tender. BS normal. No R/R/G  EXT:    No deformities . No clubbing.       + lower extremity edema, No venous stasis  PULSE:   Appears equal and palpable.   PSYCHIATRIC:  Seems appropriate, No acute psycosis  MS:    No fractures, No gross weakness  NEUROLOGIC:   Non-focal     DATA: IMAGING & TESTING:     LABORATORY TESTS:    CBC:   Lab Results   Component Value Date    WBC 6.8 10/28/2021    RBC 2.55 10/28/2021    HGB 7.6 10/28/2021    HCT 23.0 10/28/2021    MCV 90.2 10/28/2021    MCH 29.8 10/28/2021    MCHC 33.0 10/28/2021    RDW 13.9 10/28/2021    PLT 61 10/28/2021    MPV 12.3 10/28/2021     CMP:    Lab Results   Component Value Date     10/28/2021    K 3.7 10/28/2021    K 3.9 10/28/2021     10/28/2021    CO2 26 10/28/2021    BUN 38 10/28/2021    CREATININE 4.6 10/28/2021    GFRAA 18 10/28/2021    LABGLOM 18 10/28/2021    GLUCOSE 106 10/28/2021    PROT 5.0 10/28/2021    LABALBU 2.8 10/28/2021    CALCIUM 8.4 10/28/2021    BILITOT 0.4 10/28/2021    ALKPHOS 89 10/28/2021    AST 39 10/28/2021    ALT 27 10/28/2021     Magnesium:    Lab Results   Component Value Date    MG 1.8 10/28/2021     Phosphorus:    Lab Results   Component Value Date    PHOS 3.6 10/28/2021     PT/INR:    Lab Results   Component Value Date    PROTIME 17.7 10/15/2021    INR 1.6 10/15/2021     FERRITIN:    Lab Results   Component Value Date    FERRITIN 3,921 10/06/2021     Fibrinogen Level:  No components found for: FIB     PRO-BNP:   Lab Results   Component Value Date    PROBNP 3,421 (H) 10/18/2021    PROBNP 11,401 (H) 10/13/2021      ABGs:   Lab Results   Component Value Date    PH 7.479 10/24/2021    PO2 306.9 10/24/2021    PCO2 26.1 10/24/2021     Hemoglobin A1C: No components found for: HGBA1C    IMAGING:  Imaging tests were completed and reviewed and discussed radiology and care team involved and reveals     CT CHEST : There is adequate opacification of the pulmonary arteries.  There is no   evidence of filling defect to suggest pulmonary embolism. Leesa Isabel is no   evidence of thoracic aortic aneurysm or dissection.  There are small   bilateral pleural effusions.  These are increased compared to prior   examination.  There is small pericardial effusion which appears stable.  Left   hilar lymph node measures approximately 12 mm in short axis.  Right hilar   lymph node measures approximately 12 mm in short axis.  There is bilateral   gynecomastia. Robertha King is diffuse subcutaneous edema. The central airways are patent.  There is no pneumothorax.  There are   bilateral ground-glass opacities and alveolar consolidation.  There has been   interval increase in the airspace disease compared to prior examination.  The   interval worsening is most pronounced within the lower lobes.  There is   associated interstitial thickening. CT ABDOMEN PELVIS WO CONTRAST Additional Contrast? None    Result Date: 10/5/2021  FINDINGS: THE STUDY IS LIMITED DUE TO LACK OF INTRAVENOUS CONTRAST. Chest: Mediastinum: No thoracic aortic aneurysm. No definite adenopathy on this unenhanced study. Trace pericardial fluid anteriorly. Lungs/pleura: Trace layering pleural effusions bilaterally, slightly larger on the right. No pneumothorax. Numerous ground-glass densities in the bilateral upper and to a lesser degree lower lungs, in keeping with known COVID-19 pneumonia. Soft Tissues/Bones: Bilateral gynecomastia. No acute osseous abnormality in the thoracic cage. Abdomen and pelvis: The study is degraded by the patient's respiratory motion. Organs: Abdominal organs inadequately evaluated on this motion degraded and unenhanced study. No hydronephrosis on either side. GI/Bowel: Normal appendix. No evidence of bowel obstruction. Pelvis: Normal sized prostate. Urinary bladder grossly intact. Peritoneum/Retroperitoneum: No free air or free fluid. No bulky adenopathy. No abdominal aortic aneurysm. Bones/Soft Tissues: No lytic or sclerotic lesion in the regional skeleton. Limited study by the patient's respiratory motion and lack of intravenous contrast. Extensive multifocal COVID-19 pneumonia bilaterally. Bilateral trace layering pleural effusions. Bilateral gynecomastia.  Abdominal organs was admitted on October 5, 2021 after he presented from an outside clinic after he was found to be hypoxic during 6 minute walking test. Patient tested positive for Covid 19 on August 18th, he is unvaccinated and had Covid 19 last year as well. Patient states he has quarantined by himself and when his mother recently visited him she was shocked at his appearance as he had lost about 50 pounds in 2 and a half months  Sepsis  COVID-19, mild  HIV   Pneumonia  Gram-positive cocci in clusters bacteremia, etiology unclear  DEISI  + RPR  + BNP  Ejection fraction is visually estimated at 55%. There is late positive bubble suggesting possible shunt from pulmonary   origin. Visually moderately dilated left atrium. Normal right ventricular size and function.    There is small pericardial effusion without tamponade physiology      Plan:   Off oxygen  Completed Bronchoscopy with BAL with LMAC  - completed results no TB          Kay Ceron DO DO, MPH, FCCP, Mabel Ross  Professor of Internal Medicine  Pulmonary, Critical Care and Sleep Medicine

## 2021-10-28 NOTE — BH NOTE
Behavioral Health Inpatient Follow Up   [facility] Family Medicine     Psychology following for anxiety, difficulty coping with hospital stressors. Pt is a 36 y.o. male with pneumonia, COVID,  HIV pos. On dialysis. Kidney biopsy yesterday. He is making progress with stress management. Better accepting of circumstances. Very fatigued, struggling to accept changes in his mood, which he says are inconsistent with his normal positive/outgoing mood. Cognitive-behavioral therapy to address associated distress by viewing mood changes in the context of prolonged hospitalization and fatigue, rather than any long-term change in his personality.  Will continue to follow with Family Medicine team.     Electronically signed by Yash Pinto, PhD

## 2021-10-28 NOTE — FLOWSHEET NOTE
10/28/21 1628   Vital Signs   /88   Temp 98 °F (36.7 °C)   Pulse 65   Weight 196 lb 3.4 oz (89 kg)   Weight Method Bed scale   Percent Weight Change -0.67   Post-Hemodialysis Assessment   Post-Treatment Procedures Blood returned;Catheter capped, clamped and heparinized x 2 ports   Machine Disinfection Process Acid/Vinegar Clean;Bleach; Exterior Machine Disinfection   Rinseback Volume (ml) 300 ml   Total Liters Processed (l/min) 60.3 l/min   Dialyzer Clearance Lightly streaked   Duration of Treatment (minutes) 240 minutes   Hemodialysis Intake (ml) 300 ml   Hemodialysis Output (ml) 1215 ml   NET Removed (ml) 915 ml   Tolerated Treatment Good   Patient Response to Treatment pt tx ended, blood rinsed back, pt tolerated tx well, pt transported back to his room via bed

## 2021-10-28 NOTE — PROGRESS NOTES
Comprehensive Nutrition Assessment    Type and Reason for Visit:  Reassess    Nutrition Recommendations/Plan: Recommend to continue Boost Pudding supplement BID and Magic Cup supplement daily to help meet increased nutritional needs and decreased po intake of meals. Noted no fluids on trays at this time. Will adjust ONS when discontinued. Nutrition Assessment:  Patient remains at nutritional risk d/t decreased po intake of meals, averaging 50-75% since admission ; HIV screen positive ; noted kidney lesion s/p biopsy and Hepatitis C ; noted MSSA bacteremia ; pt COVID-19 positive ; adm w/ hypoxia and SOB ;  sepsis resolved ; DEISI on CKD  ; hx of asthma and previous COVID ; subjective weight loss of 50# in the past 2-3 months although no evidence of this ; s/p broncoscopy ; new HD ; will continue ONS    Malnutrition Assessment:  Malnutrition Status: At risk for malnutrition (Comment)    Context:  Acute Illness     Findings of the 6 clinical characteristics of malnutrition:  Energy Intake:  1 - 75% or less of estimated energy requirements for 7 or more days  Weight Loss:  Unable to assess (d/t lack of weight history ; subjective weight loss noted although no evidence of this)     Body Fat Loss:  Unable to assess (d/t COVID isolation)     Muscle Mass Loss:  Unable to assess (d/t COVID isolation)    Fluid Accumulation:  No significant fluid accumulation     Strength:  Not Performed    Estimated Daily Nutrient Needs:  Energy (kcal):  8861-1738 (REE 1971 x 1.2 SF); Weight Used for Energy Requirements:  Current     Protein (g):  110-130 (1.2-1.4g/kg CBW); Weight Used for Protein Requirements:  Current        Fluid (ml/day):  per renal; Method Used for Fluid Requirements:  Standard Renal      Nutrition Related Findings:  -I&Os (-1.3 L), no edema, active BS, A&O x 4, fatigue, HD      Wounds:  Open Wounds, Wound Consult Pending (wound x 1 noted to back)       Current Nutrition Therapies:    ADULT DIET;  Regular; No Fluids on Tray  Adult Oral Nutrition Supplement; Fortified Pudding Oral Supplement  Adult Oral Nutrition Supplement; Frozen Oral Supplement    Anthropometric Measures:  · Height: 6' 4\" (193 cm)  · Current Body Weight: 199 lb (90.3 kg) (10/25, bedscale)   · Admission Body Weight: 171 lb (77.6 kg) (10/5, no method)    · Usual Body Weight:  (UTO ; EMR shows only one weight recorded of 171# no method on 10/5/21 ; subjective weight loss of 50# in the past 2-3 months although no evidence of this)     · Ideal Body Weight: 202 lbs; % Ideal Body Weight 98.5 %   · BMI: 24.2   · BMI Categories: Normal Weight (BMI 18.5-24. 9)       Nutrition Diagnosis:   · Inadequate oral intake related to inadequate protein-energy intake (2/2 COVID-19) as evidenced by poor intake prior to admission, intake 51-75%      Nutrition Interventions:   Food and/or Nutrient Delivery:  Continue Current Diet, Continue Oral Nutrition Supplement  Nutrition Education/Counseling:  Education not indicated   Coordination of Nutrition Care:  Continue to monitor while inpatient    Goals:  Pt will consume ~75% of meals served       Nutrition Monitoring and Evaluation:   Behavioral-Environmental Outcomes:  None Identified   Food/Nutrient Intake Outcomes:  Food and Nutrient Intake, Supplement Intake  Physical Signs/Symptoms Outcomes:  Biochemical Data, Chewing or Swallowing, GI Status, Fluid Status or Edema, Hemodynamic Status, Meal Time Behavior, Nutrition Focused Physical Findings, Skin, Weight     Discharge Planning:     Too soon to determine     Electronically signed by August Blake RD, LD on 10/28/21 at 11:23 AM EDT    Contact: [phone]

## 2021-10-28 NOTE — CARE COORDINATION
Attempted to reach patient via phone to discuss potential dialysis location but no answer; will attempt again. Preference is for the patient to be set-up at [facility]. Will confirm after speaking with patient. Renal biopsy was done yesterday and CT of the abdomen and pelvis completed yesterday due to blood in urine post biopsy. Patient currently receiving dialysis,today is day 4 and patient has a temporary HD port. Psych is following patient for anxiety and providing support.     Jenelle Malik, MSW, LSW [phone]

## 2021-10-28 NOTE — PROGRESS NOTES
NORMA PROGRESS NOTE      Chief complaint: Follow-up of Gram-positive cocci in clusters on blood culture    The patient is a 36 y.o. male, not vaccinated against COVID-19, with history of asthma, presented on 10/05 with shortness of breath, found to be febrile at 103 °F, positive SARS-CoV-2 PCR, bilateral groundglass opacities on chest CT, tolerating room air with oxygen saturation of 97%. He reports having tested positive for SARS-CoV-2 for the 3rd time now with previous on in late August at which time he reports having quarantined. Urine Streptococcus pneumonia and Legionella antigens were negative. Blood cultures from 10/05 and 10/06 showed methicillin-susceptible Staphylococcus epidermidis in 1 out of 2 sets. HIV screen was positive with viral load of 195,000. CD4 count was low at 43. RPR was reactive at titer of 1:2. Toxoplasma Ab was negative. HLA- was negative. Serum beta-d-glucan was positive. Transthoracic echocardiogram showed late positive bubble suggesting possible shunt from pulmonary origin, moderately dilated left atrium, small pericardial effusion without tamponade physiology, ejection fraction visually estimated at 55%. He underwent bronchoscopy on 10/15 during which normal endobronchial evaluation and normal anatomy were noted. BAL Gram stain and culture showed rare polymorphonuclear leukocytes, rare epithelial cells, rare gram-negative rods, rare gram-positive rods, reduced oropharyngeal liana. BAL pneumocystis stain was negative. BAL galactomannan was negative. Serum Cryptococcus antigen was negative. He underwent lumbar puncture on 10/21, yielding CSF with normal glucose, protein, csll count with differential. CSF meningitis/encephalitis PCR panel was negative for HSV, VZV, CMV, Enterovirus, HHV-6, Streptococcus pneumoniae and agalactiae, E coli, Haemophilus, Neisseria meninigitidis, Cryptococcus. CSF VDRL was negative.  He was transferred to MICU on 10/23 for worsening shortness of breath then transferred out on 10/26. He underwent renal biopsy on 10/27. Subjective: Patient was seen and examined while undergoing hemodialysis. No chills, no abdominal pain, no diarrhea, no rash, no itching. Afebrile. Had hematuria s/p renal biopsy. Objective:  /76   Pulse 94   Temp 97.6 °F (36.4 °C) (Oral)   Resp 30   Ht 6' 4\" (1.93 m)   Wt 199 lb 15.3 oz (90.7 kg)   SpO2 98%   BMI 24.34 kg/m²   Constitutional: Alert, not in distress  Respiratory: Clear breath sounds, no crackles, no wheezes  Cardiovascular: Regular rate and rhythm, no murmurs  Gastrointestinal: Bowel sounds present, soft, nontender  Skin: Warm and dry, no active dermatoses  Musculoskeletal: No joint swelling, no joint erythema    Labs, imaging, and medical records/notes were personally reviewed. Assessment:  Fever and pancytopenia, suspect associated with acute retroviral syndrome. Jarische-Herxheimer reaction is less likely in late latent syphilis with low RPR titers. Pancytopenia, multifactorial, suspect associated with acute retroviral syndrome and/or medication-induced  COVID-19, mild  Pneumonia, suspected Pneumocystis pneumonia (serum beta-d-glucan was positive at >500 pg/mL, BAL Pneumocystis stain was negative but Pneumocystis PCR was not sent)  Late latent syphilis, neurosyphilis ruled out  Methicillin susceptible Staphylococcus epidermidis bacteremia, etiology unclear  DEISI  Advanced HIV disease (viral load of 195,000; CD4 of 43 as of 10/11/2021). Hepatitis C Ab positive (viral load not detected). Hepatitis A immune  Prolonged QTc  HAP  Hematuria, probably associated with renal biopsy    Recommendations:  Continue benzathine penicillin IM weekly for 3 doses. Continue lamivudine 50 mg q24h, dolutegravir 50 mg q24h, abacavir 600 mg q24h. Continue pentamidine 4 mg/kg IV q48h after hemodialysis to finish 21 days until 11/02.   Continue oral prednisone 40 mg q12h  Followed by 40 mg q24h for another 3 days until 10/28 then 20 mg q24h thereafter until 11//02. Follow up HIV GART, INSTI resistance. Follow up urine GC/Chlamydia PCR, if able. Monitor respiratory status. Continue supportive care. Thank you for involving me in the care of Kristin James. I will continue to follow. Please do not hesitate to call for any questions or concerns.     Electronically signed by Sissy Umana MD on 10/28/2021 at 11:04 AM

## 2021-10-28 NOTE — PROGRESS NOTES
[facility] Inpatient   Resident Progress Note    S:  Hospital day: 21   Brief Synopsis: Yue Henao is a 36 y.o. male with no PMH who presented with hypoxia and fever. He was initially seen in PCP office and was hypoxic on exertion with desaturate into the mid low 80s as well as noted to be febrile and tachycardic. Per patient, his symptoms began 8/15/2021 and he tested positive for Covid 8/28. endorse worsening shortness of breath over the past month with sputum production, intermittent chest discomfort, persistent diarrhea, chills, fever, decrease in appetite and 50 pound weight loss since August.  Patient also endorses significantly worsening \"neuropathy\" of bilateral feet, states it feels like he is \"being stabbed by needles\" which is causing him inability to walk due to pain. In the ED, he was found to be anemic, hyperkalemic, hyponatremic, febrile and COVID positive. He also had elevated Creatinine, AST, proBNP and troponin x2. CTA showed extensive multifocal COVID-19 pneumonia bilaterally, bilateral trace layering pleural effusion and bilateral gynecomastia. CT abdomen was limited due to movement and non-contrast, but showed normal appendix and no evidence of bowel obstruction. Admitted for acute hypoxia due to Covid pneumonia. Nephro, pulm and ID consulted. Started on bicarb drip. US showed hyperechoic kidneys, trace b/l perinephric fluid and right kidney lesion (Cyst vs infarction vs abscess vs neoplasia). HIV and Hep C screen were negative. HIV RNA and T and B lymphocytes were ordered. Patient became more hypoxic, tachycardic and hypertensive so  there was concern for PE. CTA was ordered, which came back negative. Echo was completed due to elevated BNP and concern for PE and that revealed a normal EF with late bubble sign possible shunting of pulmonary origin. BNP continued to increase. Cardiology was consulted. Repeat Echo pending.  ID recommends LP with CSF sent for glucose, protein, cell count with differential, which were all  negative. meningitis/encephalitis PCR panel negative. RRT called 10/23 for hypoxia and tachycardia, transferred to medical intensive on 15 L NRB. Creatinine up trending. Currently saturating well on room air and was transferred out of the ICU. Hemodialysis x4 days for worsening renal function. CSF VDRL negative, but continue to treat for latent syphilis. Renal biopsy performed on 10/27. Endorses hematuria s/p biopsy. Overnight/interim:   Patient was seen and examined at bedside. S/p renal biopsy yesterday. Endorsed gross hematuria and abdominal pain. Denies CP, SOB, Headache, chills, rashes, hematochezia or hemoptysis. Cont meds:    sodium chloride      sodium chloride      sodium chloride      sodium chloride      sodium chloride      sodium chloride       Scheduled meds:    pentamidine isethionate (PENTAM) IVPB  4 mg/kg IntraVENous Daily    abacavir  600 mg Oral Nightly    dolutegravir sodium  50 mg Oral Nightly    lamiVUDine  50 mg Oral Nightly    [START ON 10/29/2021] predniSONE  20 mg Oral Daily    [Held by provider] heparin (porcine)  5,000 Units SubCUTAneous Q8H    penicillin G benzathine  2.4 Million Units IntraMUSCular Weekly    lidocaine  1 patch TransDERmal Daily    ascorbic acid  500 mg Oral Daily    [Held by provider] metoprolol tartrate  25 mg Oral Daily    pantoprazole  40 mg Oral QAM AC    sodium chloride flush  5-40 mL IntraVENous 2 times per day     PRN meds: sodium chloride, sodium chloride, sodium chloride, sodium chloride, sodium chloride, medicated lip balm, benzonatate, perflutren lipid microspheres, hydrOXYzine, labetalol, sodium chloride flush, ondansetron **OR** ondansetron, polyethylene glycol, acetaminophen **OR** acetaminophen, sodium chloride     I reviewed the patient's past medical and surgical history, Medications and Allergies.     O:  /76   Pulse 94   Temp 97.6 °F (36.4 °C) (Oral)   Resp 30 Ht 6' 4\" (1.93 m)   Wt 199 lb 15.3 oz (90.7 kg)   SpO2 98%   BMI 24.34 kg/m²   24 hour I&O: I/O last 3 completed shifts: In: 160 [Blood:160]  Out: 425 [Urine:425]  I/O this shift:  In: -   Out: 200 [Urine:200]        Physical Exam  Vitals reviewed. Constitutional:       General: He is not in acute distress. Appearance: Normal appearance. He is not ill-appearing, toxic-appearing or diaphoretic. HENT:      Head: Normocephalic and atraumatic. Nose: No rhinorrhea. Mouth/Throat:      Mouth: Mucous membranes are moist.      Pharynx: Oropharynx is clear. Eyes:      General: No scleral icterus. Right eye: No discharge. Left eye: No discharge. Extraocular Movements: Extraocular movements intact. Cardiovascular:      Rate and Rhythm: Normal rate and regular rhythm. Pulses: Normal pulses. Heart sounds: Normal heart sounds. No murmur heard. No friction rub. No gallop. Pulmonary:      Effort: No respiratory distress. Breath sounds: No wheezing, rhonchi or rales. Abdominal:      General: Abdomen is flat. Palpations: Abdomen is soft. Tenderness: There is abdominal tenderness (RUQ, RLQ). Musculoskeletal:         General: No swelling. Cervical back: Normal range of motion. No rigidity or tenderness. Lumbar back: No swelling or spasms. Normal range of motion. Right lower leg: No edema. Left lower leg: No edema. Skin:     General: Skin is warm and dry. Coloration: Skin is not jaundiced. Findings: No bruising or rash. Comments: Right femoral HD port, c/d/i   Neurological:      General: No focal deficit present. Mental Status: He is alert and oriented to person, place, and time. Cranial Nerves: No cranial nerve deficit. Motor: No weakness. Psychiatric:         Behavior: Behavior normal.         Thought Content:  Thought content normal.         Labs:  Na/K/Cl/CO2:  139/3.9, 3.9/103/25 (10/28 8429)  BUN/Cr/glu/ALT/AST/amyl/lip:  36/4.3/--/27/39/--/-- (10/28 0402)  WBC/Hgb/Hct/Plts:  6.8/7.6/23.0/61 (10/28 0402)  estimated creatinine clearance is 31 mL/min (A) (based on SCr of 4.3 mg/dL (H)). Other pertinent labs as noted below    Radiology:  CT ABDOMEN PELVIS WO CONTRAST Additional Contrast? None   Final Result   Post right renal biopsy there is expected sequela of recent biopsy, no   significant hemorrhage identified                                        XR CHEST PORTABLE   Final Result   Mild worsening of extensive bilateral infiltrates         XR CHEST PORTABLE   Final Result   Increasing bilateral airspace disease suggesting worsening pneumonia. FL LUMBAR PUNCTURE DIAG   Final Result   Successful fluoroscopic-guided lumbar puncture. XR CHEST PORTABLE   Final Result   Extensive bilateral patchy areas of ground-glass opacity concerning for   atypical pneumonia or viral airway disease, improved         XR CHEST PORTABLE   Final Result   Persistent bilateral infiltrates. No significant changes since October 13. See above comments. See report CT chest October 13. CTA PULMONARY W CONTRAST   Final Result   1. No evidence of pulmonary embolism. 2. Interval increased size of bilateral pleural effusions with little change   in pericardial effusion. 3. Persistent subcutaneous edema. 4. Reactive bilateral hilar lymphadenopathy. 5. Bilateral airspace disease is increased compared to prior examination. The increase is most pronounced within the lower lobes. 6. Bilateral gynecomastia. XR CHEST PORTABLE   Final Result   Development of extensive bilateral pulmonary infiltrates         XR CHEST PORTABLE   Final Result   No acute process. US RETROPERITONEAL COMPLETE   Final Result   1. Hyperechoic kidneys due to underlying parenchymal disease. 2. Trace bilateral perinephric fluid of indeterminate etiology, potentially   due to underlying inflammation.    3. An approximately 1.1 cm x 1.5 cm x 2.3 cm lesion in the right kidney could   be a cyst but could also be seen with infarction, abscess, or neoplasia. Consider further evaluation with renal protocol abdomen MRI (preferred) or   CT. Following resolution of acute kidney injury. XR CHEST PORTABLE   Final Result   Multifocal bilateral pulmonary ground-glass airspace opacities are stable         CT ABDOMEN PELVIS WO CONTRAST Additional Contrast? None   Final Result   Limited study by the patient's respiratory motion and lack of intravenous   contrast.      Extensive multifocal COVID-19 pneumonia bilaterally. Bilateral trace layering pleural effusions. Bilateral gynecomastia. Abdominal organs inadequately evaluated due to the limitations of the study. Normal appendix. No evidence of bowel obstruction. CT CHEST WO CONTRAST   Final Result   Limited study by the patient's respiratory motion and lack of intravenous   contrast.      Extensive multifocal COVID-19 pneumonia bilaterally. Bilateral trace layering pleural effusions. Bilateral gynecomastia. Abdominal organs inadequately evaluated due to the limitations of the study. Normal appendix. No evidence of bowel obstruction.          CT BIOPSY RENAL    (Results Pending)   CT GUIDED NEEDLE PLACEMENT    (Results Pending)         Resident Assessment and Plan       Acute hypoxia 2/2 COVID pneumonia vs PCP  - CTA on admission: COVID pneumonia, Bilateral trace layering pleural effusion, negative for PE   - Ferritin 3900, CRP 10.1 on admission   - D dimer elevated on admission, currently stable  - Pulmonology and ID on board  - Legionella, spot TB test negative   - CXR 10/8 shows multifocal groundglass opacities are stable  - Repeat CXR 10/17: b/l pulm infiltrates improving   - Blood cx: staph epidermitis, repeat blood cx shows no growth x 7 days   -Urine culture: NGTD   -Counseled on getting COVID vaccine after patient recovers   - ECHO 10/13: EF 55%, late bubble sign possible shunting of pulmonary origin   - Cardiology consulted   - CTA 10/13 negative for PE  Bronchoscopy with BAL performed on 10/15/2021   - Respiratory culture: oral pharyngeal liana decreased, few gram negative rods   CXR 10/23: Mild worsening extensive bilateral infiltrates  - Worsening hypoxia 10/23, RRT called, transferred to ICU, was on nonrebreather, ABG showed resp alkalosis with some metabolic acidosis compensation   - Currently maintaining saturation on room air, continue to monitor  - On prednisone    - Repeat blood culture 10/21 showed NGTD x5days      Weight loss likely secondary to HIV  - Per patient due to decreased appetite from COVID; 50lb weight loss since August   - R/o other potential origins: SLE, TB, hepatitis  - MARILEE negative  - T spot TB test negative   HIV, hepatitis C antibodies positive  - Elevated IgG and IgA, normal IgM: IgA nephropathy vs MPGN?   - Cryoglobulin normal   - TSH normal   - Sed rate and CRP elevated      HIV  HIV 1/2 antibodies positive    CD4 44, CD3 153, CD8 100  HIV viral load 195,000  - ID on board: received vancomycin, ancef; stopped cefepime 10/26  - Fungitell test positive, received eraxis  - Bactrim d/c'd due to renal failure; on pentamidine for suspected Pneumocystis pneumonia  - Pneumocystis stain negative, but PCR pending; still suspect PCP pna  - tests for opportunistic infections pending: HIV GART, urine GC/Chlamydia PCR  - [id number] negative, genotype pending  - Toxoplasma Ab, cryptococcus negative  - hepatitis A total Ab positive  - RPR reactive, quant 1:2, CSF VDRL negative   - On PCN G weekly for latent syphilis   - Started on biktarvy 10/22, d/c'd due to renal failure   - Started on lamivudine, dolutegravir, and abacavir 10/25  - Repeat fungitell test pending     Hematuria   - benjie blood in urine s/p renal biopsy 10/27  - Hb 8.1>7.6  - Continue to monitor H/H     Anemia  Pancytopenia  - Likely 2/2 from GI bleed vs HIV   Transfused 2 units pRBC since admission  - FOBT negative x3  - Heme Onc consulted: received granix to improve ANC  Platelets trending down, 44k>34k; hold heparin, will need transfusion if bleeding or plts <20k   - Transfuse platelets prior to and during kidney biopsy   Peripheral blood smear shows normocytic anemia, absolute lymphocytopenia; no platelet clumping, schistocytes or dysplasia  - Hb 7.6, trending down; trend H/H q8h    DEISI    Hyponatremia - improving   - Na normalized    - Nephro on board   - Likely secondary to dehydration from persistent diarrhea vs decreased PO intake vs bactrim vs HIVANS vs Biktarvy  - Cr improving after HD,3.7  [employer]  - urine studies show intrinsic cause of DEISI  - Microalbumin-creatinine ratio elevated   - Reconsulted IR to inquire about kidney biopsy inpatient   Fluid restriction  - Oliguric (0.2 mL/kg/hr)   - Endorsing hematuria s/p renal biopsy, continue to monitor  - Vascular surgery was consulted, placed temporary HD port  - receiving dialysis,today is day 4    Hypoalbuminemia   -2/2 poor intake vs CHF vs nephrotic syndrome  - Alb 2.7  -Previously received albumin 25 g q6h x 4 doses 10/6, 8 doses 10/12 and 8 doses 10/23  Low C3, normal C4 and cryoglobulin  Fluid restriction   - Continue to monitor     Hypnagogic Hallucinationsimproved  - occurring since received anesthesia on 10/15   - no history of psych history or narcolepsy  - could be 2/2 to poor sleep vs anesthesia reaction vs vistaril?  - continue to monitor       Elevated troponin and proBNP  - 2/2 myocardial injury vs stress cardiomyopathy due to COVID vs new onset CHF   - initial troponin 78, 78  - initial BNP 1599,  repeat 11,401 on 10/14, trending down 3400   - ECHO: EF 55%, late bubble sign possible shunting of pulmonary origin  - Repeat ECHO pending      Tachycardia  - 2/2 to infectious vs anxiety vs anemia   HR improving   Cardio on board   - Hold lopressor Asymptomatic  Continue to monitor    HTN -improving  - BP on lower end  - Lopressor held   - labetalol PRN  - Continue to monitor                PT/OT evaluation: Consulted  DVT prophylaxis: held heparin, PCDs  GI prophylaxis:protonix  Disposition: intermediate   Diet: adult         Electronically signed by Robles Borja MD PGY-1 on 10/28/2021 at 9:38 AM  Attending physician: Dr. Ramón Galloway

## 2021-10-28 NOTE — PROGRESS NOTES
Patient stated that they voided benjie urine in toliet. Patient did not measure output. Patient stated that he flelt it was half a urinal full. Dr. Sahil Hooks on call and notified. obtained stat CBC. Hemoglobin returned at 8. 1. will continue to monitor and spoke with patient regarding need to measure output

## 2021-10-29 LAB
ALBUMIN SERPL-MCNC: 2.8 G/DL (ref 3.5–5.2)
ALP BLD-CCNC: 85 U/L (ref 40–129)
ALT SERPL-CCNC: 29 U/L (ref 0–40)
ANION GAP SERPL CALCULATED.3IONS-SCNC: 7 MMOL/L (ref 7–16)
ANION GAP SERPL CALCULATED.3IONS-SCNC: 8 MMOL/L (ref 7–16)
ANISOCYTOSIS: ABNORMAL
APTT: 26 SEC (ref 24.5–35.1)
AST SERPL-CCNC: 41 U/L (ref 0–39)
BASOPHILIC STIPPLING: ABNORMAL
BASOPHILS ABSOLUTE: 0 E9/L (ref 0–0.2)
BASOPHILS RELATIVE PERCENT: 0.2 % (ref 0–2)
BILIRUB SERPL-MCNC: 0.5 MG/DL (ref 0–1.2)
BLOOD BANK DISPENSE STATUS: NORMAL
BLOOD BANK PRODUCT CODE: NORMAL
BPU ID: NORMAL
BUN BLDV-MCNC: 19 MG/DL (ref 6–20)
BUN BLDV-MCNC: 20 MG/DL (ref 6–20)
CALCIUM SERPL-MCNC: 8 MG/DL (ref 8.6–10.2)
CALCIUM SERPL-MCNC: 8 MG/DL (ref 8.6–10.2)
CHLORIDE BLD-SCNC: 101 MMOL/L (ref 98–107)
CHLORIDE BLD-SCNC: 101 MMOL/L (ref 98–107)
CO2: 27 MMOL/L (ref 22–29)
CO2: 28 MMOL/L (ref 22–29)
CREAT SERPL-MCNC: 2.8 MG/DL (ref 0.7–1.2)
CREAT SERPL-MCNC: 3 MG/DL (ref 0.7–1.2)
DESCRIPTION BLOOD BANK: NORMAL
EOSINOPHILS ABSOLUTE: 0.07 E9/L (ref 0.05–0.5)
EOSINOPHILS RELATIVE PERCENT: 1.7 % (ref 0–6)
GFR AFRICAN AMERICAN: 30
GFR AFRICAN AMERICAN: 33
GFR NON-AFRICAN AMERICAN: 30 ML/MIN/1.73
GFR NON-AFRICAN AMERICAN: 33 ML/MIN/1.73
GLUCOSE BLD-MCNC: 132 MG/DL (ref 74–99)
GLUCOSE BLD-MCNC: 89 MG/DL (ref 74–99)
HCT VFR BLD CALC: 21.7 % (ref 37–54)
HCT VFR BLD CALC: 24.3 % (ref 37–54)
HCT VFR BLD CALC: 24.6 % (ref 37–54)
HEMOGLOBIN: 7.3 G/DL (ref 12.5–16.5)
HEMOGLOBIN: 8 G/DL (ref 12.5–16.5)
HEMOGLOBIN: 8.2 G/DL (ref 12.5–16.5)
HYPOCHROMIA: ABNORMAL
LYMPHOCYTES ABSOLUTE: 0.22 E9/L (ref 1.5–4)
LYMPHOCYTES RELATIVE PERCENT: 5.2 % (ref 20–42)
Lab: NORMAL
MAGNESIUM: 1.7 MG/DL (ref 1.6–2.6)
MCH RBC QN AUTO: 30.2 PG (ref 26–35)
MCHC RBC AUTO-ENTMCNC: 33.6 % (ref 32–34.5)
MCV RBC AUTO: 89.7 FL (ref 80–99.9)
MONOCYTES ABSOLUTE: 0.44 E9/L (ref 0.1–0.95)
MONOCYTES RELATIVE PERCENT: 10.4 % (ref 2–12)
NEUTROPHILS ABSOLUTE: 3.65 E9/L (ref 1.8–7.3)
NEUTROPHILS RELATIVE PERCENT: 82.6 % (ref 43–80)
OVALOCYTES: ABNORMAL
PDW BLD-RTO: 13.9 FL (ref 11.5–15)
PHOSPHORUS: 2.8 MG/DL (ref 2.5–4.5)
PLATELET # BLD: 44 E9/L (ref 130–450)
PLATELET CONFIRMATION: NORMAL
PMV BLD AUTO: 13.2 FL (ref 7–12)
POIKILOCYTES: ABNORMAL
POLYCHROMASIA: ABNORMAL
POTASSIUM REFLEX MAGNESIUM: 3.7 MMOL/L (ref 3.5–5)
POTASSIUM SERPL-SCNC: 3.7 MMOL/L (ref 3.5–5)
POTASSIUM SERPL-SCNC: 3.9 MMOL/L (ref 3.5–5)
RBC # BLD: 2.42 E12/L (ref 3.8–5.8)
REPORT: NORMAL
SODIUM BLD-SCNC: 136 MMOL/L (ref 132–146)
SODIUM BLD-SCNC: 136 MMOL/L (ref 132–146)
THIS TEST SENT TO: NORMAL
TOTAL PROTEIN: 4.9 G/DL (ref 6.4–8.3)
WBC # BLD: 4.4 E9/L (ref 4.5–11.5)

## 2021-10-29 PROCEDURE — 99232 SBSQ HOSP IP/OBS MODERATE 35: CPT | Performed by: FAMILY MEDICINE

## 2021-10-29 PROCEDURE — 6370000000 HC RX 637 (ALT 250 FOR IP): Performed by: STUDENT IN AN ORGANIZED HEALTH CARE EDUCATION/TRAINING PROGRAM

## 2021-10-29 PROCEDURE — 85014 HEMATOCRIT: CPT

## 2021-10-29 PROCEDURE — 2140000000 HC CCU INTERMEDIATE R&B

## 2021-10-29 PROCEDURE — 36415 COLL VENOUS BLD VENIPUNCTURE: CPT

## 2021-10-29 PROCEDURE — 6370000000 HC RX 637 (ALT 250 FOR IP): Performed by: INTERNAL MEDICINE

## 2021-10-29 PROCEDURE — 2580000003 HC RX 258: Performed by: INTERNAL MEDICINE

## 2021-10-29 PROCEDURE — 6360000002 HC RX W HCPCS: Performed by: INTERNAL MEDICINE

## 2021-10-29 PROCEDURE — 99252 IP/OBS CONSLTJ NEW/EST SF 35: CPT | Performed by: RADIOLOGY

## 2021-10-29 PROCEDURE — 84100 ASSAY OF PHOSPHORUS: CPT

## 2021-10-29 PROCEDURE — 83735 ASSAY OF MAGNESIUM: CPT

## 2021-10-29 PROCEDURE — 85018 HEMOGLOBIN: CPT

## 2021-10-29 PROCEDURE — 80048 BASIC METABOLIC PNL TOTAL CA: CPT

## 2021-10-29 PROCEDURE — 80053 COMPREHEN METABOLIC PANEL: CPT

## 2021-10-29 PROCEDURE — 85025 COMPLETE CBC W/AUTO DIFF WBC: CPT

## 2021-10-29 PROCEDURE — 85730 THROMBOPLASTIN TIME PARTIAL: CPT

## 2021-10-29 PROCEDURE — 99232 SBSQ HOSP IP/OBS MODERATE 35: CPT | Performed by: INTERNAL MEDICINE

## 2021-10-29 RX ADMIN — Medication 10 ML: at 10:13

## 2021-10-29 RX ADMIN — DOLUTEGRAVIR SODIUM 50 MG: 50 TABLET, FILM COATED ORAL at 22:24

## 2021-10-29 RX ADMIN — PANTOPRAZOLE SODIUM 40 MG: 40 TABLET, DELAYED RELEASE ORAL at 05:57

## 2021-10-29 RX ADMIN — PENICILLIN G BENZATHINE 2.4 MILLION UNITS: 1200000 INJECTION, SUSPENSION INTRAMUSCULAR at 10:13

## 2021-10-29 RX ADMIN — ABACAVIR 600 MG: 300 TABLET, FILM COATED ORAL at 22:24

## 2021-10-29 RX ADMIN — LAMIVUDINE 50 MG: 100 TABLET, FILM COATED ORAL at 22:24

## 2021-10-29 RX ADMIN — PREDNISONE 20 MG: 20 TABLET ORAL at 10:13

## 2021-10-29 RX ADMIN — BENZONATATE 100 MG: 100 CAPSULE ORAL at 22:24

## 2021-10-29 RX ADMIN — Medication 10 ML: at 22:24

## 2021-10-29 RX ADMIN — OXYCODONE HYDROCHLORIDE AND ACETAMINOPHEN 500 MG: 500 TABLET ORAL at 10:13

## 2021-10-29 ASSESSMENT — PAIN SCALES - GENERAL
PAINLEVEL_OUTOF10: 8
PAINLEVEL_OUTOF10: 0
PAINLEVEL_OUTOF10: 0

## 2021-10-29 NOTE — CARE COORDINATION
Ashley Garcia at [facility] for Dialysis to get patient set-up for dialysis. She states she has to check availability and find out if patient is okay for [place] location due to covid dx; advised patient tested positive 10/5. Faxed needed paperwork and Tricia Li will follow-up. Will need a tentative discharge date for patient to be set-up for dialysis.     Rebecca Baer, MSW, LSW [phone]

## 2021-10-29 NOTE — PROGRESS NOTES
[facility]  Department of Internal Medicine  Division of Pulmonary, Critical Care and Sleep Medicine  Progress Note    Lashonda Slater DO, MPH, [facility] Friday, Vimal Guerrero MD, [facility]  [place] [facility]      Patient: Philip Figueroa  MRN: [id number]  : 1990    Encounter Time: 2:50 PM     Date of Admission: 10/5/2021  3:07 PM    Primary Care Physician: Christine Gibson MD    Reason for Consultation: COVID     SUBJECTIVE:    Hep C and HIV + screen  CD4 is 47  Echo = reviewed  HD cath place to start HD  Patient was seen and examined at bedside. S/p renal biopsy yesterday. Denies CP, SOB, Headache, chills, rashes, hematochezia or hemoptysis.          OBJECTIVE:     PHYSICAL EXAM:   VITALS:   Vitals:    10/28/21 1628 10/28/21 2021 10/29/21 0058 10/29/21 1000   BP: 129/88 121/78 128/84 122/77   Pulse: 65 85 113 93   Resp:  16 18 16   Temp: 98 °F (36.7 °C) 98 °F (36.7 °C)  97.6 °F (36.4 °C)   TempSrc:  Temporal  Temporal   SpO2:  100%  95%   Weight: 196 lb 3.4 oz (89 kg)      Height:            Intake/Output Summary (Last 24 hours) at 10/29/2021 1450  Last data filed at 10/29/2021 1000  Gross per 24 hour   Intake 300 ml   Output 1715 ml   Net -1415 ml        CONSTITUTIONAL:   A&O x 3, NAD  SKIN:     No rash,   HEENT:     EOMI, MMM, No thrush  NECK:    No bruits, No JVP apprechiated  CV:      Sinus,  No murmur, No rubs, No gallops  PULMONARY:   Couse BS,  No Wheezing, No Rales, No Rhonchi      No noted egophony  ABDOMEN:     Soft, non-tender. BS normal. No R/R/G  EXT:    No deformities . No clubbing.       + lower extremity edema, No venous stasis  PULSE:   Appears equal and palpable.   PSYCHIATRIC:  Seems appropriate, No acute psycosis  MS:    No fractures, No gross weakness  NEUROLOGIC:   Non-focal     DATA: IMAGING & TESTING:     LABORATORY TESTS:    CBC:   Lab Results   Component Value Date    WBC 4.4 10/29/2021    RBC 2.42 10/29/2021    HGB 7.3 10/29/2021 HCT 21.7 10/29/2021    MCV 89.7 10/29/2021    MCH 30.2 10/29/2021    MCHC 33.6 10/29/2021    RDW 13.9 10/29/2021    PLT 44 10/29/2021    MPV 13.2 10/29/2021     CMP:    Lab Results   Component Value Date     10/29/2021    K 3.7 10/29/2021    K 3.7 10/29/2021     10/29/2021    CO2 28 10/29/2021    BUN 20 10/29/2021    CREATININE 3.0 10/29/2021    GFRAA 30 10/29/2021    LABGLOM 30 10/29/2021    GLUCOSE 89 10/29/2021    PROT 4.9 10/29/2021    LABALBU 2.8 10/29/2021    CALCIUM 8.0 10/29/2021    BILITOT 0.5 10/29/2021    ALKPHOS 85 10/29/2021    AST 41 10/29/2021    ALT 29 10/29/2021     Magnesium:    Lab Results   Component Value Date    MG 1.7 10/29/2021     Phosphorus:    Lab Results   Component Value Date    PHOS 2.8 10/29/2021     PT/INR:    Lab Results   Component Value Date    PROTIME 17.7 10/15/2021    INR 1.6 10/15/2021     FERRITIN:    Lab Results   Component Value Date    FERRITIN 3,921 10/06/2021     Fibrinogen Level:  No components found for: FIB     PRO-BNP:   Lab Results   Component Value Date    PROBNP 3,421 (H) 10/18/2021    PROBNP 11,401 (H) 10/13/2021      ABGs:   Lab Results   Component Value Date    PH 7.479 10/24/2021    PO2 306.9 10/24/2021    PCO2 26.1 10/24/2021     Hemoglobin A1C: No components found for: HGBA1C    IMAGING:  Imaging tests were completed and reviewed and discussed radiology and care team involved and reveals     CT CHEST : There is adequate opacification of the pulmonary arteries.  There is no   evidence of filling defect to suggest pulmonary embolism. Elgie Dowdy is no   evidence of thoracic aortic aneurysm or dissection.  There are small   bilateral pleural effusions.  These are increased compared to prior   examination.  There is small pericardial effusion which appears stable.  Left   hilar lymph node measures approximately 12 mm in short axis.  Right hilar   lymph node measures approximately 12 mm in short axis.  There is bilateral   gynecomastia. Elgie Dowdy is diffuse subcutaneous edema. The central airways are patent.  There is no pneumothorax.  There are   bilateral ground-glass opacities and alveolar consolidation.  There has been   interval increase in the airspace disease compared to prior examination.  The   interval worsening is most pronounced within the lower lobes.  There is   associated interstitial thickening. CT ABDOMEN PELVIS WO CONTRAST Additional Contrast? None    Result Date: 10/5/2021  FINDINGS: THE STUDY IS LIMITED DUE TO LACK OF INTRAVENOUS CONTRAST. Chest: Mediastinum: No thoracic aortic aneurysm. No definite adenopathy on this unenhanced study. Trace pericardial fluid anteriorly. Lungs/pleura: Trace layering pleural effusions bilaterally, slightly larger on the right. No pneumothorax. Numerous ground-glass densities in the bilateral upper and to a lesser degree lower lungs, in keeping with known COVID-19 pneumonia. Soft Tissues/Bones: Bilateral gynecomastia. No acute osseous abnormality in the thoracic cage. Abdomen and pelvis: The study is degraded by the patient's respiratory motion. Organs: Abdominal organs inadequately evaluated on this motion degraded and unenhanced study. No hydronephrosis on either side. GI/Bowel: Normal appendix. No evidence of bowel obstruction. Pelvis: Normal sized prostate. Urinary bladder grossly intact. Peritoneum/Retroperitoneum: No free air or free fluid. No bulky adenopathy. No abdominal aortic aneurysm. Bones/Soft Tissues: No lytic or sclerotic lesion in the regional skeleton. Limited study by the patient's respiratory motion and lack of intravenous contrast. Extensive multifocal COVID-19 pneumonia bilaterally. Bilateral trace layering pleural effusions. Bilateral gynecomastia. Abdominal organs inadequately evaluated due to the limitations of the study. Normal appendix. No evidence of bowel obstruction.      CT CHEST WO CONTRAST    Result Date: 10/5/2021  FINDINGS: THE STUDY IS LIMITED DUE TO LACK OF INTRAVENOUS CONTRAST. Chest: Mediastinum: No thoracic aortic aneurysm. No definite adenopathy on this unenhanced study. Trace pericardial fluid anteriorly. Lungs/pleura: Trace layering pleural effusions bilaterally, slightly larger on the right. No pneumothorax. Numerous ground-glass densities in the bilateral upper and to a lesser degree lower lungs, in keeping with known COVID-19 pneumonia. Soft Tissues/Bones: Bilateral gynecomastia. No acute osseous abnormality in the thoracic cage. Abdomen and pelvis: The study is degraded by the patient's respiratory motion. Organs: Abdominal organs inadequately evaluated on this motion degraded and unenhanced study. No hydronephrosis on either side. GI/Bowel: Normal appendix. No evidence of bowel obstruction. Pelvis: Normal sized prostate. Urinary bladder grossly intact. Peritoneum/Retroperitoneum: No free air or free fluid. No bulky adenopathy. No abdominal aortic aneurysm. Bones/Soft Tissues: No lytic or sclerotic lesion in the regional skeleton. Limited study by the patient's respiratory motion and lack of intravenous contrast. Extensive multifocal COVID-19 pneumonia bilaterally. Bilateral trace layering pleural effusions. Bilateral gynecomastia. Abdominal organs inadequately evaluated due to the limitations of the study. Normal appendix. No evidence of bowel obstruction. ECHOCARDIOGRAM:  Ejection fraction is visually estimated at 55%. There is late positive bubble suggesting possible shunt from pulmonary   origin. Visually moderately dilated left atrium. Normal right ventricular size and function.    There is small pericardial effusion without tamponade physiology    Assessment:  Mr. Reg Hagan is a 36year old male with no significant PMH who was admitted on October 5, 2021 after he presented from an outside clinic after he was found to be hypoxic during 6 minute walking test. Patient tested positive for Covid 19 on August 18th, he is unvaccinated

## 2021-10-29 NOTE — DISCHARGE SUMMARY
Discharge Summary    Jose Banks  :  1990  MRN:  [id number]    Admit date:  10/5/2021  Discharge date:  2021    Admitting Physician:  Pastora Olmos MD      Admission Condition:  poor    Discharged Condition:  good    Discharge Diagnoses:   Patient Active Problem List   Diagnosis    Acute kidney injury due to COVID-19 [facility])    Asthma    Nephrotic range proteinuria    Symptomatic HIV infection [facility])       Hospital Course:     Jose Banks is a 36 y.o. male with no PMH who presented with hypoxia and fever. He was initially seen in PCP office and was hypoxic on exertion, febrile and tachycardic. Per patient, his symptoms began 8/15/2021 and he tested positive for Covid . Endorse worsening shortness of breath over the past month with sputum production, intermittent chest discomfort, persistent diarrhea, chills, fever, neuropathy of b/l feet, decrease in appetite and 50 pound weight loss since August. In the ED, he was found to be anemic, hyperkalemic, hyponatremic, febrile and COVID positive. He also had elevated Creatinine, AST, proBNP and troponin x2. CTA showed extensive multifocal COVID-19 pneumonia bilaterally, bilateral trace layering pleural effusion and bilateral gynecomastia. CT abdomen was limited but showed normal appendix and no evidence of bowel obstruction. Admitted for acute hypoxia due to Covid pneumonia. Nephro, pulm and ID consulted. Started on bicarb drip. US showed hyperechoic kidneys, trace b/l perinephric fluid and right kidney lesion (Cyst vs infarction vs abscess vs neoplasia). HIV and Hep C screen were positive. HIV RNA and T and B lymphocytes were ordered, which subsequently came back positive with CD4 count 44. Patient became more hypoxic, tachycardic and hypertensive so there was concern for PE. CTA was ordered, which came back negative.  Echo was completed due to elevated BNP and concern for PE and that revealed a normal EF with late bubble sign possible shunting of pulmonary origin. BNP continued to increase. Cardiology was consulted. Repeat Echo pending. ID recommended LP with CSF sent for glucose, protein, cell count with differential, which were all  negative. meningitis/encephalitis PCR panel negative, secondary to a Positive RPR. RRT called 10/23 for hypoxia and tachycardia, transferred to medical intensive on 15 L NRB. Creatinine up trending. Currently saturating well on room air and was transferred out of the ICU. Hemodialysis x4 days for worsening renal function. CSF VDRL negative, but continue to treat for latent syphilis with weekly penecillin. Hematology/Oncology consulted 10/24 for worsening pancytopenia. Considered low probability to HIT. Patient presentation thought to be consistent with anemia of chronic disease and iron deficiency. Patient completed granix therapy in order to improve his ANC count in order for better tolerance of HIV therapy He was transfused two units of platelets and 2 units PRBC for worsening pancytopenia. .As platelets trended down to < 45 E9/L 10/29/2021, heparin was held. With worsening creatinine function, Biktarvy also held and patient started on Lamivudine, dolutegravir, and abacavir. Renal biopsy performed on 10/27. Patient with hematuria after biopsy, and acute drop in H/H. CT abdomen post biopsy negative for retroperitoneal hemorrhage. With patient's consistent episodes of tachycardia and recent EKG concerns for WPW , electrophysiology was consulted and patient was started on Propafenone 150 mg TID. Patient continued to tolerate the medication well. Over the remaining course of his stay, patients creatine and platelets improved. Temporary dialysis catheter was removed and patient was kidney function was monitored daily. Renal biopsy results were inconclusive, with plans by nephrology to plan re-biopsy outpatient.  During the last week of the patients stay, patient had two acute drops in H/H ; 6.6 and 6.8 respectively. He was transfused 2 units PRBC. FOBT + ; retroperitoneal u/s, and repeat CT also negative for any retroperitoneal hemorrhage/hematoma. Repeat iron studies also improved from initial comparison on 10/6/2021. General Surgery consulted , no acute intervention planned. Patients creatinine and platelets normalized during the end of his stay. PT/OT evaluated and treated the patient, with patient receiving AM-PAC scores of 20/24. With h/h remaining steady , and no further transfusion requirements, patient was considered stable for discharge to home on triumeq. Patient advised of appropriate follow ups including:    Nephrology - Patient to follow up in 2-3 weeks with Dr. Iris Brandt. Infectious Disease - Patient to continue with Triumeq , follow up in 2-3 weeks with Dr. Dang Villafuerte. Electrophysiology- Patient to follow up with Dr. Griselda Pence for consideration for Rigoberto Roldan. Changes to follow after discharge:  1) Anemia: patient had a few dips in H/H during the last week of his stay. FOBT+ with no surgical intervention recommendations by Surgery. Consideration for outpatient colonoscopy if H/H not stabilizing. 2) Nephrology recommendations: Patient will need CBC, BMP within one week of discharge  3) ID - patient to take triumeq daily , if for some reason patient is not able to get these medications , patient has been advised to call Dr. Melany Buchanan, or can let PCP know as well. Advise compliance. 4) EP - patient discharged with propafenone 150 mg TID.  Can consider BB if needed outpatient    Discharge Medications:         Medication List      START taking these medications    Abacavir-Dolutegravir-Lamivud 600- MG Tabs  Take 1 tablet by mouth daily     ascorbic acid 500 MG tablet  Commonly known as: VITAMIN C  Take 1 tablet by mouth daily  Start taking on: November 8, 2021     propafenone 150 MG tablet  Commonly known as: RYTHMOL  Take 1 tablet by mouth every 8 hours        STOP taking these medications Physician         Physician Cardiology                                                  El Mendoza MD                                 Any Other  Procedure Type of Study   TTE procedure:Echo Limited Study. Procedure Date Date: 10/13/2021 Start: 03:28 PM Study Location: Portable Technical Quality: Adequate visualization Indications:Cardiomyopathy. Patient Status: Routine Contrast Medium: Bubble Study. Height: 78 inches Weight: 175 pounds BSA: 2.13 m^2 BMI: 20.22 kg/m^2  Findings   Left Ventricle  Ejection fraction is visually estimated at 55%. Right Ventricle  Normal right ventricular size and function. Left Atrium  There is late positive bubble suggesting possible shunt from pulmonary  origin. Visually moderately dilated left atrium. Right Atrium  Normal right atrium size. Mitral Valve  Trace mitral regurgitation is present. Tricuspid Valve  Normal tricuspid valve structure and function. Aortic Valve  The aortic valve is trileaflet. Aortic valve opens well. Pulmonic Valve  Pulmonic valve is structurally normal.   Pericardial Effusion  There is small pericardial effusion without tamponade physiology   Conclusions   Summary  Ejection fraction is visually estimated at 55%. There is late positive bubble suggesting possible shunt from pulmonary  origin. Visually moderately dilated left atrium. Normal right ventricular size and function.   There is small pericardial effusion without tamponade physiology   Signature   ----------------------------------------------------------------  Electronically signed by El Mendoza MD(Interpreting  physician) on 10/13/2021 08:10 PM  ----------------------------------------------------------------  M-Mode/2D Measurements & Calculations   LV Diastolic     LV Systolic Dimension: 3.9 cm     LA Dimension: 3.6 cm  Dimension: 5.5   LV Volume Diastolic: 835.0 ml  cm               LV Volume Systolic: 81.3 ml  LV DN:83.4 %     LV EDV/LV EDV Index: 147.6 TX/15  LV PW Diastolic: ml/m^2LV ESV/LV ESV Index: 71.7   RV Diastolic Dimension:  1.3 cm           ml/31ml/ m^2                      2.1 cm  Septum           EF Calculated: 76.9 %  Diastolic: 1.2   LV Mass Index: 135 l/min*m^2  cm                                                 LA volume/Index: 74.8                                                     ml /34. 97ml/m^2  LV Mass: 288.16  g  Doppler Measurements & Calculations    AV Peak Velocity: 1.77 m/s   AV Peak Gradient: 12.59 mmHg   AV Mean Velocity: 1.41 m/s   AV Mean Gradient: 8.5 mmHg   AV VTI: 29.3 cm                             TR Velocity:1.67 m/s                                               TR Gradient:11.12 mmHg  [email or web address] [id number]    CT ABDOMEN PELVIS WO CONTRAST    Result Date: 11/6/2021  EXAMINATION: CT OF THE ABDOMEN AND PELVIS WITHOUT CONTRAST 11/6/2021 1:46 pm TECHNIQUE: CT of the abdomen and pelvis was performed without the administration of intravenous contrast. Multiplanar reformatted images are provided for review. Dose modulation, iterative reconstruction, and/or weight based adjustment of the mA/kV was utilized to reduce the radiation dose to as low as reasonably achievable. COMPARISON: None. HISTORY: ORDERING SYSTEM PROVIDED HISTORY: Rule out retroperitoneal bleed TECHNOLOGIST PROVIDED HISTORY: Reason for exam:->Rule out retroperitoneal bleed What reading provider will be dictating this exam?->CRC FINDINGS: Dilated left ventricle. Visualization of the cardiac interventricular septum suggests anemia. Reticulonodular densities in the bilateral lungs are partially imaged; there is no of subpleural sparing. Dilated IVC and hepatic veins. No evidence of free air. Trace simple free fluid in the pelvis. Zohra Miguel Specifically, there is no evidence of retroperitoneal hematoma; the psoas muscles and iliacus muscles are unremarkable.  No evidence of bowel obstruction. Nondilated appendix. Mildly prominent retroperitoneal lymph nodes are nonspecific. No hydronephrosis. No calcified gallstones. A decompressed bladder. No acute osseous abnormality. No evidence retroperitoneal hemorrhage. Findings suggestive of hypervolemia: Dilated IVC with trace simple free fluid in the pelvis. Cardiomegaly. Partially visualized pulmonary densities. Given the reported history of HIV, opportunistic infection such as pneumocystis pneumonia could have this appearance. COVID-19 pneumonia could also have this appearance. CT ABDOMEN PELVIS WO CONTRAST Additional Contrast? None    Result Date: 10/27/2021  Patient MRN:  [id number] : 1990 Age: 36 years Gender: Male Order Date:  10/27/2021 EXAM: CT ABDOMEN PELVIS WO CONTRAST NUMBER OF IMAGES \ views:  693 INDICATION:  blood in urine post biopsy DR Lillian Zaragoza TO READ COMPARISON: 10/27/2021 Technique: Low-dose CT  acquisition technique included one of following options; 1 . Automated exposure control, 2. Adjustment of MA and or KV according to patient's size or 3. Use of iterative reconstruction. Multiple computerized tomography sections of the abdomen with sagittal and coronal MPR reconstructions were obtained from the top of the diaphragm to the pelvis. The visualized portions of the abdomen reveal: Study is performed to evaluate blood in urine post right renal biopsy. There is a minimal wall fluid surrounding the inferior pole the right kidney. There is a small amount free fluid in the pelvis. No significant hemorrhage is seen.      Post right renal biopsy there is expected sequela of recent biopsy, no significant hemorrhage identified     CT GUIDED NEEDLE PLACEMENT    Result Date: 10/28/2021  Patient MRN:  [id number] : 1990 Age: 36 years Gender: Male Order Date:  10/27/2021 3:52 PM EXAM: CT BIOPSY RENAL, CT GUIDED NEEDLE PLACEMENT NUMBER OF IMAGES:  70 INDICATION:  acute kidney injury  acute kidney injury Which side should the procedure be performed?->Right What reading provider will be dictating this exam?->[facility] COMPARISON: None After obtaining informed consent and following the routine sterile prep and drape, a needle was inserted. Through this guide needle a biopsy needle was inserted. 4 passes were made. Good specimen was obtained. Patient tolerated the procedure well. The procedure was performed under local anesthesia and 1 mg of Versed and 50 mcg of fentanyl. 30 seconds of fluoroscopy was utilized. A timeout was performed at 1523 hours . Patient was monitored for 60 minutes  by registered nurse. Successful uncomplicated CT and fluoroscopic guided right renal biopsy. If there are any physician concerns regarding this report, a Radiologist can be reached by calling the following number [phone]. XR CHEST PORTABLE    Result Date: 10/23/2021  EXAMINATION: ONE XRAY VIEW OF THE CHEST 10/23/2021 4:11 am COMPARISON: 10/22/2021 HISTORY: ORDERING SYSTEM PROVIDED HISTORY: SOB TECHNOLOGIST PROVIDED HISTORY: Reason for exam:->SOB What reading provider will be dictating this exam?->CRC FINDINGS: There are extensive pulmonary infiltrates bilaterally demonstrating slight worsening since the recent previous. There is no pleural effusion or pneumothorax seen. There no cardiomegaly. Mild worsening of extensive bilateral infiltrates     XR CHEST PORTABLE    Result Date: 10/22/2021  EXAMINATION: ONE XRAY VIEW OF THE CHEST 10/22/2021 5:24 pm COMPARISON: Chest, single view 10/17/2021 HISTORY: ORDERING SYSTEM PROVIDED HISTORY: covid pna, hiv, sob FINDINGS: A single portable AP view of the chest was obtained. There is increasing airspace disease in the bilateral lungs. This is confluent in the mid to lower lung zones as well as patchy opacities in the upper lung zones bilaterally. Heart size is stable. No evidence of pneumothorax. Increasing bilateral airspace disease suggesting worsening pneumonia.      XR CHEST PORTABLE    Result Date: 10/17/2021  EXAMINATION: ONE XRAY VIEW OF THE CHEST 10/17/2021 11:41 am COMPARISON: 10/15/2020 HISTORY: ORDERING SYSTEM PROVIDED HISTORY: covid pneumonia TECHNOLOGIST PROVIDED HISTORY: Reason for exam:->covid pneumonia What reading provider will be dictating this exam?->CRC FINDINGS: There are extensive bilateral patchy ground-glass areas of consolidative disease, suggestive of infiltrative pneumonia and or atelectasis. This process appears slightly improved on the right. No pneumothorax or pleural effusions are identified. The heart size is felt to be at the upper limits of normal but stable when compared the previous study. Extensive bilateral patchy areas of ground-glass opacity concerning for atypical pneumonia or viral airway disease, improved     XR CHEST PORTABLE    Result Date: 10/15/2021  EXAMINATION: ONE XRAY VIEW OF THE CHEST 10/15/2021 2:41 pm COMPARISON: CT chest October 13, plain radiographs of the chest October 12 HISTORY: ORDERING SYSTEM PROVIDED HISTORY: post bronch; tachycardia TECHNOLOGIST PROVIDED HISTORY: Reason for exam:->post bronch; tachycardia What reading provider will be dictating this exam?->CRC FINDINGS: Persistent findings for widespread bilateral infiltrates which can be manifestation of acute infectious viral pneumonia. The see report CT chest October 13. No pleural effusions. The heart has borderline size. No pneumothorax on the right on the left. Persistent bilateral infiltrates. No significant changes since October 13. See above comments. See report CT chest October 13.      XR CHEST PORTABLE    Result Date: 10/12/2021  EXAMINATION: ONE XRAY VIEW OF THE CHEST 10/12/2021 9:44 am COMPARISON: 10/07/2021 HISTORY: ORDERING SYSTEM PROVIDED HISTORY: fever, tachycardia TECHNOLOGIST PROVIDED HISTORY: Reason for exam:->fever, tachycardia What reading provider will be dictating this exam?->CRC FINDINGS: Since the prior study there has developed extensive bilateral infiltrates. There are no significant effusions. Heart size is normal.     Development of extensive bilateral pulmonary infiltrates     XR CHEST PORTABLE    Result Date: 10/9/2021  EXAMINATION: ONE XRAY VIEW OF THE CHEST 10/9/2021 7:59 pm COMPARISON: 10/07/2021 HISTORY: ORDERING SYSTEM PROVIDED HISTORY: r/o chf TECHNOLOGIST PROVIDED HISTORY: Reason for exam:->r/o chf What reading provider will be dictating this exam?->CRC FINDINGS: The lungs are without acute focal process. There is no effusion or pneumothorax. The cardiomediastinal silhouette is without acute process. The osseous structures are without acute process. No acute process. CTA PULMONARY W CONTRAST    Result Date: 10/13/2021  EXAMINATION: CTA OF THE CHEST 10/13/2021 12:20 pm TECHNIQUE: CTA of the chest was performed after the administration of intravenous contrast.  Multiplanar reformatted images are provided for review. MIP images are provided for review. Dose modulation, iterative reconstruction, and/or weight based adjustment of the mA/kV was utilized to reduce the radiation dose to as low as reasonably achievable. COMPARISON: CT chest 10/05/2021. HISTORY: ORDERING SYSTEM PROVIDED HISTORY: covid pna, hypoxia, tachycardia, r/o PE FINDINGS: There is adequate opacification of the pulmonary arteries. There is no evidence of filling defect to suggest pulmonary embolism. There is no evidence of thoracic aortic aneurysm or dissection. There are small bilateral pleural effusions. These are increased compared to prior examination. There is small pericardial effusion which appears stable. Left hilar lymph node measures approximately 12 mm in short axis. Right hilar lymph node measures approximately 12 mm in short axis. There is bilateral gynecomastia. There is diffuse subcutaneous edema. The central airways are patent. There is no pneumothorax. There are bilateral ground-glass opacities and alveolar consolidation. There has been interval increase in the airspace disease compared to prior examination. The interval worsening is most pronounced within the lower lobes. There is associated interstitial thickening. There is no acute abnormality within the visualized upper abdomen. 1. No evidence of pulmonary embolism. 2. Interval increased size of bilateral pleural effusions with little change in pericardial effusion. 3. Persistent subcutaneous edema. 4. Reactive bilateral hilar lymphadenopathy. 5. Bilateral airspace disease is increased compared to prior examination. The increase is most pronounced within the lower lobes. 6. Bilateral gynecomastia. CT BIOPSY RENAL    Result Date: 10/28/2021  Patient MRN:  [id number] : 1990 Age: 36 years Gender: Male Order Date:  10/27/2021 3:52 PM EXAM: CT BIOPSY RENAL, CT GUIDED NEEDLE PLACEMENT NUMBER OF IMAGES:  79 INDICATION:  acute kidney injury  acute kidney injury Which side should the procedure be performed?->Right What reading provider will be dictating this exam?->[facility] COMPARISON: None After obtaining informed consent and following the routine sterile prep and drape, a needle was inserted. Through this guide needle a biopsy needle was inserted. 4 passes were made. Good specimen was obtained. Patient tolerated the procedure well. The procedure was performed under local anesthesia and 1 mg of Versed and 50 mcg of fentanyl. 30 seconds of fluoroscopy was utilized. A timeout was performed at 1523 hours . Patient was monitored for 60 minutes  by registered nurse. Successful uncomplicated CT and fluoroscopic guided right renal biopsy. If there are any physician concerns regarding this report, a Radiologist can be reached by calling the following number [phone]. US RETROPERITONEAL LIMITED    Result Date: 2021  EXAMINATION: ULTRASOUND OF THE KIDNEYS 2021 11:05 am COMPARISON: None.  HISTORY: ORDERING SYSTEM PROVIDED HISTORY: Concern for retroperitoneal bleed TECHNOLOGIST PROVIDED HISTORY: Reason for exam:->Concern for retroperitoneal bleed What reading provider will be dictating this exam?->CRC FINDINGS: Real-time imaging was performed of the upper abdomen and retroperitoneal spaces. Images demonstrate normal paravertebral soft tissues, aorta and IVC. The kidneys are unremarkable A small volume of free fluid is identified in the anterior right peritoneal space. The pancreas appears normal     1. Minimal free intraperitoneal fluid. 2. No sign of retroperitoneal mass/hemorrhage. FL LUMBAR PUNCTURE DIAG    Result Date: 10/22/2021  EXAMINATION: FLUOROSCOPIC GUIDED LUMBAR PUNCTURE 10/21/2021 5:07 pm HISTORY: ORDERING SYSTEM PROVIDED HISTORY: Neurosyphilis. Please send CSF for glucose, protein, cell count with differential, Gram stain and culture, meningitis/encephalitis PCR panel, VDRL. TECHNOLOGIST PROVIDED HISTORY: Reason for exam:->Neurosyphilis. Please send CSF for glucose, protein, cell count with differential, Gram stain and culture, meningitis/encephalitis PCR panel, VDRL. What reading provider will be dictating this exam?->CRC FLUOROSCOPY DOSE AND TYPE OR TIME AND EXPOSURES: Fluoroscopy time 0.08 minute Dose: 68.6  cGy*cm^2 PROCEDURE: :  Cristobal Leavitt Informed consent was obtained after the risks and benefits of the procedure were discussed with the patient and all questions were answered fully. [place] protocol was observed and a standard timeout was performed. The patient was positioned prone and the back was prepped and draped in the normal sterile fashion. 1% lidocaine was used for local anesthesia. The subarachnoid space was accessed with a 22-gauge 3.5\" spinal needle at the 3 level. Free flow of clear CSF was noted. Approximately 8 ml of CSF was removed and sent for analysis. The stylet was reinserted, spinal needle was removed and brief pressure was applied at the puncture site.  There were no immediate complications and the patient tolerated the procedure well. Successful fluoroscopic-guided lumbar puncture. Treatments:   IV hydration, antibiotics: penecillin, cardiac meds: propafenone 150 mg TID, labetalol 10 mg once. anticoagulation: LMW heparin, steroids: dexamethasone, therapies: PT and OT, procedures: biopsy: right renal biopsy, lumbar puncture, temporary hemodialysis catheter placement and dialysis: Hemodialysis    Discharge Exam:  Please refer to physical exam day of discharge. Disposition:   home    Future Appointments   Date Time Provider Nikko Priscilla   11/8/2021  3:20 PM [facility]       More than 30 minutes was spent in preparation of this patient's discharge including, but not limited to, examination, preparation of documents, prescription preparation, counseling and coordination.     Signed:  Mayelin Macias MD  11/7/2021, 4:01 PM

## 2021-10-29 NOTE — PROGRESS NOTES
[facility] - [facility] Inpatient   Resident Progress Note    S:  Hospital day: 24   Brief Synopsis: Jose Banks is a 36 y.o. male with no PMH who presented with hypoxia and fever. He was initially seen in PCP office and was hypoxic on exertion with desaturate into the mid low 80s as well as noted to be febrile and tachycardic. Per patient, his symptoms began 8/15/2021 and he tested positive for Covid 8/28. endorse worsening shortness of breath over the past month with sputum production, intermittent chest discomfort, persistent diarrhea, chills, fever, decrease in appetite and 50 pound weight loss since August.  Patient also endorses significantly worsening \"neuropathy\" of bilateral feet, states it feels like he is \"being stabbed by needles\" which is causing him inability to walk due to pain. In the ED, he was found to be anemic, hyperkalemic, hyponatremic, febrile and COVID positive. He also had elevated Creatinine, AST, proBNP and troponin x2. CTA showed extensive multifocal COVID-19 pneumonia bilaterally, bilateral trace layering pleural effusion and bilateral gynecomastia. CT abdomen was limited due to movement and non-contrast, but showed normal appendix and no evidence of bowel obstruction. Admitted for acute hypoxia due to Covid pneumonia. Nephro, pulm and ID consulted. Started on bicarb drip. US showed hyperechoic kidneys, trace b/l perinephric fluid and right kidney lesion (Cyst vs infarction vs abscess vs neoplasia). HIV and Hep C screen were positive. HIV RNA and T and B lymphocytes were ordered. Patient became more hypoxic, tachycardic and hypertensive so  there was concern for PE. CTA was ordered, which came back negative. Echo was completed due to elevated BNP and concern for PE and that revealed a normal EF with late bubble sign possible shunting of pulmonary origin. BNP continued to increase. Cardiology was consulted. Repeat Echo pending.  ID recommends LP with CSF sent for glucose, protein, cell count with differential, which were all  negative. meningitis/encephalitis PCR panel negative. RRT called 10/23 for hypoxia and tachycardia, transferred to medical intensive on 15 L NRB. Creatinine up trending. Currently saturating well on room air and was transferred out of the ICU. Hemodialysis x4 days for worsening renal function. CSF VDRL negative, but continue to treat for latent syphilis. Renal biopsy performed on 10/27. Endorses hematuria s/p biopsy. CT abdomen showed no acute abnormalities. Overnight/interim:   Patient was seen and examined at bedside. Denies gross hematuria,abdominal pain, CP, SOB, Headache, chills, rashes, hematochezia or hemoptysis. Has lower mood today due feeling like all of the treatments he is getting is not working.        Cont meds:    sodium chloride      sodium chloride      sodium chloride      sodium chloride      sodium chloride      sodium chloride       Scheduled meds:    pentamidine isethionate (PENTAM) IVPB  4 mg/kg IntraVENous Daily    abacavir  600 mg Oral Nightly    dolutegravir sodium  50 mg Oral Nightly    lamiVUDine  50 mg Oral Nightly    predniSONE  20 mg Oral Daily    [Held by provider] heparin (porcine)  5,000 Units SubCUTAneous Q8H    penicillin G benzathine  2.4 Million Units IntraMUSCular Weekly    lidocaine  1 patch TransDERmal Daily    ascorbic acid  500 mg Oral Daily    [Held by provider] metoprolol tartrate  25 mg Oral Daily    pantoprazole  40 mg Oral QAM AC    sodium chloride flush  5-40 mL IntraVENous 2 times per day     PRN meds: sodium chloride, sodium chloride, sodium chloride, sodium chloride, sodium chloride, medicated lip balm, benzonatate, perflutren lipid microspheres, hydrOXYzine, labetalol, sodium chloride flush, ondansetron **OR** ondansetron, polyethylene glycol, acetaminophen **OR** acetaminophen, sodium chloride     I reviewed the patient's past medical and surgical history, Medications and Allergies. O:  /84   Pulse 113   Temp 98 °F (36.7 °C) (Temporal)   Resp 18   Ht 6' 4\" (1.93 m)   Wt 196 lb 3.4 oz (89 kg)   SpO2 100%   BMI 23.88 kg/m²   24 hour I&O: I/O last 3 completed shifts: In: 300   Out: 1715 [Urine:500]  No intake/output data recorded. Physical Exam  Vitals reviewed. Constitutional:       General: He is not in acute distress. Appearance: Normal appearance. He is not ill-appearing, toxic-appearing or diaphoretic. HENT:      Head: Normocephalic and atraumatic. Nose: No rhinorrhea. Mouth/Throat:      Mouth: Mucous membranes are moist.      Pharynx: Oropharynx is clear. Eyes:      General: No scleral icterus. Right eye: No discharge. Left eye: No discharge. Extraocular Movements: Extraocular movements intact. Cardiovascular:      Rate and Rhythm: Normal rate and regular rhythm. Pulses: Normal pulses. Heart sounds: Normal heart sounds. No murmur heard. No friction rub. No gallop. Pulmonary:      Effort: No respiratory distress. Breath sounds: No wheezing, rhonchi or rales. Abdominal:      General: Abdomen is flat. Palpations: Abdomen is soft. Tenderness: There is no abdominal tenderness. Musculoskeletal:         General: No swelling. Cervical back: Normal range of motion. No rigidity or tenderness. Lumbar back: No swelling or spasms. Normal range of motion. Right lower leg: No edema. Left lower leg: No edema. Skin:     General: Skin is warm and dry. Coloration: Skin is not jaundiced. Findings: No bruising or rash. Comments: Right femoral HD port, c/d/i   Neurological:      General: No focal deficit present. Mental Status: He is alert and oriented to person, place, and time. Cranial Nerves: No cranial nerve deficit. Motor: No weakness. Psychiatric:         Behavior: Behavior normal.         Thought Content:  Thought content normal.      Comments: Depressed mood, denies SI/HI         Labs:  Na/K/Cl/CO2:  136/3.7, 3.7/101/28 (10/29 0500)  BUN/Cr/glu/ALT/AST/amyl/lip:  20/3.0/--/29/41/--/-- (10/29 0500)  WBC/Hgb/Hct/Plts:  4.4/7.3/21.7/44 (10/29 0500)  estimated creatinine clearance is 45 mL/min (A) (based on SCr of 3 mg/dL (H)). Other pertinent labs as noted below    Radiology:  CT ABDOMEN PELVIS WO CONTRAST Additional Contrast? None   Final Result   Post right renal biopsy there is expected sequela of recent biopsy, no   significant hemorrhage identified                                        CT BIOPSY RENAL   Final Result   Successful uncomplicated CT and fluoroscopic guided right   renal biopsy. If there are any physician concerns regarding this report, a   Radiologist can be reached by calling the following number [phone]. CT GUIDED NEEDLE PLACEMENT   Final Result   Successful uncomplicated CT and fluoroscopic guided right   renal biopsy. If there are any physician concerns regarding this report, a   Radiologist can be reached by calling the following number [phone]. XR CHEST PORTABLE   Final Result   Mild worsening of extensive bilateral infiltrates         XR CHEST PORTABLE   Final Result   Increasing bilateral airspace disease suggesting worsening pneumonia. FL LUMBAR PUNCTURE DIAG   Final Result   Successful fluoroscopic-guided lumbar puncture. XR CHEST PORTABLE   Final Result   Extensive bilateral patchy areas of ground-glass opacity concerning for   atypical pneumonia or viral airway disease, improved         XR CHEST PORTABLE   Final Result   Persistent bilateral infiltrates. No significant changes since October 13. See above comments. See report CT chest October 13. CTA PULMONARY W CONTRAST   Final Result   1. No evidence of pulmonary embolism. 2. Interval increased size of bilateral pleural effusions with little change   in pericardial effusion.    3. Persistent subcutaneous edema.   4. Reactive bilateral hilar lymphadenopathy. 5. Bilateral airspace disease is increased compared to prior examination. The increase is most pronounced within the lower lobes. 6. Bilateral gynecomastia. XR CHEST PORTABLE   Final Result   Development of extensive bilateral pulmonary infiltrates         XR CHEST PORTABLE   Final Result   No acute process. US RETROPERITONEAL COMPLETE   Final Result   1. Hyperechoic kidneys due to underlying parenchymal disease. 2. Trace bilateral perinephric fluid of indeterminate etiology, potentially   due to underlying inflammation. 3. An approximately 1.1 cm x 1.5 cm x 2.3 cm lesion in the right kidney could   be a cyst but could also be seen with infarction, abscess, or neoplasia. Consider further evaluation with renal protocol abdomen MRI (preferred) or   CT. Following resolution of acute kidney injury. XR CHEST PORTABLE   Final Result   Multifocal bilateral pulmonary ground-glass airspace opacities are stable         CT ABDOMEN PELVIS WO CONTRAST Additional Contrast? None   Final Result   Limited study by the patient's respiratory motion and lack of intravenous   contrast.      Extensive multifocal COVID-19 pneumonia bilaterally. Bilateral trace layering pleural effusions. Bilateral gynecomastia. Abdominal organs inadequately evaluated due to the limitations of the study. Normal appendix. No evidence of bowel obstruction. CT CHEST WO CONTRAST   Final Result   Limited study by the patient's respiratory motion and lack of intravenous   contrast.      Extensive multifocal COVID-19 pneumonia bilaterally. Bilateral trace layering pleural effusions. Bilateral gynecomastia. Abdominal organs inadequately evaluated due to the limitations of the study. Normal appendix. No evidence of bowel obstruction.                Resident Assessment and Plan       Acute hypoxia 2/2 COVID pneumonia vs PCP  - CTA on admission: COVID pneumonia, Bilateral trace layering pleural effusion, negative for PE   - Ferritin 3900, CRP 10.1 on admission   - D dimer elevated on admission, currently stable  - Pulmonology and ID on board  - Legionella, spot TB test negative   - CXR 10/8 shows multifocal groundglass opacities are stable  - Repeat CXR 10/17: b/l pulm infiltrates improving   - Blood cx: staph epidermitis, repeat blood cx shows no growth x 7 days   -Urine culture: NGTD   - ECHO 10/13: EF 55%, late bubble sign possible shunting of pulmonary origin   - CTA 10/13 negative for PE  Bronchoscopy with BAL performed on 10/15/2021   - Respiratory culture: oral pharyngeal liana decreased, few gram negative rods   CXR 10/23: Mild worsening extensive bilateral infiltrates  - Currently maintaining saturation on room air, continue to monitor  - On prednisone    - Repeat blood culture 10/21 showed NGTD x5days      Weight loss likely secondary to HIV  - Per patient due to decreased appetite from COVID; 50lb weight loss since August   - R/o other potential origins: SLE, TB, hepatitis  - MARILEE negative  - T spot TB test negative   HIV, hepatitis C antibodies positive  - Elevated IgG and IgA, normal IgM: IgA nephropathy vs MPGN?   - Cryoglobulin normal   - TSH normal   - Sed rate and CRP elevated      HIV  HIV 1/2 antibodies positive    CD4 44, CD3 153, CD8 100  HIV viral load 195,000  - ID on board: received vancomycin, ancef; stopped cefepime 10/26  - Fungitell test positive x2, received eraxis  - Bactrim d/c'd due to renal failure; on pentamidine for suspected Pneumocystis pneumonia  - Pneumocystis stain negative, but PCR pending; still suspect PCP pna  - HIV GART pending   - [id number] negative, genotype pending  - Toxoplasma Ab, cryptococcus negative  - hepatitis A total Ab positive  - RPR reactive, quant 1:2, CSF VDRL negative   - On PCN G weekly for latent syphilis (received 2/3 doses)  - Started on biktarvy 10/22, d/c'd due to renal failure   - Started on lamivudine, dolutegravir, and abacavir 10/25    Hematuria - resolved   - benjie blood in urine s/p renal biopsy 10/27  - Hb trending down   - Continue to monitor H/H     Anemia  Pancytopenia  - Likely 2/2 from GI bleed vs HIV   Peripheral blood smear shows normocytic anemia, absolute lymphocytopenia; no platelet clumping, schistocytes or dysplasia  - Heme Onc consulted: received granix to improve ANC  Transfused 2 units pRBC since admission  - FOBT negative x3  Platelets trending down, transfusion if bleeding or plts <20k   - Transfused 2U platelets prior to and during kidney biopsy   - Hb 7.3, trending down; trend H/H q8h    DEISI    Hyponatremia - improving   - Na normalized    - Nephro on board   - Likely secondary to dehydration from persistent diarrhea vs decreased PO intake vs bactrim vs HIVANS vs Biktarvy  - Cr improving after HD,3.7  [employer]  - urine studies show intrinsic cause of DEISI  - Microalbumin-creatinine ratio elevated   - Reconsulted IR to inquire about kidney biopsy inpatient   Fluid restriction  - Oliguric (0.2 mL/kg/hr)  - received dialysis  10/25 x3days    Hypoalbuminemia - improving  -2/2 poor intake vs CHF vs nephrotic syndrome  - Alb stable  -Previously received albumin 25 g q6h x 4 doses 10/6, 8 doses 10/12 and 8 doses 10/23  Low C3, normal C4 and cryoglobulin  Fluid restriction   - Continue to monitor     Elevated troponin and proBNP  - 2/2 myocardial injury vs stress cardiomyopathy due to COVID vs new onset CHF   - initial troponin 78, 78  - initial BNP 1599,  repeat 11,401 on 10/14, trending down 3400   - ECHO: EF 55%, late bubble sign possible shunting of pulmonary origin  - Repeat ECHO pending      Tachycardia - improving  - 2/2 to infectious vs anxiety vs anemia   HR improving   Cardio on board   - Hold lopressor   Asymptomatic  Continue to monitor    HTN -improving  - BP on lower end  - Lopressor held   - labetalol PRN  - Continue to monitor                PT/OT evaluation: Consulted, patient refused  DVT prophylaxis: PCDs  GI prophylaxis:protonix  Disposition: intermediate   Diet: adult         Electronically signed by Pete Best MD PGY-1 on 10/29/2021 at 8:53 AM  Attending physician: Dr. Britta Ya

## 2021-10-29 NOTE — CARE COORDINATION
Call made to [facility] at [facility] and they currently have no openings at the [facility] location. SW called [facility] in [place] to set-up patient for dialysis but no answer; left a message to return the call.     Amrita Puente, MSW, LSW [phone]

## 2021-10-29 NOTE — PROGRESS NOTES
[facility] PROGRESS NOTE      Chief complaint: Follow-up of Pneumocystis pneumonia, late latent syphilis, advanced HIV disease    The patient is a 36 y.o. male, not vaccinated against COVID-19, with history of asthma, presented on 10/05 with shortness of breath, found to be febrile at 103 °F, positive SARS-CoV-2 PCR, bilateral groundglass opacities on chest CT, tolerating room air with oxygen saturation of 97%. He reports having tested positive for SARS-CoV-2 for the 3rd time now with previous on in late August at which time he reports having quarantined. Urine Streptococcus pneumonia and Legionella antigens were negative. Blood cultures from 10/05 and 10/06 showed methicillin-susceptible Staphylococcus epidermidis in 1 out of 2 sets. HIV screen was positive with viral load of 195,000. CD4 count was low at 43. RPR was reactive at titer of 1:2. Toxoplasma Ab was negative. HLA- was negative. Serum beta-d-glucan was positive. Transthoracic echocardiogram showed late positive bubble suggesting possible shunt from pulmonary origin, moderately dilated left atrium, small pericardial effusion without tamponade physiology, ejection fraction visually estimated at 55%. He underwent bronchoscopy on 10/15 during which normal endobronchial evaluation and normal anatomy were noted. BAL Gram stain and culture showed rare polymorphonuclear leukocytes, rare epithelial cells, rare gram-negative rods, rare gram-positive rods, reduced oropharyngeal liana. BAL pneumocystis stain was negative. BAL galactomannan was negative. Serum Cryptococcus antigen was negative. He underwent lumbar puncture on 10/21, yielding CSF with normal glucose, protein, csll count with differential. CSF meningitis/encephalitis PCR panel was negative for HSV, VZV, CMV, Enterovirus, HHV-6, Streptococcus pneumoniae and agalactiae, E coli, Haemophilus, Neisseria meninigitidis, Cryptococcus. CSF VDRL was negative.  He was transferred to MICU on 10/23 for worsening shortness of breath then transferred out on 10/26. He underwent renal biopsy on 10/27. Subjective: Patient was seen and examined. No chills, no abdominal pain, no diarrhea, no rash, no itching. Afebrile. He reports hematuria s/p renal biopsy is clearing up. Objective:  /77   Pulse 93   Temp 97.6 °F (36.4 °C) (Temporal)   Resp 16   Ht 6' 4\" (1.93 m)   Wt 196 lb 3.4 oz (89 kg)   SpO2 95%   BMI 23.88 kg/m²   Constitutional: Alert, not in distress  Respiratory: Clear breath sounds, no crackles, no wheezes  Cardiovascular: Regular rate and rhythm, no murmurs  Gastrointestinal: Bowel sounds present, soft, nontender  Skin: Warm and dry, no active dermatoses  Musculoskeletal: No joint swelling, no joint erythema    Labs, imaging, and medical records/notes were personally reviewed. Assessment:  Fever and pancytopenia, suspect associated with acute retroviral syndrome. Jarische-Herxheimer reaction is less likely in late latent syphilis with low RPR titers. Pancytopenia, multifactorial, suspect associated with acute retroviral syndrome and/or medication-induced  COVID-19, mild  Pneumonia, suspected Pneumocystis pneumonia (serum beta-d-glucan was positive at >500 pg/mL, BAL Pneumocystis stain was negative but Pneumocystis PCR was not sent)  Late latent syphilis, neurosyphilis ruled out  Methicillin susceptible Staphylococcus epidermidis bacteremia, etiology unclear  DEISI  Advanced HIV disease (viral load of 195,000; CD4 of 43 as of 10/11/2021). Hepatitis C Ab positive (viral load not detected). Hepatitis A immune  Prolonged QTc  HAP  Hematuria, probably associated with renal biopsy    Recommendations:  Continue benzathine penicillin IM weekly for 3 doses. Continue lamivudine 50 mg q24h, dolutegravir 50 mg q24h, abacavir 600 mg q24h. Continue pentamidine 4 mg/kg IV q48h after hemodialysis to finish 21 days until 11/02. Continue oral prednisone 20 mg q24h until 11//02.   Follow up HIV UDAYT, INSTI resistance. Follow up urine GC/Chlamydia PCR. Monitor respiratory status. Continue supportive care. Thank you for involving me in the care of Kristin James. Dr. Ana María Benitez will continue to follow. Please do not hesitate to call for any questions or concerns.     Electronically signed by Nikko Wells MD on 10/29/2021 at 12:23 PM

## 2021-10-29 NOTE — CONSULTS
Neuro-Interventional/ Interventional Consult     Patient Tena Severin  MRN: [id number]  YOB: 1990  DATE OF EVALUATION: 10/29/2021    HPI:   Chief Complaint   Patient presents with    Fever     sent in from the clinic for a low walking pulse ox and fever. Reports 53# weight loss in approx 2 months. Cough with left sided chest pain    Extremity Weakness     states that the top of his bilateral feet feel numb and weak       Assessment:   Reason For Evaluation:   Roopa Schwartz a 36 y.o. male with renal failure  thrombocytompenia      Plan:     rec   Biopsy following platelet tranfusion    Physical Exam: alert and orientated, moves all 4 extremities,     40 minutes of which 50% was spent coordinating care and couseling      Allergies: No Known Allergies  Prior to Visit Medications    Medication Sig Taking? Authorizing Provider   bictegravir-emtricitab-tenofovir alafenamide (BIKTARVY) -25 MG TABS per tablet Take 1 tablet by mouth daily Yes Yola Sutton MD   Penicillin G Benzathine (BICILLIN L-A) [id number] UNIT/4ML SUSP Inject 4 mLs into the muscle once a week for 2 doses Inject into upper outer gluteal area.  Yes Yola Sutton MD   chlorhexidine (PERIDEX) 0.12 % solution SWISH 1/2 OUNCE BY MOUTH TWICE DAILY Yes Historical Provider, MD     Social History     Tobacco Use    Smoking status: Former Smoker     Quit date: 2021     Years since quittin.2    Smokeless tobacco: Never Used   Vaping Use    Vaping Use: Never used   Substance Use Topics    Alcohol use: No    Drug use: No     Family History   Problem Relation Age of Onset    Diabetes type 2  Mother     Diabetes type 2  Father      Past Surgical History:   Procedure Laterality Date    BRONCHOSCOPY N/A 10/15/2021    BRONCHOSCOPY ALVEOLAR LAVAGE performed by Asa Altamirano DO at [address] BIOPSY RENAL  10/27/2021    CT BIOPSY RENAL 10/27/2021 Kaye Cosme MD [facility] CT    FRACTURE SURGERY       Past Medical History:   Diagnosis Date    Asthma          Objective:   /77   Pulse 93   Temp 97.6 °F (36.4 °C) (Temporal)   Resp 16   Ht 6' 4\" (1.93 m)   Wt 196 lb 3.4 oz (89 kg)   SpO2 95%   BMI 23.88 kg/m²       Laboratory/Radiology:     Recent Results (from the past 24 hour(s))   Basic Metabolic Panel    Collection Time: 10/28/21  5:55 PM   Result Value Ref Range    Sodium 135 132 - 146 mmol/L    Potassium 4.0 3.5 - 5.0 mmol/L    Chloride 99 98 - 107 mmol/L    CO2 28 22 - 29 mmol/L    Anion Gap 8 7 - 16 mmol/L    Glucose 96 74 - 99 mg/dL    BUN 14 6 - 20 mg/dL    CREATININE 2.2 (H) 0.7 - 1.2 mg/dL    GFR Non-African American 43 >=60 mL/min/1.73    GFR African American 43     Calcium 8.4 (L) 8.6 - 10.2 mg/dL   Hemoglobin and hematocrit, blood    Collection Time: 10/28/21  9:47 PM   Result Value Ref Range    Hemoglobin 8.1 (L) 12.5 - 16.5 g/dL    Hematocrit 24.4 (L) 37.0 - 54.0 %   Basic Metabolic Panel    Collection Time: 10/28/21 11:36 PM   Result Value Ref Range    Sodium 136 132 - 146 mmol/L    Potassium 3.9 3.5 - 5.0 mmol/L    Chloride 101 98 - 107 mmol/L    CO2 27 22 - 29 mmol/L    Anion Gap 8 7 - 16 mmol/L    Glucose 132 (H) 74 - 99 mg/dL    BUN 19 6 - 20 mg/dL    CREATININE 2.8 (H) 0.7 - 1.2 mg/dL    GFR Non-African American 33 >=60 mL/min/1.73    GFR African American 33     Calcium 8.0 (L) 8.6 - 10.2 mg/dL   Basic Metabolic Panel    Collection Time: 10/29/21  5:00 AM   Result Value Ref Range    Sodium 136 132 - 146 mmol/L    Potassium 3.7 3.5 - 5.0 mmol/L    Chloride 101 98 - 107 mmol/L    CO2 28 22 - 29 mmol/L    Anion Gap 7 7 - 16 mmol/L    Glucose 89 74 - 99 mg/dL    BUN 20 6 - 20 mg/dL    CREATININE 3.0 (H) 0.7 - 1.2 mg/dL    GFR Non-African American 30 >=60 mL/min/1.73    GFR African American 30     Calcium 8.0 (L) 8.6 - 10.2 mg/dL   Comprehensive Metabolic Panel w/ Reflex to MG    Collection Time: 10/29/21  5:00 AM   Result Value Ref Range    Potassium reflex Magnesium 3.7 3.5 - 5.0 mmol/L    Total Protein 4.9 (L) 6.4 - 8.3 g/dL    Albumin 2.8 (L) 3.5 - 5.2 g/dL    Total Bilirubin 0.5 0.0 - 1.2 mg/dL    Alkaline Phosphatase 85 40 - 129 U/L    ALT 29 0 - 40 U/L    AST 41 (H) 0 - 39 U/L   CBC auto differential    Collection Time: 10/29/21  5:00 AM   Result Value Ref Range    WBC 4.4 (L) 4.5 - 11.5 E9/L    RBC 2.42 (L) 3.80 - 5.80 E12/L    Hemoglobin 7.3 (L) 12.5 - 16.5 g/dL    Hematocrit 21.7 (L) 37.0 - 54.0 %    MCV 89.7 80.0 - 99.9 fL    MCH 30.2 26.0 - 35.0 pg    MCHC 33.6 32.0 - 34.5 %    RDW 13.9 11.5 - 15.0 fL    Platelets 44 (L) 964 - 450 E9/L    MPV 13.2 (H) 7.0 - 12.0 fL    Neutrophils % 82.6 (H) 43.0 - 80.0 %    Lymphocytes % 5.2 (L) 20.0 - 42.0 %    Monocytes % 10.4 2.0 - 12.0 %    Eosinophils % 1.7 0.0 - 6.0 %    Basophils % 0.2 0.0 - 2.0 %    Neutrophils Absolute 3.65 1.80 - 7.30 E9/L    Lymphocytes Absolute 0.22 (L) 1.50 - 4.00 E9/L    Monocytes Absolute 0.44 0.10 - 0.95 E9/L    Eosinophils Absolute 0.07 0.05 - 0.50 E9/L    Basophils Absolute 0.00 0.00 - 0.20 E9/L    Anisocytosis 1+     Polychromasia 1+     Hypochromia 1+     Poikilocytes 2+     Ovalocytes 2+     Basophilic Stippling 1+    APTT    Collection Time: 10/29/21  5:00 AM   Result Value Ref Range    aPTT 26.0 24.5 - 35.1 sec   Magnesium    Collection Time: 10/29/21  5:00 AM   Result Value Ref Range    Magnesium 1.7 1.6 - 2.6 mg/dL   Phosphorus    Collection Time: 10/29/21  5:00 AM   Result Value Ref Range    Phosphorus 2.8 2.5 - 4.5 mg/dL   Platelet Confirmation    Collection Time: 10/29/21  5:00 AM   Result Value Ref Range    Platelet Confirmation CONFIRMED        CT ABDOMEN PELVIS WO CONTRAST Additional Contrast? None    Result Date: 10/27/2021  Patient MRN:  [id number] : 1990 Age: 36 years Gender: Male Order Date:  10/27/2021 EXAM: CT ABDOMEN PELVIS WO CONTRAST NUMBER OF IMAGES \ views:  432 INDICATION:  blood in urine post biopsy DR Yair Loving TO READ COMPARISON: 10/27/2021 Technique: Low-dose CT  acquisition technique included one of following options; 1 . Automated exposure control, 2. Adjustment of MA and or KV according to patient's size or 3. Use of iterative reconstruction. Multiple computerized tomography sections of the abdomen with sagittal and coronal MPR reconstructions were obtained from the top of the diaphragm to the pelvis. The visualized portions of the abdomen reveal: Study is performed to evaluate blood in urine post right renal biopsy. There is a minimal wall fluid surrounding the inferior pole the right kidney. There is a small amount free fluid in the pelvis. No significant hemorrhage is seen. Post right renal biopsy there is expected sequela of recent biopsy, no significant hemorrhage identified     CT GUIDED NEEDLE PLACEMENT    Result Date: 10/28/2021  Patient MRN:  [id number] : 1990 Age: 36 years Gender: Male Order Date:  10/27/2021 3:52 PM EXAM: CT BIOPSY RENAL, CT GUIDED NEEDLE PLACEMENT NUMBER OF IMAGES:  70 INDICATION:  acute kidney injury  acute kidney injury Which side should the procedure be performed?->Right What reading provider will be dictating this exam?->[facility] COMPARISON: None After obtaining informed consent and following the routine sterile prep and drape, a needle was inserted. Through this guide needle a biopsy needle was inserted. 4 passes were made. Good specimen was obtained. Patient tolerated the procedure well. The procedure was performed under local anesthesia and 1 mg of Versed and 50 mcg of fentanyl. 30 seconds of fluoroscopy was utilized. A timeout was performed at 1523 hours . Patient was monitored for 60 minutes  by registered nurse. Successful uncomplicated CT and fluoroscopic guided right renal biopsy. If there are any physician concerns regarding this report, a Radiologist can be reached by calling the following number [phone].      CT BIOPSY RENAL    Result Date: 10/28/2021  Patient MRN:  [id number] : 1990 Age: 36 years Gender: Male Order Date:  10/27/2021 3:52 PM EXAM: CT BIOPSY RENAL, CT GUIDED NEEDLE PLACEMENT NUMBER OF IMAGES:  79 INDICATION:  acute kidney injury  acute kidney injury Which side should the procedure be performed?->Right What reading provider will be dictating this exam?->[facility] COMPARISON: None After obtaining informed consent and following the routine sterile prep and drape, a needle was inserted. Through this guide needle a biopsy needle was inserted. 4 passes were made. Good specimen was obtained. Patient tolerated the procedure well. The procedure was performed under local anesthesia and 1 mg of Versed and 50 mcg of fentanyl. 30 seconds of fluoroscopy was utilized. A timeout was performed at 1523 hours . Patient was monitored for 60 minutes  by registered nurse. Successful uncomplicated CT and fluoroscopic guided right renal biopsy. If there are any physician concerns regarding this report, a Radiologist can be reached by calling the following number [phone].        Patient Active Problem List   Diagnosis    Acute respiratory failure with hypoxia (HCC)    [facility] to [facility]    Asthma    Nephrotic range proteinuria    COVID-19         Dayne Llanes II, MD  1:19 PM  10/29/2021

## 2021-10-29 NOTE — PROGRESS NOTES
[facility] [place] Attending    S: 36 y.o. male with history of asthma and smoking history presented with increasing dyspnea and cough over past month. On presentation had fever of 103. Has tested positive for COVID twice, most recently end of August.  CT shows extensive bilateral pneumonia and COVID testing again positive. HIV pos, CD4 43. Early AM 10/23,  RRT called due to tachycardia and oxygen desaturation requiring oxygen from 3L NC to 15LNRB mask. Patient had been febrile overnight as well and transferred to MICU. Has had upper extremity tremors and lower extremity neuropathy symptoms. Anxiety with long hospitalization. Denies any new issues today. Anxious about switching care teams . No chest pain or shortness of breath. Feels that he needs to have a bowel movement today. O: VS- Blood pressure 122/77, pulse 93, temperature 97.6 °F (36.4 °C), temperature source Temporal, resp. rate 16, height 6' 4\" (1.93 m), weight 196 lb 3.4 oz (89 kg), SpO2 95 %. Exam is as noted by resident   Awake, alert and oriented. Heart - RRR  Lungs- clear breath sounds bilaterally. Ext - no edema. Impressions:   Principal Problem:    Acute respiratory failure with hypoxia (HCC)  Active Problems:  Repeatedly positive Covid  Stable groundglass opacities    Acute kidney injury due to COVID-19 ([address]    Asthma    Nephrotic range proteinuria  50 pound weight loss  2 blood cultures positive  Pancytopenia   HIV positive, new diagnosis  late latent syphillis    Plan   Appreciate Nephrology, ID, MICU, cardiology, gen surgery, and pulmonology. Covid pneumonia/PCP pneumonia with sepsis, New diagnosis of HIV- resistance testing pending, CD4 count of 43. Viral load 195,000. ID managing antibiotics and antivirals for HIV, currently on lamivudine/abacavir/dolutegravir ,resistance testing negative - no resistance. Per ID pentamidine 4mg/kg IV q48H on dialysis to finish 21 days until 11/2.      RPR positive. S/p LP. VDRL CSF negative. Continue benzathine PCN IM weekly x 3 doses. Sinus tachycardia - Echo with EF 55%, small pericardial effusion and possible atrial shunt. Anemia - FOBT heme positive first time, neg on repeat. Gen surgery has no plans for scopes s/p 2U PRBCs, will trend h/h. Follow. Hb 7.6 continue to monitor H&H    Awaiting kidney biopsy results. Noted worsening creatinine , DEISI on CKD . S/p dialysis catheter and dialysis. on HD per nephro. H/O consulted due to pancytopenia including neutropenia. . Will monitor CBC. Platelet count less than 50K will d/c heparin per heme/onc recommendations. Platelets now 44 s/p 2 platelet transfusions. Attending Physician Statement  I have reviewed the chart and seen the patient with the resident(s). I personally reviewed images, EKG's and similar tests, if present. I personally reviewed and performed key elements of the history and exam.  I have reviewed and confirmed student and/or resident history and exam with changes as indicated above. I agree with the assessment, plan and orders as documented by the resident. Please refer to the resident and/or student note for additional information. Logan Patel MD

## 2021-10-29 NOTE — PROGRESS NOTES
Department of Internal Medicine  Nephrology Progress Note      Events reviewed. SUBJECTIVE:  We are following Mr. Martin Machado for DEISI on CKD. Still with gross hematuria.     PHYSICAL EXAM:      Vitals:    VITALS:  /77   Pulse 93   Temp 97.6 °F (36.4 °C) (Temporal)   Resp 16   Ht 6' 4\" (1.93 m)   Wt 196 lb 3.4 oz (89 kg)   SpO2 95%   BMI 23.88 kg/m²   24HR INTAKE/OUTPUT:      Intake/Output Summary (Last 24 hours) at 10/29/2021 1044  Last data filed at 10/29/2021 0109  Gross per 24 hour   Intake 300 ml   Output 1515 ml   Net -1215 ml     Young cachectic looking male  Constitutional:  Awake, alert,in moderate respiratory distress, OOB sitting in chair, Fio2 at 4 L via NC  HEENT:  PERRL, normocephalic, atraumatic  Respiratory: diminished lung sounds  Cardiovascular/Edema:  RRR, S1/S2, -edema  Gastrointestinal:  abd flat, soft, non-tender, Perkins in place  Neurologic:  Awake, alert, no focal deficits  Skin:  Warm, dry, no rash or lesions  Other: No LE edema      Scheduled Meds:   pentamidine isethionate (PENTAM) IVPB  4 mg/kg IntraVENous Daily    abacavir  600 mg Oral Nightly    dolutegravir sodium  50 mg Oral Nightly    lamiVUDine  50 mg Oral Nightly    predniSONE  20 mg Oral Daily    [Held by provider] heparin (porcine)  5,000 Units SubCUTAneous Q8H    penicillin G benzathine  2.4 Million Units IntraMUSCular Weekly    lidocaine  1 patch TransDERmal Daily    ascorbic acid  500 mg Oral Daily    [Held by provider] metoprolol tartrate  25 mg Oral Daily    pantoprazole  40 mg Oral QAM AC    sodium chloride flush  5-40 mL IntraVENous 2 times per day     Continuous Infusions:   sodium chloride      sodium chloride      sodium chloride      sodium chloride      sodium chloride      sodium chloride       PRN Meds:.sodium chloride, sodium chloride, sodium chloride, sodium chloride, sodium chloride, medicated lip balm, benzonatate, perflutren lipid microspheres, hydrOXYzine, labetalol, sodium chloride flush, ondansetron **OR** ondansetron, polyethylene glycol, acetaminophen **OR** acetaminophen, sodium chloride    DATA:    CBC:   Lab Results   Component Value Date    WBC 4.4 10/29/2021    RBC 2.42 10/29/2021    HGB 7.3 10/29/2021    HCT 21.7 10/29/2021    MCV 89.7 10/29/2021    MCH 30.2 10/29/2021    MCHC 33.6 10/29/2021    RDW 13.9 10/29/2021    PLT 44 10/29/2021    MPV 13.2 10/29/2021     CMP:    Lab Results   Component Value Date     10/29/2021    K 3.7 10/29/2021    K 3.7 10/29/2021     10/29/2021    CO2 28 10/29/2021    BUN 20 10/29/2021    CREATININE 3.0 10/29/2021    GFRAA 30 10/29/2021    LABGLOM 30 10/29/2021    GLUCOSE 89 10/29/2021    PROT 4.9 10/29/2021    LABALBU 2.8 10/29/2021    CALCIUM 8.0 10/29/2021    BILITOT 0.5 10/29/2021    ALKPHOS 85 10/29/2021    AST 41 10/29/2021    ALT 29 10/29/2021     Magnesium:    Lab Results   Component Value Date    MG 1.7 10/29/2021     Phosphorus:    Lab Results   Component Value Date    PHOS 2.8 10/29/2021        Radiology Review:       Ejection fraction is visually estimated at 55%. There is late positive bubble suggesting possible shunt from pulmonary   origin. Visually moderately dilated left atrium. Normal right ventricular size and function. There is small pericardial effusion without tamponade physiology        CT Chest October 5, 2021   Limited study by the patient's respiratory motion and lack of intravenous   contrast.       Extensive multifocal COVID-19 pneumonia bilaterally.       Bilateral trace layering pleural effusions.       Bilateral gynecomastia.       Abdominal organs inadequately evaluated due to the limitations of the study.       Normal appendix.       No evidence of bowel obstruction.         Kidney ultrasound October 7, 2021   1. Hyperechoic kidneys due to underlying parenchymal disease. 2. Trace bilateral perinephric fluid of indeterminate etiology, potentially   due to underlying inflammation.    3. An approximately 1.1 cm x 1.5 cm x 2.3 cm lesion in the right kidney could   be a cyst but could also be seen with infarction, abscess, or neoplasia. Consider further evaluation with renal protocol abdomen MRI (preferred) or   CT.  Following resolution of acute kidney injury.         CTA pulmonary with IV contrast October 13, 2021   1. No evidence of pulmonary embolism. 2. Interval increased size of bilateral pleural effusions with little change   in pericardial effusion. 3. Persistent subcutaneous edema. 4. Reactive bilateral hilar lymphadenopathy. 5. Bilateral airspace disease is increased compared to prior examination. The increase is most pronounced within the lower lobes. 6. Bilateral gynecomastia.                 BRIEF SUMMARY OF INITIAL CONSULT:     Briefly, Mr. Alfredo Barkley is a 36year old male with no significant PMH who was admitted on October 5, 2021 after he presented from an outside clinic after he was found to be hypoxic during 6 minute walking test. Patient tested positive for Covid 19 on August 18th, he is unvaccinated and had Covid 19 last year as well. Patient states he has quarantined by himself and when his mother recently visited him she was shocked at his appearance as he had lost about 50 pounds in 2 and a half months. On admission labs were significant for sodium of 130, potassium 5.2, bicarbonate 20, BUN 41, creatinine 3.6, magnesium 2.7, calcium 7.7 and proBNP 1,599. We are consulted for DEISI. Problems resolved:    Hyperkalemia, 2/2 DEISI. Low potassium diet  Hypotonic hyponatremia 2/2 intravascular volume from poor oral intake. Bicarb drip started. Sodium levels improving. Low bicarbonate levels with hyperchloremia, most likely NAGMA versus respiratory alkalosis; we need a PH to clarify diagnosis.  Awaiting ABG results  High bicarbonate levels, likely metabolic alkalosis 2/2 administration  Hypokalemia, multifactorial, poor intake, probably renal potassium wasting  Severe Hypoalbuminemia, multifactorial, status post albumin administration  DEISI on CKD, volume responsive pre-renal DEISI d/t volume (poor oral intake), urine sodium <20. Resolved. Edematous state, multifactorial, mainly 2/2 nephrotic syndrome and possibly HFpEF 55%, on bumex  Hyponatremia, multifactorial, including decreased GFR and hemodynamically mediated in the setting of large hypotonic fluid administration (D5 with Bactrim). Sodium levels decrease, stable overnight  Low bicarbonate levels with hyperchloremia, consistent with either hyperchloremic metabolic acidosis versus respiratory alkalosis. Bicarbonate levels improved with bicarbonate drip. HTN, on metoprolol   Probably fungemia, (1, 3) beta-D-glucan positive, on anidulafungin  MSSE bacteremia, on cefepime 2 g every 8 hours  Sinus tachycardia, multifactorial  Hypomagnesemia, 2/2 poor intake   Leukopenia, WBC 1.1    IMPRESSION/RECOMMENDATIONS:     Recurrent DEISI on CKD, ATN contrast associated DEISI versus ischemic ATN (hypotension related tachycardia). Nonoliguric. Started on renal replacement therapy on October 25, 2021. Had dialysis yesterday. 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 [facility] Work-up so far HIV positive, Hepatitis C positive, MARILEE negative, C4 normal, C3 88 (low), UPEP without monoclonal gammopathy, negative cryoglobulins. Kidney ultrasound with hyperechoic kidneys. Status post kidney biopsy October 27, 2021    Gross hematuria, status post kidney biopsy  Probably HFpEF 55%, proBNP 11,401 > 3421   Right kidney lesion, likely a cyst but cannot be ruled out other pathology including infarction, abscess, neoplasm.   We will proceed with MRI when renal function improved  HIV positive, CD4 count 43, started on OLMSTEAD therapy    ---------------------------------------------------------------------    Possible pneumocystic pneumonia, on pentamidine  Possibly acute retroviral syndrome Jarische-Herxheimer reaction?   Hepatitis C antibody positive, viral load not detected  Covid 19 infection in non vaccinated pt  Normocytic anemia, multifactorial      Plan:    No HD today  Await kidney biopsy results  Continue to monitor renal function for recovery         Electronically signed by Mikie Tsai MD on 10/29/2021 at 10:44 AM

## 2021-10-30 PROBLEM — Z99.2 ACUTE HEMODIALYSIS PATIENT (HCC): Status: ACTIVE | Noted: 2021-10-30

## 2021-10-30 PROBLEM — D61.818 PANCYTOPENIA (HCC): Status: ACTIVE | Noted: 2021-10-30

## 2021-10-30 PROBLEM — B20 SYMPTOMATIC HIV INFECTION (HCC): Chronic | Status: ACTIVE | Noted: 2021-10-30

## 2021-10-30 LAB
ALBUMIN SERPL-MCNC: 2.8 G/DL (ref 3.5–5.2)
ALP BLD-CCNC: 84 U/L (ref 40–129)
ALT SERPL-CCNC: 28 U/L (ref 0–40)
ANION GAP SERPL CALCULATED.3IONS-SCNC: 6 MMOL/L (ref 7–16)
ANISOCYTOSIS: ABNORMAL
APTT: 26.1 SEC (ref 24.5–35.1)
AST SERPL-CCNC: 31 U/L (ref 0–39)
BASOPHILS ABSOLUTE: 0 E9/L (ref 0–0.2)
BASOPHILS RELATIVE PERCENT: 0 % (ref 0–2)
BILIRUB SERPL-MCNC: 0.5 MG/DL (ref 0–1.2)
BUN BLDV-MCNC: 24 MG/DL (ref 6–20)
CALCIUM SERPL-MCNC: 8.4 MG/DL (ref 8.6–10.2)
CHLORIDE BLD-SCNC: 105 MMOL/L (ref 98–107)
CO2: 28 MMOL/L (ref 22–29)
CREAT SERPL-MCNC: 3.4 MG/DL (ref 0.7–1.2)
EOSINOPHILS ABSOLUTE: 0.08 E9/L (ref 0.05–0.5)
EOSINOPHILS RELATIVE PERCENT: 3.6 % (ref 0–6)
GFR AFRICAN AMERICAN: 26
GFR NON-AFRICAN AMERICAN: 26 ML/MIN/1.73
GLUCOSE BLD-MCNC: 83 MG/DL (ref 74–99)
HCT VFR BLD CALC: 22.5 % (ref 37–54)
HCT VFR BLD CALC: 22.9 % (ref 37–54)
HCT VFR BLD CALC: 23.3 % (ref 37–54)
HEMOGLOBIN: 7.2 G/DL (ref 12.5–16.5)
HEMOGLOBIN: 7.5 G/DL (ref 12.5–16.5)
HEMOGLOBIN: 7.5 G/DL (ref 12.5–16.5)
HYPOCHROMIA: ABNORMAL
IMMATURE GRANULOCYTES #: 0.06 E9/L
IMMATURE GRANULOCYTES %: 2.7 % (ref 0–5)
LYMPHOCYTES ABSOLUTE: 0.16 E9/L (ref 1.5–4)
LYMPHOCYTES RELATIVE PERCENT: 7.1 % (ref 20–42)
MCH RBC QN AUTO: 30.5 PG (ref 26–35)
MCHC RBC AUTO-ENTMCNC: 32.8 % (ref 32–34.5)
MCV RBC AUTO: 93.1 FL (ref 80–99.9)
MONOCYTES ABSOLUTE: 0.34 E9/L (ref 0.1–0.95)
MONOCYTES RELATIVE PERCENT: 15.2 % (ref 2–12)
NEUTROPHILS ABSOLUTE: 1.6 E9/L (ref 1.8–7.3)
NEUTROPHILS RELATIVE PERCENT: 71.4 % (ref 43–80)
OVALOCYTES: ABNORMAL
PDW BLD-RTO: 13.9 FL (ref 11.5–15)
PLATELET # BLD: 55 E9/L (ref 130–450)
PLATELET CONFIRMATION: NORMAL
PMV BLD AUTO: 12.6 FL (ref 7–12)
POIKILOCYTES: ABNORMAL
POTASSIUM REFLEX MAGNESIUM: 4 MMOL/L (ref 3.5–5)
POTASSIUM SERPL-SCNC: 4 MMOL/L (ref 3.5–5)
RBC # BLD: 2.46 E12/L (ref 3.8–5.8)
SODIUM BLD-SCNC: 139 MMOL/L (ref 132–146)
TEAR DROP CELLS: ABNORMAL
TOTAL PROTEIN: 5 G/DL (ref 6.4–8.3)
WBC # BLD: 2.2 E9/L (ref 4.5–11.5)

## 2021-10-30 PROCEDURE — 99232 SBSQ HOSP IP/OBS MODERATE 35: CPT | Performed by: FAMILY MEDICINE

## 2021-10-30 PROCEDURE — 80053 COMPREHEN METABOLIC PANEL: CPT

## 2021-10-30 PROCEDURE — 2140000000 HC CCU INTERMEDIATE R&B

## 2021-10-30 PROCEDURE — 85025 COMPLETE CBC W/AUTO DIFF WBC: CPT

## 2021-10-30 PROCEDURE — 6370000000 HC RX 637 (ALT 250 FOR IP): Performed by: STUDENT IN AN ORGANIZED HEALTH CARE EDUCATION/TRAINING PROGRAM

## 2021-10-30 PROCEDURE — 2580000003 HC RX 258: Performed by: INTERNAL MEDICINE

## 2021-10-30 PROCEDURE — 85018 HEMOGLOBIN: CPT

## 2021-10-30 PROCEDURE — 99232 SBSQ HOSP IP/OBS MODERATE 35: CPT | Performed by: INTERNAL MEDICINE

## 2021-10-30 PROCEDURE — 85014 HEMATOCRIT: CPT

## 2021-10-30 PROCEDURE — 85730 THROMBOPLASTIN TIME PARTIAL: CPT

## 2021-10-30 PROCEDURE — 6370000000 HC RX 637 (ALT 250 FOR IP): Performed by: INTERNAL MEDICINE

## 2021-10-30 PROCEDURE — 36415 COLL VENOUS BLD VENIPUNCTURE: CPT

## 2021-10-30 PROCEDURE — 80048 BASIC METABOLIC PNL TOTAL CA: CPT

## 2021-10-30 RX ADMIN — OXYCODONE HYDROCHLORIDE AND ACETAMINOPHEN 500 MG: 500 TABLET ORAL at 10:08

## 2021-10-30 RX ADMIN — ABACAVIR 600 MG: 300 TABLET, FILM COATED ORAL at 20:59

## 2021-10-30 RX ADMIN — ACETAMINOPHEN 650 MG: 325 TABLET ORAL at 18:32

## 2021-10-30 RX ADMIN — Medication 10 ML: at 21:00

## 2021-10-30 RX ADMIN — PANTOPRAZOLE SODIUM 40 MG: 40 TABLET, DELAYED RELEASE ORAL at 05:32

## 2021-10-30 RX ADMIN — LAMIVUDINE 50 MG: 100 TABLET, FILM COATED ORAL at 20:59

## 2021-10-30 RX ADMIN — Medication 10 ML: at 11:09

## 2021-10-30 RX ADMIN — DOLUTEGRAVIR SODIUM 50 MG: 50 TABLET, FILM COATED ORAL at 20:59

## 2021-10-30 RX ADMIN — PREDNISONE 20 MG: 20 TABLET ORAL at 10:09

## 2021-10-30 ASSESSMENT — PAIN SCALES - GENERAL
PAINLEVEL_OUTOF10: 0
PAINLEVEL_OUTOF10: 0

## 2021-10-30 NOTE — PROGRESS NOTES
NORMA PROGRESS NOTE      Chief complaint: Follow-up of Pneumocystis pneumonia, late latent syphilis, advanced HIV disease    The patient is a 36 y.o. male, not vaccinated against COVID-19, with history of asthma, presented on 10/05 with shortness of breath, found to be febrile at 103 °F, positive SARS-CoV-2 PCR, bilateral groundglass opacities on chest CT, tolerating room air with oxygen saturation of 97%. He reports having tested positive for SARS-CoV-2 for the 3rd time now with previous on in late August at which time he reports having quarantined. Urine Streptococcus pneumonia and Legionella antigens were negative. Blood cultures from 10/05 and 10/06 showed methicillin-susceptible Staphylococcus epidermidis in 1 out of 2 sets. HIV screen was positive with viral load of 195,000. CD4 count was low at 43. RPR was reactive at titer of 1:2. Toxoplasma Ab was negative. HLA- was negative. Serum beta-d-glucan was positive. Transthoracic echocardiogram showed late positive bubble suggesting possible shunt from pulmonary origin, moderately dilated left atrium, small pericardial effusion without tamponade physiology, ejection fraction visually estimated at 55%. He underwent bronchoscopy on 10/15 during which normal endobronchial evaluation and normal anatomy were noted. BAL Gram stain and culture showed rare polymorphonuclear leukocytes, rare epithelial cells, rare gram-negative rods, rare gram-positive rods, reduced oropharyngeal liana. BAL pneumocystis stain was negative. BAL galactomannan was negative. Serum Cryptococcus antigen was negative. He underwent lumbar puncture on 10/21, yielding CSF with normal glucose, protein, csll count with differential. CSF meningitis/encephalitis PCR panel was negative for HSV, VZV, CMV, Enterovirus, HHV-6, Streptococcus pneumoniae and agalactiae, E coli, Haemophilus, Neisseria meninigitidis, Cryptococcus. CSF VDRL was negative.  He was transferred to MICU on 10/23 for worsening shortness of breath then transferred out on 10/26. He underwent renal biopsy on 10/27. Subjective: Patient was seen and examined. No chills, no abdominal pain, no diarrhea, no rash, no itching. Afebrile. Objective:  /86   Pulse 98   Temp 96.9 °F (36.1 °C) (Temporal)   Resp 18   Ht 6' 4\" (1.93 m)   Wt 196 lb 3.4 oz (89 kg)   SpO2 99%   BMI 23.88 kg/m²   Constitutional: Alert, not in distress  Respiratory: Clear breath sounds, no crackles, no wheezes  Cardiovascular: Regular rate and rhythm, no murmurs  Gastrointestinal: Bowel sounds present, soft, nontender  Skin: Warm and dry, no active dermatoses  Musculoskeletal: No joint swelling, no joint erythema    Labs, imaging, and medical records/notes were personally reviewed. Assessment:  Fever and pancytopenia, suspect associated with acute retroviral syndrome. Jarische-Herxheimer reaction is less likely in late latent syphilis with low RPR titers. Pancytopenia, multifactorial, suspect associated with acute retroviral syndrome and/or medication-induced  COVID-19, mild  Pneumonia, suspected Pneumocystis pneumonia (serum beta-d-glucan was positive at >500 pg/mL, BAL Pneumocystis stain was negative but Pneumocystis PCR was not sent)  Late latent syphilis, neurosyphilis ruled out  Methicillin susceptible Staphylococcus epidermidis bacteremia, etiology unclear  DEISI  Advanced HIV disease (viral load of 195,000; CD4 of 43 as of 10/11/2021). Hepatitis C Ab positive (viral load not detected). Hepatitis A immune  Prolonged QTc  HAP  Hematuria, probably associated with renal biopsy    Recommendations:  Continue benzathine penicillin IM weekly for 3 doses. Continue lamivudine 50 mg q24h, dolutegravir 50 mg q24h, abacavir 600 mg q24h. Continue pentamidine 4 mg/kg IV q48h after hemodialysis to finish 21 days until 11/02. Continue oral prednisone 20 mg q24h until 11//02. Follow up HIV GART, INSTI resistance. Follow up urine GC/Chlamydia PCR.    Monitor respiratory status. Continue supportive care. Thank you for involving me in the care of Lucina Valera. Electronically signed by RICKY Martin CNP on 10/30/2021 at 9:12 AM   Pt seen and examined. Above discussed agree with advanced practice nurse. Labs, cultures, and radiographs reviewed. Face to Face encounter occurred. Changes made as necessary.      Kathy Krishnamurthy MD

## 2021-10-30 NOTE — PROGRESS NOTES
[facility] Inpatient   Resident Progress Note    S:  Hospital day: 25   Brief Synopsis: Kit Holden is a 36 y.o. male with no PMH who presented with hypoxia and fever. He was initially seen in PCP office and was hypoxic on exertion with desaturate into the mid low 80s as well as noted to be febrile and tachycardic. Per patient, his symptoms began 8/15/2021 and he tested positive for Covid 8/28. endorse worsening shortness of breath over the past month with sputum production, intermittent chest discomfort, persistent diarrhea, chills, fever, decrease in appetite and 50 pound weight loss since August.  Patient also endorses significantly worsening \"neuropathy\" of bilateral feet, states it feels like he is \"being stabbed by needles\" which is causing him inability to walk due to pain. In the ED, he was found to be anemic, hyperkalemic, hyponatremic, febrile and COVID positive. He also had elevated Creatinine, AST, proBNP and troponin x2. CTA showed extensive multifocal COVID-19 pneumonia bilaterally, bilateral trace layering pleural effusion and bilateral gynecomastia. CT abdomen was limited due to movement and non-contrast, but showed normal appendix and no evidence of bowel obstruction. Admitted for acute hypoxia due to Covid pneumonia. Nephro, pulm and ID consulted. Started on bicarb drip. US showed hyperechoic kidneys, trace b/l perinephric fluid and right kidney lesion (Cyst vs infarction vs abscess vs neoplasia). HIV and Hep C screen were positive. HIV RNA and T and B lymphocytes were ordered. Patient became more hypoxic, tachycardic and hypertensive so  there was concern for PE. CTA was ordered, which came back negative. Echo was completed due to elevated BNP and concern for PE and that revealed a normal EF with late bubble sign possible shunting of pulmonary origin. BNP continued to increase. Cardiology was consulted. Repeat Echo pending.  ID recommends LP with CSF sent for glucose, protein, cell count with differential, which were all  negative. meningitis/encephalitis PCR panel negative. RRT called 10/23 for hypoxia and tachycardia, transferred to medical intensive on 15 L NRB. Creatinine up trending. Currently saturating well on room air and was transferred out of the ICU. Hemodialysis x4 days for worsening renal function. CSF VDRL negative, but continue to treat for latent syphilis. Renal biopsy performed on 10/27. Endorses hematuria s/p biopsy. CT abdomen showed no acute abnormalities. Overnight/interim:   Patient was seen and examined at bedside. Denies any concerns this morning other than feeling cold. Around 12:30, received message that patient is tachycardic in 130s-140s. Patient examined at bedside. Denies pain, chest pain, SOB. He appears relaxed in bed.       Cont meds:    sodium chloride      sodium chloride      sodium chloride      sodium chloride      sodium chloride      sodium chloride       Scheduled meds:    pentamidine isethionate (PENTAM) IVPB  4 mg/kg IntraVENous Daily    abacavir  600 mg Oral Nightly    dolutegravir sodium  50 mg Oral Nightly    lamiVUDine  50 mg Oral Nightly    predniSONE  20 mg Oral Daily    [Held by provider] heparin (porcine)  5,000 Units SubCUTAneous Q8H    penicillin G benzathine  2.4 Million Units IntraMUSCular Weekly    lidocaine  1 patch TransDERmal Daily    ascorbic acid  500 mg Oral Daily    [Held by provider] metoprolol tartrate  25 mg Oral Daily    pantoprazole  40 mg Oral QAM AC    sodium chloride flush  5-40 mL IntraVENous 2 times per day     PRN meds: sodium chloride, sodium chloride, sodium chloride, sodium chloride, sodium chloride, medicated lip balm, benzonatate, perflutren lipid microspheres, hydrOXYzine, labetalol, sodium chloride flush, ondansetron **OR** ondansetron, polyethylene glycol, acetaminophen **OR** acetaminophen, sodium chloride     I reviewed the patient's past medical and surgical history, Medications and Allergies. O:  /86   Pulse 98   Temp 96.9 °F (36.1 °C) (Temporal)   Resp 18   Ht 6' 4\" (1.93 m)   Wt 196 lb 3.4 oz (89 kg)   SpO2 99%   BMI 23.88 kg/m²   24 hour I&O: I/O last 3 completed shifts: In: 600 [P.O.:600]  Out: 400 [Urine:400]  No intake/output data recorded. Physical Exam  Vitals reviewed. Constitutional:       General: He is not in acute distress. Appearance: Normal appearance. He is not ill-appearing, toxic-appearing or diaphoretic. HENT:      Head: Normocephalic and atraumatic. Nose: No rhinorrhea. Mouth/Throat:      Mouth: Mucous membranes are moist.      Pharynx: Oropharynx is clear. Eyes:      General: No scleral icterus. Right eye: No discharge. Left eye: No discharge. Extraocular Movements: Extraocular movements intact. Cardiovascular:      Rate and Rhythm: Normal rate and regular rhythm. Pulses: Normal pulses. Heart sounds: Normal heart sounds. No murmur heard. No friction rub. No gallop. Pulmonary:      Effort: No respiratory distress. Breath sounds: No wheezing, rhonchi or rales. Abdominal:      General: Abdomen is flat. Palpations: Abdomen is soft. Tenderness: There is no abdominal tenderness. Musculoskeletal:         General: No swelling. Cervical back: Normal range of motion. No rigidity or tenderness. Lumbar back: No swelling or spasms. Normal range of motion. Skin:     General: Skin is warm and dry. Coloration: Skin is not jaundiced. Findings: No bruising or rash. Comments: Right femoral HD port, c/d/i   Neurological:      General: No focal deficit present. Mental Status: He is alert and oriented to person, place, and time. Cranial Nerves: No cranial nerve deficit. Motor: No weakness. Psychiatric:         Behavior: Behavior normal.         Thought Content:  Thought content normal.      Comments: Depressed mood, denies SI/HI Labs:  Na/K/Cl/CO2:  139/4.0, 4.0/105/28 (10/30 0432)  BUN/Cr/glu/ALT/AST/amyl/lip:  24/3.4/--/28/31/--/-- (10/30 3533)  WBC/Hgb/Hct/Plts:  2.2/7.5/22.9/55 (10/30 1017)  estimated creatinine clearance is 39 mL/min (A) (based on SCr of 3.4 mg/dL (H)). Other pertinent labs as noted below    Radiology:  CT ABDOMEN PELVIS WO CONTRAST Additional Contrast? None   Final Result   Post right renal biopsy there is expected sequela of recent biopsy, no   significant hemorrhage identified                                        CT BIOPSY RENAL   Final Result   Successful uncomplicated CT and fluoroscopic guided right   renal biopsy. If there are any physician concerns regarding this report, a   Radiologist can be reached by calling the following number [phone]. CT GUIDED NEEDLE PLACEMENT   Final Result   Successful uncomplicated CT and fluoroscopic guided right   renal biopsy. If there are any physician concerns regarding this report, a   Radiologist can be reached by calling the following number [phone]. XR CHEST PORTABLE   Final Result   Mild worsening of extensive bilateral infiltrates         XR CHEST PORTABLE   Final Result   Increasing bilateral airspace disease suggesting worsening pneumonia. FL LUMBAR PUNCTURE DIAG   Final Result   Successful fluoroscopic-guided lumbar puncture. XR CHEST PORTABLE   Final Result   Extensive bilateral patchy areas of ground-glass opacity concerning for   atypical pneumonia or viral airway disease, improved         XR CHEST PORTABLE   Final Result   Persistent bilateral infiltrates. No significant changes since October 13. See above comments. See report CT chest October 13. CTA PULMONARY W CONTRAST   Final Result   1. No evidence of pulmonary embolism. 2. Interval increased size of bilateral pleural effusions with little change   in pericardial effusion. 3. Persistent subcutaneous edema.    4. Reactive bilateral hilar lymphadenopathy. 5. Bilateral airspace disease is increased compared to prior examination. The increase is most pronounced within the lower lobes. 6. Bilateral gynecomastia. XR CHEST PORTABLE   Final Result   Development of extensive bilateral pulmonary infiltrates         XR CHEST PORTABLE   Final Result   No acute process. US RETROPERITONEAL COMPLETE   Final Result   1. Hyperechoic kidneys due to underlying parenchymal disease. 2. Trace bilateral perinephric fluid of indeterminate etiology, potentially   due to underlying inflammation. 3. An approximately 1.1 cm x 1.5 cm x 2.3 cm lesion in the right kidney could   be a cyst but could also be seen with infarction, abscess, or neoplasia. Consider further evaluation with renal protocol abdomen MRI (preferred) or   CT. Following resolution of acute kidney injury. XR CHEST PORTABLE   Final Result   Multifocal bilateral pulmonary ground-glass airspace opacities are stable         CT ABDOMEN PELVIS WO CONTRAST Additional Contrast? None   Final Result   Limited study by the patient's respiratory motion and lack of intravenous   contrast.      Extensive multifocal COVID-19 pneumonia bilaterally. Bilateral trace layering pleural effusions. Bilateral gynecomastia. Abdominal organs inadequately evaluated due to the limitations of the study. Normal appendix. No evidence of bowel obstruction. CT CHEST WO CONTRAST   Final Result   Limited study by the patient's respiratory motion and lack of intravenous   contrast.      Extensive multifocal COVID-19 pneumonia bilaterally. Bilateral trace layering pleural effusions. Bilateral gynecomastia. Abdominal organs inadequately evaluated due to the limitations of the study. Normal appendix. No evidence of bowel obstruction.                Resident Assessment and Plan       Acute hypoxia 2/2 COVID pneumonia vs PCP  - CTA on admission: COVID pneumonia, Bilateral trace layering pleural effusion, negative for PE   - Ferritin 3900, CRP 10.1 on admission   - D dimer elevated on admission, currently stable  - Pulmonology and ID on board  - Legionella, spot TB test negative   - CXR 10/8 shows multifocal groundglass opacities are stable  - Repeat CXR 10/17: b/l pulm infiltrates improving   - Blood cx: staph epidermitis, repeat blood cx shows no growth x 7 days   -Urine culture: NGTD   - ECHO 10/13: EF 55%, late bubble sign possible shunting of pulmonary origin   - CTA 10/13 negative for PE  Bronchoscopy with BAL performed on 10/15/2021   - Respiratory culture: oral pharyngeal liana decreased, few gram negative rods   CXR 10/23: Mild worsening extensive bilateral infiltrates  - Currently maintaining saturation on room air, continue to monitor  - On prednisone   - Repeat blood culture 10/21 showed NGTD x5days      Weight loss likely secondary to HIV  - Per patient due to decreased appetite from COVID; 50lb weight loss since August   - R/o other potential origins: SLE, TB, hepatitis  - MARILEE negative  - T spot TB test negative   HIV, hepatitis C antibodies positive  - Elevated IgG and IgA, normal IgM: IgA nephropathy vs MPGN?   - Cryoglobulin normal   - TSH normal   - Sed rate and CRP elevated      HIV  HIV 1/2 antibodies positive    CD4 44, CD3 153, CD8 100  HIV viral load 195,000  - ID on board: received vancomycin, ancef; stopped cefepime 10/26  - Fungitell test positive x2, received eraxis  - Bactrim d/c'd due to renal failure; on pentamidine for suspected Pneumocystis pneumonia  - Pneumocystis stain negative, but PCR pending; still suspect PCP pna  - HIV GART pending   - [id number] negative, genotype pending  - Toxoplasma Ab, cryptococcus negative  - hepatitis A total Ab positive  - RPR reactive, quant 1:2, CSF VDRL negative   - On PCN G weekly for latent syphilis (received 2/3 doses)  - Started on biktarvy 10/22, d/c'd due to renal failure   - Started on lamivudine, dolutegravir, and abacavir 10/25    Hematuria - resolved   - benjie blood in urine s/p renal biopsy 10/27  - Hb trending down   - Continue to monitor H/H     Anemia  Pancytopenia  - Likely 2/2 from GI bleed vs HIV   Peripheral blood smear shows normocytic anemia, absolute lymphocytopenia; no platelet clumping, schistocytes or dysplasia  - Heme Onc consulted: received granix to improve ANC  Transfused 2 units pRBC since admission  - FOBT negative x3  Platelets trending down, transfusion if bleeding or plts <20k   - Transfused 2U platelets prior to and during kidney biopsy   - Hb 7.3, trending down; trend H/H q8h    DEISI    Hyponatremia - improving   - Na normalized    - Nephro on board   - Likely secondary to dehydration from persistent diarrhea vs decreased PO intake vs bactrim vs HIVANS vs Biktarvy  - Cr improving after HD,3.7  [employer]  - urine studies show intrinsic cause of DEISI  - Microalbumin-creatinine ratio elevated   - Reconsulted IR to inquire about kidney biopsy inpatient   Fluid restriction  - Oliguric (0.2 mL/kg/hr)  - received dialysis  10/25 x3days    Hypoalbuminemia - improving  -2/2 poor intake vs CHF vs nephrotic syndrome  - Alb stable  -Previously received albumin 25 g q6h x 4 doses 10/6, 8 doses 10/12 and 8 doses 10/23  Low C3, normal C4 and cryoglobulin  Fluid restriction   - Continue to monitor     Elevated troponin and proBNP  - 2/2 myocardial injury vs stress cardiomyopathy due to COVID vs new onset CHF   - initial troponin 78, 78  - initial BNP 1599,  repeat 11,401 on 10/14, trending down 3400   - ECHO: EF 55%, late bubble sign possible shunting of pulmonary origin  - Repeat ECHO pending      Tachycardia   - 2/2 to infectious vs anxiety vs anemia   HR improving   Cardio on board   - Hold lopressor   Asymptomatic  Continue to monitor    HTN -improving  - BP on lower end  - Lopressor held   - labetalol PRN  - Continue to monitor             PT/OT evaluation: Consulted, patient refused  DVT prophylaxis: PCDs  GI prophylaxis:protonix  Disposition: intermediate   Diet: adult         Electronically signed by Asa Ormond, DO PGY-3 on 10/30/2021 at 7:13 AM  Attending physician: Dr. Blanca Diaz

## 2021-10-30 NOTE — PROGRESS NOTES
Department of Internal Medicine  Nephrology Progress Note      Events reviewed. SUBJECTIVE:  We are following Mr. Neida Agosto for DEISI on CKD. Still with gross hematuria.     PHYSICAL EXAM:      Vitals:    VITALS:  /77   Pulse 126   Temp 99.7 °F (37.6 °C) (Oral)   Resp 18   Ht 6' 4\" (1.93 m)   Wt 196 lb 3.4 oz (89 kg)   SpO2 97%   BMI 23.88 kg/m²   24HR INTAKE/OUTPUT:      Intake/Output Summary (Last 24 hours) at 10/30/2021 1322  Last data filed at 10/30/2021 1008  Gross per 24 hour   Intake 600 ml   Output 600 ml   Net 0 ml     Young cachectic looking male  Constitutional:  Awake, alert,in moderate respiratory distress, OOB sitting in chair, Fio2 at 4 L via NC  HEENT:  PERRL, normocephalic, atraumatic  Respiratory: diminished lung sounds  Cardiovascular/Edema:  RRR, S1/S2, -edema  Gastrointestinal:  abd flat, soft, non-tender, Perkins in place  Neurologic:  Awake, alert, no focal deficits  Skin:  Warm, dry, no rash or lesions  Other: No LE edema      Scheduled Meds:   pentamidine isethionate (PENTAM) IVPB  4 mg/kg IntraVENous Daily    abacavir  600 mg Oral Nightly    dolutegravir sodium  50 mg Oral Nightly    lamiVUDine  50 mg Oral Nightly    predniSONE  20 mg Oral Daily    [Held by provider] heparin (porcine)  5,000 Units SubCUTAneous Q8H    penicillin G benzathine  2.4 Million Units IntraMUSCular Weekly    lidocaine  1 patch TransDERmal Daily    ascorbic acid  500 mg Oral Daily    [Held by provider] metoprolol tartrate  25 mg Oral Daily    pantoprazole  40 mg Oral QAM AC    sodium chloride flush  5-40 mL IntraVENous 2 times per day     Continuous Infusions:   sodium chloride      sodium chloride      sodium chloride      sodium chloride      sodium chloride      sodium chloride       PRN Meds:.sodium chloride, sodium chloride, sodium chloride, sodium chloride, sodium chloride, medicated lip balm, benzonatate, perflutren lipid microspheres, hydrOXYzine, labetalol, sodium approximately 1.1 cm x 1.5 cm x 2.3 cm lesion in the right kidney could   be a cyst but could also be seen with infarction, abscess, or neoplasia. Consider further evaluation with renal protocol abdomen MRI (preferred) or   CT.  Following resolution of acute kidney injury.         CTA pulmonary with IV contrast October 13, 2021   1. No evidence of pulmonary embolism. 2. Interval increased size of bilateral pleural effusions with little change   in pericardial effusion. 3. Persistent subcutaneous edema. 4. Reactive bilateral hilar lymphadenopathy. 5. Bilateral airspace disease is increased compared to prior examination. The increase is most pronounced within the lower lobes. 6. Bilateral gynecomastia.                 BRIEF SUMMARY OF INITIAL CONSULT:     Briefly, Mr. Leslie Fernando is a 36year old male with no significant PMH who was admitted on October 5, 2021 after he presented from an outside clinic after he was found to be hypoxic during 6 minute walking test. Patient tested positive for Covid 19 on August 18th, he is unvaccinated and had Covid 19 last year as well. Patient states he has quarantined by himself and when his mother recently visited him she was shocked at his appearance as he had lost about 50 pounds in 2 and a half months. On admission labs were significant for sodium of 130, potassium 5.2, bicarbonate 20, BUN 41, creatinine 3.6, magnesium 2.7, calcium 7.7 and proBNP 1,599. We are consulted for DEISI. Problems resolved:    Hyperkalemia, 2/2 DEISI. Low potassium diet  Hypotonic hyponatremia 2/2 intravascular volume from poor oral intake. Bicarb drip started. Sodium levels improving. Low bicarbonate levels with hyperchloremia, most likely NAGMA versus respiratory alkalosis; we need a PH to clarify diagnosis.  Awaiting ABG results  High bicarbonate levels, likely metabolic alkalosis 2/2 administration  Hypokalemia, multifactorial, poor intake, probably renal potassium wasting  Severe Hypoalbuminemia, multifactorial, status post albumin administration  DEISI on CKD, volume responsive pre-renal DEISI d/t volume (poor oral intake), urine sodium <20. Resolved. Edematous state, multifactorial, mainly 2/2 nephrotic syndrome and possibly HFpEF 55%, on bumex  Hyponatremia, multifactorial, including decreased GFR and hemodynamically mediated in the setting of large hypotonic fluid administration (D5 with Bactrim). Sodium levels decrease, stable overnight  Low bicarbonate levels with hyperchloremia, consistent with either hyperchloremic metabolic acidosis versus respiratory alkalosis. Bicarbonate levels improved with bicarbonate drip. HTN, on metoprolol   Probably fungemia, (1, 3) beta-D-glucan positive, on anidulafungin  MSSE bacteremia, on cefepime 2 g every 8 hours  Sinus tachycardia, multifactorial  Hypomagnesemia, 2/2 poor intake   Leukopenia, WBC 1.1    IMPRESSION/RECOMMENDATIONS:     Recurrent DEISI on CKD, ATN contrast associated DEISI versus ischemic ATN (hypotension related tachycardia). Nonoliguric. Started on renal replacement therapy on October 25, 2021. Had dialysis yesterday. 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 Caryl Essex Work-up so far HIV positive, Hepatitis C positive, MARILEE negative, C4 normal, C3 88 (low), UPEP without monoclonal gammopathy, negative cryoglobulins. Kidney ultrasound with hyperechoic kidneys. Status post kidney biopsy October 27, 2021    Gross hematuria, status post kidney biopsy  Probably HFpEF 55%, proBNP 11,401 > 3421   Right kidney lesion, likely a cyst but cannot be ruled out other pathology including infarction, abscess, neoplasm.   We will proceed with MRI when renal function improved  HIV positive, CD4 count 43, started on OLMSTEAD therapy    ---------------------------------------------------------------------    Possible pneumocystic pneumonia, on pentamidine  Possibly acute retroviral syndrome Jarische-Herxheimer reaction?   Hepatitis C antibody positive, viral load not detected  Covid 19 infection in non vaccinated pt  Normocytic anemia, multifactorial  Tachycardia ?anxiety      Plan:    No HD today  Await kidney biopsy results  Continue to monitor renal function for recovery       Discussed with RN  Electronically signed by Romayne Boehringer, MD on 10/30/2021 at 1:22 PM

## 2021-10-30 NOTE — PROGRESS NOTES
[address] [place] [address] Attending    S: 36 y.o. male with history of asthma and smoking history presented with increasing dyspnea and cough over past month. On presentation had fever of 103. Has tested positive for COVID twice, most recently end of August.  CT shows extensive bilateral pneumonia and COVID testing again positive. HIV pos, CD4 43. Early AM 10/23,  RRT called due to tachycardia and oxygen desaturation requiring oxygen from 3L NC to 15LNRB mask. Patient had been febrile overnight as well and transferred to MICU. Improved and transferred back to floor. Treatment initiated for HIV, but noted to have mild pancytopenia and thrombocytopenia to less than 50. Renal failure developed and requiring dialysis. Today, feeling pretty good. Ambulating to bathroom with walker    O: VS- Blood pressure 128/86, pulse 98, temperature 96.9 °F (36.1 °C), temperature source Temporal, resp. rate 18, height 6' 4\" (1.93 m), weight 196 lb 3.4 oz (89 kg), SpO2 99 %. Exam is as noted by resident   Awake, alert and oriented. Heart - RRR  Lungs- clear breath sounds bilaterally. Ext - no edema. Impressions:  Principal Problem:    Acute respiratory failure with hypoxia ([address]  Active Problems:    Acute kidney injury due to COVID-19 ([facility]    Asthma    Nephrotic range proteinuria    COVID-19    Symptomatic HIV infection ([facility] [address]    Acute hemodialysis patient ([facility] [address]    Pancytopenia ([facility] [address]  Resolved Problems:    * No resolved hospital problems. *      Plan   Appreciate Nephrology, ID, MICU, cardiology, gen surgery, and pulmonology. Covid pneumonia/PCP pneumonia with sepsis   New diagnosis of HIV, CD4 count of 43. Viral load 195,000. ID managing antibiotics and antivirals for HIV, currently on lamivudine/abacavir/dolutegravir   Per ID pentamidine 4mg/kg IV q48H on dialysis to finish 21 days until 11/2. Steroids until 11/2  RPR positive. S/p LP. VDRL CSF negative.  Continue benzathine PCN IM weekly x 3 Private Auto Walk in

## 2021-10-30 NOTE — PROGRESS NOTES
[place]  Department of Internal Medicine  Division of Pulmonary, Critical Care and Sleep Medicine  Progress Note    Neda Reddy DO, MPH, Cash Copeland MD, [facility]  [place] [facility]      Patient: Ángel Mckinney  MRN: [id number]  : 1990    Encounter Time: 3:17 PM     Date of Admission: 10/5/2021  3:07 PM    Primary Care Physician: Karan Garcia MD    Reason for Consultation: COVID     SUBJECTIVE:    Hep C and HIV + screen  CD4 is 47  Echo = reviewed  HD cath place to start HD  Patient was seen and examined at bedside. S/p renal biopsy yesterday. Denies CP, SOB, Headache, chills, rashes, hematochezia or hemoptysis.          OBJECTIVE:     PHYSICAL EXAM:   VITALS:   Vitals:    10/29/21 1645 10/30/21 1000 10/30/21 1017 10/30/21 1215   BP: 128/86 134/85  121/77   Pulse: 98 126     Resp: 18 18     Temp: 96.9 °F (36.1 °C) 101.2 °F (38.4 °C) 99.7 °F (37.6 °C)    TempSrc: Temporal Temporal Oral    SpO2: 99% 97%     Weight:       Height:            Intake/Output Summary (Last 24 hours) at 10/30/2021 1517  Last data filed at 10/30/2021 1008  Gross per 24 hour   Intake 600 ml   Output 600 ml   Net 0 ml        CONSTITUTIONAL:   A&O x 3, NAD  SKIN:     No rash,   HEENT:     EOMI, MMM, No thrush  NECK:    No bruits, No JVP apprechiated  CV:      Sinus,  No murmur, No rubs, No gallops  PULMONARY:   Couse BS,  No Wheezing, No Rales, No Rhonchi      No noted egophony  ABDOMEN:     Soft, non-tender. BS normal. No R/R/G  EXT:    No deformities . No clubbing.       + lower extremity edema, No venous stasis  PULSE:   Appears equal and palpable.   PSYCHIATRIC:  Seems appropriate, No acute psycosis  MS:    No fractures, No gross weakness  NEUROLOGIC:   Non-focal     DATA: IMAGING & TESTING:     LABORATORY TESTS:    CBC:   Lab Results   Component Value Date    WBC 2.2 10/30/2021    RBC 2.46 10/30/2021    HGB 7.5 10/30/2021    HCT 22.9 10/30/2021    MCV 93.1 10/30/2021    MCH 30.5 10/30/2021    MCHC 32.8 10/30/2021    RDW 13.9 10/30/2021    PLT 55 10/30/2021    MPV 12.6 10/30/2021     CMP:    Lab Results   Component Value Date     10/30/2021    K 4.0 10/30/2021    K 4.0 10/30/2021     10/30/2021    CO2 28 10/30/2021    BUN 24 10/30/2021    CREATININE 3.4 10/30/2021    GFRAA 26 10/30/2021    LABGLOM 26 10/30/2021    GLUCOSE 83 10/30/2021    PROT 5.0 10/30/2021    LABALBU 2.8 10/30/2021    CALCIUM 8.4 10/30/2021    BILITOT 0.5 10/30/2021    ALKPHOS 84 10/30/2021    AST 31 10/30/2021    ALT 28 10/30/2021     Magnesium:    Lab Results   Component Value Date    MG 1.7 10/29/2021     Phosphorus:    Lab Results   Component Value Date    PHOS 2.8 10/29/2021     PT/INR:    Lab Results   Component Value Date    PROTIME 17.7 10/15/2021    INR 1.6 10/15/2021     FERRITIN:    Lab Results   Component Value Date    FERRITIN 3,921 10/06/2021     Fibrinogen Level:  No components found for: FIB     PRO-BNP:   Lab Results   Component Value Date    PROBNP 3,421 (H) 10/18/2021    PROBNP 11,401 (H) 10/13/2021      ABGs:   Lab Results   Component Value Date    PH 7.479 10/24/2021    PO2 306.9 10/24/2021    PCO2 26.1 10/24/2021     Hemoglobin A1C: No components found for: HGBA1C    IMAGING:  Imaging tests were completed and reviewed and discussed radiology and care team involved and reveals     CT CHEST : There is adequate opacification of the pulmonary arteries.  There is no   evidence of filling defect to suggest pulmonary embolism. Lissette Many is no   evidence of thoracic aortic aneurysm or dissection.  There are small   bilateral pleural effusions.  These are increased compared to prior   examination.  There is small pericardial effusion which appears stable.  Left   hilar lymph node measures approximately 12 mm in short axis.  Right hilar   lymph node measures approximately 12 mm in short axis.  There is bilateral   gynecomastia. Lissette Many is diffuse subcutaneous edema.      The central airways are patent. Rj Rower is no pneumothorax.  There are   bilateral ground-glass opacities and alveolar consolidation.  There has been   interval increase in the airspace disease compared to prior examination.  The   interval worsening is most pronounced within the lower lobes.  There is   associated interstitial thickening. CT ABDOMEN PELVIS WO CONTRAST Additional Contrast? None    Result Date: 10/5/2021  FINDINGS: THE STUDY IS LIMITED DUE TO LACK OF INTRAVENOUS CONTRAST. Chest: Mediastinum: No thoracic aortic aneurysm. No definite adenopathy on this unenhanced study. Trace pericardial fluid anteriorly. Lungs/pleura: Trace layering pleural effusions bilaterally, slightly larger on the right. No pneumothorax. Numerous ground-glass densities in the bilateral upper and to a lesser degree lower lungs, in keeping with known COVID-19 pneumonia. Soft Tissues/Bones: Bilateral gynecomastia. No acute osseous abnormality in the thoracic cage. Abdomen and pelvis: The study is degraded by the patient's respiratory motion. Organs: Abdominal organs inadequately evaluated on this motion degraded and unenhanced study. No hydronephrosis on either side. GI/Bowel: Normal appendix. No evidence of bowel obstruction. Pelvis: Normal sized prostate. Urinary bladder grossly intact. Peritoneum/Retroperitoneum: No free air or free fluid. No bulky adenopathy. No abdominal aortic aneurysm. Bones/Soft Tissues: No lytic or sclerotic lesion in the regional skeleton. Limited study by the patient's respiratory motion and lack of intravenous contrast. Extensive multifocal COVID-19 pneumonia bilaterally. Bilateral trace layering pleural effusions. Bilateral gynecomastia. Abdominal organs inadequately evaluated due to the limitations of the study. Normal appendix. No evidence of bowel obstruction. CT CHEST WO CONTRAST    Result Date: 10/5/2021  FINDINGS: THE STUDY IS LIMITED DUE TO LACK OF INTRAVENOUS CONTRAST.  Chest: Mediastinum: No thoracic aortic aneurysm. No definite adenopathy on this unenhanced study. Trace pericardial fluid anteriorly. Lungs/pleura: Trace layering pleural effusions bilaterally, slightly larger on the right. No pneumothorax. Numerous ground-glass densities in the bilateral upper and to a lesser degree lower lungs, in keeping with known COVID-19 pneumonia. Soft Tissues/Bones: Bilateral gynecomastia. No acute osseous abnormality in the thoracic cage. Abdomen and pelvis: The study is degraded by the patient's respiratory motion. Organs: Abdominal organs inadequately evaluated on this motion degraded and unenhanced study. No hydronephrosis on either side. GI/Bowel: Normal appendix. No evidence of bowel obstruction. Pelvis: Normal sized prostate. Urinary bladder grossly intact. Peritoneum/Retroperitoneum: No free air or free fluid. No bulky adenopathy. No abdominal aortic aneurysm. Bones/Soft Tissues: No lytic or sclerotic lesion in the regional skeleton. Limited study by the patient's respiratory motion and lack of intravenous contrast. Extensive multifocal COVID-19 pneumonia bilaterally. Bilateral trace layering pleural effusions. Bilateral gynecomastia. Abdominal organs inadequately evaluated due to the limitations of the study. Normal appendix. No evidence of bowel obstruction. ECHOCARDIOGRAM:  Ejection fraction is visually estimated at 55%. There is late positive bubble suggesting possible shunt from pulmonary   origin. Visually moderately dilated left atrium. Normal right ventricular size and function.    There is small pericardial effusion without tamponade physiology    Assessment:  Mr. Sonjia Peabody is a 36year old male with no significant PMH who was admitted on October 5, 2021 after he presented from an outside clinic after he was found to be hypoxic during 6 minute walking test. Patient tested positive for Covid 19 on August 18th, he is unvaccinated and had Covid 19 last year as well. Patient states he has quarantined by himself and when his mother recently visited him she was shocked at his appearance as he had lost about 50 pounds in 2 and a half months  Sepsis  COVID-19, mild  HIV   Pneumonia  Gram-positive cocci in clusters bacteremia, etiology unclear  DEISI  + RPR  + BNP  Ejection fraction is visually estimated at 55%. There is late positive bubble suggesting possible shunt from pulmonary   origin. Visually moderately dilated left atrium. Normal right ventricular size and function.    There is small pericardial effusion without tamponade physiology      Plan:   Off oxygen  Completed Bronchoscopy with BAL with LMAC  - completed results no TB  Will follow           [facility] DO JELLY Cruz, MPH, FCCP, Angelica Casselton  Professor of Internal Medicine  Pulmonary, Critical Care and Sleep Medicine

## 2021-10-31 LAB
ALBUMIN SERPL-MCNC: 2.9 G/DL (ref 3.5–5.2)
ALP BLD-CCNC: 79 U/L (ref 40–129)
ALT SERPL-CCNC: 28 U/L (ref 0–40)
ANION GAP SERPL CALCULATED.3IONS-SCNC: 8 MMOL/L (ref 7–16)
ANISOCYTOSIS: ABNORMAL
APTT: 27.4 SEC (ref 24.5–35.1)
AST SERPL-CCNC: 31 U/L (ref 0–39)
BASOPHILS ABSOLUTE: 0 E9/L (ref 0–0.2)
BASOPHILS RELATIVE PERCENT: 0 % (ref 0–2)
BILIRUB SERPL-MCNC: 0.4 MG/DL (ref 0–1.2)
BUN BLDV-MCNC: 22 MG/DL (ref 6–20)
CALCIUM SERPL-MCNC: 8.3 MG/DL (ref 8.6–10.2)
CHLORIDE BLD-SCNC: 103 MMOL/L (ref 98–107)
CO2: 25 MMOL/L (ref 22–29)
CREAT SERPL-MCNC: 2.8 MG/DL (ref 0.7–1.2)
EOSINOPHILS ABSOLUTE: 0.04 E9/L (ref 0.05–0.5)
EOSINOPHILS RELATIVE PERCENT: 2 % (ref 0–6)
GFR AFRICAN AMERICAN: 33
GFR NON-AFRICAN AMERICAN: 33 ML/MIN/1.73
GLUCOSE BLD-MCNC: 78 MG/DL (ref 74–99)
HCT VFR BLD CALC: 23.6 % (ref 37–54)
HEMOGLOBIN: 7.5 G/DL (ref 12.5–16.5)
HYPOCHROMIA: ABNORMAL
LYMPHOCYTES ABSOLUTE: 0.53 E9/L (ref 1.5–4)
LYMPHOCYTES RELATIVE PERCENT: 24 % (ref 20–42)
MCH RBC QN AUTO: 30.2 PG (ref 26–35)
MCHC RBC AUTO-ENTMCNC: 31.8 % (ref 32–34.5)
MCV RBC AUTO: 95.2 FL (ref 80–99.9)
MONOCYTES ABSOLUTE: 0.26 E9/L (ref 0.1–0.95)
MONOCYTES RELATIVE PERCENT: 12 % (ref 2–12)
NEUTROPHILS ABSOLUTE: 1.36 E9/L (ref 1.8–7.3)
NEUTROPHILS RELATIVE PERCENT: 62 % (ref 43–80)
OVALOCYTES: ABNORMAL
PDW BLD-RTO: 14.6 FL (ref 11.5–15)
PLATELET # BLD: 58 E9/L (ref 130–450)
PLATELET CONFIRMATION: NORMAL
PMV BLD AUTO: 13 FL (ref 7–12)
POIKILOCYTES: ABNORMAL
POLYCHROMASIA: ABNORMAL
POTASSIUM REFLEX MAGNESIUM: 4.5 MMOL/L (ref 3.5–5)
POTASSIUM SERPL-SCNC: 4.5 MMOL/L (ref 3.5–5)
RBC # BLD: 2.48 E12/L (ref 3.8–5.8)
SMUDGE CELLS: ABNORMAL
SODIUM BLD-SCNC: 136 MMOL/L (ref 132–146)
TOTAL PROTEIN: 5.3 G/DL (ref 6.4–8.3)
WBC # BLD: 2.2 E9/L (ref 4.5–11.5)

## 2021-10-31 PROCEDURE — 6370000000 HC RX 637 (ALT 250 FOR IP): Performed by: STUDENT IN AN ORGANIZED HEALTH CARE EDUCATION/TRAINING PROGRAM

## 2021-10-31 PROCEDURE — 80048 BASIC METABOLIC PNL TOTAL CA: CPT

## 2021-10-31 PROCEDURE — 99255 IP/OBS CONSLTJ NEW/EST HI 80: CPT | Performed by: STUDENT IN AN ORGANIZED HEALTH CARE EDUCATION/TRAINING PROGRAM

## 2021-10-31 PROCEDURE — 99232 SBSQ HOSP IP/OBS MODERATE 35: CPT | Performed by: FAMILY MEDICINE

## 2021-10-31 PROCEDURE — 6370000000 HC RX 637 (ALT 250 FOR IP): Performed by: INTERNAL MEDICINE

## 2021-10-31 PROCEDURE — 80053 COMPREHEN METABOLIC PANEL: CPT

## 2021-10-31 PROCEDURE — 87040 BLOOD CULTURE FOR BACTERIA: CPT

## 2021-10-31 PROCEDURE — 99232 SBSQ HOSP IP/OBS MODERATE 35: CPT | Performed by: INTERNAL MEDICINE

## 2021-10-31 PROCEDURE — 85025 COMPLETE CBC W/AUTO DIFF WBC: CPT

## 2021-10-31 PROCEDURE — 85730 THROMBOPLASTIN TIME PARTIAL: CPT

## 2021-10-31 PROCEDURE — 2580000003 HC RX 258: Performed by: INTERNAL MEDICINE

## 2021-10-31 PROCEDURE — 2140000000 HC CCU INTERMEDIATE R&B

## 2021-10-31 PROCEDURE — 36415 COLL VENOUS BLD VENIPUNCTURE: CPT

## 2021-10-31 RX ORDER — PROPAFENONE HYDROCHLORIDE 150 MG/1
150 TABLET, FILM COATED ORAL EVERY 8 HOURS SCHEDULED
Status: DISCONTINUED | OUTPATIENT
Start: 2021-10-31 | End: 2021-11-07 | Stop reason: HOSPADM

## 2021-10-31 RX ADMIN — ABACAVIR 600 MG: 300 TABLET, FILM COATED ORAL at 21:56

## 2021-10-31 RX ADMIN — PANTOPRAZOLE SODIUM 40 MG: 40 TABLET, DELAYED RELEASE ORAL at 06:38

## 2021-10-31 RX ADMIN — ACETAMINOPHEN 650 MG: 325 TABLET ORAL at 21:55

## 2021-10-31 RX ADMIN — PROPAFENONE HYDROCHLORIDE 150 MG: 150 TABLET, FILM COATED ORAL at 14:08

## 2021-10-31 RX ADMIN — OXYCODONE HYDROCHLORIDE AND ACETAMINOPHEN 500 MG: 500 TABLET ORAL at 10:13

## 2021-10-31 RX ADMIN — DOLUTEGRAVIR SODIUM 50 MG: 50 TABLET, FILM COATED ORAL at 21:55

## 2021-10-31 RX ADMIN — PREDNISONE 20 MG: 20 TABLET ORAL at 10:13

## 2021-10-31 RX ADMIN — PROPAFENONE HYDROCHLORIDE 150 MG: 150 TABLET, FILM COATED ORAL at 21:55

## 2021-10-31 RX ADMIN — LAMIVUDINE 50 MG: 100 TABLET, FILM COATED ORAL at 21:55

## 2021-10-31 RX ADMIN — Medication 10 ML: at 10:13

## 2021-10-31 RX ADMIN — Medication 10 ML: at 21:56

## 2021-10-31 ASSESSMENT — PAIN SCALES - GENERAL
PAINLEVEL_OUTOF10: 0

## 2021-10-31 NOTE — PROGRESS NOTES
tachycardia - Echo with EF 55%, small pericardial effusion and possible atrial shunt. EKG suspicious for WPW like syndrome, although not meeting all criteria. Will ask EPS to see for recommendations    Awaiting kidney biopsy results. Noted worsening creatinine , DEISI on CKD . HD held yesterday and creatinine improving. Blood also clearing    H/O consulted due to pancytopenia including neutropenia. . Will monitor CBC. Platelet count less than 50K will d/c heparin per heme/onc recommendations. Platelets now 58 s/p 2 platelet transfusions. Attending Physician Statement  I have reviewed the chart and seen the patient with the resident(s). I personally reviewed images, EKG's and similar tests, if present. I personally reviewed and performed key elements of the history and exam.  I have reviewed and confirmed student and/or resident history and exam with changes as indicated above. I agree with the assessment, plan and orders as documented by the resident. Please refer to the resident and/or student note for additional information.       Daly Friedman MD

## 2021-10-31 NOTE — PROGRESS NOTES
Patient heart rate in 130s, MD aware, per MD if he remains asymptomatic they are not going to treat at this time. Dr. Pepper Suazo notified that his metoprolol has been held by provider for low blood pressures, however, pressures are stable now.

## 2021-10-31 NOTE — PROGRESS NOTES
flush, ondansetron **OR** ondansetron, polyethylene glycol, acetaminophen **OR** acetaminophen, sodium chloride    DATA:    CBC:   Lab Results   Component Value Date    WBC 2.2 10/31/2021    RBC 2.48 10/31/2021    HGB 7.5 10/31/2021    HCT 23.6 10/31/2021    MCV 95.2 10/31/2021    MCH 30.2 10/31/2021    MCHC 31.8 10/31/2021    RDW 14.6 10/31/2021    PLT 58 10/31/2021    MPV 13.0 10/31/2021     CMP:    Lab Results   Component Value Date     10/31/2021    K 4.5 10/31/2021    K 4.5 10/31/2021     10/31/2021    CO2 25 10/31/2021    BUN 22 10/31/2021    CREATININE 2.8 10/31/2021    GFRAA 33 10/31/2021    LABGLOM 33 10/31/2021    GLUCOSE 78 10/31/2021    PROT 5.3 10/31/2021    LABALBU 2.9 10/31/2021    CALCIUM 8.3 10/31/2021    BILITOT 0.4 10/31/2021    ALKPHOS 79 10/31/2021    AST 31 10/31/2021    ALT 28 10/31/2021     Magnesium:    Lab Results   Component Value Date    MG 1.7 10/29/2021     Phosphorus:    Lab Results   Component Value Date    PHOS 2.8 10/29/2021        Radiology Review:       Ejection fraction is visually estimated at 55%. There is late positive bubble suggesting possible shunt from pulmonary   origin. Visually moderately dilated left atrium. Normal right ventricular size and function. There is small pericardial effusion without tamponade physiology        CT Chest October 5, 2021   Limited study by the patient's respiratory motion and lack of intravenous   contrast.       Extensive multifocal COVID-19 pneumonia bilaterally.       Bilateral trace layering pleural effusions.       Bilateral gynecomastia.       Abdominal organs inadequately evaluated due to the limitations of the study.       Normal appendix.       No evidence of bowel obstruction.         Kidney ultrasound October 7, 2021   1. Hyperechoic kidneys due to underlying parenchymal disease. 2. Trace bilateral perinephric fluid of indeterminate etiology, potentially   due to underlying inflammation.    3. An approximately 1.1 cm x 1.5 cm x 2.3 cm lesion in the right kidney could   be a cyst but could also be seen with infarction, abscess, or neoplasia. Consider further evaluation with renal protocol abdomen MRI (preferred) or   CT.  Following resolution of acute kidney injury.         CTA pulmonary with IV contrast October 13, 2021   1. No evidence of pulmonary embolism. 2. Interval increased size of bilateral pleural effusions with little change   in pericardial effusion. 3. Persistent subcutaneous edema. 4. Reactive bilateral hilar lymphadenopathy. 5. Bilateral airspace disease is increased compared to prior examination. The increase is most pronounced within the lower lobes. 6. Bilateral gynecomastia.                 BRIEF SUMMARY OF INITIAL CONSULT:     Briefly, Mr. Ronnell Haynes is a 36year old male with no significant PMH who was admitted on October 5, 2021 after he presented from an outside clinic after he was found to be hypoxic during 6 minute walking test. Patient tested positive for Covid 19 on August 18th, he is unvaccinated and had Covid 19 last year as well. Patient states he has quarantined by himself and when his mother recently visited him she was shocked at his appearance as he had lost about 50 pounds in 2 and a half months. On admission labs were significant for sodium of 130, potassium 5.2, bicarbonate 20, BUN 41, creatinine 3.6, magnesium 2.7, calcium 7.7 and proBNP 1,599. We are consulted for DEISI. Problems resolved:    Hyperkalemia, 2/2 DEISI. Low potassium diet  Hypotonic hyponatremia 2/2 intravascular volume from poor oral intake. Bicarb drip started. Sodium levels improving. Low bicarbonate levels with hyperchloremia, most likely NAGMA versus respiratory alkalosis; we need a PH to clarify diagnosis.  Awaiting ABG results  High bicarbonate levels, likely metabolic alkalosis 2/2 administration  Hypokalemia, multifactorial, poor intake, probably renal potassium wasting  Severe Hypoalbuminemia, multifactorial, status post albumin administration  DEISI on CKD, volume responsive pre-renal DEISI d/t volume (poor oral intake), urine sodium <20. Resolved. Edematous state, multifactorial, mainly 2/2 nephrotic syndrome and possibly HFpEF 55%, on bumex  Hyponatremia, multifactorial, including decreased GFR and hemodynamically mediated in the setting of large hypotonic fluid administration (D5 with Bactrim). Sodium levels decrease, stable overnight  Low bicarbonate levels with hyperchloremia, consistent with either hyperchloremic metabolic acidosis versus respiratory alkalosis. Bicarbonate levels improved with bicarbonate drip. HTN, on metoprolol   Probably fungemia, (1, 3) beta-D-glucan positive, on anidulafungin  MSSE bacteremia, on cefepime 2 g every 8 hours  Sinus tachycardia, multifactorial  Hypomagnesemia, 2/2 poor intake   Leukopenia, WBC 1.1  Gross hematuria, status post kidney biopsy. Resolved    IMPRESSION/RECOMMENDATIONS:     Recurrent DEISI on CKD, ATN contrast associated DEISI versus ischemic ATN (hypotension related tachycardia). Nonoliguric. Started on renal replacement therapy on October 25, 2021. We are currently holding dialysis, renal function improving and making fair amount of urine. Hematuria has resolved. 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 Caryl [place] Work-up so far HIV positive, Hepatitis C positive, MARILEE negative, C4 normal, C3 88 (low), UPEP without monoclonal gammopathy, negative cryoglobulins. Kidney ultrasound with hyperechoic kidneys. Status post kidney biopsy October 27, 2021  Probably HFpEF 55%, proBNP 11,401 > 3421   Right kidney lesion, likely a cyst but cannot be ruled out other pathology including infarction, abscess, neoplasm.   We will proceed with MRI when renal function improved  HIV positive, CD4 count 43, started on OLMSTEAD therapy    ---------------------------------------------------------------------    Possible pneumocystic pneumonia, on pentamidine  Possibly acute retroviral syndrome Jarische-Herxheimer reaction?   Hepatitis C antibody positive, viral load not detected  Covid 19 infection in non vaccinated pt  Normocytic anemia, multifactorial  Tachycardia ?anxiety      Plan:    Continue to hold HD   Await kidney biopsy results  Continue to monitor renal function for recovery       Discussed with RN  Electronically signed by RICKY Wiseman CNP on 10/31/2021 at 2:34 PM

## 2021-10-31 NOTE — CONSULTS
[address] [facility]  CARDIAC ELECTROPHYSIOLOGY DEPARTMENT/DIVISION OF CARDIOLOGY  Consultation Report  PATIENT: Ravinder Lawson  MEDICAL RECORD NUMBER: [id number]  DATE OF SERVICE:  10/31/2021  ATTENDING ELECTROPHYSIOLOGIST:  Alesha Rocha DO  REFERRING PHYSICIAN: No ref. provider found and Alex Eubanks MD  CHIEF COMPLAINT: Pre-excitation ECG pattern    HPI: Ravinder Lawson is a 36 y.o. male with a history of COVID-19 x 2 (8/2020 and 8/2021). He presented on 10/5/21 with chief complaint of dyspnea, fatigue, and weight loss (50 lbs in ~80 days). He was diagnosed initially with sepsis 2/2 COVID-19 pneumonia, DEISI, and anemia. Patient was not vaccinated for COVID-19. He was evaluated by Nephrology, who diagnosed with DEISI v CVKD and severe hypoalbuminemia witth proteinuria, which was treated with IVF. He was evaluated by Infectious Disease and diagnosed with methicillin susceptible S epidermidis bacteremia and COVID-19 pneumonia, which was treated with IV vancomycin. During anemia work-up, he was noted to have FOB+ and was evaluated by General Surgery, who recommended monitoring without endoscopy at this time. He was evaluated by Psych for anxiety. He was noted to have elevated troponin, so Cardiology was consulted. He was diagnosed with sinus tachycardia and elevated troponin, which were thought to be related to sepsis, DEISI, and anemia. A TTE reported normal LVEF with possible pulmonary shunt, small pericardial effusion without tamponade, and moderate LAE. A CTA chest reported trace b/l pleural effusions. He was treated with metoprolol 25 mg daily for HTN. On 10/15/21, an ECG was read as AF, but on my review I believe this is sinus tachycardia at 163 bpm, but it is difficult due to significant baseline artifact. On 10/15/21, he underwent bronchoscopy, which was reportedly normal. Despite Pneumocystis stain negative, PCP pneumonia is suspected per ID as Pneumocystis PCR not sent.  He was subsequently diagnosed with HIV and HCV. He was treated with antiretrovrial therapy. He subsequently developed fever and pancytopenia, thought to be due to acute retroviral syndrome v Jarische-Herxheimer reaction. He is also being treated for latent syphilis. He was evaluated by Hem/Onc and diagnosed with pancytopenia due to HIV, COVID-19, and possible PCP and drug use; which was treated with Granix. His renal function declined and had temporary HD catheter placed on 10/25/21, as well as renal biopsy on 40/94/46, which was complicated by hematuria. He was noted to be tachycardic in the setting of above medical issues. An ECG reported sinus tachycardia with pre-excitation pattern. EP service is now consulted for WPW pattern on ECG. He denies any palpitations in the past, but does report an episode of lightheadedness in 9/2021 shortly after second diagnosis of COVID-19. Patient denies any other complaints at this time. Prior cardiac testing:  · ECG (10/23/21): significant baseline artifact, ST at 134 bpm, pre-excitation pattern  · ECG (10/16/21): ST at 122 bpm, pre-excitation pattern  · ECG (10/15/21): significant baseline artifact, likely ST at 163 bpm, pre-excitation pattern  · ECG (10/14/21): sinus tachycardia at 117 bpm with pre-excitation pattern. · TTE (10/13/21): LVEF = 55%, positive bubble suggesting possible pulmonary shunt, moderate LAE, small pericardial effusion without tamponade. · ECG (10/7/21): SR at 74 bpm, pre-excitation pattern. · ECG (10/5/21): ST at 114 bpm, pre-excitation pattern.     Past Medical History:   Diagnosis Date    Asthma      Past Surgical History:   Procedure Laterality Date    BRONCHOSCOPY N/A 10/15/2021    BRONCHOSCOPY ALVEOLAR LAVAGE performed by Jb Santacruz DO at [address] BIOPSY RENAL  10/27/2021    CT BIOPSY RENAL 10/27/2021 Rupali Vázquez MD [facility] CT    FRACTURE SURGERY        Family History   Problem Relation Age of Onset    Diabetes type 2 Mother     Diabetes type 2  Father      There is no family history of sudden cardiac arrest    Social History     Tobacco Use    Smoking status: Former Smoker     Quit date: 2021     Years since quittin.2    Smokeless tobacco: Never Used   Substance Use Topics    Alcohol use: No       Current Facility-Administered Medications   Medication Dose Route Frequency Provider Last Rate Last Admin    0.9 % sodium chloride infusion   IntraVENous PRN Anselmo Shepherd MD        0.9 % sodium chloride infusion   IntraVENous PRN Ludwig Aleman MD        pentamidine (PENTAM) 362.8 mg in dextrose 5 % 50 mL IVPB  4 mg/kg IntraVENous Daily Sissy Umana MD   Stopped at 10/28/21 2020    abacavir (ZIAGEN) tablet 600 mg  600 mg Oral Nightly Sissy Umana MD   600 mg at 10/30/21 2059    dolutegravir sodium (TIVICAY) tablet 50 mg  50 mg Oral Nightly Sissy Umana MD   50 mg at 10/30/21 2059    lamiVUDine (EPIVIR) tablet 50 mg  50 mg Oral Nightly Sissy Umana MD   50 mg at 10/30/21 2059    predniSONE (DELTASONE) tablet 20 mg  20 mg Oral Daily Sissy Umana MD   20 mg at 10/30/21 1009    0.9 % sodium chloride infusion   IntraVENous PRN Michael Hylton MD        0.9 % sodium chloride infusion   IntraVENous PRN Ruth Ann Whipple MD        0.9 % sodium chloride infusion   IntraVENous PRN Ruth Ann Whipple MD       [facility] [place] by provider] heparin (porcine) injection 5,000 Units  5,000 Units SubCUTAneous Q8H Myrna Berg MD   5,000 Units at 10/26/21 0553    penicillin G benzathine (BICILLIN L-A) [id number] UNIT/2ML suspension 2.4 Million Units  2.4 Million Units IntraMUSCular Weekly Sissy Umana MD   2.4 Million Units at 10/29/21 1013    lidocaine 4 % external patch 1 patch  1 patch TransDERmal Daily Anselmo Shepherd MD   1 patch at 10/28/21 0919    medicated lip balm (BLISTEX/CARMEX) stick   Topical PRN Anselmo Shepherd MD   Given at 10/23/21 0209    benzonatate (TESSALON) capsule 100 mg  100 mg Oral BID PRN Joe Hawthorne MD   100 mg at 10/29/21 2224    ascorbic acid (VITAMIN C) tablet 500 mg  500 mg Oral Daily Robles MD Jonh   500 mg at 10/30/21 1008    perflutren lipid microspheres (DEFINITY) injection 1.65 mg  1.5 mL IntraVENous ONCE PRN Pastor Elle MD       [facility]] metoprolol tartrate (LOPRESSOR) tablet 25 mg  25 mg Oral Daily Myrna Berg MD   25 mg at 10/18/21 1153    hydrOXYzine (VISTARIL) capsule 25 mg  25 mg Oral TID PRN Italo Braver, DO   25 mg at 10/22/21 2235    [Held by provider] labetalol (NORMODYNE;TRANDATE) injection 10 mg  10 mg IntraVENous Q6H PRN Italo Braver, DO        pantoprazole (PROTONIX) tablet 40 mg  40 mg Oral QAM AC Adinaclaude Marina Aldo, DO   40 mg at 10/31/21 8648    sodium chloride flush 0.9 % injection 5-40 mL  5-40 mL IntraVENous 2 times per day Italo Braver, DO   10 mL at 10/30/21 2100    sodium chloride flush 0.9 % injection 5-40 mL  5-40 mL IntraVENous PRN Italo Braver, DO   10 mL at 10/28/21 2016    ondansetron (ZOFRAN-ODT) disintegrating tablet 4 mg  4 mg Oral Q8H PRN Italo Braver, DO        Or    ondansetron [facility]) injection 4 mg  4 mg IntraVENous Q6H PRN Italo Braver, DO        polyethylene glycol (GLYCOLAX) packet 17 g  17 g Oral Daily PRN Adina Marina Aldo, DO   17 g at 10/27/21 0857    acetaminophen (TYLENOL) tablet 650 mg  650 mg Oral Q6H PRN Italo Braver, DO   650 mg at 10/30/21 1832    Or    acetaminophen (TYLENOL) suppository 650 mg  650 mg Rectal Q6H PRN Adina Fieldsiro, DO        0.9 % sodium chloride infusion  25 mL IntraVENous PRN Italo Braver, DO            No Known Allergies    ROS:   Constitutional: Negative for fever, activity change and appetite change. HENT: Negative for epistaxis. Eyes: Negative for diploplia, blurred vision. Respiratory: Negative for cough, chest tightness, shortness of breath and wheezing. Cardiovascular: pertinent positives in HPI  Gastrointestinal: Negative for abdominal pain and blood in stool. Genitourinary: Negative for hematuria and difficulty urinating. Musculoskeletal: Negative for myalgias and gait problem. Skin: Negative for color change and rash. Neurological: Negative for syncope and light-headedness. Psychiatric/Behavioral: Negative for confusion and agitation. The patient is not nervous/anxious. Heme: no bleeding disorders, no melena or hematochezia  All other review of systems are negative     PHYSICAL EXAM:  Vitals:    10/30/21 1017 10/30/21 1215 10/30/21 1715 10/30/21 2115   BP:  121/77 127/76 126/60   Pulse:   127 110   Resp:   20 18   Temp: 99.7 °F (37.6 °C)  100.7 °F (38.2 °C) 98.2 °F (36.8 °C)   TempSrc: Oral  Oral Oral   SpO2:   97% 98%   Weight:       Height:       Limited exam due to COVID-19 pandemic. Constitutional: NAD  Pulmonary/Chest:  No respiratory distress, no audible wheeze  Psychiatric: Normal mood and affect, A&O x3     I have personally reviewed the laboratory, cardiac diagnostic and radiographic testing as outlined below:    Data:    Recent Labs     10/29/21  0500 10/29/21  1458 10/30/21  0452 10/30/21  0452 10/30/21  1448 10/30/21  2001 10/31/21  0431   WBC 4.4*  --  2.2*  --   --   --  2.2*   HGB 7.3*   < > 7.5*   < > 7.5* 7.2* 7.5*   HCT 21.7*   < > 22.9*   < > 23.3* 22.5* 23.6*   PLT 44*  --  55*  --   --   --  58*    < > = values in this interval not displayed. Recent Labs     10/29/21  0500 10/29/21  0500 10/30/21  0452 10/31/21  0431     --  139 136   K 3.7  3.7   < > 4.0  4.0 4.5     --  105 103   CO2 28  --  28 25   BUN 20  --  24* 22*   CREATININE 3.0*  --  3.4* 2.8*   CALCIUM 8.0*  --  8.4* 8.3*    < > = values in this interval not displayed. No results for input(s): INR in the last 72 hours. No results for input(s): TSH in the last 72 hours.   Lab Results   Component Value Date    MG 1.7 10/29/2021     Telemetry: SR 90 - 170 bpm, one 7.5 second episode of WCT on 10/30/21 at 1530 (asymptomatic)     Assessment/Plan:  1. WPW Syndrome  -Likely left lateral AP. -Episode of tachycardia on 10/30/21, nonsustained.  -Episode of dizziness shortly after COVID-19 diagnosis at home prior to admit.  -Avoid AVN inhibitors.  -Start propafenone 150 mg every 8 hours. -Monitor on telemetry. -ECG on 11/1/21.    2. Pneumonia 2/2 COVID-19 +/- PCP pneumonia  -Management per ID and Pulmonary. -COVID + on 10/27/21. 3. HIV and HCV and possible latent syphillis  -Management per ID. 4. Methicillin susceptible S epidermidis bacteremia  -Management per ID.  -Blood cultures negative since 10/6/21.    5. Pancyotpenia   -Management per Hem/Onc    5. Acute renal failure  -Temporary HD catheter placed on 10/25/21.  -Renal biopsy on 10/27/21. Results pending.  -Management per Nephrology. 6. Anxiety  -Management per Psych. I have spent a total of 120 minutes with the patient and his/her family reviewing the above stated recommendations. And a total of >50% of that time involved face-to-face time providing counseling and or coordination of care with the other providers. Thank you for allowing me to participate in your patient's care. Please call me if there are any questions. Yen Arreola D.O.   Cardiac Electrophysiology  [facility]

## 2021-10-31 NOTE — PROGRESS NOTES
[facility] (formerly [facility]) Inpatient   Resident Progress Note    S:  Hospital day: 32   Brief Synopsis: Silvia Calvillo is a 36 y.o. male with no PMH who presented with hypoxia and fever. He was initially seen in PCP office and was hypoxic on exertion with desaturate into the mid low 80s as well as noted to be febrile and tachycardic. Per patient, his symptoms began 8/15/2021 and he tested positive for Covid 8/28. endorse worsening shortness of breath over the past month with sputum production, intermittent chest discomfort, persistent diarrhea, chills, fever, decrease in appetite and 50 pound weight loss since August.  Patient also endorses significantly worsening \"neuropathy\" of bilateral feet, states it feels like he is \"being stabbed by needles\" which is causing him inability to walk due to pain. In the ED, he was found to be anemic, hyperkalemic, hyponatremic, febrile and COVID positive. He also had elevated Creatinine, AST, proBNP and troponin x2. CTA showed extensive multifocal COVID-19 pneumonia bilaterally, bilateral trace layering pleural effusion and bilateral gynecomastia. CT abdomen was limited due to movement and non-contrast, but showed normal appendix and no evidence of bowel obstruction. Admitted for acute hypoxia due to Covid pneumonia. Nephro, pulm and ID consulted. Started on bicarb drip. US showed hyperechoic kidneys, trace b/l perinephric fluid and right kidney lesion (Cyst vs infarction vs abscess vs neoplasia). HIV and Hep C screen were positive. HIV RNA and T and B lymphocytes were ordered. Patient became more hypoxic, tachycardic and hypertensive so  there was concern for PE. CTA was ordered, which came back negative. Echo was completed due to elevated BNP and concern for PE and that revealed a normal EF with late bubble sign possible shunting of pulmonary origin. BNP continued to increase. Cardiology was consulted. Repeat Echo pending.  ID recommends LP with CSF sent for glucose, protein, (89 kg)   SpO2 98%   BMI 23.88 kg/m²   24 hour I&O: I/O last 3 completed shifts:  In: -   Out: 750 [Urine:750]  No intake/output data recorded. Physical Exam  Vitals reviewed. Constitutional:       General: He is not in acute distress. Appearance: Normal appearance. He is not ill-appearing, toxic-appearing or diaphoretic. HENT:      Head: Normocephalic and atraumatic. Nose: No rhinorrhea. Mouth/Throat:      Mouth: Mucous membranes are moist.      Pharynx: Oropharynx is clear. Eyes:      General: No scleral icterus. Right eye: No discharge. Left eye: No discharge. Extraocular Movements: Extraocular movements intact. Cardiovascular:      Rate and Rhythm: Regular rhythm. Tachycardia present. Pulses: Normal pulses. Heart sounds: Normal heart sounds. No murmur heard. No friction rub. No gallop. Pulmonary:      Effort: No respiratory distress. Breath sounds: No wheezing, rhonchi or rales. Abdominal:      General: Abdomen is flat. Palpations: Abdomen is soft. Tenderness: There is no abdominal tenderness. Musculoskeletal:         General: No swelling. Cervical back: Normal range of motion. No rigidity or tenderness. Lumbar back: No swelling or spasms. Normal range of motion. Skin:     General: Skin is warm and dry. Coloration: Skin is not jaundiced. Findings: No bruising or rash. Comments: Right femoral HD port, c/d/i   Neurological:      General: No focal deficit present. Mental Status: He is alert and oriented to person, place, and time. Cranial Nerves: No cranial nerve deficit. Motor: No weakness. Psychiatric:         Behavior: Behavior normal.         Thought Content:  Thought content normal.      Comments: Depressed mood, denies SI/HI         Labs:  Na/K/Cl/CO2:  139/4.0, 4.0/105/28 (10/30 4731)  BUN/Cr/glu/ALT/AST/amyl/lip:  24/3.4/--/28/31/--/-- (10/30 6931)  WBC/Hgb/Hct/Plts: --/7.2/22.5/-- (10/30 2001)  estimated creatinine clearance is 39 mL/min (A) (based on SCr of 3.4 mg/dL (H)). Other pertinent labs as noted below    Radiology:  CT ABDOMEN PELVIS WO CONTRAST Additional Contrast? None   Final Result   Post right renal biopsy there is expected sequela of recent biopsy, no   significant hemorrhage identified                                        CT BIOPSY RENAL   Final Result   Successful uncomplicated CT and fluoroscopic guided right   renal biopsy. If there are any physician concerns regarding this report, a   Radiologist can be reached by calling the following number [phone]. CT GUIDED NEEDLE PLACEMENT   Final Result   Successful uncomplicated CT and fluoroscopic guided right   renal biopsy. If there are any physician concerns regarding this report, a   Radiologist can be reached by calling the following number [phone]. XR CHEST PORTABLE   Final Result   Mild worsening of extensive bilateral infiltrates         XR CHEST PORTABLE   Final Result   Increasing bilateral airspace disease suggesting worsening pneumonia. FL LUMBAR PUNCTURE DIAG   Final Result   Successful fluoroscopic-guided lumbar puncture. XR CHEST PORTABLE   Final Result   Extensive bilateral patchy areas of ground-glass opacity concerning for   atypical pneumonia or viral airway disease, improved         XR CHEST PORTABLE   Final Result   Persistent bilateral infiltrates. No significant changes since October 13. See above comments. See report CT chest October 13. CTA PULMONARY W CONTRAST   Final Result   1. No evidence of pulmonary embolism. 2. Interval increased size of bilateral pleural effusions with little change   in pericardial effusion. 3. Persistent subcutaneous edema. 4. Reactive bilateral hilar lymphadenopathy. 5. Bilateral airspace disease is increased compared to prior examination.    The increase is most pronounced within the lower lobes. 6. Bilateral gynecomastia. XR CHEST PORTABLE   Final Result   Development of extensive bilateral pulmonary infiltrates         XR CHEST PORTABLE   Final Result   No acute process. US RETROPERITONEAL COMPLETE   Final Result   1. Hyperechoic kidneys due to underlying parenchymal disease. 2. Trace bilateral perinephric fluid of indeterminate etiology, potentially   due to underlying inflammation. 3. An approximately 1.1 cm x 1.5 cm x 2.3 cm lesion in the right kidney could   be a cyst but could also be seen with infarction, abscess, or neoplasia. Consider further evaluation with renal protocol abdomen MRI (preferred) or   CT. Following resolution of acute kidney injury. XR CHEST PORTABLE   Final Result   Multifocal bilateral pulmonary ground-glass airspace opacities are stable         CT ABDOMEN PELVIS WO CONTRAST Additional Contrast? None   Final Result   Limited study by the patient's respiratory motion and lack of intravenous   contrast.      Extensive multifocal COVID-19 pneumonia bilaterally. Bilateral trace layering pleural effusions. Bilateral gynecomastia. Abdominal organs inadequately evaluated due to the limitations of the study. Normal appendix. No evidence of bowel obstruction. CT CHEST WO CONTRAST   Final Result   Limited study by the patient's respiratory motion and lack of intravenous   contrast.      Extensive multifocal COVID-19 pneumonia bilaterally. Bilateral trace layering pleural effusions. Bilateral gynecomastia. Abdominal organs inadequately evaluated due to the limitations of the study. Normal appendix. No evidence of bowel obstruction.                Resident Assessment and Plan       Acute hypoxia 2/2 COVID pneumonia vs PCP  - CTA on admission: COVID pneumonia, Bilateral trace layering pleural effusion, negative for PE   - Ferritin 3900, CRP 10.1 on admission   - D dimer elevated on admission, currently stable  - Pulmonology and ID on board  - Legionella, spot TB test negative   - CXR 10/8 shows multifocal groundglass opacities are stable  - Repeat CXR 10/17: b/l pulm infiltrates improving   - Blood cx: staph epidermitis, repeat blood cx shows no growth x 7 days   -Urine culture: NGTD   - ECHO 10/13: EF 55%, late bubble sign possible shunting of pulmonary origin   - CTA 10/13 negative for PE  Bronchoscopy with BAL performed on 10/15/2021   - Respiratory culture: oral pharyngeal liana decreased, few gram negative rods   CXR 10/23: Mild worsening extensive bilateral infiltrates  - Currently maintaining saturation on room air, continue to monitor  - On prednisone   - Repeat blood culture 10/21 showed NGTD x5days      Weight loss likely secondary to HIV  - Per patient due to decreased appetite from COVID; 50lb weight loss since August   - R/o other potential origins: SLE, TB, hepatitis  - MARILEE negative  - T spot TB test negative   HIV, hepatitis C antibodies positive  - Elevated IgG and IgA, normal IgM: IgA nephropathy vs MPGN?   - Cryoglobulin normal   - TSH normal   - Sed rate and CRP elevated      HIV  HIV 1/2 antibodies positive    CD4 44, CD3 153, CD8 100  HIV viral load 195,000  - ID on board: received vancomycin, ancef; stopped cefepime 10/26  - Fungitell test positive x2, received eraxis  - Bactrim d/c'd due to renal failure; on pentamidine for suspected Pneumocystis pneumonia  - Pneumocystis stain negative, but PCR pending; still suspect PCP pna  - HIV GART pending   - [id number] negative, genotype pending  - Toxoplasma Ab, cryptococcus negative  - hepatitis A total Ab positive  - RPR reactive, quant 1:2, CSF VDRL negative   - On PCN G weekly for latent syphilis (received 2/3 doses)  - Started on biktarvy 10/22, d/c'd due to renal failure   - Started on lamivudine, dolutegravir, and abacavir 10/25    Hematuria - resolved   - benjie blood in urine s/p renal biopsy 10/27  - Hb trending down   - Continue to monitor H/H     Anemia  Pancytopenia  - Likely 2/2 from GI bleed vs HIV   Peripheral blood smear shows normocytic anemia, absolute lymphocytopenia; no platelet clumping, schistocytes or dysplasia  - Heme Onc consulted: received granix to improve ANC  Transfused 2 units pRBC since admission  - FOBT negative x3  Platelets trending down, transfusion if bleeding or plts <20k   - Transfused 2U platelets prior to and during kidney biopsy   - Hb 7.3, trending down; trend H/H q8h    DEISI    Hyponatremia - improving   - Na normalized    - Nephro on board   - Likely secondary to dehydration from persistent diarrhea vs decreased PO intake vs bactrim vs HIVANS vs Biktarvy  - Cr improving after HD,3.7  [employer]  - urine studies show intrinsic cause of DEISI  - Microalbumin-creatinine ratio elevated   - Reconsulted IR to inquire about kidney biopsy inpatient   Fluid restriction  - Oliguric (0.2 mL/kg/hr)  - received dialysis  10/25 x3days    Hypoalbuminemia - improving  -2/2 poor intake vs CHF vs nephrotic syndrome  - Alb stable  -Previously received albumin 25 g q6h x 4 doses 10/6, 8 doses 10/12 and 8 doses 10/23  Low C3, normal C4 and cryoglobulin  Fluid restriction   - Continue to monitor     Elevated troponin and proBNP  - 2/2 myocardial injury vs stress cardiomyopathy due to COVID vs new onset CHF   - initial troponin 78, 78  - initial BNP 1599,  repeat 11,401 on 10/14, trending down 3400   - ECHO: EF 55%, late bubble sign possible shunting of pulmonary origin  - Repeat ECHO pending      Tachycardia   - 2/2 to infectious vs anxiety vs anemia   HR improving   Cardio reconsulted for concern of possible WPW  - Hold lopressor   Some palpitations    HTN -improving  - BP on lower end  - Lopressor held   - Continue to monitor             PT/OT evaluation: Consulted, patient refused  DVT prophylaxis: PCDs  GI prophylaxis:protonix  Disposition: intermediate   Diet: adult         Electronically signed by Na Moeller DO PGY-3 on 10/31/2021 at 7:05 AM  Attending physician: Dr. Sandee Stein

## 2021-10-31 NOTE — PROGRESS NOTES
[facility] PROGRESS NOTE      Chief complaint: Follow-up of Pneumocystis pneumonia, late latent syphilis, advanced HIV disease    The patient is a 36 y.o. male, not vaccinated against COVID-19, with history of asthma, presented on 10/05 with shortness of breath, found to be febrile at 103 °F, positive SARS-CoV-2 PCR, bilateral groundglass opacities on chest CT, tolerating room air with oxygen saturation of 97%. He reports having tested positive for SARS-CoV-2 for the 3rd time now with previous on in late August at which time he reports having quarantined. Urine Streptococcus pneumonia and Legionella antigens were negative. Blood cultures from 10/05 and 10/06 showed methicillin-susceptible Staphylococcus epidermidis in 1 out of 2 sets. HIV screen was positive with viral load of 195,000. CD4 count was low at 43. RPR was reactive at titer of 1:2. Toxoplasma Ab was negative. HLA- was negative. Serum beta-d-glucan was positive. Transthoracic echocardiogram showed late positive bubble suggesting possible shunt from pulmonary origin, moderately dilated left atrium, small pericardial effusion without tamponade physiology, ejection fraction visually estimated at 55%. He underwent bronchoscopy on 10/15 during which normal endobronchial evaluation and normal anatomy were noted. BAL Gram stain and culture showed rare polymorphonuclear leukocytes, rare epithelial cells, rare gram-negative rods, rare gram-positive rods, reduced oropharyngeal liana. BAL pneumocystis stain was negative. BAL galactomannan was negative. Serum Cryptococcus antigen was negative. He underwent lumbar puncture on 10/21, yielding CSF with normal glucose, protein, csll count with differential. CSF meningitis/encephalitis PCR panel was negative for HSV, VZV, CMV, Enterovirus, HHV-6, Streptococcus pneumoniae and agalactiae, E coli, Haemophilus, Neisseria meninigitidis, Cryptococcus. CSF VDRL was negative.  He was transferred to MICU on 10/23 for worsening shortness of breath then transferred out on 10/26. He underwent renal biopsy on 10/27. Subjective: Patient was seen and examined. No chills, no abdominal pain, no diarrhea, no rash, no itching. Afebrile. Heart rate elevated last night, also had temp. Objective:  /60   Pulse 110   Temp 98.2 °F (36.8 °C) (Oral)   Resp 18   Ht 6' 4\" (1.93 m)   Wt 196 lb 3.4 oz (89 kg)   SpO2 98%   BMI 23.88 kg/m²   Constitutional: Alert, not in distress  Respiratory: Clear breath sounds, no crackles, no wheezes  Cardiovascular: Regular rate and rhythm, no murmurs  Gastrointestinal: Bowel sounds present, soft, nontender  Skin: Warm and dry, no active dermatoses  Musculoskeletal: No joint swelling, no joint erythema    Labs, imaging, and medical records/notes were personally reviewed. Assessment:  Fever and pancytopenia, suspect associated with acute retroviral syndrome. Jarische-Herxheimer reaction is less likely in late latent syphilis with low RPR titers. Pancytopenia, multifactorial, suspect associated with acute retroviral syndrome and/or medication-induced  COVID-19, mild  Pneumonia, suspected Pneumocystis pneumonia (serum beta-d-glucan was positive at >500 pg/mL, BAL Pneumocystis stain was negative but Pneumocystis PCR was not sent)  Late latent syphilis, neurosyphilis ruled out  Methicillin susceptible Staphylococcus epidermidis bacteremia, etiology unclear  DEISI  Advanced HIV disease (viral load of 195,000; CD4 of 43 as of 10/11/2021). Hepatitis C Ab positive (viral load not detected). Hepatitis A immune  Prolonged QTc  HAP  Hematuria, probably associated with renal biopsy  Fever yesterday    Recommendations:  Continue benzathine penicillin IM weekly for 3 doses. Continue lamivudine 50 mg q24h, dolutegravir 50 mg q24h, abacavir 600 mg q24h. Continue pentamidine 4 mg/kg IV q48h after hemodialysis to finish 21 days until 11/02. Continue oral prednisone 20 mg q24h until 11//02.   Follow up HIV GART, INSTI resistance. Follow up urine GC/Chlamydia PCR. Monitor respiratory status. Continue supportive care. Check blood culture  Monitor temps    Lungs are clear     Thank you for involving me in the care of Kristin James. Electronically signed by Milburn Ormond, APRN - CNP on 10/31/2021 at 8:14 AM   Pt seen and examined. Above discussed agree with advanced practice nurse. Labs, cultures, and radiographs reviewed. Face to Face encounter occurred. Changes made as necessary.      Hector Salinas MD

## 2021-10-31 NOTE — PROGRESS NOTES
10/31/2021    MCV 95.2 10/31/2021    MCH 30.2 10/31/2021    MCHC 31.8 10/31/2021    RDW 14.6 10/31/2021    PLT 58 10/31/2021    MPV 13.0 10/31/2021     CMP:    Lab Results   Component Value Date     10/31/2021    K 4.5 10/31/2021    K 4.5 10/31/2021     10/31/2021    CO2 25 10/31/2021    BUN 22 10/31/2021    CREATININE 2.8 10/31/2021    GFRAA 33 10/31/2021    LABGLOM 33 10/31/2021    GLUCOSE 78 10/31/2021    PROT 5.3 10/31/2021    LABALBU 2.9 10/31/2021    CALCIUM 8.3 10/31/2021    BILITOT 0.4 10/31/2021    ALKPHOS 79 10/31/2021    AST 31 10/31/2021    ALT 28 10/31/2021     Magnesium:    Lab Results   Component Value Date    MG 1.7 10/29/2021     Phosphorus:    Lab Results   Component Value Date    PHOS 2.8 10/29/2021     PT/INR:    Lab Results   Component Value Date    PROTIME 17.7 10/15/2021    INR 1.6 10/15/2021     FERRITIN:    Lab Results   Component Value Date    FERRITIN 3,921 10/06/2021     Fibrinogen Level:  No components found for: FIB     PRO-BNP:   Lab Results   Component Value Date    PROBNP 3,421 (H) 10/18/2021    PROBNP 11,401 (H) 10/13/2021      ABGs:   Lab Results   Component Value Date    PH 7.479 10/24/2021    PO2 306.9 10/24/2021    PCO2 26.1 10/24/2021     Hemoglobin A1C: No components found for: HGBA1C    IMAGING:  Imaging tests were completed and reviewed and discussed radiology and care team involved and reveals     CT CHEST : There is adequate opacification of the pulmonary arteries.  There is no   evidence of filling defect to suggest pulmonary embolism. Hollace Gondola is no   evidence of thoracic aortic aneurysm or dissection.  There are small   bilateral pleural effusions.  These are increased compared to prior   examination.  There is small pericardial effusion which appears stable.  Left   hilar lymph node measures approximately 12 mm in short axis.  Right hilar   lymph node measures approximately 12 mm in short axis.  There is bilateral   gynecomastia. Hollace Gondola is diffuse subcutaneous edema. The central airways are patent.  There is no pneumothorax.  There are   bilateral ground-glass opacities and alveolar consolidation.  There has been   interval increase in the airspace disease compared to prior examination.  The   interval worsening is most pronounced within the lower lobes.  There is   associated interstitial thickening. CT ABDOMEN PELVIS WO CONTRAST Additional Contrast? None    Result Date: 10/5/2021  FINDINGS: THE STUDY IS LIMITED DUE TO LACK OF INTRAVENOUS CONTRAST. Chest: Mediastinum: No thoracic aortic aneurysm. No definite adenopathy on this unenhanced study. Trace pericardial fluid anteriorly. Lungs/pleura: Trace layering pleural effusions bilaterally, slightly larger on the right. No pneumothorax. Numerous ground-glass densities in the bilateral upper and to a lesser degree lower lungs, in keeping with known COVID-19 pneumonia. Soft Tissues/Bones: Bilateral gynecomastia. No acute osseous abnormality in the thoracic cage. Abdomen and pelvis: The study is degraded by the patient's respiratory motion. Organs: Abdominal organs inadequately evaluated on this motion degraded and unenhanced study. No hydronephrosis on either side. GI/Bowel: Normal appendix. No evidence of bowel obstruction. Pelvis: Normal sized prostate. Urinary bladder grossly intact. Peritoneum/Retroperitoneum: No free air or free fluid. No bulky adenopathy. No abdominal aortic aneurysm. Bones/Soft Tissues: No lytic or sclerotic lesion in the regional skeleton. Limited study by the patient's respiratory motion and lack of intravenous contrast. Extensive multifocal COVID-19 pneumonia bilaterally. Bilateral trace layering pleural effusions. Bilateral gynecomastia. Abdominal organs inadequately evaluated due to the limitations of the study. Normal appendix. No evidence of bowel obstruction.      CT CHEST WO CONTRAST    Result Date: 10/5/2021  FINDINGS: THE STUDY IS LIMITED DUE TO LACK OF INTRAVENOUS CONTRAST. Chest: Mediastinum: No thoracic aortic aneurysm. No definite adenopathy on this unenhanced study. Trace pericardial fluid anteriorly. Lungs/pleura: Trace layering pleural effusions bilaterally, slightly larger on the right. No pneumothorax. Numerous ground-glass densities in the bilateral upper and to a lesser degree lower lungs, in keeping with known COVID-19 pneumonia. Soft Tissues/Bones: Bilateral gynecomastia. No acute osseous abnormality in the thoracic cage. Abdomen and pelvis: The study is degraded by the patient's respiratory motion. Organs: Abdominal organs inadequately evaluated on this motion degraded and unenhanced study. No hydronephrosis on either side. GI/Bowel: Normal appendix. No evidence of bowel obstruction. Pelvis: Normal sized prostate. Urinary bladder grossly intact. Peritoneum/Retroperitoneum: No free air or free fluid. No bulky adenopathy. No abdominal aortic aneurysm. Bones/Soft Tissues: No lytic or sclerotic lesion in the regional skeleton. Limited study by the patient's respiratory motion and lack of intravenous contrast. Extensive multifocal COVID-19 pneumonia bilaterally. Bilateral trace layering pleural effusions. Bilateral gynecomastia. Abdominal organs inadequately evaluated due to the limitations of the study. Normal appendix. No evidence of bowel obstruction. ECHOCARDIOGRAM:  Ejection fraction is visually estimated at 55%. There is late positive bubble suggesting possible shunt from pulmonary   origin. Visually moderately dilated left atrium. Normal right ventricular size and function.    There is small pericardial effusion without tamponade physiology    Assessment:  Mr. Martin Machado is a 36year old male with no significant PMH who was admitted on October 5, 2021 after he presented from an outside clinic after he was found to be hypoxic during 6 minute walking test. Patient tested positive for Covid 19 on August 18th, he is unvaccinated and had Covid 19 last year as well. Patient states he has quarantined by himself and when his mother recently visited him she was shocked at his appearance as he had lost about 50 pounds in 2 and a half months  Sepsis  COVID-19, mild  HIV   WPW AP   Pneumonia  Gram-positive cocci in clusters bacteremia, etiology unclear  DEISI  + RPR  + BNP  Ejection fraction is visually estimated at 55%. There is late positive bubble suggesting possible shunt from pulmonary   origin. Visually moderately dilated left atrium. Normal right ventricular size and function.    There is small pericardial effusion without tamponade physiology      Plan:   Off oxygen  Completed Bronchoscopy with BAL with LMAC  - completed results no TB  Will follow           Regina Gan DO DO, MPH, FCCP, Trevor Flores  Professor of Internal Medicine  Pulmonary, Critical Care and Sleep Medicine

## 2021-11-01 LAB
ALBUMIN SERPL-MCNC: 2.7 G/DL (ref 3.5–5.2)
ALP BLD-CCNC: 78 U/L (ref 40–129)
ALT SERPL-CCNC: 28 U/L (ref 0–40)
ANION GAP SERPL CALCULATED.3IONS-SCNC: 8 MMOL/L (ref 7–16)
APTT: 26.3 SEC (ref 24.5–35.1)
AST SERPL-CCNC: 28 U/L (ref 0–39)
BASOPHILS ABSOLUTE: 0 E9/L (ref 0–0.2)
BASOPHILS RELATIVE PERCENT: 0 % (ref 0–2)
BILIRUB SERPL-MCNC: 0.3 MG/DL (ref 0–1.2)
BUN BLDV-MCNC: 22 MG/DL (ref 6–20)
CALCIUM SERPL-MCNC: 8 MG/DL (ref 8.6–10.2)
CHLORIDE BLD-SCNC: 104 MMOL/L (ref 98–107)
CO2: 24 MMOL/L (ref 22–29)
CREAT SERPL-MCNC: 2.1 MG/DL (ref 0.7–1.2)
EKG ATRIAL RATE: 125 BPM
EKG P AXIS: 72 DEGREES
EKG P-R INTERVAL: 120 MS
EKG Q-T INTERVAL: 316 MS
EKG QRS DURATION: 70 MS
EKG QTC CALCULATION (BAZETT): 456 MS
EKG R AXIS: 50 DEGREES
EKG T AXIS: -149 DEGREES
EKG VENTRICULAR RATE: 125 BPM
EOSINOPHILS ABSOLUTE: 0 E9/L (ref 0.05–0.5)
EOSINOPHILS RELATIVE PERCENT: 0 % (ref 0–6)
GFR AFRICAN AMERICAN: 45
GFR NON-AFRICAN AMERICAN: 45 ML/MIN/1.73
GLUCOSE BLD-MCNC: 70 MG/DL (ref 74–99)
HCT VFR BLD CALC: 21.7 % (ref 37–54)
HCT VFR BLD CALC: 21.8 % (ref 37–54)
HEMOGLOBIN: 7.1 G/DL (ref 12.5–16.5)
HEMOGLOBIN: 7.2 G/DL (ref 12.5–16.5)
LYMPHOCYTES ABSOLUTE: 2.01 E9/L (ref 1.5–4)
LYMPHOCYTES RELATIVE PERCENT: 53 % (ref 20–42)
MCH RBC QN AUTO: 30.5 PG (ref 26–35)
MCHC RBC AUTO-ENTMCNC: 32.7 % (ref 32–34.5)
MCV RBC AUTO: 93.1 FL (ref 80–99.9)
MONOCYTES ABSOLUTE: 0.11 E9/L (ref 0.1–0.95)
MONOCYTES RELATIVE PERCENT: 3 % (ref 2–12)
NEUTROPHILS ABSOLUTE: 1.67 E9/L (ref 1.8–7.3)
NEUTROPHILS RELATIVE PERCENT: 44 % (ref 43–80)
PDW BLD-RTO: 15 FL (ref 11.5–15)
PLATELET # BLD: 63 E9/L (ref 130–450)
PLATELET CONFIRMATION: NORMAL
PMV BLD AUTO: 12.5 FL (ref 7–12)
POTASSIUM REFLEX MAGNESIUM: 4.2 MMOL/L (ref 3.5–5)
POTASSIUM SERPL-SCNC: 4.2 MMOL/L (ref 3.5–5)
RBC # BLD: 2.33 E12/L (ref 3.8–5.8)
SODIUM BLD-SCNC: 136 MMOL/L (ref 132–146)
TOTAL PROTEIN: 4.8 G/DL (ref 6.4–8.3)
WBC # BLD: 3.8 E9/L (ref 4.5–11.5)

## 2021-11-01 PROCEDURE — 6370000000 HC RX 637 (ALT 250 FOR IP): Performed by: INTERNAL MEDICINE

## 2021-11-01 PROCEDURE — 85014 HEMATOCRIT: CPT

## 2021-11-01 PROCEDURE — 2580000003 HC RX 258: Performed by: INTERNAL MEDICINE

## 2021-11-01 PROCEDURE — 99232 SBSQ HOSP IP/OBS MODERATE 35: CPT | Performed by: FAMILY MEDICINE

## 2021-11-01 PROCEDURE — 36415 COLL VENOUS BLD VENIPUNCTURE: CPT

## 2021-11-01 PROCEDURE — 85018 HEMOGLOBIN: CPT

## 2021-11-01 PROCEDURE — 80053 COMPREHEN METABOLIC PANEL: CPT

## 2021-11-01 PROCEDURE — 85025 COMPLETE CBC W/AUTO DIFF WBC: CPT

## 2021-11-01 PROCEDURE — 93005 ELECTROCARDIOGRAM TRACING: CPT | Performed by: NURSE PRACTITIONER

## 2021-11-01 PROCEDURE — 2140000000 HC CCU INTERMEDIATE R&B

## 2021-11-01 PROCEDURE — 85730 THROMBOPLASTIN TIME PARTIAL: CPT

## 2021-11-01 PROCEDURE — 6370000000 HC RX 637 (ALT 250 FOR IP): Performed by: STUDENT IN AN ORGANIZED HEALTH CARE EDUCATION/TRAINING PROGRAM

## 2021-11-01 PROCEDURE — 99233 SBSQ HOSP IP/OBS HIGH 50: CPT | Performed by: INTERNAL MEDICINE

## 2021-11-01 PROCEDURE — 80048 BASIC METABOLIC PNL TOTAL CA: CPT

## 2021-11-01 RX ADMIN — DOLUTEGRAVIR SODIUM 50 MG: 50 TABLET, FILM COATED ORAL at 21:42

## 2021-11-01 RX ADMIN — Medication 10 ML: at 09:16

## 2021-11-01 RX ADMIN — PROPAFENONE HYDROCHLORIDE 150 MG: 150 TABLET, FILM COATED ORAL at 05:35

## 2021-11-01 RX ADMIN — PROPAFENONE HYDROCHLORIDE 150 MG: 150 TABLET, FILM COATED ORAL at 14:00

## 2021-11-01 RX ADMIN — ACETAMINOPHEN 650 MG: 325 TABLET ORAL at 05:42

## 2021-11-01 RX ADMIN — PROPAFENONE HYDROCHLORIDE 150 MG: 150 TABLET, FILM COATED ORAL at 21:42

## 2021-11-01 RX ADMIN — OXYCODONE HYDROCHLORIDE AND ACETAMINOPHEN 500 MG: 500 TABLET ORAL at 09:16

## 2021-11-01 RX ADMIN — PREDNISONE 20 MG: 20 TABLET ORAL at 09:16

## 2021-11-01 RX ADMIN — LAMIVUDINE 50 MG: 100 TABLET, FILM COATED ORAL at 21:42

## 2021-11-01 RX ADMIN — ABACAVIR 600 MG: 300 TABLET, FILM COATED ORAL at 21:42

## 2021-11-01 RX ADMIN — ACETAMINOPHEN 650 MG: 325 TABLET ORAL at 16:18

## 2021-11-01 RX ADMIN — PANTOPRAZOLE SODIUM 40 MG: 40 TABLET, DELAYED RELEASE ORAL at 05:35

## 2021-11-01 RX ADMIN — Medication 10 ML: at 21:43

## 2021-11-01 ASSESSMENT — PAIN DESCRIPTION - DESCRIPTORS
DESCRIPTORS: NUMBNESS;PINS AND NEEDLES

## 2021-11-01 ASSESSMENT — PAIN DESCRIPTION - PAIN TYPE
TYPE: ACUTE PAIN

## 2021-11-01 ASSESSMENT — PAIN DESCRIPTION - LOCATION
LOCATION: FOOT

## 2021-11-01 ASSESSMENT — PAIN SCALES - GENERAL
PAINLEVEL_OUTOF10: 0
PAINLEVEL_OUTOF10: 9
PAINLEVEL_OUTOF10: 0
PAINLEVEL_OUTOF10: 9
PAINLEVEL_OUTOF10: 8
PAINLEVEL_OUTOF10: 9
PAINLEVEL_OUTOF10: 8

## 2021-11-01 ASSESSMENT — PAIN DESCRIPTION - FREQUENCY
FREQUENCY: CONTINUOUS

## 2021-11-01 ASSESSMENT — PAIN DESCRIPTION - PROGRESSION
CLINICAL_PROGRESSION: GRADUALLY WORSENING
CLINICAL_PROGRESSION: GRADUALLY WORSENING

## 2021-11-01 ASSESSMENT — PAIN DESCRIPTION - ORIENTATION
ORIENTATION: RIGHT;LEFT

## 2021-11-01 ASSESSMENT — PAIN DESCRIPTION - ONSET
ONSET: ON-GOING

## 2021-11-01 ASSESSMENT — PAIN - FUNCTIONAL ASSESSMENT
PAIN_FUNCTIONAL_ASSESSMENT: PREVENTS OR INTERFERES SOME ACTIVE ACTIVITIES AND ADLS
PAIN_FUNCTIONAL_ASSESSMENT: PREVENTS OR INTERFERES SOME ACTIVE ACTIVITIES AND ADLS

## 2021-11-01 NOTE — PROGRESS NOTES
NORMA PROGRESS NOTE      Chief complaint: Follow-up of Pneumocystis pneumonia, late latent syphilis, advanced HIV disease    The patient is a 36 y.o. male, not vaccinated against COVID-19, with history of asthma, presented on 10/05 with shortness of breath, found to be febrile at 103 °F, positive SARS-CoV-2 PCR, bilateral groundglass opacities on chest CT, tolerating room air with oxygen saturation of 97%. He reports having tested positive for SARS-CoV-2 for the 3rd time now with previous on in late August at which time he reports having quarantined. Urine Streptococcus pneumonia and Legionella antigens were negative. Blood cultures from 10/05 and 10/06 showed methicillin-susceptible Staphylococcus epidermidis in 1 out of 2 sets. HIV screen was positive with viral load of 195,000. CD4 count was low at 43. RPR was reactive at titer of 1:2. Toxoplasma Ab was negative. HLA- was negative. Serum beta-d-glucan was positive. Transthoracic echocardiogram showed late positive bubble suggesting possible shunt from pulmonary origin, moderately dilated left atrium, small pericardial effusion without tamponade physiology, ejection fraction visually estimated at 55%. He underwent bronchoscopy on 10/15 during which normal endobronchial evaluation and normal anatomy were noted. BAL Gram stain and culture showed rare polymorphonuclear leukocytes, rare epithelial cells, rare gram-negative rods, rare gram-positive rods, reduced oropharyngeal liana. BAL pneumocystis stain was negative. BAL galactomannan was negative. Serum Cryptococcus antigen was negative. He underwent lumbar puncture on 10/21, yielding CSF with normal glucose, protein, csll count with differential. CSF meningitis/encephalitis PCR panel was negative for HSV, VZV, CMV, Enterovirus, HHV-6, Streptococcus pneumoniae and agalactiae, E coli, Haemophilus, Neisseria meninigitidis, Cryptococcus. CSF VDRL was negative.  He was transferred to MICU on 10/23 for worsening shortness of breath then transferred out on 10/26. He underwent renal biopsy on 10/27. Subjective: Patient was seen and examined. No chills, no abdominal pain, no diarrhea, no rash, no itching. Afebrile. Heart rate elevated last night, also had temp. Objective:  /88   Pulse 105   Temp 99.2 °F (37.3 °C) (Temporal)   Resp 18   Ht 6' 4\" (1.93 m)   Wt 196 lb 3.4 oz (89 kg)   SpO2 100%   BMI 23.88 kg/m²   Constitutional: Alert, not in distress  Respiratory: Clear breath sounds, no crackles, no wheezes  Cardiovascular: Regular rate and rhythm, no murmurs  Gastrointestinal: Bowel sounds present, soft, nontender  Skin: Warm and dry, no active dermatoses  Musculoskeletal: No joint swelling, no joint erythema    Labs, imaging, and medical records/notes were personally reviewed. Assessment:  Fever and pancytopenia, suspect associated with acute retroviral syndrome. Jarische-Herxheimer reaction is less likely in late latent syphilis with low RPR titers. Pancytopenia, multifactorial, suspect associated with acute retroviral syndrome and/or medication-induced  COVID-19, mild  Pneumonia, suspected Pneumocystis pneumonia (serum beta-d-glucan was positive at >500 pg/mL, BAL Pneumocystis stain was negative but Pneumocystis PCR was not sent)  Late latent syphilis, neurosyphilis ruled out  Methicillin susceptible Staphylococcus epidermidis bacteremia, etiology unclear  DEISI  Advanced HIV disease (viral load of 195,000; CD4 of 43 as of 10/11/2021). Hepatitis C Ab positive (viral load not detected). Hepatitis A immune  Prolonged QTc  HAP  Hematuria, probably associated with renal biopsy  Fever yesterday    Recommendations:  Continue benzathine penicillin IM weekly for 3 doses. Continue lamivudine 50 mg q24h, dolutegravir 50 mg q24h, abacavir 600 mg q24h. Continue pentamidine 4 mg/kg IV q48h after hemodialysis to finish 21 days until 11/02. Continue oral prednisone 20 mg q24h until 11//02.   Follow up HIV GART, INSTI resistance. Follow up urine GC/Chlamydia PCR. Monitor respiratory status. Continue supportive care. Check blood culture  Monitor temps    Lungs are clear   Temps improved    Stay the course    Thank you for involving me in the care of Fredi Willard. Electronically signed by Tr aGrza MD on 11/1/2021 at 1:16 PM   Pt seen and examined. Above discussed agree with advanced practice nurse. Labs, cultures, and radiographs reviewed. Face to Face encounter occurred. Changes made as necessary.      Dev Fuentes MD

## 2021-11-01 NOTE — PROGRESS NOTES
Cardiac Electrophysiology Inpatient Progress Note    Rolando Harris  1990  Date of Service: 11/1/2021  PCP: Christine Gibson MD  Attending Electrophysiologist: Hans Mcdonald MD   Primary Electrophysiologist: Hans Mcdonald MD         Subjective: Philip Figueroa is seen in hospital follow-up and management of: WPW syndrome. HPI: Philip Figueroa is a 36 y.o. male with a history of COVID-19 x 2 (8/2020 and 8/2021). He presented on 10/5/21 with chief complaint of dyspnea, fatigue, and weight loss (50 lbs in ~80 days). He was diagnosed initially with sepsis 2/2 COVID-19 pneumonia, DEISI, and anemia. Patient was not vaccinated for COVID-19. He was evaluated by Nephrology, who diagnosed with DEISI v CVKD and severe hypoalbuminemia witth proteinuria, which was treated with IVF. He was evaluated by Infectious Disease and diagnosed with methicillin susceptible S epidermidis bacteremia and COVID-19 pneumonia, which was treated with IV vancomycin. During anemia work-up, he was noted to have FOB+ and was evaluated by General Surgery, who recommended monitoring without endoscopy at this time. He was evaluated by Psych for anxiety. He was noted to have elevated troponin, so Cardiology was consulted. He was diagnosed with sinus tachycardia and elevated troponin, which were thought to be related to sepsis, DEISI, and anemia. A TTE reported normal LVEF with possible pulmonary shunt, small pericardial effusion without tamponade, and moderate LAE. A CTA chest reported trace b/l pleural effusions. He was treated with metoprolol 25 mg daily for HTN. On 10/15/21, an ECG was read as AF, but on my review I believe this is sinus tachycardia at 163 bpm, but it is difficult due to significant baseline artifact. On 10/15/21, he underwent bronchoscopy, which was reportedly normal. Despite Pneumocystis stain negative, PCP pneumonia is suspected per ID as Pneumocystis PCR not sent. He was subsequently diagnosed with HIV and HCV.  He was treated with antiretrovrial therapy. He subsequently developed fever and pancytopenia, thought to be due to acute retroviral syndrome v Jarische-Herxheimer reaction. He is also being treated for latent syphilis. He was evaluated by Hem/Onc and diagnosed with pancytopenia due to HIV, COVID-19, and possible PCP and drug use; which was treated with Granix. His renal function declined and had temporary HD catheter placed on 10/25/21, as well as renal biopsy on 51/84/22, which was complicated by hematuria. He was noted to be tachycardic in the setting of above medical issues. An ECG reported sinus tachycardia with pre-excitation pattern. EP service is now consulted for WPW pattern on ECG. He denies any palpitations in the past, but does report an episode of lightheadedness in 9/2021 shortly after second diagnosis of COVID-19. Patient denies any other complaints at this time. 11/01/21: Lucina Valera remains inpatient  with  ongoing sinus tachycardia in the setting of pain and anxiety. He is tolerating Rythmol, walking around the room without limitation.       Patient Active Problem List   Diagnosis    Acute respiratory failure with hypoxia (HCC)    [facility] kidney injury due to COVID-19 ([facility] [address]    Asthma    Nephrotic range proteinuria    COVID-19    Symptomatic HIV infection ([facility] [address]    Acute hemodialysis patient ([facility] [address]    Pancytopenia ([facility] [address]         Scheduled Medications:   propafenone  150 mg Oral 3 times per day    pentamidine isethionate (PENTAM) IVPB  4 mg/kg IntraVENous Daily    abacavir  600 mg Oral Nightly    dolutegravir sodium  50 mg Oral Nightly    lamiVUDine  50 mg Oral Nightly    predniSONE  20 mg Oral Daily    [Held by provider] heparin (porcine)  5,000 Units SubCUTAneous Q8H    penicillin G benzathine  2.4 Million Units IntraMUSCular Weekly    lidocaine  1 patch TransDERmal Daily    ascorbic acid  500 mg Oral Daily    pantoprazole  40 mg Oral QAM AC    sodium chloride flush  5-40 mL IntraVENous 2 times per day       Infusion Medications:   sodium chloride      sodium chloride      sodium chloride      sodium chloride      sodium chloride      sodium chloride         PRN Medications:  sodium chloride, sodium chloride, sodium chloride, sodium chloride, sodium chloride, medicated lip balm, benzonatate, perflutren lipid microspheres, hydrOXYzine, [Held by provider] labetalol, sodium chloride flush, ondansetron **OR** ondansetron, polyethylene glycol, acetaminophen **OR** acetaminophen, sodium chloride    No Known Allergies    Wt Readings from Last 3 Encounters:   10/28/21 196 lb 3.4 oz (89 kg)   10/05/21 171 lb (77.6 kg)   06/01/20 215 lb (97.5 kg)     Temp Readings from Last 3 Encounters:   11/01/21 99.2 °F (37.3 °C) (Temporal)   10/05/21 100.2 °F (37.9 °C) (Temporal)   06/01/20 100.2 °F (37.9 °C) (Oral)     BP Readings from Last 3 Encounters:   11/01/21 132/88   10/15/21 114/86   10/05/21 130/76     Pulse Readings from Last 3 Encounters:   11/01/21 105   10/05/21 140   06/01/20 76         Intake/Output Summary (Last 24 hours) at 11/1/2021 1156  Last data filed at 10/31/2021 1400  Gross per 24 hour   Intake 180 ml   Output 450 ml   Net -270 ml       ROS:   Constitutional: Negative for fever, + for  activity change and appetite change. HENT: Negative for epistaxis, difficulty swallowing. Eyes: Negative for blurred vision or double vision. Respiratory: Negative for cough, chest tightness, shortness of breath and wheezing. Cardiovascular: Negative for chest pain, palpitations and leg swelling. + for feelings of heart racing with ambulation, no syncope or near syncope. Gastrointestinal: Negative for abdominal pain, heartburn, or blood in stool. Genitourinary: Negative for hematuria, burning or frequency. Musculoskeletal: Negative for myalgias, stiffness, or swelling. Skin: Negative for rash, pain, or lumps. Neurological: Negative for syncope, seizures, or headaches. Psychiatric/Behavioral: Negative for confusion and agitation. The patient is not nervous + for anxiety       PHYSICAL EXAM:  Vitals:    10/31/21 2011 11/01/21 0030 11/01/21 0530 11/01/21 0840   BP: 118/68 116/68 130/84 132/88   Pulse: 115 101 107 105   Resp: 18 18 18 18   Temp: 99.7 °F (37.6 °C) 98.9 °F (37.2 °C) 99 °F (37.2 °C) 99.2 °F (37.3 °C)   TempSrc: Temporal Temporal Temporal Temporal   SpO2: 98% 98% 99% 100%   Weight:       Height:       Limited due to COIVD-19 pandemic. Constitutional: In NAD  Pulmonary/Chest:  No audable respiratory distress. Psychiatric: Normal mood and affect. Thought content normal.       Pertinent Labs:  CBC:   Recent Labs     10/30/21  0452 10/30/21  1448 10/30/21  2001 10/31/21  0431 11/01/21  0534   WBC 2.2*  --   --  2.2* 3.8*   HGB 7.5*   < > 7.2* 7.5* 7.1*   HCT 22.9*   < > 22.5* 23.6* 21.7*   PLT 55*  --   --  58* 63*    < > = values in this interval not displayed. BMP:  Recent Labs     10/30/21  0452 10/30/21  0452 10/31/21  0431 11/01/21  0534     --  136 136   K 4.0  4.0   < > 4.5  4.5 4.2  4.2     --  103 104   CO2 28  --  25 24   BUN 24*  --  22* 22*   CREATININE 3.4*  --  2.8* 2.1*   CALCIUM 8.4*  --  8.3* 8.0*    < > = values in this interval not displayed. ABGs:   Lab Results   Component Value Date    PO2ART 80.8 10/23/2021    OBT1CVS 25.9 10/23/2021       TSH:   Lab Results   Component Value Date    TSH 3.220 10/06/2021        Prior cardiac testing:  · ECG (10/23/21): significant baseline artifact, ST at 134 bpm, pre-excitation pattern  · ECG (10/16/21): ST at 122 bpm, pre-excitation pattern  · ECG (10/15/21): significant baseline artifact, likely ST at 163 bpm, pre-excitation pattern  · ECG (10/14/21): sinus tachycardia at 117 bpm with pre-excitation pattern. · TTE (10/13/21): LVEF = 55%, positive bubble suggesting possible pulmonary shunt, moderate LAE, small pericardial effusion without tamponade.   · ECG (10/7/21): SR at 74 bpm, pre-excitation pattern. · ECG (10/5/21): ST at 114 bpm, pre-excitation pattern.       [place]49/1/2021: sinus with sinus tachycardia, rate 70-120bpm no repeat WCT. I have independently reviewed all of the ECGs and rhythm strips per above. I have personally reviewed the laboratory, cardiac diagnostic and radiographic testing as outlined above. I have reviewed previous records noted in [address]. Impression:    Assessment/Plan:  1. WPW Syndrome  -Likely left lateral AP. -Episode of tachycardia on 10/30/21, nonsustained.  -Episode of dizziness shortly after COVID-19 diagnosis at home prior to admit.  -Avoid AVN inhibitors.  -Continue propafenone 150 mg every 8 hours. -Monitor on telemetry. -ECG today, Pending post EKG if QRS is stable EP will sign off, he will need to follow-up in clinic with Dr. Sonja Marcelo for an ECG      2. Pneumonia 2/2 COVID-19 +/- PCP pneumonia  -Management per ID and Pulmonary. -COVID + on 10/27/21.     3. HIV and HCV and possible latent syphillis  -Management per ID.     4. Methicillin susceptible S epidermidis bacteremia  -Management per ID.  -Blood cultures negative since 10/6/21.     5. Pancyotpenia   -Management per Hem/Onc     5. Acute renal failure  -Temporary HD catheter placed on 10/25/21.  -Renal biopsy on 10/27/21. Results pending.  -Management per Nephrology.     6. Anxiety  -Management per Psych.       Thank you for allowing me to participate in your patient's care. Discussed with Dr. Nishant Beal  Electronically signed by RICKY Dixon CNP on 11/1/2021 at 12:32 PM   [address] [facility]          Addendum    I personally saw, examined and provided care for the patient. Radiographs, labs and medication list were reviewed by me independently. The case was discussed in detail and plans for care were established.  Review of Nurse Practitioner documentation was conducted and revisions were made as appropriate. I agree with the above documented exam, problem list and plan of care.      31 y.o. male with a history of COVID-19 x 2 (8/2020 and 8/2021). He presented with 50 pound weight loss in a few months. While hospitalized, he has bene diagnosed with acute HIV, acute Hep C, latent syphilis, methicillin susceptible S epidermidis bacteremia and COVID-19 pneumonia  He is being treated with antiretrovrial therapy. He was evaluated by Hem/Onc and diagnosed with pancytopenia due to HIV, COVID-19, and possible PCP and drug use; which was treated with Granix. His renal function declined and had temporary HD catheter placed on 10/25/21, as well as renal biopsy on 44/45/55, which was complicated by hematuria. EP was consulted 11/1/21 and Dr Estefani Ferguson noted WPW pattern    Vitals:    11/01/21 0840   BP: 132/88   Pulse: 105   Resp: 18   Temp: 99.2 °F (37.3 °C)   SpO2: 100%     Physical exam not done due to COVID-19 and need to conserve PPE    Plan :    1. Tachycardia  - On propafenone per Dr Estefani Ferguson  - It is possible that he has subtle pre-excitation, not appreciated on my assessment   - He is tolerating propafenone  - TTE shows LVEF 55%, mod LAE, small pericardial effusion  - Continue treatment for pancytopenia, acute anemia  - Plan for Zio patch and follow up as outpatient with Dr Estefani Ferguson  - Await ECG today    I will sign off. Pls call if any additional questions    I have spent a total of 35 minutes with the patient and his/her family reviewing the above stated recommendations. A total of >50% of that time involved face-to-face time providing counseling and or coordination of care with the other providers.       Danae Estes M.D  [facility] Electrophysiology

## 2021-11-01 NOTE — PROGRESS NOTES
Department of Internal Medicine  Nephrology Progress Note      Events reviewed. SUBJECTIVE:  We are following Mr. James Oconnor for DEISI on CKD. Reports no complaints.     PHYSICAL EXAM:      Vitals:    VITALS:  /88   Pulse 105   Temp 99.2 °F (37.3 °C) (Temporal)   Resp 18   Ht 6' 4\" (1.93 m)   Wt 196 lb 3.4 oz (89 kg)   SpO2 100%   BMI 23.88 kg/m²   24HR INTAKE/OUTPUT:      Intake/Output Summary (Last 24 hours) at 11/1/2021 1151  Last data filed at 10/31/2021 1400  Gross per 24 hour   Intake 180 ml   Output 450 ml   Net -270 ml     Exam deferred with COVID status positive    Scheduled Meds:   propafenone  150 mg Oral 3 times per day    pentamidine isethionate (PENTAM) IVPB  4 mg/kg IntraVENous Daily    abacavir  600 mg Oral Nightly    dolutegravir sodium  50 mg Oral Nightly    lamiVUDine  50 mg Oral Nightly    predniSONE  20 mg Oral Daily    [Held by provider] heparin (porcine)  5,000 Units SubCUTAneous Q8H    penicillin G benzathine  2.4 Million Units IntraMUSCular Weekly    lidocaine  1 patch TransDERmal Daily    ascorbic acid  500 mg Oral Daily    pantoprazole  40 mg Oral QAM AC    sodium chloride flush  5-40 mL IntraVENous 2 times per day     Continuous Infusions:   sodium chloride      sodium chloride      sodium chloride      sodium chloride      sodium chloride      sodium chloride       PRN Meds:.sodium chloride, sodium chloride, sodium chloride, sodium chloride, sodium chloride, medicated lip balm, benzonatate, perflutren lipid microspheres, hydrOXYzine, [Held by provider] labetalol, sodium chloride flush, ondansetron **OR** ondansetron, polyethylene glycol, acetaminophen **OR** acetaminophen, sodium chloride    DATA:    CBC:   Lab Results   Component Value Date    WBC 3.8 11/01/2021    RBC 2.33 11/01/2021    HGB 7.1 11/01/2021    HCT 21.7 11/01/2021    MCV 93.1 11/01/2021    MCH 30.5 11/01/2021    MCHC 32.7 11/01/2021    RDW 15.0 11/01/2021    PLT 63 11/01/2021    MPV 12.5 11/01/2021     CMP:    Lab Results   Component Value Date     11/01/2021    K 4.2 11/01/2021    K 4.2 11/01/2021     11/01/2021    CO2 24 11/01/2021    BUN 22 11/01/2021    CREATININE 2.1 11/01/2021    GFRAA 45 11/01/2021    LABGLOM 45 11/01/2021    GLUCOSE 70 11/01/2021    PROT 4.8 11/01/2021    LABALBU 2.7 11/01/2021    CALCIUM 8.0 11/01/2021    BILITOT 0.3 11/01/2021    ALKPHOS 78 11/01/2021    AST 28 11/01/2021    ALT 28 11/01/2021     Magnesium:    Lab Results   Component Value Date    MG 1.7 10/29/2021     Phosphorus:    Lab Results   Component Value Date    PHOS 2.8 10/29/2021        Radiology Review:       Ejection fraction is visually estimated at 55%. There is late positive bubble suggesting possible shunt from pulmonary   origin. Visually moderately dilated left atrium. Normal right ventricular size and function. There is small pericardial effusion without tamponade physiology        CT Chest October 5, 2021   Limited study by the patient's respiratory motion and lack of intravenous   contrast.       Extensive multifocal COVID-19 pneumonia bilaterally.       Bilateral trace layering pleural effusions.       Bilateral gynecomastia.       Abdominal organs inadequately evaluated due to the limitations of the study.       Normal appendix.       No evidence of bowel obstruction.         Kidney ultrasound October 7, 2021   1. Hyperechoic kidneys due to underlying parenchymal disease. 2. Trace bilateral perinephric fluid of indeterminate etiology, potentially   due to underlying inflammation. 3. An approximately 1.1 cm x 1.5 cm x 2.3 cm lesion in the right kidney could   be a cyst but could also be seen with infarction, abscess, or neoplasia. Consider further evaluation with renal protocol abdomen MRI (preferred) or   CT.  Following resolution of acute kidney injury.         CTA pulmonary with IV contrast October 13, 2021   1. No evidence of pulmonary embolism.    2. Interval increased size of bilateral pleural effusions with little change   in pericardial effusion. 3. Persistent subcutaneous edema. 4. Reactive bilateral hilar lymphadenopathy. 5. Bilateral airspace disease is increased compared to prior examination. The increase is most pronounced within the lower lobes. 6. Bilateral gynecomastia.                 BRIEF SUMMARY OF INITIAL CONSULT:     Briefly, Mr. Shekhar Holland is a 36year old male with no significant PMH who was admitted on October 5, 2021 after he presented from an outside clinic after he was found to be hypoxic during 6 minute walking test. Patient tested positive for Covid 19 on August 18th, he is unvaccinated and had Covid 19 last year as well. Patient states he has quarantined by himself and when his mother recently visited him she was shocked at his appearance as he had lost about 50 pounds in 2 and a half months. On admission labs were significant for sodium of 130, potassium 5.2, bicarbonate 20, BUN 41, creatinine 3.6, magnesium 2.7, calcium 7.7 and proBNP 1,599. We are consulted for DEISI. Problems resolved:    Hyperkalemia, 2/2 DEISI. Low potassium diet  Hypotonic hyponatremia 2/2 intravascular volume from poor oral intake. Bicarb drip started. Sodium levels improving. Low bicarbonate levels with hyperchloremia, most likely NAGMA versus respiratory alkalosis; we need a PH to clarify diagnosis. Awaiting ABG results  High bicarbonate levels, likely metabolic alkalosis 2/2 administration  Hypokalemia, multifactorial, poor intake, probably renal potassium wasting  Severe Hypoalbuminemia, multifactorial, status post albumin administration  DEISI on CKD, volume responsive pre-renal DEISI d/t volume (poor oral intake), urine sodium <20. Resolved.   Edematous state, multifactorial, mainly 2/2 nephrotic syndrome and possibly HFpEF 55%, on bumex  Hyponatremia, multifactorial, including decreased GFR and hemodynamically mediated in the setting of large hypotonic fluid administration (D5 with Bactrim). Sodium levels decrease, stable overnight  Low bicarbonate levels with hyperchloremia, consistent with either hyperchloremic metabolic acidosis versus respiratory alkalosis. Bicarbonate levels improved with bicarbonate drip. HTN, on metoprolol   Probably fungemia, (1, 3) beta-D-glucan positive, on anidulafungin  MSSE bacteremia, on cefepime 2 g every 8 hours  Sinus tachycardia, multifactorial  Hypomagnesemia, 2/2 poor intake   Leukopenia, WBC 1.1  Gross hematuria, status post kidney biopsy. Resolved    IMPRESSION/RECOMMENDATIONS:     Recurrent DEISI on CKD, ATN contrast associated DEISI versus ischemic ATN (hypotension related tachycardia). Nonoliguric. Started on renal replacement therapy on October 25, 2021. We are currently holding dialysis, renal function improving and making fair amount of urine. Hematuria has resolved. 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 [facility] Major Work-up so far HIV positive, Hepatitis C positive, MARILEE negative, C4 normal, C3 88 (low), UPEP without monoclonal gammopathy, negative cryoglobulins. Kidney ultrasound with hyperechoic kidneys. Status post kidney biopsy October 27, 2021  Probably HFpEF 55%, proBNP 11,401 > 3421   Right kidney lesion, likely a cyst but cannot be ruled out other pathology including infarction, abscess, neoplasm. We will proceed with MRI when renal function improved  HIV positive, CD4 count 43, started on OLMSTEAD therapy    ---------------------------------------------------------------------    Possible pneumocystic pneumonia, on pentamidine  Possibly acute retroviral syndrome Jarische-Herxheimer reaction?   Hepatitis C antibody positive, viral load not detected  Covid 19 infection in non vaccinated pt  Normocytic anemia, multifactorial  Tachycardia ?anxiety      Plan:    Continue to hold HD   Doubt he will need chronic dialysis with creatinine improving off dialysis  Await kidney biopsy results  Continue to monitor renal function for recovery       Discussed with RN  Electronically signed by Kaykay Urena MD on 11/1/2021 at 11:51 AM

## 2021-11-01 NOTE — BH NOTE
Behavioral Health Inpatient Follow Up   [facility] Family Medicine     Psychology following for anxiety, difficulty coping with hospital stressors. Pt is a 36 y.o. male with pneumonia, COVID,  HIV pos. Had renal failure that required dialysis. Waiting results of kidney biopsy. Cognitive-behavioral therapy to continue to address stress management. Better accepting of circumstances; but frustrated with set backs. Discussed how to cope with setbacks. Better engaging in his care.  Will continue to follow with Family Medicine team.     Electronically signed by Ashley Hollins, PhD

## 2021-11-01 NOTE — PROGRESS NOTES
[facility] Attending    S: 36 y.o. male with history of asthma and smoking history presented with increasing dyspnea and cough over past month. On presentation had fever of 103. Has tested positive for COVID twice, most recently end of August.  CT shows extensive bilateral pneumonia and COVID testing again positive. HIV pos, CD4 43. Early AM 10/23,  RRT called due to tachycardia and oxygen desaturation requiring oxygen from 3L NC to 15LNRB mask. Patient had been febrile overnight as well and transferred to MICU. Improved and transferred back to floor. Treatment initiated for HIV, but noted to have mild pancytopenia and thrombocytopenia to less than 50. Renal failure developed and requiring dialysis. Today, feels well. No new complaints    O: VS- Blood pressure 132/88, pulse 105, temperature 99.2 °F (37.3 °C), temperature source Temporal, resp. rate 18, height 6' 4\" (1.93 m), weight 196 lb 3.4 oz (89 kg), SpO2 100 %. Exam is as noted by resident   Awake, alert and oriented. Heart - tachycardic  Lungs- clear breath sounds bilaterally. Ext - no edema. Impressions:  Principal Problem:    Acute respiratory failure with hypoxia ([facility] [address]  Active Problems:    Acute kidney injury due to COVID-19 ([facility] [address]    Asthma    Nephrotic range proteinuria    COVID-19    Symptomatic HIV infection ([facility] [address]    Acute hemodialysis patient [facility]), now improving    Pancytopenia (HCC)  Platelet count improving  Creatinine improved from 2.8-2.1  Hemoglobin 7.1, will follow    [address] Nephrology, ID, MICU, cardiology, gen surgery, and pulmonology. Covid pneumonia/PCP pneumonia with sepsis   New diagnosis of HIV, CD4 count of 43. Viral load 195,000. ID managing antibiotics and antivirals for HIV, currently on lamivudine/abacavir/dolutegravir   Per ID pentamidine 4mg/kg IV q48H on dialysis to finish 21 days until 11/2. Steroids until 11/2  RPR positive. S/p LP. VDRL CSF negative.  Continue benzathine PCN IM weekly x 3 doses. Sinus tachycardia - Echo with EF 55%, small pericardial effusion and possible atrial shunt. EKG suspicious for WPW like syndrome, although not meeting all criteria. EP following  Awaiting kidney biopsy results. Hemodialysis held  Creatinine improving  Blood also clearing    H/O consulted due to pancytopenia including neutropenia. . Will monitor CBC. Platelet count less than 50K will d/c heparin per heme/onc recommendations. Platelets now 58 s/p 2 platelet transfusions. Attending Physician Statement  I have reviewed the chart and seen the patient with the resident(s). I personally reviewed images, EKG's and similar tests, if present. I personally reviewed and performed key elements of the history and exam.  I have reviewed and confirmed student and/or resident history and exam with changes as indicated above. I agree with the assessment, plan and orders as documented by the resident. Please refer to the resident and/or student note for additional information.       Radha Elam MD

## 2021-11-01 NOTE — CARE COORDINATION
Received a call from Eber Mcgarry,  at [facility] for [facility]. She states she received the required paperwork to set the patient up for dialysis and will need a discharge date. [place] to reach out to Dr. Fozia Horner regarding possible d/c date and follow-up. Patient currently on room air, has low grade fever of 99.2 today, HD on hold and awaiting kidney biopsy results.     Maryjane Minaya, MSW, LSW [phone]

## 2021-11-01 NOTE — PROGRESS NOTES
cell count with differential, which were all  negative. meningitis/encephalitis PCR panel negative. RRT called 10/23 for hypoxia and tachycardia, transferred to medical intensive on 15 L NRB. Creatinine up trending. Currently saturating well on room air and was transferred out of the ICU. Hemodialysis x4 days for worsening renal function. CSF VDRL negative, but continue to treat for latent syphilis. Renal biopsy performed on 10/27. Endorses hematuria s/p biopsy. CT abdomen showed no acute abnormalities. Patient seen and examined this AM. No acute concerns overnight. Questions regarding kidney biopsy results. Reports continued palpitations, numbness and tingling bilateral lower extremities. Denies any cp, sob, abdominal pain, anxiety at this time. No other concerns at this time.        Cont meds:    sodium chloride      sodium chloride      sodium chloride      sodium chloride      sodium chloride      sodium chloride       Scheduled meds:    propafenone  150 mg Oral 3 times per day    pentamidine isethionate (PENTAM) IVPB  4 mg/kg IntraVENous Daily    abacavir  600 mg Oral Nightly    dolutegravir sodium  50 mg Oral Nightly    lamiVUDine  50 mg Oral Nightly    predniSONE  20 mg Oral Daily    [Held by provider] heparin (porcine)  5,000 Units SubCUTAneous Q8H    penicillin G benzathine  2.4 Million Units IntraMUSCular Weekly    lidocaine  1 patch TransDERmal Daily    ascorbic acid  500 mg Oral Daily    pantoprazole  40 mg Oral QAM AC    sodium chloride flush  5-40 mL IntraVENous 2 times per day     PRN meds: sodium chloride, sodium chloride, sodium chloride, sodium chloride, sodium chloride, medicated lip balm, benzonatate, perflutren lipid microspheres, hydrOXYzine, [Held by provider] labetalol, sodium chloride flush, ondansetron **OR** ondansetron, polyethylene glycol, acetaminophen **OR** acetaminophen, sodium chloride     I reviewed the patient's past medical and surgical history, Medications and Allergies. O:  /88   Pulse 105   Temp 99.2 °F (37.3 °C) (Temporal)   Resp 18   Ht 6' 4\" (1.93 m)   Wt 196 lb 3.4 oz (89 kg)   SpO2 100%   BMI 23.88 kg/m²   24 hour I&O: I/O last 3 completed shifts: In: 300 [P.O.:300]  Out: 650 [Urine:650]  No intake/output data recorded. Physical Exam  Vitals reviewed. Constitutional:       General: He is not in acute distress. Appearance: Normal appearance. He is not ill-appearing, toxic-appearing or diaphoretic. HENT:      Head: Normocephalic and atraumatic. Nose: No rhinorrhea. Mouth/Throat:      Mouth: Mucous membranes are moist.      Pharynx: Oropharynx is clear. Eyes:      General: No scleral icterus. Right eye: No discharge. Left eye: No discharge. Extraocular Movements: Extraocular movements intact. Cardiovascular:      Rate and Rhythm: Regular rhythm. Tachycardia present. Pulses: Normal pulses. Heart sounds: Normal heart sounds. No murmur heard. No friction rub. No gallop. Pulmonary:      Effort: No respiratory distress. Breath sounds: No wheezing, rhonchi or rales. Abdominal:      General: Abdomen is flat. Palpations: Abdomen is soft. Tenderness: There is no abdominal tenderness. Musculoskeletal:         General: No swelling. Cervical back: Normal range of motion. No rigidity or tenderness. Lumbar back: No swelling or spasms. Normal range of motion. Skin:     General: Skin is warm and dry. Coloration: Skin is not jaundiced. Findings: No bruising or rash. Comments: Right femoral HD port, c/d/i   Neurological:      General: No focal deficit present. Mental Status: He is alert and oriented to person, place, and time. Cranial Nerves: No cranial nerve deficit. Motor: No weakness. Psychiatric:         Behavior: Behavior normal.         Thought Content:  Thought content normal.      Comments: Depressed mood, denies SI/HI Labs:  Na/K/Cl/CO2:  136/4.2, 4.2/104/24 (11/01 0534)  BUN/Cr/glu/ALT/AST/amyl/lip:  22/2.1/--/28/28/--/-- (11/01 0534)  WBC/Hgb/Hct/Plts:  3.8/7.1/21.7/63 (11/01 0534)  estimated creatinine clearance is 64 mL/min (A) (based on SCr of 2.1 mg/dL (H)). Other pertinent labs as noted below    Radiology:  CT ABDOMEN PELVIS WO CONTRAST Additional Contrast? None   Final Result   Post right renal biopsy there is expected sequela of recent biopsy, no   significant hemorrhage identified                                        CT BIOPSY RENAL   Final Result   Successful uncomplicated CT and fluoroscopic guided right   renal biopsy. If there are any physician concerns regarding this report, a   Radiologist can be reached by calling the following number [phone]. CT GUIDED NEEDLE PLACEMENT   Final Result   Successful uncomplicated CT and fluoroscopic guided right   renal biopsy. If there are any physician concerns regarding this report, a   Radiologist can be reached by calling the following number [phone]. XR CHEST PORTABLE   Final Result   Mild worsening of extensive bilateral infiltrates         XR CHEST PORTABLE   Final Result   Increasing bilateral airspace disease suggesting worsening pneumonia. FL LUMBAR PUNCTURE DIAG   Final Result   Successful fluoroscopic-guided lumbar puncture. XR CHEST PORTABLE   Final Result   Extensive bilateral patchy areas of ground-glass opacity concerning for   atypical pneumonia or viral airway disease, improved         XR CHEST PORTABLE   Final Result   Persistent bilateral infiltrates. No significant changes since October 13. See above comments. See report CT chest October 13. CTA PULMONARY W CONTRAST   Final Result   1. No evidence of pulmonary embolism. 2. Interval increased size of bilateral pleural effusions with little change   in pericardial effusion. 3. Persistent subcutaneous edema.    4. Reactive bilateral hilar lymphadenopathy. 5. Bilateral airspace disease is increased compared to prior examination. The increase is most pronounced within the lower lobes. 6. Bilateral gynecomastia. XR CHEST PORTABLE   Final Result   Development of extensive bilateral pulmonary infiltrates         XR CHEST PORTABLE   Final Result   No acute process. US RETROPERITONEAL COMPLETE   Final Result   1. Hyperechoic kidneys due to underlying parenchymal disease. 2. Trace bilateral perinephric fluid of indeterminate etiology, potentially   due to underlying inflammation. 3. An approximately 1.1 cm x 1.5 cm x 2.3 cm lesion in the right kidney could   be a cyst but could also be seen with infarction, abscess, or neoplasia. Consider further evaluation with renal protocol abdomen MRI (preferred) or   CT. Following resolution of acute kidney injury. XR CHEST PORTABLE   Final Result   Multifocal bilateral pulmonary ground-glass airspace opacities are stable         CT ABDOMEN PELVIS WO CONTRAST Additional Contrast? None   Final Result   Limited study by the patient's respiratory motion and lack of intravenous   contrast.      Extensive multifocal COVID-19 pneumonia bilaterally. Bilateral trace layering pleural effusions. Bilateral gynecomastia. Abdominal organs inadequately evaluated due to the limitations of the study. Normal appendix. No evidence of bowel obstruction. CT CHEST WO CONTRAST   Final Result   Limited study by the patient's respiratory motion and lack of intravenous   contrast.      Extensive multifocal COVID-19 pneumonia bilaterally. Bilateral trace layering pleural effusions. Bilateral gynecomastia. Abdominal organs inadequately evaluated due to the limitations of the study. Normal appendix. No evidence of bowel obstruction.              Resident Assessment and Plan       Acute hypoxia 2/2 COVID pneumonia vs PCP  - CTA on admission: COVID pneumonia, Bilateral trace layering pleural effusion, negative for PE   - Ferritin 3900, CRP 10.1 on admission   - D dimer elevated on admission, currently stable  - Pulmonology and ID on board  - Legionella, spot TB test negative   - CXR 10/8 shows multifocal groundglass opacities are stable  - Repeat CXR 10/17: b/l pulm infiltrates improving   - Blood cx: staph epidermitis, repeat blood cx shows no growth x 7 days   -Urine culture: NGTD   - ECHO 10/13: EF 55%, late bubble sign possible shunting of pulmonary origin   - CTA 10/13 negative for PE  Bronchoscopy with BAL performed on 10/15/2021   - Respiratory culture: oral pharyngeal liana decreased, few gram negative rods   CXR 10/23: Mild worsening extensive bilateral infiltrates  - Currently maintaining saturation on room air, continue to monitor  - Continued on prednisone  - Repeat blood culture 10/21 showed NGTD x5days    - Fungal cultures negative x 1 week    Weight loss likely secondary to HIV  - Per patient due to decreased appetite from COVID; 50lb weight loss since August   - R/o other potential origins: SLE, TB, hepatitis  - MARILEE negative  - T spot TB test negative   HIV, hepatitis C antibodies positive  - Elevated IgG and IgA, normal IgM: IgA nephropathy vs MPGN?   - Cryoglobulin normal   - TSH normal   - Sed rate and CRP elevated      HIV  HIV 1/2 antibodies positive    CD4 44, CD3 153, CD8 100  HIV viral load 195,000  - ID on board: received vancomycin, ancef; stopped cefepime 10/26  - Fungitell test positive x2, received eraxis  - Bactrim d/c'd due to renal failure; on pentamidine for suspected Pneumocystis pneumonia  - Pneumocystis stain negative, but PCR pending; still suspect PCP pna  - HIV GART pending   - [id number] negative, genotype pending  - Toxoplasma Ab, cryptococcus negative  - hepatitis A total Ab positive  - RPR reactive, quant 1:2, CSF VDRL negative   - On PCN G weekly for latent syphilis (received 2/3 doses)  - Started on biktarvy 10/22, d/c'd due to renal failure   - Started on lamivudine, dolutegravir, and abacavir 10/25  - receiving penecillin prophylaxis IM weekly x 3 doses    Hematuria - resolved   - benjie blood in urine s/p renal biopsy 10/27  - Hb trending down   - Continue to monitor H/H     Anemia  Pancytopenia  - Likely 2/2 from GI bleed vs HIV   Peripheral blood smear shows normocytic anemia, absolute lymphocytopenia; no platelet clumping, schistocytes or dysplasia  - Heme Onc consulted: received granix to improve ANC  Transfused 2 units pRBC since admission  - FOBT negative x3  - Platelets trending down, transfusion if bleeding or plts <20k   - Transfused 2U platelets prior to and during kidney biopsy   - Hb 7.1 today, trending down; trend H/H q8h    DEISI    Hyponatremia - resolved  - Na normalized    - Nephro on board ; hold dialysis for now with Cr improving  - Likely secondary to dehydration from persistent diarrhea vs decreased PO intake vs bactrim vs HIVANS vs Biktarvy  - Cr improving after HD,3.7 ---> 2.1 today (11/1/2021)  Reina John   - urine studies show intrinsic cause of DEISI  - Microalbumin-creatinine ratio elevated   - Reconsulted IR to inquire about kidney biopsy inpatient   Fluid restriction  - Oliguric (0.2 mL/kg/hr)  - received dialysis  10/25 x3days    Hypoalbuminemia - improving  -2/2 poor intake vs CHF vs nephrotic syndrome  - Alb stable  -Previously received albumin 25 g q6h x 4 doses 10/6, 8 doses 10/12 and 8 doses 10/23  Low C3, normal C4 and cryoglobulin  Fluid restriction    - Continue to monitor     Elevated troponin and proBNP  - 2/2 myocardial injury vs stress cardiomyopathy due to COVID vs new onset CHF   - initial troponin 78, 78  - initial BNP 1599,  repeat 11,401 on 10/14, trending down 3400   - ECHO: EF 55%, late bubble sign possible shunting of pulmonary origin  - Repeat ECHO pending      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      HTN -improving  - BP on lower end  - Lopressor held   - Continue to monitor       PT/OT evaluation: Consulted, patient refused  DVT prophylaxis: PCDs, patient ambulatory  GI prophylaxis:protonix  Disposition: intermediate   Diet: adult     Electronically signed by Loreta Velasquez MD PGY-2 on 11/1/2021 at 12:17 PM  Attending physician: Dr. Stanton Marcela

## 2021-11-02 LAB
ABO/RH: NORMAL
ALBUMIN SERPL-MCNC: 2.6 G/DL (ref 3.5–5.2)
ALP BLD-CCNC: 85 U/L (ref 40–129)
ALT SERPL-CCNC: 34 U/L (ref 0–40)
ANION GAP SERPL CALCULATED.3IONS-SCNC: 11 MMOL/L (ref 7–16)
ANTIBODY SCREEN: NORMAL
APTT: 25.9 SEC (ref 24.5–35.1)
AST SERPL-CCNC: 31 U/L (ref 0–39)
BASOPHILS ABSOLUTE: 0 E9/L (ref 0–0.2)
BASOPHILS RELATIVE PERCENT: 0 % (ref 0–2)
BILIRUB SERPL-MCNC: 0.2 MG/DL (ref 0–1.2)
BUN BLDV-MCNC: 20 MG/DL (ref 6–20)
C. TRACHOMATIS DNA ,URINE: ABNORMAL
CALCIUM SERPL-MCNC: 7.9 MG/DL (ref 8.6–10.2)
CHLORIDE BLD-SCNC: 108 MMOL/L (ref 98–107)
CO2: 20 MMOL/L (ref 22–29)
CREAT SERPL-MCNC: 1.8 MG/DL (ref 0.7–1.2)
EOSINOPHILS ABSOLUTE: 0.04 E9/L (ref 0.05–0.5)
EOSINOPHILS RELATIVE PERCENT: 1 % (ref 0–6)
GFR AFRICAN AMERICAN: 54
GFR NON-AFRICAN AMERICAN: 54 ML/MIN/1.73
GLUCOSE BLD-MCNC: 106 MG/DL (ref 74–99)
HCT VFR BLD CALC: 20.2 % (ref 37–54)
HCT VFR BLD CALC: 22.6 % (ref 37–54)
HEMOGLOBIN: 6.6 G/DL (ref 12.5–16.5)
HEMOGLOBIN: 7.3 G/DL (ref 12.5–16.5)
LYMPHOCYTES ABSOLUTE: 0.92 E9/L (ref 1.5–4)
LYMPHOCYTES RELATIVE PERCENT: 21 % (ref 20–42)
MCH RBC QN AUTO: 30.6 PG (ref 26–35)
MCHC RBC AUTO-ENTMCNC: 32.7 % (ref 32–34.5)
MCV RBC AUTO: 93.5 FL (ref 80–99.9)
MONOCYTES ABSOLUTE: 0.18 E9/L (ref 0.1–0.95)
MONOCYTES RELATIVE PERCENT: 4 % (ref 2–12)
N. GONORRHOEAE DNA, URINE: ABNORMAL
NEUTROPHILS ABSOLUTE: 3.26 E9/L (ref 1.8–7.3)
NEUTROPHILS RELATIVE PERCENT: 74 % (ref 43–80)
PDW BLD-RTO: 15.3 FL (ref 11.5–15)
PH VENOUS: 7.35 (ref 7.35–7.45)
PLATELET # BLD: 71 E9/L (ref 130–450)
PLATELET CONFIRMATION: NORMAL
PMV BLD AUTO: 13.2 FL (ref 7–12)
POTASSIUM REFLEX MAGNESIUM: 4.3 MMOL/L (ref 3.5–5)
POTASSIUM SERPL-SCNC: 4.3 MMOL/L (ref 3.5–5)
RBC # BLD: 2.16 E12/L (ref 3.8–5.8)
RBC # BLD: NORMAL 10*6/UL
SODIUM BLD-SCNC: 139 MMOL/L (ref 132–146)
SOURCE: ABNORMAL
TOTAL PROTEIN: 4.9 G/DL (ref 6.4–8.3)
WBC # BLD: 4.4 E9/L (ref 4.5–11.5)

## 2021-11-02 PROCEDURE — 86900 BLOOD TYPING SEROLOGIC ABO: CPT

## 2021-11-02 PROCEDURE — 86850 RBC ANTIBODY SCREEN: CPT

## 2021-11-02 PROCEDURE — 2580000003 HC RX 258: Performed by: INTERNAL MEDICINE

## 2021-11-02 PROCEDURE — 85025 COMPLETE CBC W/AUTO DIFF WBC: CPT

## 2021-11-02 PROCEDURE — 82800 BLOOD PH: CPT

## 2021-11-02 PROCEDURE — 6370000000 HC RX 637 (ALT 250 FOR IP): Performed by: INTERNAL MEDICINE

## 2021-11-02 PROCEDURE — P9016 RBC LEUKOCYTES REDUCED: HCPCS

## 2021-11-02 PROCEDURE — 6370000000 HC RX 637 (ALT 250 FOR IP): Performed by: STUDENT IN AN ORGANIZED HEALTH CARE EDUCATION/TRAINING PROGRAM

## 2021-11-02 PROCEDURE — 2140000000 HC CCU INTERMEDIATE R&B

## 2021-11-02 PROCEDURE — 86923 COMPATIBILITY TEST ELECTRIC: CPT

## 2021-11-02 PROCEDURE — 80048 BASIC METABOLIC PNL TOTAL CA: CPT

## 2021-11-02 PROCEDURE — 85014 HEMATOCRIT: CPT

## 2021-11-02 PROCEDURE — 36415 COLL VENOUS BLD VENIPUNCTURE: CPT

## 2021-11-02 PROCEDURE — 85730 THROMBOPLASTIN TIME PARTIAL: CPT

## 2021-11-02 PROCEDURE — 80053 COMPREHEN METABOLIC PANEL: CPT

## 2021-11-02 PROCEDURE — 99232 SBSQ HOSP IP/OBS MODERATE 35: CPT | Performed by: FAMILY MEDICINE

## 2021-11-02 PROCEDURE — 99232 SBSQ HOSP IP/OBS MODERATE 35: CPT | Performed by: INTERNAL MEDICINE

## 2021-11-02 PROCEDURE — 2500000003 HC RX 250 WO HCPCS: Performed by: INTERNAL MEDICINE

## 2021-11-02 PROCEDURE — 85018 HEMOGLOBIN: CPT

## 2021-11-02 PROCEDURE — 86901 BLOOD TYPING SEROLOGIC RH(D): CPT

## 2021-11-02 RX ORDER — SODIUM CHLORIDE 9 MG/ML
INJECTION, SOLUTION INTRAVENOUS PRN
Status: DISCONTINUED | OUTPATIENT
Start: 2021-11-02 | End: 2021-11-04 | Stop reason: SDUPTHER

## 2021-11-02 RX ADMIN — ACETAMINOPHEN 650 MG: 325 TABLET ORAL at 05:37

## 2021-11-02 RX ADMIN — PROPAFENONE HYDROCHLORIDE 150 MG: 150 TABLET, FILM COATED ORAL at 05:37

## 2021-11-02 RX ADMIN — ABACAVIR 600 MG: 300 TABLET, FILM COATED ORAL at 20:53

## 2021-11-02 RX ADMIN — PROPAFENONE HYDROCHLORIDE 150 MG: 150 TABLET, FILM COATED ORAL at 15:01

## 2021-11-02 RX ADMIN — PANTOPRAZOLE SODIUM 40 MG: 40 TABLET, DELAYED RELEASE ORAL at 05:37

## 2021-11-02 RX ADMIN — Medication 10 ML: at 09:00

## 2021-11-02 RX ADMIN — PROPAFENONE HYDROCHLORIDE 150 MG: 150 TABLET, FILM COATED ORAL at 20:53

## 2021-11-02 RX ADMIN — ACETAMINOPHEN 650 MG: 325 TABLET ORAL at 18:35

## 2021-11-02 RX ADMIN — PREDNISONE 20 MG: 20 TABLET ORAL at 09:04

## 2021-11-02 RX ADMIN — DOLUTEGRAVIR SODIUM 50 MG: 50 TABLET, FILM COATED ORAL at 20:54

## 2021-11-02 RX ADMIN — LAMIVUDINE 50 MG: 100 TABLET, FILM COATED ORAL at 20:54

## 2021-11-02 RX ADMIN — OXYCODONE HYDROCHLORIDE AND ACETAMINOPHEN 500 MG: 500 TABLET ORAL at 09:04

## 2021-11-02 RX ADMIN — PENTAMIDINE ISETHIONATE 362.8 MG: 300 INJECTION, POWDER, LYOPHILIZED, FOR SOLUTION INTRAMUSCULAR; INTRAVENOUS at 15:02

## 2021-11-02 RX ADMIN — Medication 10 ML: at 20:54

## 2021-11-02 ASSESSMENT — PAIN DESCRIPTION - ORIENTATION
ORIENTATION: RIGHT;LEFT
ORIENTATION: RIGHT;LEFT

## 2021-11-02 ASSESSMENT — PAIN SCALES - GENERAL
PAINLEVEL_OUTOF10: 8
PAINLEVEL_OUTOF10: 8
PAINLEVEL_OUTOF10: 0
PAINLEVEL_OUTOF10: 9
PAINLEVEL_OUTOF10: 0
PAINLEVEL_OUTOF10: 8
PAINLEVEL_OUTOF10: 0

## 2021-11-02 ASSESSMENT — PAIN DESCRIPTION - ONSET
ONSET: ON-GOING
ONSET: GRADUAL

## 2021-11-02 ASSESSMENT — PAIN DESCRIPTION - FREQUENCY
FREQUENCY: CONTINUOUS
FREQUENCY: INTERMITTENT

## 2021-11-02 ASSESSMENT — PAIN DESCRIPTION - DESCRIPTORS
DESCRIPTORS: NUMBNESS;PINS AND NEEDLES
DESCRIPTORS: NUMBNESS;PINS AND NEEDLES;TINGLING

## 2021-11-02 ASSESSMENT — PAIN DESCRIPTION - LOCATION
LOCATION: FOOT
LOCATION: FOOT

## 2021-11-02 ASSESSMENT — PAIN - FUNCTIONAL ASSESSMENT: PAIN_FUNCTIONAL_ASSESSMENT: PREVENTS OR INTERFERES SOME ACTIVE ACTIVITIES AND ADLS

## 2021-11-02 ASSESSMENT — PAIN DESCRIPTION - PROGRESSION: CLINICAL_PROGRESSION: GRADUALLY WORSENING

## 2021-11-02 NOTE — PROGRESS NOTES
Department of Internal Medicine  Nephrology Progress Note      Events reviewed. SUBJECTIVE:  We are following Mr. Rosey Long for DEISI on CKD. Reports no complaints.     PHYSICAL EXAM:      Vitals:    VITALS:  /66   Pulse 100   Temp 98.1 °F (36.7 °C) (Temporal)   Resp 17   Ht 6' 4\" (1.93 m)   Wt 196 lb 3.4 oz (89 kg)   SpO2 97%   BMI 23.88 kg/m²   24HR INTAKE/OUTPUT:      Intake/Output Summary (Last 24 hours) at 11/2/2021 1150  Last data filed at 11/1/2021 1416  Gross per 24 hour   Intake 360 ml   Output 200 ml   Net 160 ml        Young cachectic: looking male  Constitutional:  Awake, alert, sitting up in bed  HEENT:  PERRL, normocephalic, atraumatic  Respiratory: diminished lung sounds  Cardiovascular/Edema:  RRR, S1/S2, -edema  Gastrointestinal:  abd flat, soft, non-tender, Perkins in place  Neurologic:  Awake, alert, no focal deficits  Skin:  Warm, dry, no rash or lesions  Other: No LE edema      Scheduled Meds:   propafenone  150 mg Oral 3 times per day    pentamidine isethionate (PENTAM) IVPB  4 mg/kg IntraVENous Daily    abacavir  600 mg Oral Nightly    dolutegravir sodium  50 mg Oral Nightly    lamiVUDine  50 mg Oral Nightly    [Held by provider] heparin (porcine)  5,000 Units SubCUTAneous Q8H    penicillin G benzathine  2.4 Million Units IntraMUSCular Weekly    lidocaine  1 patch TransDERmal Daily    ascorbic acid  500 mg Oral Daily    pantoprazole  40 mg Oral QAM AC    sodium chloride flush  5-40 mL IntraVENous 2 times per day     Continuous Infusions:   sodium chloride      sodium chloride      sodium chloride      sodium chloride      sodium chloride      sodium chloride      sodium chloride       PRN Meds:.sodium chloride, sodium chloride, sodium chloride, sodium chloride, sodium chloride, sodium chloride, medicated lip balm, benzonatate, perflutren lipid microspheres, hydrOXYzine, [Held by provider] labetalol, sodium chloride flush, ondansetron **OR** ondansetron, right kidney could   be a cyst but could also be seen with infarction, abscess, or neoplasia. Consider further evaluation with renal protocol abdomen MRI (preferred) or   CT.  Following resolution of acute kidney injury.         CTA pulmonary with IV contrast October 13, 2021   1. No evidence of pulmonary embolism. 2. Interval increased size of bilateral pleural effusions with little change   in pericardial effusion. 3. Persistent subcutaneous edema. 4. Reactive bilateral hilar lymphadenopathy. 5. Bilateral airspace disease is increased compared to prior examination. The increase is most pronounced within the lower lobes. 6. Bilateral gynecomastia.                 BRIEF SUMMARY OF INITIAL CONSULT:     Briefly, Mr. Nadia Saldana is a 36year old male with no significant PMH who was admitted on October 5, 2021 after he presented from an outside clinic after he was found to be hypoxic during 6 minute walking test. Patient tested positive for Covid 19 on August 18th, he is unvaccinated and had Covid 19 last year as well. Patient states he has quarantined by himself and when his mother recently visited him she was shocked at his appearance as he had lost about 50 pounds in 2 and a half months. On admission labs were significant for sodium of 130, potassium 5.2, bicarbonate 20, BUN 41, creatinine 3.6, magnesium 2.7, calcium 7.7 and proBNP 1,599. We are consulted for DEISI. Problems resolved:    Hyperkalemia, 2/2 DEISI. Low potassium diet  Hypotonic hyponatremia 2/2 intravascular volume from poor oral intake. Bicarb drip started. Sodium levels improving. Low bicarbonate levels with hyperchloremia, most likely NAGMA versus respiratory alkalosis; we need a PH to clarify diagnosis.  Awaiting ABG results  High bicarbonate levels, likely metabolic alkalosis 2/2 administration  Hypokalemia, multifactorial, poor intake, probably renal potassium wasting  Severe Hypoalbuminemia, multifactorial, status post albumin administration  DEISI on CKD, volume responsive pre-renal DEISI d/t volume (poor oral intake), urine sodium <20. Resolved. Edematous state, multifactorial, mainly 2/2 nephrotic syndrome and possibly HFpEF 55%, on bumex  Hyponatremia, multifactorial, including decreased GFR and hemodynamically mediated in the setting of large hypotonic fluid administration (D5 with Bactrim). Sodium levels decrease, stable overnight  Low bicarbonate levels with hyperchloremia, consistent with either hyperchloremic metabolic acidosis versus respiratory alkalosis. Bicarbonate levels improved with bicarbonate drip. HTN, on metoprolol   Probably fungemia, (1, 3) beta-D-glucan positive, on anidulafungin  MSSE bacteremia, on cefepime 2 g every 8 hours  Sinus tachycardia, multifactorial  Hypomagnesemia, 2/2 poor intake   Leukopenia, WBC 1.1  Gross hematuria, status post kidney biopsy. Resolved    IMPRESSION/RECOMMENDATIONS:     Recurrent DEISI on CKD, ATN contrast associated DEISI versus ischemic ATN (hypotension related tachycardia). Nonoliguric. Started on renal replacement therapy on October 25, 2021. Renal function continues to improve daily, will consult vascular surgery to remove tunneled dialysis catheter as dialysis is no longer required. 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 Rhenda Dominique Work-up so far HIV positive, Hepatitis C positive, MARILEE negative, C4 normal, C3 88 (low), UPEP without monoclonal gammopathy, negative cryoglobulins. Kidney ultrasound with hyperechoic kidneys. Status post kidney biopsy October 27, 2021  Low bicarbonate level, obtain venous pH to determine respiratory alkalosis versus metabolic acidosis. Probably HFpEF 55%, proBNP 11,401 > 3421   Right kidney lesion, likely a cyst but cannot be ruled out other pathology including infarction, abscess, neoplasm.   We will proceed with MRI when renal function improved  HIV positive, CD4 count 43, started on OLMSTEAD therapy    ---------------------------------------------------------------------    Anemia with acute drop in Hgb 6.6, transfuse 1 unit PRBCs  Possible pneumocystic pneumonia, on pentamidine  Possibly acute retroviral syndrome Jarische-Herxheimer reaction? Hepatitis C antibody positive, viral load not detected  Covid 19 infection in non vaccinated pt  Normocytic anemia, multifactorial  Tachycardia ?anxiety      Plan:    Consult vascular to remove tunneled dialysis catheter  Await kidney biopsy results  Continue to monitor renal function for recovery daily  Monitor H&H, transfuse <7.  For 1 unit PRBCs today  Monitor bicarbonate levels  Obtain venous pH       Electronically signed by RICKY Minaya CNP on 11/2/2021 at 11:50 AM    Agree as annotated   Discussed with RICKY Minaya MD

## 2021-11-02 NOTE — PROGRESS NOTES
shortness of breath then transferred out on 10/26. He underwent renal biopsy on 10/27. Subjective: Patient was seen and examined. No chills, no abdominal pain, no diarrhea, no rash, no itching. Afebrile. Heart rate elevated last night, also had temp. Objective:  BP (!) 137/92   Pulse 109   Temp 97.5 °F (36.4 °C) (Temporal)   Resp 18   Ht 6' 4\" (1.93 m)   Wt 196 lb 3.4 oz (89 kg)   SpO2 97%   BMI 23.88 kg/m²   Constitutional: Alert, not in distress  Respiratory: Clear breath sounds, no crackles, no wheezes  Cardiovascular: Regular rate and rhythm, no murmurs  Gastrointestinal: Bowel sounds present, soft, nontender  Skin: Warm and dry, no active dermatoses  Musculoskeletal: No joint swelling, no joint erythema    Labs, imaging, and medical records/notes were personally reviewed. Assessment:  Fever and pancytopenia, suspect associated with acute retroviral syndrome. Jarische-Herxheimer reaction is less likely in late latent syphilis with low RPR titers. Pancytopenia, multifactorial, suspect associated with acute retroviral syndrome and/or medication-induced  COVID-19, mild  Pneumonia, suspected Pneumocystis pneumonia (serum beta-d-glucan was positive at >500 pg/mL, BAL Pneumocystis stain was negative but Pneumocystis PCR was not sent)  Late latent syphilis, neurosyphilis ruled out  Methicillin susceptible Staphylococcus epidermidis bacteremia, etiology unclear  DEISI  Advanced HIV disease (viral load of 195,000; CD4 of 43 as of 10/11/2021). Hepatitis C Ab positive (viral load not detected). Hepatitis A immune  Prolonged QTc  HAP  Hematuria, probably associated with renal biopsy  Fever yesterday    Recommendations:  Continue benzathine penicillin IM weekly for 3 doses. Continue lamivudine 50 mg q24h, dolutegravir 50 mg q24h, abacavir 600 mg q24h. Continue pentamidine 4 mg/kg IV q48h after hemodialysis to finish 21 days until 11/02. Continue oral prednisone 20 mg q24h until 11//02.   Follow up HIV UDAYT, INSTI resistance. Follow up urine GC/Chlamydia PCR. Monitor respiratory status. Continue supportive care. Check blood culture  Monitor temps    Lungs are clear   Temps improved    Stay the course  He has not had pentamidine since oct 28  Suggest to give one dose today and check with Dr. Shannon Milner tomorrow     Thank you for involving me in the care of Kristin Zuletaoly James.      Electronically signed by Mauro Moe MD on 11/2/2021 at 12:32 PM

## 2021-11-02 NOTE — PROGRESS NOTES
[facility] notified via perfect serve that the pt hbg critically low this morning.

## 2021-11-02 NOTE — PROGRESS NOTES
[facility] Attending    S: 36 y.o. male with history of asthma and smoking history presented with increasing dyspnea and cough over past month. On presentation had fever of 103. Has tested positive for COVID twice, most recently end of August.  CT shows extensive bilateral pneumonia and COVID testing again positive. HIV pos, CD4 43. Early AM 10/23,  RRT called due to tachycardia and oxygen desaturation requiring oxygen from 3L NC to 15LNRB mask. Patient had been febrile overnight as well and transferred to MICU. Improved and transferred back to floor. Treatment initiated for HIV, but noted to have mild pancytopenia and thrombocytopenia to less than 50. Renal failure developed. Required temporary dialysis. Currently off dialysis, with improving renal function. Today, feels well. No new complaints, except intermittent anxiety. O: VS- Blood pressure (!) 137/92, pulse 109, temperature 97.5 °F (36.4 °C), temperature source Temporal, resp. rate 18, height 6' 4\" (1.93 m), weight 196 lb 3.4 oz (89 kg), SpO2 97 %. Exam is as noted by resident   Awake, alert and oriented. Heart - tachycardic  Lungs- clear breath sounds bilaterally. Ext - no edema. Impressions:  Principal Problem:    Acute respiratory failure with hypoxia (HCC)  Active Problems:    Acute kidney injury due to COVID-19 ([address]    Asthma     COVID-19    Symptomatic HIV infection (HCC)    Pancytopenia (HCC)  Platelet count improving  Creatinine improved from 2.8-2.1-1.9  Hemoglobin 6.6    Plan   Appreciate Nephrology, ID, MICU, cardiology, gen surgery, H/Onc, pulmonology. Covid pneumonia/PCP pneumonia with sepsis   New diagnosis of HIV, ID managing antibiotics and antivirals for HIV, currently on lamivudine/abacavir/dolutegravir   Per ID pentamidine 4mg/kg IV q48H on dialysis to finish 21 days until 11/2. Steroids until 11/2  RPR positive. S/p LP. VDRL CSF negative.  Continue benzathine PCN IM weekly x 3 doses.     Sinus tachycardia - Echo with EF 55%, small pericardial effusion and possible atrial shunt. EKG suspicious for WPW like syndrome, although not meeting all criteria. EP has seen. On propafenone. Will follow as outpatient  Awaiting kidney biopsy results. Hemodialysis held  Creatinine improving  Blood also clearing    H/O consulted due to pancytopenia including neutropenia. . Will monitor CBC. Heparin discontinued per heme-onc recommendations. Platelet count now improving     Attending Physician Statement  I have reviewed the chart and seen the patient with the resident(s). I personally reviewed images, EKG's and similar tests, if present. I personally reviewed and performed key elements of the history and exam.  I have reviewed and confirmed student and/or resident history and exam with changes as indicated above. I agree with the assessment, plan and orders as documented by the resident. Please refer to the resident and/or student note for additional information.       Kathleen Ford MD

## 2021-11-02 NOTE — BH NOTE
[facility] Family Medicine     Pt is a 36 y.o. male with pneumonia, COVID,  HIV pos. Had renal failure that required dialysis. Prolonged hospitalization. Psychology following for anxiety, difficulty coping with stress. Pt seen today with Dr. Jose Cruz Wyatt, PGY-2 Family Medicine. Cognitive-behavioral therapy to continue to address stress management. Pt reporting sensation of difficulty breathing and rapid heart rate when anxious. Practiced relaxation breathing/imagery technique. He is better engaging in his care (asking questions, keeping track of medications and lab values). Discussed how to manage stress by focusing on the facts of new information, rather than making assumptions. Emphasized optimistic thinking. He wants to be better informed in his care, wants to work on daily goals. Set goal to work with Family Medicine team on being better educated on CBC/CMP results.  Will continue to follow with Family Medicine team.     Electronically signed by Claudean Pellet, PhD

## 2021-11-02 NOTE — PROGRESS NOTES
[facility] Inpatient   Resident Progress Note    S:  Hospital day: 29   Brief Synopsis: Lise Leon is a 36 y.o. male with no PMH who presented with hypoxia and fever. He was initially seen in PCP office and was hypoxic on exertion with desaturate into the mid low 80s as well as noted to be febrile and tachycardic. Per patient, his symptoms began 8/15/2021 and he tested positive for Covid 8/28. endorse worsening shortness of breath over the past month with sputum production, intermittent chest discomfort, persistent diarrhea, chills, fever, decrease in appetite and 50 pound weight loss since August.  Patient also endorses significantly worsening \"neuropathy\" of bilateral feet, states it feels like he is \"being stabbed by needles\" which is causing him inability to walk due to pain. In the ED, he was found to be anemic, hyperkalemic, hyponatremic, febrile and COVID positive. He also had elevated Creatinine, AST, proBNP and troponin x2. CTA showed extensive multifocal COVID-19 pneumonia bilaterally, bilateral trace layering pleural effusion and bilateral gynecomastia. CT abdomen was limited due to movement and non-contrast, but showed normal appendix and no evidence of bowel obstruction. Admitted for acute hypoxia due to Covid pneumonia. Nephro, pulm and ID consulted. Started on bicarb drip. US showed hyperechoic kidneys, trace b/l perinephric fluid and right kidney lesion (Cyst vs infarction vs abscess vs neoplasia). HIV and Hep C screen were positive. HIV RNA and T and B lymphocytes were ordered. Patient became more hypoxic, tachycardic and hypertensive so  there was concern for PE. CTA was ordered, which came back negative. Echo was completed due to elevated BNP and concern for PE and that revealed a normal EF with late bubble sign possible shunting of pulmonary origin. BNP continued to increase. Cardiology was consulted. Repeat Echo pending.  ID recommends LP with CSF sent for glucose, protein, cell count with differential, which were all  negative. meningitis/encephalitis PCR panel negative. RRT called 10/23 for hypoxia and tachycardia, transferred to medical intensive on 15 L NRB. Creatinine up trending. Currently saturating well on room air and was transferred out of the ICU. Hemodialysis x4 days for worsening renal function. CSF VDRL negative, but continue to treat for latent syphilis. Renal biopsy performed on 10/27. Endorses hematuria s/p biopsy. CT abdomen showed no acute abnormalities. Patients platelets and creatinine improving, hemodialysis on hold at this point. 11/2/2021 - H/H 6.6, patient receiving 1 unit PRBC. Patient seen and examined this AM. No acute concerns overnight. Questions regardling renal biopsy results answered. Patient advised that his creatinine levels and platelet levels improving. Still continues to have neuropathy of bilateral lower extremities but is able to walk to the bathroom without walker. HIV treatment including lamivudine, dolutegravir, and abacavir   Last day of prednisone today . No other concerns at this time.      Cont meds:    sodium chloride      sodium chloride      sodium chloride      sodium chloride      sodium chloride      sodium chloride       Scheduled meds:    propafenone  150 mg Oral 3 times per day    pentamidine isethionate (PENTAM) IVPB  4 mg/kg IntraVENous Daily    abacavir  600 mg Oral Nightly    dolutegravir sodium  50 mg Oral Nightly    lamiVUDine  50 mg Oral Nightly    predniSONE  20 mg Oral Daily    [Held by provider] heparin (porcine)  5,000 Units SubCUTAneous Q8H    penicillin G benzathine  2.4 Million Units IntraMUSCular Weekly    lidocaine  1 patch TransDERmal Daily    ascorbic acid  500 mg Oral Daily    pantoprazole  40 mg Oral QAM AC    sodium chloride flush  5-40 mL IntraVENous 2 times per day     PRN meds: sodium chloride, sodium chloride, sodium chloride, sodium chloride, sodium chloride, medicated lip balm, benzonatate, perflutren lipid microspheres, hydrOXYzine, [Held by provider] labetalol, sodium chloride flush, ondansetron **OR** ondansetron, polyethylene glycol, acetaminophen **OR** acetaminophen, sodium chloride     I reviewed the patient's past medical and surgical history, Medications and Allergies. O:  /70   Pulse 106   Temp 98.7 °F (37.1 °C) (Temporal)   Resp 18   Ht 6' 4\" (1.93 m)   Wt 196 lb 3.4 oz (89 kg)   SpO2 99%   BMI 23.88 kg/m²   24 hour I&O: I/O last 3 completed shifts: In: 360 [P.O.:360]  Out: 200 [Urine:200]  No intake/output data recorded. Physical Exam  Vitals reviewed. Constitutional:       General: He is not in acute distress. Appearance: Normal appearance. He is not ill-appearing, toxic-appearing or diaphoretic. HENT:      Head: Normocephalic and atraumatic. Nose: No rhinorrhea. Mouth/Throat:      Mouth: Mucous membranes are moist.      Pharynx: Oropharynx is clear. Eyes:      General: No scleral icterus. Right eye: No discharge. Left eye: No discharge. Extraocular Movements: Extraocular movements intact. Cardiovascular:      Rate and Rhythm: Regular rhythm. Tachycardia present. Pulses: Normal pulses. Heart sounds: Normal heart sounds. No murmur heard. No friction rub. No gallop. Pulmonary:      Effort: No respiratory distress. Breath sounds: No wheezing, rhonchi or rales. Abdominal:      General: Abdomen is flat. Palpations: Abdomen is soft. Tenderness: There is no abdominal tenderness. Musculoskeletal:         General: No swelling. Cervical back: Normal range of motion. No rigidity or tenderness. Lumbar back: No swelling or spasms. Normal range of motion. Skin:     General: Skin is warm and dry. Coloration: Skin is not jaundiced. Findings: No bruising or rash. Comments: Right femoral HD port, c/d/i   Neurological:      General: No focal deficit present. Mental Status: He is alert and oriented to person, place, and time. Cranial Nerves: No cranial nerve deficit. Motor: No weakness. Psychiatric:         Behavior: Behavior normal.         Thought Content: Thought content normal.      Comments: Depressed mood, denies SI/HI       Labs:  Na/K/Cl/CO2:  136/4.2, 4.2/104/24 (11/01 0534)  BUN/Cr/glu/ALT/AST/amyl/lip:  22/2.1/--/28/28/--/-- (11/01 0534)  WBC/Hgb/Hct/Plts:  --/7.2/21.8/-- (11/01 1842)  estimated creatinine clearance is 64 mL/min (A) (based on SCr of 2.1 mg/dL (H)). Other pertinent labs as noted below    Radiology:  CT ABDOMEN PELVIS WO CONTRAST Additional Contrast? None   Final Result   Post right renal biopsy there is expected sequela of recent biopsy, no   significant hemorrhage identified                                        CT BIOPSY RENAL   Final Result   Successful uncomplicated CT and fluoroscopic guided right   renal biopsy. If there are any physician concerns regarding this report, a   Radiologist can be reached by calling the following number [phone]. CT GUIDED NEEDLE PLACEMENT   Final Result   Successful uncomplicated CT and fluoroscopic guided right   renal biopsy. If there are any physician concerns regarding this report, a   Radiologist can be reached by calling the following number [phone]. XR CHEST PORTABLE   Final Result   Mild worsening of extensive bilateral infiltrates         XR CHEST PORTABLE   Final Result   Increasing bilateral airspace disease suggesting worsening pneumonia. FL LUMBAR PUNCTURE DIAG   Final Result   Successful fluoroscopic-guided lumbar puncture. XR CHEST PORTABLE   Final Result   Extensive bilateral patchy areas of ground-glass opacity concerning for   atypical pneumonia or viral airway disease, improved         XR CHEST PORTABLE   Final Result   Persistent bilateral infiltrates. No significant changes since October 13. See above comments. See report CT chest October 13. CTA PULMONARY W CONTRAST   Final Result   1. No evidence of pulmonary embolism. 2. Interval increased size of bilateral pleural effusions with little change   in pericardial effusion. 3. Persistent subcutaneous edema. 4. Reactive bilateral hilar lymphadenopathy. 5. Bilateral airspace disease is increased compared to prior examination. The increase is most pronounced within the lower lobes. 6. Bilateral gynecomastia. XR CHEST PORTABLE   Final Result   Development of extensive bilateral pulmonary infiltrates         XR CHEST PORTABLE   Final Result   No acute process. US RETROPERITONEAL COMPLETE   Final Result   1. Hyperechoic kidneys due to underlying parenchymal disease. 2. Trace bilateral perinephric fluid of indeterminate etiology, potentially   due to underlying inflammation. 3. An approximately 1.1 cm x 1.5 cm x 2.3 cm lesion in the right kidney could   be a cyst but could also be seen with infarction, abscess, or neoplasia. Consider further evaluation with renal protocol abdomen MRI (preferred) or   CT. Following resolution of acute kidney injury. XR CHEST PORTABLE   Final Result   Multifocal bilateral pulmonary ground-glass airspace opacities are stable         CT ABDOMEN PELVIS WO CONTRAST Additional Contrast? None   Final Result   Limited study by the patient's respiratory motion and lack of intravenous   contrast.      Extensive multifocal COVID-19 pneumonia bilaterally. Bilateral trace layering pleural effusions. Bilateral gynecomastia. Abdominal organs inadequately evaluated due to the limitations of the study. Normal appendix. No evidence of bowel obstruction. CT CHEST WO CONTRAST   Final Result   Limited study by the patient's respiratory motion and lack of intravenous   contrast.      Extensive multifocal COVID-19 pneumonia bilaterally. Bilateral trace layering pleural effusions. Bilateral gynecomastia. Abdominal organs inadequately evaluated due to the limitations of the study. Normal appendix. No evidence of bowel obstruction.              Resident Assessment and Plan     Acute hypoxia 2/2 COVID pneumonia vs PCP  - CTA on admission: COVID pneumonia, Bilateral trace layering pleural effusion, negative for PE   - Ferritin 3900, CRP 10.1 on admission   - D dimer elevated on admission, currently stable  - Pulmonology and ID on board  - Legionella, spot TB test negative   - CXR 10/8 shows multifocal groundglass opacities are stable  - Repeat CXR 10/17: b/l pulm infiltrates improving   - Blood cx: staph epidermitis, repeat blood cx shows no growth x 7 days   -Urine culture: NGTD   - ECHO 10/13: EF 55%, late bubble sign possible shunting of pulmonary origin   - CTA 10/13 negative for PE  Bronchoscopy with BAL performed on 10/15/2021   - Respiratory culture: oral pharyngeal liana decreased, few gram negative rods   CXR 10/23: Mild worsening extensive bilateral infiltrates  - Currently maintaining saturation on room air, continue to monitor  - prednisone course completed (11/2/2021)  - Repeat blood culture 10/21 showed NGTD x5days    - Fungal cultures negative x 1 week    Weight loss likely secondary to HIV  - Per patient due to decreased appetite from COVID; 50lb weight loss since August   - R/o other potential origins: SLE, TB, hepatitis  - MARILEE negative  - T spot TB test negative   HIV, hepatitis C antibodies positive  - Elevated IgG and IgA, normal IgM: IgA nephropathy vs MPGN?   - Cryoglobulin normal   - TSH normal   - Sed rate and CRP elevated      HIV  HIV 1/2 antibodies positive    CD4 44, CD3 153, CD8 100  HIV viral load 195,000  - ID on board: received vancomycin, ancef; stopped cefepime 10/26  - Fungitell test positive x2, received eraxis  - Bactrim d/c'd due to renal failure; on pentamidine for suspected Pneumocystis pneumonia  - Pneumocystis stain negative, but PCR pending; still suspect PCP pna  - HIV GART pending   - [id number] negative, genotype pending  - Toxoplasma Ab, cryptococcus negative  - hepatitis A total Ab positive  - RPR reactive, quant 1:2, CSF VDRL negative   - On PCN G weekly for latent syphilis (received 2/3 doses)  - Started on biktarvy 10/22, d/c'd due to renal failure   - Started on lamivudine, dolutegravir, and abacavir 10/25  - receiving penecillin prophylaxis IM weekly x 3 doses    Hematuria - resolved   - benjie blood in urine s/p renal biopsy 10/27  - Hb trending down   - Continue to monitor H/H     Anemia  Pancytopenia  - Likely 2/2 from GI bleed vs HIV   Peripheral blood smear shows normocytic anemia, absolute lymphocytopenia; no platelet clumping, schistocytes or dysplasia  - Heme Onc consulted: received granix to improve ANC  Transfused 2 units pRBC since admission  - H/H drop to 6.6 11/2/2021 ; transfuse 1 unit PRBC today  - FOBT negative x3  - Platelets trending down, transfusion if bleeding or plts <20k   - Transfused 2U platelets prior to and during kidney biopsy   - Platelets trending up at this time , improvement to 70s    DEISI    Hyponatremia - resolved   - Na normalized    - Nephro on board ; hold dialysis for now with Cr improving  - Likely secondary to dehydration from persistent diarrhea vs decreased PO intake vs bactrim vs HIVANS vs Biktarvy  - Cr improving after HD,3.7 ---> 2.1--> 1.8 today (11/2/2021)  Chad Shafer   - urine studies show intrinsic cause of DEISI  - Microalbumin-creatinine ratio elevated   Fluid restriction  - Oliguric (0.2 mL/kg/hr)  - received dialysis  10/25 x3days  - S/p Kidney biopsy; mild C3 deposition noted    Hypoalbuminemia - improving  -2/2 poor intake vs CHF vs nephrotic syndrome  - Alb stable  -Previously received albumin 25 g q6h x 4 doses 10/6, 8 doses 10/12 and 8 doses 10/23  Low C3, normal C4 and cryoglobulin  Fluid restriction    - Continue to monitor     Elevated troponin and proBNP  - 2/2

## 2021-11-02 NOTE — CONSULTS
Vascular    Chief Complaint: Patient seen for evaluation of removal of tunneled dialysis catheter          History of present illness: This patient has a complicated history, was hospitalized with Covid 19 infection, and the history noted, he tested positive in the last year, his vaccination status is unknown, most likely do not get vaccine, as developed multiple complications including acute respiratory failure, acute kidney injury requiring hemodialysis, infected undergo insertion of a temporary dialysis catheter of the femoral vein on 25 October under the supervision Dr. Sarai Gan    His overall medical condition, and treatment multiple medical issues including neurosurgery with GI bleeding, cardiac condition with a positive bubble study, respiratory failure, renal insufficiency are gradually improving and today vascular service was consulted to remove the tunneled dialysis catheter by the nephrology service    When I came to see the patient, reviewing the chart, noted, patient never had, last catheter, all he had was a temporary dialysis catheter the femoral vein on 25 October    Patient refers improved, his dialysis been held the last few days and patient is making good urine output and today vascular service is calling for the removal of the dialysis catheter    Patient is still in isolation in, on the Covid floor, overall tells me he is feeling better, denies any chest pain or shortness of breath or any fever or chills      Patient denies any focal lateralizing neurological symptoms like loss of speech, vision or loss of function of extremity    Patient can walk a few blocks, and denies any symptoms of rest pain    No Known Allergies    No current facility-administered medications for this encounter.      Current Outpatient Medications   Medication Sig Dispense Refill    [START ON 11/8/2021] ascorbic acid (VITAMIN C) 500 MG tablet Take 1 tablet by mouth daily 30 tablet 3    propafenone (RYTHMOL) 150 MG tablet Take 1 tablet by mouth every 8 hours 90 tablet 3    Abacavir-Dolutegravir-Lamivud 600- MG TABS Take 1 tablet by mouth daily 30 tablet 11       Past Medical History:   Diagnosis Date    Asthma        Past Surgical History:   Procedure Laterality Date    BRONCHOSCOPY N/A 10/15/2021    BRONCHOSCOPY ALVEOLAR LAVAGE performed by Malvin Pompa DO at [address] BIOPSY RENAL  10/27/2021    CT BIOPSY RENAL 10/27/2021 Ross Bajwa MD [facility]    FRACTURE SURGERY         Family History   Problem Relation Age of Onset    Diabetes type 2  Mother     Diabetes type 2  Father        Social History     Socioeconomic History    Marital status: Single     Spouse name: Not on file    Number of children: Not on file    Years of education: Not on file    Highest education level: Not on file   Occupational History    Not on file   Tobacco Use    Smoking status: Former Smoker     Quit date: 2021     Years since quittin.2    Smokeless tobacco: Never Used   Vaping Use    Vaping Use: Never used   Substance and Sexual Activity    Alcohol use: No    Drug use: No    Sexual activity: Not on file   Other Topics Concern    Not on file   Social History Narrative    Not on file     Social Determinants of Health     Financial Resource Strain: Low Risk     Difficulty of Paying Living Expenses: Not very hard   Food Insecurity: Food Insecurity Present    Worried About Running Out of Food in the Last Year: Sometimes true    Daniel of Food in the Last Year: Sometimes true   Transportation Needs:     Lack of Transportation (Medical): Not on file    Lack of Transportation (Non-Medical):  Not on file   Physical Activity:     Days of Exercise per Week: Not on file    Minutes of Exercise per Session: Not on file   Stress:     Feeling of Stress : Not on file   Social Connections:     Frequency of Communication with Friends and Family: Not on file    Frequency of Social Gatherings with Friends and Family: Not on file    Attends Scientologist Services: Not on file    Active Member of Clubs or Organizations: Not on file    Attends Club or Organization Meetings: Not on file    Marital Status: Not on file   Intimate Partner Violence:     Fear of Current or Ex-Partner: Not on file    Emotionally Abused: Not on file    Physically Abused: Not on file    Sexually Abused: Not on file   Housing Stability:     Unable to Pay for Housing in the Last Year: Not on file    Number of [place] in the Last Year: Not on file    Unstable Housing in the Last Year: Not on file       Review of Systems:  Skin:  No abnormal pigmentation or rash. Eyes:  No blurring, diplopia or vision loss. Ears/Nose/Throat:  No hearing loss or vertigo. Respiratory:  No cough, pleuritic chest pain, dyspnea, or wheezing. Patient had COVID-19 pneumonia with acute respiratory failure, doing better now    Cardiovascular: No angina, palpitations . 2D echo report, positive for bubble study, consistent with patent saez ovale    Gastrointestinal:  No nausea or vomiting; no abdominal pain or rectal bleeding. History of stool for occult blood being positive, with anemia, underwent evaluation by general surgery service    Musculoskeletal:  No arthritis or weakness. Neurologic:  No paralysis, paresis, seizures or headaches. Hematologic/Lymphatic/Immunologic:  No anemia, abnormal bleeding/bruising. Patient also developed thrombocytopenia, underwent a bone marrow biopsy but associated with anemia, being followed by hematology service    Endocrine:  No heat or cold intolerance. No polyphagia, polydipsia or polyuria.     Nephrology: Acute kidney injury requiring hemodialysis, currently the renal failure is resolving, patient did not require dialysis for the last few days      Physical Exam:  BP (!) 140/89   Pulse 109   Temp 97.9 °F (36.6 °C) (Oral)   Resp 16   Ht 6' 4\" (1.93 m)   Wt 201 lb (91.2 kg)   SpO2 100%   BMI 24.47 kg/m²   General appearance:  Alert, awake, oriented x 3. No distress. Skin:  Warm and dry. Head:  Normocephalic. No masses, lesions or tenderness. Eyes:  Conjunctivae appear normal; PERRL. Ears:  External ears normal.  Nose/Sinuses:  Septum midline, mucosa normal; no drainage. Oropharynx:  Clear, no exudate noted. Neck:  No jugular venous distention, lymphadenopathy or thyromegaly. No evidence of carotid bruit      Lungs:  Clear to ausculation bilaterally. No rhonchi, crackles, wheezes. Heart:  Regular rate and rhythm. No rub or murmur. .    Abdomen:  Soft, non-tender. No masses, organomegaly. Musculoskeletal: No joint effusions, tenderness swelling or warmth. Neuro: Speech is intact. Moving all extremities. No focal motor or sensory deficits. Extremities:  Both feet are warm to touch. The color of both feet is normal.    Patient does have a right femoral dialysis catheter, it is noted, the temporary catheter tunneled catheter    Patient has no leg swelling      Pulses Right  Left    Brachial 3 3    Radial    3=normal   Femoral 2 2  2=diminished   Popliteal    1=barely palpable   Dorsalis pedis    0=absent   Posterior tibial    4=aneurysmal           Other pertinent information:1. The past medical records were reviewed.     2.    Lab Results   Component Value Date    WBC 3.9 (L) 11/07/2021    HGB 7.6 (L) 11/07/2021    HCT 23.2 (L) 11/07/2021    HCT 23.5 (L) 11/07/2021    MCV 94.8 11/07/2021     11/07/2021      Lab Results   Component Value Date     11/07/2021    K 4.7 11/07/2021    K 4.7 11/07/2021     (H) 11/07/2021    CO2 21 (L) 11/07/2021    BUN 14 11/07/2021    CREATININE 1.1 11/07/2021    GLUCOSE 86 11/07/2021    CALCIUM 8.3 (L) 11/07/2021    PROT 6.0 (L) 11/07/2021    LABALBU 3.5 11/07/2021    BILITOT 0.4 11/07/2021    ALKPHOS 85 11/07/2021    AST 23 11/07/2021    ALT 29 11/07/2021    LABGLOM >60 11/07/2021    GFRAA >60 11/07/2021     Lab Results Component Value Date    APTT 27.5 11/07/2021      Lab Results   Component Value Date    INR 1.6 10/15/2021    INR 1.2 10/12/2021    PROTIME 17.7 (H) 10/15/2021    PROTIME 13.8 (H) 10/12/2021        3. CT ABDOMEN PELVIS WO CONTRAST   Final Result   No evidence retroperitoneal hemorrhage. Findings suggestive of hypervolemia: Dilated IVC with trace simple free fluid   in the pelvis. Cardiomegaly. Partially visualized pulmonary densities. Given the reported history of HIV,   opportunistic infection such as pneumocystis pneumonia could have this   appearance. COVID-19 pneumonia could also have this appearance. US RETROPERITONEAL LIMITED   Final Result   1. Minimal free intraperitoneal fluid. 2. No sign of retroperitoneal mass/hemorrhage. CT ABDOMEN PELVIS WO CONTRAST Additional Contrast? None   Final Result   Post right renal biopsy there is expected sequela of recent biopsy, no   significant hemorrhage identified                                        CT BIOPSY RENAL   Final Result   Successful uncomplicated CT and fluoroscopic guided right   renal biopsy. If there are any physician concerns regarding this report, a   Radiologist can be reached by calling the following number [phone]. CT GUIDED NEEDLE PLACEMENT   Final Result   Successful uncomplicated CT and fluoroscopic guided right   renal biopsy. If there are any physician concerns regarding this report, a   Radiologist can be reached by calling the following number [phone]. XR CHEST PORTABLE   Final Result   Mild worsening of extensive bilateral infiltrates         XR CHEST PORTABLE   Final Result   Increasing bilateral airspace disease suggesting worsening pneumonia. FL LUMBAR PUNCTURE DIAG   Final Result   Successful fluoroscopic-guided lumbar puncture.          XR CHEST PORTABLE   Final Result   Extensive bilateral patchy areas of ground-glass opacity concerning for atypical pneumonia or viral airway disease, improved         XR CHEST PORTABLE   Final Result   Persistent bilateral infiltrates. No significant changes since October 13. See above comments. See report CT chest October 13. CTA PULMONARY W CONTRAST   Final Result   1. No evidence of pulmonary embolism. 2. Interval increased size of bilateral pleural effusions with little change   in pericardial effusion. 3. Persistent subcutaneous edema. 4. Reactive bilateral hilar lymphadenopathy. 5. Bilateral airspace disease is increased compared to prior examination. The increase is most pronounced within the lower lobes. 6. Bilateral gynecomastia. XR CHEST PORTABLE   Final Result   Development of extensive bilateral pulmonary infiltrates         XR CHEST PORTABLE   Final Result   No acute process. US RETROPERITONEAL COMPLETE   Final Result   1. Hyperechoic kidneys due to underlying parenchymal disease. 2. Trace bilateral perinephric fluid of indeterminate etiology, potentially   due to underlying inflammation. 3. An approximately 1.1 cm x 1.5 cm x 2.3 cm lesion in the right kidney could   be a cyst but could also be seen with infarction, abscess, or neoplasia. Consider further evaluation with renal protocol abdomen MRI (preferred) or   CT. Following resolution of acute kidney injury. XR CHEST PORTABLE   Final Result   Multifocal bilateral pulmonary ground-glass airspace opacities are stable         CT ABDOMEN PELVIS WO CONTRAST Additional Contrast? None   Final Result   Limited study by the patient's respiratory motion and lack of intravenous   contrast.      Extensive multifocal COVID-19 pneumonia bilaterally. Bilateral trace layering pleural effusions. Bilateral gynecomastia. Abdominal organs inadequately evaluated due to the limitations of the study. Normal appendix. No evidence of bowel obstruction.          CT CHEST WO CONTRAST   Final Result Limited study by the patient's respiratory motion and lack of intravenous   contrast.      Extensive multifocal COVID-19 pneumonia bilaterally. Bilateral trace layering pleural effusions. Bilateral gynecomastia. Abdominal organs inadequately evaluated due to the limitations of the study. Normal appendix. No evidence of bowel obstruction. 4.  The history physical, multiple consultations notes, hematology, general surgery, pulmonary critical care, cardiology, general surgery etc. were briefly reviewed    5. Lab data reviewed again, patient appeared count of 0 3000    Assessment:    1. Acute kidney injury, with improving renal functions, not on hemodialysis for last few days with good urine output    2. COVID-19 pneumonia with respiratory failure    3.   Anemia, thrombocytopenia, being evaluated by hematology service    4.abnormal bubble study, consistent with a patent foramen ovale          Patient Active Problem List   Diagnosis    Acute kidney injury due to COVID-19 ([facility] [address]    Asthma    Nephrotic range proteinuria    Symptomatic HIV infection ([facility] [address]            Plan:     Discussed the patient, along with nursing staff, options, risks benefits and alternatives explained to the patient, patient was informed, that vascular service is consulted for removal of the tunneled dialysis catheter, patient does not have a tunneled dialysis cath, does have a temporary dialysis catheter in the right femoral vein    Patient also has thrombocytopenia    Patient recommended to have the catheter cath removed at the bedside,  risk benefits and complications with special emphasis on bleeding complication due to thrombocytopenia were explained, may need to hold pressure for a longer time than usual to control bleeding, if necessary may require platelet transfusions    Patient understands this will agrees to have the temporary cath removed at the bedside      The catheter was removed, occluding the sutures, pressure was held for 10 minutes to control bleeding, complete hemostasis was obtained, patient was instructed, to let the nursing staff know if he notices any bleeding in the next several minutes or any time, it was understood, to hold pressure at the puncture site, and call the nursing staff    Also discussed with nursing staff to immediately check on the patient periodically to keep a close eye on him for any bleeding issues    I have come back again 15 minutes later to check the patient, no bleeding noted the puncture site, patient was reassured and was instructed to call me as needed if any issues at the puncture site like bleeding      Thank you for letting us participate in the care of your patient    Electronically signed by Azalia Alejandro MD on 11/7/2021 at 8:00 PM        Azalia Alejandro MD

## 2021-11-03 LAB
ALBUMIN SERPL-MCNC: 2.7 G/DL (ref 3.5–5.2)
ALP BLD-CCNC: 85 U/L (ref 40–129)
ALT SERPL-CCNC: 40 U/L (ref 0–40)
ANION GAP SERPL CALCULATED.3IONS-SCNC: 9 MMOL/L (ref 7–16)
ANISOCYTOSIS: ABNORMAL
APTT: 24.8 SEC (ref 24.5–35.1)
AST SERPL-CCNC: 27 U/L (ref 0–39)
BASOPHILS ABSOLUTE: 0.05 E9/L (ref 0–0.2)
BASOPHILS RELATIVE PERCENT: 1.1 % (ref 0–2)
BILIRUB SERPL-MCNC: 0.3 MG/DL (ref 0–1.2)
BUN BLDV-MCNC: 17 MG/DL (ref 6–20)
CALCIUM SERPL-MCNC: 7.9 MG/DL (ref 8.6–10.2)
CHLORIDE BLD-SCNC: 105 MMOL/L (ref 98–107)
CO2: 20 MMOL/L (ref 22–29)
CREAT SERPL-MCNC: 1.5 MG/DL (ref 0.7–1.2)
EKG ATRIAL RATE: 141 BPM
EKG P AXIS: 71 DEGREES
EKG P-R INTERVAL: 130 MS
EKG Q-T INTERVAL: 300 MS
EKG QRS DURATION: 68 MS
EKG QTC CALCULATION (BAZETT): 459 MS
EKG R AXIS: 54 DEGREES
EKG T AXIS: -135 DEGREES
EKG VENTRICULAR RATE: 141 BPM
EOSINOPHILS ABSOLUTE: 0.05 E9/L (ref 0.05–0.5)
EOSINOPHILS RELATIVE PERCENT: 1.1 % (ref 0–6)
GFR AFRICAN AMERICAN: >60
GFR NON-AFRICAN AMERICAN: >60 ML/MIN/1.73
GLUCOSE BLD-MCNC: 97 MG/DL (ref 74–99)
HCT VFR BLD CALC: 21.8 % (ref 37–54)
HEMOGLOBIN: 7.3 G/DL (ref 12.5–16.5)
HYPOCHROMIA: ABNORMAL
LYMPHOCYTES ABSOLUTE: 0.75 E9/L (ref 1.5–4)
LYMPHOCYTES RELATIVE PERCENT: 17.2 % (ref 20–42)
MCH RBC QN AUTO: 30.7 PG (ref 26–35)
MCHC RBC AUTO-ENTMCNC: 33.5 % (ref 32–34.5)
MCV RBC AUTO: 91.6 FL (ref 80–99.9)
MONOCYTES ABSOLUTE: 0.44 E9/L (ref 0.1–0.95)
MONOCYTES RELATIVE PERCENT: 9.7 % (ref 2–12)
NEUTROPHILS ABSOLUTE: 3.08 E9/L (ref 1.8–7.3)
NEUTROPHILS RELATIVE PERCENT: 69.9 % (ref 43–80)
OVALOCYTES: ABNORMAL
PDW BLD-RTO: 15.5 FL (ref 11.5–15)
PLASMA CELLS PERCENT: 1.1 % (ref 0–0)
PLATELET # BLD: 83 E9/L (ref 130–450)
PLATELET CONFIRMATION: NORMAL
PMV BLD AUTO: 11.9 FL (ref 7–12)
POIKILOCYTES: ABNORMAL
POLYCHROMASIA: ABNORMAL
POTASSIUM REFLEX MAGNESIUM: 4.4 MMOL/L (ref 3.5–5)
POTASSIUM SERPL-SCNC: 4.4 MMOL/L (ref 3.5–5)
RBC # BLD: 2.38 E12/L (ref 3.8–5.8)
SMUDGE CELLS: ABNORMAL
SODIUM BLD-SCNC: 134 MMOL/L (ref 132–146)
TEAR DROP CELLS: ABNORMAL
TOTAL PROTEIN: 5 G/DL (ref 6.4–8.3)
WBC # BLD: 4.4 E9/L (ref 4.5–11.5)

## 2021-11-03 PROCEDURE — 99232 SBSQ HOSP IP/OBS MODERATE 35: CPT | Performed by: FAMILY MEDICINE

## 2021-11-03 PROCEDURE — 6370000000 HC RX 637 (ALT 250 FOR IP): Performed by: INTERNAL MEDICINE

## 2021-11-03 PROCEDURE — 99232 SBSQ HOSP IP/OBS MODERATE 35: CPT | Performed by: INTERNAL MEDICINE

## 2021-11-03 PROCEDURE — 80053 COMPREHEN METABOLIC PANEL: CPT

## 2021-11-03 PROCEDURE — 36415 COLL VENOUS BLD VENIPUNCTURE: CPT

## 2021-11-03 PROCEDURE — 2140000000 HC CCU INTERMEDIATE R&B

## 2021-11-03 PROCEDURE — 6360000002 HC RX W HCPCS: Performed by: NURSE PRACTITIONER

## 2021-11-03 PROCEDURE — 6370000000 HC RX 637 (ALT 250 FOR IP): Performed by: STUDENT IN AN ORGANIZED HEALTH CARE EDUCATION/TRAINING PROGRAM

## 2021-11-03 PROCEDURE — 85025 COMPLETE CBC W/AUTO DIFF WBC: CPT

## 2021-11-03 PROCEDURE — 2580000003 HC RX 258: Performed by: INTERNAL MEDICINE

## 2021-11-03 PROCEDURE — 85730 THROMBOPLASTIN TIME PARTIAL: CPT

## 2021-11-03 PROCEDURE — 80048 BASIC METABOLIC PNL TOTAL CA: CPT

## 2021-11-03 PROCEDURE — P9047 ALBUMIN (HUMAN), 25%, 50ML: HCPCS | Performed by: NURSE PRACTITIONER

## 2021-11-03 RX ORDER — ALBUMIN (HUMAN) 12.5 G/50ML
25 SOLUTION INTRAVENOUS EVERY 6 HOURS
Status: COMPLETED | OUTPATIENT
Start: 2021-11-03 | End: 2021-11-04

## 2021-11-03 RX ORDER — LAMIVUDINE 100 MG/1
300 TABLET, FILM COATED ORAL NIGHTLY
Status: DISCONTINUED | OUTPATIENT
Start: 2021-11-03 | End: 2021-11-07 | Stop reason: HOSPADM

## 2021-11-03 RX ADMIN — ABACAVIR 600 MG: 300 TABLET, FILM COATED ORAL at 20:59

## 2021-11-03 RX ADMIN — PROPAFENONE HYDROCHLORIDE 150 MG: 150 TABLET, FILM COATED ORAL at 23:09

## 2021-11-03 RX ADMIN — ACETAMINOPHEN 650 MG: 325 TABLET ORAL at 04:29

## 2021-11-03 RX ADMIN — Medication 10 ML: at 09:00

## 2021-11-03 RX ADMIN — PROPAFENONE HYDROCHLORIDE 150 MG: 150 TABLET, FILM COATED ORAL at 14:25

## 2021-11-03 RX ADMIN — POLYETHYLENE GLYCOL 3350 17 G: 17 POWDER, FOR SOLUTION ORAL at 23:15

## 2021-11-03 RX ADMIN — ACETAMINOPHEN 650 MG: 325 TABLET ORAL at 23:15

## 2021-11-03 RX ADMIN — OXYCODONE HYDROCHLORIDE AND ACETAMINOPHEN 500 MG: 500 TABLET ORAL at 08:28

## 2021-11-03 RX ADMIN — DOLUTEGRAVIR SODIUM 50 MG: 50 TABLET, FILM COATED ORAL at 23:09

## 2021-11-03 RX ADMIN — ACETAMINOPHEN 650 MG: 325 TABLET ORAL at 14:40

## 2021-11-03 RX ADMIN — ALBUMIN (HUMAN) 25 G: 0.25 INJECTION, SOLUTION INTRAVENOUS at 20:59

## 2021-11-03 RX ADMIN — Medication 10 ML: at 22:11

## 2021-11-03 RX ADMIN — ALBUMIN (HUMAN) 25 G: 0.25 INJECTION, SOLUTION INTRAVENOUS at 14:26

## 2021-11-03 RX ADMIN — PROPAFENONE HYDROCHLORIDE 150 MG: 150 TABLET, FILM COATED ORAL at 06:16

## 2021-11-03 RX ADMIN — PANTOPRAZOLE SODIUM 40 MG: 40 TABLET, DELAYED RELEASE ORAL at 06:16

## 2021-11-03 RX ADMIN — LAMIVUDINE 300 MG: 100 TABLET, FILM COATED ORAL at 20:59

## 2021-11-03 ASSESSMENT — PAIN DESCRIPTION - LOCATION
LOCATION: FLANK
LOCATION: FOOT
LOCATION: FLANK

## 2021-11-03 ASSESSMENT — PAIN DESCRIPTION - ORIENTATION
ORIENTATION: RIGHT
ORIENTATION: RIGHT
ORIENTATION: RIGHT;LEFT

## 2021-11-03 ASSESSMENT — PAIN DESCRIPTION - DESCRIPTORS
DESCRIPTORS: SHARP;PENETRATING;DISCOMFORT
DESCRIPTORS: TINGLING;NUMBNESS;PINS AND NEEDLES
DESCRIPTORS: SHARP;PENETRATING;DISCOMFORT

## 2021-11-03 ASSESSMENT — PAIN DESCRIPTION - ONSET
ONSET: GRADUAL
ONSET: ON-GOING
ONSET: GRADUAL

## 2021-11-03 ASSESSMENT — PAIN SCALES - GENERAL
PAINLEVEL_OUTOF10: 6
PAINLEVEL_OUTOF10: 4
PAINLEVEL_OUTOF10: 0
PAINLEVEL_OUTOF10: 8
PAINLEVEL_OUTOF10: 9
PAINLEVEL_OUTOF10: 0
PAINLEVEL_OUTOF10: 9

## 2021-11-03 ASSESSMENT — PAIN DESCRIPTION - FREQUENCY
FREQUENCY: CONTINUOUS
FREQUENCY: INTERMITTENT
FREQUENCY: INTERMITTENT

## 2021-11-03 ASSESSMENT — PAIN DESCRIPTION - PAIN TYPE
TYPE: CHRONIC PAIN
TYPE: ACUTE PAIN
TYPE: ACUTE PAIN

## 2021-11-03 NOTE — PROGRESS NOTES
transferred to MICU on 10/23 for worsening shortness of breath then transferred out on 10/26. He underwent renal biopsy on 10/27. Subjective: Patient was seen and examined. No chills, no abdominal pain, no diarrhea, no rash, no itching. Afebrile. He reports feeling well with improving appetite. Objective:  /79   Pulse 97   Temp 98 °F (36.7 °C) (Temporal)   Resp 17   Ht 6' 4\" (1.93 m)   Wt 196 lb 3.4 oz (89 kg)   SpO2 97%   BMI 23.88 kg/m²   Constitutional: Alert, not in distress  Respiratory: Clear breath sounds, no crackles, no wheezes  Cardiovascular: Regular rate and rhythm, no murmurs  Gastrointestinal: Bowel sounds present, soft, nontender  Skin: Warm and dry, no active dermatoses  Musculoskeletal: No joint swelling, no joint erythema    Labs, imaging, and medical records/notes were personally reviewed. Assessment:  Pancytopenia, multifactorial, suspect associated with acute retroviral syndrome   COVID-19, mild  Pneumonia, suspected Pneumocystis pneumonia (serum beta-d-glucan was positive at >500 pg/mL, BAL Pneumocystis stain was negative but Pneumocystis PCR was not sent)  Late latent syphilis, neurosyphilis ruled out  Methicillin susceptible Staphylococcus epidermidis bacteremia, etiology unclear  DEISI, resolved  Advanced HIV disease (viral load of 195,000; CD4 of 43 as of 10/11/2021). Hepatitis C Ab positive (viral load not detected). Hepatitis A immune  Prolonged QTc  HAP  Hematuria, probably associated with renal biopsy    Recommendations:  Give last (3rd weekly) dose of benzathine penicillin IM tomorrow prior to discharge if planning to discharge patient tomorrow. Continue lamivudine at 300 mg q24h, dolutegravir 50 mg q24h, abacavir 600 mg q24h. Switch to single pill combination Triumeq, if available. Stop pentamidine. Monitor respiratory status. Continue supportive care. Thank you for involving me in the care of Kristin James. I will continue to follow.  Please do not hesitate to call for any questions or concerns.     Electronically signed by Ivette Ross MD on 11/3/2021 at 12:10 PM

## 2021-11-03 NOTE — PROGRESS NOTES
CBC:   Lab Results   Component Value Date    WBC 4.4 11/03/2021    RBC 2.38 11/03/2021    HGB 7.3 11/03/2021    HCT 21.8 11/03/2021    MCV 91.6 11/03/2021    MCH 30.7 11/03/2021    MCHC 33.5 11/03/2021    RDW 15.5 11/03/2021    PLT 83 11/03/2021    MPV 11.9 11/03/2021     CMP:    Lab Results   Component Value Date     11/03/2021    K 4.4 11/03/2021    K 4.4 11/03/2021     11/03/2021    CO2 20 11/03/2021    BUN 17 11/03/2021    CREATININE 1.5 11/03/2021    GFRAA >60 11/03/2021    LABGLOM >60 11/03/2021    GLUCOSE 97 11/03/2021    PROT 5.0 11/03/2021    LABALBU 2.7 11/03/2021    CALCIUM 7.9 11/03/2021    BILITOT 0.3 11/03/2021    ALKPHOS 85 11/03/2021    AST 27 11/03/2021    ALT 40 11/03/2021     Magnesium:    Lab Results   Component Value Date    MG 1.7 10/29/2021     Phosphorus:    Lab Results   Component Value Date    PHOS 2.8 10/29/2021        Radiology Review:       Ejection fraction is visually estimated at 55%. There is late positive bubble suggesting possible shunt from pulmonary   origin. Visually moderately dilated left atrium. Normal right ventricular size and function. There is small pericardial effusion without tamponade physiology        CT Chest October 5, 2021   Limited study by the patient's respiratory motion and lack of intravenous   contrast.       Extensive multifocal COVID-19 pneumonia bilaterally.       Bilateral trace layering pleural effusions.       Bilateral gynecomastia.       Abdominal organs inadequately evaluated due to the limitations of the study.       Normal appendix.       No evidence of bowel obstruction.         Kidney ultrasound October 7, 2021   1. Hyperechoic kidneys due to underlying parenchymal disease. 2. Trace bilateral perinephric fluid of indeterminate etiology, potentially   due to underlying inflammation.    3. An approximately 1.1 cm x 1.5 cm x 2.3 cm lesion in the right kidney could   be a cyst but could also be seen with infarction, abscess, or neoplasia. Consider further evaluation with renal protocol abdomen MRI (preferred) or   CT.  Following resolution of acute kidney injury.         CTA pulmonary with IV contrast October 13, 2021   1. No evidence of pulmonary embolism. 2. Interval increased size of bilateral pleural effusions with little change   in pericardial effusion. 3. Persistent subcutaneous edema. 4. Reactive bilateral hilar lymphadenopathy. 5. Bilateral airspace disease is increased compared to prior examination. The increase is most pronounced within the lower lobes. 6. Bilateral gynecomastia.                 BRIEF SUMMARY OF INITIAL CONSULT:     Briefly, Mr. Juan Orellana is a 36year old male with no significant PMH who was admitted on October 5, 2021 after he presented from an outside clinic after he was found to be hypoxic during 6 minute walking test. Patient tested positive for Covid 19 on August 18th, he is unvaccinated and had Covid 19 last year as well. Patient states he has quarantined by himself and when his mother recently visited him she was shocked at his appearance as he had lost about 50 pounds in 2 and a half months. On admission labs were significant for sodium of 130, potassium 5.2, bicarbonate 20, BUN 41, creatinine 3.6, magnesium 2.7, calcium 7.7 and proBNP 1,599. We are consulted for DEISI. Problems resolved:    Hyperkalemia, 2/2 DEISI. Low potassium diet  Hypotonic hyponatremia 2/2 intravascular volume from poor oral intake. Bicarb drip started. Sodium levels improving. Low bicarbonate levels with hyperchloremia, most likely NAGMA versus respiratory alkalosis; we need a PH to clarify diagnosis.  Awaiting ABG results  High bicarbonate levels, likely metabolic alkalosis 2/2 administration  Hypokalemia, multifactorial, poor intake, probably renal potassium wasting  Severe Hypoalbuminemia, multifactorial, status post albumin administration  DEISI on CKD, volume responsive pre-renal DEISI d/t volume (poor oral intake), urine sodium <20. Resolved. Edematous state, multifactorial, mainly 2/2 nephrotic syndrome and possibly HFpEF 55%, on bumex  Hyponatremia, multifactorial, including decreased GFR and hemodynamically mediated in the setting of large hypotonic fluid administration (D5 with Bactrim). Sodium levels decrease, stable overnight  Low bicarbonate levels with hyperchloremia, consistent with either hyperchloremic metabolic acidosis versus respiratory alkalosis. Bicarbonate levels improved with bicarbonate drip. HTN, on metoprolol   Probably fungemia, (1, 3) beta-D-glucan positive, on anidulafungin  MSSE bacteremia, on cefepime 2 g every 8 hours  Sinus tachycardia, multifactorial  Hypomagnesemia, 2/2 poor intake   Leukopenia, WBC 1.1  Gross hematuria, status post kidney biopsy. Resolved    IMPRESSION/RECOMMENDATIONS:     Recurrent DEISI on CKD, ATN contrast associated DEISI versus ischemic ATN (hypotension related tachycardia). Nonoliguric. Started on renal replacement therapy on October 25, 2021. Renal function continues to improve daily, dialysis catheter has been removed. 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 Sherryle Moder Work-up so far HIV positive, Hepatitis C positive, MARILEE negative, C4 normal, C3 88 (low), UPEP without monoclonal gammopathy, negative cryoglobulins. Kidney ultrasound with hyperechoic kidneys. Status post kidney biopsy October 27, 2021  Low bicarbonate level, will obtain urine electrolytes and urinalysis to calculate urine anion gap  Probably HFpEF 55%, proBNP 11,401 > 3421   Right kidney lesion, likely a cyst but cannot be ruled out other pathology including infarction, abscess, neoplasm.   We will proceed with MRI when renal function improved  HIV positive, CD4 count 43, started on OLMSTEAD therapy    ---------------------------------------------------------------------    Anemia with acute drop in Hgb 6.6, transfuse 1 unit PRBCs  Possible pneumocystic pneumonia, on pentamidine  Possibly acute retroviral syndrome Jarische-Herxheimer reaction? Hepatitis C antibody positive, viral load not detected  Covid 19 infection in non vaccinated pt  Normocytic anemia, multifactorial  Tachycardia ?anxiety      Plan:    Await kidney biopsy results  Continue to monitor renal function for recovery daily  Monitor H&H, transfuse <7.   Monitor bicarbonate levels  Obtain urine electrolytes  Obtain urinalysis  Give albumin 25 g Q 6 hrs to complete 4 doses     Case and plan discussed with   Electronically signed by RICKY Macedo CNP on 11/3/2021 at 11:49 AM

## 2021-11-03 NOTE — PROGRESS NOTES
[place]  Department of Internal Medicine  Division of Pulmonary, Critical Care and Sleep Medicine  Progress Note    Regina Gan DO, MPH, [facility], FACOI, FACP  Jelani Dolan MD, [facility] DO Van      Patient: Marta Lucio  MRN: [id number]  : 1990    Encounter Time: 8:39 AM     Date of Admission: 10/5/2021  3:07 PM    Primary Care Physician: Deborah Browne MD    Reason for Consultation: COVID     SUBJECTIVE:  Cath removed    OBJECTIVE:     PHYSICAL EXAM:   VITALS:   Vitals:    21 1928 21 2346 21 0415 21 0815   BP: 126/88 124/84 128/82 128/79   Pulse: 107 94 108 97   Resp: 18 18 18 17   Temp: 97.6 °F (36.4 °C) 97.8 °F (36.6 °C) 98 °F (36.7 °C) 98 °F (36.7 °C)   TempSrc: Infrared Temporal Temporal Temporal   SpO2: 98% 97% 98% 97%   Weight:       Height:          No intake or output data in the 24 hours ending 21 0839     CONSTITUTIONAL:   A&O x 3, NAD  SKIN:     No rash,   HEENT:     EOMI, MMM, No thrush  NECK:    No bruits, No JVP apprechiated  CV:      Sinus,  No murmur, No rubs, No gallops  PULMONARY:   Couse BS,  No Wheezing, No Rales, No Rhonchi      No noted egophony  ABDOMEN:     Soft, non-tender. BS normal. No R/R/G  EXT:    No deformities . No clubbing.       + lower extremity edema, No venous stasis  PULSE:   Appears equal and palpable.   PSYCHIATRIC:  Seems appropriate, No acute psycosis  MS:    No fractures, No gross weakness  NEUROLOGIC:   Non-focal     DATA: IMAGING & TESTING:     LABORATORY TESTS:    CBC:   Lab Results   Component Value Date    WBC 4.4 2021    RBC 2.38 2021    HGB 7.3 2021    HCT 21.8 2021    MCV 91.6 2021    MCH 30.7 2021    MCHC 33.5 2021    RDW 15.5 2021    PLT 83 2021    MPV 11.9 2021     CMP:    Lab Results   Component Value Date     2021    K 4.4 2021    K 4.4 2021     2021    CO2 20 11/03/2021    BUN 17 11/03/2021    CREATININE 1.5 11/03/2021    GFRAA >60 11/03/2021    LABGLOM >60 11/03/2021    GLUCOSE 97 11/03/2021    PROT 5.0 11/03/2021    LABALBU 2.7 11/03/2021    CALCIUM 7.9 11/03/2021    BILITOT 0.3 11/03/2021    ALKPHOS 85 11/03/2021    AST 27 11/03/2021    ALT 40 11/03/2021     Magnesium:    Lab Results   Component Value Date    MG 1.7 10/29/2021     Phosphorus:    Lab Results   Component Value Date    PHOS 2.8 10/29/2021     PT/INR:    Lab Results   Component Value Date    PROTIME 17.7 10/15/2021    INR 1.6 10/15/2021     FERRITIN:    Lab Results   Component Value Date    FERRITIN 3,921 10/06/2021     Fibrinogen Level:  No components found for: FIB     PRO-BNP:   Lab Results   Component Value Date    PROBNP 3,421 (H) 10/18/2021    PROBNP 11,401 (H) 10/13/2021      ABGs:   Lab Results   Component Value Date    PH 7.479 10/24/2021    PO2 306.9 10/24/2021    PCO2 26.1 10/24/2021     Hemoglobin A1C: No components found for: HGBA1C    IMAGING:  Imaging tests were completed and reviewed and discussed radiology and care team involved and reveals     CT CHEST : There is adequate opacification of the pulmonary arteries.  There is no   evidence of filling defect to suggest pulmonary embolism. Ramy Araceli is no   evidence of thoracic aortic aneurysm or dissection.  There are small   bilateral pleural effusions.  These are increased compared to prior   examination.  There is small pericardial effusion which appears stable.  Left   hilar lymph node measures approximately 12 mm in short axis.  Right hilar   lymph node measures approximately 12 mm in short axis.  There is bilateral   gynecomastia. Ramy Araceli is diffuse subcutaneous edema.      The central airways are patent.  There is no pneumothorax.  There are   bilateral ground-glass opacities and alveolar consolidation.  There has been   interval increase in the airspace disease compared to prior examination.  The   interval worsening is most pronounced within the lower lobes. Jolayne Cancel is   associated interstitial thickening. CT ABDOMEN PELVIS WO CONTRAST Additional Contrast? None    Result Date: 10/5/2021  FINDINGS: THE STUDY IS LIMITED DUE TO LACK OF INTRAVENOUS CONTRAST. Chest: Mediastinum: No thoracic aortic aneurysm. No definite adenopathy on this unenhanced study. Trace pericardial fluid anteriorly. Lungs/pleura: Trace layering pleural effusions bilaterally, slightly larger on the right. No pneumothorax. Numerous ground-glass densities in the bilateral upper and to a lesser degree lower lungs, in keeping with known COVID-19 pneumonia. Soft Tissues/Bones: Bilateral gynecomastia. No acute osseous abnormality in the thoracic cage. Abdomen and pelvis: The study is degraded by the patient's respiratory motion. Organs: Abdominal organs inadequately evaluated on this motion degraded and unenhanced study. No hydronephrosis on either side. GI/Bowel: Normal appendix. No evidence of bowel obstruction. Pelvis: Normal sized prostate. Urinary bladder grossly intact. Peritoneum/Retroperitoneum: No free air or free fluid. No bulky adenopathy. No abdominal aortic aneurysm. Bones/Soft Tissues: No lytic or sclerotic lesion in the regional skeleton. Limited study by the patient's respiratory motion and lack of intravenous contrast. Extensive multifocal COVID-19 pneumonia bilaterally. Bilateral trace layering pleural effusions. Bilateral gynecomastia. Abdominal organs inadequately evaluated due to the limitations of the study. Normal appendix. No evidence of bowel obstruction. CT CHEST WO CONTRAST    Result Date: 10/5/2021  FINDINGS: THE STUDY IS LIMITED DUE TO LACK OF INTRAVENOUS CONTRAST. Chest: Mediastinum: No thoracic aortic aneurysm. No definite adenopathy on this unenhanced study. Trace pericardial fluid anteriorly. Lungs/pleura: Trace layering pleural effusions bilaterally, slightly larger on the right. No pneumothorax.   Numerous ground-glass densities in the bilateral upper and to a lesser degree lower lungs, in keeping with known COVID-19 pneumonia. Soft Tissues/Bones: Bilateral gynecomastia. No acute osseous abnormality in the thoracic cage. Abdomen and pelvis: The study is degraded by the patient's respiratory motion. Organs: Abdominal organs inadequately evaluated on this motion degraded and unenhanced study. No hydronephrosis on either side. GI/Bowel: Normal appendix. No evidence of bowel obstruction. Pelvis: Normal sized prostate. Urinary bladder grossly intact. Peritoneum/Retroperitoneum: No free air or free fluid. No bulky adenopathy. No abdominal aortic aneurysm. Bones/Soft Tissues: No lytic or sclerotic lesion in the regional skeleton. Limited study by the patient's respiratory motion and lack of intravenous contrast. Extensive multifocal COVID-19 pneumonia bilaterally. Bilateral trace layering pleural effusions. Bilateral gynecomastia. Abdominal organs inadequately evaluated due to the limitations of the study. Normal appendix. No evidence of bowel obstruction. ECHOCARDIOGRAM:  Ejection fraction is visually estimated at 55%. There is late positive bubble suggesting possible shunt from pulmonary   origin. Visually moderately dilated left atrium. Normal right ventricular size and function. There is small pericardial effusion without tamponade physiology    Assessment:  Mr. Nadia Saldana is a 36year old male with no significant PMH who was admitted on October 5, 2021 after he presented from an outside clinic after he was found to be hypoxic during 6 minute walking test. Patient tested positive for Covid 19 on August 18th, he is unvaccinated and had Covid 19 last year as well.  Patient states he has quarantined by himself and when his mother recently visited him she was shocked at his appearance as he had lost about 50 pounds in 2 and a half months  Sepsis  COVID-19, mild  HIV   WPW AP   Pneumonia  Gram-positive cocci in clusters bacteremia, etiology unclear  DEISI  + RPR  + BNP  Ejection fraction is visually estimated at 55%. There is late positive bubble suggesting possible shunt from pulmonary   origin. Visually moderately dilated left atrium. Normal right ventricular size and function.    There is small pericardial effusion without tamponade physiology      Plan:   Off oxygen  Will follow           Monserrat Velazquez DO DO, MPH, [facility]  Professor of Internal Medicine  Pulmonary, Critical Care and Sleep Medicine

## 2021-11-03 NOTE — PROGRESS NOTES
[address] [place] [address] Attending    S: 36 y.o. male with history of asthma and smoking history presented with increasing dyspnea and cough over past month. On presentation had fever of 103. Has tested positive for COVID twice, most recently end of August.  CT shows extensive bilateral pneumonia and COVID testing again positive. HIV pos, CD4 43. Early AM 10/23,  RRT called due to tachycardia and oxygen desaturation requiring oxygen from 3L NC to 15LNRB mask. Patient had been febrile overnight as well and transferred to MICU. Improved and transferred back to floor. Treatment initiated for HIV, but noted to have mild pancytopenia and thrombocytopenia to less than 50. Renal failure developed. Required temporary dialysis. Currently off dialysis, with improving renal function. Today he has some right flank discomfort, which however is improving since he has been up and around. O: VS- Blood pressure 128/79, pulse 97, temperature 98 °F (36.7 °C), temperature source Temporal, resp. rate 17, height 6' 4\" (1.93 m), weight 196 lb 3.4 oz (89 kg), SpO2 97 %. Exam is as noted by resident   Awake, alert and oriented. Heart - tachycardic  Lungs- clear breath sounds bilaterally. No abdominal masses or tenderness  No palpable abnormalities of right flank area where he is feeling the discomfort. Ext - no edema. Impressions:  Principal Problem:    Acute respiratory failure with hypoxia (HCC)  Active Problems:    Acute kidney injury due to COVID-19 ([facility] [address]    Asthma     COVID-19    Symptomatic HIV infection (HCC)    Pancytopenia (HCC)  Platelet count improving  Creatinine improved from 2.8-2.1-1.9  Hemoglobin 6.6 to 7.3 after transfusion    Plan   Receiving counseling from Psychology    Appreciate Nephrology, ID, MICU, cardiology, gen surgery, H/Onc, pulmonology.   Covid pneumonia/PCP pneumonia with sepsis   New diagnosis of HIV, ID managing antibiotics and antivirals for HIV, currently on lamivudine/abacavir/dolutegravir   Per ID pentamidine 4mg/kg IV q48H on dialysis to finish 21 days until 11/2. RPR positive. S/p LP. VDRL CSF negative. Continue benzathine PCN IM weekly x 3 doses. Sinus tachycardia - Echo with EF 55%, small pericardial effusion and possible atrial shunt. EKG suspicious for WPW like syndrome, although not meeting all criteria. EP has seen. On propafenone. Will follow as outpatient  Hemodialysis held  Creatinine improving  Platelets improving since heparin is discontinued          Attending Physician Statement  I have reviewed the chart and seen the patient with the resident(s). I personally reviewed images, EKG's and similar tests, if present. I personally reviewed and performed key elements of the history and exam.  I have reviewed and confirmed student and/or resident history and exam with changes as indicated above. I agree with the assessment, plan and orders as documented by the resident. Please refer to the resident and/or student note for additional information.       Tahir Dobbs MD

## 2021-11-03 NOTE — PROGRESS NOTES
[facility] Inpatient   Resident Progress Note    S:  Hospital day: 34   Brief Synopsis: Federico Mike is a 36 y.o. male with no PMH who presented with hypoxia and fever. He was initially seen in PCP office and was hypoxic on exertion with desaturate into the mid low 80s as well as noted to be febrile and tachycardic. Per patient, his symptoms began 8/15/2021 and he tested positive for Covid 8/28. endorse worsening shortness of breath over the past month with sputum production, intermittent chest discomfort, persistent diarrhea, chills, fever, decrease in appetite and 50 pound weight loss since August.  Patient also endorses significantly worsening \"neuropathy\" of bilateral feet, states it feels like he is \"being stabbed by needles\" which is causing him inability to walk due to pain. In the ED, he was found to be anemic, hyperkalemic, hyponatremic, febrile and COVID positive. He also had elevated Creatinine, AST, proBNP and troponin x2. CTA showed extensive multifocal COVID-19 pneumonia bilaterally, bilateral trace layering pleural effusion and bilateral gynecomastia. CT abdomen was limited due to movement and non-contrast, but showed normal appendix and no evidence of bowel obstruction. Admitted for acute hypoxia due to Covid pneumonia. Nephro, pulm and ID consulted. Started on bicarb drip. US showed hyperechoic kidneys, trace b/l perinephric fluid and right kidney lesion (Cyst vs infarction vs abscess vs neoplasia). HIV and Hep C screen were positive. HIV RNA and T and B lymphocytes were ordered. Patient became more hypoxic, tachycardic and hypertensive so  there was concern for PE. CTA was ordered, which came back negative. Echo was completed due to elevated BNP and concern for PE and that revealed a normal EF with late bubble sign possible shunting of pulmonary origin. BNP continued to increase. Cardiology was consulted. Repeat Echo pending.  ID recommends LP with CSF sent for glucose, protein, cell count with differential, which were all  negative. meningitis/encephalitis PCR panel negative. RRT called 10/23 for hypoxia and tachycardia, transferred to medical intensive on 15 L NRB. Creatinine up trending. Currently saturating well on room air and was transferred out of the ICU. Hemodialysis x4 days for worsening renal function. CSF VDRL negative, but continue to treat for latent syphilis. Renal biopsy performed on 10/27. Endorses hematuria s/p biopsy. CT abdomen showed no acute abnormalities. Patients platelets and creatinine improving, hemodialysis on hold at this point. 11/2/2021 - H/H 6.6, patient receiving 1 unit PRBC. Patient seen and examined this AM. No acute concerns overnight. Questions regardling renal biopsy results answered. Patient advised that his creatinine levels and platelet levels improving. Still continues to have neuropathy of bilateral lower extremities but is able to walk to the bathroom without walker. HIV treatment including lamivudine, dolutegravir, and abacavir ; try to switch to one pill format : Triumeq  Prednisone completed. No other concerns at this time.      Cont meds:    sodium chloride      sodium chloride      sodium chloride      sodium chloride      sodium chloride      sodium chloride      sodium chloride       Scheduled meds:    propafenone  150 mg Oral 3 times per day    pentamidine isethionate (PENTAM) IVPB  4 mg/kg IntraVENous Daily    abacavir  600 mg Oral Nightly    dolutegravir sodium  50 mg Oral Nightly    lamiVUDine  50 mg Oral Nightly    [Held by provider] heparin (porcine)  5,000 Units SubCUTAneous Q8H    penicillin G benzathine  2.4 Million Units IntraMUSCular Weekly    lidocaine  1 patch TransDERmal Daily    ascorbic acid  500 mg Oral Daily    pantoprazole  40 mg Oral QAM AC    sodium chloride flush  5-40 mL IntraVENous 2 times per day     PRN meds: sodium chloride, sodium chloride, sodium chloride, sodium chloride, sodium chloride, sodium chloride, medicated lip balm, benzonatate, perflutren lipid microspheres, hydrOXYzine, [Held by provider] labetalol, sodium chloride flush, ondansetron **OR** ondansetron, polyethylene glycol, acetaminophen **OR** acetaminophen, sodium chloride     I reviewed the patient's past medical and surgical history, Medications and Allergies. O:  /79   Pulse 97   Temp 98 °F (36.7 °C) (Temporal)   Resp 17   Ht 6' 4\" (1.93 m)   Wt 196 lb 3.4 oz (89 kg)   SpO2 97%   BMI 23.88 kg/m²   24 hour I&O: No intake/output data recorded. No intake/output data recorded. Physical Exam  Vitals reviewed. Constitutional:       General: He is not in acute distress. Appearance: Normal appearance. He is not ill-appearing, toxic-appearing or diaphoretic. HENT:      Head: Normocephalic and atraumatic. Nose: No rhinorrhea. Mouth/Throat:      Mouth: Mucous membranes are moist.      Pharynx: Oropharynx is clear. Eyes:      General: No scleral icterus. Right eye: No discharge. Left eye: No discharge. Extraocular Movements: Extraocular movements intact. Cardiovascular:      Rate and Rhythm: Regular rhythm. Tachycardia present. Pulses: Normal pulses. Heart sounds: Normal heart sounds. No murmur heard. No friction rub. No gallop. Pulmonary:      Effort: No respiratory distress. Breath sounds: No wheezing, rhonchi or rales. Abdominal:      General: Abdomen is flat. Palpations: Abdomen is soft. Tenderness: There is no abdominal tenderness. Musculoskeletal:         General: No swelling. Cervical back: Normal range of motion. No rigidity or tenderness. Lumbar back: No swelling or spasms. Normal range of motion. Skin:     General: Skin is warm and dry. Coloration: Skin is not jaundiced. Findings: No bruising or rash. Neurological:      General: No focal deficit present.       Mental Status: He is alert and oriented to evidence of pulmonary embolism. 2. Interval increased size of bilateral pleural effusions with little change   in pericardial effusion. 3. Persistent subcutaneous edema. 4. Reactive bilateral hilar lymphadenopathy. 5. Bilateral airspace disease is increased compared to prior examination. The increase is most pronounced within the lower lobes. 6. Bilateral gynecomastia. XR CHEST PORTABLE   Final Result   Development of extensive bilateral pulmonary infiltrates         XR CHEST PORTABLE   Final Result   No acute process. US RETROPERITONEAL COMPLETE   Final Result   1. Hyperechoic kidneys due to underlying parenchymal disease. 2. Trace bilateral perinephric fluid of indeterminate etiology, potentially   due to underlying inflammation. 3. An approximately 1.1 cm x 1.5 cm x 2.3 cm lesion in the right kidney could   be a cyst but could also be seen with infarction, abscess, or neoplasia. Consider further evaluation with renal protocol abdomen MRI (preferred) or   CT. Following resolution of acute kidney injury. XR CHEST PORTABLE   Final Result   Multifocal bilateral pulmonary ground-glass airspace opacities are stable         CT ABDOMEN PELVIS WO CONTRAST Additional Contrast? None   Final Result   Limited study by the patient's respiratory motion and lack of intravenous   contrast.      Extensive multifocal COVID-19 pneumonia bilaterally. Bilateral trace layering pleural effusions. Bilateral gynecomastia. Abdominal organs inadequately evaluated due to the limitations of the study. Normal appendix. No evidence of bowel obstruction. CT CHEST WO CONTRAST   Final Result   Limited study by the patient's respiratory motion and lack of intravenous   contrast.      Extensive multifocal COVID-19 pneumonia bilaterally. Bilateral trace layering pleural effusions. Bilateral gynecomastia.       Abdominal organs inadequately evaluated due to the limitations of the study. Normal appendix. No evidence of bowel obstruction.              Resident Assessment and Plan     Acute hypoxia 2/2 COVID pneumonia vs PCP  - CTA on admission: COVID pneumonia, Bilateral trace layering pleural effusion, negative for PE   - Ferritin 3900, CRP 10.1 on admission   - D dimer elevated on admission, currently stable  - Pulmonology and ID on board  - Legionella, spot TB test negative   - CXR 10/8 shows multifocal groundglass opacities are stable  - Repeat CXR 10/17: b/l pulm infiltrates improving   - Blood cx: staph epidermitis, repeat blood cx shows no growth x 7 days   -Urine culture: NGTD   - ECHO 10/13: EF 55%, late bubble sign possible shunting of pulmonary origin   - CTA 10/13 negative for PE  Bronchoscopy with BAL performed on 10/15/2021   - Respiratory culture: oral pharyngeal liana decreased, few gram negative rods   CXR 10/23: Mild worsening extensive bilateral infiltrates  - Currently maintaining saturation on room air, continue to monitor  - prednisone course completed (11/2/2021)  - Repeat blood culture 10/21 showed NGTD x5days    - Fungal cultures negative x 1 week    Weight loss likely secondary to HIV  - Per patient due to decreased appetite from COVID; 50lb weight loss since August   - R/o other potential origins: SLE, TB, hepatitis  - MARILEE negative  - T spot TB test negative   HIV, hepatitis C antibodies positive  - Elevated IgG and IgA, normal IgM: IgA nephropathy vs MPGN?   - Cryoglobulin normal   - TSH normal   - Sed rate and CRP elevated      HIV  HIV 1/2 antibodies positive    CD4 44, CD3 153, CD8 100  HIV viral load 195,000  - ID on board: received vancomycin, ancef; stopped cefepime 10/26  - Fungitell test positive x2, received eraxis  - Bactrim d/c'd due to renal failure; on pentamidine for suspected Pneumocystis pneumonia  - Pneumocystis stain negative, but PCR pending; still suspect PCP pna  - HIV GART pending   - [id number] negative, genotype pending  - Toxoplasma Ab, cryptococcus negative  - hepatitis A total Ab positive  - RPR reactive, quant 1:2, CSF VDRL negative   - On PCN G weekly for latent syphilis (received 2/3 doses)  - Started on biktarvy 10/22, d/c'd due to renal failure   - Started on lamivudine, dolutegravir, and abacavir 10/25  - receiving penecillin prophylaxis IM weekly x 3 doses , received one dose so far.     Hematuria - resolved   - benjie blood in urine s/p renal biopsy 10/27  - Hb trending down , s/p 1 transfusion 11/2/2021 , h/h improved to 7.3  - Continue to monitor H/H     Anemia  Pancytopenia  - Likely 2/2 from GI bleed vs HIV   Peripheral blood smear shows normocytic anemia, absolute lymphocytopenia; no platelet clumping, schistocytes or dysplasia  - Heme Onc consulted: received granix to improve ANC  Transfused 2 units pRBC since admission  - H/H drop to 6.6 11/2/2021 ; transfuse 1 unit PRBC today  - FOBT negative x3  - Platelets trending down, transfusion if bleeding or plts <20k   - Transfused 2U platelets prior to and during kidney biopsy   - Platelets trending up at this time , improvement to 80s    DEISI    Hyponatremia - resolved   - Na normalized    - Nephro on board ; hold dialysis for now with Cr improving  - Likely secondary to dehydration from persistent diarrhea vs decreased PO intake vs bactrim vs HIVANS vs Biktarvy  - Cr improving after HD,3.7 ---> 2.1--> 1.8 today (11/2/2021)  Sherrel Reasons   - urine studies show intrinsic cause of DEISI  - Microalbumin-creatinine ratio elevated   Fluid restriction  - Oliguric (0.2 mL/kg/hr)  - received dialysis  10/25 x3days  - S/p Kidney biopsy; mild C3 deposition noted    Hypoalbuminemia - improving  -2/2 poor intake vs CHF vs nephrotic syndrome  - Alb stable  -Previously received albumin 25 g q6h x 4 doses 10/6, 8 doses 10/12 and 8 doses 10/23  Low C3, normal C4 and cryoglobulin  Fluid restriction    - Continue to monitor     Elevated troponin and proBNP  - 2/2 myocardial injury vs stress cardiomyopathy due to COVID vs new onset CHF   - initial troponin 78, 78  - initial BNP 1599,  repeat 11,401 on 10/14, trending down 3400   - ECHO: EF 55%, late bubble sign possible shunting of pulmonary origin  - Repeat ECHO pending      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 Joann Wilson

## 2021-11-03 NOTE — BH NOTE
[address] [facility]     Pt is a 36 y.o. male with pneumonia, COVID,  HIV pos. Renal failure developed. Required temporary dialysis. Currently off dialysis, with improving renal function. Prolonged hospitalization. Psychology following for anxiety, difficulty coping with stress. Continued cognitive-behavioral therapy to continue to address stress management. Gets anxious when assuming worst case scenario. Discussed how to continue to focus on facts, information that is being provided to him, etc. He is better engaging in his care (asking questions, keeping track of medications and lab values). He has problem-solved solutions to outside stressors (e.g., problems with housing situation, financial/banking concerns). Plans to go to his mother's home after d/c.  Will continue to follow with Family Medicine team.     Electronically signed by Kimberly John, PhD

## 2021-11-03 NOTE — PROGRESS NOTES
[place]  Department of Internal Medicine  Division of Pulmonary, Critical Care and Sleep Medicine  Progress Note    Glenn Roblero DO, MPH, [facility], FACOI, FACP  Jelani Dolan MD, [facility]  [facility]      Patient: Kit Holden  MRN: [id number]  : 1990    Encounter Time: 10:03 PM     Date of Admission: 10/5/2021  3:07 PM    Primary Care Physician: Jazmin Becerra MD    Reason for Consultation: COVID     SUBJECTIVE:  Cath removed      OBJECTIVE:     PHYSICAL EXAM:   VITALS:   Vitals:    21 1210 21 1325 21 1430 21 1928   BP: (!) 137/92 132/77 (!) 140/72 126/88   Pulse: 109 105 98 107   Resp: 18 18    Temp: 97.5 °F (36.4 °C) 97.6 °F (36.4 °C) 97.4 °F (36.3 °C) 97.6 °F (36.4 °C)   TempSrc: Temporal Temporal Temporal Infrared   SpO2: 97% 97% 97% 98%   Weight:       Height:          No intake or output data in the 24 hours ending 21     CONSTITUTIONAL:   A&O x 3, NAD  SKIN:     No rash,   HEENT:     EOMI, MMM, No thrush  NECK:    No bruits, No JVP apprechiated  CV:      Sinus,  No murmur, No rubs, No gallops  PULMONARY:   Couse BS,  No Wheezing, No Rales, No Rhonchi      No noted egophony  ABDOMEN:     Soft, non-tender. BS normal. No R/R/G  EXT:    No deformities . No clubbing.       + lower extremity edema, No venous stasis  PULSE:   Appears equal and palpable.   PSYCHIATRIC:  Seems appropriate, No acute psycosis  MS:    No fractures, No gross weakness  NEUROLOGIC:   Non-focal     DATA: IMAGING & TESTING:     LABORATORY TESTS:    CBC:   Lab Results   Component Value Date    WBC 4.4 2021    RBC 2.16 2021    HGB 7.3 2021    HCT 22.6 2021    MCV 93.5 2021    MCH 30.6 2021    MCHC 32.7 2021    RDW 15.3 2021    PLT 71 2021    MPV 13.2 2021     CMP:    Lab Results   Component Value Date     2021    K 4.3 2021    K 4.3 2021     2021 CO2 20 11/02/2021    BUN 20 11/02/2021    CREATININE 1.8 11/02/2021    GFRAA 54 11/02/2021    LABGLOM 54 11/02/2021    GLUCOSE 106 11/02/2021    PROT 4.9 11/02/2021    LABALBU 2.6 11/02/2021    CALCIUM 7.9 11/02/2021    BILITOT 0.2 11/02/2021    ALKPHOS 85 11/02/2021    AST 31 11/02/2021    ALT 34 11/02/2021     Magnesium:    Lab Results   Component Value Date    MG 1.7 10/29/2021     Phosphorus:    Lab Results   Component Value Date    PHOS 2.8 10/29/2021     PT/INR:    Lab Results   Component Value Date    PROTIME 17.7 10/15/2021    INR 1.6 10/15/2021     FERRITIN:    Lab Results   Component Value Date    FERRITIN 3,921 10/06/2021     Fibrinogen Level:  No components found for: FIB     PRO-BNP:   Lab Results   Component Value Date    PROBNP 3,421 (H) 10/18/2021    PROBNP 11,401 (H) 10/13/2021      ABGs:   Lab Results   Component Value Date    PH 7.479 10/24/2021    PO2 306.9 10/24/2021    PCO2 26.1 10/24/2021     Hemoglobin A1C: No components found for: HGBA1C    IMAGING:  Imaging tests were completed and reviewed and discussed radiology and care team involved and reveals     CT CHEST : There is adequate opacification of the pulmonary arteries.  There is no   evidence of filling defect to suggest pulmonary embolism. Escudero Moles is no   evidence of thoracic aortic aneurysm or dissection.  There are small   bilateral pleural effusions.  These are increased compared to prior   examination.  There is small pericardial effusion which appears stable.  Left   hilar lymph node measures approximately 12 mm in short axis.  Right hilar   lymph node measures approximately 12 mm in short axis.  There is bilateral   gynecomastia. Escudero Moles is diffuse subcutaneous edema.      The central airways are patent.  There is no pneumothorax.  There are   bilateral ground-glass opacities and alveolar consolidation.  There has been   interval increase in the airspace disease compared to prior examination.  The   interval worsening is most pronounced within the lower lobes. Mone Pod is   associated interstitial thickening. CT ABDOMEN PELVIS WO CONTRAST Additional Contrast? None    Result Date: 10/5/2021  FINDINGS: THE STUDY IS LIMITED DUE TO LACK OF INTRAVENOUS CONTRAST. Chest: Mediastinum: No thoracic aortic aneurysm. No definite adenopathy on this unenhanced study. Trace pericardial fluid anteriorly. Lungs/pleura: Trace layering pleural effusions bilaterally, slightly larger on the right. No pneumothorax. Numerous ground-glass densities in the bilateral upper and to a lesser degree lower lungs, in keeping with known COVID-19 pneumonia. Soft Tissues/Bones: Bilateral gynecomastia. No acute osseous abnormality in the thoracic cage. Abdomen and pelvis: The study is degraded by the patient's respiratory motion. Organs: Abdominal organs inadequately evaluated on this motion degraded and unenhanced study. No hydronephrosis on either side. GI/Bowel: Normal appendix. No evidence of bowel obstruction. Pelvis: Normal sized prostate. Urinary bladder grossly intact. Peritoneum/Retroperitoneum: No free air or free fluid. No bulky adenopathy. No abdominal aortic aneurysm. Bones/Soft Tissues: No lytic or sclerotic lesion in the regional skeleton. Limited study by the patient's respiratory motion and lack of intravenous contrast. Extensive multifocal COVID-19 pneumonia bilaterally. Bilateral trace layering pleural effusions. Bilateral gynecomastia. Abdominal organs inadequately evaluated due to the limitations of the study. Normal appendix. No evidence of bowel obstruction. CT CHEST WO CONTRAST    Result Date: 10/5/2021  FINDINGS: THE STUDY IS LIMITED DUE TO LACK OF INTRAVENOUS CONTRAST. Chest: Mediastinum: No thoracic aortic aneurysm. No definite adenopathy on this unenhanced study. Trace pericardial fluid anteriorly. Lungs/pleura: Trace layering pleural effusions bilaterally, slightly larger on the right. No pneumothorax.   Numerous ground-glass densities in the bilateral upper and to a lesser degree lower lungs, in keeping with known COVID-19 pneumonia. Soft Tissues/Bones: Bilateral gynecomastia. No acute osseous abnormality in the thoracic cage. Abdomen and pelvis: The study is degraded by the patient's respiratory motion. Organs: Abdominal organs inadequately evaluated on this motion degraded and unenhanced study. No hydronephrosis on either side. GI/Bowel: Normal appendix. No evidence of bowel obstruction. Pelvis: Normal sized prostate. Urinary bladder grossly intact. Peritoneum/Retroperitoneum: No free air or free fluid. No bulky adenopathy. No abdominal aortic aneurysm. Bones/Soft Tissues: No lytic or sclerotic lesion in the regional skeleton. Limited study by the patient's respiratory motion and lack of intravenous contrast. Extensive multifocal COVID-19 pneumonia bilaterally. Bilateral trace layering pleural effusions. Bilateral gynecomastia. Abdominal organs inadequately evaluated due to the limitations of the study. Normal appendix. No evidence of bowel obstruction. ECHOCARDIOGRAM:  Ejection fraction is visually estimated at 55%. There is late positive bubble suggesting possible shunt from pulmonary   origin. Visually moderately dilated left atrium. Normal right ventricular size and function. There is small pericardial effusion without tamponade physiology    Assessment:  Mr. Tessa Espinoza is a 36year old male with no significant PMH who was admitted on October 5, 2021 after he presented from an outside clinic after he was found to be hypoxic during 6 minute walking test. Patient tested positive for Covid 19 on August 18th, he is unvaccinated and had Covid 19 last year as well.  Patient states he has quarantined by himself and when his mother recently visited him she was shocked at his appearance as he had lost about 50 pounds in 2 and a half months  Sepsis  COVID-19, mild  HIV   WPW AP   Pneumonia  Gram-positive cocci in clusters bacteremia, etiology unclear  DEISI  + RPR  + BNP  Ejection fraction is visually estimated at 55%. There is late positive bubble suggesting possible shunt from pulmonary   origin. Visually moderately dilated left atrium. Normal right ventricular size and function.    There is small pericardial effusion without tamponade physiology      Plan:   Off oxygen  Will follow           Michell Jhaveri DO DO, MPH, [facility]  Professor of Internal Medicine  Pulmonary, Critical Care and Sleep Medicine

## 2021-11-04 LAB
ALBUMIN SERPL-MCNC: 3.2 G/DL (ref 3.5–5.2)
ALP BLD-CCNC: 100 U/L (ref 40–129)
ALT SERPL-CCNC: 36 U/L (ref 0–40)
ANION GAP SERPL CALCULATED.3IONS-SCNC: 9 MMOL/L (ref 7–16)
ANISOCYTOSIS: ABNORMAL
APTT: 26.1 SEC (ref 24.5–35.1)
AST SERPL-CCNC: 30 U/L (ref 0–39)
BACTERIA: ABNORMAL /HPF
BASOPHILS ABSOLUTE: 0 E9/L (ref 0–0.2)
BASOPHILS RELATIVE PERCENT: 0.2 % (ref 0–2)
BILIRUB SERPL-MCNC: 0.3 MG/DL (ref 0–1.2)
BILIRUBIN URINE: NEGATIVE
BLOOD BANK DISPENSE STATUS: NORMAL
BLOOD BANK PRODUCT CODE: NORMAL
BLOOD, URINE: ABNORMAL
BPU ID: NORMAL
BUN BLDV-MCNC: 16 MG/DL (ref 6–20)
CALCIUM IONIZED: 1.21 MMOL/L (ref 1.15–1.33)
CALCIUM SERPL-MCNC: 8.1 MG/DL (ref 8.6–10.2)
CHLORIDE BLD-SCNC: 110 MMOL/L (ref 98–107)
CHLORIDE URINE RANDOM: 120 MMOL/L
CLARITY: CLEAR
CO2: 20 MMOL/L (ref 22–29)
COLOR: YELLOW
CREAT SERPL-MCNC: 1.4 MG/DL (ref 0.7–1.2)
CREATININE URINE: 79 MG/DL (ref 40–278)
CREATININE URINE: 80 MG/DL (ref 40–278)
DESCRIPTION BLOOD BANK: NORMAL
EOSINOPHILS ABSOLUTE: 0 E9/L (ref 0.05–0.5)
EOSINOPHILS RELATIVE PERCENT: 0.6 % (ref 0–6)
GFR AFRICAN AMERICAN: >60
GFR NON-AFRICAN AMERICAN: >60 ML/MIN/1.73
GLUCOSE BLD-MCNC: 92 MG/DL (ref 74–99)
GLUCOSE URINE: NEGATIVE MG/DL
HCT VFR BLD CALC: 20.5 % (ref 37–54)
HCT VFR BLD CALC: 24.6 % (ref 37–54)
HEMOGLOBIN: 6.8 G/DL (ref 12.5–16.5)
HEMOGLOBIN: 8.1 G/DL (ref 12.5–16.5)
KETONES, URINE: NEGATIVE MG/DL
LEUKOCYTE ESTERASE, URINE: NEGATIVE
LYMPHOCYTES ABSOLUTE: 0.62 E9/L (ref 1.5–4)
LYMPHOCYTES RELATIVE PERCENT: 13.2 % (ref 20–42)
MCH RBC QN AUTO: 31.5 PG (ref 26–35)
MCHC RBC AUTO-ENTMCNC: 33.2 % (ref 32–34.5)
MCV RBC AUTO: 94.9 FL (ref 80–99.9)
MICROALBUMIN UR-MCNC: 2212.4 MG/L
MICROALBUMIN/CREAT UR-RTO: 2800.5 (ref 0–30)
MONOCYTES ABSOLUTE: 0.05 E9/L (ref 0.1–0.95)
MONOCYTES RELATIVE PERCENT: 0.9 % (ref 2–12)
NEUTROPHILS ABSOLUTE: 4.13 E9/L (ref 1.8–7.3)
NEUTROPHILS RELATIVE PERCENT: 86 % (ref 43–80)
NITRITE, URINE: NEGATIVE
OVALOCYTES: ABNORMAL
PDW BLD-RTO: 15.6 FL (ref 11.5–15)
PH UA: 7 (ref 5–9)
PLATELET # BLD: 113 E9/L (ref 130–450)
PMV BLD AUTO: 11.8 FL (ref 7–12)
POIKILOCYTES: ABNORMAL
POLYCHROMASIA: ABNORMAL
POTASSIUM REFLEX MAGNESIUM: 4.8 MMOL/L (ref 3.5–5)
POTASSIUM SERPL-SCNC: 4.8 MMOL/L (ref 3.5–5)
POTASSIUM, UR: 50 MMOL/L
PROTEIN PROTEIN: 264 MG/DL (ref 0–12)
PROTEIN UA: >=300 MG/DL
PROTEIN/CREAT RATIO: 3.3
PROTEIN/CREAT RATIO: 3.3 (ref 0–0.2)
RBC # BLD: 2.16 E12/L (ref 3.8–5.8)
RBC UA: ABNORMAL /HPF (ref 0–2)
SODIUM BLD-SCNC: 139 MMOL/L (ref 132–146)
SODIUM URINE: 109 MMOL/L
SPECIFIC GRAVITY UA: 1.02 (ref 1–1.03)
TOTAL PROTEIN: 5.8 G/DL (ref 6.4–8.3)
UROBILINOGEN, URINE: 0.2 E.U./DL
WBC # BLD: 4.8 E9/L (ref 4.5–11.5)
WBC UA: ABNORMAL /HPF (ref 0–5)

## 2021-11-04 PROCEDURE — 6360000002 HC RX W HCPCS: Performed by: INTERNAL MEDICINE

## 2021-11-04 PROCEDURE — 85730 THROMBOPLASTIN TIME PARTIAL: CPT

## 2021-11-04 PROCEDURE — 99232 SBSQ HOSP IP/OBS MODERATE 35: CPT | Performed by: INTERNAL MEDICINE

## 2021-11-04 PROCEDURE — 99232 SBSQ HOSP IP/OBS MODERATE 35: CPT | Performed by: FAMILY MEDICINE

## 2021-11-04 PROCEDURE — 84300 ASSAY OF URINE SODIUM: CPT

## 2021-11-04 PROCEDURE — 6370000000 HC RX 637 (ALT 250 FOR IP): Performed by: INTERNAL MEDICINE

## 2021-11-04 PROCEDURE — 36415 COLL VENOUS BLD VENIPUNCTURE: CPT

## 2021-11-04 PROCEDURE — 82570 ASSAY OF URINE CREATININE: CPT

## 2021-11-04 PROCEDURE — 80048 BASIC METABOLIC PNL TOTAL CA: CPT

## 2021-11-04 PROCEDURE — 82436 ASSAY OF URINE CHLORIDE: CPT

## 2021-11-04 PROCEDURE — 85018 HEMOGLOBIN: CPT

## 2021-11-04 PROCEDURE — 84156 ASSAY OF PROTEIN URINE: CPT

## 2021-11-04 PROCEDURE — 80053 COMPREHEN METABOLIC PANEL: CPT

## 2021-11-04 PROCEDURE — 85014 HEMATOCRIT: CPT

## 2021-11-04 PROCEDURE — 6370000000 HC RX 637 (ALT 250 FOR IP): Performed by: STUDENT IN AN ORGANIZED HEALTH CARE EDUCATION/TRAINING PROGRAM

## 2021-11-04 PROCEDURE — P9047 ALBUMIN (HUMAN), 25%, 50ML: HCPCS | Performed by: NURSE PRACTITIONER

## 2021-11-04 PROCEDURE — 2140000000 HC CCU INTERMEDIATE R&B

## 2021-11-04 PROCEDURE — 82044 UR ALBUMIN SEMIQUANTITATIVE: CPT

## 2021-11-04 PROCEDURE — 85025 COMPLETE CBC W/AUTO DIFF WBC: CPT

## 2021-11-04 PROCEDURE — 84133 ASSAY OF URINE POTASSIUM: CPT

## 2021-11-04 PROCEDURE — 6360000002 HC RX W HCPCS: Performed by: NURSE PRACTITIONER

## 2021-11-04 PROCEDURE — 2580000003 HC RX 258: Performed by: INTERNAL MEDICINE

## 2021-11-04 PROCEDURE — 97164 PT RE-EVAL EST PLAN CARE: CPT

## 2021-11-04 PROCEDURE — 97530 THERAPEUTIC ACTIVITIES: CPT

## 2021-11-04 PROCEDURE — 97168 OT RE-EVAL EST PLAN CARE: CPT

## 2021-11-04 PROCEDURE — 82330 ASSAY OF CALCIUM: CPT

## 2021-11-04 PROCEDURE — 81001 URINALYSIS AUTO W/SCOPE: CPT

## 2021-11-04 PROCEDURE — 97535 SELF CARE MNGMENT TRAINING: CPT

## 2021-11-04 RX ORDER — SODIUM CHLORIDE 9 MG/ML
INJECTION, SOLUTION INTRAVENOUS PRN
Status: DISCONTINUED | OUTPATIENT
Start: 2021-11-04 | End: 2021-11-07 | Stop reason: HOSPADM

## 2021-11-04 RX ADMIN — Medication 10 ML: at 20:30

## 2021-11-04 RX ADMIN — PANTOPRAZOLE SODIUM 40 MG: 40 TABLET, DELAYED RELEASE ORAL at 06:10

## 2021-11-04 RX ADMIN — ACETAMINOPHEN 650 MG: 325 TABLET ORAL at 22:15

## 2021-11-04 RX ADMIN — DOLUTEGRAVIR SODIUM 50 MG: 50 TABLET, FILM COATED ORAL at 20:30

## 2021-11-04 RX ADMIN — PENICILLIN G BENZATHINE 2.4 MILLION UNITS: 1200000 INJECTION, SUSPENSION INTRAMUSCULAR at 08:33

## 2021-11-04 RX ADMIN — PROPAFENONE HYDROCHLORIDE 150 MG: 150 TABLET, FILM COATED ORAL at 20:28

## 2021-11-04 RX ADMIN — ALBUMIN (HUMAN) 25 G: 0.25 INJECTION, SOLUTION INTRAVENOUS at 03:08

## 2021-11-04 RX ADMIN — PROPAFENONE HYDROCHLORIDE 150 MG: 150 TABLET, FILM COATED ORAL at 06:10

## 2021-11-04 RX ADMIN — ALBUMIN (HUMAN) 25 G: 0.25 INJECTION, SOLUTION INTRAVENOUS at 08:57

## 2021-11-04 RX ADMIN — ABACAVIR 600 MG: 300 TABLET, FILM COATED ORAL at 20:29

## 2021-11-04 RX ADMIN — PROPAFENONE HYDROCHLORIDE 150 MG: 150 TABLET, FILM COATED ORAL at 14:37

## 2021-11-04 RX ADMIN — LAMIVUDINE 300 MG: 100 TABLET, FILM COATED ORAL at 20:29

## 2021-11-04 RX ADMIN — OXYCODONE HYDROCHLORIDE AND ACETAMINOPHEN 500 MG: 500 TABLET ORAL at 08:31

## 2021-11-04 ASSESSMENT — PAIN SCALES - GENERAL
PAINLEVEL_OUTOF10: 0
PAINLEVEL_OUTOF10: 3
PAINLEVEL_OUTOF10: 0

## 2021-11-04 NOTE — PROGRESS NOTES
Department of Internal Medicine  Nephrology Progress Note      Events reviewed. Right HD line removed. SUBJECTIVE:  We are following Mr. Bassam Monsivais for DEISI on CKD. Patient is very upset and frustrated this afternoon. Hgb dropped overnight.     PHYSICAL EXAM:      Vitals:    VITALS:  /82   Pulse 106   Temp 97.4 °F (36.3 °C) (Temporal)   Resp 16   Ht 6' 4\" (1.93 m)   Wt 196 lb 3.4 oz (89 kg)   SpO2 100%   BMI 23.88 kg/m²   24HR INTAKE/OUTPUT:      Intake/Output Summary (Last 24 hours) at 11/4/2021 1411  Last data filed at 11/4/2021 0851  Gross per 24 hour   Intake 422.08 ml   Output 475 ml   Net -52.92 ml        Young cachectic: looking male  Constitutional:  Awake, alert, sitting up in bed  HEENT:  PERRL, normocephalic, atraumatic  Respiratory: diminished lung sounds  Cardiovascular/Edema:  RRR, S1/S2, -edema  Gastrointestinal:  abd flat, soft, non-tender, Perkins in place  Neurologic:  Awake, alert, no focal deficits  Skin:  Warm, dry, no rash or lesions  Other: No LE edema      Scheduled Meds:   lamiVUDine  300 mg Oral Nightly    propafenone  150 mg Oral 3 times per day    abacavir  600 mg Oral Nightly    dolutegravir sodium  50 mg Oral Nightly    [Held by provider] heparin (porcine)  5,000 Units SubCUTAneous Q8H    lidocaine  1 patch TransDERmal Daily    ascorbic acid  500 mg Oral Daily    pantoprazole  40 mg Oral QAM AC    sodium chloride flush  5-40 mL IntraVENous 2 times per day     Continuous Infusions:   sodium chloride      sodium chloride       PRN Meds:.sodium chloride, medicated lip balm, benzonatate, perflutren lipid microspheres, hydrOXYzine, [Held by provider] labetalol, sodium chloride flush, ondansetron **OR** ondansetron, polyethylene glycol, acetaminophen **OR** acetaminophen, sodium chloride    DATA:    CBC:   Lab Results   Component Value Date    WBC 4.8 11/04/2021    RBC 2.16 11/04/2021    HGB 6.8 11/04/2021    HCT 20.5 11/04/2021    MCV 94.9 11/04/2021    MCH 31.5 11/04/2021    MCHC 33.2 11/04/2021    RDW 15.6 11/04/2021     11/04/2021    MPV 11.8 11/04/2021     CMP:    Lab Results   Component Value Date     11/04/2021    K 4.8 11/04/2021    K 4.8 11/04/2021     11/04/2021    CO2 20 11/04/2021    BUN 16 11/04/2021    CREATININE 1.4 11/04/2021    GFRAA >60 11/04/2021    LABGLOM >60 11/04/2021    GLUCOSE 92 11/04/2021    PROT 5.8 11/04/2021    LABALBU 3.2 11/04/2021    CALCIUM 8.1 11/04/2021    BILITOT 0.3 11/04/2021    ALKPHOS 100 11/04/2021    AST 30 11/04/2021    ALT 36 11/04/2021     Magnesium:    Lab Results   Component Value Date    MG 1.7 10/29/2021     Phosphorus:    Lab Results   Component Value Date    PHOS 2.8 10/29/2021        Radiology Review:       Ejection fraction is visually estimated at 55%. There is late positive bubble suggesting possible shunt from pulmonary   origin. Visually moderately dilated left atrium. Normal right ventricular size and function. There is small pericardial effusion without tamponade physiology        CT Chest October 5, 2021   Limited study by the patient's respiratory motion and lack of intravenous   contrast.       Extensive multifocal COVID-19 pneumonia bilaterally.       Bilateral trace layering pleural effusions.       Bilateral gynecomastia.       Abdominal organs inadequately evaluated due to the limitations of the study.       Normal appendix.       No evidence of bowel obstruction.         Kidney ultrasound October 7, 2021   1. Hyperechoic kidneys due to underlying parenchymal disease. 2. Trace bilateral perinephric fluid of indeterminate etiology, potentially   due to underlying inflammation. 3. An approximately 1.1 cm x 1.5 cm x 2.3 cm lesion in the right kidney could   be a cyst but could also be seen with infarction, abscess, or neoplasia.    Consider further evaluation with renal protocol abdomen MRI (preferred) or   CT.  Following resolution of acute kidney injury.         CTA pulmonary with IV contrast October 13, 2021   1. No evidence of pulmonary embolism. 2. Interval increased size of bilateral pleural effusions with little change   in pericardial effusion. 3. Persistent subcutaneous edema. 4. Reactive bilateral hilar lymphadenopathy. 5. Bilateral airspace disease is increased compared to prior examination. The increase is most pronounced within the lower lobes. 6. Bilateral gynecomastia.                 BRIEF SUMMARY OF INITIAL CONSULT:     Briefly, Mr. Virgen Sommers is a 36year old male with no significant PMH who was admitted on October 5, 2021 after he presented from an outside clinic after he was found to be hypoxic during 6 minute walking test. Patient tested positive for Covid 19 on August 18th, he is unvaccinated and had Covid 19 last year as well. Patient states he has quarantined by himself and when his mother recently visited him she was shocked at his appearance as he had lost about 50 pounds in 2 and a half months. On admission labs were significant for sodium of 130, potassium 5.2, bicarbonate 20, BUN 41, creatinine 3.6, magnesium 2.7, calcium 7.7 and proBNP 1,599. We are consulted for DEISI. Problems resolved:    Hyperkalemia, 2/2 DEISI. Low potassium diet  Hypotonic hyponatremia 2/2 intravascular volume from poor oral intake. Bicarb drip started. Sodium levels improving. Low bicarbonate levels with hyperchloremia, most likely NAGMA versus respiratory alkalosis; we need a PH to clarify diagnosis. Awaiting ABG results  High bicarbonate levels, likely metabolic alkalosis 2/2 administration  Hypokalemia, multifactorial, poor intake, probably renal potassium wasting  Severe Hypoalbuminemia, multifactorial, status post albumin administration  DEISI on CKD, volume responsive pre-renal DEISI d/t volume (poor oral intake), urine sodium <20. Resolved.   Edematous state, multifactorial, mainly 2/2 nephrotic syndrome and possibly HFpEF 55%, on bumex  Hyponatremia, multifactorial, including decreased GFR and hemodynamically mediated in the setting of large hypotonic fluid administration (D5 with Bactrim). Sodium levels decrease, stable overnight  Low bicarbonate levels with hyperchloremia, consistent with either hyperchloremic metabolic acidosis versus respiratory alkalosis. Bicarbonate levels improved with bicarbonate drip. HTN, on metoprolol   Probably fungemia, (1, 3) beta-D-glucan positive, on anidulafungin  MSSE bacteremia, on cefepime 2 g every 8 hours  Sinus tachycardia, multifactorial  Hypomagnesemia, 2/2 poor intake   Leukopenia, WBC 1.1  Gross hematuria, status post kidney biopsy. Resolved    IMPRESSION/RECOMMENDATIONS:     Recurrent DEISI on CKD, ATN contrast associated DEISI versus ischemic ATN (hypotension related tachycardia). Nonoliguric. Started on renal replacement therapy on October 25, 2021. Renal function continues to improve daily, dialysis catheter has been removed. 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 Charmaine Silva Work-up so far HIV positive, Hepatitis C positive, MARILEE negative, C4 normal, C3 88 (low), UPEP without monoclonal gammopathy, negative cryoglobulins. Kidney ultrasound with hyperechoic kidneys. Status post kidney biopsy October 27, 2021  Low bicarbonate level, will obtain urine electrolytes and urinalysis to calculate urine anion gap  Probably HFpEF 55%, proBNP 11,401 > 3421   Right kidney lesion, likely a cyst but cannot be ruled out other pathology including infarction, abscess, neoplasm. We will proceed with MRI when renal function improved  HIV positive, CD4 count 43, started on OLMSTEAD therapy    ---------------------------------------------------------------------    Anemia with acute drop in Hgb 6.6, transfuse 1 unit PRBCs  Possible pneumocystic pneumonia, on pentamidine  Possibly acute retroviral syndrome Jarische-Herxheimer reaction?   Hepatitis C antibody positive, viral load not detected  Covid 19 infection in non vaccinated pt  Normocytic anemia, multifactorial, acute drop in hgb 6.8, for transfusion  Tachycardia ?anxiety      Plan:    Await kidney biopsy results  Continue to monitor renal function for recovery daily  Monitor H&H, transfuse <7, for transfusion today  Monitor bicarbonate levels  Obtain urine electrolytes  Obtain urinalysis       Case and plan discussed with   Electronically signed by RICKY Clark CNP on 11/4/2021 at 2:11 PM
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
## 2021-11-04 NOTE — PLAN OF CARE
Problem: Airway Clearance - Ineffective  Goal: Achieve or maintain patent airway  Outcome: Met This Shift     Problem: Gas Exchange - Impaired  Goal: Absence of hypoxia  Outcome: Met This Shift  Goal: Promote optimal lung function  Outcome: Met This Shift     Problem: Breathing Pattern - Ineffective  Goal: Ability to achieve and maintain a regular respiratory rate  Outcome: Met This Shift     Problem:  Body Temperature -  Risk of, Imbalanced  Goal: Ability to maintain a body temperature within defined limits  Outcome: Met This Shift  Goal: Will regain or maintain usual level of consciousness  Outcome: Met This Shift  Goal: Complications related to the disease process, condition or treatment will be avoided or minimized  Outcome: Met This Shift     Problem: Isolation Precautions - Risk of Spread of Infection  Goal: Prevent transmission of infection  Outcome: Met This Shift     Problem: Nutrition Deficits  Goal: Optimize nutritional status  Outcome: Met This Shift     Problem: Risk for Fluid Volume Deficit  Goal: Maintain normal heart rhythm  Outcome: Met This Shift  Goal: Maintain absence of muscle cramping  Outcome: Met This Shift  Goal: Maintain normal serum potassium, sodium, calcium, phosphorus, and pH  Outcome: Met This Shift     Problem: Loneliness or Risk for Loneliness  Goal: Demonstrate positive use of time alone when socialization is not possible  Outcome: Met This Shift     Problem: Fatigue  Goal: Verbalize increase energy and improved vitality  Outcome: Met This Shift     Problem: Patient Education: Go to Patient Education Activity  Goal: Patient/Family Education  Outcome: Met This Shift     Problem: Falls - Risk of:  Goal: Will remain free from falls  Description: Will remain free from falls  Outcome: Met This Shift  Goal: Absence of physical injury  Description: Absence of physical injury  Outcome: Met This Shift     Problem: Pain:  Goal: Pain level will decrease  Description: Pain level will decrease  Outcome: Met This Shift  Goal: Control of acute pain  Description: Control of acute pain  Outcome: Met This Shift  Goal: Control of chronic pain  Description: Control of chronic pain  Outcome: Met This Shift     Problem: Skin Integrity:  Goal: Will show no infection signs and symptoms  Description: Will show no infection signs and symptoms  Outcome: Met This Shift  Goal: Absence of new skin breakdown  Description: Absence of new skin breakdown  Outcome: Met This Shift

## 2021-11-04 NOTE — PROGRESS NOTES
1 unit of blood ordered, nurse spiked through bag, blood bank notified, new order placed for another unit.     Carmelina Rhodes RN

## 2021-11-04 NOTE — PROGRESS NOTES
Comprehensive Nutrition Assessment    Type and Reason for Visit:  Reassess    Nutrition Recommendations/Plan: Continue Diet, d/c dry trays once med appropriate. Will Continue Current ONS. Nutrition Assessment:  Pt continues to be at nutritional risk d/t sporadic decreased intake of meals since adm; COVID19+ s/p bronch 10/15, DEISI/CKD on Temp HD (currently held), Renal Lesion s/p Bx 10/27, HIV/Hep C, MSSA bacteremia. Remains at risk d/t subjective wt loss and poor intake pta. Will continue ONS and monitor. Malnutrition Assessment:  Malnutrition Status: At risk for malnutrition (Comment)    Context:  Acute Illness     Findings of the 6 clinical characteristics of malnutrition:  Energy Intake:  1 - 75% or less of estimated energy requirements for 7 or more days  Weight Loss:  Unable to assess (d/t lack of weight history ; subjective weight loss noted although no evidence of this)     Body Fat Loss:  Unable to assess (d/t COVID isolation)     Muscle Mass Loss:  Unable to assess (d/t COVID isolation)    Fluid Accumulation:  No significant fluid accumulation     Strength:  Not Performed    Estimated Daily Nutrient Needs:  Energy (kcal):  MSJx1. 2SF= 0046-6286; Weight Used for Energy Requirements:  Current     Protein (g):  1.2-1.4 gm/kg= 110-130; Weight Used for Protein Requirements:  Current        Fluid (ml/day):  per renal mgmt; Method Used for Fluid Requirements:  Standard Renal      Nutrition Related Findings:  A&O, dentition WNL, Abd/BS WDL, no edema, I/O's WNL      Wounds:  Surgical Incision       Current Nutrition Therapies:    ADULT DIET; Regular; No Fluids on Tray  Adult Oral Nutrition Supplement;  Fortified Pudding Oral Supplement  Adult Oral Nutrition Supplement; Frozen Oral Supplement    Anthropometric Measures:  · Height: 6' 4\" (193 cm)  · Current Body Weight: 196 lb (88.9 kg) (bed 10/28)   · Admission Body Weight: 171 lb (77.6 kg) (10/5, no method)    · Usual Body Weight:  (UTO ; EMR shows only one weight recorded of 171# no method on 10/5/21 ; subjective weight loss of 50# in the past 2-3 months although no evidence of this)     · Ideal Body Weight: 202 lbs; % Ideal Body Weight 98.5 %   · BMI: 23.9  · Adjusted Body Weight:  ; No Adjustment   · Adjusted BMI:      · BMI Categories: Normal Weight (BMI 18.5-24. 9)       Nutrition Diagnosis:   · Inadequate oral intake related to catabolic illness (2/2 HIV w/ COVID19+) as evidenced by intake 51-75%, poor intake prior to admission, weight loss      Nutrition Interventions:   Food and/or Nutrient Delivery:  Continue Current Diet, Continue Oral Nutrition Supplement (Continue Diet, d/c dry trays once med appropriate. Will Continue Current ONS.)  Nutrition Education/Counseling:  Education not indicated   Coordination of Nutrition Care:  Continue to monitor while inpatient    Goals:  PO intake >75% of meals/ONS. Nutrition Monitoring and Evaluation:   Behavioral-Environmental Outcomes:  None Identified   Food/Nutrient Intake Outcomes:  Food and Nutrient Intake, Supplement Intake  Physical Signs/Symptoms Outcomes:  Biochemical Data, GI Status, Nausea or Vomiting, Fluid Status or Edema, Hemodynamic Status, Skin, Nutrition Focused Physical Findings, Weight     Discharge Planning:     Too soon to determine     Electronically signed by Cora Rojas RD, LD on 11/4/21 at 1:12 PM EDT    Contact: ext [phone]

## 2021-11-04 NOTE — PROGRESS NOTES
[address] [place] [address] Attending    S: 36 y.o. male with history of asthma and smoking history presented with increasing dyspnea and cough over past month. On presentation had fever of 103. Has tested positive for COVID twice, most recently end of August.  CT shows extensive bilateral pneumonia and COVID testing again positive. HIV pos, CD4 43. Early AM 10/23,  RRT called due to tachycardia and oxygen desaturation requiring oxygen from 3L NC to 15LNRB mask. Patient had been febrile overnight as well and transferred to MICU. Improved and transferred back to floor. Treatment initiated for HIV, but noted to have mild pancytopenia and thrombocytopenia to less than 50. Renal failure developed. Required temporary dialysis. Currently off dialysis, with improving renal function. Right flank discomfort resolved  Received transfusion overnight      O: VS- Blood pressure 127/82, pulse 106, temperature 97.4 °F (36.3 °C), temperature source Temporal, resp. rate 16, height 6' 4\" (1.93 m), weight 196 lb 3.4 oz (89 kg), SpO2 100 %. Exam is as noted by resident   Awake, alert and oriented. Heart - tachycardic  Lungs- clear breath sounds bilaterally. No abdominal masses or tenderness  No palpable abnormalities of right flank area where he is feeling the discomfort. Ext - no edema. Impressions:  Principal Problem:    Acute respiratory failure with hypoxia (HCC)  Active Problems:    Acute kidney injury due to COVID-19 ([facility]    Asthma     COVID-19    Symptomatic HIV infection ([facility]    Pancytopenia (HCC)  Platelet count improving  Creatinine improved from 2.8-2.1-1.9  Hemoglobin not steadily improved yet      Plan   Receiving counseling from Psychology    Appreciate Nephrology, ID, MICU, cardiology, gen surgery, H/Onc, pulmonology.   Covid pneumonia/PCP pneumonia with sepsis   New diagnosis of HIV, ID managing antibiotics and antivirals for HIV, currently on lamivudine/abacavir/dolutegravir   Per ID pentamidine 4mg/kg IV q48H on dialysis to finish 21 days until 11/2. RPR positive. S/p LP. VDRL CSF negative. Continue benzathine PCN IM weekly x 3 doses, soon to be finished    Sinus tachycardia - Echo with EF 55%, small pericardial effusion and possible atrial shunt. EKG suspicious for WPW like syndrome, although not meeting all criteria. EP has seen. On propafenone. Will follow as outpatient  Hemodialysis held  Creatinine improving  Platelets improving since heparin is discontinued          Attending Physician Statement  I have reviewed the chart and seen the patient with the resident(s). I personally reviewed images, EKG's and similar tests, if present. I personally reviewed and performed key elements of the history and exam.  I have reviewed and confirmed student and/or resident history and exam with changes as indicated above. I agree with the assessment, plan and orders as documented by the resident. Please refer to the resident and/or student note for additional information.       Emily Langford MD

## 2021-11-04 NOTE — PROGRESS NOTES
Physical Therapy  Physical Therapy Reassessment    Name: Malachi Brown  : 1990  MRN: [id number]      Date of Service: 2021    Evaluating PT: Ana Puente PT, DPT [id number]      Room #:  6927/1829-Z  Diagnosis:  Acute respiratory failure with hypoxia ([facility] [J96.01]  COVID-19 [U07.1]  PMHx/PSHx:  Asthma  Procedure/Surgery:  Bronch (10/15), lumbar puncture (10/21), renal biopsy (10/27)  Precautions:  Fall risk, Droplet Plus Isolation (COVID-19)    SUBJECTIVE:    Pt lives with brother but plans on staying with his parents at discharge. Parents live in a single story house. Pt ambulated without AD prior to admission. Reason for reassessment: Decline in medical status/transfer to ICU    OBJECTIVE:   Reassessment  Date: 21 Treatment Date: Short Term/ Long Term   Goals   AM-PAC 6 Clicks /     Was pt agreeable to Eval/treatment? Yes     Does pt have pain? No current complaints of pain     Bed Mobility  Rolling: NT  Supine to sit: Independent   Sit to supine: NT  Scooting: Independent   NA   Transfers Sit to stand: Supervision  Stand to sit: Supervision  Stand pivot: Supervision without AD  Sit to stand: Independent   Stand to sit: Independent   Stand pivot: Independent    Ambulation   25, 25, 50 feet without AD with Supervision  400 feet Independent    Stair negotiation: ascended and descended NT  12 step(s) with 1 rail(s) Mod Independent    ROM BUE: Refer to OT note  BLE: WFL     Strength BUE: Refer to OT note  BLE: NT     Balance Sitting EOB: Independent   Dynamic Standing: Supervision without AD  Dynamic Standing: Independent      Pt is A & O x: 4 to person, place, month/year, and situation. Sensation: Pt denies numbness and tingling of extremities. Edema: Unremarkable. Therapeutic Exercises:  5x STS from chair    Patient education  Pt educated on PT role in acute care setting.     Patient response to education:   Pt verbalized understanding Pt demonstrated skill Pt requires further education in this area   Yes NA No     ASSESSMENT:    Conditions Requiring Skilled Therapeutic Intervention:    [x]Decreased strength     []Decreased ROM  [x]Decreased functional mobility  []Decreased balance   [x]Decreased endurance   []Decreased posture  []Decreased sensation  []Decreased coordination   []Decreased vision  []Decreased safety awareness   []Increased pain       Comments:    Pt was in bed upon room entry, agreeable to PT evaluation. Pt is very pleasant and cooperative. Pt ambulated several times within room without AD. Gait was steady with normal speed. Pt completed transfers from commode and ambulated around room/back to chair. Pt completed 5x STS exercise as noted above. Pt was left in chair with all needs met at conclusion of session. Treatment:  Patient practiced and was instructed in the following treatment:     Therapeutic activities:  o Transfers: Pt completed multiple transfers from surfaces of varying heights (EOB x2, commode x1, chair x6). o Ambulation: Pt ambulated x2 reps without AD as endurance was challenged. o Education: Pt was educated on normal vital sign ranges.  Therapeutic exercise: Pt completed 5x STS exercise as noted above. Pt's/family goals:  1. To return home. Prognosis is Good for reaching above PT goals. Patient and or family understand(s) diagnosis, prognosis, and plan of care. Yes. PHYSICAL THERAPY PLAN OF CARE:    PT POC is established based on physician order and patient diagnosis     Referring provider/PT Order:    Start   Ordering Provider    11/04/21 0915  PT eval and treat Start: 11/04/21 0915, End: 11/04/21 0915, ONE TIME, Standing Count: 1 Occurrences, R      William Shepherd MD        Diagnosis:  Acute respiratory failure with hypoxia ([facility] [J96.01]  COVID-19 [U07.1]  Specific instructions for next treatment:  Increase ambulation distance.     Current Treatment Recommendations:     [x] Strengthening to improve independence with functional mobility   [] ROM to improve independence with functional mobility   [x] Balance Training to improve static/dynamic balance and to reduce fall risk  [x] Endurance Training to improve activity tolerance during functional mobility   [x] Transfer Training to improve safety and independence with all functional transfers   [x] Gait Training to improve gait mechanics, endurance and assess need for appropriate assistive device  [x] Stair Training in preparation for safe discharge home and/or into the community   [] Positioning to prevent skin breakdown and contractures  [x] Safety and Education Training   [] Patient/Caregiver Education   [] HEP  [] Other     PT long term treatment goals are located in above grid    Frequency of treatments: 2-5x/week x 1-2 days. Time in  1110  Time out  1135    Total Treatment Time  10 minutes     Evaluation Time includes thorough review of current medical information, gathering information on past medical history/social history and prior level of function, completion of standardized testing/informal observation of tasks, assessment of data and education on plan of care and goals.     CPT codes:  [] Low Complexity PT evaluation 31220  [] Moderate Complexity PT evaluation 35745  [] High Complexity PT evaluation 85555  [x] PT Re-evaluation 37614  [] Gait training 37315 0 minutes  [] Manual therapy 16823 0 minutes  [x] Therapeutic activities 66203 10 minutes  [] Therapeutic exercises 13591 0 minutes  [] Neuromuscular reeducation 01657 0 minutes     Chucky Leach, PT, DPT  [id number]

## 2021-11-04 NOTE — PROGRESS NOTES
[facility] PROGRESS NOTE      Chief complaint: Follow-up of Pneumocystis pneumonia, late latent syphilis, advanced HIV disease    The patient is a 36 y.o. male, not vaccinated against COVID-19, with history of asthma, presented on 10/05 with shortness of breath, found to be febrile at 103 °F, positive SARS-CoV-2 PCR, bilateral groundglass opacities on chest CT, tolerating room air with oxygen saturation of 97%. He reports having tested positive for SARS-CoV-2 for the 3rd time now with previous on in late August at which time he reports having quarantined. Urine Streptococcus pneumonia and Legionella antigens were negative. Blood cultures from 10/05 and 10/06 showed methicillin-susceptible Staphylococcus epidermidis in 1 out of 2 sets. HIV screen was positive with viral load of 195,000, GART and INSTI resistance unremarkable. CD4 count was low at 43. RPR was reactive at titer of 1:2. Toxoplasma Ab was negative. HLA- was negative. Serum beta-d-glucan was positive. Transthoracic echocardiogram showed late positive bubble suggesting possible shunt from pulmonary origin, moderately dilated left atrium, small pericardial effusion without tamponade physiology, ejection fraction visually estimated at 55%. He underwent bronchoscopy on 10/15 during which normal endobronchial evaluation and normal anatomy were noted. BAL Gram stain and culture showed rare polymorphonuclear leukocytes, rare epithelial cells, rare gram-negative rods, rare gram-positive rods, reduced oropharyngeal liana. BAL pneumocystis stain was negative. BAL galactomannan was negative. Serum Cryptococcus antigen was negative. He underwent lumbar puncture on 10/21, yielding CSF with normal glucose, protein, csll count with differential. CSF meningitis/encephalitis PCR panel was negative for HSV, VZV, CMV, Enterovirus, HHV-6, Streptococcus pneumoniae and agalactiae, E coli, Haemophilus, Neisseria meninigitidis, Cryptococcus. CSF VDRL was negative.  He was transferred to MICU on 10/23 for worsening shortness of breath then transferred out on 10/26. He underwent renal biopsy on 10/27. Subjective: Patient was seen and examined. No chills, no abdominal pain, no diarrhea, no rash, no itching. Afebrile. He reports feeling well with no hypoxia even on ambulation. He reports no lightheadedness. Objective:  /82   Pulse 106   Temp 97.4 °F (36.3 °C) (Temporal)   Resp 16   Ht 6' 4\" (1.93 m)   Wt 196 lb 3.4 oz (89 kg)   SpO2 100%   BMI 23.88 kg/m²   Constitutional: Alert, not in distress  Respiratory: Clear breath sounds, no crackles, no wheezes  Cardiovascular: Regular rate and rhythm, no murmurs  Gastrointestinal: Bowel sounds present, soft, nontender  Skin: Warm and dry, no active dermatoses  Musculoskeletal: No joint swelling, no joint erythema    Labs, imaging, and medical records/notes were personally reviewed. Assessment:  Pancytopenia, multifactorial, suspect associated with acute retroviral syndrome   COVID-19, mild  Pneumonia, suspected Pneumocystis pneumonia (serum beta-d-glucan was positive at >500 pg/mL, BAL Pneumocystis stain was negative but Pneumocystis PCR was not sent)  Late latent syphilis, neurosyphilis ruled out. Received 3 weekly benzathine penicillin. Methicillin susceptible Staphylococcus epidermidis bacteremia, etiology unclear  DEISI, resolved  Advanced HIV disease (viral load of 195,000; CD4 of 43 as of 10/11/2021). Hepatitis C Ab positive (viral load not detected). Hepatitis A immune  Prolonged QTc  HAP  Hematuria, probably associated with renal biopsy    Recommendations:  Continue lamivudine at 300 mg q24h, dolutegravir 50 mg q24h, abacavir 600 mg q24h. Switch to single pill combination Triumeq, if available. Follow up with me in 2 weeks after discharge. Consider imaging to check for retroperitoneal bleeding in the setting of recent renal biopsy. Continue supportive care. Case was discussed with Dr. Amanda Graham.     Thank you for involving me in the care of Kristin James. I will continue to follow. Please do not hesitate to call for any questions or concerns.     Electronically signed by Claudine Pittman MD on 11/4/2021 at 10:42 AM

## 2021-11-04 NOTE — PROGRESS NOTES
[place]  Department of Internal Medicine  Division of Pulmonary, Critical Care and Sleep Medicine  Progress Note    Asa Altamirano DO, MPH, [facility], FACOI, FACP  Jelani Dolan MD, [facility]  [facility]      Patient: Cheli Rhodes  MRN: [id number]  : 1990    Encounter Time: 6:43 PM     Date of Admission: 10/5/2021  3:07 PM    Primary Care Physician: Fabienne Willson MD    Reason for Consultation: Allyn Neigh:    No SOB    OBJECTIVE:     PHYSICAL EXAM:   VITALS:   Vitals:    21 0503 21 0612 21 0845 21 1430   BP: 133/87 133/88 127/82 (!) 145/87   Pulse: 99 114 106 115   Resp: 18 16 16 18   Temp: 98.1 °F (36.7 °C) 98.2 °F (36.8 °C) 97.4 °F (36.3 °C) 97.5 °F (36.4 °C)   TempSrc: Oral Oral Temporal Temporal   SpO2: 99% 99% 100% 98%   Weight:       Height:            Intake/Output Summary (Last 24 hours) at 2021 1843  Last data filed at 2021 0851  Gross per 24 hour   Intake 422.08 ml   Output 475 ml   Net -52.92 ml        CONSTITUTIONAL:   A&O x 3, NAD  SKIN:     No rash,   HEENT:     EOMI, MMM, No thrush  NECK:    No bruits, No JVP apprechiated  CV:      Sinus,  No murmur, No rubs, No gallops  PULMONARY:   Couse BS,  No Wheezing, No Rales, No Rhonchi      No noted egophony  ABDOMEN:     Soft, non-tender. BS normal. No R/R/G  EXT:    No deformities . No clubbing.       + lower extremity edema, No venous stasis  PULSE:   Appears equal and palpable.   PSYCHIATRIC:  Seems appropriate, No acute psycosis  MS:    No fractures, No gross weakness  NEUROLOGIC:   Non-focal     DATA: IMAGING & TESTING:     LABORATORY TESTS:    CBC:   Lab Results   Component Value Date    WBC 4.8 2021    RBC 2.16 2021    HGB 6.8 2021    HCT 20.5 2021    MCV 94.9 2021    MCH 31.5 2021    MCHC 33.2 2021    RDW 15.6 2021     2021    MPV 11.8 2021     CMP:    Lab Results   Component Value Date     11/04/2021    K 4.8 11/04/2021    K 4.8 11/04/2021     11/04/2021    CO2 20 11/04/2021    BUN 16 11/04/2021    CREATININE 1.4 11/04/2021    GFRAA >60 11/04/2021    LABGLOM >60 11/04/2021    GLUCOSE 92 11/04/2021    PROT 5.8 11/04/2021    LABALBU 3.2 11/04/2021    CALCIUM 8.1 11/04/2021    BILITOT 0.3 11/04/2021    ALKPHOS 100 11/04/2021    AST 30 11/04/2021    ALT 36 11/04/2021     Magnesium:    Lab Results   Component Value Date    MG 1.7 10/29/2021     Phosphorus:    Lab Results   Component Value Date    PHOS 2.8 10/29/2021     PT/INR:    Lab Results   Component Value Date    PROTIME 17.7 10/15/2021    INR 1.6 10/15/2021     FERRITIN:    Lab Results   Component Value Date    FERRITIN 3,921 10/06/2021     Fibrinogen Level:  No components found for: FIB     PRO-BNP:   Lab Results   Component Value Date    PROBNP 3,421 (H) 10/18/2021    PROBNP 11,401 (H) 10/13/2021      ABGs:   Lab Results   Component Value Date    PH 7.479 10/24/2021    PO2 306.9 10/24/2021    PCO2 26.1 10/24/2021     Hemoglobin A1C: No components found for: HGBA1C    IMAGING:  Imaging tests were completed and reviewed and discussed radiology and care team involved and reveals     CT CHEST : There is adequate opacification of the pulmonary arteries.  There is no   evidence of filling defect to suggest pulmonary embolism. Calleen Coy is no   evidence of thoracic aortic aneurysm or dissection.  There are small   bilateral pleural effusions.  These are increased compared to prior   examination.  There is small pericardial effusion which appears stable.  Left   hilar lymph node measures approximately 12 mm in short axis.  Right hilar   lymph node measures approximately 12 mm in short axis.  There is bilateral   gynecomastia. Calleen Coy is diffuse subcutaneous edema.      The central airways are patent.  There is no pneumothorax.  There are   bilateral ground-glass opacities and alveolar consolidation.  There has been   interval increase in the airspace disease compared to prior examination.  The   interval worsening is most pronounced within the lower lobes. Joya Gosselin is   associated interstitial thickening. CT ABDOMEN PELVIS WO CONTRAST Additional Contrast? None    Result Date: 10/5/2021  FINDINGS: THE STUDY IS LIMITED DUE TO LACK OF INTRAVENOUS CONTRAST. Chest: Mediastinum: No thoracic aortic aneurysm. No definite adenopathy on this unenhanced study. Trace pericardial fluid anteriorly. Lungs/pleura: Trace layering pleural effusions bilaterally, slightly larger on the right. No pneumothorax. Numerous ground-glass densities in the bilateral upper and to a lesser degree lower lungs, in keeping with known COVID-19 pneumonia. Soft Tissues/Bones: Bilateral gynecomastia. No acute osseous abnormality in the thoracic cage. Abdomen and pelvis: The study is degraded by the patient's respiratory motion. Organs: Abdominal organs inadequately evaluated on this motion degraded and unenhanced study. No hydronephrosis on either side. GI/Bowel: Normal appendix. No evidence of bowel obstruction. Pelvis: Normal sized prostate. Urinary bladder grossly intact. Peritoneum/Retroperitoneum: No free air or free fluid. No bulky adenopathy. No abdominal aortic aneurysm. Bones/Soft Tissues: No lytic or sclerotic lesion in the regional skeleton. Limited study by the patient's respiratory motion and lack of intravenous contrast. Extensive multifocal COVID-19 pneumonia bilaterally. Bilateral trace layering pleural effusions. Bilateral gynecomastia. Abdominal organs inadequately evaluated due to the limitations of the study. Normal appendix. No evidence of bowel obstruction. CT CHEST WO CONTRAST    Result Date: 10/5/2021  FINDINGS: THE STUDY IS LIMITED DUE TO LACK OF INTRAVENOUS CONTRAST. Chest: Mediastinum: No thoracic aortic aneurysm. No definite adenopathy on this unenhanced study. Trace pericardial fluid anteriorly.  Lungs/pleura: Trace layering pleural effusions bilaterally, slightly larger on the right. No pneumothorax. Numerous ground-glass densities in the bilateral upper and to a lesser degree lower lungs, in keeping with known COVID-19 pneumonia. Soft Tissues/Bones: Bilateral gynecomastia. No acute osseous abnormality in the thoracic cage. Abdomen and pelvis: The study is degraded by the patient's respiratory motion. Organs: Abdominal organs inadequately evaluated on this motion degraded and unenhanced study. No hydronephrosis on either side. GI/Bowel: Normal appendix. No evidence of bowel obstruction. Pelvis: Normal sized prostate. Urinary bladder grossly intact. Peritoneum/Retroperitoneum: No free air or free fluid. No bulky adenopathy. No abdominal aortic aneurysm. Bones/Soft Tissues: No lytic or sclerotic lesion in the regional skeleton. Limited study by the patient's respiratory motion and lack of intravenous contrast. Extensive multifocal COVID-19 pneumonia bilaterally. Bilateral trace layering pleural effusions. Bilateral gynecomastia. Abdominal organs inadequately evaluated due to the limitations of the study. Normal appendix. No evidence of bowel obstruction. ECHOCARDIOGRAM:  Ejection fraction is visually estimated at 55%. There is late positive bubble suggesting possible shunt from pulmonary   origin. Visually moderately dilated left atrium. Normal right ventricular size and function. There is small pericardial effusion without tamponade physiology    Assessment:  Mr. Nadia Saldana is a 36year old male with no significant PMH who was admitted on October 5, 2021 after he presented from an outside clinic after he was found to be hypoxic during 6 minute walking test. Patient tested positive for Covid 19 on August 18th, he is unvaccinated and had Covid 19 last year as well.  Patient states he has quarantined by himself and when his mother recently visited him she was shocked at his appearance as he had lost about 50 pounds in 2 and a half months  Sepsis  COVID-19, mild  HIV   WPW AP   Pneumonia  Gram-positive cocci in clusters bacteremia, etiology unclear  DEISI  + RPR  + BNP  Ejection fraction is visually estimated at 55%. There is late positive bubble suggesting possible shunt from pulmonary   origin. Visually moderately dilated left atrium. Normal right ventricular size and function.    There is small pericardial effusion without tamponade physiology      Plan:   Off oxygen  Will follow           Paul Polk DO DO, MPH, [facility]  Professor of Internal Medicine  Pulmonary, Critical Care and Sleep Medicine

## 2021-11-04 NOTE — PROGRESS NOTES
[facility] Inpatient   Resident Progress Note    S:  Hospital day: 30   Brief Synopsis: Graeme Sanchez is a 36 y.o. male with no PMH who presented with hypoxia and fever. He was initially seen in PCP office and was hypoxic on exertion with desaturate into the mid low 80s as well as noted to be febrile and tachycardic. Per patient, his symptoms began 8/15/2021 and he tested positive for Covid 8/28. endorse worsening shortness of breath over the past month with sputum production, intermittent chest discomfort, persistent diarrhea, chills, fever, decrease in appetite and 50 pound weight loss since August.  Patient also endorses significantly worsening \"neuropathy\" of bilateral feet, states it feels like he is \"being stabbed by needles\" which is causing him inability to walk due to pain. In the ED, he was found to be anemic, hyperkalemic, hyponatremic, febrile and COVID positive. He also had elevated Creatinine, AST, proBNP and troponin x2. CTA showed extensive multifocal COVID-19 pneumonia bilaterally, bilateral trace layering pleural effusion and bilateral gynecomastia. CT abdomen was limited due to movement and non-contrast, but showed normal appendix and no evidence of bowel obstruction. Admitted for acute hypoxia due to Covid pneumonia. Nephro, pulm and ID consulted. Started on bicarb drip. US showed hyperechoic kidneys, trace b/l perinephric fluid and right kidney lesion (Cyst vs infarction vs abscess vs neoplasia). HIV and Hep C screen were positive. HIV RNA and T and B lymphocytes were ordered. Patient became more hypoxic, tachycardic and hypertensive so  there was concern for PE. CTA was ordered, which came back negative. Echo was completed due to elevated BNP and concern for PE and that revealed a normal EF with late bubble sign possible shunting of pulmonary origin. BNP continued to increase. Cardiology was consulted. Repeat Echo pending.  ID recommends LP with CSF sent for glucose, protein, cell count with differential, which were all  negative. meningitis/encephalitis PCR panel negative. RRT called 10/23 for hypoxia and tachycardia, transferred to medical intensive on 15 L NRB. Creatinine up trending. Currently saturating well on room air and was transferred out of the ICU. Hemodialysis x4 days for worsening renal function. CSF VDRL negative, but continue to treat for latent syphilis. Renal biopsy performed on 10/27. Endorses hematuria s/p biopsy. CT abdomen showed no acute abnormalities. Patients platelets and creatinine improving, hemodialysis on hold at this point. 11/2/2021 - H/H 6.6, patient received 1 unit PRBC. H&H stabilized for 1 day above 7, 11/4/2021, patient hemoglobin dropped again to 6.8, requiring another PRBC transfusion. Patient seen and examined this AM.  This morning patient's hemoglobin dropped to 6.8. Requiring another PRBC transfusion. Patient concerned about mishap with PRBC transfusion this a.m. (possible spike through bag, reattempt to transfuse, difficulty finding veins or transfusion). Advised patient that everything is under control at this time and that we are currently working on stabilizing his hemoglobin levels. Platelets and creatinine trending in the right direction. HIV treatment including lamivudine, dolutegravir, and abacavir ; try to switch to one pill format : Triumeq  To receive his last dose of penicillin. No other concerns at this time.      Cont meds:    sodium chloride      sodium chloride       Scheduled meds:    lamiVUDine  300 mg Oral Nightly    propafenone  150 mg Oral 3 times per day    abacavir  600 mg Oral Nightly    dolutegravir sodium  50 mg Oral Nightly    [Held by provider] heparin (porcine)  5,000 Units SubCUTAneous Q8H    lidocaine  1 patch TransDERmal Daily    ascorbic acid  500 mg Oral Daily    pantoprazole  40 mg Oral QAM AC    sodium chloride flush  5-40 mL IntraVENous 2 times per day     PRN meds: sodium chloride, medicated lip balm, benzonatate, perflutren lipid microspheres, hydrOXYzine, [Held by provider] labetalol, sodium chloride flush, ondansetron **OR** ondansetron, polyethylene glycol, acetaminophen **OR** acetaminophen, sodium chloride     I reviewed the patient's past medical and surgical history, Medications and Allergies. O:  /82   Pulse 106   Temp 97.4 °F (36.3 °C) (Temporal)   Resp 16   Ht 6' 4\" (1.93 m)   Wt 196 lb 3.4 oz (89 kg)   SpO2 100%   BMI 23.88 kg/m²   24 hour I&O: No intake/output data recorded. I/O this shift: In: 422.1 [Blood:422.1]  Out: 475 [Urine:475]        Physical Exam  Vitals reviewed. Constitutional:       General: He is not in acute distress. Appearance: Normal appearance. He is not ill-appearing, toxic-appearing or diaphoretic. HENT:      Head: Normocephalic and atraumatic. Nose: No rhinorrhea. Mouth/Throat:      Mouth: Mucous membranes are moist.      Pharynx: Oropharynx is clear. Eyes:      General: No scleral icterus. Right eye: No discharge. Left eye: No discharge. Extraocular Movements: Extraocular movements intact. Cardiovascular:      Rate and Rhythm: Regular rhythm. Tachycardia present. Pulses: Normal pulses. Heart sounds: Normal heart sounds. No murmur heard. No friction rub. No gallop. Pulmonary:      Effort: No respiratory distress. Breath sounds: No wheezing, rhonchi or rales. Abdominal:      General: Abdomen is flat. Palpations: Abdomen is soft. Tenderness: There is no abdominal tenderness. Musculoskeletal:         General: No swelling. Cervical back: Normal range of motion. No rigidity or tenderness. Lumbar back: No swelling or spasms. Normal range of motion. Skin:     General: Skin is warm and dry. Coloration: Skin is not jaundiced. Findings: No bruising or rash. Neurological:      General: No focal deficit present.       Mental Status: He is alert and oriented to person, place, and time. Cranial Nerves: No cranial nerve deficit. Motor: No weakness. Psychiatric:         Behavior: Behavior normal.         Thought Content: Thought content normal.       Labs:  Na/K/Cl/CO2:  139/4.8, 4.8/110/20 (11/04 8290)  BUN/Cr/glu/ALT/AST/amyl/lip:  16/1.4/--/36/30/--/-- (11/04 0352)  WBC/Hgb/Hct/Plts:  4.8/6.8/20.5/113 (11/04 0352)  estimated creatinine clearance is 96 mL/min (A) (based on SCr of 1.4 mg/dL (H)). Other pertinent labs as noted below    Radiology:  CT ABDOMEN PELVIS WO CONTRAST Additional Contrast? None   Final Result   Post right renal biopsy there is expected sequela of recent biopsy, no   significant hemorrhage identified                                        CT BIOPSY RENAL   Final Result   Successful uncomplicated CT and fluoroscopic guided right   renal biopsy. If there are any physician concerns regarding this report, a   Radiologist can be reached by calling the following number [phone]. CT GUIDED NEEDLE PLACEMENT   Final Result   Successful uncomplicated CT and fluoroscopic guided right   renal biopsy. If there are any physician concerns regarding this report, a   Radiologist can be reached by calling the following number [phone]. XR CHEST PORTABLE   Final Result   Mild worsening of extensive bilateral infiltrates         XR CHEST PORTABLE   Final Result   Increasing bilateral airspace disease suggesting worsening pneumonia. FL LUMBAR PUNCTURE DIAG   Final Result   Successful fluoroscopic-guided lumbar puncture. XR CHEST PORTABLE   Final Result   Extensive bilateral patchy areas of ground-glass opacity concerning for   atypical pneumonia or viral airway disease, improved         XR CHEST PORTABLE   Final Result   Persistent bilateral infiltrates. No significant changes since October 13. See above comments. See report CT chest October 13.          CTA PULMONARY W CONTRAST   Final Result   1. No evidence of pulmonary embolism. 2. Interval increased size of bilateral pleural effusions with little change   in pericardial effusion. 3. Persistent subcutaneous edema. 4. Reactive bilateral hilar lymphadenopathy. 5. Bilateral airspace disease is increased compared to prior examination. The increase is most pronounced within the lower lobes. 6. Bilateral gynecomastia. XR CHEST PORTABLE   Final Result   Development of extensive bilateral pulmonary infiltrates         XR CHEST PORTABLE   Final Result   No acute process. US RETROPERITONEAL COMPLETE   Final Result   1. Hyperechoic kidneys due to underlying parenchymal disease. 2. Trace bilateral perinephric fluid of indeterminate etiology, potentially   due to underlying inflammation. 3. An approximately 1.1 cm x 1.5 cm x 2.3 cm lesion in the right kidney could   be a cyst but could also be seen with infarction, abscess, or neoplasia. Consider further evaluation with renal protocol abdomen MRI (preferred) or   CT. Following resolution of acute kidney injury. XR CHEST PORTABLE   Final Result   Multifocal bilateral pulmonary ground-glass airspace opacities are stable         CT ABDOMEN PELVIS WO CONTRAST Additional Contrast? None   Final Result   Limited study by the patient's respiratory motion and lack of intravenous   contrast.      Extensive multifocal COVID-19 pneumonia bilaterally. Bilateral trace layering pleural effusions. Bilateral gynecomastia. Abdominal organs inadequately evaluated due to the limitations of the study. Normal appendix. No evidence of bowel obstruction. CT CHEST WO CONTRAST   Final Result   Limited study by the patient's respiratory motion and lack of intravenous   contrast.      Extensive multifocal COVID-19 pneumonia bilaterally. Bilateral trace layering pleural effusions. Bilateral gynecomastia.       Abdominal organs inadequately evaluated due to the limitations of the study. Normal appendix. No evidence of bowel obstruction.              Resident Assessment and Plan     Acute hypoxia 2/2 COVID pneumonia vs PCP  - CTA on admission: COVID pneumonia, Bilateral trace layering pleural effusion, negative for PE   - Ferritin 3900, CRP 10.1 on admission   - D dimer elevated on admission, currently stable  - Pulmonology and ID on board  - Legionella, spot TB test negative   - CXR 10/8 shows multifocal groundglass opacities are stable  - Repeat CXR 10/17: b/l pulm infiltrates improving   - Blood cx: staph epidermitis, repeat blood cx shows no growth x 7 days   -Urine culture: NGTD   - ECHO 10/13: EF 55%, late bubble sign possible shunting of pulmonary origin   - CTA 10/13 negative for PE  Bronchoscopy with BAL performed on 10/15/2021   - Respiratory culture: oral pharyngeal liana decreased, few gram negative rods   CXR 10/23: Mild worsening extensive bilateral infiltrates  - Currently maintaining saturation on room air, continue to monitor  - prednisone course completed (11/2/2021)  - Repeat blood culture 10/21 showed NGTD x5days    - Fungal cultures negative x 1 week    Weight loss likely secondary to HIV  - Per patient due to decreased appetite from COVID; 50lb weight loss since August   - R/o other potential origins: SLE, TB, hepatitis  - MARILEE negative  - T spot TB test negative   HIV, hepatitis C antibodies positive  - Elevated IgG and IgA, normal IgM: IgA nephropathy vs MPGN?   - Cryoglobulin normal   - TSH normal   - Sed rate and CRP elevated      HIV  HIV 1/2 antibodies positive    CD4 44, CD3 153, CD8 100  HIV viral load 195,000  - ID on board: received vancomycin, ancef; stopped cefepime 10/26  - Fungitell test positive x2, received eraxis  - Bactrim d/c'd due to renal failure; on pentamidine for suspected Pneumocystis pneumonia  - Pneumocystis stain negative, but PCR pending; still suspect PCP pna  - HIV GART pending   - [id number] negative, genotype pending  - Toxoplasma Ab, cryptococcus negative  - hepatitis A total Ab positive  - RPR reactive, quant 1:2, CSF VDRL negative   - On PCN G weekly for latent syphilis (received 2/3 doses)  - Started on biktarvy 10/22, d/c'd due to renal failure   - Started on lamivudine, dolutegravir, and abacavir 10/25  - receiving penecillin prophylaxis IM weekly x 3 doses , received one dose so far.     Hematuria - resolved   - benjie blood in urine s/p renal biopsy 10/27  - Hb trending down , s/p 1 transfusion 11/2/2021 , h/h improved to 7.3  - Continue to monitor H/H     Anemia  Pancytopenia-improving  - Likely 2/2 from GI bleed vs HIV   Peripheral blood smear shows normocytic anemia, absolute lymphocytopenia; no platelet clumping, schistocytes or dysplasia  - Heme Onc consulted: received granix to improve ANC  Transfused 4 units pRBC since admission  - H/H drop to 6.6 11/2/2021 ; transfuse 1 unit PRBC , H&H dropped to 6.8 on 11/4/2021, transfuse 1 unit PRBC  - FOBT negative x3  - Platelets trending up, transfusion if bleeding or plts <20k   - Transfused 2U platelets prior to and during kidney biopsy     DEISI    Hyponatremia - resolved   - Na normalized    - Nephro on board ; hold dialysis for now with Cr improving  - Likely secondary to dehydration from persistent diarrhea vs decreased PO intake vs bactrim vs HIVANS vs Biktarvy  - Cr improving after HD,3.7 ---> 2.1--> 1.8 today (11/2/2021)  Scarlett Mcdaniel   - urine studies show intrinsic cause of DEISI  - Microalbumin-creatinine ratio elevated   Fluid restriction  - Oliguric (0.2 mL/kg/hr)  - received dialysis  10/25 x3days  - S/p Kidney biopsy; mild C3 deposition noted    Hypoalbuminemia - improving  -2/2 poor intake vs CHF vs nephrotic syndrome  - Alb stable  -Previously received albumin 25 g q6h x 4 doses 10/6, 8 doses 10/12 and 8 doses 10/23  Low C3, normal C4 and cryoglobulin  Fluid restriction    - Continue to monitor     Elevated troponin and proBNP  - 2/2 myocardial injury vs stress cardiomyopathy due to COVID vs new onset CHF   - initial troponin 78, 78  - initial BNP 1599,  repeat 11,401 on 10/14, trending down 3400   - ECHO: EF 55%, late bubble sign possible shunting of pulmonary origin  - Repeat ECHO pending      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 Betzaida Nguyễn

## 2021-11-04 NOTE — BH NOTE
[address] [facility]     Pt is a 36 y.o. male with pneumonia, COVID,  HIV pos, improving renal failure. Prolonged hospitalization. Psychology following for anxiety, difficulty coping with stress. Continued cognitive-behavioral therapy to address stress management. Reviewed relaxation breathing, focusing on logical/rational thinking, and problem-solving to manage stress. Will continue to follow with Family Medicine team. Pt will continue with this psychologist outpatient after d/c.     Electronically signed by Jesse Ortiz, PhD

## 2021-11-05 ENCOUNTER — APPOINTMENT (OUTPATIENT)
Dept: ULTRASOUND IMAGING | Age: 31
DRG: 890 | End: 2021-11-05
Payer: COMMERCIAL

## 2021-11-05 LAB
ALBUMIN SERPL-MCNC: 3.1 G/DL (ref 3.5–5.2)
ALP BLD-CCNC: 80 U/L (ref 40–129)
ALT SERPL-CCNC: 32 U/L (ref 0–40)
ANION GAP SERPL CALCULATED.3IONS-SCNC: 8 MMOL/L (ref 7–16)
ANISOCYTOSIS: ABNORMAL
APTT: 27.4 SEC (ref 24.5–35.1)
AST SERPL-CCNC: 23 U/L (ref 0–39)
ATYPICAL LYMPHOCYTE RELATIVE PERCENT: 0.9 % (ref 0–4)
BASOPHILS ABSOLUTE: 0 E9/L (ref 0–0.2)
BASOPHILS RELATIVE PERCENT: 0.5 % (ref 0–2)
BILIRUB SERPL-MCNC: 0.5 MG/DL (ref 0–1.2)
BLOOD CULTURE, ROUTINE: NORMAL
BUN BLDV-MCNC: 13 MG/DL (ref 6–20)
CALCIUM SERPL-MCNC: 8.5 MG/DL (ref 8.6–10.2)
CHLORIDE BLD-SCNC: 107 MMOL/L (ref 98–107)
CO2: 19 MMOL/L (ref 22–29)
CREAT SERPL-MCNC: 1.2 MG/DL (ref 0.7–1.2)
CULTURE, BLOOD 2: NORMAL
EOSINOPHILS ABSOLUTE: 0.07 E9/L (ref 0.05–0.5)
EOSINOPHILS RELATIVE PERCENT: 1.7 % (ref 0–6)
GFR AFRICAN AMERICAN: >60
GFR NON-AFRICAN AMERICAN: >60 ML/MIN/1.73
GLUCOSE BLD-MCNC: 82 MG/DL (ref 74–99)
HCT VFR BLD CALC: 23.7 % (ref 37–54)
HEMOGLOBIN: 7.8 G/DL (ref 12.5–16.5)
LYMPHOCYTES ABSOLUTE: 0.52 E9/L (ref 1.5–4)
LYMPHOCYTES RELATIVE PERCENT: 11.3 % (ref 20–42)
MCH RBC QN AUTO: 30.8 PG (ref 26–35)
MCHC RBC AUTO-ENTMCNC: 32.9 % (ref 32–34.5)
MCV RBC AUTO: 93.7 FL (ref 80–99.9)
MONOCYTES ABSOLUTE: 0.13 E9/L (ref 0.1–0.95)
MONOCYTES RELATIVE PERCENT: 2.6 % (ref 2–12)
NEUTROPHILS ABSOLUTE: 3.61 E9/L (ref 1.8–7.3)
NEUTROPHILS RELATIVE PERCENT: 83.5 % (ref 43–80)
OVALOCYTES: ABNORMAL
PDW BLD-RTO: 15.3 FL (ref 11.5–15)
PLATELET # BLD: 127 E9/L (ref 130–450)
PMV BLD AUTO: 11.4 FL (ref 7–12)
POIKILOCYTES: ABNORMAL
POTASSIUM REFLEX MAGNESIUM: 4.6 MMOL/L (ref 3.5–5)
POTASSIUM SERPL-SCNC: 4.6 MMOL/L (ref 3.5–5)
RBC # BLD: 2.53 E12/L (ref 3.8–5.8)
SODIUM BLD-SCNC: 134 MMOL/L (ref 132–146)
TOTAL PROTEIN: 5.2 G/DL (ref 6.4–8.3)
WBC # BLD: 4.3 E9/L (ref 4.5–11.5)

## 2021-11-05 PROCEDURE — 6370000000 HC RX 637 (ALT 250 FOR IP): Performed by: STUDENT IN AN ORGANIZED HEALTH CARE EDUCATION/TRAINING PROGRAM

## 2021-11-05 PROCEDURE — 80053 COMPREHEN METABOLIC PANEL: CPT

## 2021-11-05 PROCEDURE — 36415 COLL VENOUS BLD VENIPUNCTURE: CPT

## 2021-11-05 PROCEDURE — 85730 THROMBOPLASTIN TIME PARTIAL: CPT

## 2021-11-05 PROCEDURE — 6370000000 HC RX 637 (ALT 250 FOR IP): Performed by: INTERNAL MEDICINE

## 2021-11-05 PROCEDURE — 76775 US EXAM ABDO BACK WALL LIM: CPT

## 2021-11-05 PROCEDURE — 6360000002 HC RX W HCPCS: Performed by: NURSE PRACTITIONER

## 2021-11-05 PROCEDURE — 2140000000 HC CCU INTERMEDIATE R&B

## 2021-11-05 PROCEDURE — P9047 ALBUMIN (HUMAN), 25%, 50ML: HCPCS | Performed by: NURSE PRACTITIONER

## 2021-11-05 PROCEDURE — 99232 SBSQ HOSP IP/OBS MODERATE 35: CPT | Performed by: FAMILY MEDICINE

## 2021-11-05 PROCEDURE — 2580000003 HC RX 258: Performed by: INTERNAL MEDICINE

## 2021-11-05 PROCEDURE — 80048 BASIC METABOLIC PNL TOTAL CA: CPT

## 2021-11-05 PROCEDURE — 85025 COMPLETE CBC W/AUTO DIFF WBC: CPT

## 2021-11-05 RX ORDER — ALBUMIN (HUMAN) 12.5 G/50ML
25 SOLUTION INTRAVENOUS EVERY 8 HOURS
Status: COMPLETED | OUTPATIENT
Start: 2021-11-05 | End: 2021-11-06

## 2021-11-05 RX ADMIN — PROPAFENONE HYDROCHLORIDE 150 MG: 150 TABLET, FILM COATED ORAL at 21:11

## 2021-11-05 RX ADMIN — Medication 10 ML: at 08:27

## 2021-11-05 RX ADMIN — PROPAFENONE HYDROCHLORIDE 150 MG: 150 TABLET, FILM COATED ORAL at 14:02

## 2021-11-05 RX ADMIN — ABACAVIR 600 MG: 300 TABLET, FILM COATED ORAL at 21:12

## 2021-11-05 RX ADMIN — PANTOPRAZOLE SODIUM 40 MG: 40 TABLET, DELAYED RELEASE ORAL at 06:26

## 2021-11-05 RX ADMIN — PROPAFENONE HYDROCHLORIDE 150 MG: 150 TABLET, FILM COATED ORAL at 06:26

## 2021-11-05 RX ADMIN — LAMIVUDINE 300 MG: 100 TABLET, FILM COATED ORAL at 21:12

## 2021-11-05 RX ADMIN — OXYCODONE HYDROCHLORIDE AND ACETAMINOPHEN 500 MG: 500 TABLET ORAL at 08:27

## 2021-11-05 RX ADMIN — DOLUTEGRAVIR SODIUM 50 MG: 50 TABLET, FILM COATED ORAL at 21:12

## 2021-11-05 RX ADMIN — ALBUMIN (HUMAN) 25 G: 0.25 INJECTION, SOLUTION INTRAVENOUS at 15:00

## 2021-11-05 RX ADMIN — Medication 10 ML: at 21:12

## 2021-11-05 ASSESSMENT — PAIN SCALES - GENERAL
PAINLEVEL_OUTOF10: 0

## 2021-11-05 NOTE — PROGRESS NOTES
[address] [place] [address] Attending    S: 36 y.o. male with history of asthma and smoking history presented with increasing dyspnea and cough over past month. On presentation had fever of 103. Has tested positive for COVID twice, most recently end of August.  CT shows extensive bilateral pneumonia and COVID testing again positive. HIV pos, CD4 43. Early AM 10/23,  RRT called due to tachycardia and oxygen desaturation requiring oxygen from 3L NC to 15LNRB mask. Patient had been febrile overnight as well and transferred to MICU. Improved and transferred back to floor. Treatment initiated for HIV, but noted to have mild pancytopenia and thrombocytopenia to less than 50. Renal failure developed. Required temporary dialysis. Currently off dialysis, with improving renal function. Right flank discomfort resolved  Received transfusion overnight      O: VS- Blood pressure (!) 136/96, pulse 106, temperature 97.7 °F (36.5 °C), temperature source Oral, resp. rate 16, height 6' 4\" (1.93 m), weight 196 lb 3.4 oz (89 kg), SpO2 100 %. Exam is as noted by resident   Awake, alert and oriented. Heart -regular rhythm, no gallop  Lungs- clear breath sounds bilaterally. No abdominal masses or tenderness  Ext - no edema. Impressions:  Principal Problem:    Acute respiratory failure with hypoxia (HCC)  Active Problems:    Acute kidney injury due to COVID-19 ([facility] [address]    Asthma     COVID-19    Symptomatic HIV infection ([facility] [address]    Pancytopenia (HCC)  Platelet count improving  Creatinine improved from 2.8-2.1-1.9  Hemoglobin not steadily improved yet, currently 7.8 ff transfusion      Plan   Receiving counseling from Psychology    Appreciate Nephrology, ID, MICU, cardiology, gen surgery, H/Onc, pulmonology.   Covid pneumonia/PCP pneumonia with sepsis   New diagnosis of HIV, ID managing antibiotics and antivirals for HIV, currently on lamivudine/abacavir/dolutegravir   Per ID pentamidine 4mg/kg IV q48H on dialysis

## 2021-11-05 NOTE — PROGRESS NOTES
Department of Internal Medicine  Nephrology Progress Note      Events reviewed. Right HD line removed. SUBJECTIVE:  We are following Mr. Jerald Cintron for DEISI on CKD. Patient is very upset and frustrated this afternoon. Hgb dropped overnight.     PHYSICAL EXAM:      Vitals:    VITALS:  BP (!) 136/96   Pulse 106   Temp 97.7 °F (36.5 °C) (Oral)   Resp 16   Ht 6' 4\" (1.93 m)   Wt 196 lb 3.4 oz (89 kg)   SpO2 100%   BMI 23.88 kg/m²   24HR INTAKE/OUTPUT:      Intake/Output Summary (Last 24 hours) at 11/5/2021 1342  Last data filed at 11/5/2021 0815  Gross per 24 hour   Intake    Output 450 ml   Net -450 ml        Young cachectic: looking male  Constitutional:  Awake, alert, sitting up in bed  HEENT:  PERRL, normocephalic, atraumatic  Respiratory: diminished lung sounds  Cardiovascular/Edema:  RRR, S1/S2, -edema  Gastrointestinal:  abd flat, soft, non-tender, Perkins in place  Neurologic:  Awake, alert, no focal deficits  Skin:  Warm, dry, no rash or lesions  Other: No LE edema      Scheduled Meds:   lamiVUDine  300 mg Oral Nightly    propafenone  150 mg Oral 3 times per day    abacavir  600 mg Oral Nightly    dolutegravir sodium  50 mg Oral Nightly    [Held by provider] heparin (porcine)  5,000 Units SubCUTAneous Q8H    lidocaine  1 patch TransDERmal Daily    ascorbic acid  500 mg Oral Daily    pantoprazole  40 mg Oral QAM AC    sodium chloride flush  5-40 mL IntraVENous 2 times per day     Continuous Infusions:   sodium chloride      sodium chloride       PRN Meds:.sodium chloride, medicated lip balm, benzonatate, perflutren lipid microspheres, hydrOXYzine, [Held by provider] labetalol, sodium chloride flush, ondansetron **OR** ondansetron, polyethylene glycol, acetaminophen **OR** acetaminophen, sodium chloride    DATA:    CBC:   Lab Results   Component Value Date    WBC 4.3 11/05/2021    RBC 2.53 11/05/2021    HGB 7.8 11/05/2021    HCT 23.7 11/05/2021    MCV 93.7 11/05/2021    MCH 30.8 11/05/2021 MCHC 32.9 11/05/2021    RDW 15.3 11/05/2021     11/05/2021    MPV 11.4 11/05/2021     CMP:    Lab Results   Component Value Date     11/05/2021    K 4.6 11/05/2021    K 4.6 11/05/2021     11/05/2021    CO2 19 11/05/2021    BUN 13 11/05/2021    CREATININE 1.2 11/05/2021    GFRAA >60 11/05/2021    LABGLOM >60 11/05/2021    GLUCOSE 82 11/05/2021    PROT 5.2 11/05/2021    LABALBU 3.1 11/05/2021    CALCIUM 8.5 11/05/2021    BILITOT 0.5 11/05/2021    ALKPHOS 80 11/05/2021    AST 23 11/05/2021    ALT 32 11/05/2021     Magnesium:    Lab Results   Component Value Date    MG 1.7 10/29/2021     Phosphorus:    Lab Results   Component Value Date    PHOS 2.8 10/29/2021        Radiology Review:       Ejection fraction is visually estimated at 55%. There is late positive bubble suggesting possible shunt from pulmonary   origin. Visually moderately dilated left atrium. Normal right ventricular size and function. There is small pericardial effusion without tamponade physiology        CT Chest October 5, 2021   Limited study by the patient's respiratory motion and lack of intravenous   contrast.       Extensive multifocal COVID-19 pneumonia bilaterally.       Bilateral trace layering pleural effusions.       Bilateral gynecomastia.       Abdominal organs inadequately evaluated due to the limitations of the study.       Normal appendix.       No evidence of bowel obstruction.         Kidney ultrasound October 7, 2021   1. Hyperechoic kidneys due to underlying parenchymal disease. 2. Trace bilateral perinephric fluid of indeterminate etiology, potentially   due to underlying inflammation. 3. An approximately 1.1 cm x 1.5 cm x 2.3 cm lesion in the right kidney could   be a cyst but could also be seen with infarction, abscess, or neoplasia.    Consider further evaluation with renal protocol abdomen MRI (preferred) or   CT.  Following resolution of acute kidney injury.         CTA pulmonary with IV contrast October 13, 2021   1. No evidence of pulmonary embolism. 2. Interval increased size of bilateral pleural effusions with little change   in pericardial effusion. 3. Persistent subcutaneous edema. 4. Reactive bilateral hilar lymphadenopathy. 5. Bilateral airspace disease is increased compared to prior examination. The increase is most pronounced within the lower lobes. 6. Bilateral gynecomastia.         Renal US November 5, 2021   1. Minimal free intraperitoneal fluid. 2. No sign of retroperitoneal mass/hemorrhage.                     BRIEF SUMMARY OF INITIAL CONSULT:     Briefly, Mr. Fatimah Meyer is a 36year old male with no significant PMH who was admitted on October 5, 2021 after he presented from an outside clinic after he was found to be hypoxic during 6 minute walking test. Patient tested positive for Covid 19 on August 18th, he is unvaccinated and had Covid 19 last year as well. Patient states he has quarantined by himself and when his mother recently visited him she was shocked at his appearance as he had lost about 50 pounds in 2 and a half months. On admission labs were significant for sodium of 130, potassium 5.2, bicarbonate 20, BUN 41, creatinine 3.6, magnesium 2.7, calcium 7.7 and proBNP 1,599. We are consulted for DEISI. Problems resolved:    Hyperkalemia, 2/2 DEISI. Low potassium diet  Hypotonic hyponatremia 2/2 intravascular volume from poor oral intake. Bicarb drip started. Sodium levels improving. Low bicarbonate levels with hyperchloremia, most likely NAGMA versus respiratory alkalosis; we need a PH to clarify diagnosis. Awaiting ABG results  High bicarbonate levels, likely metabolic alkalosis 2/2 administration  Hypokalemia, multifactorial, poor intake, probably renal potassium wasting  Severe Hypoalbuminemia, multifactorial, status post albumin administration  DEISI on CKD, volume responsive pre-renal DEISI d/t volume (poor oral intake), urine sodium <20. Resolved.   Edematous state, multifactorial, mainly 2/2 nephrotic syndrome and possibly HFpEF 55%, on bumex  Hyponatremia, multifactorial, including decreased GFR and hemodynamically mediated in the setting of large hypotonic fluid administration (D5 with Bactrim). Sodium levels decrease, stable overnight  Low bicarbonate levels with hyperchloremia, consistent with either hyperchloremic metabolic acidosis versus respiratory alkalosis. Bicarbonate levels improved with bicarbonate drip. HTN, on metoprolol   Probably fungemia, (1, 3) beta-D-glucan positive, on anidulafungin  MSSE bacteremia, on cefepime 2 g every 8 hours  Sinus tachycardia, multifactorial  Hypomagnesemia, 2/2 poor intake   Leukopenia, WBC 1.1  Gross hematuria, status post kidney biopsy. Resolved    IMPRESSION/RECOMMENDATIONS:     Recurrent DEISI on CKD, ATN contrast associated DEISI versus ischemic ATN (hypotension related tachycardia). Nonoliguric. Started on renal replacement therapy on October 25, 2021. Renal function continues to improve daily, dialysis catheter has been removed. 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 [facility] Work-up so far HIV positive, Hepatitis C positive, MARILEE negative, C4 normal, C3 88 (low), UPEP without monoclonal gammopathy, negative cryoglobulins. Kidney ultrasound with hyperechoic kidneys. Status post kidney biopsy October 27, 2021. Repeat UACR 2800 mg/g, UPCR 3.3 (November 4, 2021)  Low bicarbonate level, will obtain urine electrolytes and urinalysis to calculate urine anion gap  Probably HFpEF 55%, proBNP 11,401 > 3421   Right kidney lesion, likely a cyst but cannot be ruled out other pathology including infarction, abscess, neoplasm.   We will proceed with MRI when renal function improved  HIV positive, CD4 count 43, started on OLMSTEAD therapy    ---------------------------------------------------------------------    Anemia with acute drop in Hgb s/p transfusion. Repeat renal US negative for bleed. H&H stable today. Possible pneumocystic pneumonia, on pentamidine  Possibly acute retroviral syndrome Jarische-Herxheimer reaction? Hepatitis C antibody positive, viral load not detected  Covid 19 infection in non vaccinated pt  Normocytic anemia, multifactorial, acute drop in hgb 6.8, for transfusion  Tachycardia ?anxiety      Plan:    Kidney biopsy results inconclusive, will need repeat biopsy. \This can be done in the outpatient setting.   Continue to monitor renal function for recovery daily  Monitor H&H, transfuse <7, for transfusion today  Monitor bicarbonate levels  Obtain urine electrolytes  Obtain urinalysis, urine protein/crea ratio  Give albumin 25 g Q 8 hrs X3 doses       Case and plan discussed with   Electronically signed by RICKY Frye CNP on 11/5/2021 at 1:42 PM

## 2021-11-05 NOTE — PROGRESS NOTES
Patient was off the floor for US at the time of my visit. I will see him again tomorrow. Labs, imaging, and medical records/notes were personally reviewed. Assessment:  Pancytopenia, multifactorial, suspect associated with acute retroviral syndrome   COVID-19, mild  Pneumonia, suspected Pneumocystis pneumonia (serum beta-d-glucan was positive at >500 pg/mL, BAL Pneumocystis stain was negative but Pneumocystis PCR was not sent). Received treatment with high-dose Bactrim then switched to pentamidine due to pancytopenia. Late latent syphilis, neurosyphilis ruled out. Received 3 weekly benzathine penicillin. Methicillin susceptible Staphylococcus epidermidis bacteremia, etiology unclear  DEISI, resolved  Advanced HIV disease (viral load of 195,000; CD4 of 43 as of 10/11/2021). Hepatitis C Ab positive (viral load not detected). Hepatitis A immune  Prolonged QTc  Hematuria, resolved, probably associated with renal biopsy    Recommendations:  Continue lamivudine at 300 mg q24h, dolutegravir 50 mg q24h, abacavir 600 mg q24h. Switch to single pill combination Triumeq, if available. Follow up with me in 2 weeks after discharge. Consider imaging to check for retroperitoneal bleeding in the setting of recent renal biopsy. Continue supportive care. Thank you for involving me in the care of Jonathan Franklin. I will continue to follow. Please do not hesitate to call for any questions or concerns.     Electronically signed by Laurel Velez MD on 11/5/2021 at 10:39 AM

## 2021-11-05 NOTE — PROGRESS NOTES
[facility] Inpatient   Resident Progress Note    S:  Hospital day: 32   Brief Synopsis: Jonathan Franklin is a 36 y.o. male with no PMH who presented with hypoxia and fever. He was initially seen in PCP office and was hypoxic on exertion with desaturate into the mid low 80s as well as noted to be febrile and tachycardic. Per patient, his symptoms began 8/15/2021 and he tested positive for Covid 8/28. endorse worsening shortness of breath over the past month with sputum production, intermittent chest discomfort, persistent diarrhea, chills, fever, decrease in appetite and 50 pound weight loss since August.  Patient also endorses significantly worsening \"neuropathy\" of bilateral feet, states it feels like he is \"being stabbed by needles\" which is causing him inability to walk due to pain. In the ED, he was found to be anemic, hyperkalemic, hyponatremic, febrile and COVID positive. He also had elevated Creatinine, AST, proBNP and troponin x2. CTA showed extensive multifocal COVID-19 pneumonia bilaterally, bilateral trace layering pleural effusion and bilateral gynecomastia. CT abdomen was limited due to movement and non-contrast, but showed normal appendix and no evidence of bowel obstruction. Admitted for acute hypoxia due to Covid pneumonia. Nephro, pulm and ID consulted. Started on bicarb drip. US showed hyperechoic kidneys, trace b/l perinephric fluid and right kidney lesion (Cyst vs infarction vs abscess vs neoplasia). HIV and Hep C screen were positive. HIV RNA and T and B lymphocytes were ordered. Patient became more hypoxic, tachycardic and hypertensive so  there was concern for PE. CTA was ordered, which came back negative. Echo was completed due to elevated BNP and concern for PE and that revealed a normal EF with late bubble sign possible shunting of pulmonary origin. BNP continued to increase. Cardiology was consulted. Repeat Echo pending.  ID recommends LP with CSF sent for glucose, protein, cell count with differential, which were all  negative. meningitis/encephalitis PCR panel negative. RRT called 10/23 for hypoxia and tachycardia, transferred to medical intensive on 15 L NRB. Creatinine up trending. Currently saturating well on room air and was transferred out of the ICU. Hemodialysis x4 days for worsening renal function. CSF VDRL negative, but continue to treat for latent syphilis. Renal biopsy performed on 10/27. Endorses hematuria s/p biopsy. CT abdomen showed no acute abnormalities. Patients platelets and creatinine improving, hemodialysis on hold at this point. 11/2/2021 - H/H 6.6, patient received 1 unit PRBC. H&H stabilized for 1 day above 7, 11/4/2021, patient hemoglobin dropped again to 6.8, requiring another PRBC transfusion. Retroperitoneal ultrasound negative, minimal intraperitoneal fluid already seen on previous ultrasound as well., repeat FOBT ordered. Patient seen and examined this AM.  Patient's H&H stable this morning, is status post 2 RBC transfusions this week. Patient is still concerned about drop in H&H. Advised that this could be multifactorial.  Awaiting results of retroperitoneal ultrasound to rule out development of hematoma. Creatinine normalized, platelets trending towards normal.  HIV treatment includes lamivudine, dolutegravir, abacavir; trying to switch to Elif MAC. Patient has already received his last dose of penicillin. Patient refusing rectal examination for FOBT at this time, states that he will try to leave a stool sample to perform FOBT.     No other concerns this morning    Cont meds:    sodium chloride      sodium chloride       Scheduled meds:    lamiVUDine  300 mg Oral Nightly    propafenone  150 mg Oral 3 times per day    abacavir  600 mg Oral Nightly    dolutegravir sodium  50 mg Oral Nightly    [Held by provider] heparin (porcine)  5,000 Units SubCUTAneous Q8H    lidocaine  1 patch TransDERmal Daily    ascorbic acid  500 mg Oral Coloration: Skin is not jaundiced. Findings: No bruising or rash. Neurological:      General: No focal deficit present. Mental Status: He is alert and oriented to person, place, and time. Cranial Nerves: No cranial nerve deficit. Motor: No weakness. Psychiatric:         Behavior: Behavior normal.         Thought Content: Thought content normal.       Labs:  Na/K/Cl/CO2:  134/4.6/107/19 (11/05 0732)  BUN/Cr/glu/ALT/AST/amyl/lip:  13/1.2/--/32/23/--/-- (11/05 0732)  WBC/Hgb/Hct/Plts:  4.3/7.8/23.7/127 (11/05 0732)  estimated creatinine clearance is 112 mL/min (based on SCr of 1.2 mg/dL). Other pertinent labs as noted below    Radiology:  CT ABDOMEN PELVIS WO CONTRAST Additional Contrast? None   Final Result   Post right renal biopsy there is expected sequela of recent biopsy, no   significant hemorrhage identified                                        CT BIOPSY RENAL   Final Result   Successful uncomplicated CT and fluoroscopic guided right   renal biopsy. If there are any physician concerns regarding this report, a   Radiologist can be reached by calling the following number [phone]. CT GUIDED NEEDLE PLACEMENT   Final Result   Successful uncomplicated CT and fluoroscopic guided right   renal biopsy. If there are any physician concerns regarding this report, a   Radiologist can be reached by calling the following number [phone]. XR CHEST PORTABLE   Final Result   Mild worsening of extensive bilateral infiltrates         XR CHEST PORTABLE   Final Result   Increasing bilateral airspace disease suggesting worsening pneumonia. FL LUMBAR PUNCTURE DIAG   Final Result   Successful fluoroscopic-guided lumbar puncture.          XR CHEST PORTABLE   Final Result   Extensive bilateral patchy areas of ground-glass opacity concerning for   atypical pneumonia or viral airway disease, improved         XR CHEST PORTABLE   Final Result   Persistent bilateral infiltrates. No significant changes since October 13. See above comments. See report CT chest October 13. CTA PULMONARY W CONTRAST   Final Result   1. No evidence of pulmonary embolism. 2. Interval increased size of bilateral pleural effusions with little change   in pericardial effusion. 3. Persistent subcutaneous edema. 4. Reactive bilateral hilar lymphadenopathy. 5. Bilateral airspace disease is increased compared to prior examination. The increase is most pronounced within the lower lobes. 6. Bilateral gynecomastia. XR CHEST PORTABLE   Final Result   Development of extensive bilateral pulmonary infiltrates         XR CHEST PORTABLE   Final Result   No acute process. US RETROPERITONEAL COMPLETE   Final Result   1. Hyperechoic kidneys due to underlying parenchymal disease. 2. Trace bilateral perinephric fluid of indeterminate etiology, potentially   due to underlying inflammation. 3. An approximately 1.1 cm x 1.5 cm x 2.3 cm lesion in the right kidney could   be a cyst but could also be seen with infarction, abscess, or neoplasia. Consider further evaluation with renal protocol abdomen MRI (preferred) or   CT. Following resolution of acute kidney injury. XR CHEST PORTABLE   Final Result   Multifocal bilateral pulmonary ground-glass airspace opacities are stable         CT ABDOMEN PELVIS WO CONTRAST Additional Contrast? None   Final Result   Limited study by the patient's respiratory motion and lack of intravenous   contrast.      Extensive multifocal COVID-19 pneumonia bilaterally. Bilateral trace layering pleural effusions. Bilateral gynecomastia. Abdominal organs inadequately evaluated due to the limitations of the study. Normal appendix. No evidence of bowel obstruction.          CT CHEST WO CONTRAST   Final Result   Limited study by the patient's respiratory motion and lack of intravenous   contrast.      Extensive multifocal COVID-19 pneumonia bilaterally. Bilateral trace layering pleural effusions. Bilateral gynecomastia. Abdominal organs inadequately evaluated due to the limitations of the study. Normal appendix. No evidence of bowel obstruction.          US RETROPERITONEAL COMPLETE    (Results Pending)       Resident Assessment and Plan     Acute hypoxia 2/2 COVID pneumonia vs PCP  - CTA on admission: COVID pneumonia, Bilateral trace layering pleural effusion, negative for PE   - Ferritin 3900, CRP 10.1 on admission   - D dimer elevated on admission, currently stable  - Pulmonology and ID on board  - Legionella, spot TB test negative   - CXR 10/8 shows multifocal groundglass opacities are stable  - Repeat CXR 10/17: b/l pulm infiltrates improving   - Blood cx: staph epidermitis, repeat blood cx shows no growth x 7 days   -Urine culture: NGTD   - ECHO 10/13: EF 55%, late bubble sign possible shunting of pulmonary origin   - CTA 10/13 negative for PE  Bronchoscopy with BAL performed on 10/15/2021   - Respiratory culture: oral pharyngeal liana decreased, few gram negative rods   CXR 10/23: Mild worsening extensive bilateral infiltrates  - Currently maintaining saturation on room air, continue to monitor  - prednisone course completed (11/2/2021)  - Repeat blood culture 10/21 showed NGTD x5days    - Fungal cultures negative x 1 week    Weight loss likely secondary to HIV  - Per patient due to decreased appetite from COVID; 50lb weight loss since August   - R/o other potential origins: SLE, TB, hepatitis  - MARILEE negative  - T spot TB test negative   HIV, hepatitis C antibodies positive  - Elevated IgG and IgA, normal IgM: IgA nephropathy vs MPGN?   - Cryoglobulin normal   - TSH normal   - Sed rate and CRP trending towards normal    HIV  HIV 1/2 antibodies positive    CD4 44, CD3 153, CD8 100  HIV viral load 195,000  - ID on board: received vancomycin, ancef; stopped cefepime 10/26  - Fungitell test positive x2, received eraxis  - Bactrim d/c'd due to renal failure; on pentamidine for suspected Pneumocystis pneumonia  - Pneumocystis stain negative, but PCR pending; still suspect PCP pna  - HIV GART pending   - [id number] negative, genotype pending  - Toxoplasma Ab, cryptococcus negative  - hepatitis A total Ab positive  - RPR reactive, quant 1:2, CSF VDRL negative   - On PCN G weekly for latent syphilis (received 2/3 doses)  - Started on biktarvy 10/22, d/c'd due to renal failure   - Started on lamivudine, dolutegravir, and abacavir 10/25 : Switch to Triumeq  - receiving penecillin prophylaxis IM weekly x 3 doses , completed dosing    Hematuria - resolved   - benjie blood in urine s/p renal biopsy 10/27  - Hb trending down , s/p 1 transfusion 11/2/2021 , h/h improved to 7.3  - Continue to monitor H/H     Anemia   Likely multifactorial  GI bleed vs retroperitoneal hematoma vs CKD vs change in medication/polypharmacy? ?   Pancytopenia-improving  - Likely 2/2 from GI bleed vs HIV   Peripheral blood smear shows normocytic anemia, absolute lymphocytopenia; no platelet clumping, schistocytes or dysplasia  - Heme Onc consulted: received granix to improve ANC  Transfused 4 units pRBC since admission  - H/H drop to 6.6 11/2/2021 ; transfuse 1 unit PRBC , H&H dropped to 6.8 on 11/4/2021, transfuse 1 unit PRBC  - FOBT negative x3 , repeat FOBT at this time  - Platelets trending up, transfusion if bleeding or plts <20k   - Transfused 2U platelets prior to and during kidney biopsy   We will continue to monitor for now; retroperitoneal ultrasound negative for any retroperitoneal hematoma  -Patient refusing rectal examination for FOBT  - Will continue to monitor H&H, transfuse if H/H< 7.    DEISI    Hyponatremia - resolved   - Na normalized    - Nephro on board ; hold dialysis for now with Cr improving  - Likely secondary to dehydration from persistent diarrhea vs decreased PO intake vs bactrim vs HIVANS vs Biktarvy  - Cr improving after HD,3.7 ---> 2.1--> 1.8 today --> 1.2  Held Biktarvy   - urine studies show intrinsic cause of DEISI  - Microalbumin-creatinine ratio elevated   Fluid restriction  - Oliguric (0.2 mL/kg/hr)  - received dialysis  10/25 x3days  - S/p Kidney biopsy; mild C3 deposition noted ; biopsy inconclusive, patient will need repeat biopsy outpatient per nephrology.     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 Oumou Glass

## 2021-11-05 NOTE — CARE COORDINATION
Patients renal function continues to improve, dialysis catheter has been removed. Retroperitoneal ultrasound was completed yesterday due to concern for bleed; pending results. Patient's hgb was 6.8 yesterday, and he required a PRBC transfusion; Hgb today is 7.8. Patient evaluated by PT/OT yesterday and AM-PAC scores for PT and OT were 20/24; plan is for the patient to return home.     Jamia Dodson, MSW, LSW [phone]

## 2021-11-06 ENCOUNTER — APPOINTMENT (OUTPATIENT)
Dept: CT IMAGING | Age: 31
DRG: 890 | End: 2021-11-06
Payer: COMMERCIAL

## 2021-11-06 LAB
ALBUMIN SERPL-MCNC: 3.5 G/DL (ref 3.5–5.2)
ALP BLD-CCNC: 82 U/L (ref 40–129)
ALT SERPL-CCNC: 27 U/L (ref 0–40)
ANION GAP SERPL CALCULATED.3IONS-SCNC: 12 MMOL/L (ref 7–16)
ANISOCYTOSIS: ABNORMAL
APTT: 27.4 SEC (ref 24.5–35.1)
AST SERPL-CCNC: 21 U/L (ref 0–39)
BASOPHILS ABSOLUTE: 0 E9/L (ref 0–0.2)
BASOPHILS RELATIVE PERCENT: 0 % (ref 0–2)
BILIRUB SERPL-MCNC: 0.5 MG/DL (ref 0–1.2)
BUN BLDV-MCNC: 12 MG/DL (ref 6–20)
CALCIUM SERPL-MCNC: 8.5 MG/DL (ref 8.6–10.2)
CHLORIDE BLD-SCNC: 112 MMOL/L (ref 98–107)
CO2: 18 MMOL/L (ref 22–29)
CREAT SERPL-MCNC: 1.2 MG/DL (ref 0.7–1.2)
EOSINOPHILS ABSOLUTE: 0.13 E9/L (ref 0.05–0.5)
EOSINOPHILS RELATIVE PERCENT: 3 % (ref 0–6)
GFR AFRICAN AMERICAN: >60
GFR NON-AFRICAN AMERICAN: >60 ML/MIN/1.73
GLUCOSE BLD-MCNC: 79 MG/DL (ref 74–99)
HCT VFR BLD CALC: 23.2 % (ref 37–54)
HEMOGLOBIN: 7.6 G/DL (ref 12.5–16.5)
IRON SATURATION: 37 % (ref 20–55)
IRON: 56 MCG/DL (ref 59–158)
LYMPHOCYTES ABSOLUTE: 0.92 E9/L (ref 1.5–4)
LYMPHOCYTES RELATIVE PERCENT: 21 % (ref 20–42)
MCH RBC QN AUTO: 30.8 PG (ref 26–35)
MCHC RBC AUTO-ENTMCNC: 32.8 % (ref 32–34.5)
MCV RBC AUTO: 93.9 FL (ref 80–99.9)
MONOCYTES ABSOLUTE: 0.22 E9/L (ref 0.1–0.95)
MONOCYTES RELATIVE PERCENT: 5 % (ref 2–12)
NEUTROPHILS ABSOLUTE: 3.12 E9/L (ref 1.8–7.3)
NEUTROPHILS RELATIVE PERCENT: 71 % (ref 43–80)
OVALOCYTES: ABNORMAL
PDW BLD-RTO: 15.4 FL (ref 11.5–15)
PLATELET # BLD: 130 E9/L (ref 130–450)
PMV BLD AUTO: 11.5 FL (ref 7–12)
POIKILOCYTES: ABNORMAL
POLYCHROMASIA: ABNORMAL
POTASSIUM REFLEX MAGNESIUM: 5 MMOL/L (ref 3.5–5)
POTASSIUM SERPL-SCNC: 5 MMOL/L (ref 3.5–5)
RBC # BLD: 2.47 E12/L (ref 3.8–5.8)
SODIUM BLD-SCNC: 142 MMOL/L (ref 132–146)
TOTAL IRON BINDING CAPACITY: 150 MCG/DL (ref 250–450)
TOTAL PROTEIN: 5.9 G/DL (ref 6.4–8.3)
WBC # BLD: 4.4 E9/L (ref 4.5–11.5)

## 2021-11-06 PROCEDURE — P9047 ALBUMIN (HUMAN), 25%, 50ML: HCPCS | Performed by: NURSE PRACTITIONER

## 2021-11-06 PROCEDURE — 80053 COMPREHEN METABOLIC PANEL: CPT

## 2021-11-06 PROCEDURE — 2140000000 HC CCU INTERMEDIATE R&B

## 2021-11-06 PROCEDURE — 6370000000 HC RX 637 (ALT 250 FOR IP): Performed by: FAMILY MEDICINE

## 2021-11-06 PROCEDURE — 6360000002 HC RX W HCPCS: Performed by: NURSE PRACTITIONER

## 2021-11-06 PROCEDURE — 6370000000 HC RX 637 (ALT 250 FOR IP): Performed by: INTERNAL MEDICINE

## 2021-11-06 PROCEDURE — 6370000000 HC RX 637 (ALT 250 FOR IP): Performed by: STUDENT IN AN ORGANIZED HEALTH CARE EDUCATION/TRAINING PROGRAM

## 2021-11-06 PROCEDURE — 74176 CT ABD & PELVIS W/O CONTRAST: CPT

## 2021-11-06 PROCEDURE — 99232 SBSQ HOSP IP/OBS MODERATE 35: CPT | Performed by: FAMILY MEDICINE

## 2021-11-06 PROCEDURE — 85730 THROMBOPLASTIN TIME PARTIAL: CPT

## 2021-11-06 PROCEDURE — 36415 COLL VENOUS BLD VENIPUNCTURE: CPT

## 2021-11-06 PROCEDURE — 80048 BASIC METABOLIC PNL TOTAL CA: CPT

## 2021-11-06 PROCEDURE — 83550 IRON BINDING TEST: CPT

## 2021-11-06 PROCEDURE — 85025 COMPLETE CBC W/AUTO DIFF WBC: CPT

## 2021-11-06 PROCEDURE — 83540 ASSAY OF IRON: CPT

## 2021-11-06 PROCEDURE — 2580000003 HC RX 258: Performed by: INTERNAL MEDICINE

## 2021-11-06 RX ORDER — BENZOCAINE/MENTHOL 6 MG-10 MG
LOZENGE MUCOUS MEMBRANE 2 TIMES DAILY
Status: DISCONTINUED | OUTPATIENT
Start: 2021-11-06 | End: 2021-11-07 | Stop reason: HOSPADM

## 2021-11-06 RX ADMIN — OXYCODONE HYDROCHLORIDE AND ACETAMINOPHEN 500 MG: 500 TABLET ORAL at 09:30

## 2021-11-06 RX ADMIN — Medication 10 ML: at 09:30

## 2021-11-06 RX ADMIN — DOLUTEGRAVIR SODIUM 50 MG: 50 TABLET, FILM COATED ORAL at 21:49

## 2021-11-06 RX ADMIN — HYDROCORTISONE: 1 CREAM TOPICAL at 23:26

## 2021-11-06 RX ADMIN — LAMIVUDINE 300 MG: 100 TABLET, FILM COATED ORAL at 21:50

## 2021-11-06 RX ADMIN — Medication 10 ML: at 21:50

## 2021-11-06 RX ADMIN — ALBUMIN (HUMAN) 25 G: 0.25 INJECTION, SOLUTION INTRAVENOUS at 09:30

## 2021-11-06 RX ADMIN — ALBUMIN (HUMAN) 25 G: 0.25 INJECTION, SOLUTION INTRAVENOUS at 00:17

## 2021-11-06 RX ADMIN — PROPAFENONE HYDROCHLORIDE 150 MG: 150 TABLET, FILM COATED ORAL at 21:49

## 2021-11-06 RX ADMIN — PROPAFENONE HYDROCHLORIDE 150 MG: 150 TABLET, FILM COATED ORAL at 06:13

## 2021-11-06 RX ADMIN — PROPAFENONE HYDROCHLORIDE 150 MG: 150 TABLET, FILM COATED ORAL at 14:01

## 2021-11-06 RX ADMIN — PANTOPRAZOLE SODIUM 40 MG: 40 TABLET, DELAYED RELEASE ORAL at 06:13

## 2021-11-06 RX ADMIN — ACETAMINOPHEN 650 MG: 325 TABLET ORAL at 03:30

## 2021-11-06 RX ADMIN — ABACAVIR 600 MG: 300 TABLET, FILM COATED ORAL at 21:49

## 2021-11-06 ASSESSMENT — PAIN SCALES - GENERAL
PAINLEVEL_OUTOF10: 3
PAINLEVEL_OUTOF10: 0

## 2021-11-06 NOTE — PROGRESS NOTES
[facility] PROGRESS NOTE      Chief complaint: Follow-up of Pneumocystis pneumonia, late latent syphilis, advanced HIV disease    The patient is a 36 y.o. male, not vaccinated against COVID-19, with history of asthma, presented on 10/05 with shortness of breath, found to be febrile at 103 °F, positive SARS-CoV-2 PCR, bilateral groundglass opacities on chest CT, tolerating room air with oxygen saturation of 97%. He reports having tested positive for SARS-CoV-2 for the 3rd time now with previous on in late August at which time he reports having quarantined. Urine Streptococcus pneumonia and Legionella antigens were negative. Blood cultures from 10/05 and 10/06 showed methicillin-susceptible Staphylococcus epidermidis in 1 out of 2 sets. HIV screen was positive with viral load of 195,000, GART and INSTI resistance unremarkable. CD4 count was low at 43. RPR was reactive at titer of 1:2. Toxoplasma Ab was negative. HLA- was negative. Serum beta-d-glucan was positive. Transthoracic echocardiogram showed late positive bubble suggesting possible shunt from pulmonary origin, moderately dilated left atrium, small pericardial effusion without tamponade physiology, ejection fraction visually estimated at 55%. He underwent bronchoscopy on 10/15 during which normal endobronchial evaluation and normal anatomy were noted. BAL Gram stain and culture showed rare polymorphonuclear leukocytes, rare epithelial cells, rare gram-negative rods, rare gram-positive rods, reduced oropharyngeal liana. BAL pneumocystis stain was negative. BAL galactomannan was negative. Serum Cryptococcus antigen was negative. He underwent lumbar puncture on 10/21, yielding CSF with normal glucose, protein, csll count with differential. CSF meningitis/encephalitis PCR panel was negative for HSV, VZV, CMV, Enterovirus, HHV-6, Streptococcus pneumoniae and agalactiae, E coli, Haemophilus, Neisseria meninigitidis, Cryptococcus. CSF VDRL was negative.  He was transferred to MICU on 10/23 for worsening shortness of breath then transferred out on 10/26. He underwent renal biopsy on 10/27 with histopathology results inconclusive stating limited sample consisting of renal medulla by light microscopy and mild glomerular C3 deposition by immunofluorescence. Retroperitoneal US showed no sign of retroperitoneal mass/hemorrhage. Subjective: Patient was seen and examined. No chills, no abdominal pain, no diarrhea, no rash, no itching. Afebrile. He reports feeling well. Objective:  BP (!) 131/92   Pulse 100   Temp 96.4 °F (35.8 °C) (Axillary)   Resp 16   Ht 6' 4\" (1.93 m)   Wt 196 lb 3.4 oz (89 kg)   SpO2 100%   BMI 23.88 kg/m²   Constitutional: Alert, not in distress  Respiratory: Clear breath sounds, no crackles, no wheezes  Cardiovascular: Regular rate and rhythm, no murmurs  Gastrointestinal: Bowel sounds present, soft, nontender  Skin: Warm and dry, no active dermatoses  Musculoskeletal: No joint swelling, no joint erythema    Labs, imaging, and medical records/notes were personally reviewed. Assessment:  Pancytopenia, multifactorial, suspect associated with acute retroviral syndrome   COVID-19, mild  Pneumonia, suspected Pneumocystis pneumonia (serum beta-d-glucan was positive at >500 pg/mL, BAL Pneumocystis stain was negative but Pneumocystis PCR was not sent). Received treatment with high-dose Bactrim then switched to pentamidine due to pancytopenia. Late latent syphilis, neurosyphilis ruled out. Received 3 weekly benzathine penicillin. Methicillin susceptible Staphylococcus epidermidis bacteremia, etiology unclear  DEISI, resolved  Advanced HIV disease (viral load of 195,000; CD4 of 43 as of 10/11/2021). Hepatitis C Ab positive (viral load not detected). Hepatitis A immune  Hematuria, resolved, probably associated with renal biopsy     Recommendations:  Continue lamivudine at 300 mg q24h, dolutegravir 50 mg q24h, abacavir 600 mg q24h.  Switch to single pill combination Triumeq, if available. Follow up with me in 2 weeks after discharge. Continue supportive care. Thank you for involving me in the care of Kristin James. I will continue to follow. Please do not hesitate to call for any questions or concerns.     Electronically signed by Terri Shirley MD on 11/6/2021 at 11:19 AM

## 2021-11-06 NOTE — PROGRESS NOTES
[address] [place] [address] Attending    S: 36 y.o. male with history of asthma and smoking history presented with increasing dyspnea and cough over past month. On presentation had fever of 103. Has tested positive for COVID twice, most recently end of August.  CT shows extensive bilateral pneumonia and COVID testing again positive. HIV pos, CD4 43. Early AM 10/23,  RRT called due to tachycardia and oxygen desaturation requiring oxygen from 3L NC to 15LNRB mask. Patient had been febrile overnight as well and transferred to MICU. Improved and transferred back to floor. Treatment initiated for HIV, but noted to have mild pancytopenia and thrombocytopenia to less than 50. Renal failure developed. Required temporary dialysis. Currently off dialysis, with improving renal function. No complaints other than feeling frustrated about prolonged hospital stay and persistent , non improving anemia. O: VS- Blood pressure (!) 131/92, pulse 100, temperature 96.4 °F (35.8 °C), temperature source Axillary, resp. rate 16, height 6' 4\" (1.93 m), weight 196 lb 3.4 oz (89 kg), SpO2 100 %. Exam is as noted by resident   Awake, alert and oriented. Heart -regular rhythm, no gallop  Lungs- clear breath sounds bilaterally. No abdominal masses or tenderness  Ext - no edema. Impressions:  Principal Problem:    Acute respiratory failure with hypoxia (HCC)  Active Problems:    Acute kidney injury due to COVID-19 ([facility] [address]    Asthma     COVID-19    Symptomatic HIV infection ([facility] [address]    Pancytopenia (HCC)  Platelet count improving  Creatinine improved from 2.8-2.1-1.9  Hemoglobin not steadily improved yet, currently 7.8 ff transfusion      Plan   Receiving counseling from Psychology    Appreciate Nephrology, ID, MICU, cardiology, gen surgery, H/Onc, pulmonology. Covid pneumonia/PCP pneumonia with sepsis - resolved.    New diagnosis of HIV, ID managing antibiotics and antivirals for HIV, currently on lamivudine/abacavir/dolutegravir   RPR positive. S/p LP. VDRL CSF negative. PCN finished     Sinus tachycardia - Echo with EF 55%, small pericardial effusion and possible atrial shunt. EKG suspicious for WPW like syndrome, although not meeting all criteria. EP has seen. On propafenone. Will follow as outpatient  Hemodialysis held  Creatinine improving  Platelets improving since heparin is discontinued    Stool sample  Retroperitoneal ultrasound to see if renal biopsy site might be source of dropping hemoglobin, although he is now asymptomatic  Check reticulocyte count. Talk to nephro about possible retacrit    Other possible reasons are anemia of chronic disease and polypharmacy          Attending Physician Statement  I have reviewed the chart and seen the patient with the resident(s). I personally reviewed images, EKG's and similar tests, if present. I personally reviewed and performed key elements of the history and exam.  I have reviewed and confirmed student and/or resident history and exam with changes as indicated above. I agree with the assessment, plan and orders as documented by the resident. Please refer to the resident and/or student note for additional information. Ahsan Moura.  Kathy Ordonez MD

## 2021-11-06 NOTE — PROGRESS NOTES
Department of Internal Medicine  Nephrology Progress Note      Events reviewed. Right HD line removed. SUBJECTIVE:  We are following Mr. Bobby Santiago for DEISI on CKD. Patient is upset and frustrated this afternoon. He wants to go home.        PHYSICAL EXAM:      Vitals:    VITALS:  BP (!) 131/92   Pulse 100   Temp 96.4 °F (35.8 °C) (Axillary)   Resp 16   Ht 6' 4\" (1.93 m)   Wt 196 lb 3.4 oz (89 kg)   SpO2 100%   BMI 23.88 kg/m²   24HR INTAKE/OUTPUT:      Intake/Output Summary (Last 24 hours) at 11/6/2021 1643  Last data filed at 11/6/2021 1455  Gross per 24 hour   Intake 240 ml   Output 900 ml   Net -660 ml        Young cachectic: looking male  Constitutional:  Awake, alert, sitting up in bed  HEENT:  PERRL, normocephalic, atraumatic  Respiratory: diminished lung sounds  Cardiovascular/Edema:  RRR, S1/S2, -edema  Gastrointestinal:  abd flat, soft, non-tender, Perkins in place  Neurologic:  Awake, alert, no focal deficits  Skin:  Warm, dry, no rash or lesions  Other: No LE edema      Scheduled Meds:   hydrocortisone-aloe   Topical BID    lamiVUDine  300 mg Oral Nightly    propafenone  150 mg Oral 3 times per day    abacavir  600 mg Oral Nightly    dolutegravir sodium  50 mg Oral Nightly    [Held by provider] heparin (porcine)  5,000 Units SubCUTAneous Q8H    lidocaine  1 patch TransDERmal Daily    ascorbic acid  500 mg Oral Daily    pantoprazole  40 mg Oral QAM AC    sodium chloride flush  5-40 mL IntraVENous 2 times per day     Continuous Infusions:   sodium chloride      sodium chloride       PRN Meds:.sodium chloride, medicated lip balm, benzonatate, perflutren lipid microspheres, hydrOXYzine, [Held by provider] labetalol, sodium chloride flush, ondansetron **OR** ondansetron, polyethylene glycol, acetaminophen **OR** acetaminophen, sodium chloride    DATA:    CBC:   Lab Results   Component Value Date    WBC 4.4 11/06/2021    RBC 2.47 11/06/2021    HGB 7.6 11/06/2021    HCT 23.2 11/06/2021 MCV 93.9 11/06/2021    MCH 30.8 11/06/2021    MCHC 32.8 11/06/2021    RDW 15.4 11/06/2021     11/06/2021    MPV 11.5 11/06/2021     CMP:    Lab Results   Component Value Date     11/06/2021    K 5.0 11/06/2021    K 5.0 11/06/2021     11/06/2021    CO2 18 11/06/2021    BUN 12 11/06/2021    CREATININE 1.2 11/06/2021    GFRAA >60 11/06/2021    LABGLOM >60 11/06/2021    GLUCOSE 79 11/06/2021    PROT 5.9 11/06/2021    LABALBU 3.5 11/06/2021    CALCIUM 8.5 11/06/2021    BILITOT 0.5 11/06/2021    ALKPHOS 82 11/06/2021    AST 21 11/06/2021    ALT 27 11/06/2021     Magnesium:    Lab Results   Component Value Date    MG 1.7 10/29/2021     Phosphorus:    Lab Results   Component Value Date    PHOS 2.8 10/29/2021        Radiology Review:       Ejection fraction is visually estimated at 55%. There is late positive bubble suggesting possible shunt from pulmonary   origin. Visually moderately dilated left atrium. Normal right ventricular size and function. There is small pericardial effusion without tamponade physiology        CT Chest October 5, 2021   Limited study by the patient's respiratory motion and lack of intravenous   contrast.       Extensive multifocal COVID-19 pneumonia bilaterally. Bilateral trace layering pleural effusions. Bilateral gynecomastia. Abdominal organs inadequately evaluated due to the limitations of the study. Normal appendix. No evidence of bowel obstruction. Kidney ultrasound October 7, 2021   1. Hyperechoic kidneys due to underlying parenchymal disease. 2. Trace bilateral perinephric fluid of indeterminate etiology, potentially   due to underlying inflammation. 3. An approximately 1.1 cm x 1.5 cm x 2.3 cm lesion in the right kidney could   be a cyst but could also be seen with infarction, abscess, or neoplasia. Consider further evaluation with renal protocol abdomen MRI (preferred) or   CT.   Following resolution of acute kidney injury. CTA pulmonary with IV contrast October 13, 2021   1. No evidence of pulmonary embolism. 2. Interval increased size of bilateral pleural effusions with little change   in pericardial effusion. 3. Persistent subcutaneous edema. 4. Reactive bilateral hilar lymphadenopathy. 5. Bilateral airspace disease is increased compared to prior examination. The increase is most pronounced within the lower lobes. 6. Bilateral gynecomastia. Renal US November 5, 2021   1. Minimal free intraperitoneal fluid. 2. No sign of retroperitoneal mass/hemorrhage. BRIEF SUMMARY OF INITIAL CONSULT:     Briefly, Mr. Martin Machado is a 36year old male with no significant PMH who was admitted on October 5, 2021 after he presented from an outside clinic after he was found to be hypoxic during 6 minute walking test. Patient tested positive for Covid 19 on August 18th, he is unvaccinated and had Covid 19 last year as well. Patient states he has quarantined by himself and when his mother recently visited him she was shocked at his appearance as he had lost about 50 pounds in 2 and a half months. On admission labs were significant for sodium of 130, potassium 5.2, bicarbonate 20, BUN 41, creatinine 3.6, magnesium 2.7, calcium 7.7 and proBNP 1,599. We are consulted for DEISI. Problems resolved:    Hyperkalemia, 2/2 DEISI. Low potassium diet  Hypotonic hyponatremia 2/2 intravascular volume from poor oral intake. Bicarb drip started. Sodium levels improving. Low bicarbonate levels with hyperchloremia, most likely NAGMA versus respiratory alkalosis; we need a PH to clarify diagnosis.  Awaiting ABG results  High bicarbonate levels, likely metabolic alkalosis 2/2 administration  Hypokalemia, multifactorial, poor intake, probably renal potassium wasting  Severe Hypoalbuminemia, multifactorial, status post albumin administration  DEISI on CKD, volume responsive pre-renal DEISI d/t volume (poor oral intake), urine sodium <20. Resolved. Edematous state, multifactorial, mainly 2/2 nephrotic syndrome and possibly HFpEF 55%, on bumex  Hyponatremia, multifactorial, including decreased GFR and hemodynamically mediated in the setting of large hypotonic fluid administration (D5 with Bactrim). Sodium levels decrease, stable overnight  Low bicarbonate levels with hyperchloremia, consistent with either hyperchloremic metabolic acidosis versus respiratory alkalosis. Bicarbonate levels improved with bicarbonate drip. HTN, on metoprolol   Probably fungemia, (1, 3) beta-D-glucan positive, on anidulafungin  MSSE bacteremia, on cefepime 2 g every 8 hours  Sinus tachycardia, multifactorial  Hypomagnesemia, 2/2 poor intake   Leukopenia, WBC 1.1  Gross hematuria, status post kidney biopsy. Resolved    IMPRESSION/RECOMMENDATIONS:     Recurrent DEISI on CKD, ATN contrast associated DEISI versus ischemic ATN (hypotension related tachycardia). Nonoliguric. Started on renal replacement therapy on October 25, 2021. Renal function continues to improve daily, dialysis catheter has been removed. 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 Bridget Marte Work-up so far HIV positive, Hepatitis C positive, MARILEE negative, C4 normal, C3 88 (low), UPEP without monoclonal gammopathy, negative cryoglobulins. Kidney ultrasound with hyperechoic kidneys. Status post kidney biopsy October 27, 2021. Repeat UACR 2800 mg/g, UPCR 3.3 (November 4, 2021)  Low bicarbonate level, 18 today  Probably HFpEF 55%, proBNP 11,401 > 3421   Right kidney lesion, likely a cyst but cannot be ruled out other pathology including infarction, abscess, neoplasm. We will proceed with MRI when renal function improved.   HIV positive, CD4 count 43, started on OLMSTEAD therapy    ---------------------------------------------------------------------    Anemia with acute drop in Hgb s/p transfusion. Repeat renal US negative for bleed. H&H stable today. 7.6  Possible pneumocystic pneumonia, on pentamidine  Possibly acute retroviral syndrome Jarische-Herxheimer reaction? Hepatitis C antibody positive, viral load not detected  Covid 19 infection in non vaccinated pt  Normocytic anemia, multifactorial, 7.6/23.2  Tachycardia ?anxiety      Plan:    Kidney biopsy results inconclusive, will need repeat biopsy. This can be done in the outpatient setting.   Continue to monitor renal function for recovery daily  Monitor H&H, transfuse <7  Monitor bicarbonate levels  Finish albumin 25 g Q 8 hrs X3 doses       Celine Dorsey MD, locum tenens physician covering for Dr. Kimberly Lagos

## 2021-11-06 NOTE — PROGRESS NOTES
[facility] - [facility] Inpatient   Resident Progress Note    S:  Hospital day: 28   Brief Synopsis: Philip Figueroa is a 36 y.o. male with no PMH who presented with hypoxia and fever. He was initially seen in PCP office and was hypoxic on exertion with desaturate into the mid low 80s as well as noted to be febrile and tachycardic. Per patient, his symptoms began 8/15/2021 and he tested positive for Covid 8/28. endorse worsening shortness of breath over the past month with sputum production, intermittent chest discomfort, persistent diarrhea, chills, fever, decrease in appetite and 50 pound weight loss since August.  Patient also endorses significantly worsening \"neuropathy\" of bilateral feet, states it feels like he is \"being stabbed by needles\" which is causing him inability to walk due to pain. In the ED, he was found to be anemic, hyperkalemic, hyponatremic, febrile and COVID positive. He also had elevated Creatinine, AST, proBNP and troponin x2. CTA showed extensive multifocal COVID-19 pneumonia bilaterally, bilateral trace layering pleural effusion and bilateral gynecomastia. CT abdomen was limited due to movement and non-contrast, but showed normal appendix and no evidence of bowel obstruction. Admitted for acute hypoxia due to Covid pneumonia. Nephro, pulm and ID consulted. Started on bicarb drip. US showed hyperechoic kidneys, trace b/l perinephric fluid and right kidney lesion (Cyst vs infarction vs abscess vs neoplasia). HIV and Hep C screen were positive. HIV RNA and T and B lymphocytes were ordered. Patient became more hypoxic, tachycardic and hypertensive so  there was concern for PE. CTA was ordered, which came back negative. Echo was completed due to elevated BNP and concern for PE and that revealed a normal EF with late bubble sign possible shunting of pulmonary origin. BNP continued to increase. Cardiology was consulted. Repeat Echo pending.  ID recommends LP with CSF sent for glucose, protein, cell count with differential, which were all  negative. meningitis/encephalitis PCR panel negative. RRT called 10/23 for hypoxia and tachycardia, transferred to medical intensive on 15 L NRB. Creatinine up trending. Currently saturating well on room air and was transferred out of the ICU. Hemodialysis x4 days for worsening renal function. CSF VDRL negative, but continue to treat for latent syphilis. Renal biopsy performed on 10/27. Endorses hematuria s/p biopsy. CT abdomen showed no acute abnormalities. Patients platelets and creatinine improving, hemodialysis on hold at this point. 11/2/2021 - H/H 6.6, patient received 1 unit PRBC. H&H stabilized for 1 day above 7, 11/4/2021, patient hemoglobin dropped again to 6.8, requiring another PRBC transfusion. Retroperitoneal ultrasound negative, minimal intraperitoneal fluid already seen on previous ultrasound as well., repeat FOBT ordered. H/H continues to remain unstable. Repeat retic count. Patient seen and examined this AM.  Patient's H&H continues to slowly decline s/p transfusion. Patient has recieved 2 RBC transfusions this week. Continued to advise that  this could be multifactorial.  Awaiting results of retroperitoneal ultrasound to rule out development of hematoma. Creatinine normalized, platelets trending towards normal.  HIV treatment includes lamivudine, dolutegravir, abacavir; trying to switch to Triumeq  Patient has already received his last dose of penicillin. Patient refusing rectal examination for FOBT at this time, states that he will try to leave a stool sample to perform FOBT.     No other concerns this morning    Cont meds:    sodium chloride      sodium chloride       Scheduled meds:    lamiVUDine  300 mg Oral Nightly    propafenone  150 mg Oral 3 times per day    abacavir  600 mg Oral Nightly    dolutegravir sodium  50 mg Oral Nightly    [Held by provider] heparin (porcine)  5,000 Units SubCUTAneous Q8H    lidocaine  1 patch TransDERmal Daily    ascorbic acid  500 mg Oral Daily    pantoprazole  40 mg Oral QAM AC    sodium chloride flush  5-40 mL IntraVENous 2 times per day     PRN meds: sodium chloride, medicated lip balm, benzonatate, perflutren lipid microspheres, hydrOXYzine, [Held by provider] labetalol, sodium chloride flush, ondansetron **OR** ondansetron, polyethylene glycol, acetaminophen **OR** acetaminophen, sodium chloride     I reviewed the patient's past medical and surgical history, Medications and Allergies. O:  BP (!) 131/92   Pulse 100   Temp 96.4 °F (35.8 °C) (Axillary)   Resp 16   Ht 6' 4\" (1.93 m)   Wt 196 lb 3.4 oz (89 kg)   SpO2 100%   BMI 23.88 kg/m²   24 hour I&O: I/O last 3 completed shifts:  In: -   Out: 450 [Urine:450]  No intake/output data recorded. Physical Exam  Vitals reviewed. Constitutional:       General: He is not in acute distress. Appearance: Normal appearance. He is not ill-appearing, toxic-appearing or diaphoretic. HENT:      Head: Normocephalic and atraumatic. Nose: No rhinorrhea. Mouth/Throat:      Mouth: Mucous membranes are moist.      Pharynx: Oropharynx is clear. Eyes:      General: No scleral icterus. Right eye: No discharge. Left eye: No discharge. Extraocular Movements: Extraocular movements intact. Cardiovascular:      Rate and Rhythm: Regular rhythm. Tachycardia present. Pulses: Normal pulses. Heart sounds: Normal heart sounds. No murmur heard. No friction rub. No gallop. Pulmonary:      Effort: No respiratory distress. Breath sounds: No wheezing, rhonchi or rales. Abdominal:      General: Abdomen is flat. Palpations: Abdomen is soft. Tenderness: There is no abdominal tenderness. Musculoskeletal:         General: No swelling. Cervical back: Normal range of motion. No rigidity or tenderness. Lumbar back: No swelling or spasms. Normal range of motion.    Skin:     General: Skin is warm and dry. Coloration: Skin is not jaundiced. Findings: No bruising or rash. Neurological:      General: No focal deficit present. Mental Status: He is alert and oriented to person, place, and time. Cranial Nerves: No cranial nerve deficit. Motor: No weakness. Psychiatric:         Behavior: Behavior normal.         Thought Content: Thought content normal.       Labs:  Na/K/Cl/CO2:  142/5.0, 5.0/112/18 (11/06 8393)  BUN/Cr/glu/ALT/AST/amyl/lip:  12/1.2/--/27/21/--/-- (11/06 5963)  WBC/Hgb/Hct/Plts:  4.4/7.6/23.2/130 (11/06 0511)  estimated creatinine clearance is 112 mL/min (based on SCr of 1.2 mg/dL). Other pertinent labs as noted below    Radiology:  US RETROPERITONEAL LIMITED   Final Result   1. Minimal free intraperitoneal fluid. 2. No sign of retroperitoneal mass/hemorrhage. CT ABDOMEN PELVIS WO CONTRAST Additional Contrast? None   Final Result   Post right renal biopsy there is expected sequela of recent biopsy, no   significant hemorrhage identified                                        CT BIOPSY RENAL   Final Result   Successful uncomplicated CT and fluoroscopic guided right   renal biopsy. If there are any physician concerns regarding this report, a   Radiologist can be reached by calling the following number [phone]. CT GUIDED NEEDLE PLACEMENT   Final Result   Successful uncomplicated CT and fluoroscopic guided right   renal biopsy. If there are any physician concerns regarding this report, a   Radiologist can be reached by calling the following number [phone]. XR CHEST PORTABLE   Final Result   Mild worsening of extensive bilateral infiltrates         XR CHEST PORTABLE   Final Result   Increasing bilateral airspace disease suggesting worsening pneumonia. FL LUMBAR PUNCTURE DIAG   Final Result   Successful fluoroscopic-guided lumbar puncture.          XR CHEST PORTABLE   Final Result   Extensive bilateral patchy areas of ground-glass opacity concerning for   atypical pneumonia or viral airway disease, improved         XR CHEST PORTABLE   Final Result   Persistent bilateral infiltrates. No significant changes since October 13. See above comments. See report CT chest October 13. CTA PULMONARY W CONTRAST   Final Result   1. No evidence of pulmonary embolism. 2. Interval increased size of bilateral pleural effusions with little change   in pericardial effusion. 3. Persistent subcutaneous edema. 4. Reactive bilateral hilar lymphadenopathy. 5. Bilateral airspace disease is increased compared to prior examination. The increase is most pronounced within the lower lobes. 6. Bilateral gynecomastia. XR CHEST PORTABLE   Final Result   Development of extensive bilateral pulmonary infiltrates         XR CHEST PORTABLE   Final Result   No acute process. US RETROPERITONEAL COMPLETE   Final Result   1. Hyperechoic kidneys due to underlying parenchymal disease. 2. Trace bilateral perinephric fluid of indeterminate etiology, potentially   due to underlying inflammation. 3. An approximately 1.1 cm x 1.5 cm x 2.3 cm lesion in the right kidney could   be a cyst but could also be seen with infarction, abscess, or neoplasia. Consider further evaluation with renal protocol abdomen MRI (preferred) or   CT. Following resolution of acute kidney injury. XR CHEST PORTABLE   Final Result   Multifocal bilateral pulmonary ground-glass airspace opacities are stable         CT ABDOMEN PELVIS WO CONTRAST Additional Contrast? None   Final Result   Limited study by the patient's respiratory motion and lack of intravenous   contrast.      Extensive multifocal COVID-19 pneumonia bilaterally. Bilateral trace layering pleural effusions. Bilateral gynecomastia. Abdominal organs inadequately evaluated due to the limitations of the study. Normal appendix. No evidence of bowel obstruction. CT CHEST WO CONTRAST   Final Result   Limited study by the patient's respiratory motion and lack of intravenous   contrast.      Extensive multifocal COVID-19 pneumonia bilaterally. Bilateral trace layering pleural effusions. Bilateral gynecomastia. Abdominal organs inadequately evaluated due to the limitations of the study. Normal appendix. No evidence of bowel obstruction.              Resident Assessment and Plan     Acute hypoxia 2/2 COVID pneumonia vs PCP  - CTA on admission: COVID pneumonia, Bilateral trace layering pleural effusion, negative for PE   - Ferritin 3900, CRP 10.1 on admission   - D dimer elevated on admission, currently stable  - Pulmonology and ID on board  - Legionella, spot TB test negative   - CXR 10/8 shows multifocal groundglass opacities are stable  - Repeat CXR 10/17: b/l pulm infiltrates improving   - Blood cx: staph epidermitis, repeat blood cx shows no growth x 7 days   -Urine culture: NGTD   - ECHO 10/13: EF 55%, late bubble sign possible shunting of pulmonary origin   - CTA 10/13 negative for PE  Bronchoscopy with BAL performed on 10/15/2021   - Respiratory culture: oral pharyngeal liana decreased, few gram negative rods   CXR 10/23: Mild worsening extensive bilateral infiltrates  - Currently maintaining saturation on room air, continue to monitor  - prednisone course completed (11/2/2021)  - Repeat blood culture 10/21 showed NGTD x5days    - Fungal cultures negative x 1 week    Weight loss likely secondary to HIV  - Per patient due to decreased appetite from COVID; 50lb weight loss since August   - R/o other potential origins: SLE, TB, hepatitis  - MARILEE negative  - T spot TB test negative   HIV, hepatitis C antibodies positive  - Elevated IgG and IgA, normal IgM: IgA nephropathy vs MPGN?   - Cryoglobulin normal   - TSH normal   - Sed rate and CRP trending towards normal  - continues to gain weight appropriately during hospital stay    HIV  HIV 1/2 antibodies positive    CD4 44, CD3 153, CD8 100  HIV viral load 195,000  - ID on board: received vancomycin, ancef; stopped cefepime 10/26  - Fungitell test positive x2, received eraxis  - Bactrim d/c'd due to renal failure; on pentamidine for suspected Pneumocystis pneumonia  - Pneumocystis stain negative, but PCR pending; still suspect PCP pna  - HIV GART pending   - [id number] negative, genotype pending  - Toxoplasma Ab, cryptococcus negative  - hepatitis A total Ab positive  - RPR reactive, quant 1:2, CSF VDRL negative   - On PCN G weekly for latent syphilis (received 2/3 doses)  - Started on biktarvy 10/22, d/c'd due to renal failure   - Started on lamivudine, dolutegravir, and abacavir 10/25 : Switch to Triumeq  - receiving penecillin prophylaxis IM weekly x 3 doses , completed dosing    Hematuria - resolved   - benjie blood in urine s/p renal biopsy 10/27  - Hb trending down , s/p 1 transfusion 11/2/2021 , h/h improved to 7.3  - Continue to monitor H/H     Anemia   Likely multifactorial  GI bleed vs retroperitoneal hematoma vs CKD vs change in medication/polypharmacy? ? Pancytopenia-improving  - Likely 2/2 from GI bleed vs HIV   Peripheral blood smear shows normocytic anemia, absolute lymphocytopenia; no platelet clumping, schistocytes or dysplasia  - Heme Onc consulted: received granix to improve ANC  Transfused 4 units pRBC since admission  - H/H drop to 6.6 11/2/2021 ; transfuse 1 unit PRBC , H&H dropped to 6.8 on 11/4/2021, transfuse 1 unit PRBC  - FOBT negative x3 , repeat FOBT at this time  - Platelets trending up, transfusion if bleeding or plts <20k  , normalized   - Transfused 2U platelets prior to and during kidney biopsy   We will continue to monitor for now; retroperitoneal ultrasound negative for any retroperitoneal hematoma  -Patient refusing rectal examination for FOBT  - Will continue to monitor H&H, transfuse if H/H< 7.  - consideration for retacrit? Repeat iron studies?     DEISI Hyponatremia - resolved   - Na normalized    - Nephro on board ; hold dialysis for now with Cr improving  - Likely secondary to dehydration from persistent diarrhea vs decreased PO intake vs bactrim vs HIVANS vs Biktarvy  - Cr improving after HD,3.7 ---> 2.1--> 1.8 today --> 1.2  Held Biktarvy   - urine studies show intrinsic cause of DEISI  - Microalbumin-creatinine ratio elevated   Fluid restriction  - Oliguric (0.2 mL/kg/hr)  - received dialysis  10/25 x3days  - S/p Kidney biopsy; mild C3 deposition noted ; biopsy inconclusive, patient will need repeat biopsy outpatient per nephrology.     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 Ayesha Raygoza

## 2021-11-07 VITALS
SYSTOLIC BLOOD PRESSURE: 140 MMHG | HEART RATE: 109 BPM | BODY MASS INDEX: 24.48 KG/M2 | WEIGHT: 201 LBS | TEMPERATURE: 97.9 F | DIASTOLIC BLOOD PRESSURE: 89 MMHG | OXYGEN SATURATION: 100 % | HEIGHT: 76 IN | RESPIRATION RATE: 16 BRPM

## 2021-11-07 PROBLEM — J96.01 ACUTE RESPIRATORY FAILURE WITH HYPOXIA (HCC): Status: RESOLVED | Noted: 2021-10-05 | Resolved: 2021-11-07

## 2021-11-07 PROBLEM — B20 CURRENTLY ASYMPTOMATIC HIV INFECTION, WITH HISTORY OF HIV-RELATED ILLNESS (HCC): Status: ACTIVE | Noted: 2021-10-30

## 2021-11-07 PROBLEM — Z99.2 ACUTE HEMODIALYSIS PATIENT (HCC): Status: RESOLVED | Noted: 2021-10-30 | Resolved: 2021-11-07

## 2021-11-07 PROBLEM — D61.818 PANCYTOPENIA (HCC): Status: RESOLVED | Noted: 2021-10-30 | Resolved: 2021-11-07

## 2021-11-07 LAB
ALBUMIN SERPL-MCNC: 3.5 G/DL (ref 3.5–5.2)
ALP BLD-CCNC: 85 U/L (ref 40–129)
ALT SERPL-CCNC: 29 U/L (ref 0–40)
ANION GAP SERPL CALCULATED.3IONS-SCNC: 9 MMOL/L (ref 7–16)
ANISOCYTOSIS: ABNORMAL
APTT: 27.5 SEC (ref 24.5–35.1)
AST SERPL-CCNC: 23 U/L (ref 0–39)
ATYPICAL LYMPHOCYTE RELATIVE PERCENT: 2 % (ref 0–4)
BASOPHILS ABSOLUTE: 0 E9/L (ref 0–0.2)
BASOPHILS RELATIVE PERCENT: 0 % (ref 0–2)
BILIRUB SERPL-MCNC: 0.4 MG/DL (ref 0–1.2)
BUN BLDV-MCNC: 14 MG/DL (ref 6–20)
CALCIUM SERPL-MCNC: 8.3 MG/DL (ref 8.6–10.2)
CHLORIDE BLD-SCNC: 109 MMOL/L (ref 98–107)
CO2: 21 MMOL/L (ref 22–29)
CREAT SERPL-MCNC: 1.1 MG/DL (ref 0.7–1.2)
EOSINOPHILS ABSOLUTE: 0.12 E9/L (ref 0.05–0.5)
EOSINOPHILS RELATIVE PERCENT: 3 % (ref 0–6)
GFR AFRICAN AMERICAN: >60
GFR NON-AFRICAN AMERICAN: >60 ML/MIN/1.73
GLUCOSE BLD-MCNC: 86 MG/DL (ref 74–99)
HCT VFR BLD CALC: 23.2 % (ref 37–54)
HCT VFR BLD CALC: 23.5 % (ref 37–54)
HEMOGLOBIN: 7.6 G/DL (ref 12.5–16.5)
IMMATURE RETIC FRACT: 5.9 % (ref 2.3–13.4)
LYMPHOCYTES ABSOLUTE: 0.82 E9/L (ref 1.5–4)
LYMPHOCYTES RELATIVE PERCENT: 19 % (ref 20–42)
MCH RBC QN AUTO: 30.6 PG (ref 26–35)
MCHC RBC AUTO-ENTMCNC: 32.3 % (ref 32–34.5)
MCV RBC AUTO: 94.8 FL (ref 80–99.9)
MONOCYTES ABSOLUTE: 0.12 E9/L (ref 0.1–0.95)
MONOCYTES RELATIVE PERCENT: 3 % (ref 2–12)
NEUTROPHILS ABSOLUTE: 2.85 E9/L (ref 1.8–7.3)
NEUTROPHILS RELATIVE PERCENT: 73 % (ref 43–80)
OVALOCYTES: ABNORMAL
PDW BLD-RTO: 15.6 FL (ref 11.5–15)
PLATELET # BLD: 160 E9/L (ref 130–450)
PMV BLD AUTO: 10.9 FL (ref 7–12)
POIKILOCYTES: ABNORMAL
POLYCHROMASIA: ABNORMAL
POTASSIUM REFLEX MAGNESIUM: 4.7 MMOL/L (ref 3.5–5)
POTASSIUM SERPL-SCNC: 4.7 MMOL/L (ref 3.5–5)
RBC # BLD: 2.48 E12/L (ref 3.8–5.8)
RETIC HGB EQUIVALENT: 38.1 PG (ref 28.2–36.6)
RETICULOCYTE ABSOLUTE COUNT: 0.03 E12/L
RETICULOCYTE COUNT PCT: 1.2 % (ref 0.4–1.9)
SODIUM BLD-SCNC: 139 MMOL/L (ref 132–146)
TOTAL PROTEIN: 6 G/DL (ref 6.4–8.3)
WBC # BLD: 3.9 E9/L (ref 4.5–11.5)

## 2021-11-07 PROCEDURE — 85045 AUTOMATED RETICULOCYTE COUNT: CPT

## 2021-11-07 PROCEDURE — 6370000000 HC RX 637 (ALT 250 FOR IP): Performed by: INTERNAL MEDICINE

## 2021-11-07 PROCEDURE — 85730 THROMBOPLASTIN TIME PARTIAL: CPT

## 2021-11-07 PROCEDURE — 2580000003 HC RX 258: Performed by: INTERNAL MEDICINE

## 2021-11-07 PROCEDURE — 6370000000 HC RX 637 (ALT 250 FOR IP): Performed by: STUDENT IN AN ORGANIZED HEALTH CARE EDUCATION/TRAINING PROGRAM

## 2021-11-07 PROCEDURE — 85025 COMPLETE CBC W/AUTO DIFF WBC: CPT

## 2021-11-07 PROCEDURE — 36415 COLL VENOUS BLD VENIPUNCTURE: CPT

## 2021-11-07 PROCEDURE — 80053 COMPREHEN METABOLIC PANEL: CPT

## 2021-11-07 PROCEDURE — 99239 HOSP IP/OBS DSCHRG MGMT >30: CPT | Performed by: FAMILY MEDICINE

## 2021-11-07 PROCEDURE — 99253 IP/OBS CNSLTJ NEW/EST LOW 45: CPT | Performed by: SURGERY

## 2021-11-07 PROCEDURE — 80048 BASIC METABOLIC PNL TOTAL CA: CPT

## 2021-11-07 RX ORDER — PROPAFENONE HYDROCHLORIDE 150 MG/1
150 TABLET, FILM COATED ORAL EVERY 8 HOURS SCHEDULED
Qty: 90 TABLET | Refills: 3 | Status: SHIPPED | OUTPATIENT
Start: 2021-11-07 | End: 2021-11-08 | Stop reason: SDUPTHER

## 2021-11-07 RX ORDER — PANTOPRAZOLE SODIUM 40 MG/1
40 TABLET, DELAYED RELEASE ORAL
Qty: 30 TABLET | Refills: 3 | Status: CANCELLED | OUTPATIENT
Start: 2021-11-08

## 2021-11-07 RX ORDER — ASCORBIC ACID 500 MG
500 TABLET ORAL DAILY
Qty: 30 TABLET | Refills: 3 | Status: SHIPPED | OUTPATIENT
Start: 2021-11-08 | End: 2021-11-08 | Stop reason: SDUPTHER

## 2021-11-07 RX ADMIN — PANTOPRAZOLE SODIUM 40 MG: 40 TABLET, DELAYED RELEASE ORAL at 06:08

## 2021-11-07 RX ADMIN — PROPAFENONE HYDROCHLORIDE 150 MG: 150 TABLET, FILM COATED ORAL at 13:14

## 2021-11-07 RX ADMIN — OXYCODONE HYDROCHLORIDE AND ACETAMINOPHEN 500 MG: 500 TABLET ORAL at 09:20

## 2021-11-07 RX ADMIN — ACETAMINOPHEN 650 MG: 325 TABLET ORAL at 03:44

## 2021-11-07 RX ADMIN — Medication 10 ML: at 09:21

## 2021-11-07 RX ADMIN — HYDROCORTISONE: 1 CREAM TOPICAL at 09:21

## 2021-11-07 RX ADMIN — PROPAFENONE HYDROCHLORIDE 150 MG: 150 TABLET, FILM COATED ORAL at 06:08

## 2021-11-07 ASSESSMENT — PAIN SCALES - GENERAL
PAINLEVEL_OUTOF10: 0
PAINLEVEL_OUTOF10: 9

## 2021-11-07 NOTE — PROGRESS NOTES
[address] [place] [address] Attending    S: 36 y.o. male with history of asthma and smoking history presented with increasing dyspnea and cough over past month. On presentation had fever of 103. Has tested positive for COVID twice, most recently end of August.  CT shows extensive bilateral pneumonia and COVID testing again positive. HIV pos, CD4 43. Early AM 10/23,  RRT called due to tachycardia and oxygen desaturation requiring oxygen from 3L NC to 15LNRB mask. Patient had been febrile overnight as well and transferred to MICU. Improved and transferred back to floor. Treatment initiated for HIV, but noted to have mild pancytopenia and thrombocytopenia to less than 50. Renal failure developed. Required temporary dialysis. Currently off dialysis, with improving renal function. Patient with no complaints today. Happy to be going home. O: VS- Blood pressure (!) 140/89, pulse 109, temperature 97.9 °F (36.6 °C), temperature source Oral, resp. rate 16, height 6' 4\" (1.93 m), weight 201 lb (91.2 kg), SpO2 100 %. Exam is as noted by resident   Awake, alert and oriented. Heart -regular rhythm, no gallop  Lungs- clear breath sounds bilaterally. No abdominal masses or tenderness  Ext - no edema. Impressions:  Principal Problem:    Acute respiratory failure with hypoxia (HCC)  Active Problems:    Acute kidney injury due to COVID-19 ([facility] [address]    Asthma     COVID-19    Symptomatic HIV infection ([facility] [address]    Pancytopenia (HCC)  Platelet count improving  Creatinine improved from 2.8-2.1-1.9  Hemoglobin not steadily improved yet, currently 7.8 ff transfusion      Plan   Receiving counseling from Psychology    Appreciate Nephrology, ID, MICU, cardiology, gen surgery, H/Onc, pulmonology. Covid pneumonia/PCP pneumonia with sepsis - resolved. New diagnosis of HIV, ID managing antibiotics and antivirals for HIV, currently on lamivudine/abacavir/dolutegravir   RPR positive. S/p LP. VDRL CSF negative. PCN finished     Sinus tachycardia - Echo with EF 55%, small pericardial effusion and possible atrial shunt. EKG suspicious for WPW like syndrome, although not meeting all criteria. EP has seen. On propafenone. Will follow as outpatient  Hemodialysis held  Creatinine improving  Platelets improving since heparin is discontinued    Stool sample positive for occult blood. Hb stable x 3 days. Plan for outpatient follow up of anemia. D/c home with close follow up with EP, ID, PCP, gen surgery, nephro. Attending Physician Statement  I have reviewed the chart and seen the patient with the resident(s). I personally reviewed images, EKG's and similar tests, if present. I personally reviewed and performed key elements of the history and exam.  I have reviewed and confirmed student and/or resident history and exam with changes as indicated above. I agree with the assessment, plan and orders as documented by the resident. Please refer to the resident and/or student note for additional information. Pino Waller.  Astrid Huff MD

## 2021-11-07 NOTE — PROGRESS NOTES
[facility] Inpatient   Resident Progress Note    S:  Hospital day: 35   Brief Synopsis: Lynn Lyles is a 36 y.o. male with no PMH who presented with hypoxia and fever. He was initially seen in PCP office and was hypoxic on exertion with desaturate into the mid low 80s as well as noted to be febrile and tachycardic. Per patient, his symptoms began 8/15/2021 and he tested positive for Covid 8/28. endorse worsening shortness of breath over the past month with sputum production, intermittent chest discomfort, persistent diarrhea, chills, fever, decrease in appetite and 50 pound weight loss since August.  Patient also endorses significantly worsening \"neuropathy\" of bilateral feet, states it feels like he is \"being stabbed by needles\" which is causing him inability to walk due to pain. In the ED, he was found to be anemic, hyperkalemic, hyponatremic, febrile and COVID positive. He also had elevated Creatinine, AST, proBNP and troponin x2. CTA showed extensive multifocal COVID-19 pneumonia bilaterally, bilateral trace layering pleural effusion and bilateral gynecomastia. CT abdomen was limited due to movement and non-contrast, but showed normal appendix and no evidence of bowel obstruction. Admitted for acute hypoxia due to Covid pneumonia. Nephro, pulm and ID consulted. Started on bicarb drip. US showed hyperechoic kidneys, trace b/l perinephric fluid and right kidney lesion (Cyst vs infarction vs abscess vs neoplasia). HIV and Hep C screen were positive. HIV RNA and T and B lymphocytes were ordered. Patient became more hypoxic, tachycardic and hypertensive so  there was concern for PE. CTA was ordered, which came back negative. Echo was completed due to elevated BNP and concern for PE and that revealed a normal EF with late bubble sign possible shunting of pulmonary origin. BNP continued to increase. Cardiology was consulted. Repeat Echo pending.  ID recommends LP with CSF sent for glucose, protein, cell count with differential, which were all  negative. meningitis/encephalitis PCR panel negative. RRT called 10/23 for hypoxia and tachycardia, transferred to medical intensive on 15 L NRB. Creatinine up trending. Currently saturating well on room air and was transferred out of the ICU. Hemodialysis x4 days for worsening renal function. CSF VDRL negative, but continue to treat for latent syphilis. Renal biopsy performed on 10/27. Endorses hematuria s/p biopsy. CT abdomen showed no acute abnormalities. Patients platelets and creatinine improving, hemodialysis on hold at this point. 11/2/2021 - H/H 6.6, patient received 1 unit PRBC. H&H stabilized for 1 day above 7, 11/4/2021, patient hemoglobin dropped again to 6.8, requiring another PRBC transfusion. Retroperitoneal ultrasound negative, minimal intraperitoneal fluid already seen on previous ultrasound as well., repeat FOBT ordered. H/H continues to remain unstable. Repeat retic count. Patient seen and examined this AM.  Patient's H&H continues to slowly decline s/p transfusion. Patient has recieved 2 RBC transfusions this week. Continued to advise that  this could be multifactorial.  Awaiting results of retroperitoneal ultrasound to rule out development of hematoma. Creatinine normalized, platelets trending towards normal.  HIV treatment includes lamivudine, dolutegravir, abacavir; trying to switch to Triumeq  Patient has already received his last dose of penicillin. Patient refusing rectal examination for FOBT at this time, states that he will try to leave a stool sample to perform FOBT.     No other concerns this morning    Cont meds:    sodium chloride      sodium chloride       Scheduled meds:    hydrocortisone-aloe   Topical BID    lamiVUDine  300 mg Oral Nightly    propafenone  150 mg Oral 3 times per day    abacavir  600 mg Oral Nightly    dolutegravir sodium  50 mg Oral Nightly    [Held by provider] heparin (porcine)  5,000 Units SubCUTAneous Q8H    lidocaine  1 patch TransDERmal Daily    ascorbic acid  500 mg Oral Daily    pantoprazole  40 mg Oral QAM AC    sodium chloride flush  5-40 mL IntraVENous 2 times per day     PRN meds: sodium chloride, medicated lip balm, benzonatate, perflutren lipid microspheres, hydrOXYzine, [Held by provider] labetalol, sodium chloride flush, ondansetron **OR** ondansetron, polyethylene glycol, acetaminophen **OR** acetaminophen, sodium chloride     I reviewed the patient's past medical and surgical history, Medications and Allergies. O:  BP (!) 137/95   Pulse 113   Temp 97.5 °F (36.4 °C) (Oral)   Resp 16   Ht 6' 4\" (1.93 m)   Wt 201 lb (91.2 kg)   SpO2 97%   BMI 24.47 kg/m²   24 hour I&O: I/O last 3 completed shifts: In: 12 [P.O.:960]  Out: 1100 [Urine:1100]  I/O this shift:  In: -   Out: 150 [Urine:150]        Physical Exam  Vitals reviewed. Constitutional:       General: He is not in acute distress. Appearance: Normal appearance. He is not ill-appearing, toxic-appearing or diaphoretic. HENT:      Head: Normocephalic and atraumatic. Nose: No rhinorrhea. Mouth/Throat:      Mouth: Mucous membranes are moist.      Pharynx: Oropharynx is clear. Eyes:      General: No scleral icterus. Right eye: No discharge. Left eye: No discharge. Extraocular Movements: Extraocular movements intact. Cardiovascular:      Rate and Rhythm: Regular rhythm. Tachycardia present. Pulses: Normal pulses. Heart sounds: Normal heart sounds. No murmur heard. No friction rub. No gallop. Pulmonary:      Effort: No respiratory distress. Breath sounds: No wheezing, rhonchi or rales. Abdominal:      General: Abdomen is flat. Palpations: Abdomen is soft. Tenderness: There is no abdominal tenderness. Musculoskeletal:         General: No swelling. Cervical back: Normal range of motion. No rigidity or tenderness. Lumbar back: No swelling or spasms. Normal range of motion. Skin:     General: Skin is warm and dry. Coloration: Skin is not jaundiced. Findings: No bruising or rash. Neurological:      General: No focal deficit present. Mental Status: He is alert and oriented to person, place, and time. Cranial Nerves: No cranial nerve deficit. Motor: No weakness. Psychiatric:         Behavior: Behavior normal.         Thought Content: Thought content normal.       Labs:  Na/K/Cl/CO2:  142/5.0, 5.0/112/18 (11/06 0778)  BUN/Cr/glu/ALT/AST/amyl/lip:  12/1.2/--/27/21/--/-- (11/06 4181)  WBC/Hgb/Hct/Plts:  4.4/7.6/23.2/130 (11/06 0511)  estimated creatinine clearance is 112 mL/min (based on SCr of 1.2 mg/dL). Other pertinent labs as noted below    Radiology:  CT ABDOMEN PELVIS WO CONTRAST   Final Result   No evidence retroperitoneal hemorrhage. Findings suggestive of hypervolemia: Dilated IVC with trace simple free fluid   in the pelvis. Cardiomegaly. Partially visualized pulmonary densities. Given the reported history of HIV,   opportunistic infection such as pneumocystis pneumonia could have this   appearance. COVID-19 pneumonia could also have this appearance. US RETROPERITONEAL LIMITED   Final Result   1. Minimal free intraperitoneal fluid. 2. No sign of retroperitoneal mass/hemorrhage. CT ABDOMEN PELVIS WO CONTRAST Additional Contrast? None   Final Result   Post right renal biopsy there is expected sequela of recent biopsy, no   significant hemorrhage identified                                        CT BIOPSY RENAL   Final Result   Successful uncomplicated CT and fluoroscopic guided right   renal biopsy. If there are any physician concerns regarding this report, a   Radiologist can be reached by calling the following number [phone]. CT GUIDED NEEDLE PLACEMENT   Final Result   Successful uncomplicated CT and fluoroscopic guided right   renal biopsy.       If there are any pulmonary ground-glass airspace opacities are stable         CT ABDOMEN PELVIS WO CONTRAST Additional Contrast? None   Final Result   Limited study by the patient's respiratory motion and lack of intravenous   contrast.      Extensive multifocal COVID-19 pneumonia bilaterally. Bilateral trace layering pleural effusions. Bilateral gynecomastia. Abdominal organs inadequately evaluated due to the limitations of the study. Normal appendix. No evidence of bowel obstruction. CT CHEST WO CONTRAST   Final Result   Limited study by the patient's respiratory motion and lack of intravenous   contrast.      Extensive multifocal COVID-19 pneumonia bilaterally. Bilateral trace layering pleural effusions. Bilateral gynecomastia. Abdominal organs inadequately evaluated due to the limitations of the study. Normal appendix. No evidence of bowel obstruction.              Resident Assessment and Plan     Acute hypoxia 2/2 COVID pneumonia vs PCP  - CTA on admission: COVID pneumonia, Bilateral trace layering pleural effusion, negative for PE   - Ferritin 3900, CRP 10.1 on admission   - D dimer elevated on admission, currently stable  - Pulmonology and ID on board  - Legionella, spot TB test negative   - CXR 10/8 shows multifocal groundglass opacities are stable  - Repeat CXR 10/17: b/l pulm infiltrates improving   - Blood cx: staph epidermitis, repeat blood cx shows no growth x 7 days   -Urine culture: NGTD   - ECHO 10/13: EF 55%, late bubble sign possible shunting of pulmonary origin   - CTA 10/13 negative for PE  Bronchoscopy with BAL performed on 10/15/2021   - Respiratory culture: oral pharyngeal liana decreased, few gram negative rods   CXR 10/23: Mild worsening extensive bilateral infiltrates  - Currently maintaining saturation on room air, continue to monitor  - prednisone course completed (11/2/2021)  - Repeat blood culture 10/21 showed NGTD x5days    - Fungal cultures negative x 1 week    Weight loss likely secondary to HIV  - Per patient due to decreased appetite from COVID; 50lb weight loss since August   - R/o other potential origins: SLE, TB, hepatitis  - MARILEE negative  - T spot TB test negative   HIV, hepatitis C antibodies positive  - Elevated IgG and IgA, normal IgM: IgA nephropathy vs MPGN?   - Cryoglobulin normal   - TSH normal   - Sed rate and CRP trending towards normal  - continues to gain weight appropriately during hospital stay    HIV  HIV 1/2 antibodies positive    CD4 44, CD3 153, CD8 100  HIV viral load 195,000  - ID on board: received vancomycin, ancef; stopped cefepime 10/26  - Fungitell test positive x2, received eraxis  - Bactrim d/c'd due to renal failure; on pentamidine for suspected Pneumocystis pneumonia  - Pneumocystis stain negative, but PCR pending; still suspect PCP pna  - [id number] negative, genotype pending  - Toxoplasma Ab, cryptococcus negative  - hepatitis A total Ab positive  - RPR reactive, quant 1:2, CSF VDRL negative   - On PCN G weekly for latent syphilis (received 2/3 doses)  - Started on biktarvy 10/22, d/c'd due to renal failure   - Started on lamivudine, dolutegravir, and abacavir 10/25 : Switch to Triumeq  - receiving penecillin prophylaxis IM weekly x 3 doses , completed dosing    Hematuria - resolved   - benjie blood in urine s/p renal biopsy 10/27  - Hb trending down , s/p 1 transfusion 11/2/2021 , h/h improved to 7.3  - Continue to monitor H/H     Anemia   Likely multifactorial  GI bleed vs retroperitoneal hematoma vs CKD vs medication side effects  Pancytopenia-improving  - Likely 2/2 from GI bleed vs HIV   Peripheral blood smear shows normocytic anemia, absolute lymphocytopenia; no platelet clumping, schistocytes or dysplasia  - Heme Onc consulted: received granix to improve ANC  Transfused 4 units pRBC since admission  - Platelets trending up, transfusion if bleeding or plts <20k  , normalized   - Will continue to monitor H&H, transfuse if H/H< 7.  - H/H drop to 6.6 11/2/2021 ; transfuse 1 unit PRBC , H&H dropped to 6.8 on 11/4/2021, transfuse 1 unit PRBC  - case discussed with heme/onc, ID and nephrology specialists respectively ; concern to rule out GI bleed vs retroperitoneal hematoma   - FOBT negative x3 , repeat FOBT + 11/6/2021  - repeat iron studies showing improvement   - retroperitoneal US and CT abd/pelvis wo contrast negative for any acute bleed / retroperitoneal hematoma  - general surgery reconsulted; recs appreciated - no acute intervention at this time.   - h/h holding steady over the last 3 days    DEISI    Hyponatremia - resolved   - Na normalized    - Nephro on board ; hold dialysis for now with Cr improving  - Likely secondary to dehydration from persistent diarrhea vs decreased PO intake vs bactrim vs HIVANS vs Biktarvy  Held Biktarvy   - urine studies show intrinsic cause of DEISI  - Microalbumin-creatinine ratio elevated   - creatinine trended towards normal - 1.2  - Oliguric (0.2 mL/kg/hr) --> improved to 0.6 cc/kg/hr   - received dialysis  10/25 x3days  - S/p Kidney biopsy; mild C3 deposition noted ; biopsy inconclusive, patient will need repeat biopsy outpatient per nephrology.     Hypoalbuminemia - improved  -2/2 poor intake vs CHF vs nephrotic syndrome  - Alb stable , kidney function stablilized  -Previously received albumin 25 g q6h x 4 doses 10/6, 8 doses 10/12 and 8 doses 10/23  Low C3, normal C4 and cryoglobulin  - repeat urinalysis continues to show increased protein loss in urine  Fluid restriction    - Continue to monitor     Elevated troponin and proBNP  - 2/2 myocardial injury vs stress cardiomyopathy due to COVID vs new onset CHF   - initial troponin 78, 78  - initial BNP 1599,  repeat 11,401 on 10/14, trending down 3400   - ECHO: EF 55%, late bubble sign possible shunting of pulmonary origin  - Repeat ECHO pending      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 Mireya Shaw

## 2021-11-07 NOTE — PROGRESS NOTES
Department of Internal Medicine  Nephrology Progress Note      Events reviewed. SUBJECTIVE:  We are following Mr. Tessa Espinoza for DEISI on CKD. Patient is sitting up at the edge of the bed this am. He is happy that he does not need any surgical intervention. Very eager to go home.     PHYSICAL EXAM:      Vitals:    VITALS:  BP (!) 140/89   Pulse 109   Temp 97.9 °F (36.6 °C) (Oral)   Resp 16   Ht 6' 4\" (1.93 m)   Wt 201 lb (91.2 kg)   SpO2 100%   BMI 24.47 kg/m²   24HR INTAKE/OUTPUT:      Intake/Output Summary (Last 24 hours) at 11/7/2021 0842  Last data filed at 11/6/2021 2315  Gross per 24 hour   Intake 960 ml   Output 1250 ml   Net -290 ml        Young cachectic: looking male  Constitutional:  Awake, alert, sitting up in bed  HEENT:  PERRL, normocephalic, atraumatic  Respiratory: diminished lung sounds  Cardiovascular/Edema:  RRR, S1/S2, -edema  Gastrointestinal:  abd flat, soft, non-tender, BS x4 quads  Neurologic:  Awake, alert, no focal deficits  Skin:  Warm, dry, no rash or lesions  Other: No LE edema      Scheduled Meds:   hydrocortisone-aloe   Topical BID    lamiVUDine  300 mg Oral Nightly    propafenone  150 mg Oral 3 times per day    abacavir  600 mg Oral Nightly    dolutegravir sodium  50 mg Oral Nightly    [Held by provider] heparin (porcine)  5,000 Units SubCUTAneous Q8H    lidocaine  1 patch TransDERmal Daily    ascorbic acid  500 mg Oral Daily    pantoprazole  40 mg Oral QAM AC    sodium chloride flush  5-40 mL IntraVENous 2 times per day     Continuous Infusions:   sodium chloride      sodium chloride       PRN Meds:.sodium chloride, medicated lip balm, benzonatate, perflutren lipid microspheres, hydrOXYzine, [Held by provider] labetalol, sodium chloride flush, ondansetron **OR** ondansetron, polyethylene glycol, acetaminophen **OR** acetaminophen, sodium chloride    DATA:    CBC:   Lab Results   Component Value Date    WBC 3.9 11/07/2021    RBC 2.48 11/07/2021    HGB 7.6 (preferred) or   CT. Following resolution of acute kidney injury. CTA pulmonary with IV contrast October 13, 2021   1. No evidence of pulmonary embolism. 2. Interval increased size of bilateral pleural effusions with little change   in pericardial effusion. 3. Persistent subcutaneous edema. 4. Reactive bilateral hilar lymphadenopathy. 5. Bilateral airspace disease is increased compared to prior examination. The increase is most pronounced within the lower lobes. 6. Bilateral gynecomastia. Renal US November 5, 2021   1. Minimal free intraperitoneal fluid. 2. No sign of retroperitoneal mass/hemorrhage. BRIEF SUMMARY OF INITIAL CONSULT:     Briefly, Mr. Janak Pittman is a 36year old male with no significant PMH who was admitted on October 5, 2021 after he presented from an outside clinic after he was found to be hypoxic during 6 minute walking test. Patient tested positive for Covid 19 on August 18th, he is unvaccinated and had Covid 19 last year as well. Patient states he has quarantined by himself and when his mother recently visited him she was shocked at his appearance as he had lost about 50 pounds in 2 and a half months. On admission labs were significant for sodium of 130, potassium 5.2, bicarbonate 20, BUN 41, creatinine 3.6, magnesium 2.7, calcium 7.7 and proBNP 1,599. We are consulted for DEISI. Problems resolved:    Hyperkalemia, 2/2 DEISI. Low potassium diet  Hypotonic hyponatremia 2/2 intravascular volume from poor oral intake. Bicarb drip started. Sodium levels improving. Low bicarbonate levels with hyperchloremia, most likely NAGMA versus respiratory alkalosis; we need a PH to clarify diagnosis.  Awaiting ABG results  High bicarbonate levels, likely metabolic alkalosis 2/2 administration  Hypokalemia, multifactorial, poor intake, probably renal potassium wasting  Severe Hypoalbuminemia, multifactorial, status post albumin administration  DEISI on CKD, volume responsive pre-renal DEISI d/t volume (poor oral intake), urine sodium <20. Resolved. Edematous state, multifactorial, mainly 2/2 nephrotic syndrome and possibly HFpEF 55%, on bumex  Hyponatremia, multifactorial, including decreased GFR and hemodynamically mediated in the setting of large hypotonic fluid administration (D5 with Bactrim). Sodium levels decrease, stable overnight  Low bicarbonate levels with hyperchloremia, consistent with either hyperchloremic metabolic acidosis versus respiratory alkalosis. Bicarbonate levels improved with bicarbonate drip. HTN, on metoprolol   Probably fungemia, (1, 3) beta-D-glucan positive, on anidulafungin  MSSE bacteremia, on cefepime 2 g every 8 hours  Sinus tachycardia, multifactorial  Hypomagnesemia, 2/2 poor intake   Leukopenia, WBC 1.1  Gross hematuria, status post kidney biopsy. Resolved    IMPRESSION/RECOMMENDATIONS:     Recurrent DEISI on CKD, ATN contrast associated DEISI versus ischemic ATN (hypotension related tachycardia). Nonoliguric. Started on renal replacement therapy on October 25, 2021. Renal function continues to improve daily, dialysis catheter has been removed. 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 [facility] Work-up so far HIV positive, Hepatitis C positive, MARILEE negative, C4 normal, C3 88 (low), UPEP without monoclonal gammopathy, negative cryoglobulins. Kidney ultrasound with hyperechoic kidneys. Status post kidney biopsy October 27, 2021. Repeat UACR 2800 mg/g, UPCR 3.3 (November 4, 2021)  Low bicarbonate level, 18 today  Probably HFpEF 55%, proBNP 11,401 > 3421   Right kidney lesion, likely a cyst but cannot be ruled out other pathology including infarction, abscess, neoplasm. We will proceed with MRI when renal function improved.   HIV positive, CD4 count 43, started on OLMSTEAD therapy    ---------------------------------------------------------------------    Anemia with acute drop in Hgb s/p transfusion. Repeat renal US negative for bleed. H&H stable today. 7.6/23.5  Possible pneumocystic pneumonia, on pentamidine  Possibly acute retroviral syndrome Jarische-Herxheimer reaction? Hepatitis C antibody positive, viral load not detected  Covid 19 infection in non vaccinated pt  Normocytic anemia, multifactorial, 7.6/23.2  Tachycardia ?anxiety      Plan:    Kidney biopsy results inconclusive, will need repeat biopsy. This can be done in the outpatient setting.   Continue to monitor renal function for recovery daily  Monitor H&H, transfuse <7  Monitor bicarbonate levels  Discharge planning        Irvine Gilford, MD, locum tenens physician covering for Dr. Karoline Oscar

## 2021-11-08 ENCOUNTER — CARE COORDINATION (OUTPATIENT)
Dept: CASE MANAGEMENT | Age: 31
End: 2021-11-08

## 2021-11-08 ENCOUNTER — OFFICE VISIT (OUTPATIENT)
Dept: FAMILY MEDICINE CLINIC | Age: 31
End: 2021-11-08
Payer: COMMERCIAL

## 2021-11-08 VITALS
BODY MASS INDEX: 23.38 KG/M2 | TEMPERATURE: 99.5 F | OXYGEN SATURATION: 100 % | SYSTOLIC BLOOD PRESSURE: 133 MMHG | DIASTOLIC BLOOD PRESSURE: 88 MMHG | HEIGHT: 77 IN | HEART RATE: 117 BPM | WEIGHT: 198 LBS | RESPIRATION RATE: 16 BRPM

## 2021-11-08 DIAGNOSIS — I45.6 WPW (WOLFF-PARKINSON-WHITE SYNDROME): ICD-10-CM

## 2021-11-08 DIAGNOSIS — B20 SYMPTOMATIC HIV INFECTION (HCC): ICD-10-CM

## 2021-11-08 DIAGNOSIS — D64.9 ANEMIA, UNSPECIFIED TYPE: ICD-10-CM

## 2021-11-08 DIAGNOSIS — Z09 HOSPITAL DISCHARGE FOLLOW-UP: Primary | ICD-10-CM

## 2021-11-08 DIAGNOSIS — N17.9 ACUTE KIDNEY INJURY DUE TO COVID-19 (HCC): Primary | ICD-10-CM

## 2021-11-08 DIAGNOSIS — R53.81 PHYSICAL DECONDITIONING: ICD-10-CM

## 2021-11-08 DIAGNOSIS — U07.1 ACUTE KIDNEY INJURY DUE TO COVID-19 (HCC): Primary | ICD-10-CM

## 2021-11-08 LAB
HCT VFR BLD CALC: 24.8 % (ref 37–54)
HEMOGLOBIN: 8.1 G/DL (ref 12.5–16.5)
MCH RBC QN AUTO: 31.8 PG (ref 26–35)
MCHC RBC AUTO-ENTMCNC: 32.7 % (ref 32–34.5)
MCV RBC AUTO: 97.3 FL (ref 80–99.9)
PDW BLD-RTO: 16 FL (ref 11.5–15)
PLATELET # BLD: 181 E9/L (ref 130–450)
PMV BLD AUTO: 10.7 FL (ref 7–12)
RBC # BLD: 2.55 E12/L (ref 3.8–5.8)
WBC # BLD: 2.3 E9/L (ref 4.5–11.5)

## 2021-11-08 PROCEDURE — 99214 OFFICE O/P EST MOD 30 MIN: CPT | Performed by: FAMILY MEDICINE

## 2021-11-08 PROCEDURE — 36415 COLL VENOUS BLD VENIPUNCTURE: CPT | Performed by: FAMILY MEDICINE

## 2021-11-08 PROCEDURE — 99212 OFFICE O/P EST SF 10 MIN: CPT | Performed by: FAMILY MEDICINE

## 2021-11-08 PROCEDURE — 1111F DSCHRG MED/CURRENT MED MERGE: CPT | Performed by: FAMILY MEDICINE

## 2021-11-08 RX ORDER — ASCORBIC ACID 500 MG
500 TABLET ORAL DAILY
Qty: 30 TABLET | Refills: 3 | Status: SHIPPED
Start: 2021-11-08 | End: 2022-02-25 | Stop reason: SDUPTHER

## 2021-11-08 RX ORDER — PROPAFENONE HYDROCHLORIDE 150 MG/1
150 TABLET, FILM COATED ORAL EVERY 8 HOURS SCHEDULED
Qty: 90 TABLET | Refills: 3 | Status: SHIPPED
Start: 2021-11-08 | End: 2022-09-20 | Stop reason: SDUPTHER

## 2021-11-08 NOTE — CARE COORDINATION
[facility] Transitions Initial Follow Up Call    Call within 2 business days of discharge: Yes    Patient: Esther Cintron Patient : 1990   MRN: [id number]  Reason for Admission: [place]  Discharge Date: 21 RARS: Readmission Risk Score: 8.9 ( )      Last Discharge [facility]       Complaint Diagnosis Description Type Department Provider    10/5/21 Fever; Extremity Weakness COVID-19 . .. ED to Hosp-Admission (Discharged) (ADMITTED) JOSUÉ Barakat MD; Amada Marte... Patient contacted regarding COVID-19 diagnosis. Discussed COVID-19 related testing which was available at this time. Test results were positive. Patient informed of results, if available? Yes.~Pt previously aware of +COVID-19 results (initially + 21 and 10/5/21)      Care Transition Nurse contacted the patient by telephone to perform post discharge assessment. Call within 2 business days of discharge: Yes. Provided introduction to self, and explanation of the CTN/ACM role, and reason for call due to risk factors for infection and/or exposure to COVID-19. Symptoms reviewed with patient who verbalized the following symptoms: fatigue, cough, no new symptoms and no worsening symptoms. [facility] Pt initially dx with COVID on 21 and then on 10/5/21. Pt had complicated hospital stay and was IP for 33 days. Pt had RRT on 10/23/21 d/t hypoxia and tachycardia and was transferred to MICU. Pt received HD IP x 4 days for worsening kidney function and HD cath removed eventually on 10/25/21. Pt suffered from pancytopenia during IP and received platelets and PRBC tranfusions. Pt is s/p renal bx on 10/27/21 and results were inconclusive and possible repeat bx as an OP per chart review. Spoke to pt today. Pt reports that he is feeling \"better\" today and happy to be home. Pt denies any sob, wheezing, chest tightness, or congestion.  Pt reports to a cough in the mornings and then clears as the day goes on. Pt denies any fevers/chills. Confirmed pt has all new rx and taking as prescribed. 1111F entered, as MH pcp. Reviewed f/u appts. Pt has a HFU with his pcp today @3:20 pm. Pt will discuss at [address] today about scheduling with psychologist through his pcp's office David Centeno), as he was seen initially in IP setting. Pt has a scheduled f/u with ID on 12/2/21 and with renal on 12/7/21. Pt would like assist in scheduling with EP, as he is having difficulty reaching someone at the office to schedule. CTN sending message to  to help. Pt did not voice any current needs/concerns. Pt agreeable to continued outreaches from CTN. Due to no new or worsening symptoms encounter was not routed to provider for escalation. Discussed follow-up appointments. If no appointment was previously scheduled, appointment scheduling offered: Yes.~Pt has a scheduled HFU with pcp today @3:20 pm. Pt would like assistance in scheduling with EP office, as he states he is having difficulties getting this scheduled. CTN offered to have  help coordinate appt and pt receptive to this.  Will route message to .    [facility] follow up appointment(s):   Future Appointments   Date Time Provider Nikko Wells   11/8/2021  3:20 PM MD Nitin Snow [facility] AND WOMEN'[facility]   12/2/2021  1:45 PM Mera Quiros MD [facility] INF     Non-[facility] follow up appointment(s): Dr. America Meadows 12/7/21 @ 11 am    Non-face-to-face services provided:  Scheduled appointment with PCP-f/u today, 11/8/21 @ 3:20 pm for HFU appt  Scheduled appointment with Specialist-Dr. Nelda Hancock (ID) 12/2/21, Dr. Lazara Fenton (renal) 12/7/21, David Centeno (psych)~pt will schedule while at pcp office today, Dr Glenard Boeck (pt needs assist)~CTN routed message to  to help  Obtained and reviewed discharge summary and/or continuity of care documents  Reviewed and followed up on pending diagnostic tests and treatments-labs today at pcp office. Pt will possibly need repeat kidney bx per renal (pt has f/u with renal on 12/7/21)  Assessment and support for treatment adherence and medication management-Full med review completed. 1111F entered, as MH pcp. Confirmed pt has all medications. Pt states that he has x5 days of Rythmol and pharm should get rest of supply in tomorrow. Pt will be transferring to [address] [place], [place] [address]:   Does patient have an Advance Directive:  health care decision maker information reviewed and verified. Educated patient about risk for severe COVID-19 due to risk factors according to CDC guidelines. CTN reviewed discharge instructions, medical action plan and red flag symptoms with the patient who verbalized understanding. Patient was given an opportunity to verbalize any questions and concerns and agrees to contact CTN or health care provider for questions related to their healthcare. Reviewed and educated patient on any new and changed medications related to discharge diagnosis     Was patient discharged with a pulse oximeter? No       CTN provided contact information. Plan for follow-up call in 5-7 days based on severity of symptoms and risk factors.

## 2021-11-08 NOTE — PROGRESS NOTES
Post-Discharge Transitional Care Management Services or Hospital Follow Up      Kristin James   YOB: 1990    Date of Office Visit:  11/8/2021  Date of Hospital Admission: 10/5/21  Date of Hospital Discharge: 11/7/21  Readmission Risk Score(high >=14%. Medium >=10%):Readmission Risk Score: 8.9 ( )      Care management risk score Rising risk (score 2-5) and Complex Care (Scores >=6): 1     Non face to face  following discharge, date last encounter closed (first attempt may have been earlier): 11/8/2021  1:11 PM 11/8/2021  1:11 PM    Call initiated 2 business days of discharge: Yes     Patient Active Problem List   Diagnosis    [facility] due to [facility]    Asthma    Nephrotic range proteinuria    Symptomatic HIV infection ([facility]       No Known Allergies    Medications listed as ordered at the time of discharge from hospital  @DISCHARGEMEDSLIST(<NOROUTINE> error)@      Medications marked \"taking\" at this time  Outpatient Medications Marked as Taking for the 11/8/21 encounter (Office Visit) with Marion Clayton MD   Medication Sig Dispense Refill    ascorbic acid (VITAMIN C) 500 MG tablet Take 1 tablet by mouth daily 30 tablet 3    propafenone (RYTHMOL) 150 MG tablet Take 1 tablet by mouth every 8 hours 90 tablet 3    Abacavir-Dolutegravir-Lamivud 600- MG TABS Take 1 tablet by mouth daily 30 tablet 11        Medications patient taking as of now reconciled against medications ordered at time of hospital discharge: Yes    Chief Complaint   Patient presents with    Follow-Up from Hospital       HPI    Inpatient course: Discharge summary reviewed- see chart. CURRENT STATUS (11/9/21):  Still with some shortness of breath with exertion. Has made appointments with all doctors except electrophysiology. Has spoken to care coordination to assist with scheduling appts. Has appt with neprho and ID already in place. No black or bloody bowel movements. Urinating well.   Reports some occasional palpitations at times but no chest pain or pressure. Tolerating Triumeq as well as propafenone. Kidney biopsy inconclusive well done inpatient, will need repeated as an outpatient. Doing well with coping, still interested in establising with Dr. Faith Loera. Review of Systems   Constitutional: Positive for fatigue. Negative for chills and fever. Respiratory: Positive for shortness of breath (with exertion). Negative for cough and chest tightness. Cardiovascular: Positive for palpitations (occasional). Negative for chest pain. Gastrointestinal: Negative for abdominal pain, blood in stool, diarrhea, nausea and vomiting. Genitourinary: Negative for difficulty urinating and dysuria. Vitals:    11/08/21 1542   BP: 133/88   Site: Right Upper Arm   Position: Sitting   Cuff Size: Medium Adult   Pulse: 117   Resp: 16   Temp: 99.5 °F (37.5 °C)   TempSrc: Temporal   SpO2: 100%   Weight: 198 lb (89.8 kg)   Height: 6' 4.6\" (1.946 m)     Body mass index is 23.73 kg/m². Wt Readings from Last 3 Encounters:   11/08/21 198 lb (89.8 kg)   11/07/21 201 lb (91.2 kg)   10/05/21 171 lb (77.6 kg)     BP Readings from Last 3 Encounters:   11/08/21 133/88   11/07/21 (!) 140/89   10/15/21 114/86       Physical Exam  Constitutional:       General: He is not in acute distress. Appearance: Normal appearance. HENT:      Head: Normocephalic and atraumatic. Eyes:      Conjunctiva/sclera: Conjunctivae normal.      Pupils: Pupils are equal, round, and reactive to light. Cardiovascular:      Rate and Rhythm: Regular rhythm. Tachycardia present. Pulses: Normal pulses. Heart sounds: No murmur heard. Pulmonary:      Effort: Pulmonary effort is normal.      Breath sounds: Normal breath sounds. No wheezing or rales. Abdominal:      General: Abdomen is flat. There is no distension. Palpations: Abdomen is soft. Musculoskeletal:      Cervical back: Normal range of motion and neck supple. Skin:     General: Skin is warm and dry. Comments: Dialysis catheter insertion site well-healing, dry, intact   Neurological:      Mental Status: He is alert. Psychiatric:         Mood and Affect: Mood normal.         Behavior: Behavior normal.             Assessment/Plan:  1. Hospital discharge follow-up  Events reviewed, follow-up with all specialist as directed, care coordination to assist  - [address] MED LIST    2. Physical deconditioning  Referral to physical therapy  - [facility], Joanette Bence    3. Anemia, unspecified type  Check CBC, likely will need outpatient follow-up with surgery  - CBC; Future    4. Symptomatic HIV infection ([facility]  Continue Triumeq, recheck viral load and CD4 count in 4 weeks    5. WPW (Joie-Parkinson-White syndrome)  Continue propafenone, follow-up with EP        Medical Decision Making: high complexity    Follow up:  Return in 1 month (on 12/8/2021).     Linh De La Fuente MD PGY-3    Discussed with: Dr. Noemy Mccarthy

## 2021-11-08 NOTE — PROGRESS NOTES
S: 36 y.o. male here for hospital f/u for covid and HIV and WPW and HIVAN. Taking ART. Weak, some begum. Still recovering. Occasional palps. Making urine. Cath out    O: VS: /88 (Site: Right Upper Arm, Position: Sitting, Cuff Size: Medium Adult)   Pulse 117   Temp 99.5 °F (37.5 °C) (Temporal)   Resp 16   Ht 6' 4.6\" (1.946 m)   Wt 198 lb (89.8 kg)   SpO2 100%   BMI 23.73 kg/m²    General: NAD, alert and interacting appropriately. CV:  tachy, no gallops, rubs, or murmurs    Resp: CTAB    Impression: TCM for covid and HIV and WPW and HIVAN. Plan:    PT for deconditioning. CPM HIV. F/u w/ ID  CPM WPW, f/u w/ EP  CTM kidney fxn  rtc 4 wks for recheck CD4 and viral load. Attending Physician Statement  I have discussed the case, including pertinent history and exam findings with the resident. I also have seen the patient and performed key portions of the examination. I agree with the documented assessment and plan.

## 2021-11-09 ENCOUNTER — CARE COORDINATION (OUTPATIENT)
Dept: CASE MANAGEMENT | Age: 31
End: 2021-11-09

## 2021-11-09 DIAGNOSIS — I45.6 WPW (WOLFF-PARKINSON-WHITE SYNDROME): Primary | ICD-10-CM

## 2021-11-09 LAB
VDRL CSF SCREEN: NON REACTIVE
VDRL TITER CSF: NORMAL

## 2021-11-09 ASSESSMENT — ENCOUNTER SYMPTOMS
VOMITING: 0
ABDOMINAL PAIN: 0
COUGH: 0
BLOOD IN STOOL: 0
NAUSEA: 0
CHEST TIGHTNESS: 0
SHORTNESS OF BREATH: 1
DIARRHEA: 0

## 2021-11-11 DIAGNOSIS — Z29.8 NEED FOR PNEUMOCYSTIS PROPHYLAXIS: Primary | ICD-10-CM

## 2021-11-11 RX ORDER — SULFAMETHOXAZOLE AND TRIMETHOPRIM 400; 80 MG/1; MG/1
1 TABLET ORAL DAILY
Qty: 30 TABLET | Refills: 11 | Status: SHIPPED | OUTPATIENT
Start: 2021-11-11 | End: 2022-02-25 | Stop reason: SDUPTHER

## 2021-11-12 ENCOUNTER — TELEPHONE (OUTPATIENT)
Dept: CARDIOLOGY CLINIC | Age: 31
End: 2021-11-12

## 2021-11-12 ENCOUNTER — NURSE ONLY (OUTPATIENT)
Dept: FAMILY MEDICINE CLINIC | Age: 31
End: 2021-11-12
Payer: COMMERCIAL

## 2021-11-12 DIAGNOSIS — D64.9 ANEMIA, UNSPECIFIED TYPE: ICD-10-CM

## 2021-11-12 DIAGNOSIS — R80.9 NEPHROTIC RANGE PROTEINURIA: ICD-10-CM

## 2021-11-12 DIAGNOSIS — D64.9 ANEMIA, UNSPECIFIED TYPE: Primary | ICD-10-CM

## 2021-11-12 LAB
ANION GAP SERPL CALCULATED.3IONS-SCNC: 13 MMOL/L (ref 7–16)
BUN BLDV-MCNC: 13 MG/DL (ref 6–20)
CALCIUM SERPL-MCNC: 8.6 MG/DL (ref 8.6–10.2)
CHLORIDE BLD-SCNC: 108 MMOL/L (ref 98–107)
CO2: 18 MMOL/L (ref 22–29)
CREAT SERPL-MCNC: 1.2 MG/DL (ref 0.7–1.2)
GFR AFRICAN AMERICAN: >60
GFR NON-AFRICAN AMERICAN: >60 ML/MIN/1.73
GLUCOSE BLD-MCNC: 87 MG/DL (ref 74–99)
HCT VFR BLD CALC: 25.7 % (ref 37–54)
HEMOGLOBIN: 8.3 G/DL (ref 12.5–16.5)
MCH RBC QN AUTO: 31.4 PG (ref 26–35)
MCHC RBC AUTO-ENTMCNC: 32.3 % (ref 32–34.5)
MCV RBC AUTO: 97.3 FL (ref 80–99.9)
PDW BLD-RTO: 17.2 FL (ref 11.5–15)
PLATELET # BLD: 254 E9/L (ref 130–450)
PMV BLD AUTO: 10.8 FL (ref 7–12)
POTASSIUM SERPL-SCNC: 4.5 MMOL/L (ref 3.5–5)
RBC # BLD: 2.64 E12/L (ref 3.8–5.8)
SODIUM BLD-SCNC: 139 MMOL/L (ref 132–146)
WBC # BLD: 3 E9/L (ref 4.5–11.5)

## 2021-11-12 PROCEDURE — 36415 COLL VENOUS BLD VENIPUNCTURE: CPT | Performed by: FAMILY MEDICINE

## 2021-11-15 NOTE — TELEPHONE ENCOUNTER
Follow-up in 6 to 12 months. Call for earlier visit if symptoms. If symptoms are significant enough please go to the emergency room. WDL

## 2021-11-16 ENCOUNTER — TELEPHONE (OUTPATIENT)
Dept: NON INVASIVE DIAGNOSTICS | Age: 31
End: 2021-11-16

## 2021-11-16 ENCOUNTER — TELEPHONE (OUTPATIENT)
Dept: FAMILY MEDICINE CLINIC | Age: 31
End: 2021-11-16

## 2021-11-16 DIAGNOSIS — G63 NEUROPATHY DUE TO HIV (HCC): Primary | ICD-10-CM

## 2021-11-16 DIAGNOSIS — B20 NEUROPATHY DUE TO HIV (HCC): Primary | ICD-10-CM

## 2021-11-16 RX ORDER — GABAPENTIN 100 MG/1
100 CAPSULE ORAL NIGHTLY
Qty: 30 CAPSULE | Refills: 0 | Status: SHIPPED
Start: 2021-11-16 | End: 2021-12-08 | Stop reason: SDUPTHER

## 2021-11-16 NOTE — TELEPHONE ENCOUNTER
----- Message from Montrellhalliefadi Damir sent at 11/16/2021  9:50 AM EST -----  Subject: Message to Provider    QUESTIONS  Information for Provider? Pt is having lots of pain in his feet/ foot. PT   would like to know why or what he can do to get comfort/ plan.   ---------------------------------------------------------------------------  --------------  CALL BACK INFO  What is the best way for the office to contact you? OK to leave message on   voicemail  Preferred Call Back Phone Number? [phone]  ---------------------------------------------------------------------------  --------------  SCRIPT ANSWERS  Relationship to Patient?  Self

## 2021-11-16 NOTE — TELEPHONE ENCOUNTER
Left message to call the office back to schedule hosp f/u patient started on rythmol and needs f/u with TB

## 2021-11-17 ENCOUNTER — CARE COORDINATION (OUTPATIENT)
Dept: CASE MANAGEMENT | Age: 31
End: 2021-11-17

## 2021-11-17 NOTE — TELEPHONE ENCOUNTER
I phoned patient about medication   Patient would like to try lyrica instead  patient stated that gabapentin has to many side effects  Please advise  Thank you April

## 2021-11-18 NOTE — TELEPHONE ENCOUNTER
Lyrica can also be associated with side effects. I would still recommend patient complete trial of low dose gabapentin 100 mg nightly for his symptoms.  We can assess response at his upcoming appointment thank you

## 2021-11-18 NOTE — TELEPHONE ENCOUNTER
Notified patient of the requested medication  Patient stated that he will try the gabapentin and talk to doctor at his next appointmenrt

## 2021-11-22 LAB
FUNGUS (MYCOLOGY) CULTURE: NORMAL
FUNGUS (MYCOLOGY) CULTURE: NORMAL
FUNGUS STAIN: NORMAL

## 2021-11-26 ENCOUNTER — CARE COORDINATION (OUTPATIENT)
Dept: CASE MANAGEMENT | Age: 31
End: 2021-11-26

## 2021-11-26 NOTE — CARE COORDINATION
[place] Transitions Follow Up Call    2021    Patient: Ángel Mckinney  Patient : 1990   MRN: [id number]  Reason for Admission: COVID-19  Discharge Date: 21 RARS: Readmission Risk Score: 8.9 ( )      Attempted to reach the patient for sub COVID Monitoring Care Transition call post hospital discharge. Message left with CTN's contact information requesting return phone call. 2 unsuccessful outreaches recently. No further outreach attempts will be made. If no return call by the end of today, CTN will sign off.          Follow Up  Future Appointments   Date Time Provider Nikko Wells   2021 11:00 AM Sofi Kang, PhD [facility], [facility]. Psych [facility]   2021  1:45 PM Michaelle Wang MD [facility] INF   2021  1:00 PM [facility]   2022 12:30 PM Shruthi Najera DO ELECTRO PHYS [facility]       Balwinder De Paz RN

## 2021-11-30 DIAGNOSIS — B20 HUMAN IMMUNODEFICIENCY VIRUS (HCC): Primary | ICD-10-CM

## 2021-12-07 LAB
AFB CULTURE (MYCOBACTERIA): NORMAL
AFB SMEAR: NORMAL

## 2021-12-08 ENCOUNTER — OFFICE VISIT (OUTPATIENT)
Dept: FAMILY MEDICINE CLINIC | Age: 31
End: 2021-12-08
Payer: COMMERCIAL

## 2021-12-08 VITALS
HEIGHT: 76 IN | OXYGEN SATURATION: 99 % | DIASTOLIC BLOOD PRESSURE: 88 MMHG | BODY MASS INDEX: 22.53 KG/M2 | TEMPERATURE: 98.4 F | HEART RATE: 86 BPM | SYSTOLIC BLOOD PRESSURE: 133 MMHG | WEIGHT: 185 LBS | RESPIRATION RATE: 16 BRPM

## 2021-12-08 DIAGNOSIS — B20 SYMPTOMATIC HIV INFECTION (HCC): Primary | ICD-10-CM

## 2021-12-08 DIAGNOSIS — F32.A DEPRESSION, UNSPECIFIED DEPRESSION TYPE: ICD-10-CM

## 2021-12-08 DIAGNOSIS — R80.9 NEPHROTIC RANGE PROTEINURIA: ICD-10-CM

## 2021-12-08 DIAGNOSIS — Z23 NEED FOR IMMUNIZATION AGAINST INFLUENZA: ICD-10-CM

## 2021-12-08 DIAGNOSIS — G63 NEUROPATHY DUE TO HIV (HCC): ICD-10-CM

## 2021-12-08 DIAGNOSIS — B20 NEUROPATHY DUE TO HIV (HCC): ICD-10-CM

## 2021-12-08 DIAGNOSIS — I45.6 WPW (WOLFF-PARKINSON-WHITE SYNDROME): ICD-10-CM

## 2021-12-08 PROCEDURE — 1036F TOBACCO NON-USER: CPT | Performed by: FAMILY MEDICINE

## 2021-12-08 PROCEDURE — G8427 DOCREV CUR MEDS BY ELIG CLIN: HCPCS | Performed by: FAMILY MEDICINE

## 2021-12-08 PROCEDURE — G8420 CALC BMI NORM PARAMETERS: HCPCS | Performed by: FAMILY MEDICINE

## 2021-12-08 PROCEDURE — G8482 FLU IMMUNIZE ORDER/ADMIN: HCPCS | Performed by: FAMILY MEDICINE

## 2021-12-08 PROCEDURE — 99212 OFFICE O/P EST SF 10 MIN: CPT | Performed by: FAMILY MEDICINE

## 2021-12-08 PROCEDURE — 99214 OFFICE O/P EST MOD 30 MIN: CPT | Performed by: FAMILY MEDICINE

## 2021-12-08 RX ORDER — DULOXETIN HYDROCHLORIDE 30 MG/1
30 CAPSULE, DELAYED RELEASE ORAL DAILY
Qty: 30 CAPSULE | Refills: 1 | Status: SHIPPED
Start: 2021-12-08 | End: 2022-09-20 | Stop reason: SDUPTHER

## 2021-12-08 RX ORDER — GABAPENTIN 100 MG/1
100 CAPSULE ORAL NIGHTLY
Qty: 30 CAPSULE | Refills: 0 | Status: SHIPPED
Start: 2021-12-08 | End: 2022-01-25 | Stop reason: SDUPTHER

## 2021-12-08 ASSESSMENT — LIFESTYLE VARIABLES
HOW OFTEN DO YOU HAVE A DRINK CONTAINING ALCOHOL: 2-4 TIMES A MONTH
HOW MANY STANDARD DRINKS CONTAINING ALCOHOL DO YOU HAVE ON A TYPICAL DAY: 3 OR 4

## 2021-12-08 ASSESSMENT — COLUMBIA-SUICIDE SEVERITY RATING SCALE - C-SSRS
6. HAVE YOU EVER DONE ANYTHING, STARTED TO DO ANYTHING, OR PREPARED TO DO ANYTHING TO END YOUR LIFE?: NO
1. WITHIN THE PAST MONTH, HAVE YOU WISHED YOU WERE DEAD OR WISHED YOU COULD GO TO SLEEP AND NOT WAKE UP?: YES
2. HAVE YOU ACTUALLY HAD ANY THOUGHTS OF KILLING YOURSELF?: NO

## 2021-12-08 ASSESSMENT — ENCOUNTER SYMPTOMS
SHORTNESS OF BREATH: 0
WHEEZING: 0
BLOOD IN STOOL: 0
ABDOMINAL PAIN: 0
NAUSEA: 0
COUGH: 0

## 2021-12-08 ASSESSMENT — PATIENT HEALTH QUESTIONNAIRE - PHQ9
SUM OF ALL RESPONSES TO PHQ QUESTIONS 1-9: 15
2. FEELING DOWN, DEPRESSED OR HOPELESS: 3
SUM OF ALL RESPONSES TO PHQ QUESTIONS 1-9: 16
3. TROUBLE FALLING OR STAYING ASLEEP: 3
10. IF YOU CHECKED OFF ANY PROBLEMS, HOW DIFFICULT HAVE THESE PROBLEMS MADE IT FOR YOU TO DO YOUR WORK, TAKE CARE OF THINGS AT HOME, OR GET ALONG WITH OTHER PEOPLE: 3
9. THOUGHTS THAT YOU WOULD BE BETTER OFF DEAD, OR OF HURTING YOURSELF: 1
1. LITTLE INTEREST OR PLEASURE IN DOING THINGS: 2
SUM OF ALL RESPONSES TO PHQ9 QUESTIONS 1 & 2: 5
4. FEELING TIRED OR HAVING LITTLE ENERGY: 0
SUM OF ALL RESPONSES TO PHQ QUESTIONS 1-9: 16
5. POOR APPETITE OR OVEREATING: 1
8. MOVING OR SPEAKING SO SLOWLY THAT OTHER PEOPLE COULD HAVE NOTICED. OR THE OPPOSITE, BEING SO FIGETY OR RESTLESS THAT YOU HAVE BEEN MOVING AROUND A LOT MORE THAN USUAL: 3
6. FEELING BAD ABOUT YOURSELF - OR THAT YOU ARE A FAILURE OR HAVE LET YOURSELF OR YOUR FAMILY DOWN: 3
7. TROUBLE CONCENTRATING ON THINGS, SUCH AS READING THE NEWSPAPER OR WATCHING TELEVISION: 0

## 2021-12-08 NOTE — PROGRESS NOTES
[facility]  FAMILY MEDICINE RESIDENCY PROGRAM  DATE OF VISIT : 2021    Patient : Kinza Draper   Age : 36 y.o.  : 1990   MRN : [id number]   ______________________________________________________________________    Chief Complaint :   Chief Complaint   Patient presents with   Saba An Check-Up       HPI : Kinza Draper is 36 y.o. male who presented to the clinic today for above chief complaint. HIV:  Current medications include Triumeq daily. Patient reports compliance with medications, no missed doses or side effects. Patient denies any fevers, swollen glands. Patient remains on Bactrim daily for PCP prophylaxis. Labs are ordered in system for his follow-up with infectious disease in January. He reports no new sexual partners since last visit. No penile discharge or lesions. No blood in his urine. Some urinary urgency. Nephrotic range proteinuria:  Last GFR greater than 60. Did follow-up with nephrology day prior, states that labs were drawn and urine checked at that visit. Renal biopsy results inconclusive performed in hospital, process underway to have repeat biopsy done at [facility]. Does endorse some occasional lower extremity edema, overall improved at this time. Anxiety/depression:  PHQ-9 16 with some passive suicidal ideation. No active plan or prior attempt. Patient reports ongoing anxiety with current situation and events over the last few months. Sleep and appetite affected, energy level affected. Patient does endorse an occasional chest tightness when he starts overthinking and getting worked up over his anxiety. Symptoms do not occur with exertion. No homicidal thinking. He was scheduled to follow-up with an office psychologist, missed appointment, states that he was not ready at that time. Is agreeable to rescheduling and following through. Neuropathy:  Current medications include gabapentin 100 mg nightly.   Patient reports minimal relief with medication. Patient not interested in further up titration. Patient continues to have burning and numbness in his feet and toes bilaterally. Reports that the pain occasionally extends up the entirety of his leg. Denies any back pain. WPW:  Diagnosed during hospitalization, current medications include propafenone 150 mg 3 times daily. Patient has follow-up scheduled with EP in January. Patient reports occasional palpitations. No exertional chest pain or shortness of breath. Due to have heart monitor performed though he states that he has not had this received. Past Medical History :  Past Medical History:   Diagnosis Date    Asthma      Past Surgical History:   Procedure Laterality Date    BRONCHOSCOPY N/A 10/15/2021    BRONCHOSCOPY ALVEOLAR LAVAGE performed by Cassidy Chacon DO at [address] BIOPSY RENAL  10/27/2021    CT BIOPSY RENAL 10/27/2021 MD JOSUÉ Lafleur CT    FRACTURE SURGERY           Review of Systems :    ROS - Per HPI   Review of Systems   Constitutional: Negative for chills, fever and unexpected weight change. Respiratory: Negative for cough, shortness of breath and wheezing. Cardiovascular: Negative for chest pain, palpitations and leg swelling. Gastrointestinal: Negative for abdominal pain, blood in stool and nausea. Genitourinary: Positive for urgency. Negative for dysuria, frequency, hematuria and penile discharge. Musculoskeletal:        Foot pain per hpi   Skin: Negative for color change and rash. Neurological: Positive for numbness (feet per hpi). Negative for dizziness, syncope, light-headedness and headaches. Psychiatric/Behavioral: Positive for decreased concentration, dysphoric mood and sleep disturbance. Negative for behavioral problems, self-injury and suicidal ideas. The patient is nervous/anxious.       ______________________________________________________________________    Physical Exam :    Wt Readings from Last 3 Encounters: 12/08/21 185 lb (83.9 kg)   11/08/21 198 lb (89.8 kg)   11/07/21 201 lb (91.2 kg)       BMI Readings from Last 3 Encounters:   12/08/21 22.52 kg/m²   11/08/21 23.73 kg/m²   11/07/21 24.47 kg/m²       Vitals: /88 (Site: Right Upper Arm, Position: Sitting, Cuff Size: Medium Adult)   Pulse 86   Temp 98.4 °F (36.9 °C) (Temporal)   Resp 16   Ht 6' 4\" (1.93 m)   Wt 185 lb (83.9 kg)   SpO2 99%   BMI 22.52 kg/m²     Physical Exam  Constitutional:       General: He is not in acute distress. Appearance: He is well-developed. HENT:      Head: Normocephalic and atraumatic. Mouth/Throat:      Mouth: Mucous membranes are moist.      Pharynx: Oropharynx is clear. No oropharyngeal exudate or posterior oropharyngeal erythema. Comments: No thrush  Eyes:      Conjunctiva/sclera: Conjunctivae normal.      Pupils: Pupils are equal, round, and reactive to light. Neck:      Thyroid: No thyromegaly. Trachea: No tracheal deviation. Cardiovascular:      Rate and Rhythm: Normal rate and regular rhythm. Heart sounds: Normal heart sounds. No murmur heard. Pulmonary:      Effort: Pulmonary effort is normal. No respiratory distress. Breath sounds: Normal breath sounds. No wheezing or rales. Abdominal:      General: Bowel sounds are normal. There is no distension. Palpations: Abdomen is soft. Tenderness: There is no abdominal tenderness. Musculoskeletal:         General: Normal range of motion. Cervical back: Normal range of motion and neck supple. Comments: Trace pitting edema b/l shins   Lymphadenopathy:      Cervical: No cervical adenopathy. Skin:     General: Skin is warm and dry. Capillary Refill: Capillary refill takes less than 2 seconds. Findings: No rash. Neurological:      Mental Status: He is alert and oriented to person, place, and time. Cranial Nerves: No cranial nerve deficit.       Deep Tendon Reflexes: Reflexes normal.   Psychiatric: Behavior: Behavior normal.         Thought Content: Thought content normal.         Judgment: Judgment normal.         ______________________________________________________________________    Assessment & Plan :     Diagnosis Orders   1. Symptomatic HIV infection ([address]     2. 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 WPW (Joie-Parkinson-White syndrome)     4. Nephrotic range proteinuria     5. Need for immunization against influenza  INFLUENZA, QUADV, 3 YRS AND OLDER, IM PF, PREFILL SYR OR SDV, 0.5ML (AFLURIA QUADV, PF)   6. Depression, unspecified depression type  DULoxetine (CYMBALTA) 30 MG extended release capsule       HIV: Currently on Triumeq, under the care of infectious disease. Due to follow-up in January, labs are in system to be drawn prior to appointment. Patient reminded of this. Also remaining on Bactrim for PCP prophylaxis. Neuropathy: Likely secondary to HIV, will obtain nerve conduction study to assess further. Consider imaging of back pending results. Continue gabapentin at current dosage, addition of duloxetine 30 mg daily. WPW: Under the care of EP, continue propafenone. To have ambulatory monitoring performed, front office to check on status of monitor. Follow-up with EP as scheduled. Nephrotic range proteinuria: Under the care of nephrology, will obtain laboratory results from office visit day prior. Continue to avoid NSAIDs. In the process of getting repeat biopsy performed at [facility]. Depression/anxiety: PHQ-9 of 16 today, will initiate Cymbalta 30 mg daily to treat both depression as well as neuropathic pain. Patient to reschedule with Dr. Jason Laird, patient encouraged and stressed to follow through with appointment. Will consider cotreatment sessions in the future. Advised to call office or seek immediate attention for any suicidal thinking.   Health maintenance:

## 2021-12-08 NOTE — PROGRESS NOTES
S: 36 y.o. male presents today for:     HIV/AIDS/Covid/PCP pneumonia/WPW  Seeing ID- on meds  EP-to see next month, having monitor  Anxiety- is seeing Dr. Sandoval Gonzalez  Neuropathy- on gabapentin qhs. Not happy with current therapy. Nephropathy- seeing nephro,     O: VS: /88 (Site: Right Upper Arm, Position: Sitting, Cuff Size: Medium Adult)   Pulse 86   Temp 98.4 °F (36.9 °C) (Temporal)   Resp 16   Ht 6' 4\" (1.93 m)   Wt 185 lb (83.9 kg)   SpO2 99%   BMI 22.52 kg/m²   AAO/NAD, appropriate affect for mood  CV:  RRR, no murmur  Resp: CTAB  Ext- DPP-B, no clubbing, cyanosis, edema    Impression/Plan:   1) neuropathy- nerve conduction, increase gabapentin, add cymbalta  2) anxiety- cymbalta and Dr. Benetta Schwab  3) WPW- per EP  4) nephropathy- continue with Nephro    Attending Physician Statement  I have discussed the case, including pertinent history and exam findings with the resident. I agree with the documented assessment and plan.       Lizz Owens, DO

## 2021-12-08 NOTE — PROGRESS NOTES
Vaccine Information Sheet, \"Influenza - Inactivated\"  given to Ricardo Jones, or parent/legal guardian of  Ricardo Jones and verbalized understanding. Patient responses:    Have you ever had a reaction to a flu vaccine? No  Do you have any current illness? No  Have you ever had Guillian Waldron Syndrome? No  Do you have a serious allergy to any of the follow: Neomycin, Polymyxin, Thimerosal, eggs or egg products? No    Flu vaccine given per order. Please see immunization tab. Risks and benefits explained. Current VIS given.

## 2021-12-09 ASSESSMENT — ENCOUNTER SYMPTOMS: COLOR CHANGE: 0

## 2022-01-04 DIAGNOSIS — B20 HUMAN IMMUNODEFICIENCY VIRUS (HCC): Primary | ICD-10-CM

## 2022-01-10 ENCOUNTER — HOSPITAL ENCOUNTER (OUTPATIENT)
Dept: NEUROLOGY | Age: 32
Discharge: HOME | End: 2022-01-10
Payer: COMMERCIAL

## 2022-01-10 PROCEDURE — 95886 MUSC TEST DONE W/N TEST COMP: CPT

## 2022-01-10 PROCEDURE — 95911 NRV CNDJ TEST 9-10 STUDIES: CPT

## 2022-01-10 NOTE — PROCEDURES
EMG [facility]   Electrodiagnostic Laboratory  [place]        Full Name: Yvette Stanley Gender: Male  MRN: [id number] YOB: 1990  Location[de-identified] Outpt. Visit Date: 1/10/2022 14:27  Age: 36 Years 3 Months Old  Examining Physician: Dr. Sandy Sheehan  Referring Physician: Dr. Bethany Cummings  Technician: Hussain Church   Height: 6 feet 4 inch  Weight: 185 lbs  Notes: Neuropathy d/t HIV B20, G63      Motor NCS      Nerve / Sites Lat. Amplitude Distance Lat Diff Velocity Temp. Amp. 1-2    ms mV cm ms m/s °C %   L Peroneal - EDB      Ankle 11.82 0.05 8   31.6 100      Pop fossa 28.33 0.05 45 16.51 27 31.9 62.5   R Peroneal - EDB      Ankle 8.80 0.1 8   32.1 100      Fib head 22.81 0.1 43 14.01 31 32 111   R Tibial - AH      Ankle 13.85 0.0 8   32.1 100      Pop fossa NR NR  NR  32.2 NR   L Tibial - AH      Ankle 9.38 0.1 8   32.1 100      Pop fossa 27.76 0.0 47 18.39 26 32.1 84.8       Sensory NCS      Nerve / Sites Onset Lat Peak Lat PP Amp Distance Velocity Temp.    ms ms µV cm m/s °C   L Superficial peroneal - Ankle      Lat leg NR NR NR 10 NR 32   R Superficial peroneal - Ankle      Lat leg NR NR NR 10 NR 32.2   R Sural - Ankle (Calf)      Calf NR NR NR 14 NR 32.2   L Sural - Ankle (Calf)      Calf 4.64 5.73 4.4 14 30 32.1       F  Wave      Nerve F Lat M Lat F-M Lat    ms ms ms   L Peroneal - EDB NR NR NR   R Peroneal - EDB NR NR NR   L Tibial - AH 83.2         H Reflex      Nerve Lat Hmax    ms   R Tibial - Soleus 41.4   L Tibial - Soleus 39. 1       EMG         EMG Summary Table     Spontaneous MUAP Recruitment   Muscle IA Fib PSW Fasc H.F. Amp Dur. PPP Pattern   L. Extensor hallucis longus N 2+ 2+ None 3+Serrated 1+ 1+ 3+ Decr   R. Extensor hallucis longus N 2+ 2+ None 3+Serrated 1+ 1+ 3+ Decr   L. Flexor digitorum longus N 2+ 2+ None 3+Serrated 1+ 1+ 3+ Decr   R. Flexor digitorum longus N 2+ 2+ None 3+Serrated 1+ 1+ 3+ Decr   L.  Tibialis anterior N 2+ 2+ None 3+Serrated 1+ 1+ 3+ Decr   R. Tibialis anterior N 2+ 2+ None 3+Serrated 1+ 1+ 3+ Decr   L. Gastrocnemius (Medial head) N 2+ 2+ None 3+Serrated 1+ 1+ 3+ Decr   R. Gastrocnemius (Medial head) N 2+ 2+ None 3+Serrated 1+ 1+ 3+ Decr   L. Vastus medialis N None None None None 1+ 1+ N N   R. Vastus medialis N None None None None 1+ 1+ N N       This is the first electrodiagnostic study for Nubia Spencer. He was informed of the benefits, risk, indications and 100 locations to this examination. He voices understanding and consent to proceed. There was some limitation to the examination and that patient is positive for HIV, precautions were placed. Patient tolerated the procedure well, there was minimal bleeding on insertion in 1 area. Results of this examination were abnormal.  Did proceed with performing the full examination after nerve conduction studies revealed significant abnormalities. Summary:  Nerve conduction studies in the lower extremities reveal prolonged mixed motor latency of the peroneal nerve bilaterally. Amplitudes were significantly reduced as noted above, velocities severely limited and reduced. Temperature measured as normal.    In the tibial distribution, again prolonged mixed motor latency, inability to appropriately measure amplitude of the right tibial nerve, reduced amplitude of the left, unmeasurable velocity of the right tibial, reduced velocity on the left. .    Sensory studies in the lower extremities attempted and there was no response to stimulation of the superficial peroneal and right sural nerves. Peak latency of the left sural nerve, prolonged with lower limits of normal amplitude. .    Insertional activity in the lower extremity revealing diffuse positive waves, fibrillation potentials, increased amplitude and duration with polyphasic units. This was found diffusely in muscles distal to the knee, less changes present in the vastus medialis.   Please refer to the summarization table above for further details. .    Impression:  Study compatible with following:    #1  Abnormal nerve conduction studies of both motor and sensory fibers in all nerves sampled and tested, compatible with severe peripheral neuropathy affecting both motor and sensory fibers, axonal as well as myelin areas of the nerves    #2  No evidence of primary myopathy    #3  Changes in insertional activity not frequently compatible with proximal entrapment syndrome i.e. radiculopathies. However, clinical correlation is suggested. Recommendations: Please see attached letter in the communication section of the chart for further details. Thank you. Clinical correlation recommended.         Electronically signed by Luciano Chaves MD on 0/31/4716 at 3:44 PM

## 2022-01-10 NOTE — LETTER
RE:  Jj Brown    Dear Dr. Sandrita Zuniga,     Enclosed you will find a copy of the requested EMG on your patient, Jj Brown completed 1/10/2022. EMG Findings:       Anita Phoenix MD   Physician   Internal Medicine   Procedures      Signed   Date of Service:  1/10/2022  2:00 PM              Procedure Orders   EMG [[id number]] ordered by Anita Phoenix MD at 18/57/06 1543     Procedures   EMG [NEU5]          Signed              Show:Clear all  [x]Manual[x]Template[x]Copied    Added by:  Meli Feliz MD      []Chuyver for details    EMG [address]   Electrodiagnostic Laboratory  [place]         Full Name:    Rocío Muse                 Gender:             Male  MRN:             [id number]                        YOB: 1990  Location[de-identified]     Outpt. Visit Date:                          1/10/2022 14:27  Age:                                   36 Years 3 Months Old  Examining Physician:      Dr. Helene Bhaena  Referring Physician:        Dr. Viet Ng  Technician:                       Riri Ambrosio   Height:                               6 feet 4 inch  Weight:                              185 lbs  Notes:                                Neuropathy d/t HIV B20, G63      Motor NCS      Nerve / Sites Lat. Amplitude Distance Lat Diff Velocity Temp. Amp. 1-2     ms mV cm ms m/s °C %   L Peroneal - EDB      Ankle 11.82 0.05 8     31.6 100      Pop fossa 28.33 0.05 45 16.51 27 31.9 62.5   R Peroneal - EDB      Ankle 8.80 0.1 8     32.1 100      Fib head 22.81 0.1 43 14.01 31 32 111   R Tibial - AH      Ankle 13.85 0.0 8     32.1 100      Pop fossa NR NR   NR   32.2 NR   L Tibial - AH      Ankle 9.38 0.1 8     32.1 100      Pop fossa 27.76 0.0 47 18.39 26 32.1 84.8       Sensory NCS      Nerve / Sites Onset Lat Peak Lat PP Amp Distance Velocity Temp.     ms ms µV cm m/s °C   L Superficial peroneal - Ankle      Lat leg NR NR NR 10 NR 32   R Superficial limited and reduced. Temperature measured as normal.     In the tibial distribution, again prolonged mixed motor latency, inability to appropriately measure amplitude of the right tibial nerve, reduced amplitude of the left, unmeasurable velocity of the right tibial, reduced velocity on the left. .     Sensory studies in the lower extremities attempted and there was no response to stimulation of the superficial peroneal and right sural nerves. Peak latency of the left sural nerve, prolonged with lower limits of normal amplitude. .     Insertional activity in the lower extremity revealing diffuse positive waves, fibrillation potentials, increased amplitude and duration with polyphasic units. This was found diffusely in muscles distal to the knee, less changes present in the vastus medialis. Please refer to the summarization table above for further details. .     Impression:  Study compatible with following:     #1  Abnormal nerve conduction studies of both motor and sensory fibers in all nerves sampled and tested, compatible with severe peripheral neuropathy affecting both motor and sensory fibers, axonal as well as myelin areas of the nerves     #2  No evidence of primary myopathy     #3  Changes in insertional activity not frequently compatible with proximal entrapment syndrome i.e. radiculopathies. However, clinical correlation is suggested.     Recommendations: Please see attached letter in the communication section of the chart for further details. Thank you.     Clinical correlation recommended.           Electronically signed by Chaka Arriaga MD on 2/89/3906 at 3:44 PM                          History:  Gisela Kohli relates that in September or October 2021 he came down with \"COVID\". During this work-up, he was very ill, and numerous blood test were taken and he was also told that he was HIV positive. At that time and presently he states his lower extremities \"feel like they are not there\".   He has difficulty with ambulation, \"because I do not know where my feet are\". Most symptoms are sensory and he complains of significant numbness in both feet. Both lower extremities are involved same degree. He has little complaint of proximal abnormalities. He denies any involvement of the upper extremities. He also describes some pain in the dorsal area of both feet. He denies any lower back pain. He denies any similar complaints previous to the fall 2021. .     ROS:   See the above history. He did describes no bowel or bladder abnormalities, no difficulty with upper extremity coordination, no issues with vision, speech, hearing. He also has been told he has asthma. Weight has been stable recently. He did have acute kidney injury secondary to [place] he has a history of [employer] syndrome. Meds:    Prior to Admission medications    Medication Sig Start Date End Date Taking? Authorizing Provider   gabapentin (NEURONTIN) 100 MG capsule Take 1 capsule by mouth nightly for 30 days.  Intended supply: 30 days 12/8/21 1/7/22  Sunil Cee MD   DULoxetine (CYMBALTA) 30 MG extended release capsule Take 1 capsule by mouth daily 12/8/21   Sunil Cee MD   sulfamethoxazole-trimethoprim (BACTRIM) 400-80 MG per tablet Take 1 tablet by mouth daily 11/11/21   Krzysztof Flores MD   ascorbic acid (VITAMIN C) 500 MG tablet Take 1 tablet by mouth daily 11/8/21   Sunil Cee MD   propafenone (RYTHMOL) 150 MG tablet Take 1 tablet by mouth every 8 hours 11/8/21   Sunil Cee MD   Abacavir-Dolutegravir-Lamivud 954- MG TABS Take 1 tablet by mouth daily 11/8/21 2/6/22  Sunil Cee MD       PMH:     Past Medical History:   Diagnosis Date    Asthma        PSH:    Past Surgical History:   Procedure Laterality Date    BRONCHOSCOPY N/A 10/15/2021    BRONCHOSCOPY ALVEOLAR LAVAGE performed by Isabel Thomas DO at [address] BIOPSY RENAL  10/27/2021    CT BIOPSY RENAL 10/27/2021 Cassidy Garcia II, MD [facility]    FRACTURE SURGERY         Social History:    Social History     Socioeconomic History    Marital status: Single     Spouse name: Not on file    Number of children: Not on file    Years of education: Not on file    Highest education level: Not on file   Occupational History    Not on file   Tobacco Use    Smoking status: Former Smoker     Quit date: 2021     Years since quittin.4    Smokeless tobacco: Never Used   Vaping Use    Vaping Use: Never used   Substance and Sexual Activity    Alcohol use: No    Drug use: No    Sexual activity: Not on file   Other Topics Concern    Not on file   Social History Narrative    Not on file     Social Determinants of Health     Financial Resource Strain: Low Risk     Difficulty of Paying Living Expenses: Not very hard   Food Insecurity: Food Insecurity Present    Worried About [address] [employer] in the Last Year: Sometimes true    Daniel of Food in the Last Year: Sometimes true   Transportation Needs:     Lack of Transportation (Medical): Not on file    Lack of Transportation (Non-Medical):  Not on file   Physical Activity:     Days of Exercise per Week: Not on file    Minutes of Exercise per Session: Not on file   Stress:     Feeling of Stress : Not on file   Social Connections:     Frequency of Communication with Friends and Family: Not on file    Frequency of Social Gatherings with Friends and Family: Not on file    Attends Gnosticist Services: Not on file    Active Member of Clubs or Organizations: Not on file    Attends Club or Organization Meetings: Not on file    Marital Status: Not on file   Intimate Partner Violence:     Fear of Current or Ex-Partner: Not on file    Emotionally Abused: Not on file    Physically Abused: Not on file    Sexually Abused: Not on file   Housing Stability:     Unable to Pay for Housing in the Last Year: Not on file    Number of [place] in the Last Year: Not on file    Unstable Housing in the Last Year: Not on file       Family History:    Family History   Problem Relation Age of Onset    Diabetes type 2  Mother     Diabetes type 2  Father        Exam: Exam reveals a 82-year-old male 6 foot 4 inches weighing 185 pounds. Cognitively intact. Skin: Skin and lower extremity normal color, temperature, and texture. No lesions. Motor examination of the lower extremities reveals G-/N grade tone is normal and no atrophy. .    Sensory examination intact to pin in the lower extremities. .     Reflexes hyperactive at the knee, 1-2+ at the ankle. Downgoing toes and no clonus. .     Discussion: Please see the results of the electrodiagnostic study. Patient is being referred back to his primary care provider for further discussion and managementtreatment options. If there are any questions, please contact me for discussion. Thank you once again for allowing me to participate along with you in his behalf.             Electronically signed by Mireya Mahan MD on 7/10/1638 at 3:50 PM

## 2022-01-25 ENCOUNTER — OFFICE VISIT (OUTPATIENT)
Dept: FAMILY MEDICINE CLINIC | Age: 32
End: 2022-01-25
Payer: COMMERCIAL

## 2022-01-25 ENCOUNTER — HOSPITAL ENCOUNTER (OUTPATIENT)
Age: 32
Discharge: HOME | End: 2022-01-25

## 2022-01-25 VITALS
TEMPERATURE: 98.1 F | SYSTOLIC BLOOD PRESSURE: 137 MMHG | DIASTOLIC BLOOD PRESSURE: 86 MMHG | OXYGEN SATURATION: 100 % | BODY MASS INDEX: 24.72 KG/M2 | HEIGHT: 76 IN | HEART RATE: 99 BPM | WEIGHT: 203 LBS | RESPIRATION RATE: 16 BRPM

## 2022-01-25 DIAGNOSIS — I45.6 WPW (WOLFF-PARKINSON-WHITE SYNDROME): ICD-10-CM

## 2022-01-25 DIAGNOSIS — L64.9 MALE PATTERN BALDNESS: ICD-10-CM

## 2022-01-25 DIAGNOSIS — B20 HUMAN IMMUNODEFICIENCY VIRUS (HCC): ICD-10-CM

## 2022-01-25 DIAGNOSIS — R80.9 NEPHROTIC RANGE PROTEINURIA: ICD-10-CM

## 2022-01-25 DIAGNOSIS — G63 NEUROPATHY DUE TO HIV (HCC): Primary | ICD-10-CM

## 2022-01-25 DIAGNOSIS — B20 SYMPTOMATIC HIV INFECTION (HCC): ICD-10-CM

## 2022-01-25 DIAGNOSIS — B20 NEUROPATHY DUE TO HIV (HCC): Primary | ICD-10-CM

## 2022-01-25 PROCEDURE — G8427 DOCREV CUR MEDS BY ELIG CLIN: HCPCS | Performed by: FAMILY MEDICINE

## 2022-01-25 PROCEDURE — 99212 OFFICE O/P EST SF 10 MIN: CPT | Performed by: FAMILY MEDICINE

## 2022-01-25 PROCEDURE — G8420 CALC BMI NORM PARAMETERS: HCPCS | Performed by: FAMILY MEDICINE

## 2022-01-25 PROCEDURE — 1036F TOBACCO NON-USER: CPT | Performed by: FAMILY MEDICINE

## 2022-01-25 PROCEDURE — 99213 OFFICE O/P EST LOW 20 MIN: CPT | Performed by: FAMILY MEDICINE

## 2022-01-25 PROCEDURE — G8482 FLU IMMUNIZE ORDER/ADMIN: HCPCS | Performed by: FAMILY MEDICINE

## 2022-01-25 RX ORDER — GABAPENTIN 100 MG/1
100 CAPSULE ORAL 3 TIMES DAILY
Qty: 90 CAPSULE | Refills: 0 | Status: SHIPPED
Start: 2022-01-25 | End: 2022-02-25 | Stop reason: SDUPTHER

## 2022-01-25 ASSESSMENT — ENCOUNTER SYMPTOMS
COUGH: 0
SHORTNESS OF BREATH: 0
NAUSEA: 0
ABDOMINAL PAIN: 0
BLOOD IN STOOL: 0
WHEEZING: 0
COLOR CHANGE: 0

## 2022-01-25 NOTE — PATIENT INSTRUCTIONS
Minoxidil 5% for hair growth  Increase gabapentin to 100 mg three times a day  Follow up with Dr. Ciro Richardson and reschedule with electrophysiology doctor

## 2022-01-25 NOTE — PROGRESS NOTES
[facility] MEDICINE RESIDENCY PROGRAM  DATE OF VISIT : 2022    Patient : Marilia Mclaughlin   Age : 36 y.o.  : 1990   MRN : [id number]   ______________________________________________________________________    Chief Complaint :   Chief Complaint   Patient presents with    Other     f/u    Other     hair is thinning and nails are changing     Other     review test results       HPI : Marilia Mclaughlin is 36 y.o. male who presented to the clinic today for above chief complaint. HIV:  Current medications include Triumeq daily. Patient reports compliance with medications, no missed doses or side effects. Patient denies any fevers, swollen glands. Patient remains on Bactrim daily for PCP prophylaxis. Labs are ordered in system for his follow-up with infectious disease on . Has not had labs drawn. Nephrotic range proteinuria:  Last GFR greater than 60. Renal biopsy results inconclusive performed in hospital, process underway to have repeat biopsy done at [facility]. Has not heard from [facility] yet. Neuropathy:  Current medications include gabapentin 100 mg nightly, Cymbalta 30 mg daily. Patient reports some relief from gabapentin however does not think that it lasts long enough. Patient continues to have burning and numbness in his feet and toes bilaterally. Reports that the pain occasionally extends up the entirety of his leg. Denies any back pain. He is agreeable to up titration of therapy. Impression from EMG 1/10/22:   Study compatible with following:   #1  Abnormal nerve conduction studies of both motor and sensory fibers in all nerves sampled and tested, compatible with severe peripheral neuropathy affecting both motor and sensory fibers, axonal as well as myelin areas of the nerve    #2  No evidence of primary myopathy   #3  Changes in insertional activity not frequently compatible with proximal entrapment syndrome i.e. radiculopathies.   However, clinical correlation is suggested. Recommendations: Please see attached letter in the communication section of the chart for further details. Thank you. Clinical correlation recommended    WPW:  Diagnosed during hospitalization, current medications include propafenone 150 mg 3 times daily. Patient has follow-up scheduled with EP in January, was canceled due to weather. He reports racing heart and palpitations have decreased. No exertional chest pain or shortness of breath. Due to have heart monitor performed though he states that he has not had this received. Hair loss:  Patient reports subjective thinning of his hair over the last few months. Last TSH normal in October 2021. He reports father with full head of hair, mom with some thinning. He has not tried any new medications. Remarkable past medical history pertaining to complaint includes advanced HIV, multiple Covid infections. Past Medical History :  Past Medical History:   Diagnosis Date    Asthma      Past Surgical History:   Procedure Laterality Date    BRONCHOSCOPY N/A 10/15/2021    BRONCHOSCOPY ALVEOLAR LAVAGE performed by Corinne General,  at [id number] BIOPSY RENAL  10/27/2021    CT BIOPSY RENAL 10/27/2021 Eugenie Ryan MD [facility]    FRACTURE SURGERY           Review of Systems :    ROS - Per HPI   Review of Systems   Constitutional: Negative for chills, fever and unexpected weight change. Respiratory: Negative for cough, shortness of breath and wheezing. Cardiovascular: Negative for chest pain, palpitations and leg swelling. Gastrointestinal: Negative for abdominal pain, blood in stool and nausea. Musculoskeletal:        Foot pain per hpi   Skin: Negative for color change and rash. Neurological: Positive for numbness (feet per hpi).  Negative for dizziness, syncope, light-headedness and headaches.     ______________________________________________________________________    Physical Exam :    Wt Readings from Last 3 Encounters:   01/25/22 203 lb (92.1 kg)   12/08/21 185 lb (83.9 kg)   11/08/21 198 lb (89.8 kg)       BMI Readings from Last 3 Encounters:   01/25/22 24.71 kg/m²   12/08/21 22.52 kg/m²   11/08/21 23.73 kg/m²       Vitals: /86 (Site: Right Upper Arm, Position: Sitting, Cuff Size: Medium Adult)   Pulse 99   Temp 98.1 °F (36.7 °C) (Temporal)   Resp 16   Ht 6' 4\" (1.93 m)   Wt 203 lb (92.1 kg)   SpO2 100%   BMI 24.71 kg/m²     Physical Exam  Constitutional:       General: He is not in acute distress. Appearance: He is well-developed. HENT:      Head: Normocephalic and atraumatic. Mouth/Throat:      Mouth: Mucous membranes are moist.      Pharynx: Oropharynx is clear. No oropharyngeal exudate or posterior oropharyngeal erythema. Comments: No thrush  Eyes:      Conjunctiva/sclera: Conjunctivae normal.      Pupils: Pupils are equal, round, and reactive to light. Neck:      Thyroid: No thyromegaly. Trachea: No tracheal deviation. Cardiovascular:      Rate and Rhythm: Normal rate and regular rhythm. Heart sounds: Normal heart sounds. No murmur heard. Pulmonary:      Effort: Pulmonary effort is normal. No respiratory distress. Breath sounds: Normal breath sounds. No wheezing or rales. Abdominal:      General: Bowel sounds are normal. There is no distension. Palpations: Abdomen is soft. Tenderness: There is no abdominal tenderness. Musculoskeletal:         General: Normal range of motion. Cervical back: Normal range of motion and neck supple. Comments: Strength 5 out of 5 bilateral lower extremities   Lymphadenopathy:      Cervical: No cervical adenopathy. Skin:     Comments: Thinning and receding of hairline at front, thinning at the crown appreciated as well   Neurological:      Mental Status: He is alert and oriented to person, place, and time. Cranial Nerves: No cranial nerve deficit.       Deep Tendon Reflexes: Reflexes normal. Psychiatric:         Behavior: Behavior normal.         Thought Content: Thought content normal.         Judgment: Judgment normal.         ______________________________________________________________________    Assessment & Plan :     Diagnosis Orders   1. 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 Symptomatic HIV infection ([address]     3. WPW (Joie-Parkinson-White syndrome)     4. Nephrotic range proteinuria     5. Male pattern baldness         HIV: Currently on Triumeq, under the care of infectious disease. Due to follow with ID January 27, encourage patient to go and have laboratory work drawn after office visit. Also remaining on Bactrim for PCP prophylaxis. Neuropathy: EMG reviewed with findings consistent of neuropathy. Will increase gabapentin to 100 mg 3 times daily, continue duloxetine 30 mg daily. WPW: Under the care of EP, continue propafenone. Needs to reschedule with EP due to weather. Nephrotic range proteinuria: Under the care of nephrology. In the process of getting repeat biopsy performed at [facility]. Male pattern baldness: Advised patient to get off brand minoxidil 5% from pharmacy and to begin applying to thinning patches, will follow response in next 3 to 6 months      Follow up:  Return in about 4 weeks (around 2/22/2022) for follow up new medication.       Christine Everett MD PGY-3    Discussed with: Dr. Julio Griggs PROVIDER:[TOKEN:[[id number]]]

## 2022-01-25 NOTE — PROGRESS NOTES
Attending Physician Statement    S:   Chief Complaint   Patient presents with    Other     f/u    Other     hair is thinning and nails are changing     Other     review test results      31/M - following with ID for HIV - neuropathy confirmed by EMG on gabapentin QHS, possible WPW needs to establish with EP  O: Blood pressure 137/86, pulse 99, temperature 98.1 °F (36.7 °C), temperature source Temporal, resp. rate 16, height 6' 4\" (1.93 m), weight 203 lb (92.1 kg), SpO2 100 %. Exam:   Heart - RRR   Lungs - clear   ABD - SNTND, +BSx4  A: As above  P:  Increase gabapentin to 100 TID   Follow-up as ordered    I have discussed the case, including pertinent history and exam findings with the resident. I agree with the documented assessment and plan.

## 2022-01-27 ENCOUNTER — CLINICAL DOCUMENTATION (OUTPATIENT)
Dept: INFECTIOUS DISEASES | Age: 32
End: 2022-01-27

## 2022-01-27 NOTE — PROGRESS NOTES
Patient has not been able to follow up with me for HIV management but has been able to follow up with his primary care physician since hospital discharge. I have discussed with his primary care physician Dr. Senthil Cash if his department can take over patient's HIV management since patient is able to follow up with them consistently rather than with me and so I am not able to provide necessary HIV management. Dr. Senthil Cash discussed with Dr. Elaine Fermin and agreed to taking over his HIV management. As such, it would be reasonable and more convenient for patient to follow up with his primary care physician for HIV management from hereon. I appreciate Dr. Senthil Cash and Dr. Elaine Fermin' patient care.

## 2022-02-25 ENCOUNTER — OFFICE VISIT (OUTPATIENT)
Dept: FAMILY MEDICINE CLINIC | Age: 32
End: 2022-02-25
Payer: COMMERCIAL

## 2022-02-25 ENCOUNTER — HOSPITAL ENCOUNTER (OUTPATIENT)
Age: 32
Discharge: HOME | End: 2022-02-25

## 2022-02-25 VITALS
HEIGHT: 76 IN | DIASTOLIC BLOOD PRESSURE: 86 MMHG | BODY MASS INDEX: 25.09 KG/M2 | OXYGEN SATURATION: 100 % | WEIGHT: 206 LBS | TEMPERATURE: 98.4 F | HEART RATE: 81 BPM | SYSTOLIC BLOOD PRESSURE: 159 MMHG

## 2022-02-25 DIAGNOSIS — B20 NEUROPATHY DUE TO HIV (HCC): ICD-10-CM

## 2022-02-25 DIAGNOSIS — I45.6 WPW (WOLFF-PARKINSON-WHITE SYNDROME): ICD-10-CM

## 2022-02-25 DIAGNOSIS — B20 SYMPTOMATIC HIV INFECTION (HCC): Primary | ICD-10-CM

## 2022-02-25 DIAGNOSIS — R03.0 ELEVATED BLOOD PRESSURE READING: ICD-10-CM

## 2022-02-25 DIAGNOSIS — G63 NEUROPATHY DUE TO HIV (HCC): ICD-10-CM

## 2022-02-25 DIAGNOSIS — Z29.8 NEED FOR PNEUMOCYSTIS PROPHYLAXIS: ICD-10-CM

## 2022-02-25 PROCEDURE — 6360000002 HC RX W HCPCS

## 2022-02-25 PROCEDURE — 1036F TOBACCO NON-USER: CPT | Performed by: FAMILY MEDICINE

## 2022-02-25 PROCEDURE — G8419 CALC BMI OUT NRM PARAM NOF/U: HCPCS | Performed by: FAMILY MEDICINE

## 2022-02-25 PROCEDURE — G8427 DOCREV CUR MEDS BY ELIG CLIN: HCPCS | Performed by: FAMILY MEDICINE

## 2022-02-25 PROCEDURE — 99213 OFFICE O/P EST LOW 20 MIN: CPT | Performed by: FAMILY MEDICINE

## 2022-02-25 PROCEDURE — G0010 ADMIN HEPATITIS B VACCINE: HCPCS

## 2022-02-25 PROCEDURE — 90740 HEPB VACC 3 DOSE IMMUNSUP IM: CPT

## 2022-02-25 PROCEDURE — 99212 OFFICE O/P EST SF 10 MIN: CPT | Performed by: FAMILY MEDICINE

## 2022-02-25 PROCEDURE — G8482 FLU IMMUNIZE ORDER/ADMIN: HCPCS | Performed by: FAMILY MEDICINE

## 2022-02-25 RX ORDER — ASCORBIC ACID 500 MG
500 TABLET ORAL DAILY
Qty: 30 TABLET | Refills: 3 | Status: SHIPPED
Start: 2022-02-25 | End: 2022-09-20 | Stop reason: SDUPTHER

## 2022-02-25 RX ORDER — SULFAMETHOXAZOLE AND TRIMETHOPRIM 400; 80 MG/1; MG/1
1 TABLET ORAL DAILY
Qty: 30 TABLET | Refills: 11 | Status: SHIPPED | OUTPATIENT
Start: 2022-02-25

## 2022-02-25 RX ORDER — ABACAVIR SULFATE, DOLUTEGRAVIR SODIUM, LAMIVUDINE 600; 50; 300 MG/1; MG/1; MG/1
TABLET, FILM COATED ORAL
COMMUNITY
Start: 2022-02-18 | End: 2022-09-20 | Stop reason: SDUPTHER

## 2022-02-25 RX ORDER — GABAPENTIN 100 MG/1
100 CAPSULE ORAL 3 TIMES DAILY
Qty: 90 CAPSULE | Refills: 0 | Status: SHIPPED
Start: 2022-02-25 | End: 2022-06-17

## 2022-02-25 NOTE — PROGRESS NOTES
[facility]  FAMILY MEDICINE RESIDENCY PROGRAM  DATE OF VISIT : 2022    Patient : Diana Massey   Age : 36 y.o.  : 1990   MRN : [id number]   ______________________________________________________________________    Chief Complaint :   Chief Complaint   Patient presents with    1 Month Follow-Up    Meena Delgadillo 3 times       HPI : Diana Massey is 36 y.o. male who presented to the clinic today for above chief complaint. HIV:  Current medications include Triumeq daily. Patient reports compliance with medications, no missed doses or side effects. Patient denies any fevers, swollen glands. Has not been taking Bactrim, did not realize he still needed to be on this. Labs are ordered in system for his follow-up with infectious disease on . Has not had labs drawn yet. Infectious disease doctor, Dr. Eyad Jhaveri, transferred HIV care to this practice due to patient following up here more frequently. No new sexual partners. No concerns for STIs. Nephrotic range proteinuria:  Last GFR greater than 60. Renal biopsy results inconclusive performed in hospital, process underway to have repeat biopsy done at [facility]. Has not heard from [facility] yet. Neuropathy:  Current medications include gabapentin 100 mg 3 times daily (recently increased at last office visit), Cymbalta 30 mg daily. Patient reports that since increasing frequency of gabapentin his symptoms are still present. He is not interested in increasing dosage at this time. Patient continues to have burning and numbness in his feet and toes bilaterally. Reports that the pain occasionally extends up the entirety of his leg. Denies any back pain. Reports having fallen few times since last visit, no loss of consciousness. He reports struggling with going up and down stairs with neuropathic symptoms. Is amenable to physical therapy.     WPW:  Diagnosed during hospitalization, current medications include propafenone 150 mg 3 times daily. Patient has follow-up scheduled with EP in January, was canceled due to weather. He has not rescheduled this appointment. He reports racing heart and palpitations have decreased. No exertional chest pain or shortness of breath. Due to have heart monitor performed though he states that he has not had this received. Elevated blood pressure:  Blood pressure elevated at this visit today, has been previously well controlled. Patient not on any medications for blood pressure control. Denies any chest pain, shortness of breath, visual disturbance. Past Medical History :  Past Medical History:   Diagnosis Date    Asthma      Past Surgical History:   Procedure Laterality Date    BRONCHOSCOPY N/A 10/15/2021    BRONCHOSCOPY ALVEOLAR LAVAGE performed by Raudel Ford DO at [address] BIOPSY RENAL  10/27/2021    CT BIOPSY RENAL 10/27/2021 Prem Mandujano MD SE CT    FRACTURE SURGERY           Review of Systems :    ROS - Per HPI     ______________________________________________________________________    Physical Exam :    Wt Readings from Last 3 Encounters:   02/25/22 206 lb (93.4 kg)   01/25/22 203 lb (92.1 kg)   12/08/21 185 lb (83.9 kg)       BMI Readings from Last 3 Encounters:   02/25/22 25.08 kg/m²   01/25/22 24.71 kg/m²   12/08/21 22.52 kg/m²       Vitals: BP (!) 159/86 (Site: Left Upper Arm, Position: Sitting, Cuff Size: Medium Adult)   Pulse 81   Temp 98.4 °F (36.9 °C) (Temporal)   Ht 6' 4\" (1.93 m)   Wt 206 lb (93.4 kg)   SpO2 100%   BMI 25.08 kg/m²     Physical Exam  Constitutional:       General: He is not in acute distress. Appearance: He is well-developed. HENT:      Head: Normocephalic and atraumatic. Mouth/Throat:      Mouth: Mucous membranes are moist.      Pharynx: Oropharynx is clear. No oropharyngeal exudate or posterior oropharyngeal erythema.       Comments: No thrush  Eyes:      Conjunctiva/sclera: Conjunctivae normal.      Pupils: Pupils are equal, round, and reactive to light. Neck:      Thyroid: No thyromegaly. Trachea: No tracheal deviation. Cardiovascular:      Rate and Rhythm: Normal rate and regular rhythm. Heart sounds: Normal heart sounds. No murmur heard. Pulmonary:      Effort: Pulmonary effort is normal. No respiratory distress. Breath sounds: Normal breath sounds. No wheezing or rales. Abdominal:      General: Bowel sounds are normal. There is no distension. Palpations: Abdomen is soft. Tenderness: There is no abdominal tenderness. Musculoskeletal:         General: Normal range of motion. Cervical back: Normal range of motion and neck supple. Comments: Strength 5 out of 5 bilateral lower extremities   Lymphadenopathy:      Cervical: No cervical adenopathy. Neurological:      Mental Status: He is alert and oriented to person, place, and time. Psychiatric:         Behavior: Behavior normal.         Thought Content: Thought content normal.         Judgment: Judgment normal.         ______________________________________________________________________    Assessment & Plan :     Diagnosis Orders   1. Symptomatic HIV infection (HCC)  sulfamethoxazole-trimethoprim (BACTRIM) 400-80 MG per tablet    Hep A Vaccine Adult (HAVRIX)    Hep B Vaccine Adult (ENGERIX-B)   2. Need for pneumocystis prophylaxis  sulfamethoxazole-trimethoprim (BACTRIM) 400-80 MG per tablet   3. 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 WPW (Joie-Parkinson-White syndrome)     5. Elevated blood pressure reading         HIV: Continue Triumeq, continue Bactrim, advised patient that he needs to get labs drawn as soon as possible, verbalized understanding.  Is agreeable to beginning hepatitis A and B immunization series  Neuropathy: Continue gabapentin and duloxetine at current dosage, referral to physical therapy  WPW: Under the care of EP, continue propafenone. Needs to reschedule with EP due to weather. Strongly encourage patient to reschedule as soon as he can  Nephrotic range proteinuria: Under the care of nephrology. In the process of getting repeat biopsy performed at [facility]. Health maintenance: First hep B and hep A immunizations      Follow up:  Return in about 4 weeks (around 3/25/2022) for follow up on symptoms.        Vic Simmonds, MD PGY-3    Discussed with: Dr. Yessica Rosenthal

## 2022-02-25 NOTE — PROGRESS NOTES
S: 36 y.o. male here for WPW, HIV with possible HIVAN. Has not been taking bactrim despite recommendations. Taking Triumeq  On propafenone. Struggles going up and down stairs, some falls. Cymbalta. Has crying spells, doesn't want to increase therapy. O: VS: BP (!) 159/86 (Site: Left Upper Arm, Position: Sitting, Cuff Size: Medium Adult)   Pulse 81   Temp 98.4 °F (36.9 °C) (Temporal)   Ht 6' 4\" (1.93 m)   Wt 206 lb (93.4 kg)   SpO2 100%   BMI 25.08 kg/m²    General: NAD, alert and interacting appropriately. No adenopathy or thrush   CV:  RRR, no gallops, rubs, or murmurs    Resp: CTAB   Abd:  Soft, nontender   Ext:  No edema    Impression: HIV. WPW. Falls 2/2 neuropathy. Depression, anxiety  Plan:   Get HIV vaccines. Continue triumeq. Labs. Continue propafenone  Continue cymbalta, f/u w/ Dr. Deisy Finley      Attending Physician Statement  I have discussed the case, including pertinent history and exam findings with the resident. I agree with the documented assessment and plan.

## 2022-03-14 ENCOUNTER — HOSPITAL ENCOUNTER (OUTPATIENT)
Dept: PHYSICAL THERAPY | Age: 32
Setting detail: THERAPIES SERIES
Discharge: HOME | End: 2022-03-14

## 2022-03-14 NOTE — PROGRESS NOTES
[address] [facility]                Phone: [phone]  Fax: [phone]    Physical Therapy  Cancellation/No-show Note  Patient Name:  Sea Ware  :  1990   Date:  3/14/2022    For today's appointment patient:  []  Cancelled  []  Rescheduled appointment  [x]  No-show     Reason given by patient:  []  Patient ill  []  Conflicting appointment  []  No transportation    []  Conflict with work  [x]  No reason given  [x]  Other:  Pt did not show for scheduled initial evaluation.           Electronically signed by:    Edilma Jolly, PT, DPT  [id number]

## 2022-03-16 ENCOUNTER — HOSPITAL ENCOUNTER (OUTPATIENT)
Age: 32
Discharge: HOME | End: 2022-03-16
Payer: COMMERCIAL

## 2022-03-16 DIAGNOSIS — B20 HUMAN IMMUNODEFICIENCY VIRUS (HCC): ICD-10-CM

## 2022-03-16 DIAGNOSIS — Z29.8 NEED FOR PNEUMOCYSTIS PROPHYLAXIS: ICD-10-CM

## 2022-03-16 LAB
ALBUMIN SERPL-MCNC: 4.2 G/DL (ref 3.5–5.2)
ALP BLD-CCNC: 81 U/L (ref 40–129)
ALT SERPL-CCNC: 18 U/L (ref 0–40)
ANION GAP SERPL CALCULATED.3IONS-SCNC: 8 MMOL/L (ref 7–16)
AST SERPL-CCNC: 20 U/L (ref 0–39)
ATYPICAL LYMPHOCYTE RELATIVE PERCENT: 0.9 % (ref 0–4)
BASOPHILS ABSOLUTE: 0.03 E9/L (ref 0–0.2)
BASOPHILS RELATIVE PERCENT: 0.9 % (ref 0–2)
BILIRUB SERPL-MCNC: 0.7 MG/DL (ref 0–1.2)
BUN BLDV-MCNC: 14 MG/DL (ref 6–20)
C-REACTIVE PROTEIN: 0.3 MG/DL (ref 0–0.4)
CALCIUM SERPL-MCNC: 9.4 MG/DL (ref 8.6–10.2)
CHLORIDE BLD-SCNC: 105 MMOL/L (ref 98–107)
CO2: 25 MMOL/L (ref 22–29)
CREAT SERPL-MCNC: 1 MG/DL (ref 0.7–1.2)
EOSINOPHILS ABSOLUTE: 0.41 E9/L (ref 0.05–0.5)
EOSINOPHILS RELATIVE PERCENT: 13.3 % (ref 0–6)
GFR AFRICAN AMERICAN: >60
GFR NON-AFRICAN AMERICAN: >60 ML/MIN/1.73
GLUCOSE BLD-MCNC: 81 MG/DL (ref 74–99)
HCT VFR BLD CALC: 41.8 % (ref 37–54)
HEMOGLOBIN: 14.1 G/DL (ref 12.5–16.5)
LYMPHOCYTES ABSOLUTE: 0.84 E9/L (ref 1.5–4)
LYMPHOCYTES RELATIVE PERCENT: 25.6 % (ref 20–42)
MCH RBC QN AUTO: 34.8 PG (ref 26–35)
MCHC RBC AUTO-ENTMCNC: 33.7 % (ref 32–34.5)
MCV RBC AUTO: 103.2 FL (ref 80–99.9)
MONOCYTES ABSOLUTE: 0.37 E9/L (ref 0.1–0.95)
MONOCYTES RELATIVE PERCENT: 11.5 % (ref 2–12)
NEUTROPHILS ABSOLUTE: 1.49 E9/L (ref 1.8–7.3)
NEUTROPHILS RELATIVE PERCENT: 47.8 % (ref 43–80)
NUCLEATED RED BLOOD CELLS: 0 /100 WBC
PDW BLD-RTO: 13 FL (ref 11.5–15)
PLATELET # BLD: 247 E9/L (ref 130–450)
PMV BLD AUTO: 9.5 FL (ref 7–12)
POTASSIUM SERPL-SCNC: 4.4 MMOL/L (ref 3.5–5)
RBC # BLD: 4.05 E12/L (ref 3.8–5.8)
RBC # BLD: NORMAL 10*6/UL
SEDIMENTATION RATE, ERYTHROCYTE: 7 MM/HR (ref 0–15)
SODIUM BLD-SCNC: 138 MMOL/L (ref 132–146)
TOTAL PROTEIN: 7.3 G/DL (ref 6.4–8.3)
WBC # BLD: 3.1 E9/L (ref 4.5–11.5)

## 2022-03-16 PROCEDURE — 36415 COLL VENOUS BLD VENIPUNCTURE: CPT

## 2022-03-16 PROCEDURE — 80053 COMPREHEN METABOLIC PANEL: CPT

## 2022-03-16 PROCEDURE — 85025 COMPLETE CBC W/AUTO DIFF WBC: CPT

## 2022-03-16 PROCEDURE — 86360 T CELL ABSOLUTE COUNT/RATIO: CPT

## 2022-03-16 PROCEDURE — 86359 T CELLS TOTAL COUNT: CPT

## 2022-03-16 PROCEDURE — 85651 RBC SED RATE NONAUTOMATED: CPT

## 2022-03-16 PROCEDURE — 87536 HIV-1 QUANT&REVRSE TRNSCRPJ: CPT

## 2022-03-16 PROCEDURE — 86140 C-REACTIVE PROTEIN: CPT

## 2022-03-18 LAB
ABSOLUTE CD 3: 842 CELLS/UL (ref 570–2400)
ABSOLUTE CD 4 HELPER: 350 CELLS/UL (ref 430–1800)
ABSOLUTE CD 8 (SUPP): 451 CELLS/UL (ref 210–1200)
CD4/CD8 RATIO: 0.78 RATIO (ref 0.8–3.9)
LYMPH SUBSET INFORMATION: ABNORMAL

## 2022-03-22 LAB
HIV QUANT LOG: <1.3 LOG CPY/ML
HIV-1 RNA BY PCR, QN: <20 CPY/ML
HIV-1 RNA BY PCR, QN: DETECTED
SOURCE: ABNORMAL

## 2022-04-13 NOTE — PROGRESS NOTES
Patients  .called RRT Blood pressure 135/96. SPO2 93 on room air. Blood sugar 93. Temp 99.8F  Oxygen at 4L/min initiated spo2 96%. Ekg done  Sinus tarchycadia. Carotid massage done,patient given tylenol ,625mg ,vistaril 25mg. Initial (On Arrival)

## 2022-06-16 DIAGNOSIS — G63 NEUROPATHY DUE TO HIV (HCC): ICD-10-CM

## 2022-06-16 DIAGNOSIS — B20 NEUROPATHY DUE TO HIV (HCC): ICD-10-CM

## 2022-06-17 RX ORDER — GABAPENTIN 100 MG/1
CAPSULE ORAL
Qty: 90 CAPSULE | Refills: 0 | Status: SHIPPED
Start: 2022-06-17 | End: 2022-09-20 | Stop reason: SDUPTHER

## 2022-06-29 NOTE — PROGRESS NOTES
Comprehensive Nutrition Assessment    Type and Reason for Visit:  Initial, Consult    Nutrition Recommendations/Plan: Recommend and start Boost Pudding supplement BID and Magic Cup supplement daily to help meet nutritional needs and d/t decreased po intake of meals. No fluids on trays at this time. Monitor potassium levels as available. WNL at this time. Nutrition Assessment:  Patient adm w/ decreased appetite PTA, averaging 50-75% of meals served since admission ; pt COVID-19 positive ; adm w/ hypoxia and SOB ; noted sepsis and DEISI ; HIV pending ; hx of asthma and previous COVID ; subjective weight loss of 50# in the past 2-3 months although no evidence of this ; will start ONS    Malnutrition Assessment:  Malnutrition Status: At risk for malnutrition (Comment)    Context:  Acute Illness     Findings of the 6 clinical characteristics of malnutrition:  Energy Intake:  Mild decrease in energy intake (Comment) (x 3 days since admission)  Weight Loss:  Unable to assess (d/t lack of weight history ; subjective weight loss noted although no evidence of this)     Body Fat Loss:  Unable to assess (d/t COVID isolation)     Muscle Mass Loss:  Unable to assess (d/t COVID isolation)    Fluid Accumulation:  No significant fluid accumulation     Strength:  Not Performed    Estimated Daily Nutrient Needs:  Energy (kcal):  6289-3040 (REE 1853 x 1.2 SF); Weight Used for Energy Requirements:  Current     Protein (g):   (1.2-1.4g/kg CBW); Weight Used for Protein Requirements:  Current        Fluid (ml/day):  no fluids on trays at this time; Method Used for Fluid Requirements:  1 ml/kcal      Nutrition Related Findings:  I&Os WNL, no edema, hyperactive BS, nausea, loose stools, A&O x 4, SOB      Wounds:  None       Current Nutrition Therapies:    ADULT DIET;  Regular; Low Potassium (Less than 3000 mg/day)    Anthropometric Measures:  · Height: 6' 4\" (193 cm)  · Current Body Weight: 173 lb (78.5 kg) (10/6, no method)   · Admission Body Weight: 171 lb (77.6 kg) (10/5, no method)    · Usual Body Weight:  (UTO ; EMR shows only one weight recorded of 171# no method on 10/5/21 ; subjective weight loss of 50# in the past 2-3 months although no evidence of this)     · Ideal Body Weight: 202 lbs; % Ideal Body Weight 85.6 %   · BMI: 21.1  · BMI Categories: Normal Weight (BMI 18.5-24. 9)       Nutrition Diagnosis:   · Inadequate oral intake related to inadequate protein-energy intake (2/2 COVID-19) as evidenced by poor intake prior to admission, intake 51-75%      Nutrition Interventions:   Food and/or Nutrient Delivery:  Continue Current Diet, Start Oral Nutrition Supplement  Nutrition Education/Counseling:  Education not indicated   Coordination of Nutrition Care:  Continue to monitor while inpatient    Goals:  Pt will consume ~75% of meals served       Nutrition Monitoring and Evaluation:   Behavioral-Environmental Outcomes:  None Identified   Food/Nutrient Intake Outcomes:  Food and Nutrient Intake, Supplement Intake  Physical Signs/Symptoms Outcomes:  Biochemical Data, Chewing or Swallowing, GI Status, Fluid Status or Edema, Meal Time Behavior, Hemodynamic Status, Nausea or Vomiting, Diarrhea, Nutrition Focused Physical Findings, Skin, Weight     Discharge Planning:     Too soon to determine     Electronically signed by Jaelyn Lopez RD, LD on 10/8/21 at 11:34 AM EDT    Contact: [phone] upright (90 degrees)

## 2022-09-19 DIAGNOSIS — G63 NEUROPATHY DUE TO HIV (HCC): ICD-10-CM

## 2022-09-19 DIAGNOSIS — B20 NEUROPATHY DUE TO HIV (HCC): ICD-10-CM

## 2022-09-19 DIAGNOSIS — F32.A DEPRESSION, UNSPECIFIED DEPRESSION TYPE: ICD-10-CM

## 2022-09-19 NOTE — TELEPHONE ENCOUNTER
Last Appointment:  Visit date not found  Future Appointments   Date Time Provider Nikko Wells   9/20/2022  1:00 PM [facility]

## 2022-09-20 ENCOUNTER — OFFICE VISIT (OUTPATIENT)
Dept: FAMILY MEDICINE CLINIC | Age: 32
End: 2022-09-20
Payer: COMMERCIAL

## 2022-09-20 VITALS
SYSTOLIC BLOOD PRESSURE: 133 MMHG | DIASTOLIC BLOOD PRESSURE: 87 MMHG | RESPIRATION RATE: 16 BRPM | OXYGEN SATURATION: 98 % | WEIGHT: 227 LBS | BODY MASS INDEX: 27.64 KG/M2 | HEART RATE: 82 BPM | TEMPERATURE: 99.1 F | HEIGHT: 76 IN

## 2022-09-20 DIAGNOSIS — B20 NEUROPATHY DUE TO HIV (HCC): ICD-10-CM

## 2022-09-20 DIAGNOSIS — R10.9 FLANK PAIN: Primary | ICD-10-CM

## 2022-09-20 DIAGNOSIS — R10.9 FLANK PAIN: ICD-10-CM

## 2022-09-20 DIAGNOSIS — G63 NEUROPATHY DUE TO HIV (HCC): ICD-10-CM

## 2022-09-20 DIAGNOSIS — F32.A DEPRESSION, UNSPECIFIED DEPRESSION TYPE: ICD-10-CM

## 2022-09-20 LAB
BILIRUBIN, POC: NEGATIVE
BLOOD URINE, POC: NEGATIVE
CLARITY, POC: CLEAR
COLOR, POC: YELLOW
GLUCOSE URINE, POC: NEGATIVE
KETONES, POC: NEGATIVE
LEUKOCYTE EST, POC: NEGATIVE
NITRITE, POC: NEGATIVE
PH, POC: 5.5
PROTEIN, POC: 100
SPECIFIC GRAVITY, POC: 1.02
UROBILINOGEN, POC: 0.2

## 2022-09-20 PROCEDURE — 81002 URINALYSIS NONAUTO W/O SCOPE: CPT

## 2022-09-20 PROCEDURE — G8427 DOCREV CUR MEDS BY ELIG CLIN: HCPCS

## 2022-09-20 PROCEDURE — G8419 CALC BMI OUT NRM PARAM NOF/U: HCPCS

## 2022-09-20 PROCEDURE — 99213 OFFICE O/P EST LOW 20 MIN: CPT

## 2022-09-20 PROCEDURE — 99212 OFFICE O/P EST SF 10 MIN: CPT

## 2022-09-20 PROCEDURE — 1036F TOBACCO NON-USER: CPT

## 2022-09-20 RX ORDER — PROPAFENONE HYDROCHLORIDE 150 MG/1
150 TABLET, FILM COATED ORAL EVERY 8 HOURS SCHEDULED
Qty: 90 TABLET | Refills: 3 | Status: SHIPPED | OUTPATIENT
Start: 2022-09-20

## 2022-09-20 RX ORDER — DULOXETIN HYDROCHLORIDE 30 MG/1
30 CAPSULE, DELAYED RELEASE ORAL DAILY
Qty: 30 CAPSULE | Refills: 0 | Status: SHIPPED | OUTPATIENT
Start: 2022-09-20

## 2022-09-20 RX ORDER — ABACAVIR SULFATE, DOLUTEGRAVIR SODIUM, LAMIVUDINE 600; 50; 300 MG/1; MG/1; MG/1
TABLET, FILM COATED ORAL
Qty: 30 TABLET | Refills: 0 | Status: SHIPPED
Start: 2022-09-20 | End: 2022-09-20 | Stop reason: SDUPTHER

## 2022-09-20 RX ORDER — DULOXETIN HYDROCHLORIDE 30 MG/1
30 CAPSULE, DELAYED RELEASE ORAL DAILY
Qty: 30 CAPSULE | Refills: 0 | Status: SHIPPED
Start: 2022-09-20 | End: 2022-09-20 | Stop reason: SDUPTHER

## 2022-09-20 RX ORDER — GABAPENTIN 100 MG/1
CAPSULE ORAL
Qty: 90 CAPSULE | Refills: 0 | Status: SHIPPED
Start: 2022-09-20 | End: 2022-09-20 | Stop reason: SDUPTHER

## 2022-09-20 RX ORDER — ASCORBIC ACID 500 MG
500 TABLET ORAL DAILY
Qty: 30 TABLET | Refills: 0 | Status: SHIPPED
Start: 2022-09-20 | End: 2022-09-20 | Stop reason: SDUPTHER

## 2022-09-20 RX ORDER — ASCORBIC ACID 500 MG
500 TABLET ORAL DAILY
Qty: 30 TABLET | Refills: 0 | Status: SHIPPED | OUTPATIENT
Start: 2022-09-20

## 2022-09-20 RX ORDER — ABACAVIR SULFATE, DOLUTEGRAVIR SODIUM, LAMIVUDINE 600; 50; 300 MG/1; MG/1; MG/1
TABLET, FILM COATED ORAL
Qty: 30 TABLET | Refills: 0 | Status: SHIPPED
Start: 2022-09-20 | End: 2022-10-18 | Stop reason: SDUPTHER

## 2022-09-20 RX ORDER — GABAPENTIN 100 MG/1
CAPSULE ORAL
Qty: 90 CAPSULE | Refills: 0 | Status: SHIPPED | OUTPATIENT
Start: 2022-09-20 | End: 2022-10-20

## 2022-09-20 ASSESSMENT — PATIENT HEALTH QUESTIONNAIRE - PHQ9
4. FEELING TIRED OR HAVING LITTLE ENERGY: 1
9. THOUGHTS THAT YOU WOULD BE BETTER OFF DEAD, OR OF HURTING YOURSELF: 0
5. POOR APPETITE OR OVEREATING: 0
6. FEELING BAD ABOUT YOURSELF - OR THAT YOU ARE A FAILURE OR HAVE LET YOURSELF OR YOUR FAMILY DOWN: 0
7. TROUBLE CONCENTRATING ON THINGS, SUCH AS READING THE NEWSPAPER OR WATCHING TELEVISION: 0
3. TROUBLE FALLING OR STAYING ASLEEP: 0
8. MOVING OR SPEAKING SO SLOWLY THAT OTHER PEOPLE COULD HAVE NOTICED. OR THE OPPOSITE, BEING SO FIGETY OR RESTLESS THAT YOU HAVE BEEN MOVING AROUND A LOT MORE THAN USUAL: 0
SUM OF ALL RESPONSES TO PHQ QUESTIONS 1-9: 1
SUM OF ALL RESPONSES TO PHQ QUESTIONS 1-9: 1
10. IF YOU CHECKED OFF ANY PROBLEMS, HOW DIFFICULT HAVE THESE PROBLEMS MADE IT FOR YOU TO DO YOUR WORK, TAKE CARE OF THINGS AT HOME, OR GET ALONG WITH OTHER PEOPLE: 0
SUM OF ALL RESPONSES TO PHQ QUESTIONS 1-9: 1
1. LITTLE INTEREST OR PLEASURE IN DOING THINGS: 0
2. FEELING DOWN, DEPRESSED OR HOPELESS: 0
SUM OF ALL RESPONSES TO PHQ QUESTIONS 1-9: 1
SUM OF ALL RESPONSES TO PHQ9 QUESTIONS 1 & 2: 0

## 2022-09-20 ASSESSMENT — LIFESTYLE VARIABLES
HOW MANY STANDARD DRINKS CONTAINING ALCOHOL DO YOU HAVE ON A TYPICAL DAY: 3 OR 4
HOW OFTEN DO YOU HAVE A DRINK CONTAINING ALCOHOL: 2-3 TIMES A WEEK

## 2022-09-20 NOTE — PROGRESS NOTES
Attending Physician Statement    S:   Chief Complaint   Patient presents with    Flank Pain     Left x 3 days  urgency  with urinating        32/M with PMH of HIV who presents to the clinic today for 3-day history of left flank pain. Patient denies systemic symptoms or dysuria/hematuria. Patient states pain is improved on urination. O: Blood pressure 133/87, pulse 82, temperature 99.1 °F (37.3 °C), temperature source Temporal, resp. rate 16, height 6' 4\" (1.93 m), weight 227 lb (103 kg), SpO2 98 %. Exam:   Heart - RRR   Lungs - clear   Abd - CVA TTP on left, no rebound or guarding  A: Flank Pain, concern for pyelonephritis/nephrolithiasis  P:  POC UA   Urine Cx   Advised patient to proceed to ED for further workup for possible nephrolithiasis/pyelonephritis   Follow-up as ordered    I have discussed the case, including pertinent history and exam findings with the resident. I agree with the documented assessment and plan.        Sarah Banda MD

## 2022-09-20 NOTE — PROGRESS NOTES
[facility] MEDICINE RESIDENCY PROGRAM  DATE OF VISIT : 2022    Patient : Noemy Sahu   Age : 29 y.o.  : 1990   MRN : [id number]   ________________________________________________________________    Chief Complaint:   Chief Complaint   Patient presents with    Flank Pain     Left x 3 days  urgency  with urinating         HPI:   History obtained from the patient. Noemy Sahu is a 29 y.o. male with a PMH of HIV, CKD stage II, nephrotic syndrome here for same-day appointment regarding left flank pain. Pain has been present for the past 3 days, intermittent, not relieved with Tylenol, improves with voiding. Denies having systemic symptoms such as fevers, chills, headaches, vomiting . No dysuria, hematuria, change in color of urine. Patient has significant history of HIV and nephrotic range proteinuria in the past for which he was seen by nephrology and renal biopsy was obtained which was inconclusive. Saw Nephrology on 2022 where possible HIV associated nephropathy vs FSGS was being considered. Patient states that he was supposed to get a follow-up biopsy in [facility] per Dr. Dayanara Worrell but he did not follow up with this due to work schedule. I reviewed the patient's past medications, allergies, past medical history, past surgical history, family history and social history during this visit      ROS:  Pertinent positives and negatives are stated within HPI    Physical Exam:    Vitals: /87   Pulse 82   Temp 99.1 °F (37.3 °C) (Temporal)   Resp 16   Ht 6' 4\" (1.93 m)   Wt 227 lb (103 kg)   SpO2 98%   BMI 27.63 kg/m²   Physical Exam  Constitutional:       General: He is not in acute distress. Appearance: He is well-developed. HENT:      Head: Normocephalic and atraumatic. Mouth/Throat:      Mouth: Mucous membranes are moist.      Pharynx: Oropharynx is clear. No oropharyngeal exudate or posterior oropharyngeal erythema. Comments: No thrush  Eyes:      Conjunctiva/sclera: Conjunctivae normal.      Pupils: Pupils are equal, round, and reactive to light. Neck:      Thyroid: No thyromegaly. Trachea: No tracheal deviation. Cardiovascular:      Rate and Rhythm: Normal rate and regular rhythm. Heart sounds: Normal heart sounds. No murmur heard. Pulmonary:      Effort: Pulmonary effort is normal. No respiratory distress. Breath sounds: Normal breath sounds. No wheezing or rales. Abdominal:      General: Bowel sounds are normal. There is no distension. Palpations: Abdomen is soft. Tenderness: There is no abdominal tenderness. Musculoskeletal:         General: Normal range of motion. Cervical back: Normal range of motion and neck supple. Lymphadenopathy:      Cervical: No cervical adenopathy. Neurological:      Mental Status: He is alert and oriented to person, place, and time. Psychiatric:         Behavior: Behavior normal.         Thought Content: Thought content normal.         Judgment: Judgment normal.       Assessment & Plan:    Left flank pain with proteinuria  Patient presenting today to clinic for same-day visit appointment for left flank pain of 3 days onset. Denies having fevers chills vomiting diarrhea. No dysuria, hematuria, diarrhea. Urine culture obtained was obtained and the UA done here concerning for proteinuria up to 100. No blood, bacteria, white blood cells. Patient has significant history of HIV and nephrotic range proteinuria in the past for which he was seen by nephrology and renal biopsy was obtained which was inconclusive. Saw Nephrology on 03/31/2022 where possible HIV associated nephropathy vs FSGS was being considered. Patient states that he was supposed to get a follow-up biopsy in [facility] clinic per Dr. Alicia Franco but he did not follow up with this due to work schedule. I advised patient to go to the emergency department for further work-up and evaluation. Patient might require imaging to rule out any pyelonephritis or other concerning process given he has history of HIV with no recent labs and proteinuria associated with CVA tenderness. Educational materials printed for patient's review and were included in patient instructions on his/her After Visit Summary and given to patient at the end of visit. Counseled regarding above diagnosis, including possible risks and complications,  especially if left uncontrolled. Counseled regarding the possible side effects, risks, benefits and alternatives to treatment. Call or go to ED immediately if symptoms worsen or persist. Indications and proper use of medication(s) reviewed. Potential side-effects and risks of medication(s) also explained. Reviewed age and gender appropriate health screening exams and vaccinations. Advised patient regarding importance of keeping up with recommended health maintenance and to schedule as soon as possible if overdue, as this is important in assessing for undiagnosed pathology, especially cancer, as well as protecting against potentially harmful/life threatening disease. Patient and/or guardian verbalizes understanding and agrees with above counseling, assessment and plan. All questions answered.     Return to Office: 1 month or as soon as possible after ED/ hospital.    Ángel Owen MD   This case was discussed with Dr. Marcin Michelle

## 2022-09-23 LAB — URINE CULTURE, ROUTINE: NORMAL

## 2022-10-03 ENCOUNTER — TELEPHONE (OUTPATIENT)
Dept: FAMILY MEDICINE CLINIC | Age: 32
End: 2022-10-03

## 2022-10-03 NOTE — TELEPHONE ENCOUNTER
Called patient to discuss need for labs and appointment . No answer. Could not leave message as mailbox was full. Please mail letter to patient to call and schedule an appointment.

## 2022-10-18 DIAGNOSIS — B20 SYMPTOMATIC HIV INFECTION (HCC): Primary | ICD-10-CM

## 2022-10-18 RX ORDER — ABACAVIR SULFATE, DOLUTEGRAVIR SODIUM, LAMIVUDINE 600; 50; 300 MG/1; MG/1; MG/1
TABLET, FILM COATED ORAL
Qty: 30 TABLET | Refills: 3 | Status: SHIPPED | OUTPATIENT
Start: 2022-10-18

## 2022-10-18 NOTE — PROGRESS NOTES
Patient needs labs and appointment. Labs ordered. I could not leave message as mailbox was full. I will also send a [employer] message.

## 2022-12-07 ENCOUNTER — TELEPHONE (OUTPATIENT)
Dept: FAMILY MEDICINE CLINIC | Age: 32
End: 2022-12-07

## 2022-12-07 NOTE — TELEPHONE ENCOUNTER
Called patient to discuss need for follow up and labs . No answer. LMOM for patient to call back with best number and time to reach them     Please send certified letter to patient that we have tried to reach him and that he needs appointment.

## 2023-01-20 ENCOUNTER — TELEPHONE (OUTPATIENT)
Dept: FAMILY MEDICINE CLINIC | Age: 33
End: 2023-01-20

## 2023-01-20 NOTE — TELEPHONE ENCOUNTER
Called and spoke to patient. Reports that he has been taking his HIV meds and has not missed any doses. I discussed importance of him coming in to get labs drawn. He reports that he will get them done by this weekend. Reports overall he feels well but does have some shortness of breath. Please call patient to schedule follow up appointment. He reports he will answer the phone.

## 2023-02-27 ENCOUNTER — TELEPHONE (OUTPATIENT)
Dept: FAMILY MEDICINE CLINIC | Age: 33
End: 2023-02-27

## 2023-02-27 NOTE — TELEPHONE ENCOUNTER
Called patient to remind him to schedule appointment. Could not leave message as mailbox is full. Please send certified letter for patient to call and schedule a visit asap.

## 2023-04-19 ENCOUNTER — OFFICE VISIT (OUTPATIENT)
Dept: FAMILY MEDICINE CLINIC | Age: 33
End: 2023-04-19
Payer: COMMERCIAL

## 2023-04-19 VITALS
TEMPERATURE: 97.8 F | HEART RATE: 97 BPM | RESPIRATION RATE: 16 BRPM | SYSTOLIC BLOOD PRESSURE: 145 MMHG | BODY MASS INDEX: 30.2 KG/M2 | OXYGEN SATURATION: 96 % | DIASTOLIC BLOOD PRESSURE: 97 MMHG | WEIGHT: 248 LBS | HEIGHT: 76 IN

## 2023-04-19 DIAGNOSIS — I45.6 WPW (WOLFF-PARKINSON-WHITE SYNDROME): ICD-10-CM

## 2023-04-19 DIAGNOSIS — M25.532 WRIST PAIN, ACUTE, LEFT: Primary | ICD-10-CM

## 2023-04-19 DIAGNOSIS — B20 NEUROPATHY DUE TO HIV (HCC): ICD-10-CM

## 2023-04-19 DIAGNOSIS — G63 NEUROPATHY DUE TO HIV (HCC): ICD-10-CM

## 2023-04-19 PROCEDURE — 1036F TOBACCO NON-USER: CPT

## 2023-04-19 PROCEDURE — G8417 CALC BMI ABV UP PARAM F/U: HCPCS

## 2023-04-19 PROCEDURE — 99214 OFFICE O/P EST MOD 30 MIN: CPT

## 2023-04-19 PROCEDURE — G8428 CUR MEDS NOT DOCUMENT: HCPCS

## 2023-04-19 PROCEDURE — 36415 COLL VENOUS BLD VENIPUNCTURE: CPT | Performed by: FAMILY MEDICINE

## 2023-04-19 RX ORDER — ASCORBIC ACID 500 MG
500 TABLET ORAL DAILY
Qty: 30 TABLET | Refills: 0 | Status: SHIPPED | OUTPATIENT
Start: 2023-04-19

## 2023-04-19 RX ORDER — ABACAVIR SULFATE, DOLUTEGRAVIR SODIUM, LAMIVUDINE 600; 50; 300 MG/1; MG/1; MG/1
TABLET, FILM COATED ORAL
Qty: 30 TABLET | Refills: 3 | Status: SHIPPED | OUTPATIENT
Start: 2023-04-19

## 2023-04-19 SDOH — ECONOMIC STABILITY: FOOD INSECURITY: WITHIN THE PAST 12 MONTHS, YOU WORRIED THAT YOUR FOOD WOULD RUN OUT BEFORE YOU GOT MONEY TO BUY MORE.: NEVER TRUE

## 2023-04-19 SDOH — ECONOMIC STABILITY: FOOD INSECURITY: WITHIN THE PAST 12 MONTHS, THE FOOD YOU BOUGHT JUST DIDN'T LAST AND YOU DIDN'T HAVE MONEY TO GET MORE.: NEVER TRUE

## 2023-04-19 SDOH — ECONOMIC STABILITY: INCOME INSECURITY: IN THE LAST 12 MONTHS, WAS THERE A TIME WHEN YOU WERE NOT ABLE TO PAY THE MORTGAGE OR RENT ON TIME?: NO

## 2023-04-19 SDOH — ECONOMIC STABILITY: TRANSPORTATION INSECURITY
IN THE PAST 12 MONTHS, HAS LACK OF TRANSPORTATION KEPT YOU FROM MEETINGS, WORK, OR FROM GETTING THINGS NEEDED FOR DAILY LIVING?: NO

## 2023-04-19 SDOH — ECONOMIC STABILITY: HOUSING INSECURITY
IN THE LAST 12 MONTHS, WAS THERE A TIME WHEN YOU DID NOT HAVE A STEADY PLACE TO SLEEP OR SLEPT IN A SHELTER (INCLUDING NOW)?: NO

## 2023-04-19 SDOH — ECONOMIC STABILITY: TRANSPORTATION INSECURITY
IN THE PAST 12 MONTHS, HAS THE LACK OF TRANSPORTATION KEPT YOU FROM MEDICAL APPOINTMENTS OR FROM GETTING MEDICATIONS?: NO

## 2023-04-19 ASSESSMENT — PATIENT HEALTH QUESTIONNAIRE - PHQ9
SUM OF ALL RESPONSES TO PHQ QUESTIONS 1-9: 0
2. FEELING DOWN, DEPRESSED OR HOPELESS: 0
1. LITTLE INTEREST OR PLEASURE IN DOING THINGS: 0
SUM OF ALL RESPONSES TO PHQ QUESTIONS 1-9: 0
SUM OF ALL RESPONSES TO PHQ9 QUESTIONS 1 & 2: 0
SUM OF ALL RESPONSES TO PHQ QUESTIONS 1-9: 0
SUM OF ALL RESPONSES TO PHQ QUESTIONS 1-9: 0

## 2023-04-19 ASSESSMENT — LIFESTYLE VARIABLES
HOW OFTEN DO YOU HAVE A DRINK CONTAINING ALCOHOL: MONTHLY OR LESS
HOW MANY STANDARD DRINKS CONTAINING ALCOHOL DO YOU HAVE ON A TYPICAL DAY: 3 OR 4

## 2023-04-19 ASSESSMENT — SOCIAL DETERMINANTS OF HEALTH (SDOH): HOW HARD IS IT FOR YOU TO PAY FOR THE VERY BASICS LIKE FOOD, HOUSING, MEDICAL CARE, AND HEATING?: NOT HARD AT ALL

## 2023-04-20 LAB
ALBUMIN SERPL-MCNC: 4.3 G/DL (ref 3.5–5.2)
ALP SERPL-CCNC: 86 U/L (ref 40–129)
ALT SERPL-CCNC: 19 U/L (ref 0–40)
ANION GAP SERPL CALCULATED.3IONS-SCNC: 13 MMOL/L (ref 7–16)
ANISOCYTOSIS: ABNORMAL
AST SERPL-CCNC: 34 U/L (ref 0–39)
BASOPHILS # BLD: 0.02 E9/L (ref 0–0.2)
BASOPHILS NFR BLD: 0.9 % (ref 0–2)
BILIRUB SERPL-MCNC: 0.3 MG/DL (ref 0–1.2)
BUN SERPL-MCNC: 12 MG/DL (ref 6–20)
BURR CELLS: ABNORMAL
CALCIUM SERPL-MCNC: 9 MG/DL (ref 8.6–10.2)
CHLORIDE SERPL-SCNC: 106 MMOL/L (ref 98–107)
CHOLESTEROL, TOTAL: 127 MG/DL (ref 0–199)
CO2 SERPL-SCNC: 20 MMOL/L (ref 22–29)
CREAT SERPL-MCNC: 1.1 MG/DL (ref 0.7–1.2)
EOSINOPHIL # BLD: 0 E9/L (ref 0.05–0.5)
EOSINOPHIL NFR BLD: 1.1 % (ref 0–6)
ERYTHROCYTE [DISTWIDTH] IN BLOOD BY AUTOMATED COUNT: 12.6 FL (ref 11.5–15)
FOLATE SERPL-MCNC: 12.9 NG/ML (ref 4.8–24.2)
GLUCOSE SERPL-MCNC: 68 MG/DL (ref 74–99)
HBA1C MFR BLD: 5 % (ref 4–5.6)
HCT VFR BLD AUTO: 45.1 % (ref 37–54)
HDLC SERPL-MCNC: 33 MG/DL
HGB BLD-MCNC: 15.1 G/DL (ref 12.5–16.5)
LDLC SERPL CALC-MCNC: 80 MG/DL (ref 0–99)
LYMPHOCYTES # BLD: 0.4 E9/L (ref 1.5–4)
LYMPHOCYTES NFR BLD: 20.9 % (ref 20–42)
MCH RBC QN AUTO: 33.7 PG (ref 26–35)
MCHC RBC AUTO-ENTMCNC: 33.5 % (ref 32–34.5)
MCV RBC AUTO: 100.7 FL (ref 80–99.9)
METAMYELOCYTES NFR BLD MANUAL: 0.9 % (ref 0–1)
MONOCYTES # BLD: 0.32 E9/L (ref 0.1–0.95)
MONOCYTES NFR BLD: 16.5 % (ref 2–12)
NEUTROPHILS # BLD: 1.18 E9/L (ref 1.8–7.3)
NEUTS SEG NFR BLD: 60.8 % (ref 43–80)
PLATELET # BLD AUTO: 202 E9/L (ref 130–450)
PMV BLD AUTO: 10.5 FL (ref 7–12)
POIKILOCYTES: ABNORMAL
POTASSIUM SERPL-SCNC: 4.5 MMOL/L (ref 3.5–5)
PROT SERPL-MCNC: 7.5 G/DL (ref 6.4–8.3)
RBC # BLD AUTO: 4.48 E12/L (ref 3.8–5.8)
SODIUM SERPL-SCNC: 139 MMOL/L (ref 132–146)
TRIGL SERPL-MCNC: 69 MG/DL (ref 0–149)
TSH SERPL-MCNC: 0.44 UIU/ML (ref 0.27–4.2)
VIT B12 SERPL-MCNC: 439 PG/ML (ref 211–946)
VLDLC SERPL CALC-MCNC: 14 MG/DL
WBC # BLD: 1.9 E9/L (ref 4.5–11.5)

## 2023-04-22 LAB
CD3 CELLS # BLD: 563 CELLS/UL (ref 570–2400)
CD3+CD4+ CELLS # BLD: 180 CELLS/UL (ref 430–1800)
CD3+CD4+ CELLS/CD3+CD8+ CLL BLD: 0.52 RATIO (ref 0.8–3.9)
CD3+CD8+ CELLS # BLD: 343 CELLS/UL (ref 210–1200)
LYMPH SUBSET INFORMATION: ABNORMAL

## 2023-04-26 ENCOUNTER — TELEPHONE (OUTPATIENT)
Dept: FAMILY MEDICINE CLINIC | Age: 33
End: 2023-04-26

## 2023-04-26 LAB
HIV QUANT LOG: 5.48 LOG CPY/ML
HIV-1 RNA BY PCR, QN: ABNORMAL CPY/ML
HIV-1 RNA BY PCR, QN: DETECTED
SOURCE: ABNORMAL

## 2023-04-26 RX ORDER — SULFAMETHOXAZOLE AND TRIMETHOPRIM 800; 160 MG/1; MG/1
1 TABLET ORAL DAILY
Qty: 30 TABLET | Refills: 1 | Status: SHIPPED | OUTPATIENT
Start: 2023-04-26 | End: 2023-05-26

## 2023-04-26 NOTE — TELEPHONE ENCOUNTER
Called and spoke to patient. His viral load is high and I explained that I am concerned that he had been off of his medications for a while. He reports that he may have been off of it for 1 month , but I am concerned that it may have been longer. He has picked the medicine up and has taken it for the past week. I explained importance of compliance. We will recheck in 1 month and at 2 months. If there is an appropriate decrease in viral load we will continue triumeq. If viral load does not come down and there is concern for resistance we will check a genosure . His CD4 count is 180 - patient started on bactrim DS daily. He is aware of this. He is agreeable to coming in for a visit on May at 3. Please add him to my schedule.

## 2023-05-02 ENCOUNTER — TELEPHONE (OUTPATIENT)
Dept: FAMILY MEDICINE CLINIC | Age: 33
End: 2023-05-02

## 2023-05-02 PROBLEM — M25.532 WRIST PAIN, ACUTE, LEFT: Status: ACTIVE | Noted: 2023-05-02

## 2023-05-02 PROBLEM — G63 NEUROPATHY DUE TO HIV (HCC): Status: ACTIVE | Noted: 2021-10-30

## 2023-05-02 NOTE — ASSESSMENT & PLAN NOTE
ddx include scaphoid fx, trauma on top of previous surgery, neuropathy?  Will refer to surgeon who performed surgery, Dr. Devika Romero

## 2023-05-02 NOTE — ASSESSMENT & PLAN NOTE
Neuropathy thought to be from HIV, precedes the diagnosis of HIV in Nov 2021. Patient has been to podiatry before and also had EMG testing for it. Will check Vit B12 and folate as well. Gabapentin trial proved \"not working\" for patient, did advise patient that there is potential to titrate dose upwards. He is not ready for trying a higher dose as he is afraid of somnolent effects and ultimately affecting his mood. Explained to patient it may not have that effect but he declines gabapentin at the moment. Patient is open to physical therapy and goal of txt is to prevent falls and enable patient to be more active. Patient to continue with Triumeq. Will check CD4 count today to determine need for TMP-SMX ppx.

## 2023-05-02 NOTE — TELEPHONE ENCOUNTER
Called and spoke to patient. He had forgotten about appointment. We had a long conversation about the importance of him taking his medications. The risk of resistance to medication with non adherence. We also discussed his low CD4 count and need for bactrim. He had not picked it up but said he would pick this up today. He will come in on May 18 at 2. He is aware of the appointment date and time.

## 2023-05-03 ENCOUNTER — HOSPITAL ENCOUNTER (OUTPATIENT)
Age: 33
Discharge: HOME | End: 2023-05-05
Payer: COMMERCIAL

## 2023-05-03 ENCOUNTER — HOSPITAL ENCOUNTER (OUTPATIENT)
Dept: GENERAL RADIOLOGY | Age: 33
Discharge: HOME | End: 2023-05-05
Payer: COMMERCIAL

## 2023-05-03 DIAGNOSIS — M25.532 WRIST PAIN, ACUTE, LEFT: ICD-10-CM

## 2023-05-03 PROCEDURE — 73130 X-RAY EXAM OF HAND: CPT

## 2023-05-18 ENCOUNTER — TELEPHONE (OUTPATIENT)
Dept: FAMILY MEDICINE CLINIC | Age: 33
End: 2023-05-18

## 2023-05-22 NOTE — TELEPHONE ENCOUNTER
Called patient to discuss missed appointment . No answer. Could not leave message as mailbox was full.

## 2023-07-24 RX ORDER — SULFAMETHOXAZOLE AND TRIMETHOPRIM 800; 160 MG/1; MG/1
1 TABLET ORAL DAILY
Qty: 30 TABLET | Refills: 0 | Status: SHIPPED | OUTPATIENT
Start: 2023-07-24 | End: 2023-08-23

## 2023-07-24 NOTE — TELEPHONE ENCOUNTER
Last Appointment:  Visit date not found  Future Appointments  7/25/2023  2:00 PM    MD Jhonatan Cronin [facility]        [facility]

## 2023-07-25 ENCOUNTER — OFFICE VISIT (OUTPATIENT)
Dept: FAMILY MEDICINE CLINIC | Age: 33
End: 2023-07-25
Payer: COMMERCIAL

## 2023-07-25 VITALS
OXYGEN SATURATION: 99 % | BODY MASS INDEX: 29.96 KG/M2 | WEIGHT: 246 LBS | HEART RATE: 79 BPM | RESPIRATION RATE: 18 BRPM | HEIGHT: 76 IN | TEMPERATURE: 99.3 F | DIASTOLIC BLOOD PRESSURE: 75 MMHG | SYSTOLIC BLOOD PRESSURE: 125 MMHG

## 2023-07-25 DIAGNOSIS — B20 NEUROPATHY DUE TO HIV (HCC): Primary | ICD-10-CM

## 2023-07-25 DIAGNOSIS — G63 NEUROPATHY DUE TO HIV (HCC): Primary | ICD-10-CM

## 2023-07-25 PROCEDURE — 36415 COLL VENOUS BLD VENIPUNCTURE: CPT | Performed by: FAMILY MEDICINE

## 2023-07-25 PROCEDURE — G8417 CALC BMI ABV UP PARAM F/U: HCPCS

## 2023-07-25 PROCEDURE — 99213 OFFICE O/P EST LOW 20 MIN: CPT

## 2023-07-25 PROCEDURE — 1036F TOBACCO NON-USER: CPT

## 2023-07-25 PROCEDURE — G8427 DOCREV CUR MEDS BY ELIG CLIN: HCPCS

## 2023-07-25 RX ORDER — ASCORBIC ACID 500 MG
500 TABLET ORAL DAILY
Qty: 30 TABLET | Refills: 0 | Status: SHIPPED | OUTPATIENT
Start: 2023-07-25

## 2023-07-28 LAB
CD3 ABSOLUTE: 1515 CELLS/UL (ref 570–2400)
CD4 ABSOLUTE: 531 CELLS/UL (ref 430–1800)
CD4/CD8 RATIO: 0.6 RATIO (ref 0.8–3.9)
CD8 ABSOLUTE: 885 CELLS/UL (ref 210–1200)
HIV 1 QNT: 49.7 CPY/ML
HIV 1 RNA, LOG NUMBER PCR: 1.7 LOG CPY/ML
HIV-1 RNA BY PCR, QN: DETECTED
Lab: ABNORMAL
SOURCE: ABNORMAL

## 2023-09-07 ENCOUNTER — OFFICE VISIT (OUTPATIENT)
Dept: FAMILY MEDICINE CLINIC | Age: 33
End: 2023-09-07
Payer: COMMERCIAL

## 2023-09-07 VITALS
SYSTOLIC BLOOD PRESSURE: 138 MMHG | HEIGHT: 76 IN | BODY MASS INDEX: 29.71 KG/M2 | HEART RATE: 84 BPM | DIASTOLIC BLOOD PRESSURE: 79 MMHG | OXYGEN SATURATION: 97 % | WEIGHT: 244 LBS | TEMPERATURE: 98.8 F | RESPIRATION RATE: 18 BRPM

## 2023-09-07 DIAGNOSIS — F32.A DEPRESSION, UNSPECIFIED DEPRESSION TYPE: ICD-10-CM

## 2023-09-07 DIAGNOSIS — B20 NEUROPATHY DUE TO HIV (HCC): ICD-10-CM

## 2023-09-07 DIAGNOSIS — B20 SYMPTOMATIC HIV INFECTION (HCC): ICD-10-CM

## 2023-09-07 DIAGNOSIS — G63 NEUROPATHY DUE TO HIV (HCC): ICD-10-CM

## 2023-09-07 DIAGNOSIS — I45.6 WPW (WOLFF-PARKINSON-WHITE SYNDROME): Primary | ICD-10-CM

## 2023-09-07 DIAGNOSIS — R80.9 NEPHROTIC RANGE PROTEINURIA: ICD-10-CM

## 2023-09-07 PROCEDURE — G8417 CALC BMI ABV UP PARAM F/U: HCPCS | Performed by: FAMILY MEDICINE

## 2023-09-07 PROCEDURE — 99214 OFFICE O/P EST MOD 30 MIN: CPT | Performed by: FAMILY MEDICINE

## 2023-09-07 PROCEDURE — G8427 DOCREV CUR MEDS BY ELIG CLIN: HCPCS | Performed by: FAMILY MEDICINE

## 2023-09-07 PROCEDURE — 1036F TOBACCO NON-USER: CPT | Performed by: FAMILY MEDICINE

## 2023-09-07 RX ORDER — ASCORBIC ACID 500 MG
500 TABLET ORAL DAILY
Qty: 30 TABLET | Refills: 0 | Status: SHIPPED | OUTPATIENT
Start: 2023-09-07

## 2023-09-07 RX ORDER — DULOXETIN HYDROCHLORIDE 30 MG/1
30 CAPSULE, DELAYED RELEASE ORAL DAILY
Qty: 30 CAPSULE | Refills: 3 | Status: SHIPPED | OUTPATIENT
Start: 2023-09-07

## 2023-09-07 RX ORDER — ABACAVIR SULFATE, DOLUTEGRAVIR SODIUM, LAMIVUDINE 600; 50; 300 MG/1; MG/1; MG/1
TABLET, FILM COATED ORAL
Qty: 30 TABLET | Refills: 3 | Status: SHIPPED | OUTPATIENT
Start: 2023-09-07

## 2023-09-07 RX ORDER — SULFAMETHOXAZOLE AND TRIMETHOPRIM 800; 160 MG/1; MG/1
1 TABLET ORAL 2 TIMES DAILY
Qty: 14 TABLET | Refills: 0 | Status: SHIPPED | OUTPATIENT
Start: 2023-09-07 | End: 2023-09-14

## 2023-09-07 RX ORDER — DULOXETIN HYDROCHLORIDE 30 MG/1
30 CAPSULE, DELAYED RELEASE ORAL DAILY
Qty: 30 CAPSULE | Refills: 3
Start: 2023-09-07 | End: 2023-09-07

## 2023-09-07 RX ORDER — PROPAFENONE HYDROCHLORIDE 150 MG/1
150 TABLET, COATED ORAL EVERY 8 HOURS SCHEDULED
Qty: 90 TABLET | Refills: 3 | Status: CANCELLED | OUTPATIENT
Start: 2023-09-07

## 2023-09-07 NOTE — PROGRESS NOTES
[facility]  [address] [facility], [address] [place] [address] H[place]  Ericka Roman MD     Patient: Ceci Garcia  YOB: 1990  Visit Date: 9/7/23    Jelly Oconnell is a 29y.o. year old male here today for   Chief Complaint   Patient presents with    Other     F/u       HPI  Patient is a 29year old male here for follow up of HIV     Has not seen cardiologist for a while but has seen the nephrologist.     No urinary incontinence , no dysuria. Denies frothy urine. Urine is yellow. Drinks a lot of water. Did miss two doses of triumeq the last two days as he ran out but had taken it every other day. Lives at home with younger brother. 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 Enjoys his work. Works mostly Friday to Sunday. Support system is his mother. Mother knows about his medical issues and is a big source of support. Has not been sexually active since his diagnosis. Has childhood trauma that is very bothersome to him     Had thought about counseling but does not feel ready. Does not sleep well. Stays up . Has a hard time falling asleep. Stays up for a couple days then sleeps for a full day. Continues to have numbness and tingling in feet. . No weakness. Just tingling. Sruggles with stability if moves downt the steps too fast misses a step. Review of Systems   Constitutional:  Negative for chills, fever and unexpected weight change. Respiratory:  Negative for cough, shortness of breath and wheezing. Cardiovascular:  Negative for chest pain, palpitations and leg swelling. Gastrointestinal:  Negative for abdominal pain, blood in stool and nausea. Musculoskeletal:         Foot pain per hpi   Skin:  Negative for color change and rash. Neurological:  Positive for numbness (feet per hpi). Negative for dizziness, syncope, light-headedness and headaches.        Current Outpatient Medications

## 2023-09-14 ASSESSMENT — ENCOUNTER SYMPTOMS
BLOOD IN STOOL: 0
SHORTNESS OF BREATH: 0
NAUSEA: 0
ABDOMINAL PAIN: 0
WHEEZING: 0
COLOR CHANGE: 0
COUGH: 0

## 2023-10-10 ENCOUNTER — TELEPHONE (OUTPATIENT)
Dept: FAMILY MEDICINE CLINIC | Age: 33
End: 2023-10-10

## 2023-12-22 ENCOUNTER — TELEPHONE (OUTPATIENT)
Dept: FAMILY MEDICINE CLINIC | Age: 33
End: 2023-12-22

## 2023-12-22 NOTE — TELEPHONE ENCOUNTER
Called patient. Could not leave message. Please mail certified letter to patient that he needs to come in for appointment and to get labs as soon as possible.

## 2024-01-09 ENCOUNTER — OFFICE VISIT (OUTPATIENT)
Dept: FAMILY MEDICINE CLINIC | Age: 34
End: 2024-01-09
Payer: COMMERCIAL

## 2024-01-09 VITALS
HEIGHT: 75 IN | WEIGHT: 253 LBS | RESPIRATION RATE: 18 BRPM | SYSTOLIC BLOOD PRESSURE: 133 MMHG | OXYGEN SATURATION: 98 % | HEART RATE: 91 BPM | BODY MASS INDEX: 31.46 KG/M2 | DIASTOLIC BLOOD PRESSURE: 91 MMHG | TEMPERATURE: 98.4 F

## 2024-01-09 DIAGNOSIS — B20 SYMPTOMATIC HIV INFECTION (HCC): ICD-10-CM

## 2024-01-09 DIAGNOSIS — R21 RASH: ICD-10-CM

## 2024-01-09 DIAGNOSIS — I45.6 WPW (WOLFF-PARKINSON-WHITE SYNDROME): ICD-10-CM

## 2024-01-09 DIAGNOSIS — M79.89 MASS OF SOFT TISSUE: ICD-10-CM

## 2024-01-09 DIAGNOSIS — G63 NEUROPATHY DUE TO HIV (HCC): ICD-10-CM

## 2024-01-09 DIAGNOSIS — R80.9 NEPHROTIC RANGE PROTEINURIA: ICD-10-CM

## 2024-01-09 DIAGNOSIS — A63.0 ANAL WART: Primary | ICD-10-CM

## 2024-01-09 DIAGNOSIS — B20 NEUROPATHY DUE TO HIV (HCC): ICD-10-CM

## 2024-01-09 LAB
ABSOLUTE IMMATURE GRANULOCYTE: <0.03 K/UL (ref 0–0.58)
BASOPHILS ABSOLUTE: 0.02 K/UL (ref 0–0.2)
BASOPHILS RELATIVE PERCENT: 0 % (ref 0–2)
EOSINOPHILS ABSOLUTE: 0.19 K/UL (ref 0.05–0.5)
EOSINOPHILS RELATIVE PERCENT: 4 % (ref 0–6)
HCT VFR BLD CALC: 42.9 % (ref 37–54)
HEMOGLOBIN: 14 G/DL (ref 12.5–16.5)
IMMATURE GRANULOCYTES: 0 % (ref 0–5)
LYMPHOCYTES ABSOLUTE: 1.46 K/UL (ref 1.5–4)
LYMPHOCYTES RELATIVE PERCENT: 32 % (ref 20–42)
MCH RBC QN AUTO: 32.5 PG (ref 26–35)
MCHC RBC AUTO-ENTMCNC: 32.6 G/DL (ref 32–34.5)
MCV RBC AUTO: 99.5 FL (ref 80–99.9)
MONOCYTES ABSOLUTE: 0.41 K/UL (ref 0.1–0.95)
MONOCYTES RELATIVE PERCENT: 9 % (ref 2–12)
NEUTROPHILS ABSOLUTE: 2.47 K/UL (ref 1.8–7.3)
NEUTROPHILS RELATIVE PERCENT: 54 % (ref 43–80)
PDW BLD-RTO: 14.2 % (ref 11.5–15)
PLATELET # BLD: 322 K/UL (ref 130–450)
PMV BLD AUTO: 10.3 FL (ref 7–12)
RBC # BLD: 4.31 M/UL (ref 3.8–5.8)
TOTAL PROTEIN, URINE: 131 MG/DL (ref 0–12)
WBC # BLD: 4.6 K/UL (ref 4.5–11.5)

## 2024-01-09 PROCEDURE — 99214 OFFICE O/P EST MOD 30 MIN: CPT

## 2024-01-09 PROCEDURE — G8484 FLU IMMUNIZE NO ADMIN: HCPCS

## 2024-01-09 PROCEDURE — 1036F TOBACCO NON-USER: CPT

## 2024-01-09 PROCEDURE — G8417 CALC BMI ABV UP PARAM F/U: HCPCS

## 2024-01-09 PROCEDURE — G8428 CUR MEDS NOT DOCUMENT: HCPCS

## 2024-01-09 ASSESSMENT — PATIENT HEALTH QUESTIONNAIRE - PHQ9
9. THOUGHTS THAT YOU WOULD BE BETTER OFF DEAD, OR OF HURTING YOURSELF: 0
2. FEELING DOWN, DEPRESSED OR HOPELESS: 0
SUM OF ALL RESPONSES TO PHQ QUESTIONS 1-9: 0
SUM OF ALL RESPONSES TO PHQ QUESTIONS 1-9: 0
8. MOVING OR SPEAKING SO SLOWLY THAT OTHER PEOPLE COULD HAVE NOTICED. OR THE OPPOSITE, BEING SO FIGETY OR RESTLESS THAT YOU HAVE BEEN MOVING AROUND A LOT MORE THAN USUAL: 0
10. IF YOU CHECKED OFF ANY PROBLEMS, HOW DIFFICULT HAVE THESE PROBLEMS MADE IT FOR YOU TO DO YOUR WORK, TAKE CARE OF THINGS AT HOME, OR GET ALONG WITH OTHER PEOPLE: 0
4. FEELING TIRED OR HAVING LITTLE ENERGY: 0
6. FEELING BAD ABOUT YOURSELF - OR THAT YOU ARE A FAILURE OR HAVE LET YOURSELF OR YOUR FAMILY DOWN: 0
SUM OF ALL RESPONSES TO PHQ QUESTIONS 1-9: 0
7. TROUBLE CONCENTRATING ON THINGS, SUCH AS READING THE NEWSPAPER OR WATCHING TELEVISION: 0
SUM OF ALL RESPONSES TO PHQ QUESTIONS 1-9: 0
3. TROUBLE FALLING OR STAYING ASLEEP: 0
1. LITTLE INTEREST OR PLEASURE IN DOING THINGS: 0
5. POOR APPETITE OR OVEREATING: 0
SUM OF ALL RESPONSES TO PHQ9 QUESTIONS 1 & 2: 0

## 2024-01-10 LAB
ALBUMIN SERPL-MCNC: 3.9 G/DL (ref 3.5–5.2)
ALP BLD-CCNC: 131 U/L (ref 40–129)
ALT SERPL-CCNC: 23 U/L (ref 0–40)
ANION GAP SERPL CALCULATED.3IONS-SCNC: 15 MMOL/L (ref 7–16)
AST SERPL-CCNC: 28 U/L (ref 0–39)
BILIRUB SERPL-MCNC: 0.3 MG/DL (ref 0–1.2)
BUN BLDV-MCNC: 12 MG/DL (ref 6–20)
CALCIUM SERPL-MCNC: 9.3 MG/DL (ref 8.6–10.2)
CHLORIDE BLD-SCNC: 107 MMOL/L (ref 98–107)
CO2: 19 MMOL/L (ref 22–29)
CREAT SERPL-MCNC: 1.2 MG/DL (ref 0.7–1.2)
FOLATE: 6.4 NG/ML (ref 4.8–24.2)
GFR SERPL CREATININE-BSD FRML MDRD: >60 ML/MIN/1.73M2
GLUCOSE BLD-MCNC: 83 MG/DL (ref 74–99)
HAV IGM SER IA-ACNC: NONREACTIVE
HBV SURFACE AB TITR SER: <3.1 MIU/ML (ref 0–9.99)
HEPATITIS B CORE IGM ANTIBODY: NONREACTIVE
HEPATITIS B SURF AG,XHBAGS: NONREACTIVE
HEPATITIS C ANTIBODY: REACTIVE
POTASSIUM SERPL-SCNC: 4.6 MMOL/L (ref 3.5–5)
RPR TITER: NORMAL
RPR: REACTIVE
SODIUM BLD-SCNC: 141 MMOL/L (ref 132–146)
TOTAL PROTEIN: 8.7 G/DL (ref 6.4–8.3)
TSH SERPL DL<=0.05 MIU/L-ACNC: 1.1 UIU/ML (ref 0.27–4.2)
VITAMIN B-12: 437 PG/ML (ref 211–946)
VITAMIN D 25-HYDROXY: 13.5 NG/ML (ref 30–100)

## 2024-01-11 LAB
HIV 1 QNT: 898 CPY/ML
HIV 1 RNA, LOG NUMBER PCR: 2.95 LOG CPY/ML
HIV-1 RNA BY PCR, QN: DETECTED
SOURCE: ABNORMAL

## 2024-01-12 LAB
% CD4: 30 % (ref 32–64)
CD4 ABSOLUTE: 430 CELLS/UL (ref 430–1800)
LYMPHOCYTE SUBSET PANEL 2 INFO: ABNORMAL

## 2024-01-15 NOTE — PROGRESS NOTES
[facility]  [address] [facility]        Patient: Orion Harris  YOB: 1990  Visit Date: 1/9/24    Orion is a 33 y.o. year old male here today for   Chief Complaint   Patient presents with    Rash     Patient presents today with c/o having a rash on his skin. Patient states he mixed Vaseline and cologne and rubbed it on his body, in which he feels gave him a burn on his skin. Patient states it now itches.    Hypertension     Patient states he had a physical last week for employment in which they advised him his BP was high.     Chest Pain     Patient states he has been having pressure in his chest recently.     Mass     Patient has a lump behind his right ear he has had for 3-4 months.       HPI    Mass behind the right ear .   Was told prior that it was a lymph node 3 months .  Reports that It has been getting smaller.   Previously tender but it is no longer tender.   Denies any discharge, drainage coming out of mass.  Denies any other noticeable lumps/masses.  States that it is improving    Chest pain    has history of wpw.   He has not seen ep yet, was referred to electrophysiology on his previous appointment with PCP but patient stated that he has not been able to follow-up with appointment yet  States that chest pain occurred daily.  Last about 10 minutes.  Did not present with any radiation but is associated with headaches.  Are not related to exertion or exercise.  States that it can happen at random times throughout the day including while he is laying down.      Rash  Patient states that he mixed colone with Vaseline and apply it all over his body due to some underlying trend that he stopped.  States that after he did this he did experience some rash all throughout his body which seems to be now resolving or almost resolved.  Does report that he has some dry skin on throughout.  No history of eczema.  No open wounds.  Patient states that this is worse all throughout his anal 
Attending Physician Statement    S:   Chief Complaint   Patient presents with    Rash     Patient presents today with c/o having a rash on his skin. Patient states he mixed Vaseline and cologne and rubbed it on his body, in which he feels gave him a burn on his skin. Patient states it now itches.    Hypertension     Patient states he had a physical last week for employment in which they advised him his BP was high.     Chest Pain     Patient states he has been having pressure in his chest recently.     Mass     Patient has a lump behind his right ear he has had for 3-4 months.      Mass on the right that he has on the right ear . Was told prior that it was a lymph node . 3 months . It has been getting smaller. Previously tender but it is no longer tender.      Chest pain - has history of wpw. He has not seen ep yet. Has not scheduled appointment.   Occurs daily . Not related to exercise or exertion. Once per day . Last 10 minutes.     Has been taking his HIV meds . Reports a couple missed doses when he is late to  his medicines. Denies night sweats , diffuse lymphadenopathy , weight loss      Rash ever since he burned himself with vaseline and cologne. Rash on his buttocks.   O: Blood pressure (!) 133/91, pulse 91, temperature 98.4 °F (36.9 °C), temperature source Temporal, resp. rate 18, height 1.905 m (6' 3\"), weight 114.8 kg (253 lb), SpO2 98 %.   Exam:   Heart - RRR   Lungs - clear   Skin - verrucous lesion around anus   A: HIV, anal wart, wpw  P:  Check labs    Again I personally had a prolonged conversation with patient regarding the importance of compliance with HIV medication and labs as occasional missed doses can lead to resistance and harder to treat HIV infection.   Referral to general surgery. Will most likely need anal Pap .  Discussed importance of follow up with EP    Follow-up as ordered    Attending Attestation   I have discussed the case, including pertinent history and exam findings with 
list also reviewed and updated as needed in patient's record.    Wt Readings from Last 3 Encounters:   01/09/24 114.8 kg (253 lb)   09/07/23 110.7 kg (244 lb)   07/25/23 111.6 kg (246 lb)                   BP (!) 133/91 (Site: Left Upper Arm, Position: Sitting, Cuff Size: Medium Adult)   Pulse 91   Temp 98.4 °F (36.9 °C) (Temporal)   Resp 18   Ht 1.905 m (6' 3\")   Wt 114.8 kg (253 lb)   SpO2 98%   BMI 31.62 kg/m²       Physical Exam  Results for orders placed or performed in visit on 07/25/23   T + B Lymphocyte Differential   Result Value Ref Range    CD3 Abs 1,515 570 - 2,400 cells/uL    CD4 Absolute 531 430 - 1,800 cells/uL    CD8 Absolute 885 210 - 1,200 cells/uL    CD4/CD8 Ratio 0.60 (L) 0.80 - 3.90 ratio    LYMPHOCYTE SUBSET PANEL 3 INFORMATION See Note    HIV RNA, Quantitative, PCR   Result Value Ref Range    Source .PLASMA     Hiv 1 Qnt 49.7 cpy/mL    HIV 1 RNA, log number PCR 1.70 Log cpy/mL    HIV-1 RNA by PCR, Qn DETECTED (A) Not Detected       ASSESSMENT/PLAN  There are no diagnoses linked to this encounter.        Phone/MyChart follow up if tests abnormal.    No follow-ups on file. or sooner if necessary.    I have reviewed myfindings and recommendations with Orion.     Zhang Rivera MD, M.D

## 2024-01-23 ENCOUNTER — TELEPHONE (OUTPATIENT)
Dept: FAMILY MEDICINE CLINIC | Age: 34
End: 2024-01-23

## 2024-01-23 ENCOUNTER — TELEPHONE (OUTPATIENT)
Dept: SURGERY | Age: 34
End: 2024-01-23

## 2024-01-23 ENCOUNTER — PREP FOR PROCEDURE (OUTPATIENT)
Dept: SURGERY | Age: 34
End: 2024-01-23

## 2024-01-23 ENCOUNTER — OFFICE VISIT (OUTPATIENT)
Dept: SURGERY | Age: 34
End: 2024-01-23
Payer: COMMERCIAL

## 2024-01-23 VITALS — BODY MASS INDEX: 31.58 KG/M2 | HEIGHT: 75 IN | WEIGHT: 254 LBS | RESPIRATION RATE: 16 BRPM

## 2024-01-23 DIAGNOSIS — A63.0 ANAL WART: Primary | ICD-10-CM

## 2024-01-23 DIAGNOSIS — G63 NEUROPATHY DUE TO HIV (HCC): ICD-10-CM

## 2024-01-23 DIAGNOSIS — A63.0 ANOGENITAL WARTS: ICD-10-CM

## 2024-01-23 DIAGNOSIS — B20 NEUROPATHY DUE TO HIV (HCC): ICD-10-CM

## 2024-01-23 PROCEDURE — G8417 CALC BMI ABV UP PARAM F/U: HCPCS | Performed by: SURGERY

## 2024-01-23 PROCEDURE — G8484 FLU IMMUNIZE NO ADMIN: HCPCS | Performed by: SURGERY

## 2024-01-23 PROCEDURE — G8427 DOCREV CUR MEDS BY ELIG CLIN: HCPCS | Performed by: SURGERY

## 2024-01-23 PROCEDURE — 99243 OFF/OP CNSLTJ NEW/EST LOW 30: CPT | Performed by: SURGERY

## 2024-01-23 RX ORDER — ABACAVIR SULFATE, DOLUTEGRAVIR SODIUM, LAMIVUDINE 600; 50; 300 MG/1; MG/1; MG/1
TABLET, FILM COATED ORAL
Qty: 30 TABLET | Refills: 3 | Status: SHIPPED | OUTPATIENT
Start: 2024-01-23

## 2024-01-23 NOTE — TELEPHONE ENCOUNTER
Scheduled pt for EXCISION & FULGURATION OF ANAL WART 24 at 11AM. Pt needs to arrive at [facility] at 9AM. Patient confirmed date and time, address and directions given in office. Prep instructions given in office, patient understood.      Prior Authorization Form:      DEMOGRAPHICS:                     Patient Name:  Orion Harris  Patient :  1990            Insurance:  [facility] / Plan: [employer] PLAN / Product Type: *No Product type* /   Insurance ID Number:    Payer/Plan Subscr  Sex Relation Sub. Ins. ID Effective Group Num   1. IVON COMMU* ORION HARRIS 1990 Male Self [id number]                                    [address]   2. BCBS - BCBS -* ORION HARRIS 1990 Male Self [id number] 1988                                    [address]         DIAGNOSIS & PROCEDURE:                       Procedure/Operation: EXCISION & FULGURATION OF ANAL WART           CPT Code: 09695    Diagnosis:  ANAL WARTS    ICD10 Code: A63.0    Location:  [facility]    Surgeon:  ENRIQUE KEMP    SCHEDULING INFORMATION:                          Date: 24    Time: 11AM              Anesthesia:  LMAC                                                       Status:  OUTPATIENT       Special Comments:  N/A       Electronically signed by Arlene Jeffrey MA on 2024 at 1:41 PM

## 2024-01-23 NOTE — TELEPHONE ENCOUNTER
Called patient. No answer and no option to leave message. [place] [facility] [place]. They told me they do treatment but [facility] does contact tracing for syphilis. I called Jimmy Rivera at [phone] and [facility] for him to call me back.     I also called his pharmacy and they report patient filled his triumeq 9/7/23, 11/6/23, 12/29/23 . I asked that the pharmacist put a note on his chart to ask him to call me the next time he is there to fill his medication.     I looked at his communication consent and he does not consent for us to speak to anyone but him.     He has appointment scheduled with me on 2/9/2024.

## 2024-01-23 NOTE — PROGRESS NOTES
[facility]  History and Physical    Patient's Name/Date of Birth: Orion Harris / 1990 (33 y.o.)    PCP:  Dr. Ed Monroe    Chief Complaint:  anal wart    History of Present Illness:  33 yr old male presents with complaint of anal wart on left buttock.  States has been present for about 2 months.  States sometimes it gets irritated.  Hasn't really changed in size.      Past Medical History:   Diagnosis Date    Asthma     Depression 2021    due to HIV diagnosis, on cymbalta before       Past Surgical History:   Procedure Laterality Date    BRONCHOSCOPY N/A 10/15/2021    BRONCHOSCOPY ALVEOLAR LAVAGE performed by Andre Carlson DO at [facility]    CT BIOPSY RENAL  10/27/2021    CT BIOPSY RENAL 10/27/2021 [place] [facility] [place]    FRACTURE SURGERY         Family History   Problem Relation Age of Onset    Diabetes type 2  Mother     Diabetes type 2  Father        Social History     Socioeconomic History    Marital status: Single     Spouse name: Not on file    Number of children: Not on file    Years of education: Not on file    Highest education level: Not on file   Occupational History    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   Tobacco Use    Smoking status: Former     Current packs/day: 0.00     Types: Cigarettes     Quit date: 2021     Years since quittin.4    Smokeless tobacco: Never   Vaping Use    Vaping Use: Never used   Substance and Sexual Activity    Alcohol use: Yes     Comment: seldom    Drug use: No    Sexual activity: Not on file   Other Topics Concern    Not on file   Social History Narrative    Not on file     Social Determinants of Health     Financial Resource Strain: Low Risk  (2023)    Overall Financial Resource Strain (CARDIA)     Difficulty of Paying Living Expenses: Not hard at all   Food Insecurity: Not on file (2023)   Transportation Needs: No Transportation Needs (2023)    PRAPARE - Transportation

## 2024-01-25 ENCOUNTER — ANESTHESIA EVENT (OUTPATIENT)
Dept: OPERATING ROOM | Age: 34
End: 2024-01-25
Payer: COMMERCIAL

## 2024-01-25 NOTE — PROGRESS NOTES
[facility]   PRE-ADMISSION TESTING GENERAL INSTRUCTIONS  PAT Phone Number: [phone]      GENERAL INSTRUCTIONS:  [x] Antibacterial Soap shower Night before and/or AM of Surgery  [] CHG wipe instruction sheet and wipes given.  []Hibiclens shower the night before and the morning of surgery. Do not use Hibiclens on your face or head.   [x] Nothing by mouth after midnight, including gum, candy, mints, or water.  Only a sip of water the medications we instruct you to take.  [x] You may brush your teeth, gargle, but do NOT swallow water.   [x] No smoking, chewing tobacco, illegal drugs, or alcohol within 24 hours of your surgery.  [x] Jewelry, valuables or body piercing's should not be brought to the hospital. All body and/or tongue piercing's must be removed prior to arriving to hospital.  ALL hair pins must be removed.   [x] Do not wear makeup, lotions, powders, deodorant.  Nail polish as directed by the nurse.  [x] Arrange transportation with a responsible adult  to and from the hospital.  Arrange for someone to be with you for the remainder of the day and for 24 hours after your procedure due to having had anesthesia.        Who will be your  for transportation?___his brother_______________       Who will be staying with you for 24 hrs after your procedure?____brother______________  [x] Only 2 ADULTS are permitted to accompany you.    [x] Bring insurance card and photo ID.  [] Transfusion Bracelet ([place]): Please bring with you to hospital, day of surgery  [] Urine pregnancy test will be done in pre-op.  Bring urine specimen day of surgery. Any small container is acceptable.  [] Bring copy of living will or healthcare power of  papers to be placed in your electronic record.    PARKING INSTRUCTIONS:   [x] Arrival Date and Time:_______1/26 @ 0900__________  [x]Your surgery time is subject to change, we will notify you the day before surgery, in the  expected.  You will be asked to rate your pain from 0-10(a zero is not acceptable-education is needed).  [x] Ask your nurse for your pain medication.  [] Joint camp offered.   [] Joint replacement booklets given.  [x] Other:_will have dressing to area, wear loose comfortable clothing.__________________________    WHAT TO EXPECT:  [x] The day of surgery you will be greeted and checked in by the [facility] .  In addition, you will be registered in the [facility] by a Patient Access Representative.  A nurse will greet you in accordance to the time you are needed in the pre-op area to prepare you for surgery. Please do not be discouraged if you are not greeted in the order you arrive as there are many variables that are involved in patient preparation.  Your patience is greatly appreciated as you wait for your nurse.   [x]  Delays may occur with surgery and staff will make a sincere effort to keep you informed of delays.  If any delays occur with your procedure, we apologize ahead of time for your inconvenience as we recognize the value of your time.

## 2024-01-26 ENCOUNTER — HOSPITAL ENCOUNTER (OUTPATIENT)
Age: 34
Setting detail: OUTPATIENT SURGERY
Discharge: HOME | End: 2024-01-26
Attending: SURGERY | Admitting: SURGERY
Payer: COMMERCIAL

## 2024-01-26 ENCOUNTER — ANESTHESIA (OUTPATIENT)
Dept: OPERATING ROOM | Age: 34
End: 2024-01-26
Payer: COMMERCIAL

## 2024-01-26 VITALS
RESPIRATION RATE: 16 BRPM | HEART RATE: 64 BPM | SYSTOLIC BLOOD PRESSURE: 159 MMHG | OXYGEN SATURATION: 100 % | BODY MASS INDEX: 31.46 KG/M2 | WEIGHT: 253 LBS | TEMPERATURE: 97.5 F | DIASTOLIC BLOOD PRESSURE: 100 MMHG | HEIGHT: 75 IN

## 2024-01-26 DIAGNOSIS — A63.0 ANOGENITAL WARTS: ICD-10-CM

## 2024-01-26 DIAGNOSIS — G89.18 ACUTE POSTOPERATIVE PAIN: Primary | ICD-10-CM

## 2024-01-26 PROCEDURE — 6370000000 HC RX 637 (ALT 250 FOR IP): Performed by: SURGERY

## 2024-01-26 PROCEDURE — 6360000002 HC RX W HCPCS: Performed by: SURGERY

## 2024-01-26 PROCEDURE — 2500000003 HC RX 250 WO HCPCS

## 2024-01-26 PROCEDURE — 11402 EXC TR-EXT B9+MARG 1.1-2 CM: CPT | Performed by: SURGERY

## 2024-01-26 PROCEDURE — 3600000002 HC SURGERY LEVEL 2 BASE: Performed by: SURGERY

## 2024-01-26 PROCEDURE — 88305 TISSUE EXAM BY PATHOLOGIST: CPT

## 2024-01-26 PROCEDURE — 2500000003 HC RX 250 WO HCPCS: Performed by: SURGERY

## 2024-01-26 PROCEDURE — 3700000000 HC ANESTHESIA ATTENDED CARE: Performed by: SURGERY

## 2024-01-26 PROCEDURE — 2720000010 HC SURG SUPPLY STERILE: Performed by: SURGERY

## 2024-01-26 PROCEDURE — 2580000003 HC RX 258: Performed by: SURGERY

## 2024-01-26 PROCEDURE — 7100000011 HC PHASE II RECOVERY - ADDTL 15 MIN: Performed by: SURGERY

## 2024-01-26 PROCEDURE — 6360000002 HC RX W HCPCS

## 2024-01-26 PROCEDURE — 3700000001 HC ADD 15 MINUTES (ANESTHESIA): Performed by: SURGERY

## 2024-01-26 PROCEDURE — 2709999900 HC NON-CHARGEABLE SUPPLY: Performed by: SURGERY

## 2024-01-26 PROCEDURE — 3600000012 HC SURGERY LEVEL 2 ADDTL 15MIN: Performed by: SURGERY

## 2024-01-26 PROCEDURE — 7100000010 HC PHASE II RECOVERY - FIRST 15 MIN: Performed by: SURGERY

## 2024-01-26 RX ORDER — SENNA AND DOCUSATE SODIUM 50; 8.6 MG/1; MG/1
1 TABLET, FILM COATED ORAL DAILY
Qty: 10 TABLET | Refills: 0 | Status: SHIPPED | OUTPATIENT
Start: 2024-01-26

## 2024-01-26 RX ORDER — PROPOFOL 10 MG/ML
INJECTION, EMULSION INTRAVENOUS CONTINUOUS PRN
Status: DISCONTINUED | OUTPATIENT
Start: 2024-01-26 | End: 2024-01-26 | Stop reason: SDUPTHER

## 2024-01-26 RX ORDER — LIDOCAINE HYDROCHLORIDE AND EPINEPHRINE 10; 10 MG/ML; UG/ML
INJECTION, SOLUTION INFILTRATION; PERINEURAL PRN
Status: DISCONTINUED | OUTPATIENT
Start: 2024-01-26 | End: 2024-01-26 | Stop reason: ALTCHOICE

## 2024-01-26 RX ORDER — HYDROCODONE BITARTRATE AND ACETAMINOPHEN 5; 325 MG/1; MG/1
1 TABLET ORAL EVERY 6 HOURS PRN
Qty: 5 TABLET | Refills: 0 | Status: SHIPPED | OUTPATIENT
Start: 2024-01-26 | End: 2024-01-29

## 2024-01-26 RX ORDER — DEXMEDETOMIDINE HYDROCHLORIDE 100 UG/ML
INJECTION, SOLUTION INTRAVENOUS PRN
Status: DISCONTINUED | OUTPATIENT
Start: 2024-01-26 | End: 2024-01-26 | Stop reason: SDUPTHER

## 2024-01-26 RX ORDER — KETAMINE HCL IN NACL, ISO-OSM 100MG/10ML
SYRINGE (ML) INJECTION PRN
Status: DISCONTINUED | OUTPATIENT
Start: 2024-01-26 | End: 2024-01-26 | Stop reason: SDUPTHER

## 2024-01-26 RX ORDER — SODIUM CHLORIDE 0.9 % (FLUSH) 0.9 %
5-40 SYRINGE (ML) INJECTION EVERY 12 HOURS SCHEDULED
Status: DISCONTINUED | OUTPATIENT
Start: 2024-01-26 | End: 2024-01-26 | Stop reason: HOSPADM

## 2024-01-26 RX ORDER — SODIUM CHLORIDE 9 MG/ML
INJECTION, SOLUTION INTRAVENOUS PRN
Status: DISCONTINUED | OUTPATIENT
Start: 2024-01-26 | End: 2024-01-26 | Stop reason: HOSPADM

## 2024-01-26 RX ORDER — MIDAZOLAM HYDROCHLORIDE 1 MG/ML
INJECTION INTRAMUSCULAR; INTRAVENOUS PRN
Status: DISCONTINUED | OUTPATIENT
Start: 2024-01-26 | End: 2024-01-26 | Stop reason: SDUPTHER

## 2024-01-26 RX ORDER — SODIUM CHLORIDE 0.9 % (FLUSH) 0.9 %
5-40 SYRINGE (ML) INJECTION PRN
Status: DISCONTINUED | OUTPATIENT
Start: 2024-01-26 | End: 2024-01-26 | Stop reason: HOSPADM

## 2024-01-26 RX ORDER — BACITRACIN ZINC 500 [USP'U]/G
OINTMENT TOPICAL PRN
Status: DISCONTINUED | OUTPATIENT
Start: 2024-01-26 | End: 2024-01-26 | Stop reason: ALTCHOICE

## 2024-01-26 RX ADMIN — SODIUM CHLORIDE: 9 INJECTION, SOLUTION INTRAVENOUS at 11:04

## 2024-01-26 RX ADMIN — Medication 30 MG: at 11:05

## 2024-01-26 RX ADMIN — PROPOFOL 150 MCG/KG/MIN: 10 INJECTION, EMULSION INTRAVENOUS at 11:05

## 2024-01-26 RX ADMIN — MIDAZOLAM 2 MG: 1 INJECTION INTRAMUSCULAR; INTRAVENOUS at 11:04

## 2024-01-26 RX ADMIN — DEXMEDETOMIDINE HCL 20 MCG: 100 INJECTION INTRAVENOUS at 11:15

## 2024-01-26 RX ADMIN — Medication 10 MG: at 11:13

## 2024-01-26 RX ADMIN — CEFAZOLIN 2000 MG: 2 INJECTION, POWDER, FOR SOLUTION INTRAMUSCULAR; INTRAVENOUS at 11:08

## 2024-01-26 RX ADMIN — Medication 10 MG: at 11:09

## 2024-01-26 ASSESSMENT — LIFESTYLE VARIABLES: SMOKING_STATUS: 0

## 2024-01-26 ASSESSMENT — PAIN - FUNCTIONAL ASSESSMENT: PAIN_FUNCTIONAL_ASSESSMENT: NONE - DENIES PAIN

## 2024-01-26 NOTE — ANESTHESIA POSTPROCEDURE EVALUATION
Department of Anesthesiology  Postprocedure Note    Patient: Orion Harris  MRN: [id number]  YOB: 1990  Date of evaluation: 1/26/2024    Procedure Summary       Date: 01/26/24 Room / Location: [address]    Anesthesia Start: 1056 Anesthesia Stop: 1148    Procedure: EXCISION AND FULGURATION OF ANAL WART (Rectum) Diagnosis:       Anogenital warts      (Anogenital warts [A63.0])    Surgeons: Bakari Berg MD Responsible Provider: Quang Zapata MD    Anesthesia Type: MAC ASA Status: 2            Anesthesia Type: No value filed.    Mervat Phase I: Mervat Score: 10    Mervat Phase II: Mervat Score: 10    Anesthesia Post Evaluation    Patient location during evaluation: PACU  Patient participation: complete - patient participated  Level of consciousness: awake  Pain score: 3  Airway patency: patent  Nausea & Vomiting: no nausea and no vomiting  Cardiovascular status: blood pressure returned to baseline  Respiratory status: acceptable  Hydration status: euvolemic    No notable events documented.

## 2024-01-26 NOTE — PROGRESS NOTES
Patient to SRDA & placed on appropriate monitors.   Cart low, locked with siderails up. Call light within reach.

## 2024-01-26 NOTE — OP NOTE
Operative Note      Patient: Orion Harris  YOB: 1990  MRN: [id number]    Date of Procedure: 1/26/2024    Pre-Op Diagnosis Codes:     * Anogenital lesion    Post-Op Diagnosis: Same and perianal soft tissue neoplasm       Procedure(s):  EXCISION OF PERIANAL SOFT TISSUE NEOPLASM, 2CM X 1.5CM  SIMPLE CLOSURE OF 2.6CM INCISION    Surgeon(s):  Bakari Berg MD    Assistant:   Bing Rome DO PGY5    Anesthesia: Monitor Anesthesia Care    Estimated Blood Loss (mL): Minimal    Complications: None    Specimens:   ID Type Source Tests Collected by Time Destination   A : PERIANAL SOFT TISSUE NEOPLASM Tissue Tissue SURGICAL PATHOLOGY Bakari Berg MD 1/26/2024 1125        Implants:  * No implants in log *      Drains: * No LDAs found *    Findings: 2cm x 1.5cm perianal soft tissue neoplasm, simple closure of 2.6cm incision      HISTORY: Orion Harris is a 33 y.o. male with perianal lesion for the past 2 months that is enlarging. Excision of perianal lesion was recommended. The risks benefits and alternatives of the procedure were discussed with the patient who stated understanding and agreed to proceed.    DESCRIPTION OF PROCEDURE: The patient was brought to the operating room and positioned prone on the OR table. Sequential compression devices were placed on the patient's lower extremities and functioning. Preoperative antibiotics were administered. Anesthesia was obtained without complication as per the anesthesia record. Immediately prior to the procedure a time-out was called and the surgical checklist was reviewed and agreed upon by all present. The patient was prepped and draped in the usual sterile fashion.    Perianal skin was anesthetized with 1% lidocaine with epinephrine.  Using a #15 blade the perianal lesion was excised through level of the epidermis and dermis and subcutaneous tissue until it was completely excised.  The lesion measured 2cm x 1.5cm and was passed of the field as specimen.

## 2024-01-26 NOTE — PROGRESS NOTES
Dr Berg bedside spoke with patient    Patient tolerating oral intake     Person waiting for patient called to       Iv site removed.

## 2024-01-26 NOTE — DISCHARGE INSTRUCTIONS
Recommend sitz baths 3 times daily for pain and swelling and following bowel movements.        SURGERY DISCHARGE INSTRUCTIONS    You may be drowsy or lightheaded after receiving sedation or anesthesia.    A responsible person should be with you for the next 24 hours.    FOLLOW UP: Call office to schedule follow-up appointment in 2 weeks.    DIET: Advance your diet as tolerated. Start with light diet and progress to your normal diet as you feel like eating. If you experience nausea or repeated episodes of vomiting which persist beyond 12-24 hours, notify your doctor.    ACTIVITY: Rest today. Increase activity gradually. Recommend walking every day to help with return of bowel function and healing. No heavy lifting or strenuous activity for 1 week, increase activity gradually as tolerated.  No driving for 24 hours after a procedure. No driving while on prescription pain medication.    SHOWER/BATHING: Okay to shower in 24 hours after procedure. Sitzs baths three times daily and as needed for soilage    WOUND CARE: You have a clean dressing applied. You may remove this dressing in 24 hours. You do not need a new dressing, but may apply one if you feel that your incisions are drainage. You do not need to apply anything over them. Avoid directly applying lotions, creams or oils to your incision. Keep incisions clean and dry. Always ensure you and your care giver clean hands before and after caring for the wound. You may place ice on incisions to decrease the pain and bruising.    MEDICATIONS: Take as prescribed. Pain from the incision(s) is normal. The pain will vary from day to day and with any changes in your activity level, but it should gradually decrease over time. If you were given a prescription for a narcotic pain medication (percocet, norco, etc) you should NOT drink alcohol, drive, or operate any machinery. Do not take the narcotic medication if you are not having pain. If your pain is mild, you may take

## 2024-01-26 NOTE — ANESTHESIA PRE PROCEDURE
Department of Anesthesiology  Preprocedure Note       Name:  Orion Harris   Age:  33 y.o.  :  1990                                          MRN:  [id number]         Date:  2024      Surgeon: Surgeon(s):  Bakari Berg MD    Procedure: Procedure(s):  EXCISION AND FULGURATION OF ANAL WART    Medications prior to admission:   Prior to Admission medications    Medication Sig Start Date End Date Taking? Authorizing Provider   TRIUMEQ 600- MG TABS TAKE 1 TABLET BY MOUTH DAILY 24   Zhang Rivera MD   ascorbic acid (VITAMIN C) 500 MG tablet Take 1 tablet by mouth daily 23   Zhang Rivera MD   DULoxetine (CYMBALTA) 30 MG extended release capsule Take 1 capsule by mouth daily 23   Zhang Rivera MD   propafenone (RYTHMOL) 150 MG tablet Take 1 tablet by mouth every 8 hours  Patient not taking: Reported on 2024   Abhi Blanca MD       Current medications:    Current Facility-Administered Medications   Medication Dose Route Frequency Provider Last Rate Last Admin    sodium chloride flush 0.9 % injection 5-40 mL  5-40 mL IntraVENous 2 times per day Bakari Berg MD        sodium chloride flush 0.9 % injection 5-40 mL  5-40 mL IntraVENous PRN Bakari Berg MD        0.9 % sodium chloride infusion   IntraVENous PRN Bakari Berg MD        ceFAZolin (ANCEF) 2,000 mg in sterile water 20 mL IV syringe  2,000 mg IntraVENous On Call to OR Bakari Berg MD           Allergies:  No Known Allergies    Problem List:    Patient Active Problem List   Diagnosis Code    Acute kidney injury due to COVID-19 ([facility]) U07.1, N17.9    Asthma J45.909    Nephrotic range proteinuria R80.9    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

## 2024-01-26 NOTE — H&P
[facility]  History and Physical     Patient's Name/Date of Birth: Orion Harris / 1990 (33 y.o.)     PCP:  Dr. Ed Monroe     Chief Complaint:  anal wart     History of Present Illness:  33 yr old male presents with complaint of anal wart on left buttock.  States has been present for about 2 months.  States sometimes it gets irritated.  Hasn't really changed in size.       Past Medical History        Past Medical History:   Diagnosis Date    Asthma      Depression 2021     due to HIV diagnosis, on cymbalta before            Past Surgical History         Past Surgical History:   Procedure Laterality Date    BRONCHOSCOPY N/A 10/15/2021     BRONCHOSCOPY ALVEOLAR LAVAGE performed by Andre Carlson DO at [facility]    CT BIOPSY RENAL   10/27/2021     CT BIOPSY RENAL 10/27/2021 Jose Weldon II, MD [facility]    FRACTURE SURGERY                Family History         Family History   Problem Relation Age of Onset    Diabetes type 2  Mother      Diabetes type 2  Father              Social History               Socioeconomic History    Marital status: Single       Spouse name: Not on file    Number of children: Not on file    Years of education: Not on file    Highest education level: Not on file   Occupational History    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   Tobacco Use    Smoking status: Former       Current packs/day: 0.00       Types: Cigarettes       Quit date: 2021       Years since quittin.4    Smokeless tobacco: Never   Vaping Use    Vaping Use: Never used   Substance and Sexual Activity    Alcohol use: Yes       Comment: seldom    Drug use: No    Sexual activity: Not on file   Other Topics Concern    Not on file   Social History Narrative    Not on file      Social Determinants of Health           Financial Resource Strain: Low Risk  (2023)     Overall Financial Resource Strain (CARDIA)      Difficulty of Paying Living Expenses: Not  Respirations easy, nonlabored, breath sounds clear. Heart rate and rhythm regular.  ABDOMEN: Abdomen soft, nondistended, nontender, bowel sounds present.    ANUS:  wart-like growth on left buttock near anus; no other genital/anal warts visualized  SKIN:  Warm and dry.  EXTREMITIES:  Moves all 4 ext.      IMPRESSION/PLAN:  anal wart--recommend excision in OR.  The patient was explained the risks/benefits/alternatives/expected outcomes of the procedure.  The patient was explained the risks, including, but not limited to, bleeding, infection, reaction to the anesthesia medicine, and recurrence.  All questions were answered.  The patient verbalized understanding and agreed to proceed.     Electronically Signed by Bakari Berg MD on 1/23/2024 at 12:58 PM    UPDATED 1/26/24  History and physical unchanged  For excision/fulguration of anal wart    Bakari Berg MD, FACS  1/26/2024  10:39 AM

## 2024-01-31 LAB — SURGICAL PATHOLOGY REPORT: NORMAL

## 2024-02-01 PROBLEM — G89.18 ACUTE POSTOPERATIVE PAIN: Status: ACTIVE | Noted: 2024-02-01

## 2024-02-05 ENCOUNTER — TELEPHONE (OUTPATIENT)
Dept: FAMILY MEDICINE CLINIC | Age: 34
End: 2024-02-05

## 2024-02-05 NOTE — TELEPHONE ENCOUNTER
----- Message from Amelia Mckeon sent at 2/5/2024  1:13 PM EST -----  Subject: Message to Provider    QUESTIONS  Information for Provider? Jimmy with [facility] Department would like to call   him. he is worried about the patient and wants to know if office has heard   from   ---------------------------------------------------------------------------  --------------  CALL BACK INFO  [phone]; OK to leave message on voicemail  ---------------------------------------------------------------------------  --------------  SCRIPT ANSWERS  Relationship to Patient? Covered Entity  Covered Entity Type? Other  Other Covered Entity Type? Health Dept   Representative Name? Jimmy

## 2024-02-05 NOTE — TELEPHONE ENCOUNTER
I called Jimmy.     Discussed that patient has scheduled appointment with me on Friday but that he frequently misses appointment and I have not been able to reach him.     If he comes to appointment Jimmy would like a treponemal test to be done.   I tried calling patient today. No answer . Could not leave message as patient's voicemail box was full.     Jimmy reports he will make a house visit tomorrow to see if he can reach patient that way. He will call me back and let me know if he speaks to patient.

## 2024-02-07 ENCOUNTER — TELEPHONE (OUTPATIENT)
Dept: FAMILY MEDICINE CLINIC | Age: 34
End: 2024-02-07

## 2024-02-07 NOTE — TELEPHONE ENCOUNTER
Called [facility] triumeq refilled 12/29/24 , 1/23/24.   I have not been able to reach patient by phone.     I have spoken to Jimmy Rivera from [facility] multiple times , he called today and reported he did a home visit and was not able to reach patient. His mailbox was full of mail and looked like he had not gotten mail in a long time. He left a confidential letter in patient's door. I have been unable to reach patient to discuss his last set of labs including elevated viral load and elevated rpr concerning for syphilis.     I called [facility] and due to above I told them to cancel futher refills and to tell patient he needs to contact office prior to further refills. Patient has upcoming appointment this Friday 2/9/24 . If he comes to that appointment I will check labs, call Jimmy and give additional triumeq refills.

## 2024-02-09 ENCOUNTER — OFFICE VISIT (OUTPATIENT)
Dept: FAMILY MEDICINE CLINIC | Age: 34
End: 2024-02-09

## 2024-02-09 VITALS
DIASTOLIC BLOOD PRESSURE: 94 MMHG | BODY MASS INDEX: 30.21 KG/M2 | OXYGEN SATURATION: 98 % | SYSTOLIC BLOOD PRESSURE: 142 MMHG | WEIGHT: 243 LBS | TEMPERATURE: 98.3 F | HEART RATE: 113 BPM | RESPIRATION RATE: 18 BRPM | HEIGHT: 75 IN

## 2024-02-09 DIAGNOSIS — B20 SYMPTOMATIC HIV INFECTION (HCC): Primary | ICD-10-CM

## 2024-02-09 DIAGNOSIS — A53.0 POSITIVE RPR TEST: ICD-10-CM

## 2024-02-09 DIAGNOSIS — R76.8 HEPATITIS C ANTIBODY TEST POSITIVE: ICD-10-CM

## 2024-02-09 DIAGNOSIS — B20 NEUROPATHY DUE TO HIV (HCC): ICD-10-CM

## 2024-02-09 DIAGNOSIS — G63 NEUROPATHY DUE TO HIV (HCC): ICD-10-CM

## 2024-02-09 RX ORDER — ABACAVIR SULFATE, DOLUTEGRAVIR SODIUM, LAMIVUDINE 600; 50; 300 MG/1; MG/1; MG/1
TABLET, FILM COATED ORAL
Qty: 30 TABLET | Refills: 1 | Status: SHIPPED | OUTPATIENT
Start: 2024-02-09

## 2024-02-09 NOTE — PROGRESS NOTES
test  -     Cancel: T. pallidum Ab  -     Cancel: RPR    Hepatitis C antibody test positive  -     Hepatitis C RNA QNT W Genotype RFLX    Other orders  -     Miscellaneous sendout 2      Will need to set up treatment for syphilis at the health department . 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 Health department personnel also thinks he can be treated as secondary syphilis given his rash .     He has been compliant with HIV medications recently. Check labs as above. I again discussed with patient the importance of compliance with all treatments and medications.     He will need rectal Pap and multiple vaccines at next visit.     Phone/MyChart follow up if tests abnormal.    No follow-ups on file. or sooner if necessary.    I have reviewed myfindings and recommendations with Orion.     Zhang Rivera MD, M.D     [birth year]-2018

## 2024-02-12 ENCOUNTER — TELEPHONE (OUTPATIENT)
Dept: FAMILY MEDICINE CLINIC | Age: 34
End: 2024-02-12

## 2024-02-12 LAB
ABSOLUTE IMMATURE GRANULOCYTE: <0.03 K/UL (ref 0–0.58)
ALBUMIN SERPL-MCNC: 4.2 G/DL (ref 3.5–5.2)
ALP BLD-CCNC: 131 U/L (ref 40–129)
ALT SERPL-CCNC: 50 U/L (ref 0–40)
ANION GAP SERPL CALCULATED.3IONS-SCNC: 8 MMOL/L (ref 7–16)
AST SERPL-CCNC: 73 U/L (ref 0–39)
BASOPHILS ABSOLUTE: 0.02 K/UL (ref 0–0.2)
BASOPHILS RELATIVE PERCENT: 1 % (ref 0–2)
BILIRUB SERPL-MCNC: 0.7 MG/DL (ref 0–1.2)
BUN BLDV-MCNC: 10 MG/DL (ref 6–20)
CALCIUM SERPL-MCNC: 9.6 MG/DL (ref 8.6–10.2)
CHLORIDE BLD-SCNC: 104 MMOL/L (ref 98–107)
CO2: 26 MMOL/L (ref 22–29)
CREAT SERPL-MCNC: 1.2 MG/DL (ref 0.7–1.2)
EOSINOPHILS ABSOLUTE: 0.11 K/UL (ref 0.05–0.5)
EOSINOPHILS RELATIVE PERCENT: 3 % (ref 0–6)
GFR SERPL CREATININE-BSD FRML MDRD: >60 ML/MIN/1.73M2
GLUCOSE BLD-MCNC: 85 MG/DL (ref 74–99)
HCT VFR BLD CALC: 45.3 % (ref 37–54)
HCV QNT BY NAAT INTERPRETATION: NOT DETECTED
HCV QNT BY NAAT IU/ML: NOT DETECTED IU/ML
HCV QNT BY NAAT LOG IU/ML: NOT DETECTED LOG IU/ML
HEMOGLOBIN: 15.1 G/DL (ref 12.5–16.5)
IMMATURE GRANULOCYTES: 0 % (ref 0–5)
LYMPHOCYTES ABSOLUTE: 1.39 K/UL (ref 1.5–4)
LYMPHOCYTES RELATIVE PERCENT: 35 % (ref 20–42)
MCH RBC QN AUTO: 33.6 PG (ref 26–35)
MCHC RBC AUTO-ENTMCNC: 33.3 G/DL (ref 32–34.5)
MCV RBC AUTO: 100.7 FL (ref 80–99.9)
MONOCYTES ABSOLUTE: 0.36 K/UL (ref 0.1–0.95)
MONOCYTES RELATIVE PERCENT: 9 % (ref 2–12)
NEUTROPHILS ABSOLUTE: 2.11 K/UL (ref 1.8–7.3)
NEUTROPHILS RELATIVE PERCENT: 53 % (ref 43–80)
PDW BLD-RTO: 14.7 % (ref 11.5–15)
PLATELET # BLD: 275 K/UL (ref 130–450)
PMV BLD AUTO: 10.5 FL (ref 7–12)
POTASSIUM SERPL-SCNC: 4.3 MMOL/L (ref 3.5–5)
RBC # BLD: 4.5 M/UL (ref 3.8–5.8)
SODIUM BLD-SCNC: 138 MMOL/L (ref 132–146)
TOTAL PROTEIN: 8.6 G/DL (ref 6.4–8.3)
WBC # BLD: 4 K/UL (ref 4.5–11.5)

## 2024-02-12 NOTE — TELEPHONE ENCOUNTER
Jimmy from [facility] called today  checking on this pt.  Verified that pt kept appt 2/9, and has appt today.  Jimmy asked if Dr Rivera could call him sometime after she sees pt..

## 2024-02-13 LAB
HIV 1 QNT: <20 CPY/ML
HIV 1 RNA, LOG NUMBER PCR: <1.3 LOG CPY/ML
HIV-1 RNA BY PCR, QN: DETECTED
SOURCE: ABNORMAL

## 2024-02-13 NOTE — TELEPHONE ENCOUNTER
I called and spoke to Jimmy. Updated him on patient . Patient did come to appointment on Friday but did not come on Monday.     TPPA is still pending . But viral load is <20.     I tried calling patient again with no answer. I did get permission from patient to call mom and ask her to reach patient for me and have him call the office at [phone]. She agree to give patient message to get back to me tomorrow.     Need to set up treatment with health department.

## 2024-02-15 LAB
MISCELLANEOUS LAB TEST RESULT: ABNORMAL
TEST NAME: ABNORMAL

## 2024-02-20 ENCOUNTER — TELEPHONE (OUTPATIENT)
Dept: FAMILY MEDICINE CLINIC | Age: 34
End: 2024-02-20

## 2024-02-20 NOTE — TELEPHONE ENCOUNTER
Attempted to call patient. His phone did not ring and went straight to voice mail.   I called his mother, Georgina Saucedo and asked her to give patient a message to call me.

## 2024-02-28 ENCOUNTER — TELEPHONE (OUTPATIENT)
Dept: FAMILY MEDICINE CLINIC | Age: 34
End: 2024-02-28

## 2024-02-28 NOTE — TELEPHONE ENCOUNTER
Called patient - no answer. No option to leave message. The mailbox was full and could not accept message.     I called Jimmy Rivera at [facility]. He had spoken to patient on Thursday of last week 2/22 and had made a plan for patient to go to [facility] the next day for treatment. Patient did not go . Jimmy will reach out to patient again and he will pass message on to patient for him to call me.

## 2024-02-29 ENCOUNTER — TELEPHONE (OUTPATIENT)
Dept: FAMILY MEDICINE CLINIC | Age: 34
End: 2024-02-29

## 2024-02-29 NOTE — TELEPHONE ENCOUNTER
Called patient to discuss results . No answer. LMOM for patient to call back with best number and time to reach them     If patient calls back please schedule him an appointment.

## 2024-02-29 NOTE — TELEPHONE ENCOUNTER
----- Message from Rebekaangelina Mckeon sent at 2/28/2024  4:31 PM EST -----  Subject: Message to Provider    QUESTIONS  Information for Provider? Pt returned a call from yesterday, 2/28/24. Pt   called back after closing, please reach back out to Pt.  ---------------------------------------------------------------------------  --------------  CALL BACK INFO  [phone]; OK to leave message on voicemail  ---------------------------------------------------------------------------  --------------  SCRIPT ANSWERS  Relationship to Patient? Self

## 2024-03-13 ENCOUNTER — OFFICE VISIT (OUTPATIENT)
Dept: FAMILY MEDICINE CLINIC | Age: 34
End: 2024-03-13
Payer: COMMERCIAL

## 2024-03-13 VITALS
BODY MASS INDEX: 30.71 KG/M2 | TEMPERATURE: 98.1 F | OXYGEN SATURATION: 96 % | WEIGHT: 247 LBS | DIASTOLIC BLOOD PRESSURE: 86 MMHG | RESPIRATION RATE: 18 BRPM | HEART RATE: 92 BPM | SYSTOLIC BLOOD PRESSURE: 134 MMHG | HEIGHT: 75 IN

## 2024-03-13 DIAGNOSIS — B20 SYMPTOMATIC HIV INFECTION (HCC): Primary | ICD-10-CM

## 2024-03-13 DIAGNOSIS — A53.9 SYPHILIS: ICD-10-CM

## 2024-03-13 DIAGNOSIS — F32.A DEPRESSION, UNSPECIFIED DEPRESSION TYPE: ICD-10-CM

## 2024-03-13 DIAGNOSIS — B20 NEUROPATHY DUE TO HIV (HCC): ICD-10-CM

## 2024-03-13 DIAGNOSIS — I45.6 WPW (WOLFF-PARKINSON-WHITE SYNDROME): ICD-10-CM

## 2024-03-13 DIAGNOSIS — G63 NEUROPATHY DUE TO HIV (HCC): ICD-10-CM

## 2024-03-13 DIAGNOSIS — R76.8 HEPATITIS C ANTIBODY TEST POSITIVE: ICD-10-CM

## 2024-03-13 LAB
ALBUMIN SERPL-MCNC: 4 G/DL (ref 3.5–5.2)
ALP BLD-CCNC: 133 U/L (ref 40–129)
ALT SERPL-CCNC: 19 U/L (ref 0–40)
ANION GAP SERPL CALCULATED.3IONS-SCNC: 11 MMOL/L (ref 7–16)
AST SERPL-CCNC: 29 U/L (ref 0–39)
BILIRUB SERPL-MCNC: 0.7 MG/DL (ref 0–1.2)
BUN BLDV-MCNC: 10 MG/DL (ref 6–20)
CALCIUM SERPL-MCNC: 9.7 MG/DL (ref 8.6–10.2)
CHLORIDE BLD-SCNC: 102 MMOL/L (ref 98–107)
CO2: 22 MMOL/L (ref 22–29)
CREAT SERPL-MCNC: 1.1 MG/DL (ref 0.7–1.2)
GFR SERPL CREATININE-BSD FRML MDRD: >60 ML/MIN/1.73M2
GLUCOSE BLD-MCNC: 81 MG/DL (ref 74–99)
POTASSIUM SERPL-SCNC: 4.7 MMOL/L (ref 3.5–5)
SODIUM BLD-SCNC: 135 MMOL/L (ref 132–146)
TOTAL PROTEIN: 8.5 G/DL (ref 6.4–8.3)

## 2024-03-13 PROCEDURE — G8427 DOCREV CUR MEDS BY ELIG CLIN: HCPCS | Performed by: FAMILY MEDICINE

## 2024-03-13 PROCEDURE — G8417 CALC BMI ABV UP PARAM F/U: HCPCS | Performed by: FAMILY MEDICINE

## 2024-03-13 PROCEDURE — 90677 PCV20 VACCINE IM: CPT | Performed by: FAMILY MEDICINE

## 2024-03-13 PROCEDURE — 99214 OFFICE O/P EST MOD 30 MIN: CPT | Performed by: FAMILY MEDICINE

## 2024-03-13 PROCEDURE — 4004F PT TOBACCO SCREEN RCVD TLK: CPT | Performed by: FAMILY MEDICINE

## 2024-03-13 PROCEDURE — 90746 HEPB VACCINE 3 DOSE ADULT IM: CPT | Performed by: FAMILY MEDICINE

## 2024-03-13 PROCEDURE — G8484 FLU IMMUNIZE NO ADMIN: HCPCS | Performed by: FAMILY MEDICINE

## 2024-03-13 RX ORDER — ABACAVIR SULFATE, DOLUTEGRAVIR SODIUM, LAMIVUDINE 600; 50; 300 MG/1; MG/1; MG/1
TABLET, FILM COATED ORAL
Qty: 30 TABLET | Refills: 1 | Status: SHIPPED | OUTPATIENT
Start: 2024-03-13

## 2024-03-13 RX ORDER — SENNA AND DOCUSATE SODIUM 50; 8.6 MG/1; MG/1
1 TABLET, FILM COATED ORAL DAILY
Qty: 10 TABLET | Refills: 0 | Status: CANCELLED | OUTPATIENT
Start: 2024-03-13

## 2024-03-13 RX ORDER — DULOXETIN HYDROCHLORIDE 30 MG/1
30 CAPSULE, DELAYED RELEASE ORAL DAILY
Qty: 30 CAPSULE | Refills: 3 | Status: CANCELLED | OUTPATIENT
Start: 2024-03-13

## 2024-03-13 RX ORDER — ASCORBIC ACID 500 MG
500 TABLET ORAL DAILY
Qty: 30 TABLET | Refills: 0 | Status: SHIPPED | OUTPATIENT
Start: 2024-03-13

## 2024-03-13 RX ORDER — MULTIVIT-MIN/IRON/FOLIC ACID/K 18-600-40
1 CAPSULE ORAL DAILY
Qty: 30 CAPSULE | Refills: 2 | Status: SHIPPED | OUTPATIENT
Start: 2024-03-13

## 2024-03-13 NOTE — PROGRESS NOTES
[facility]  [address] [place] [address]  [phone]  Zhang Rivera MD     Patient: Orion Harris  YOB: 1990  Visit Date: 3/13/24    Orion is a 33 y.o. year old male here today for   Chief Complaint   Patient presents with    OTHER     F/u    Hypertension     From dentist       HPI  Patient is a 33 year old male with history of HIV and recent diagnosis of syphilis presents for follow up of above and elevated blood pressure.     Has gotten two PCN shots at the health department for syphilis and will be going back to get his last one .     Has headaches     Blood pressure has been high at other doctors appointments and wanted to come here to have it rechecked because it is making him nervous.     Working on changing his diet.   Was eating a lot of ramen noodles.   Has not missed any doses of his HIV medications.   Had a long conversation with him about importance of making and keeping appointments , answering phone calls. He reports that he was afraid of bad news and avoids coming to the doctor but is now committed to improving his health     No rashes. No trouble breathing .   Has some chest pain intermittently. Has history of WPW and has not been back to see EP     Review of Systems   Constitutional:  Negative for chills, fever and unexpected weight change.   Respiratory:  Negative for cough, shortness of breath and wheezing.    Cardiovascular:  Negative for chest pain, palpitations and leg swelling.   Gastrointestinal:  Negative for abdominal pain, blood in stool and nausea.   Musculoskeletal:         Foot pain per hpi   Skin:  Negative for color change and rash.   Neurological:  Positive for numbness. Negative for dizziness, syncope, light-headedness and headaches.       Current Outpatient Medications on File Prior to Visit   Medication Sig Dispense Refill    sennosides-docusate sodium (SENOKOT-S) 8.6-50 MG tablet Take 1 tablet by mouth daily 10 tablet 0    DULoxetine

## 2024-03-20 ASSESSMENT — ENCOUNTER SYMPTOMS
COUGH: 0
COLOR CHANGE: 0
NAUSEA: 0
BLOOD IN STOOL: 0
SHORTNESS OF BREATH: 0
ABDOMINAL PAIN: 0
WHEEZING: 0

## 2024-03-21 LAB
HIV 1 QNT: 91.7 CPY/ML
HIV 1 RNA, LOG NUMBER PCR: 1.96 LOG CPY/ML
HIV-1 RNA BY PCR, QN: DETECTED
SOURCE: ABNORMAL

## 2024-05-22 ENCOUNTER — TELEPHONE (OUTPATIENT)
Dept: NON INVASIVE DIAGNOSTICS | Age: 34
End: 2024-05-22

## 2024-05-22 NOTE — TELEPHONE ENCOUNTER
Called and left message for patient to call back and schedule an appointment from a referral from Dr. Rivera

## 2024-08-09 DIAGNOSIS — G63 NEUROPATHY DUE TO HIV (HCC): ICD-10-CM

## 2024-08-09 DIAGNOSIS — B20 NEUROPATHY DUE TO HIV (HCC): ICD-10-CM

## 2024-08-12 RX ORDER — ABACAVIR SULFATE, DOLUTEGRAVIR SODIUM, LAMIVUDINE 600; 50; 300 MG/1; MG/1; MG/1
TABLET, FILM COATED ORAL
Qty: 30 TABLET | Refills: 0 | OUTPATIENT
Start: 2024-08-12

## 2024-08-14 ENCOUNTER — TELEPHONE (OUTPATIENT)
Dept: FAMILY MEDICINE CLINIC | Age: 34
End: 2024-08-14
Payer: COMMERCIAL

## 2024-08-14 DIAGNOSIS — B20 CURRENTLY ASYMPTOMATIC HIV INFECTION, WITH HISTORY OF HIV-RELATED ILLNESS (HCC): ICD-10-CM

## 2024-08-14 DIAGNOSIS — B20 NEUROPATHY DUE TO HIV (HCC): Primary | ICD-10-CM

## 2024-08-14 DIAGNOSIS — G63 NEUROPATHY DUE TO HIV (HCC): Primary | ICD-10-CM

## 2024-08-14 PROCEDURE — 36415 COLL VENOUS BLD VENIPUNCTURE: CPT | Performed by: FAMILY MEDICINE

## 2024-08-14 RX ORDER — ABACAVIR SULFATE, DOLUTEGRAVIR SODIUM, LAMIVUDINE 600; 50; 300 MG/1; MG/1; MG/1
TABLET, FILM COATED ORAL
Qty: 30 TABLET | Refills: 0 | Status: SHIPPED | OUTPATIENT
Start: 2024-08-14

## 2024-08-14 NOTE — TELEPHONE ENCOUNTER
Called pharmacy . Patient filled meds 3.13.2024, 4/30/2024, 6/7/2024, 7/11/2024 . Patient denies missing medications but has missed a couple days because I refused refill on Friday. I explained that refill was refused because he had not been in since March.     I explained to patient again that it is very important for him to come in for regular appointments and to get  labs drawn regularly to avoid resistance to medications. Discussed important of not missing doses of meds.     Patient will come in today for labs and for appointment on Aug 26 at 10:30. I explained to patient that I will now longer refill his medications if he does not do the above plan. I also explained that he needs to come in monthly until HIV is controlled and then every 3 months for the rest of his life . Patient was agreeable to this at this time.

## 2024-09-06 ENCOUNTER — OFFICE VISIT (OUTPATIENT)
Dept: FAMILY MEDICINE CLINIC | Age: 34
End: 2024-09-06

## 2024-09-06 VITALS
OXYGEN SATURATION: 98 % | HEART RATE: 68 BPM | WEIGHT: 245 LBS | RESPIRATION RATE: 18 BRPM | BODY MASS INDEX: 30.46 KG/M2 | DIASTOLIC BLOOD PRESSURE: 78 MMHG | TEMPERATURE: 97.7 F | SYSTOLIC BLOOD PRESSURE: 130 MMHG | HEIGHT: 75 IN

## 2024-09-06 DIAGNOSIS — B20 NEUROPATHY DUE TO HIV (HCC): ICD-10-CM

## 2024-09-06 DIAGNOSIS — I45.6 WPW (WOLFF-PARKINSON-WHITE SYNDROME): Primary | ICD-10-CM

## 2024-09-06 DIAGNOSIS — G63 NEUROPATHY DUE TO HIV (HCC): ICD-10-CM

## 2024-09-06 DIAGNOSIS — B20 CURRENTLY ASYMPTOMATIC HIV INFECTION, WITH HISTORY OF HIV-RELATED ILLNESS (HCC): ICD-10-CM

## 2024-09-06 LAB
ALBUMIN: 4.5 G/DL (ref 3.5–5.2)
ALP BLD-CCNC: 110 U/L (ref 40–129)
ALT SERPL-CCNC: 29 U/L (ref 0–40)
ANION GAP SERPL CALCULATED.3IONS-SCNC: 12 MMOL/L (ref 7–16)
AST SERPL-CCNC: 29 U/L (ref 0–39)
BASOPHILS ABSOLUTE: 0.02 K/UL (ref 0–0.2)
BASOPHILS RELATIVE PERCENT: 0 % (ref 0–2)
BILIRUB SERPL-MCNC: 0.6 MG/DL (ref 0–1.2)
BUN BLDV-MCNC: 9 MG/DL (ref 6–20)
CALCIUM SERPL-MCNC: 9.5 MG/DL (ref 8.6–10.2)
CHLORIDE BLD-SCNC: 104 MMOL/L (ref 98–107)
CO2: 22 MMOL/L (ref 22–29)
CREAT SERPL-MCNC: 1.1 MG/DL (ref 0.7–1.2)
EOSINOPHILS ABSOLUTE: 0.14 K/UL (ref 0.05–0.5)
EOSINOPHILS RELATIVE PERCENT: 3 % (ref 0–6)
GFR, ESTIMATED: 89 ML/MIN/1.73M2
GLUCOSE BLD-MCNC: 85 MG/DL (ref 74–99)
HCT VFR BLD CALC: 45.1 % (ref 37–54)
HEMOGLOBIN: 15.8 G/DL (ref 12.5–16.5)
IMMATURE GRANULOCYTES %: 0 % (ref 0–5)
IMMATURE GRANULOCYTES ABSOLUTE: <0.03 K/UL (ref 0–0.58)
LYMPHOCYTES ABSOLUTE: 2.14 K/UL (ref 1.5–4)
LYMPHOCYTES RELATIVE PERCENT: 45 % (ref 20–42)
MCH RBC QN AUTO: 36.3 PG (ref 26–35)
MCHC RBC AUTO-ENTMCNC: 35 G/DL (ref 32–34.5)
MCV RBC AUTO: 103.7 FL (ref 80–99.9)
MONOCYTES ABSOLUTE: 0.41 K/UL (ref 0.1–0.95)
MONOCYTES RELATIVE PERCENT: 9 % (ref 2–12)
NEUTROPHILS ABSOLUTE: 2.02 K/UL (ref 1.8–7.3)
NEUTROPHILS RELATIVE PERCENT: 43 % (ref 43–80)
PDW BLD-RTO: 13.5 % (ref 11.5–15)
PLATELET # BLD: 232 K/UL (ref 130–450)
PMV BLD AUTO: 10.4 FL (ref 7–12)
POTASSIUM SERPL-SCNC: 4.3 MMOL/L (ref 3.5–5)
RBC # BLD: 4.35 M/UL (ref 3.8–5.8)
SODIUM BLD-SCNC: 138 MMOL/L (ref 132–146)
TOTAL PROTEIN: 7.7 G/DL (ref 6.4–8.3)
WBC # BLD: 4.7 K/UL (ref 4.5–11.5)

## 2024-09-06 RX ORDER — ABACAVIR SULFATE, DOLUTEGRAVIR SODIUM, LAMIVUDINE 600; 50; 300 MG/1; MG/1; MG/1
TABLET, FILM COATED ORAL
Qty: 30 TABLET | Refills: 0 | Status: SHIPPED | OUTPATIENT
Start: 2024-09-06

## 2024-09-06 SDOH — ECONOMIC STABILITY: FOOD INSECURITY: WITHIN THE PAST 12 MONTHS, YOU WORRIED THAT YOUR FOOD WOULD RUN OUT BEFORE YOU GOT MONEY TO BUY MORE.: OFTEN TRUE

## 2024-09-06 SDOH — ECONOMIC STABILITY: FOOD INSECURITY: WITHIN THE PAST 12 MONTHS, THE FOOD YOU BOUGHT JUST DIDN'T LAST AND YOU DIDN'T HAVE MONEY TO GET MORE.: OFTEN TRUE

## 2024-09-06 SDOH — ECONOMIC STABILITY: INCOME INSECURITY: HOW HARD IS IT FOR YOU TO PAY FOR THE VERY BASICS LIKE FOOD, HOUSING, MEDICAL CARE, AND HEATING?: HARD

## 2024-09-07 LAB
HIV 1 QNT: 379 CPY/ML
HIV 1 RNA, LOG NUMBER PCR: 2.58 LOG CPY/ML
HIV-1 RNA BY PCR, QN: DETECTED
SOURCE: ABNORMAL

## 2024-09-08 LAB
CD3 ABSOLUTE: 1794 CELLS/UL (ref 570–2400)
CD4 ABSOLUTE: 615 CELLS/UL (ref 430–1800)
CD4/CD8 RATIO: 0.58 RATIO (ref 0.8–3.9)
CD8 ABSOLUTE: 1056 CELLS/UL (ref 210–1200)
Lab: ABNORMAL

## 2024-09-09 LAB
RPR TITER: NORMAL
RPR: REACTIVE

## 2024-09-30 ENCOUNTER — TELEPHONE (OUTPATIENT)
Dept: FAMILY MEDICINE CLINIC | Age: 34
End: 2024-09-30

## 2024-09-30 NOTE — TELEPHONE ENCOUNTER
Called patient and reminded him of upcoming appointment on Wednesday at 2:40 . Patient reports he will be there.     I discussed his lab results with him.   Viral load was up from previous , but most likely had missed doses. Patient denies any missed doses in the last month. Will get labs on Wednesday.

## 2024-10-03 ENCOUNTER — TELEPHONE (OUTPATIENT)
Dept: FAMILY MEDICINE CLINIC | Age: 34
End: 2024-10-03

## 2024-10-03 NOTE — TELEPHONE ENCOUNTER
Called patient to discuss missed appointment . No answer. LMOM for patient to call back with best number and time to reach them

## 2024-10-11 ENCOUNTER — TELEPHONE (OUTPATIENT)
Dept: FAMILY MEDICINE CLINIC | Age: 34
End: 2024-10-11

## 2024-10-18 DIAGNOSIS — B20 NEUROPATHY DUE TO HIV (HCC): ICD-10-CM

## 2024-10-18 DIAGNOSIS — B20 CURRENTLY ASYMPTOMATIC HIV INFECTION, WITH HISTORY OF HIV-RELATED ILLNESS (HCC): ICD-10-CM

## 2024-10-18 DIAGNOSIS — G63 NEUROPATHY DUE TO HIV (HCC): ICD-10-CM

## 2024-10-21 DIAGNOSIS — B20 CURRENTLY ASYMPTOMATIC HIV INFECTION, WITH HISTORY OF HIV-RELATED ILLNESS (HCC): Primary | ICD-10-CM

## 2024-10-21 RX ORDER — ABACAVIR SULFATE, DOLUTEGRAVIR SODIUM, LAMIVUDINE 600; 50; 300 MG/1; MG/1; MG/1
TABLET, FILM COATED ORAL
Qty: 7 TABLET | Refills: 0 | Status: SHIPPED | OUTPATIENT
Start: 2024-10-21

## 2024-10-21 RX ORDER — ABACAVIR SULFATE, DOLUTEGRAVIR SODIUM, LAMIVUDINE 600; 50; 300 MG/1; MG/1; MG/1
TABLET, FILM COATED ORAL
Qty: 30 TABLET | OUTPATIENT
Start: 2024-10-21

## 2024-10-21 NOTE — TELEPHONE ENCOUNTER
Name of Medication(s) Requested:  Requested Prescriptions     Pending Prescriptions Disp Refills    TRIUMEQ 600- MG TABS [Pharmacy Med Name: TRIUMEQ TABLET] 30 tablet 0     Sig: TAKE 1 TABLET BY MOUTH DAILY       Medication is on current medication list Yes    Dosage and directions were verified? Yes    Quantity verified: 30 day supply     Pharmacy Verified?  Yes    Last Appointment:  9/6/2024    Future appts:  Future Appointments   Date Time Provider Department Center   10/24/2024  2:20 PM Zhang Rivera MD [facility] DE[facility]        (If no appt send self scheduling link. .REFILLAPPT)  Scheduling request sent?     [] Yes  [x] No    Does patient need updated?  [] Yes  [x] No

## 2024-10-21 NOTE — PROGRESS NOTES
Called patient will get labs before appointment on Thursday. Will give 1 week of medicine and will refill x 1 month when he comes to his appointment on Thursday.

## 2024-10-24 ENCOUNTER — OFFICE VISIT (OUTPATIENT)
Dept: FAMILY MEDICINE CLINIC | Age: 34
End: 2024-10-24

## 2024-10-24 VITALS
HEART RATE: 73 BPM | HEIGHT: 75 IN | SYSTOLIC BLOOD PRESSURE: 128 MMHG | DIASTOLIC BLOOD PRESSURE: 76 MMHG | OXYGEN SATURATION: 96 % | TEMPERATURE: 98.4 F | BODY MASS INDEX: 31.83 KG/M2 | WEIGHT: 256 LBS | RESPIRATION RATE: 19 BRPM

## 2024-10-24 DIAGNOSIS — B20 CURRENTLY ASYMPTOMATIC HIV INFECTION, WITH HISTORY OF HIV-RELATED ILLNESS (HCC): Primary | ICD-10-CM

## 2024-10-24 DIAGNOSIS — I45.6 WPW (WOLFF-PARKINSON-WHITE SYNDROME): ICD-10-CM

## 2024-10-24 DIAGNOSIS — R80.9 NEPHROTIC RANGE PROTEINURIA: ICD-10-CM

## 2024-10-24 NOTE — PROGRESS NOTES
[facility]  [address] [place] [address]  [phone]  Zhang Rivera MD     Patient: Orion Harris  YOB: 1990  Visit Date: 10/24/24    Orion is a 34 y.o. year old male here today for   Chief Complaint   Patient presents with    Check-Up       HPI  Patient is a 34 year old male here for follow up of HIV. He got a month supply of medication.   Only missed 2 doses in the last month.     Has been feeling ok. Has chest fluttering. Otherwise ok. Has night swetats.     No sweollen lymph nodes.   No weight loss.   No sob, no cough.     Does not want a flu shot   Cannot go to the hospital.   Went to the dentist 5 months ago.     Review of Systems   Constitutional:  Negative for chills, fever and unexpected weight change.   Respiratory:  Negative for cough, shortness of breath and wheezing.    Cardiovascular:  Negative for chest pain, palpitations and leg swelling.   Gastrointestinal:  Negative for abdominal pain, blood in stool and nausea.   Skin:  Negative for color change and rash.   Neurological:  Positive for numbness. Negative for dizziness, syncope, light-headedness and headaches.       Current Outpatient Medications on File Prior to Visit   Medication Sig Dispense Refill    TRIUMEQ 600- MG TABS TAKE 1 TABLET BY MOUTH DAILY 7 tablet 0    ascorbic acid (VITAMIN C) 500 MG tablet Take 1 tablet by mouth daily 30 tablet 0    Cholecalciferol (VITAMIN D) 50 MCG (2000 UT) CAPS capsule Take 1 capsule by mouth daily 30 capsule 2    sennosides-docusate sodium (SENOKOT-S) 8.6-50 MG tablet Take 1 tablet by mouth daily 10 tablet 0    DULoxetine (CYMBALTA) 30 MG extended release capsule Take 1 capsule by mouth daily 30 capsule 3     No current facility-administered medications on file prior to visit.     No Known Allergies    Past medical, surgical, socialand/or family history reviewed, updated as needed, and are non-contributory (unless otherwise stated).  Medications, allergies,

## 2024-10-28 DIAGNOSIS — B20 CURRENTLY ASYMPTOMATIC HIV INFECTION, WITH HISTORY OF HIV-RELATED ILLNESS (HCC): ICD-10-CM

## 2024-10-28 DIAGNOSIS — G63 NEUROPATHY DUE TO HIV (HCC): ICD-10-CM

## 2024-10-28 DIAGNOSIS — B20 NEUROPATHY DUE TO HIV (HCC): ICD-10-CM

## 2024-10-31 ASSESSMENT — ENCOUNTER SYMPTOMS
SHORTNESS OF BREATH: 0
NAUSEA: 0
COLOR CHANGE: 0
WHEEZING: 0
COUGH: 0
BLOOD IN STOOL: 0
ABDOMINAL PAIN: 0

## 2024-11-13 ENCOUNTER — TELEPHONE (OUTPATIENT)
Dept: FAMILY MEDICINE CLINIC | Age: 34
End: 2024-11-13

## 2024-11-13 DIAGNOSIS — Z86.19 HISTORY OF SYPHILIS: ICD-10-CM

## 2024-11-13 DIAGNOSIS — B20 CURRENTLY ASYMPTOMATIC HIV INFECTION, WITH HISTORY OF HIV-RELATED ILLNESS (HCC): ICD-10-CM

## 2024-11-13 DIAGNOSIS — E55.9 VITAMIN D DEFICIENCY: Primary | ICD-10-CM

## 2024-11-13 NOTE — TELEPHONE ENCOUNTER
Called patient to discuss need for labs . No answer. LMOM for patient to call back with best number and time to reach them

## 2024-11-18 ENCOUNTER — OFFICE VISIT (OUTPATIENT)
Dept: FAMILY MEDICINE CLINIC | Age: 34
End: 2024-11-18
Payer: COMMERCIAL

## 2024-11-18 VITALS
DIASTOLIC BLOOD PRESSURE: 84 MMHG | HEART RATE: 80 BPM | WEIGHT: 256 LBS | TEMPERATURE: 97.9 F | BODY MASS INDEX: 31.83 KG/M2 | SYSTOLIC BLOOD PRESSURE: 134 MMHG | OXYGEN SATURATION: 98 % | RESPIRATION RATE: 18 BRPM | HEIGHT: 75 IN

## 2024-11-18 DIAGNOSIS — G63 NEUROPATHY DUE TO HIV (HCC): ICD-10-CM

## 2024-11-18 DIAGNOSIS — B20 CURRENTLY ASYMPTOMATIC HIV INFECTION, WITH HISTORY OF HIV-RELATED ILLNESS (HCC): ICD-10-CM

## 2024-11-18 DIAGNOSIS — E55.9 VITAMIN D DEFICIENCY: ICD-10-CM

## 2024-11-18 DIAGNOSIS — B20 NEUROPATHY DUE TO HIV (HCC): ICD-10-CM

## 2024-11-18 DIAGNOSIS — Z86.19 HISTORY OF SYPHILIS: ICD-10-CM

## 2024-11-18 LAB
ALBUMIN: 4 G/DL (ref 3.5–5.2)
ALP BLD-CCNC: 108 U/L (ref 40–129)
ALT SERPL-CCNC: 23 U/L (ref 0–40)
ANION GAP SERPL CALCULATED.3IONS-SCNC: 9 MMOL/L (ref 7–16)
AST SERPL-CCNC: 26 U/L (ref 0–39)
BASOPHILS ABSOLUTE: 0.03 K/UL (ref 0–0.2)
BASOPHILS RELATIVE PERCENT: 1 % (ref 0–2)
BILIRUB SERPL-MCNC: 0.5 MG/DL (ref 0–1.2)
BILIRUBIN DIRECT: <0.2 MG/DL (ref 0–0.3)
BILIRUBIN, INDIRECT: NORMAL MG/DL (ref 0–1)
BUN BLDV-MCNC: 11 MG/DL (ref 6–20)
CALCIUM SERPL-MCNC: 9.2 MG/DL (ref 8.6–10.2)
CHLORIDE BLD-SCNC: 106 MMOL/L (ref 98–107)
CO2: 24 MMOL/L (ref 22–29)
CREAT SERPL-MCNC: 1.1 MG/DL (ref 0.7–1.2)
EOSINOPHILS ABSOLUTE: 0.18 K/UL (ref 0.05–0.5)
EOSINOPHILS RELATIVE PERCENT: 5 % (ref 0–6)
GFR, ESTIMATED: >90 ML/MIN/1.73M2
GLUCOSE BLD-MCNC: 77 MG/DL (ref 74–99)
HCT VFR BLD CALC: 44 % (ref 37–54)
HEMOGLOBIN: 14.6 G/DL (ref 12.5–16.5)
IMMATURE GRANULOCYTES %: 0 % (ref 0–5)
IMMATURE GRANULOCYTES ABSOLUTE: <0.03 K/UL (ref 0–0.58)
LYMPHOCYTES ABSOLUTE: 1.99 K/UL (ref 1.5–4)
LYMPHOCYTES RELATIVE PERCENT: 53 % (ref 20–42)
MCH RBC QN AUTO: 34.3 PG (ref 26–35)
MCHC RBC AUTO-ENTMCNC: 33.2 G/DL (ref 32–34.5)
MCV RBC AUTO: 103.3 FL (ref 80–99.9)
MONOCYTES ABSOLUTE: 0.45 K/UL (ref 0.1–0.95)
MONOCYTES RELATIVE PERCENT: 12 % (ref 2–12)
NEUTROPHILS ABSOLUTE: 1.13 K/UL (ref 1.8–7.3)
NEUTROPHILS RELATIVE PERCENT: 30 % (ref 43–80)
PDW BLD-RTO: 13.5 % (ref 11.5–15)
PLATELET # BLD: 258 K/UL (ref 130–450)
PMV BLD AUTO: 10.5 FL (ref 7–12)
POTASSIUM SERPL-SCNC: 4.8 MMOL/L (ref 3.5–5)
RBC # BLD: 4.26 M/UL (ref 3.8–5.8)
SODIUM BLD-SCNC: 139 MMOL/L (ref 132–146)
TOTAL PROTEIN: 7.3 G/DL (ref 6.4–8.3)
VITAMIN D 25-HYDROXY: 11.8 NG/ML (ref 30–100)
WBC # BLD: 3.8 K/UL (ref 4.5–11.5)

## 2024-11-18 PROCEDURE — 99213 OFFICE O/P EST LOW 20 MIN: CPT | Performed by: FAMILY MEDICINE

## 2024-11-18 PROCEDURE — G8417 CALC BMI ABV UP PARAM F/U: HCPCS | Performed by: FAMILY MEDICINE

## 2024-11-18 PROCEDURE — G8428 CUR MEDS NOT DOCUMENT: HCPCS | Performed by: FAMILY MEDICINE

## 2024-11-18 PROCEDURE — 4004F PT TOBACCO SCREEN RCVD TLK: CPT | Performed by: FAMILY MEDICINE

## 2024-11-18 PROCEDURE — G8484 FLU IMMUNIZE NO ADMIN: HCPCS | Performed by: FAMILY MEDICINE

## 2024-11-18 RX ORDER — ABACAVIR SULFATE, DOLUTEGRAVIR SODIUM, LAMIVUDINE 600; 50; 300 MG/1; MG/1; MG/1
TABLET, FILM COATED ORAL
Qty: 30 TABLET | Refills: 0 | Status: SHIPPED | OUTPATIENT
Start: 2024-11-18

## 2024-11-18 NOTE — PROGRESS NOTES
[facility]  [address] [place] [address]  [phone]  Zhang Rivera MD     Patient: Orion Harris  YOB: 1990  Visit Date: 11/18/24    Orion is a 34 y.o. year old male here today for   Chief Complaint   Patient presents with    Follow-up    OTHER     Was sick for 2 weeks        HPI  Patient is a 34 year old male here for follow up of HIV.     Was sick for two weeks. Had URI symptoms, cough, shortness of breath, congestion, runny nose. Overall improving now.     Taking medicine every day.   No night sweats, fever , enlarged lymph nodes.   No missed doses of medicine.     Has not made specialist appointments.     Tingling feeling in feet is getting better.     Review of Systems   Constitutional:  Negative for chills, fever and unexpected weight change.   Respiratory:  Negative for cough, shortness of breath and wheezing.    Cardiovascular:  Negative for chest pain, palpitations and leg swelling.   Gastrointestinal:  Negative for abdominal pain, blood in stool and nausea.   Skin:  Negative for color change and rash.   Neurological:  Positive for numbness. Negative for dizziness, syncope, light-headedness and headaches.       Current Outpatient Medications on File Prior to Visit   Medication Sig Dispense Refill    ascorbic acid (VITAMIN C) 500 MG tablet Take 1 tablet by mouth daily 30 tablet 0    Cholecalciferol (VITAMIN D) 50 MCG (2000 UT) CAPS capsule Take 1 capsule by mouth daily 30 capsule 2    sennosides-docusate sodium (SENOKOT-S) 8.6-50 MG tablet Take 1 tablet by mouth daily 10 tablet 0    DULoxetine (CYMBALTA) 30 MG extended release capsule Take 1 capsule by mouth daily 30 capsule 3     No current facility-administered medications on file prior to visit.     No Known Allergies    Past medical, surgical, socialand/or family history reviewed, updated as needed, and are non-contributory (unless otherwise stated).  Medications, allergies, and problem list also reviewed

## 2024-11-19 LAB
RPR TITER: NORMAL
RPR: REACTIVE

## 2024-11-20 LAB
HIV-1 RNA BY PCR, QN: NOT DETECTED
SOURCE: NORMAL

## 2024-12-19 DIAGNOSIS — B20 CURRENTLY ASYMPTOMATIC HIV INFECTION, WITH HISTORY OF HIV-RELATED ILLNESS (HCC): Primary | ICD-10-CM

## 2024-12-19 NOTE — PROGRESS NOTES
Patient no showed to appointment.   Called patient to discuss above , need for labs and to reschedule appointment.  . No answer. LMOM for patient to call back with best number and time to reach them     Called pharmacy and advised them to have patient call office if he calls for refill.

## 2024-12-27 DIAGNOSIS — B20 CURRENTLY ASYMPTOMATIC HIV INFECTION, WITH HISTORY OF HIV-RELATED ILLNESS (HCC): ICD-10-CM

## 2024-12-27 DIAGNOSIS — B20 NEUROPATHY DUE TO HIV (HCC): ICD-10-CM

## 2024-12-27 DIAGNOSIS — G63 NEUROPATHY DUE TO HIV (HCC): ICD-10-CM

## 2024-12-27 RX ORDER — ABACAVIR SULFATE, DOLUTEGRAVIR SODIUM, LAMIVUDINE 600; 50; 300 MG/1; MG/1; MG/1
TABLET, FILM COATED ORAL
Qty: 30 TABLET | Refills: 0 | Status: SHIPPED | OUTPATIENT
Start: 2024-12-27

## 2024-12-27 NOTE — TELEPHONE ENCOUNTER
Last Appointment:  11/18/2024  Future Appointments   Date Time Provider Department Center   12/30/2024  3:20 PM Zhang Rivera MD [facility] DEP

## 2024-12-30 ENCOUNTER — OFFICE VISIT (OUTPATIENT)
Dept: FAMILY MEDICINE CLINIC | Age: 34
End: 2024-12-30
Payer: COMMERCIAL

## 2024-12-30 VITALS
DIASTOLIC BLOOD PRESSURE: 93 MMHG | HEIGHT: 75 IN | SYSTOLIC BLOOD PRESSURE: 153 MMHG | TEMPERATURE: 98.2 F | OXYGEN SATURATION: 95 % | WEIGHT: 256 LBS | RESPIRATION RATE: 18 BRPM | BODY MASS INDEX: 31.83 KG/M2 | HEART RATE: 80 BPM

## 2024-12-30 DIAGNOSIS — A53.9 SYPHILIS: ICD-10-CM

## 2024-12-30 DIAGNOSIS — I45.6 WPW (WOLFF-PARKINSON-WHITE SYNDROME): Primary | ICD-10-CM

## 2024-12-30 DIAGNOSIS — B20 CURRENTLY ASYMPTOMATIC HIV INFECTION, WITH HISTORY OF HIV-RELATED ILLNESS (HCC): ICD-10-CM

## 2024-12-30 DIAGNOSIS — R80.9 NEPHROTIC RANGE PROTEINURIA: ICD-10-CM

## 2024-12-30 PROCEDURE — 4004F PT TOBACCO SCREEN RCVD TLK: CPT | Performed by: FAMILY MEDICINE

## 2024-12-30 PROCEDURE — G8417 CALC BMI ABV UP PARAM F/U: HCPCS | Performed by: FAMILY MEDICINE

## 2024-12-30 PROCEDURE — 99213 OFFICE O/P EST LOW 20 MIN: CPT | Performed by: FAMILY MEDICINE

## 2024-12-30 PROCEDURE — G8428 CUR MEDS NOT DOCUMENT: HCPCS | Performed by: FAMILY MEDICINE

## 2024-12-30 NOTE — PATIENT INSTRUCTIONS
Try limiting your salt.   Check your blood pressures at home.   Try not to miss doses of your medicine   Make appointments with specialists.   Follow up in 1 month.

## 2024-12-30 NOTE — PROGRESS NOTES
not be calculated 0.0 - 1.0 mg/dL    Alkaline Phosphatase 108 40 - 129 U/L    ALT 23 0 - 40 U/L    AST 26 0 - 39 U/L   HIV RNA, Quantitative, PCR   Result Value Ref Range    Source .PLASMA     HIV-1 RNA by PCR, Qn Not Detected Not Detected   CBC with Auto Differential   Result Value Ref Range    WBC 3.8 (L) 4.5 - 11.5 k/uL    RBC 4.26 3.80 - 5.80 m/uL    Hemoglobin 14.6 12.5 - 16.5 g/dL    Hematocrit 44.0 37.0 - 54.0 %    .3 (H) 80.0 - 99.9 fL    MCH 34.3 26.0 - 35.0 pg    MCHC 33.2 32.0 - 34.5 g/dL    RDW 13.5 11.5 - 15.0 %    Platelets 258 130 - 450 k/uL    MPV 10.5 7.0 - 12.0 fL    Neutrophils % 30 (L) 43.0 - 80.0 %    Lymphocytes % 53 (H) 20.0 - 42.0 %    Monocytes % 12 2.0 - 12.0 %    Eosinophils % 5 0 - 6 %    Basophils % 1 0.0 - 2.0 %    Immature Granulocytes % 0 0.0 - 5.0 %    Neutrophils Absolute 1.13 (L) 1.80 - 7.30 k/uL    Lymphocytes Absolute 1.99 1.50 - 4.00 k/uL    Monocytes Absolute 0.45 0.10 - 0.95 k/uL    Eosinophils Absolute 0.18 0.05 - 0.50 k/uL    Basophils Absolute 0.03 0.00 - 0.20 k/uL    Immature Granulocytes Absolute <0.03 0.00 - 0.58 k/uL   RPR   Result Value Ref Range    RPR REACTIVE (A) NONREACTIVE   Vitamin D 25 Hydroxy   Result Value Ref Range    Vit D, 25-Hydroxy 11.8 (L) 30.0 - 100.0 ng/mL   RPR, Quant   Result Value Ref Range    RPR TITER 1:32        ASSESSMENT/PLAN  Orion was seen today for follow-up.    Diagnoses and all orders for this visit:    WPW (Joie-Parkinson-White syndrome)  -     Caryl - Andre Meraz DO, Electrophysiology, [place]    Nephrotic range proteinuria  -     JASMEET - Salty Wilson MD, Nephrology, [place]    Currently asymptomatic HIV infection, with history of HIV-related illness (HCC)  -     HIV RNA, Quantitative, PCR    Syphilis  -     RPR      Continue meds.   Discussed importance of taking meds and making specialists appointments.       Phone/MyChart follow up if tests abnormal.    No follow-ups on file. or sooner if necessary.    I have

## 2024-12-31 LAB
RPR TITER: NORMAL
RPR: REACTIVE

## 2025-01-04 LAB
HIV-1 RNA BY PCR, QN: NOT DETECTED
SOURCE: NORMAL

## 2025-01-15 ENCOUNTER — TELEPHONE (OUTPATIENT)
Dept: FAMILY MEDICINE CLINIC | Age: 35
End: 2025-01-15

## 2025-01-15 NOTE — TELEPHONE ENCOUNTER
[facility]   Patient had secondary syphilis as he had full body rash on presentation . Was treated with at least dose of PCN which is considered adequate treatment.     Patient's RPR quant has come down from 1:512 to 1:32 which is greater than a 4 fold decrease and considered a good response. Will continue to monitor for 1 year. No further treatment needed at this time.

## 2025-01-30 DIAGNOSIS — B20 CURRENTLY ASYMPTOMATIC HIV INFECTION, WITH HISTORY OF HIV-RELATED ILLNESS (HCC): ICD-10-CM

## 2025-01-30 DIAGNOSIS — G63 NEUROPATHY DUE TO HIV (HCC): ICD-10-CM

## 2025-01-30 DIAGNOSIS — B20 NEUROPATHY DUE TO HIV (HCC): ICD-10-CM

## 2025-01-30 RX ORDER — MULTIVIT-MIN/IRON/FOLIC ACID/K 18-600-40
2000 CAPSULE ORAL DAILY
Qty: 30 CAPSULE | Refills: 2 | Status: SHIPPED | OUTPATIENT
Start: 2025-01-30

## 2025-01-30 RX ORDER — ABACAVIR SULFATE, DOLUTEGRAVIR SODIUM, LAMIVUDINE 600; 50; 300 MG/1; MG/1; MG/1
TABLET, FILM COATED ORAL
Qty: 30 TABLET | Refills: 0 | Status: SHIPPED | OUTPATIENT
Start: 2025-01-30

## 2025-01-30 RX ORDER — ASCORBIC ACID 500 MG
500 TABLET ORAL DAILY
Qty: 30 TABLET | Refills: 0 | Status: SHIPPED | OUTPATIENT
Start: 2025-01-30

## 2025-01-30 NOTE — TELEPHONE ENCOUNTER
Name of Medication(s) Requested:  Requested Prescriptions     Pending Prescriptions Disp Refills    ascorbic acid (VITAMIN C) 500 MG tablet 30 tablet 0     Sig: Take 1 tablet by mouth daily    vitamin D 50 MCG (2000 UT) CAPS capsule 30 capsule 2     Sig: Take 1 capsule by mouth daily    TRIUMEQ 600- MG TABS 30 tablet 0     Sig: TAKE 1 TABLET BY MOUTH DAILY       Medication is on current medication list Yes    Dosage and directions were verified? Yes    Quantity verified: 30 day supply     Pharmacy Verified?  Yes    Last Appointment:  12/30/2024    Future appts:  No future appointments.     (If no appt send self scheduling link. .REFILLAPPT)  Scheduling request sent?     [] Yes  [x] No    Does patient need updated?  [] Yes  [x] No

## 2025-02-14 ENCOUNTER — TELEPHONE (OUTPATIENT)
Dept: FAMILY MEDICINE CLINIC | Age: 35
End: 2025-02-14

## 2025-02-14 DIAGNOSIS — B20 CURRENTLY ASYMPTOMATIC HIV INFECTION, WITH HISTORY OF HIV-RELATED ILLNESS (HCC): Primary | ICD-10-CM

## 2025-02-14 NOTE — TELEPHONE ENCOUNTER
Called patient. No answer. No option to leave message as mailbox is full. Sent [employer] message . Placed orders . If patient calls in for appointment please let me know and let him know labs are in for him to have done asap

## 2025-02-25 ENCOUNTER — HOSPITAL ENCOUNTER (EMERGENCY)
Age: 35
Discharge: HOME | End: 2025-02-25
Payer: COMMERCIAL

## 2025-02-25 VITALS
SYSTOLIC BLOOD PRESSURE: 168 MMHG | TEMPERATURE: 98.8 F | OXYGEN SATURATION: 100 % | HEART RATE: 74 BPM | RESPIRATION RATE: 18 BRPM | WEIGHT: 240 LBS | HEIGHT: 76 IN | BODY MASS INDEX: 29.22 KG/M2 | DIASTOLIC BLOOD PRESSURE: 103 MMHG

## 2025-02-25 DIAGNOSIS — A63.0 CONDYLOMA ACUMINATUM OF ANUS: Primary | ICD-10-CM

## 2025-02-25 DIAGNOSIS — R03.0 ELEVATED BLOOD PRESSURE READING WITHOUT DIAGNOSIS OF HYPERTENSION: ICD-10-CM

## 2025-02-25 PROCEDURE — 99282 EMERGENCY DEPT VISIT SF MDM: CPT

## 2025-02-25 ASSESSMENT — PAIN - FUNCTIONAL ASSESSMENT: PAIN_FUNCTIONAL_ASSESSMENT: NONE - DENIES PAIN

## 2025-02-25 ASSESSMENT — LIFESTYLE VARIABLES
HOW MANY STANDARD DRINKS CONTAINING ALCOHOL DO YOU HAVE ON A TYPICAL DAY: 1 OR 2
HOW OFTEN DO YOU HAVE A DRINK CONTAINING ALCOHOL: MONTHLY OR LESS

## 2025-02-25 NOTE — ED PROVIDER NOTES
Independent ESTELLE Visit.            [facility] EMERGENCY DEPARTMENT  ED  Encounter Note  Admit Date/RoomTime: 2025 10:55 AM  ED Room: 37/37  NAME: Orion Harris  : 1990  MRN: [id number]  PCP: Zhang Rivera MD    CHIEF COMPLAINT     Rash (Patient reports have a rash on his buttocks x 1 week. Denies any discharge. Reports 1 episode of the rash bleeding. Denies using any OTC medications.)    HISTORY OF PRESENT ILLNESS        Orion Harris is a 34 y.o. male who presents to the ED by private vehicle for fleshy lesions around anus, beginning several weeks prior to arrival. The complaint has been stable and persistent and are mild in severity and states they sometimes get irritated and will bleed but has not bleed lately.  He denies any fever, chills, nausea, vomiting, rectal pain, bloody stools, tarry stools, constipation, diarrhea, abdominal pain, dysuria, penile drainage, penile lesions, testicular pain or any concern for STIs at present time does have a history of HIV.  Patient denies any headache, blurry vision, double vision, chest pain, palpitations or dizziness and reports that his pressure was up because he was nervous when he came in because he knew the provider out front and normally does not have hypertension.    REVIEW OF SYSTEMS     Pertinent positives and negatives are stated within HPI, all other systems reviewed and are negative.    Past Medical History:  has a past medical history of Asthma, COVID, Depression, HIV (human immunodeficiency virus infection) (HCC), and WPW (Joie-Parkinson-White syndrome).  Surgical History:  has a past surgical history that includes fracture surgery; bronchoscopy (N/A, 10/15/2021); CT BIOPSY RENAL (10/27/2021); Carpal tunnel release (Left); and Condyloma Excision (N/A, 2024).  Social History:  reports that he has been smoking cigars. He has never used smokeless tobacco. He reports current alcohol use. He reports that he does

## 2025-03-07 DIAGNOSIS — B20 CURRENTLY ASYMPTOMATIC HIV INFECTION, WITH HISTORY OF HIV-RELATED ILLNESS (HCC): ICD-10-CM

## 2025-03-07 DIAGNOSIS — B20 NEUROPATHY DUE TO HIV (HCC): ICD-10-CM

## 2025-03-07 DIAGNOSIS — G63 NEUROPATHY DUE TO HIV (HCC): ICD-10-CM

## 2025-03-07 RX ORDER — ABACAVIR SULFATE, DOLUTEGRAVIR SODIUM, LAMIVUDINE 600; 50; 300 MG/1; MG/1; MG/1
TABLET, FILM COATED ORAL
Qty: 10 TABLET | Refills: 0 | Status: SHIPPED | OUTPATIENT
Start: 2025-03-07

## 2025-03-07 NOTE — TELEPHONE ENCOUNTER
Name of Medication(s) Requested:  Requested Prescriptions     Pending Prescriptions Disp Refills    TRIUMEQ 600- MG TABS 30 tablet 0     Sig: TAKE 1 TABLET BY MOUTH DAILY       Medication is on current medication list Yes    Dosage and directions were verified? Yes    Quantity verified: 30 day supply     Pharmacy Verified?  Yes    Last Appointment:  12/30/2024    Future appts:  Future Appointments   Date Time Provider Department Center   3/10/2025  8:30 AM Bakari Berg MD [facility]        (If no appt send self scheduling link. .REFILLAPPT)  Scheduling request sent?     [] Yes  [x] No    Does patient need updated?  [] Yes  [x] No

## 2025-03-17 ENCOUNTER — TELEPHONE (OUTPATIENT)
Dept: FAMILY MEDICINE CLINIC | Age: 35
End: 2025-03-17

## 2025-03-17 DIAGNOSIS — B20 NEUROPATHY DUE TO HIV (HCC): ICD-10-CM

## 2025-03-17 DIAGNOSIS — B20 CURRENTLY ASYMPTOMATIC HIV INFECTION, WITH HISTORY OF HIV-RELATED ILLNESS (HCC): Primary | ICD-10-CM

## 2025-03-17 DIAGNOSIS — G63 NEUROPATHY DUE TO HIV (HCC): ICD-10-CM

## 2025-03-17 RX ORDER — ABACAVIR SULFATE, DOLUTEGRAVIR SODIUM, LAMIVUDINE 600; 50; 300 MG/1; MG/1; MG/1
TABLET, FILM COATED ORAL
Qty: 30 TABLET | Refills: 0 | Status: SHIPPED | OUTPATIENT
Start: 2025-03-17

## 2025-03-17 NOTE — TELEPHONE ENCOUNTER
Called and spoke to patient. Will come in for labs today. Has not had missed doses per patient.   Will need dose for today. Refill sent. Patient will come in for appointment on Thursday March 27 at 2:30.

## 2025-03-21 LAB
ALBUMIN: 4.6 G/DL (ref 3.5–5.2)
ALP BLD-CCNC: 101 U/L (ref 40–129)
ALT SERPL-CCNC: 25 U/L (ref 0–40)
ANION GAP SERPL CALCULATED.3IONS-SCNC: 13 MMOL/L (ref 7–16)
AST SERPL-CCNC: 26 U/L (ref 0–39)
BASOPHILS ABSOLUTE: 0.02 K/UL (ref 0–0.2)
BASOPHILS RELATIVE PERCENT: 0 % (ref 0–2)
BILIRUB SERPL-MCNC: 0.6 MG/DL (ref 0–1.2)
BUN BLDV-MCNC: 11 MG/DL (ref 6–20)
CALCIUM SERPL-MCNC: 9.3 MG/DL (ref 8.6–10.2)
CHLORIDE BLD-SCNC: 105 MMOL/L (ref 98–107)
CO2: 21 MMOL/L (ref 22–29)
CREAT SERPL-MCNC: 1.2 MG/DL (ref 0.7–1.2)
EOSINOPHILS ABSOLUTE: 0.15 K/UL (ref 0.05–0.5)
EOSINOPHILS RELATIVE PERCENT: 3 % (ref 0–6)
GFR, ESTIMATED: 87 ML/MIN/1.73M2
GLUCOSE BLD-MCNC: 95 MG/DL (ref 74–99)
HCT VFR BLD CALC: 43.6 % (ref 37–54)
HEMOGLOBIN: 14.7 G/DL (ref 12.5–16.5)
IMMATURE GRANULOCYTES %: 0 % (ref 0–5)
IMMATURE GRANULOCYTES ABSOLUTE: <0.03 K/UL (ref 0–0.58)
LYMPHOCYTES ABSOLUTE: 1.91 K/UL (ref 1.5–4)
LYMPHOCYTES RELATIVE PERCENT: 39 % (ref 20–42)
MCH RBC QN AUTO: 33.9 PG (ref 26–35)
MCHC RBC AUTO-ENTMCNC: 33.7 G/DL (ref 32–34.5)
MCV RBC AUTO: 100.5 FL (ref 80–99.9)
MONOCYTES ABSOLUTE: 0.45 K/UL (ref 0.1–0.95)
MONOCYTES RELATIVE PERCENT: 9 % (ref 2–12)
NEUTROPHILS ABSOLUTE: 2.32 K/UL (ref 1.8–7.3)
NEUTROPHILS RELATIVE PERCENT: 48 % (ref 43–80)
PDW BLD-RTO: 13.2 % (ref 11.5–15)
PLATELET # BLD: 268 K/UL (ref 130–450)
PMV BLD AUTO: 10.1 FL (ref 7–12)
POTASSIUM SERPL-SCNC: 4.5 MMOL/L (ref 3.5–5)
RBC # BLD: 4.34 M/UL (ref 3.8–5.8)
SODIUM BLD-SCNC: 139 MMOL/L (ref 132–146)
TOTAL PROTEIN: 7.5 G/DL (ref 6.4–8.3)
WBC # BLD: 4.9 K/UL (ref 4.5–11.5)

## 2025-03-23 LAB
% CD4: 35 % (ref 32–64)
CD4 ABSOLUTE: 742 CELLS/UL (ref 430–1800)
LYMPHOCYTE SUBSET PANEL 2 INFO: NORMAL

## 2025-03-25 LAB
HIV-1 RNA BY PCR, QN: NOT DETECTED
SOURCE: NORMAL

## 2025-03-27 ENCOUNTER — OFFICE VISIT (OUTPATIENT)
Dept: FAMILY MEDICINE CLINIC | Age: 35
End: 2025-03-27

## 2025-03-27 VITALS
DIASTOLIC BLOOD PRESSURE: 76 MMHG | HEART RATE: 72 BPM | TEMPERATURE: 98.4 F | RESPIRATION RATE: 18 BRPM | OXYGEN SATURATION: 98 % | WEIGHT: 264 LBS | BODY MASS INDEX: 32.15 KG/M2 | HEIGHT: 76 IN | SYSTOLIC BLOOD PRESSURE: 145 MMHG

## 2025-03-27 DIAGNOSIS — R00.2 HEART PALPITATIONS: ICD-10-CM

## 2025-03-27 DIAGNOSIS — Z86.19 HISTORY OF SYPHILIS: ICD-10-CM

## 2025-03-27 DIAGNOSIS — I10 PRIMARY HYPERTENSION: ICD-10-CM

## 2025-03-27 DIAGNOSIS — B20 CURRENTLY ASYMPTOMATIC HIV INFECTION, WITH HISTORY OF HIV-RELATED ILLNESS (HCC): ICD-10-CM

## 2025-03-27 DIAGNOSIS — R80.9 NEPHROTIC RANGE PROTEINURIA: ICD-10-CM

## 2025-03-27 DIAGNOSIS — A63.0 ANAL WART: ICD-10-CM

## 2025-03-27 DIAGNOSIS — B20 NEUROPATHY DUE TO HIV (HCC): ICD-10-CM

## 2025-03-27 DIAGNOSIS — G63 NEUROPATHY DUE TO HIV (HCC): ICD-10-CM

## 2025-03-27 DIAGNOSIS — A63.0 ANOGENITAL WARTS: ICD-10-CM

## 2025-03-27 DIAGNOSIS — I45.6 WPW (WOLFF-PARKINSON-WHITE SYNDROME): Primary | ICD-10-CM

## 2025-03-27 RX ORDER — ABACAVIR SULFATE, DOLUTEGRAVIR SODIUM, LAMIVUDINE 600; 50; 300 MG/1; MG/1; MG/1
TABLET, FILM COATED ORAL
Qty: 30 TABLET | Refills: 2 | Status: SHIPPED | OUTPATIENT
Start: 2025-03-27

## 2025-03-27 RX ORDER — LOSARTAN POTASSIUM 25 MG/1
25 TABLET ORAL DAILY
Qty: 90 TABLET | Refills: 1 | Status: SHIPPED | OUTPATIENT
Start: 2025-03-27

## 2025-03-27 SDOH — ECONOMIC STABILITY: FOOD INSECURITY: WITHIN THE PAST 12 MONTHS, THE FOOD YOU BOUGHT JUST DIDN'T LAST AND YOU DIDN'T HAVE MONEY TO GET MORE.: NEVER TRUE

## 2025-03-27 SDOH — ECONOMIC STABILITY: FOOD INSECURITY: WITHIN THE PAST 12 MONTHS, YOU WORRIED THAT YOUR FOOD WOULD RUN OUT BEFORE YOU GOT MONEY TO BUY MORE.: NEVER TRUE

## 2025-03-27 ASSESSMENT — PATIENT HEALTH QUESTIONNAIRE - PHQ9
10. IF YOU CHECKED OFF ANY PROBLEMS, HOW DIFFICULT HAVE THESE PROBLEMS MADE IT FOR YOU TO DO YOUR WORK, TAKE CARE OF THINGS AT HOME, OR GET ALONG WITH OTHER PEOPLE: NOT DIFFICULT AT ALL
SUM OF ALL RESPONSES TO PHQ QUESTIONS 1-9: 0
4. FEELING TIRED OR HAVING LITTLE ENERGY: NOT AT ALL
6. FEELING BAD ABOUT YOURSELF - OR THAT YOU ARE A FAILURE OR HAVE LET YOURSELF OR YOUR FAMILY DOWN: NOT AT ALL
SUM OF ALL RESPONSES TO PHQ QUESTIONS 1-9: 0
8. MOVING OR SPEAKING SO SLOWLY THAT OTHER PEOPLE COULD HAVE NOTICED. OR THE OPPOSITE, BEING SO FIGETY OR RESTLESS THAT YOU HAVE BEEN MOVING AROUND A LOT MORE THAN USUAL: NOT AT ALL
2. FEELING DOWN, DEPRESSED OR HOPELESS: NOT AT ALL
5. POOR APPETITE OR OVEREATING: NOT AT ALL
1. LITTLE INTEREST OR PLEASURE IN DOING THINGS: NOT AT ALL
SUM OF ALL RESPONSES TO PHQ QUESTIONS 1-9: 0
SUM OF ALL RESPONSES TO PHQ QUESTIONS 1-9: 0
3. TROUBLE FALLING OR STAYING ASLEEP: NOT AT ALL
9. THOUGHTS THAT YOU WOULD BE BETTER OFF DEAD, OR OF HURTING YOURSELF: NOT AT ALL
7. TROUBLE CONCENTRATING ON THINGS, SUCH AS READING THE NEWSPAPER OR WATCHING TELEVISION: NOT AT ALL

## 2025-03-27 NOTE — PROGRESS NOTES
[facility]  [address] [place] [address]  [phone]  Zhang Rivera MD     Patient: Orion Harris  YOB: 1990  Visit Date: 3/27/25    Orion is a 34 y.o. year old male here today for   Chief Complaint   Patient presents with    Follow-up       HPI  Patient is a 34 year old male here for follow up of hiv , high blood pressure , heart palpitations.     Has not been missing doses of the triumeq. No body rashes. No enlarged lymph nodes. Occasional night sweats, this is not new. No weight loss. Has been having weight gain. Has not had any new sexual partners. No headaches or blurry vision.     Blood pressure has been high consistently at home.     Had treatment for warts at the health department. They are gone.     Has gained 8 pounds.     Review of Systems   Constitutional:  Negative for chills, fever and unexpected weight change.   Respiratory:  Negative for cough, shortness of breath and wheezing.    Cardiovascular:  Negative for chest pain, palpitations and leg swelling.   Gastrointestinal:  Negative for abdominal pain, blood in stool and nausea.   Skin:  Negative for color change and rash.   Neurological:  Positive for numbness. Negative for dizziness, syncope, light-headedness and headaches.       Current Outpatient Medications on File Prior to Visit   Medication Sig Dispense Refill    ascorbic acid (VITAMIN C) 500 MG tablet Take 1 tablet by mouth daily 30 tablet 0    vitamin D 50 MCG (2000 UT) CAPS capsule Take 1 capsule by mouth daily 30 capsule 2    sennosides-docusate sodium (SENOKOT-S) 8.6-50 MG tablet Take 1 tablet by mouth daily 10 tablet 0    DULoxetine (CYMBALTA) 30 MG extended release capsule Take 1 capsule by mouth daily 30 capsule 3     No current facility-administered medications on file prior to visit.     No Known Allergies    Past medical, surgical, socialand/or family history reviewed, updated as needed, and are non-contributory (unless otherwise stated).

## 2025-03-27 NOTE — PATIENT INSTRUCTIONS
Start losartan 25mg   Get blood work done in 2 weeks.   Work on decreasing salt   Increase fresh fruits and veggies   Schedule appointment with the cardiologist, the kidney doctor and the surgeon   Follow up in 1 month or sooner as needed    [place] Transportation Resources*  (Call [employer]/211 if need more resources.)       [employer] (CARTS):  What they offer: Public transportation for all of [facility]. Call at least 24 hours in advance to make reservation. Reduced rates for age 65 and over.   Phone Number: [phone] ext [phone]    [facility] TRANSPORTATION:  What they offer: No cost transportation for [place] residents aged 65 and disabled; weekly shopping schedule.  Phone: [phone] choose option for [facility]'s office    [facility] TRANSPORTATION:  What they offer: Transportation for [facility] residents aged 60 and over who do not require assistance getting on or off vehicle. Service for medical appointments, congregate meal sites, grocery shopping or social service appointments for most of [facility]. Requires 2 week advanced notice   Phone: [phone]    [facility] TRANSPORTATION:  What they offer: Transportation for [facility] residents aged 60 and over or disabled on a weekly schedule for medical appointments, shopping, banking, etc. in [facility] and [place]. No cost  Phone: [phone] [phone]    [facility] DEPARTMENT OF JOB AND FAMILY SERVICES (NET)  What they offer: no cost transportation for persons on traditional Medicaid  Phone number: [phone] ext [phone]    [facility] TRANSPORTATION:  What they offer: Transportation for city residents aged 60 and over for [facility] medical appointments and shopping on a weekly schedule. 24-hour notice required. Cost: donation.  Phone: [phone]  Website: [employer]    [facility] TRANSPORTATION:  What they offer: No cost transportation for local seniors to

## 2025-04-18 NOTE — PROGRESS NOTES
EMG/NCS completed.   Alice Zepeda 1/10/2022
Detail Level: Simple
Additional Notes: Includes spot of concern
Render Risk Assessment In Note?: no

## 2025-06-27 DIAGNOSIS — B20 CURRENTLY ASYMPTOMATIC HIV INFECTION, WITH HISTORY OF HIV-RELATED ILLNESS (HCC): Primary | ICD-10-CM

## 2025-08-26 ENCOUNTER — OFFICE VISIT (OUTPATIENT)
Dept: FAMILY MEDICINE CLINIC | Age: 35
End: 2025-08-26
Payer: COMMERCIAL

## 2025-08-26 VITALS
WEIGHT: 259 LBS | TEMPERATURE: 98.4 F | BODY MASS INDEX: 31.54 KG/M2 | DIASTOLIC BLOOD PRESSURE: 82 MMHG | SYSTOLIC BLOOD PRESSURE: 149 MMHG | HEIGHT: 76 IN | RESPIRATION RATE: 18 BRPM | HEART RATE: 101 BPM | OXYGEN SATURATION: 96 %

## 2025-08-26 DIAGNOSIS — R45.89 NON-SUICIDAL DEPRESSED MOOD: Primary | ICD-10-CM

## 2025-08-26 DIAGNOSIS — B20 CURRENTLY ASYMPTOMATIC HIV INFECTION, WITH HISTORY OF HIV-RELATED ILLNESS (HCC): ICD-10-CM

## 2025-08-26 PROCEDURE — 99213 OFFICE O/P EST LOW 20 MIN: CPT

## 2025-08-26 ASSESSMENT — PATIENT HEALTH QUESTIONNAIRE - PHQ9
2. FEELING DOWN, DEPRESSED OR HOPELESS: SEVERAL DAYS
8. MOVING OR SPEAKING SO SLOWLY THAT OTHER PEOPLE COULD HAVE NOTICED. OR THE OPPOSITE, BEING SO FIGETY OR RESTLESS THAT YOU HAVE BEEN MOVING AROUND A LOT MORE THAN USUAL: MORE THAN HALF THE DAYS
9. THOUGHTS THAT YOU WOULD BE BETTER OFF DEAD, OR OF HURTING YOURSELF: NOT AT ALL
10. IF YOU CHECKED OFF ANY PROBLEMS, HOW DIFFICULT HAVE THESE PROBLEMS MADE IT FOR YOU TO DO YOUR WORK, TAKE CARE OF THINGS AT HOME, OR GET ALONG WITH OTHER PEOPLE: SOMEWHAT DIFFICULT
SUM OF ALL RESPONSES TO PHQ QUESTIONS 1-9: 9
1. LITTLE INTEREST OR PLEASURE IN DOING THINGS: NOT AT ALL
7. TROUBLE CONCENTRATING ON THINGS, SUCH AS READING THE NEWSPAPER OR WATCHING TELEVISION: NOT AT ALL
3. TROUBLE FALLING OR STAYING ASLEEP: SEVERAL DAYS
SUM OF ALL RESPONSES TO PHQ QUESTIONS 1-9: 9
SUM OF ALL RESPONSES TO PHQ QUESTIONS 1-9: 9
5. POOR APPETITE OR OVEREATING: NEARLY EVERY DAY
4. FEELING TIRED OR HAVING LITTLE ENERGY: MORE THAN HALF THE DAYS
6. FEELING BAD ABOUT YOURSELF - OR THAT YOU ARE A FAILURE OR HAVE LET YOURSELF OR YOUR FAMILY DOWN: NOT AT ALL
SUM OF ALL RESPONSES TO PHQ QUESTIONS 1-9: 9

## (undated) DEVICE — SOLUTION IRRIG 500ML 0.9% SOD CHL USP POUR PLAS BTL

## (undated) DEVICE — SINGLE USE BIOPSY VALVE MAJ-210: Brand: SINGLE USE BIOPSY VALVE (STERILE)

## (undated) DEVICE — AIRWAY PHARYNGEAL AD 9 CM INTUB

## (undated) DEVICE — YANKAUER,OPEN TIP,W/O VENT,STERILE: Brand: MEDLINE INDUSTRIES, INC.

## (undated) DEVICE — ELECTRODE PT RET AD L9FT HI MOIST COND ADH HYDRGEL CORDED

## (undated) DEVICE — SOLUTION IRRIG 1000ML 0.9% SOD CHL USP POUR PLAS BTL

## (undated) DEVICE — Device: Brand: MEDEX

## (undated) DEVICE — GLOVE SURG SZ 8 L12IN FNGR THK79MIL GRN LTX FREE

## (undated) DEVICE — SINGLE USE SUCTION VALVE MAJ-209: Brand: SINGLE USE SUCTION VALVE (STERILE)

## (undated) DEVICE — Device

## (undated) DEVICE — SWABSTCK, BENZOIN TINCTURE, 1/PK, STRL: Brand: APLICARE

## (undated) DEVICE — BASIC: Brand: MEDLINE INDUSTRIES, INC.

## (undated) DEVICE — SYRINGE MED 50ML LUERLOCK TIP

## (undated) DEVICE — SURGICAL PROCEDURE PACK BRONCH

## (undated) DEVICE — TAPE,WATERPROOF,CURAD,2"X10YD,LF,72/CS: Brand: CURAD

## (undated) DEVICE — SET EXTN IV L30IN TBNG DIA0.1IN PRIMING 4ML MACBOR FEM ADPT

## (undated) DEVICE — GLOVE ORANGE PI 7 1/2   MSG9075

## (undated) DEVICE — DRAPE,LAPAROTOMY,PED,STERILE: Brand: MEDLINE

## (undated) DEVICE — PREMIUM WET SKIN PREP TRAY: Brand: MEDLINE INDUSTRIES, INC.